# Patient Record
Sex: FEMALE | Race: WHITE | NOT HISPANIC OR LATINO | Employment: OTHER | URBAN - METROPOLITAN AREA
[De-identification: names, ages, dates, MRNs, and addresses within clinical notes are randomized per-mention and may not be internally consistent; named-entity substitution may affect disease eponyms.]

---

## 2017-11-13 ENCOUNTER — HOSPITAL ENCOUNTER (INPATIENT)
Facility: HOSPITAL | Age: 82
LOS: 3 days | Discharge: HOME | DRG: 377 | End: 2017-11-16
Attending: INTERNAL MEDICINE | Admitting: INTERNAL MEDICINE
Payer: MEDICARE

## 2017-11-13 ENCOUNTER — APPOINTMENT (EMERGENCY)
Dept: CT IMAGING | Facility: HOSPITAL | Age: 82
DRG: 377 | End: 2017-11-13
Payer: MEDICARE

## 2017-11-13 DIAGNOSIS — D62 ACUTE BLOOD LOSS ANEMIA: ICD-10-CM

## 2017-11-13 DIAGNOSIS — R58 HYPOVOLEMIA DUE TO HEMORRHAGE: ICD-10-CM

## 2017-11-13 DIAGNOSIS — K92.2 ACUTE LOWER GI BLEEDING: Primary | ICD-10-CM

## 2017-11-13 DIAGNOSIS — D64.9 ANEMIA: ICD-10-CM

## 2017-11-13 DIAGNOSIS — K92.2 GASTROINTESTINAL HEMORRHAGE, UNSPECIFIED GASTROINTESTINAL HEMORRHAGE TYPE: ICD-10-CM

## 2017-11-13 DIAGNOSIS — E86.1 HYPOVOLEMIA DUE TO HEMORRHAGE: ICD-10-CM

## 2017-11-13 PROBLEM — E87.2 LACTIC ACIDOSIS: Status: ACTIVE | Noted: 2017-11-13

## 2017-11-13 PROBLEM — E87.2 LACTIC ACIDOSIS: Status: RESOLVED | Noted: 2017-11-13 | Resolved: 2017-11-13

## 2017-11-13 PROBLEM — E86.0 DEHYDRATION: Status: ACTIVE | Noted: 2017-11-13

## 2017-11-13 PROBLEM — D75.839 THROMBOCYTOSIS: Status: ACTIVE | Noted: 2017-11-13

## 2017-11-13 PROBLEM — E87.20 LACTIC ACIDOSIS: Status: ACTIVE | Noted: 2017-11-13

## 2017-11-13 PROBLEM — N28.9 RENAL LESION: Status: ACTIVE | Noted: 2017-11-13

## 2017-11-13 PROBLEM — E87.20 LACTIC ACIDOSIS: Status: RESOLVED | Noted: 2017-11-13 | Resolved: 2017-11-13

## 2017-11-13 PROBLEM — I45.10 RIGHT BUNDLE BRANCH BLOCK (RBBB) ON ELECTROCARDIOGRAM (ECG): Status: ACTIVE | Noted: 2017-11-13

## 2017-11-13 PROBLEM — K92.1 HEMATOCHEZIA: Status: ACTIVE | Noted: 2017-11-13

## 2017-11-13 PROBLEM — R19.7 DIARRHEA: Status: ACTIVE | Noted: 2017-11-13

## 2017-11-13 PROBLEM — K57.30 DIVERTICULOSIS OF COLON: Status: ACTIVE | Noted: 2017-11-13

## 2017-11-13 PROBLEM — K44.9 LARGE HIATAL HERNIA: Status: ACTIVE | Noted: 2017-11-13

## 2017-11-13 PROBLEM — N17.9 ACUTE KIDNEY INJURY (HCC): Status: ACTIVE | Noted: 2017-11-13

## 2017-11-13 PROBLEM — I95.9 TRANSIENT HYPOTENSION: Status: ACTIVE | Noted: 2017-11-13

## 2017-11-13 LAB
ABO GROUP BLD: NORMAL
ALBUMIN SERPL BCP-MCNC: 2.6 G/DL (ref 3.5–5)
ALP SERPL-CCNC: 78 U/L (ref 46–116)
ALT SERPL W P-5'-P-CCNC: 14 U/L (ref 12–78)
ANION GAP BLD CALC-SCNC: 17 MMOL/L (ref 4–13)
ANION GAP SERPL CALCULATED.3IONS-SCNC: 8 MMOL/L (ref 4–13)
APTT PPP: 27 SECONDS (ref 23–35)
AST SERPL W P-5'-P-CCNC: 16 U/L (ref 5–45)
ATRIAL RATE: 87 BPM
BASOPHILS # BLD AUTO: 0.02 THOUSANDS/ΜL (ref 0–0.1)
BASOPHILS NFR BLD AUTO: 0 % (ref 0–1)
BILIRUB SERPL-MCNC: 0.3 MG/DL (ref 0.2–1)
BLD GP AB SCN SERPL QL: NEGATIVE
BUN BLD-MCNC: 19 MG/DL (ref 5–25)
BUN SERPL-MCNC: 20 MG/DL (ref 5–25)
CA-I BLD-SCNC: 1.12 MMOL/L (ref 1.12–1.32)
CALCIUM SERPL-MCNC: 8.1 MG/DL (ref 8.3–10.1)
CHLORIDE BLD-SCNC: 106 MMOL/L (ref 100–108)
CHLORIDE SERPL-SCNC: 108 MMOL/L (ref 100–108)
CO2 SERPL-SCNC: 26 MMOL/L (ref 21–32)
CREAT BLD-MCNC: 1.2 MG/DL (ref 0.6–1.3)
CREAT SERPL-MCNC: 1.34 MG/DL (ref 0.6–1.3)
EOSINOPHIL # BLD AUTO: 0.03 THOUSAND/ΜL (ref 0–0.61)
EOSINOPHIL NFR BLD AUTO: 0 % (ref 0–6)
ERYTHROCYTE [DISTWIDTH] IN BLOOD BY AUTOMATED COUNT: 15.4 % (ref 11.6–15.1)
GFR SERPL CREATININE-BSD FRML MDRD: 36 ML/MIN/1.73SQ M
GFR SERPL CREATININE-BSD FRML MDRD: 41 ML/MIN/1.73SQ M
GLUCOSE SERPL-MCNC: 126 MG/DL (ref 65–140)
GLUCOSE SERPL-MCNC: 136 MG/DL (ref 65–140)
HCT VFR BLD AUTO: 13.1 % (ref 34.8–46.1)
HCT VFR BLD CALC: <15 % (ref 34.8–46.1)
HGB BLD-MCNC: 3.9 G/DL (ref 11.5–15.4)
HGB BLD-MCNC: 5 G/DL (ref 11.5–15.4)
INR PPP: 1.08 (ref 0.86–1.16)
LACTATE SERPL-SCNC: 1 MMOL/L (ref 0.5–2)
LACTATE SERPL-SCNC: 2.1 MMOL/L (ref 0.5–2)
LIPASE SERPL-CCNC: 185 U/L (ref 73–393)
LYMPHOCYTES # BLD AUTO: 1.11 THOUSANDS/ΜL (ref 0.6–4.47)
LYMPHOCYTES NFR BLD AUTO: 11 % (ref 14–44)
MCH RBC QN AUTO: 25.7 PG (ref 26.8–34.3)
MCHC RBC AUTO-ENTMCNC: 29.8 G/DL (ref 31.4–37.4)
MCV RBC AUTO: 86 FL (ref 82–98)
MONOCYTES # BLD AUTO: 0.67 THOUSAND/ΜL (ref 0.17–1.22)
MONOCYTES NFR BLD AUTO: 7 % (ref 4–12)
NEUTROPHILS # BLD AUTO: 8.1 THOUSANDS/ΜL (ref 1.85–7.62)
NEUTS SEG NFR BLD AUTO: 82 % (ref 43–75)
P AXIS: 96 DEGREES
PCO2 BLD: 22 MMOL/L (ref 21–32)
PLATELET # BLD AUTO: 416 THOUSANDS/UL (ref 149–390)
PMV BLD AUTO: 8.5 FL (ref 8.9–12.7)
POTASSIUM BLD-SCNC: 3.9 MMOL/L (ref 3.5–5.3)
POTASSIUM SERPL-SCNC: 4.1 MMOL/L (ref 3.5–5.3)
PR INTERVAL: 174 MS
PROT SERPL-MCNC: 6.1 G/DL (ref 6.4–8.2)
PROTHROMBIN TIME: 14.3 SECONDS (ref 12.1–14.4)
QRS AXIS: -15 DEGREES
QRSD INTERVAL: 98 MS
QT INTERVAL: 352 MS
QTC INTERVAL: 423 MS
RBC # BLD AUTO: 1.52 MILLION/UL (ref 3.81–5.12)
RH BLD: POSITIVE
SODIUM BLD-SCNC: 141 MMOL/L (ref 136–145)
SODIUM SERPL-SCNC: 142 MMOL/L (ref 136–145)
SPECIMEN EXPIRATION DATE: NORMAL
SPECIMEN SOURCE: ABNORMAL
T WAVE AXIS: 92 DEGREES
TROPONIN I SERPL-MCNC: <0.02 NG/ML
VENTRICULAR RATE: 87 BPM
WBC # BLD AUTO: 9.93 THOUSAND/UL (ref 4.31–10.16)

## 2017-11-13 PROCEDURE — 87040 BLOOD CULTURE FOR BACTERIA: CPT | Performed by: PHYSICIAN ASSISTANT

## 2017-11-13 PROCEDURE — 30233N1 TRANSFUSION OF NONAUTOLOGOUS RED BLOOD CELLS INTO PERIPHERAL VEIN, PERCUTANEOUS APPROACH: ICD-10-PCS | Performed by: FAMILY MEDICINE

## 2017-11-13 PROCEDURE — P9021 RED BLOOD CELLS UNIT: HCPCS

## 2017-11-13 PROCEDURE — 86850 RBC ANTIBODY SCREEN: CPT | Performed by: PHYSICIAN ASSISTANT

## 2017-11-13 PROCEDURE — 86920 COMPATIBILITY TEST SPIN: CPT

## 2017-11-13 PROCEDURE — 83605 ASSAY OF LACTIC ACID: CPT | Performed by: PHYSICIAN ASSISTANT

## 2017-11-13 PROCEDURE — 85610 PROTHROMBIN TIME: CPT | Performed by: PHYSICIAN ASSISTANT

## 2017-11-13 PROCEDURE — 80053 COMPREHEN METABOLIC PANEL: CPT | Performed by: PHYSICIAN ASSISTANT

## 2017-11-13 PROCEDURE — 93005 ELECTROCARDIOGRAM TRACING: CPT

## 2017-11-13 PROCEDURE — 85014 HEMATOCRIT: CPT

## 2017-11-13 PROCEDURE — 99285 EMERGENCY DEPT VISIT HI MDM: CPT

## 2017-11-13 PROCEDURE — 84484 ASSAY OF TROPONIN QUANT: CPT | Performed by: PHYSICIAN ASSISTANT

## 2017-11-13 PROCEDURE — 83690 ASSAY OF LIPASE: CPT | Performed by: PHYSICIAN ASSISTANT

## 2017-11-13 PROCEDURE — 85018 HEMOGLOBIN: CPT | Performed by: PHYSICIAN ASSISTANT

## 2017-11-13 PROCEDURE — 85025 COMPLETE CBC W/AUTO DIFF WBC: CPT | Performed by: PHYSICIAN ASSISTANT

## 2017-11-13 PROCEDURE — 85730 THROMBOPLASTIN TIME PARTIAL: CPT | Performed by: PHYSICIAN ASSISTANT

## 2017-11-13 PROCEDURE — 93005 ELECTROCARDIOGRAM TRACING: CPT | Performed by: PHYSICIAN ASSISTANT

## 2017-11-13 PROCEDURE — 36415 COLL VENOUS BLD VENIPUNCTURE: CPT | Performed by: PHYSICIAN ASSISTANT

## 2017-11-13 PROCEDURE — P9040 RBC LEUKOREDUCED IRRADIATED: HCPCS

## 2017-11-13 PROCEDURE — 80047 BASIC METABLC PNL IONIZED CA: CPT

## 2017-11-13 PROCEDURE — 96361 HYDRATE IV INFUSION ADD-ON: CPT

## 2017-11-13 PROCEDURE — 36430 TRANSFUSION BLD/BLD COMPNT: CPT

## 2017-11-13 PROCEDURE — C9113 INJ PANTOPRAZOLE SODIUM, VIA: HCPCS | Performed by: PHYSICIAN ASSISTANT

## 2017-11-13 PROCEDURE — 74176 CT ABD & PELVIS W/O CONTRAST: CPT

## 2017-11-13 PROCEDURE — 96360 HYDRATION IV INFUSION INIT: CPT

## 2017-11-13 PROCEDURE — 86901 BLOOD TYPING SEROLOGIC RH(D): CPT | Performed by: PHYSICIAN ASSISTANT

## 2017-11-13 PROCEDURE — 86900 BLOOD TYPING SEROLOGIC ABO: CPT | Performed by: PHYSICIAN ASSISTANT

## 2017-11-13 RX ORDER — SODIUM CHLORIDE, SODIUM GLUCONATE, SODIUM ACETATE, POTASSIUM CHLORIDE, MAGNESIUM CHLORIDE, SODIUM PHOSPHATE, DIBASIC, AND POTASSIUM PHOSPHATE .53; .5; .37; .037; .03; .012; .00082 G/100ML; G/100ML; G/100ML; G/100ML; G/100ML; G/100ML; G/100ML
75 INJECTION, SOLUTION INTRAVENOUS CONTINUOUS
Status: DISCONTINUED | OUTPATIENT
Start: 2017-11-13 | End: 2017-11-16 | Stop reason: HOSPADM

## 2017-11-13 RX ADMIN — SODIUM CHLORIDE 8 MG/HR: 900 INJECTION, SOLUTION INTRAVENOUS at 20:28

## 2017-11-13 RX ADMIN — POLYETHYLENE GLYCOL 3350, SODIUM SULFATE ANHYDROUS, SODIUM BICARBONATE, SODIUM CHLORIDE, POTASSIUM CHLORIDE 2000 ML: 236; 22.74; 6.74; 5.86; 2.97 POWDER, FOR SOLUTION ORAL at 20:29

## 2017-11-13 RX ADMIN — SODIUM CHLORIDE 80 MG: 9 INJECTION, SOLUTION INTRAVENOUS at 20:28

## 2017-11-13 RX ADMIN — SODIUM CHLORIDE 1000 ML: 0.9 INJECTION, SOLUTION INTRAVENOUS at 15:58

## 2017-11-13 NOTE — ED PROVIDER NOTES
History  Chief Complaint   Patient presents with    Diarrhea     Per EMS and daughter "Pt was at [facility] BP was 65/37 and ambulance was called, Pt has not been feeling well for the last 8 days  Diarrhea on and off, daughter gave immodium just this morning " Pt also c/o lightheaded and dizziness, and denies SOB and chest pain"     Patient presents to emergency department diarrhea that has off and on for about 8 days  She lives at daughter and family  Daughter has been giving her Imodium as needed  Slows the diarrhea but then the diarrhea returns  She has been feeling dizzy and lightheaded off and on  She is not having any pain in her chest or difficulty breathing  Daughter states that her stool color is different and states that it is eggplant with a bit of red in color ankle  However they deny that it looks bloody  They deny that it is black and target  She states she has been able to keep in liquids and bland foods she has not had any vomiting or nausea  She denies any abdominal pain  Patient is an otherwise healthy 59-year-old woman who does not take anything regularly but daughter states she does take Aleve as needed for her knee pain  Daughter admits she does not have a family doctor she has never had a colonoscopy and has not had any medical care in years  Patient was taken to urgent care and was found have a blood pressure of 60 over 30 when EMS arrived it had started to improve they got IV access and started to give fluids just shy of 200 had gone in upon patient's arrival here  Patient's blood pressure is in the 1 teens over 50s to 60s here  Patient admits that she has been very lightheaded and dizzy off and on over the past week  None       History reviewed  No pertinent past medical history  Past Surgical History:   Procedure Laterality Date    CATARACT EXTRACTION         History reviewed  No pertinent family history    I have reviewed and agree with the history as documented  Social History   Substance Use Topics    Smoking status: Never Smoker    Smokeless tobacco: Never Used    Alcohol use No        Review of Systems   All other systems reviewed and are negative  Physical Exam  ED Triage Vitals   Temperature Pulse Respirations Blood Pressure SpO2   11/13/17 1530 11/13/17 1530 11/13/17 1530 11/13/17 1530 11/13/17 1530   97 6 °F (36 4 °C) 90 20 112/57 99 %      Temp Source Heart Rate Source Patient Position - Orthostatic VS BP Location FiO2 (%)   11/13/17 1530 11/13/17 1530 11/13/17 1530 11/13/17 1530 --   Oral Monitor Lying Right arm       Pain Score       11/13/17 1949       No Pain           Orthostatic Vital Signs  Vitals:    11/13/17 1845 11/13/17 1900 11/13/17 1915 11/13/17 1937   BP: 124/58 122/59 136/61    Pulse: 92 92 94    Patient Position - Orthostatic VS: Lying Lying Lying Lying       Physical Exam   Constitutional: She is oriented to person, place, and time  She appears well-developed and well-nourished  HENT:   Head: Normocephalic  Mouth/Throat: Oropharynx is clear and moist    Eyes: Conjunctivae and EOM are normal    Neck: Neck supple  Cardiovascular: Normal rate, regular rhythm and normal heart sounds  Pulmonary/Chest: Effort normal and breath sounds normal    Abdominal: Soft  Bowel sounds are normal  There is no tenderness  Genitourinary: Rectal exam shows guaiac positive stool  Genitourinary Comments: Gross bright red blood no masses palpated no significant hemorrhoids   Musculoskeletal: She exhibits no edema  Neurological: She is alert and oriented to person, place, and time  Skin: Skin is warm  Psychiatric: She has a normal mood and affect  Her behavior is normal    Nursing note and vitals reviewed        ED Medications  Medications   multi-electrolyte (ISOLYTE-S PH 7 4 equivalent) IV solution (not administered)   pantoprazole (PROTONIX) 80 mg in sodium chloride 0 9 % 100 mL IVPB (not administered)   pantoprazole (PROTONIX) 80 mg in sodium chloride 0 9 % 100 mL infusion (not administered)   polyethylene glycol (GOLYTELY) bowel prep 2,000 mL (not administered)   sodium chloride 0 9 % bolus 1,000 mL (0 mL Intravenous Stopped 11/13/17 1319)       Diagnostic Studies  Results Reviewed     Procedure Component Value Units Date/Time    POCT Chem 8+ [[id number]]  (Abnormal) Collected:  11/13/17 1630    Lab Status:  Final result Updated:  11/13/17 1635     SODIUM, I-STAT 141 mmol/l      Potassium, i-STAT 3 9 mmol/L      Chloride, istat 106 mmol/L      CO2, i-STAT 22 mmol/L      Anion Gap, Istat 17 (H) mmol/L      Calcium, Ionized i-STAT 1 12 mmol/L      BUN, I-STAT 19 mg/dl      Creatinine, i-STAT 1 2 mg/dl      eGFR 41 ml/min/1 73sq m      Glucose, i-STAT 126 mg/dl      Hct, i-STAT <15 (L) %      Hgb, i-STAT -- g/dl      Specimen Type VENOUS    Lactic acid, plasma [[id number]]  (Abnormal) Collected:  11/13/17 1553    Lab Status:  Final result Specimen:  Blood from Arm, Left Updated:  11/13/17 1634     LACTIC ACID 2 1 (HH) mmol/L     Narrative:         Result may be elevated if tourniquet was used during collection  Troponin I [[id number]]  (Normal) Collected:  11/13/17 1553    Lab Status:  Final result Specimen:  Blood from Arm, Left Updated:  11/13/17 1625     Troponin I <0 02 ng/mL     Narrative:         [employer] Chemistry analyzer 99% cutoff is > 0 04 ng/mL in network labs    o cTnI 99% cutoff is useful only when applied to patients in the clinical setting of myocardial ischemia  o cTnI 99% cutoff should be interpreted in the context of clinical history, ECG findings and possibly cardiac imaging to establish correct diagnosis  o cTnI 99% cutoff may be suggestive but clearly not indicative of a coronary event without the clinical setting of myocardial ischemia      Comprehensive metabolic panel [[id number]]  (Abnormal) Collected:  11/13/17 1553    Lab Status:  Final result Specimen:  Blood from Arm, Left Updated:  11/13/17 1623     Sodium 142 mmol/L Potassium 4 1 mmol/L      Chloride 108 mmol/L      CO2 26 mmol/L      Anion Gap 8 mmol/L      BUN 20 mg/dL      Creatinine 1 34 (H) mg/dL      Glucose 136 mg/dL      Calcium 8 1 (L) mg/dL      AST 16 U/L      ALT 14 U/L      Alkaline Phosphatase 78 U/L      Total Protein 6 1 (L) g/dL      Albumin 2 6 (L) g/dL      Total Bilirubin 0 30 mg/dL      eGFR 36 ml/min/1 73sq m     Narrative:         National Kidney Disease Education Program recommendations are as follows:  GFR calculation is accurate only with a steady state creatinine  Chronic Kidney disease less than 60 ml/min/1 73 sq  meters  Kidney failure less than 15 ml/min/1 73 sq  meters  Lipase [[id number]]  (Normal) Collected:  11/13/17 1553    Lab Status:  Final result Specimen:  Blood from Arm, Left Updated:  11/13/17 1623     Lipase 185 u/L     Protime-INR [[id number]]  (Normal) Collected:  11/13/17 1553    Lab Status:  Final result Specimen:  Blood from Arm, Left Updated:  11/13/17 1617     Protime 14 3 seconds      INR 1 08    APTT [[id number]]  (Normal) Collected:  11/13/17 1553    Lab Status:  Final result Specimen:  Blood from Arm, Left Updated:  11/13/17 1617     PTT 27 seconds     Narrative:          Therapeutic Heparin Range = 60-90 seconds    CBC and differential [[id number]]  (Abnormal) Collected:  11/13/17 1553    Lab Status:  Final result Specimen:  Blood from Arm, Left Updated:  11/13/17 1614     WBC 9 93 Thousand/uL      RBC 1 52 (L) Million/uL      Hemoglobin 3 9 (LL) g/dL      Hematocrit 13 1 (L) %      MCV 86 fL      MCH 25 7 (L) pg      MCHC 29 8 (L) g/dL      RDW 15 4 (H) %      MPV 8 5 (L) fL      Platelets 966 (H) Thousands/uL      Neutrophils Relative 82 (H) %      Lymphocytes Relative 11 (L) %      Monocytes Relative 7 %      Eosinophils Relative 0 %      Basophils Relative 0 %      Neutrophils Absolute 8 10 (H) Thousands/µL      Lymphocytes Absolute 1 11 Thousands/µL      Monocytes Absolute 0 67 Thousand/µL      Eosinophils Absolute 0 03 Thousand/µL      Basophils Absolute 0 02 Thousands/µL     Blood culture #1 [[id number]] Collected:  11/13/17 1554    Lab Status: In process Specimen:  Blood from Arm, Left Updated:  11/13/17 1604    Blood culture #2 [[id number]] Collected:  11/13/17 1557    Lab Status: In process Specimen:  Blood from Arm, Right Updated:  11/13/17 1604    POCT urinalysis dipstick [[id number]]     Lab Status:  No result Specimen:  Urine                  CT abdomen pelvis wo contrast   Final Result by Gifty Meza MD (11/13 1706)      Colonic diverticulosis without acute diverticulitis  Cholelithiasis without signs of cholecystitis  Large hiatal hernia containing proximally 50 percent of the stomach  No acute inflammatory changes in the abdomen or pelvis  Dominant 6 cm hyperdense right renal lesion is likely a hemorrhagic cyst   Consider comparison with prior studies if available           ##[email or web address]##imslh         Workstation performed: [id number]                    Procedures  ECG 12 Lead Documentation  Date/Time: 11/13/2017 4:41 PM  Performed by: MANUEL Honeycutt  Authorized by: Yobany EID     Patient location:  ED  Previous ECG:     Previous ECG:  Unavailable  Rate:     ECG rate:  87  Rhythm:     Rhythm: sinus rhythm    Ectopy:     Ectopy: none    QRS:     QRS axis:  Normal  ST segments:     ST segments:  Normal  T waves:     T waves: inverted      Inverted:  AVL    CriticalCare Time  Performed by: MANEUL MATHUR  Authorized by: Yobany EID     Critical care provider statement:     Critical care time (minutes):  40    Critical care was necessary to treat or prevent imminent or life-threatening deterioration of the following conditions:  Shock    Critical care was time spent personally by me on the following activities:  Obtaining history from patient or surrogate, development of treatment plan with patient or surrogate, discussions with consultants, discussions with primary provider, evaluation of patient's response to treatment, examination of patient, ordering and performing treatments and interventions, ordering and review of laboratory studies and re-evaluation of patient's condition  Comments:       90s a few times did spike up into the low 100s but did not get higher  Patient remained alert and oriented we and had multiple conversations with patient and daughter  Phone Contacts  ED Phone Contact    ED Course  ED Course as of Nov 13 2016   Mon Nov 13, 2017   1643 Initially concern for possible GI infectious cause of her is dizziness and diarrhea and so blood cultures and a lactic or initially ordered however it is secondary to her GI bleed  The elevated lactic acid is secondary to her hypovolemia secondary to GI bleed not septic shock as I was originally concerned about  1738 Discussed code status with patient and she became upset and did not answer however the daughter said yes she would be a full code  1738 Patient's vitals remained stable her heart rate remains in the 90s and her blood pressure remains stable  Currently heart rate is 90-94 with all blood pressure of 117/55  Continue to closely monitor patient  Blood is here    0 Spoke with critical care DR Eid - discussed case head and spoke with the critical care JOSÉ Padilla she will come and see the patient here in the emergency department will admit to ICU                                  MDM  Number of Diagnoses or Management Options  Acute lower GI bleeding: new and requires workup  Anemia: new and requires workup  Hypovolemia due to hemorrhage: new and requires workup     Amount and/or Complexity of Data Reviewed  Clinical lab tests: reviewed  Tests in the radiology section of CPT®: reviewed  Discussion of test results with the performing providers: yes  Obtain history from someone other than the patient: yes  Discuss the patient with other providers: yes    Risk of Complications, Morbidity, and/or Mortality  General comments: Spoke with critical care he also requested to let General surgery no and I did speak with surgical PA about case they are aware  No formal Consul is needed at this time  Discussed case in detail with Dr Corrinne Mountain will also saw and evaluated the patient  Patient Progress  Patient progress: improved    CritCare Time    Disposition  Final diagnoses:   Acute lower GI bleeding   Hypovolemia due to hemorrhage   Anemia - 2nd to hemmorage     Time reflects when diagnosis was documented in both MDM as applicable and the Disposition within this note     Time User Action Codes Description Comment    11/13/2017  6:12 PM Lisa Russell Add [K92 2] Acute lower GI bleeding     11/13/2017  6:12 PM Lisa Russell Add [E86 1,  R58] Hypovolemia due to hemorrhage     11/13/2017  6:12 PM Lisa Russell Add [D64 9] Anemia     11/13/2017  6:13 PM Lisa Russell Modify [D64 9] Anemia 2nd to hemmorage    11/13/2017  7:42 PM Emile Coffee Add [D62] Acute blood loss anemia     11/13/2017  7:43 PM Emile Coffee Add [K92 2] Gastrointestinal hemorrhage, unspecified gastrointestinal hemorrhage type     11/13/2017  7:43 PM Emile Coffee Modify [K92 2] Gastrointestinal hemorrhage, unspecified gastrointestinal hemorrhage type       ED Disposition     ED Disposition Condition Comment    Admit  Case was discussed with Dr Ashley Diaz / Sav Kaur and the patient's admission status was agreed to be admisison  to the service of Dr Mark Colby    None       There are no discharge medications for this patient  No discharge procedures on file      ED Provider  Electronically Signed by           Brandon Mccormick PA-C  11/13/17 2016

## 2017-11-14 ENCOUNTER — ANESTHESIA (INPATIENT)
Dept: GASTROENTEROLOGY | Facility: HOSPITAL | Age: 82
DRG: 377 | End: 2017-11-14
Payer: MEDICARE

## 2017-11-14 ENCOUNTER — ANESTHESIA EVENT (INPATIENT)
Dept: GASTROENTEROLOGY | Facility: HOSPITAL | Age: 82
DRG: 377 | End: 2017-11-14
Payer: MEDICARE

## 2017-11-14 ENCOUNTER — GENERIC CONVERSION - ENCOUNTER (OUTPATIENT)
Dept: OTHER | Facility: OTHER | Age: 82
End: 2017-11-14

## 2017-11-14 PROBLEM — I95.9 TRANSIENT HYPOTENSION: Status: RESOLVED | Noted: 2017-11-13 | Resolved: 2017-11-14

## 2017-11-14 PROBLEM — D75.839 THROMBOCYTOSIS: Status: RESOLVED | Noted: 2017-11-13 | Resolved: 2017-11-14

## 2017-11-14 PROBLEM — K92.2 GASTROINTESTINAL HEMORRHAGE: Status: ACTIVE | Noted: 2017-11-13

## 2017-11-14 LAB
ABO GROUP BLD BPU: NORMAL
ALBUMIN SERPL BCP-MCNC: 2.3 G/DL (ref 3.5–5)
ALP SERPL-CCNC: 78 U/L (ref 46–116)
ALT SERPL W P-5'-P-CCNC: 12 U/L (ref 12–78)
ANION GAP SERPL CALCULATED.3IONS-SCNC: 8 MMOL/L (ref 4–13)
AST SERPL W P-5'-P-CCNC: 17 U/L (ref 5–45)
BASOPHILS # BLD AUTO: 0.02 THOUSANDS/ΜL (ref 0–0.1)
BASOPHILS NFR BLD AUTO: 0 % (ref 0–1)
BILIRUB SERPL-MCNC: 0.9 MG/DL (ref 0.2–1)
BPU ID: NORMAL
BUN SERPL-MCNC: 16 MG/DL (ref 5–25)
C DIFF TOX GENS STL QL NAA+PROBE: NORMAL
CA-I BLD-SCNC: 0.94 MMOL/L (ref 1.12–1.32)
CALCIUM SERPL-MCNC: 7.7 MG/DL (ref 8.3–10.1)
CHLORIDE SERPL-SCNC: 109 MMOL/L (ref 100–108)
CO2 SERPL-SCNC: 24 MMOL/L (ref 21–32)
CREAT SERPL-MCNC: 1.08 MG/DL (ref 0.6–1.3)
CROSSMATCH: NORMAL
EOSINOPHIL # BLD AUTO: 0.05 THOUSAND/ΜL (ref 0–0.61)
EOSINOPHIL NFR BLD AUTO: 1 % (ref 0–6)
ERYTHROCYTE [DISTWIDTH] IN BLOOD BY AUTOMATED COUNT: 13.8 % (ref 11.6–15.1)
GFR SERPL CREATININE-BSD FRML MDRD: 46 ML/MIN/1.73SQ M
GLUCOSE SERPL-MCNC: 96 MG/DL (ref 65–140)
HCT VFR BLD AUTO: 22.3 % (ref 34.8–46.1)
HGB BLD-MCNC: 6.6 G/DL (ref 11.5–15.4)
HGB BLD-MCNC: 7.2 G/DL (ref 11.5–15.4)
HGB BLD-MCNC: 8 G/DL (ref 11.5–15.4)
HGB BLD-MCNC: 8.2 G/DL (ref 11.5–15.4)
INR PPP: 1.09 (ref 0.86–1.16)
LYMPHOCYTES # BLD AUTO: 1.24 THOUSANDS/ΜL (ref 0.6–4.47)
LYMPHOCYTES NFR BLD AUTO: 13 % (ref 14–44)
MAGNESIUM SERPL-MCNC: 2 MG/DL (ref 1.6–2.6)
MCH RBC QN AUTO: 27.8 PG (ref 26.8–34.3)
MCHC RBC AUTO-ENTMCNC: 32.3 G/DL (ref 31.4–37.4)
MCV RBC AUTO: 86 FL (ref 82–98)
MONOCYTES # BLD AUTO: 1.1 THOUSAND/ΜL (ref 0.17–1.22)
MONOCYTES NFR BLD AUTO: 12 % (ref 4–12)
NEUTROPHILS # BLD AUTO: 7.13 THOUSANDS/ΜL (ref 1.85–7.62)
NEUTS SEG NFR BLD AUTO: 74 % (ref 43–75)
PHOSPHATE SERPL-MCNC: 2.8 MG/DL (ref 2.3–4.1)
PLATELET # BLD AUTO: 299 THOUSANDS/UL (ref 149–390)
PMV BLD AUTO: 8.5 FL (ref 8.9–12.7)
POTASSIUM SERPL-SCNC: 3.8 MMOL/L (ref 3.5–5.3)
PROT SERPL-MCNC: 5.5 G/DL (ref 6.4–8.2)
PROTHROMBIN TIME: 14.5 SECONDS (ref 12.1–14.4)
RBC # BLD AUTO: 2.59 MILLION/UL (ref 3.81–5.12)
SODIUM SERPL-SCNC: 141 MMOL/L (ref 136–145)
TROPONIN I SERPL-MCNC: <0.02 NG/ML
UNIT DISPENSE STATUS: NORMAL
UNIT PRODUCT CODE: NORMAL
UNIT RH: NORMAL
WBC # BLD AUTO: 9.54 THOUSAND/UL (ref 4.31–10.16)

## 2017-11-14 PROCEDURE — 84100 ASSAY OF PHOSPHORUS: CPT | Performed by: INTERNAL MEDICINE

## 2017-11-14 PROCEDURE — 85025 COMPLETE CBC W/AUTO DIFF WBC: CPT | Performed by: INTERNAL MEDICINE

## 2017-11-14 PROCEDURE — 82330 ASSAY OF CALCIUM: CPT | Performed by: INTERNAL MEDICINE

## 2017-11-14 PROCEDURE — 85610 PROTHROMBIN TIME: CPT | Performed by: INTERNAL MEDICINE

## 2017-11-14 PROCEDURE — 88342 IMHCHEM/IMCYTCHM 1ST ANTB: CPT | Performed by: INTERNAL MEDICINE

## 2017-11-14 PROCEDURE — 88341 IMHCHEM/IMCYTCHM EA ADD ANTB: CPT | Performed by: INTERNAL MEDICINE

## 2017-11-14 PROCEDURE — 88305 TISSUE EXAM BY PATHOLOGIST: CPT | Performed by: INTERNAL MEDICINE

## 2017-11-14 PROCEDURE — 80053 COMPREHEN METABOLIC PANEL: CPT | Performed by: INTERNAL MEDICINE

## 2017-11-14 PROCEDURE — 85018 HEMOGLOBIN: CPT | Performed by: INTERNAL MEDICINE

## 2017-11-14 PROCEDURE — 84484 ASSAY OF TROPONIN QUANT: CPT | Performed by: INTERNAL MEDICINE

## 2017-11-14 PROCEDURE — 0DB68ZX EXCISION OF STOMACH, VIA NATURAL OR ARTIFICIAL OPENING ENDOSCOPIC, DIAGNOSTIC: ICD-10-PCS | Performed by: INTERNAL MEDICINE

## 2017-11-14 PROCEDURE — 87493 C DIFF AMPLIFIED PROBE: CPT | Performed by: PHYSICIAN ASSISTANT

## 2017-11-14 PROCEDURE — C9113 INJ PANTOPRAZOLE SODIUM, VIA: HCPCS | Performed by: PHYSICIAN ASSISTANT

## 2017-11-14 PROCEDURE — 0DJD8ZZ INSPECTION OF LOWER INTESTINAL TRACT, VIA NATURAL OR ARTIFICIAL OPENING ENDOSCOPIC: ICD-10-PCS | Performed by: INTERNAL MEDICINE

## 2017-11-14 PROCEDURE — 83735 ASSAY OF MAGNESIUM: CPT | Performed by: INTERNAL MEDICINE

## 2017-11-14 RX ORDER — CALCIUM GLUCONATE 94 MG/ML
1 INJECTION, SOLUTION INTRAVENOUS ONCE
Status: DISCONTINUED | OUTPATIENT
Start: 2017-11-14 | End: 2017-11-14 | Stop reason: SDUPTHER

## 2017-11-14 RX ORDER — POTASSIUM CHLORIDE 14.9 MG/ML
20 INJECTION INTRAVENOUS ONCE
Status: COMPLETED | OUTPATIENT
Start: 2017-11-14 | End: 2017-11-14

## 2017-11-14 RX ORDER — MAGNESIUM CARB/ALUMINUM HYDROX 105-160MG
148 TABLET,CHEWABLE ORAL ONCE
Status: COMPLETED | OUTPATIENT
Start: 2017-11-14 | End: 2017-11-14

## 2017-11-14 RX ORDER — SODIUM CHLORIDE 9 MG/ML
INJECTION, SOLUTION INTRAVENOUS CONTINUOUS PRN
Status: DISCONTINUED | OUTPATIENT
Start: 2017-11-14 | End: 2017-11-14 | Stop reason: SURG

## 2017-11-14 RX ORDER — PROPOFOL 10 MG/ML
INJECTION, EMULSION INTRAVENOUS AS NEEDED
Status: DISCONTINUED | OUTPATIENT
Start: 2017-11-14 | End: 2017-11-14 | Stop reason: SURG

## 2017-11-14 RX ADMIN — SODIUM CHLORIDE: 0.9 INJECTION, SOLUTION INTRAVENOUS at 16:08

## 2017-11-14 RX ADMIN — PROPOFOL 20 MG: 10 INJECTION, EMULSION INTRAVENOUS at 16:21

## 2017-11-14 RX ADMIN — PROPOFOL 10 MG: 10 INJECTION, EMULSION INTRAVENOUS at 16:43

## 2017-11-14 RX ADMIN — PROPOFOL 10 MG: 10 INJECTION, EMULSION INTRAVENOUS at 16:25

## 2017-11-14 RX ADMIN — PROPOFOL 10 MG: 10 INJECTION, EMULSION INTRAVENOUS at 16:33

## 2017-11-14 RX ADMIN — CALCIUM GLUCONATE 1 G: 94 INJECTION, SOLUTION INTRAVENOUS at 05:49

## 2017-11-14 RX ADMIN — PROPOFOL 10 MG: 10 INJECTION, EMULSION INTRAVENOUS at 16:28

## 2017-11-14 RX ADMIN — SODIUM CHLORIDE 8 MG/HR: 900 INJECTION, SOLUTION INTRAVENOUS at 07:40

## 2017-11-14 RX ADMIN — PROPOFOL 10 MG: 10 INJECTION, EMULSION INTRAVENOUS at 16:38

## 2017-11-14 RX ADMIN — PROPOFOL 20 MG: 10 INJECTION, EMULSION INTRAVENOUS at 16:17

## 2017-11-14 RX ADMIN — PROPOFOL 10 MG: 10 INJECTION, EMULSION INTRAVENOUS at 16:36

## 2017-11-14 RX ADMIN — PROPOFOL 10 MG: 10 INJECTION, EMULSION INTRAVENOUS at 16:31

## 2017-11-14 RX ADMIN — PROPOFOL 20 MG: 10 INJECTION, EMULSION INTRAVENOUS at 16:15

## 2017-11-14 RX ADMIN — PROPOFOL 10 MG: 10 INJECTION, EMULSION INTRAVENOUS at 16:40

## 2017-11-14 RX ADMIN — POTASSIUM CHLORIDE 20 MEQ: 200 INJECTION, SOLUTION INTRAVENOUS at 05:45

## 2017-11-14 RX ADMIN — SODIUM CHLORIDE, SODIUM GLUCONATE, SODIUM ACETATE, POTASSIUM CHLORIDE, MAGNESIUM CHLORIDE, SODIUM PHOSPHATE, DIBASIC, AND POTASSIUM PHOSPHATE 50 ML/HR: .53; .5; .37; .037; .03; .012; .00082 INJECTION, SOLUTION INTRAVENOUS at 01:36

## 2017-11-14 RX ADMIN — MAGNESIUM CITRATE 148 ML: 1.75 LIQUID ORAL at 09:19

## 2017-11-14 RX ADMIN — PROPOFOL 20 MG: 10 INJECTION, EMULSION INTRAVENOUS at 16:19

## 2017-11-14 RX ADMIN — SODIUM CHLORIDE 8 MG/HR: 900 INJECTION, SOLUTION INTRAVENOUS at 18:43

## 2017-11-14 RX ADMIN — SODIUM CHLORIDE, SODIUM GLUCONATE, SODIUM ACETATE, POTASSIUM CHLORIDE, MAGNESIUM CHLORIDE, SODIUM PHOSPHATE, DIBASIC, AND POTASSIUM PHOSPHATE 75 ML/HR: .53; .5; .37; .037; .03; .012; .00082 INJECTION, SOLUTION INTRAVENOUS at 18:28

## 2017-11-14 NOTE — CONSULTS
Consultation - 126 [facility]  Qian Mullen 80 y o  female MRN: [id number]  Unit/Bed#:  Encounter: [id number]        Inpatient consult to gastroenterology  Consult performed by: George Villa ordered by: Michell Crespo        Reason for Consult / Principal Problem: Gastrointestinal bleeding    HPI: Qian Mullen is a 80y o  year old female with reportedly no known medical history (does not have a family doctor, never had a colonoscopy and has not had any medical care in years) who presented to the emergency room yesterday afternoon complaining of intermittent diarrhea for the last 8 days, also sometimes feeling dizzy and lightheaded  Patient's family did not think that her stools had looked bloody but had an eggplant-like coloration  Patient was taken to urgent care and found to have blood pressure of 60/30, was taken here from there via EMS  In the emergency room rectal exam showed guaiac-positive stool with gross bright red blood  Patient takes Aleve as needed for knee pain, amounting to about once per day  Hemoglobin was found to be severely decreased at 3 9, baseline unknown, with MCV apparently normal at 86  BUN within normal limits at 20  She has been transfused packed red blood cells and hemoglobin this morning is 7 2  Patient was given half a bowel prep last night which she tolerated, she denies any abdominal pain, nausea or vomiting  She had some liquidy brown stool this morning  No active rectal bleeding at this time  Patient says that she was experiencing diarrhea only intermittently over the last week, not profusely throughout the day generally  She denies any known history of gastrointestinal issues, she denies any problems with acid reflux, indigestion or difficulty with swallowing  REVIEW OF SYSTEMS:    CONSTITUTIONAL: Denies any fever, chills, or rigors  Good appetite, and no recent weight loss  HEENT: No earache or tinnitus   Denies hearing loss or visual disturbances  CARDIOVASCULAR: No chest pain or palpitations  RESPIRATORY: Denies any cough, hemoptysis, shortness of breath or dyspnea on exertion  GASTROINTESTINAL: As noted in the History of Present Illness  GENITOURINARY: No problems with urination  Denies any hematuria or dysuria  NEUROLOGIC: No dizziness or vertigo, denies headaches  MUSCULOSKELETAL: Denies any muscle or joint pain  SKIN: Denies skin rashes or itching  ENDOCRINE: Denies excessive thirst  Denies intolerance to heat or cold  PSYCHOSOCIAL: Denies depression or anxiety  Denies any recent memory loss  Historical Information   History reviewed  No pertinent past medical history  Past Surgical History:   Procedure Laterality Date    CATARACT EXTRACTION       Social History   History   Alcohol Use No     History   Drug Use No     History   Smoking Status    Never Smoker   Smokeless Tobacco    Never Used     History reviewed  No pertinent family history  Meds/Allergies     No prescriptions prior to admission  Current Facility-Administered Medications   Medication Dose Route Frequency    multi-electrolyte (ISOLYTE-S PH 7 4 equivalent) IV solution  75 mL/hr Intravenous Continuous    pantoprazole (PROTONIX) 80 mg in sodium chloride 0 9 % 100 mL infusion  8 mg/hr Intravenous Continuous       No Known Allergies        Objective     Blood pressure 148/79, pulse 87, temperature 98 °F (36 7 °C), temperature source Oral, resp  rate 18, height 5' 5" (1 651 m), weight 86 9 kg (191 lb 9 3 oz), SpO2 100 %        Intake/Output Summary (Last 24 hours) at 11/14/17 0900  Last data filed at 11/14/17 0601   Gross per 24 hour   Intake          2116 33 ml   Output                0 ml   Net          2116 33 ml         PHYSICAL EXAM     General Appearance:   Alert, cooperative, no distress, appears stated age    HEENT:   Normocephalic, atraumatic, anicteric      Neck:  Supple, symmetrical, trachea midline, no adenopathy;    thyroid: no enlargement/tenderness/nodules; no carotid  bruit or JVD    Lungs:   Clear to auscultation bilaterally; no rales, rhonchi or wheezing; respirations unlabored    Heart[de-identified]   S1 and S2 normal; regular rate and rhythm; no murmur, rub, or gallop     Abdomen:   Soft, non-tender, non-distended; normal bowel sounds; no masses, no organomegaly    Genitalia:   Deferred    Rectal:   Deferred    Extremities:  No cyanosis, clubbing or edema    Pulses:  2+ and symmetric all extremities    Skin:  Skin color, texture, turgor normal, no rashes or lesions    Lymph nodes:  No palpable cervical, axillary or inguinal lymphadenopathy        Lab Results:   Admission on 11/13/2017   Component Date Value    WBC 11/13/2017 9 93     RBC 11/13/2017 1 52*    Hemoglobin 11/13/2017 3 9*    Hematocrit 11/13/2017 13 1*    MCV 11/13/2017 86     MCH 11/13/2017 25 7*    MCHC 11/13/2017 29 8*    RDW 11/13/2017 15 4*    MPV 11/13/2017 8 5*    Platelets 08/95/3737 416*    Neutrophils Relative 11/13/2017 82*    Lymphocytes Relative 11/13/2017 11*    Monocytes Relative 11/13/2017 7     Eosinophils Relative 11/13/2017 0     Basophils Relative 11/13/2017 0     Neutrophils Absolute 11/13/2017 8 10*    Lymphocytes Absolute 11/13/2017 1 11     Monocytes Absolute 11/13/2017 0 67     Eosinophils Absolute 11/13/2017 0 03     Basophils Absolute 11/13/2017 0 02     Sodium 11/13/2017 142     Potassium 11/13/2017 4 1     Chloride 11/13/2017 108     CO2 11/13/2017 26     Anion Gap 11/13/2017 8     BUN 11/13/2017 20     Creatinine 11/13/2017 1 34*    Glucose 11/13/2017 136     Calcium 11/13/2017 8 1*    AST 11/13/2017 16     ALT 11/13/2017 14     Alkaline Phosphatase 11/13/2017 78     Total Protein 11/13/2017 6 1*    Albumin 11/13/2017 2 6*    Total Bilirubin 11/13/2017 0 30     eGFR 11/13/2017 36     Lipase 11/13/2017 185     Protime 11/13/2017 14 3     INR 11/13/2017 1 08     PTT 11/13/2017 27     Troponin I 11/13/2017 <0 02     LACTIC ACID 11/13/2017 2 1*    ABO Grouping 11/13/2017 O     Rh Factor 11/13/2017 Positive     Antibody Screen 11/13/2017 Negative     Specimen Expiration Date 11/13/2017 [id number]     Unit Product Code 11/14/2017 T3969N06     Unit Number 11/14/2017 [id number]     Unit ABO 11/14/2017 O     Unit RH 11/14/2017 POS     Crossmatch 11/14/2017 Compatible     Unit Dispense Status 11/14/2017 Presumed Trans     Unit Product Code 11/14/2017 A7396E21     Unit Number 11/14/2017 [id number]     Unit ABO 11/14/2017 O     Unit RH 11/14/2017 POS     Crossmatch 11/14/2017 Compatible     Unit Dispense Status 11/14/2017 Presumed Trans     Unit Product Code 11/14/2017 L2947O20     Unit Number 11/14/2017 [id number]     Unit ABO 11/14/2017 O     Unit RH 11/14/2017 POS     Crossmatch 11/14/2017 Compatible     Unit Dispense Status 11/14/2017 Presumed Trans     SODIUM, I-STAT 11/13/2017 141     Potassium, i-STAT 11/13/2017 3 9     Chloride, istat 11/13/2017 106     CO2, i-STAT 11/13/2017 22     Anion Gap, Istat 11/13/2017 17*    Calcium, Ionized i-STAT 11/13/2017 1 12     BUN, I-STAT 11/13/2017 19     Creatinine, i-STAT 11/13/2017 1 2     eGFR 11/13/2017 41     Glucose, i-STAT 11/13/2017 126     Hct, i-STAT 11/13/2017 <15*    Hgb, i-STAT 11/13/2017      Specimen Type 11/13/2017 VENOUS     Ventricular Rate 11/13/2017 87     Atrial Rate 11/13/2017 87     IN Interval 11/13/2017 174     QRSD Interval 11/13/2017 98     QT Interval 11/13/2017 352     QTC Interval 11/13/2017 423     P Axis 11/13/2017 96     QRS Axis 11/13/2017 -15     T Wave Axis 11/13/2017 92     LACTIC ACID 11/13/2017 1 0     Hemoglobin 11/13/2017 5 0*    Hemoglobin 11/14/2017 6 6*    Calcium, Ionized 11/14/2017 0 94*    WBC 11/14/2017 9 54     RBC 11/14/2017 2 59*    Hemoglobin 11/14/2017 7 2*    Hematocrit 11/14/2017 22 3*    MCV 11/14/2017 86     MCH 11/14/2017 27 8     MCHC 11/14/2017 32 3     RDW 11/14/2017 13 8     MPV 11/14/2017 8 5*    Platelets 44/35/2775 299     Neutrophils Relative 11/14/2017 74     Lymphocytes Relative 11/14/2017 13*    Monocytes Relative 11/14/2017 12     Eosinophils Relative 11/14/2017 1     Basophils Relative 11/14/2017 0     Neutrophils Absolute 11/14/2017 7 13     Lymphocytes Absolute 11/14/2017 1 24     Monocytes Absolute 11/14/2017 1 10     Eosinophils Absolute 11/14/2017 0 05     Basophils Absolute 11/14/2017 0 02     Sodium 11/14/2017 141     Potassium 11/14/2017 3 8     Chloride 11/14/2017 109*    CO2 11/14/2017 24     Anion Gap 11/14/2017 8     BUN 11/14/2017 16     Creatinine 11/14/2017 1 08     Glucose 11/14/2017 96     Calcium 11/14/2017 7 7*    AST 11/14/2017 17     ALT 11/14/2017 12     Alkaline Phosphatase 11/14/2017 78     Total Protein 11/14/2017 5 5*    Albumin 11/14/2017 2 3*    Total Bilirubin 11/14/2017 0 90     eGFR 11/14/2017 46     Magnesium 11/14/2017 2 0     Phosphorus 11/14/2017 2 8     Protime 11/14/2017 14 5*    INR 11/14/2017 1 09     Troponin I 11/14/2017 <0 02      Results for Marla Brunner (MRN [id number]) as of 11/14/2017 09:00   Ref   Range 11/13/2017 15:34 11/13/2017 15:53 11/13/2017 15:54 11/13/2017 15:57 11/13/2017 16:23 11/13/2017 16:30 11/13/2017 21:04 11/13/2017 23:59 11/14/2017 00:15 11/14/2017 03:19 11/14/2017 05:47 11/14/2017 05:47 11/14/2017 05:47   Glucose, i-STAT Latest Ref Range: 65 - 140 mg/dl      126          SODIUM, I-STAT Latest Ref Range: 136 - 145 mmol/l      141          POTASSIUM,I-STAT Latest Ref Range: 3 5 - 5 3 mmol/L      3 9          CHLORIDE, ISTAT Latest Ref Range: 100 - 108 mmol/L      106          CO2, i-STAT Latest Ref Range: 21 - 32 mmol/L      22          Anion Gap, Istat Latest Ref Range: 4 - 13 mmol/L      17 (H)          CALCIUM, IONIZED ISTAT Latest Ref Range: 1 12 - 1 32 mmol/L      1 12          BUN, I-STAT Latest Ref Range: 5 - 25 mg/dl      19 CREATININE, I-STAT Latest Ref Range: 0 6 - 1 3 mg/dl      1 2          eGFR Latest Units: ml/min/1 73sq m  36    41    46      Hemoglobin, Istat Latest Ref Range: 11 5 - 15 4 g/dl      See Comment          HCT, I-STAT Latest Ref Range: 34 8 - 46 1 %      <15 (L)          Specimen Type Unknown      VENOUS          Sodium Latest Ref Range: 136 - 145 mmol/L  142        141      Potassium Latest Ref Range: 3 5 - 5 3 mmol/L  4 1        3 8      Chloride Latest Ref Range: 100 - 108 mmol/L  108        109 (H)      CO2 Latest Ref Range: 21 - 32 mmol/L  26        24      Anion Gap Latest Ref Range: 4 - 13 mmol/L  8        8      BUN Latest Ref Range: 5 - 25 mg/dL  20        16      Creatinine Latest Ref Range: 0 60 - 1 30 mg/dL  1 34 (H)        1 08      Glucose Latest Ref Range: 65 - 140 mg/dL  136        96      Calcium Latest Ref Range: 8 3 - 10 1 mg/dL  8 1 (L)        7 7 (L)      CALCIUM IONIZED Latest Ref Range: 1 12 - 1 32 mmol/L          0 94 (L)      AST Latest Ref Range: 5 - 45 U/L  16        17      ALT Latest Ref Range: 12 - 78 U/L  14        12      Alkaline Phosphatase Latest Ref Range: 46 - 116 U/L  78        78      Total Protein Latest Ref Range: 6 4 - 8 2 g/dL  6 1 (L)        5 5 (L)      Albumin Latest Ref Range: 3 5 - 5 0 g/dL  2 6 (L)        2 3 (L)      Total Bilirubin Latest Ref Range: 0 20 - 1 00 mg/dL  0 30        0 90      Phosphorus Latest Ref Range: 2 3 - 4 1 mg/dL          2 8      Magnesium Latest Ref Range: 1 6 - 2 6 mg/dL          2 0      Lipase Latest Ref Range: 73 - 393 u/L  185              Troponin I Latest Ref Range: <=0 04 ng/mL  <0 02        <0 02      LACTIC ACID Latest Ref Range: 0 5 - 2 0 mmol/L  2 1 (HH)     1 0         WBC Latest Ref Range: 4 31 - 10 16 Thousand/uL  9 93        9 54      RBC Latest Ref Range: 3 81 - 5 12 Million/uL  1 52 (L)        2 59 (L)      Hemoglobin Latest Ref Range: 11 5 - 15 4 g/dL  3 9 (LL)     5 0 (LL) 6 6 (LL)  7 2 (L)      Hematocrit Latest Ref Range: 34 8 - 46 1 %  13 1 (L)        22 3 (L)      MCV Latest Ref Range: 82 - 98 fL  86        86      MCH Latest Ref Range: 26 8 - 34 3 pg  25 7 (L)        27 8      MCHC Latest Ref Range: 31 4 - 37 4 g/dL  29 8 (L)        32 3      RDW Latest Ref Range: 11 6 - 15 1 %  15 4 (H)        13 8      Platelets Latest Ref Range: 149 - 390 Thousands/uL  416 (H)        299      MPV Latest Ref Range: 8 9 - 12 7 fL  8 5 (L)        8 5 (L)      Neutrophils Relative Latest Ref Range: 43 - 75 %  82 (H)        74      Lymphocytes Relative Latest Ref Range: 14 - 44 %  11 (L)        13 (L)      Monocytes Relative Latest Ref Range: 4 - 12 %  7        12      Eosinophils Relative Latest Ref Range: 0 - 6 %  0        1      Basophils Relative Latest Ref Range: 0 - 1 %  0        0      Neutrophils Absolute Latest Ref Range: 1 85 - 7 62 Thousands/µL  8 10 (H)        7 13      Lymphocytes Absolute Latest Ref Range: 0 60 - 4 47 Thousands/µL  1 11        1 24      Monocytes Absolute Latest Ref Range: 0 17 - 1 22 Thousand/µL  0 67        1 10      Eosinophils Absolute Latest Ref Range: 0 00 - 0 61 Thousand/µL  0 03        0 05      Basophils Absolute Latest Ref Range: 0 00 - 0 10 Thousands/µL  0 02        0 02      Protime Latest Ref Range: 12 1 - 14 4 seconds  14 3        14 5 (H)      INR Latest Ref Range: 0 86 - 1 16   1 08        1 09      PTT Latest Ref Range: 23 - 35 seconds  27              ABO Grouping Unknown     O           Rh Factor Unknown     Positive           Antibody Screen Unknown     Negative           Specimen Expiration Date Unknown     [id number]           CLOSTRIDIUM DIFFICILE TOXIN BY PCR Unknown         Rpt ((NONE))       BLOOD CULTURE Unknown   Rpt ((NONE)) Rpt ((NONE))            ECG 12-LEAD Unknown Rpt               Crossmatch Unknown           Compatible Compatible Compatible   Unit ABO Unknown           O O O       Imaging Studies: I have personally reviewed pertinent reports        CT ABDOMEN AND PELVIS WITHOUT IV CONTRAST  INDICATION:  "Per EMS and daughter "Pt was at [facility] BP was 65/37 and ambulance was called, Pt has not been feeling well for the last 8 days  Diarrhea on and off, daughter gave immodium just this morning " Pt also c/o lightheaded and dizziness,   and denies SOB and chest pain""  COMPARISON: None  TECHNIQUE:  CT examination of the abdomen and pelvis was performed without intravenous contrast   Reformatted images were created in axial, sagittal, and coronal planes  Radiation dose length product (DLP) for this visit:  504 mGy-cm   This examination, like all CT scans performed in the [facility], was performed utilizing techniques to minimize radiation dose exposure, including the use of iterative   reconstruction and automated exposure control  Enteric contrast was administered  FINDINGS:  ABDOMEN  LOWER CHEST:  Large hiatal hernia containing about 50 percent of the stomach  LIVER/BILIARY TREE:  Unremarkable  GALLBLADDER:  There are gallstone(s) within the gallbladder, without pericholecystic inflammatory changes  Dominant gallstone measures 2 0 cm in diameter  SPLEEN:  There is splenomegaly measuring  PANCREAS:  Unremarkable  ADRENAL GLANDS:  Unremarkable  KIDNEYS/URETERS:  No hydronephrosis or perinephric collection  Numerous simple appearing renal cysts  Single high density probably exophytic lesion off the lower pole of the left kidney measuring 6 4 cm in greatest diameter is likely a hemorrhagic   cyst   One of the left renal cyst contains thin wall calcifications  STOMACH AND BOWEL:  Advanced diffuse colonic diverticulosis without acute diverticulitis  No bowel obstruction or bowel pneumatosis  APPENDIX:  No findings to suggest appendicitis  ABDOMINOPELVIC CAVITY:  No ascites or free intraperitoneal air  No lymphadenopathy  VESSELS:  Unremarkable for patient's age  PELVIS  REPRODUCTIVE ORGANS:  Unremarkable for patient's age    URINARY BLADDER: Unremarkable  ABDOMINAL WALL/INGUINAL REGIONS:  Unremarkable  OSSEOUS STRUCTURES:  No acute fracture or destructive osseous lesion  IMPRESSION:  Colonic diverticulosis without acute diverticulitis  Cholelithiasis without signs of cholecystitis  Large hiatal hernia containing proximally 50 percent of the stomach  No acute inflammatory changes in the abdomen or pelvis  Dominant 6 cm hyperdense right renal lesion is likely a hemorrhagic cyst   Consider comparison with prior studies if available        ASSESSMENT/PLAN:     1  Severe symptomatic anemia with guaiac-positive stool, bright red blood on initial rectal exam   Hemoglobin appears appropriately increased after transfusion of packed red blood cells, and blood pressure appears improved with transfusions and IV fluids  She now appears to be having brown colored stools  Question if patient had an acute bleed from an upper source that currently is not hemorrhaging, such as a peptic ulcer (given her NSAID use), should also rule out lower GI sources    - monitor hemoglobin closely, transfuse further if needed  - Protonix IV  - will plan for EGD and colonoscopy today, give 1/2 bottle magnesium citrate stat to complete bowel prep  - I explained EGD and colonoscopy procedures in detail to the patient at this time including risks and benefits, risks including but not limited to infection, perforation and bleeding, patient expressed understanding and agreement  - try to avoid routine NSAID use, I explained this to the patient  - NPO now except for prep      The patient was seen and examined by Dr Keyanna Macias, all rincon medical decisions were made with Dr Keyanna Macias  Thank you for allowing us to participate in the care of this pleasant patient  We will follow up with you closely

## 2017-11-14 NOTE — PLAN OF CARE
Problem: Potential for Falls  Goal: Patient will remain free of falls  INTERVENTIONS:  - Assess patient frequently for physical needs  -  Identify cognitive and physical deficits and behaviors that affect risk of falls    -  [place] fall precautions as indicated by assessment   - Educate patient/family on patient safety including physical limitations  - Instruct patient to call for assistance with activity based on assessment  - Modify environment to reduce risk of injury  - Consider OT/PT consult to assist with strengthening/mobility   Outcome: Progressing      Problem: PAIN - ADULT  Goal: Verbalizes/displays adequate comfort level or baseline comfort level  Interventions:  - Encourage patient to monitor pain and request assistance  - Assess pain using appropriate pain scale  - Administer analgesics based on type and severity of pain and evaluate response  - Implement non-pharmacological measures as appropriate and evaluate response  - Consider cultural and social influences on pain and pain management  - Notify physician/advanced practitioner if interventions unsuccessful or patient reports new pain  Outcome: Progressing      Problem: GASTROINTESTINAL - ADULT  Goal: Maintains or returns to baseline bowel function  INTERVENTIONS:  - Assess bowel function  - Encourage oral fluids to ensure adequate hydration  - Administer IV fluids as ordered to ensure adequate hydration  - Administer ordered medications as needed  - Encourage mobilization and activity  - Nutrition services referral to assist patient with appropriate food choices  Outcome: Progressing    Goal: Maintains adequate nutritional intake  INTERVENTIONS:  - Monitor percentage of each meal consumed  - Identify factors contributing to decreased intake, treat as appropriate  - Assist with meals as needed  - Monitor I&O, WT and lab values  - Obtain nutrition services referral as needed  Outcome: Progressing      Problem: METABOLIC, FLUID AND ELECTROLYTES - ADULT  Goal: Electrolytes maintained within normal limits  INTERVENTIONS:  - Monitor labs and assess patient for signs and symptoms of electrolyte imbalances  - Administer electrolyte replacement as ordered  - Monitor response to electrolyte replacements, including repeat lab results as appropriate  - Instruct patient on fluid and nutrition as appropriate  Outcome: Progressing    Goal: Fluid balance maintained  INTERVENTIONS:  - Monitor labs and assess for signs and symptoms of volume excess or deficit  - Monitor I/O and WT  - Instruct patient on fluid and nutrition as appropriate  Outcome: Progressing      Problem: SKIN/TISSUE INTEGRITY - ADULT  Goal: Skin integrity remains intact  INTERVENTIONS  - Identify patients at risk for skin breakdown  - Assess and monitor skin integrity  - Assess and monitor nutrition and hydration status  - Monitor labs (i e  albumin)  - Assess for incontinence   - Turn and reposition patient  - Assist with mobility/ambulation  - Relieve pressure over bony prominences  - Avoid friction and shearing  - Provide appropriate hygiene as needed including keeping skin clean and dry  - Evaluate need for skin moisturizer/barrier cream  - Collaborate with interdisciplinary team (i e  Nutrition, Rehabilitation, etc )   - Patient/family teaching  Outcome: Progressing

## 2017-11-14 NOTE — ANESTHESIA POSTPROCEDURE EVALUATION
Post-Op Assessment Note      CV Status:  Stable    Mental Status:  Somnolent    Hydration Status:  Stable    PONV Controlled:  None    Airway Patency:  Patent    Post Op Vitals Reviewed: Yes          Staff: CRNA           BP   97/54   Temp  97 5   Pulse  76   Resp   16   SpO2   100

## 2017-11-14 NOTE — PLAN OF CARE
Problem: Potential for Falls  Goal: Patient will remain free of falls  INTERVENTIONS:  - Assess patient frequently for physical needs  -  Identify cognitive and physical deficits and behaviors that affect risk of falls    -  [place] fall precautions as indicated by assessment   - Educate patient/family on patient safety including physical limitations  - Instruct patient to call for assistance with activity based on assessment  - Modify environment to reduce risk of injury  - Consider OT/PT consult to assist with strengthening/mobility   Outcome: Progressing

## 2017-11-14 NOTE — OP NOTE
**** GI/ENDOSCOPY REPORT ****     PATIENT NAME: Nathalia Grade - VISIT ID:  Patient ID: [id number]   YOB: 1929     INTRODUCTION: Esophagogastroduodenoscopy - A 80 female patient presents   for an inpatient Esophagogastroduodenoscopy at [place] [address]  INDICATIONS: Anemia  Bleeding  CONSENT: The benefits, risks, and alternatives to the procedure were   discussed and informed consent was obtained from the patient  PREPARATION:  EKG, pulse, pulse oximetry and blood pressure were monitored   throughout the procedure  MEDICATIONS: Anesthesia-check records     PROCEDURE:  The endoscope was passed without difficulty through the mouth   under direct visualization and advanced to the 2nd portion of the   duodenum  The scope was withdrawn and the mucosa was carefully examined  FINDINGS:   Esophagus: The GE junction was observed 30 cm from the entry   site  There was a 7 cm hiatus hernia visible in the esophagus  Stomach:   Linear ulceration on the gastric fold below hiatal hernia  A biopsy was   taken  The specimen was collected for pathology  Duodenum: 3 irregular   ulcers noted in the bulb without any signs of bleeding  COMPLICATIONS: There were no complications  IMPRESSIONS: The GE junction was visualized  A hiatus hernia found  Linear   ulceration on the gastric fold below hiatal hernia  3 irregular ulcers   noted in the bulb without any signs of bleeding  RECOMMENDATIONS: Follow-up on the results of the biopsy specimens  Continue taking the following medications as prescribed: Protonix  ESTIMATED BLOOD LOSS:     PATHOLOGY SPECIMENS: Random biopsy taken  Specimen collected for pathology       PROCEDURE CODES:     ICD-9 Codes: 459 0 Hemorrhage, unspecified 553 3 Diaphragmatic hernia   without mention of obstruction or gangrene     ICD-10 Codes: D64 9 Anemia, unspecified K92 2 Gastrointestinal hemorrhage,   unspecified K44 Diaphragmatic hernia PERFORMED BY: CHARISMA Nino  on 11/14/2017  Version 1, electronically signed by CHARISMA Hobbs  on 11/14/2017   at 16:33

## 2017-11-14 NOTE — CASE MANAGEMENT
Initial Clinical Review    Admission: Date/Time/Statement: 11/13/17 @ 1815     Orders Placed This Encounter   Procedures    Inpatient Admission (expected length of stay for this patient is greater than two midnights)     Standing Status:   Standing     Number of Occurrences:   1     Order Specific Question:   Admitting Physician     Answer:   Angela Leventhal [[id number]]     Order Specific Question:   Level of Care     Answer:   Critical Care [15]     Order Specific Question:   Estimated length of stay     Answer:   More than 2 Midnights     Order Specific Question:   Certification     Answer:   I certify that inpatient services are medically necessary for this patient for a duration of greater than two midnights  See H&P and MD Progress Notes for additional information about the patient's course of treatment  ED: Date/Time/Mode of Arrival:   ED Arrival Information     Expected Arrival Acuity Means of Arrival Escorted By Service Admission Type    11/13/2017 14:55 11/13/2017 15:24 Urgent Ambulance [facility] Critical Care/ICU Urgent    Arrival Complaint    Diarrhea, dehydration          Chief Complaint:   Chief Complaint   Patient presents with    Diarrhea     Per EMS and daughter "Pt was at [facility] BP was 65/37 and ambulance was called, Pt has not been feeling well for the last 8 days  Diarrhea on and off, daughter gave immodium just this morning " Pt also c/o lightheaded and dizziness, and denies SOB and chest pain"       History of Illness: Patient presents to emergency department diarrhea that has off and on for about 8 days  She lives at daughter and family  Daughter has been giving her Imodium as needed  Slows the diarrhea but then the diarrhea returns  She has been feeling dizzy and lightheaded off and on  She is not having any pain in her chest or difficulty breathing  Daughter states that her stool color is different and states that it is eggplant with a bit of red in color ankle  However they deny that it looks bloody  They deny that it is black and target  She states she has been able to keep in liquids and bland foods she has not had any vomiting or nausea  She denies any abdominal pain  Patient is an otherwise healthy 70-year-old woman who does not take anything regularly but daughter states she does take Aleve as needed for her knee pain  Daughter admits she does not have a family doctor she has never had a colonoscopy and has not had any medical care in years  Patient was taken to urgent care and was found have a blood pressure of 60 over 30 when EMS arrived it had started to improve they got IV access and started to give fluids just shy of 200 had gone in upon patient's arrival here  Patient's blood pressure is in the 1 teens over 50s to 60s here  Patient admits that she has been very lightheaded and dizzy off and on over the past week  ED Vital Signs:   ED Triage Vitals   Temperature Pulse Respirations Blood Pressure SpO2   11/13/17 1530 11/13/17 1530 11/13/17 1530 11/13/17 1530 11/13/17 1530   97 6 °F (36 4 °C) 90 20 112/57 99 %      Temp Source Heart Rate Source Patient Position - Orthostatic VS BP Location FiO2 (%)   11/13/17 1530 11/13/17 1530 11/13/17 1530 11/13/17 1530 --   Oral Monitor Lying Right arm       Pain Score       11/13/17 1949       No Pain        Wt Readings from Last 1 Encounters:   11/13/17 86 9 kg (191 lb 9 3 oz)     Abnormal Labs/Diagnostic Test Results: Lactic Acid 2 1, Creat 1 34, H/H 3 9/13 1, Platelet 435, Neutros PCT 82    CT Abd/Pelvis: Colonic diverticulosis without acute diverticulitis  Cholelithiasis without signs of cholecystitis  Large hiatal hernia containing proximally 50 percent of the stomach  No acute inflammatory changes in the abdomen or pelvis    Dominant 6 cm hyperdense right renal lesion is likely a hemorrhagic cyst    EKG: Normal Sinus Rhythm, Incomplete RBBB    ED Treatment:   Medication Administration from 11/13/2017 1455 to 11/13/2017 1932       Date/Time Order Dose Route Action Action by Comments     11/13/2017 1749 sodium chloride 0 9 % bolus 1,000 mL 0 mL Intravenous Stopped April CHARISMA Jitendra Soto RN      11/13/2017 1558 sodium chloride 0 9 % bolus 1,000 mL 1,000 mL Intravenous New Bag April CHARISMA Jitendra Soto RN         Physical Exam   Constitutional: She is oriented to person, place, and time  She appears well-developed and well-nourished  Cardiovascular: Normal rate, regular rhythm and normal heart sounds  Pulmonary/Chest: Effort normal and breath sounds normal    Abdominal: Soft  Bowel sounds are normal  There is no tenderness  Genitourinary: Rectal exam shows guaiac positive stool  Genitourinary Comments: Gross bright red blood no masses palpated no significant hemorrhoids     Past Medical/Surgical History: Active Ambulatory Problems     Diagnosis Date Noted    No Active Ambulatory Problems     Resolved Ambulatory Problems     Diagnosis Date Noted    No Resolved Ambulatory Problems     No Additional Past Medical History       Admitting Diagnosis: Diarrhea [R19 7]  Dehydration [E86 0]  Hematochezia [K92 1]  Acute lower GI bleeding [K92 2]  Anemia [D64 9]  Hypovolemia due to hemorrhage [E86 1, R58]    Age/Sex: 80 y o  female    Assessment/Plan:     1  Acute Gastrointestinal Bleeding (upper vs lower)  2  Acute blood loss anemia w/ unknown baseline Hgb  3  Acute Kidney Injury, suspect prerenal azotemia 2/2 volume loss from diarrhea + possible ATN from recent NSAID use  Unknown baseline  4  Lactic Acidosis, likely 2/2 GI Bleed  5  Transient hypotension, responsive to IV hydration  Suspect 2/2 dehydration from diarrhea  6  Thrombocytosis  7  Incomplete Right Bundle Branch Block  8  Colonic diverticulosis w/o acute diverticulitis  9  Incidental large hiatal hernia  10   Incidental dominant 6cm hyperdense right renal lesion suspected hemorrhagic cyst        Plan:                  Neuro: [facility]-ICU, neuro checks                   CV: Currently HD stable  Goal MAP >65mmhg                   Pulm: Stable  Maintain goal SpO2 >92%                 GI:  Administer and 80 mg Protonix bolus and initiate a Protonix infusion as it is unclear with patient's symptomatology if this is an acute upper or lower gastrointestinal bleed  Patient reports taking nonsteroidals approximately every other day for arthritic knee pain for the past several weeks however the ED's physical exam findings of red blood on rectal exam suggests a lower GI source  My exam perform several hours later did not yield any bright red blood however did contain melena and maroon stool in the rectal vault  Have spoken to the on-call gastroenterologist Dr Mary Weiner and given the patient is currently hemodynamically stable without a recent bowel movement, will hold off on performing any emergent endoscopic procedures unless patient clinically deteriorates overnight and will prep the patient for an endoscopy and colonoscopy for tomorrow morning  Will continue to resuscitate the patient with blood products and fluids and monitor hemoglobin                   :  Monitor patient's creatinine which is suspected to be elevated due to dehydration and  ATN from recent NSAID use  Gentle hydration therapy with isolyte  I notified the patient of the incidentaloma of the right renal lesion suspected to be hemorrhagic cyst   Recommend outpatient follow-up                 F/E/N:  Continue isolyte and monitor electrolytes  Keep NPO for now given anticipated endoscopic procedures (EGD/colonoscopy) tomorrow morning or sooner if patient decompensates                  ID:  There is no obvious infection  C diff colitis is unlikely given patient has not been around ill contacts, lives with her daughter, and has not been on any recent antibiotics                 Heme:  Monitor hemoglobin q 6 hours  Currently there is no coagulopathy  Ordered 3 U PRBC in ED, so far received 1 unit   Goal Hgb >7 5    Endo:  Stable                  Msk/Skin:  Frequent repositioning  Assess daily for skin breakdown  When hemoglobin is stable, will consult physical therapy for an ambulatory gait assessment given the patient lives with her daughter and has been having difficulty walking due to knee pain                   Disposition:  Admit to step-down level 1 under critical care service  Closely monitor hemoglobin and perform serial abdominal exams  Monitor stool output  Spoke with patient regarding code status with daughter at bedside and have elected to be a level 1 full code at this point        VTE Pharmacologic Prophylaxis: Pharmacologic VTE Prophylaxis contraindicated due to gi bleed  VTE Mechanical Prophylaxis: sequential compression device    Given critical illness, patient length of stay will require greater than two midnights     Admission Orders:  Inpatient/Critical Care  Continuous Cardiac Monitoring  Serial Cardiac Enzymes q6h x 3  Consult GI r/e acute blood loss anemia, gi bleed   Bilateral Sequential Compression Device  Transfusion 3 units PRBCs  H&H q6h  Occult Stool x 3  Daily Weights  NPO, ice chips  Isolyte-S pH 7 4 @ 50  IV Protonix Drip

## 2017-11-14 NOTE — ANESTHESIA PREPROCEDURE EVALUATION
Review of Systems/Medical History  Patient summary reviewed  Chart reviewed  No history of anesthetic complications     Cardiovascular  Negative cardio ROS Exercise tolerance: good,     Pulmonary  Negative pulmonary ROS ,        GI/Hepatic    GI bleeding ,  Hiatal hernia,        Kidney disease ARF,        Endo/Other  Negative endo/other ROS Arthritis     GYN       Hematology  Anemia acute blood loss anemia,     Musculoskeletal  Obesity (BMI 32) ,        Neurology  Negative neurology ROS      Psychology   Negative psychology ROS        Hasn't been to doctor in > 50 years    Lab Results   Component Value Date    WBC 9 54 11/14/2017    HGB 8 0 (L) 11/14/2017     11/14/2017   s/p multiple transfusions - presented with Hgb <4    Lab Results   Component Value Date     11/14/2017    K 3 8 11/14/2017    BUN 16 11/14/2017    CREATININE 1 08 11/14/2017    GLUCOSE 96 11/14/2017     Lab Results   Component Value Date    PTT 27 11/13/2017      Lab Results   Component Value Date    INR 1 09 11/14/2017     Blood type O  Physical Exam    Airway    Mallampati score: II  TM Distance: >3 FB  Neck ROM: full     Dental   Comment: edentulous,     Cardiovascular  Comment: Negative ROS,     Pulmonary      Other Findings      11/13/17 1649 CT abdomen pelvis wo contrast    Impression:     Colonic diverticulosis without acute diverticulitis  Cholelithiasis without signs of cholecystitis  Large hiatal hernia containing proximally 50 percent of the stomach  No acute inflammatory changes in the abdomen or pelvis  Dominant 6 cm hyperdense right renal lesion is likely a hemorrhagic cyst  Consider comparison with prior studies if available  Anesthesia Plan  ASA Score- 2 Emergent      Anesthesia Type- IV sedation with anesthesia with ASA Monitors  Additional Monitors:   Airway Plan:           Induction- intravenous  Informed Consent- Anesthetic plan and risks discussed with patient    I personally reviewed this patient with the CRNA  Discussed and agreed on the Anesthesia Plan with the CRNA  Di Almanza

## 2017-11-14 NOTE — PROGRESS NOTES
Progress Note - Critical Care   Franklin Funes 80 y o  female MRN: [id number]  Unit/Bed#:  Encounter: [id number]    Attending Physician: Micah Bustos MD      ______________________________________________________________________  Assessment and Plan:   1  Acute gastrointestinal bleeding  2  Acute blood loss anemia  3  Acute kidney injury, pre renal plus ATN from recent NSAID use  4  Lactic acidosis, resolved  5  Hypocalcemia  6  Thrombocytosis  7  Incomplete right bundle branch block  8  Colonic diverticulosis without acute diverticulitis  9  Incidental large hiatal hernia  10  Incidental dominant right 6 cm suspected hemorrhagic renal cyst        Neuro: [facility]-ICU, neuro checks    CV: currently HD stable  Goal MAP > 65mmhg  Hold chemical DVT prophylaxis 2/2 GIB  Pulm:  Stable  Monitor  Incentive spirometry to prevent atelectasis    GI: Continue serial abdominal exams and hemoglobin checks  Continue Protonix infusion  Plan for endoscopy and possible colonoscopy today  Follow up GI consult    : Monitor creatinine in urine output  Pt  Notified of incidental renal cyst  Recommend outpatient followup  F/E/N:  Continue maintenance fluids while NPO  Replete potassium/calcium    ID:  No obvious infection  Monitor WBCs and temperature    Heme:  Hemoglobin q 6 hours  Transfuse for a goal hemoglobin above 7 5    Endo:  Stable     Msk/Skin:  Frequent repositioning, PT consult when hemoglobin is stable    Disposition:  Consider downgrading to Med surge telemetry postprocedure  as patient has been hemodynamically stable  Code Status: Level 1 - Full Code    Counseling / Coordination of Care  Total Critical Care time spent 0 minutes excluding procedures, teaching and family updates      ______________________________________________________________________    Chief Complaint: none    24 Hour Events:   · Admitted overnight for GIB/Acute blood loss anemia w/ Hgb 3 9 and transient hypotension which responded to isotonic fluids  GI notified and aware  · Received 3 U PRBC  · Has had 4 brown BMs s/p golytely prep for anticipated colonoscopy/EGD today  · Hgb 3 9-->5 0-->6 6-->7 2       Infusions:   protonix infusion   isolyte 50/hr    Access:  2 peripheral IV (left hand/right AC)    I/O: 1 9/no documented output    ______________________________________________________________________    Physical Exam:   Physical Exam   Constitutional: She is oriented to person, place, and time  She appears well-developed and well-nourished  HENT:   Head: Normocephalic  Eyes: Pupils are equal, round, and reactive to light  Neck: No JVD present  Cardiovascular: Normal rate and regular rhythm  Pulmonary/Chest: Effort normal    Abdominal: Soft  Bowel sounds are normal  She exhibits no distension  There is no tenderness  There is no guarding  Musculoskeletal: Normal range of motion  Neurological: She is alert and oriented to person, place, and time  Skin: Skin is warm and dry  Capillary refill takes less than 2 seconds  Psychiatric: She has a normal mood and affect  Her behavior is normal    Nursing note and vitals reviewed  ______________________________________________________________________  Vitals:    17   BP: 136/61  122/73 136/55   Pulse: 94  90 90   Resp: 20 21 18 18   Temp:  98 1 °F (36 7 °C) 97 9 °F (36 6 °C) 98 1 °F (36 7 °C)   TempSrc:  Oral     SpO2: 98% 100%  100%   Weight:  86 9 kg (191 lb 9 3 oz)     Height:  5' 5" (1 651 m)         Temperature:   Temp (24hrs), Av °F (36 7 °C), Min:97 6 °F (36 4 °C), Max:98 1 °F (36 7 °C)    Current Temperature: 98 1 °F (36 7 °C)  Weights:   IBW: 57 kg    Body mass index is 31 88 kg/m²    Weight (last 2 days)     Date/Time   Weight    17 1937  86 9 (191 58)    17 1530  91 7 (202 16)            Hemodynamic Monitoring:  N/A     Non-Invasive/Invasive Ventilation Settings:  Respiratory    Lab Data (Last 4 hours)    None         O2/Vent Data (Last 4 hours)    None              Intake and Outputs:  I/O       11/12 0701 - 11/13 0700 11/13 0701 - 11/14 0700    IV Piggyback  1000    Total Intake(mL/kg)  1000 (11 5)    Net   +1000                Nutrition:        Diet Orders            Start     Ordered    11/13/17 1942  Diet NPO; Ice chips  Diet effective now     Question Answer Comment   Diet Type NPO    NPO Except: Ice chips    RD to adjust diet per protocol? Yes        11/13/17 1946          Labs:     Results from last 7 days  Lab Units 11/13/17  2104 11/13/17  1553   WBC Thousand/uL  --  9 93   HEMOGLOBIN g/dL 5 0* 3 9*   HEMATOCRIT %  --  13 1*   PLATELETS Thousands/uL  --  416*   NEUTROS PCT %  --  82*   MONOS PCT %  --  7       Results from last 7 days  Lab Units 11/13/17  1630 11/13/17  1553   SODIUM mmol/L  --  142   POTASSIUM mmol/L  --  4 1   CHLORIDE mmol/L  --  108   CO2 mmol/L  --  26   BUN mg/dL  --  20   CREATININE mg/dL  --  1 34*   CALCIUM mg/dL  --  8 1*   TOTAL PROTEIN g/dL  --  6 1*   BILIRUBIN TOTAL mg/dL  --  0 30   ALK PHOS U/L  --  78   ALT U/L  --  14   AST U/L  --  16   GLUCOSE RANDOM mg/dL  --  136   GLUCOSE, ISTAT mg/dl 126  --          No results found for: PHOS   Results from last 7 days  Lab Units 11/13/17  1553   INR  1 08   PTT seconds 27       0  Lab Value Date/Time   TROPONINI <0 02 11/13/2017 1553       Results from last 7 days  Lab Units 11/13/17  1553   LACTIC ACID mmol/L 2 1*     ABG:No results found for: PHART, ESZ4TSH, PO2ART, HGI3JFE, W2WWYRFG, BEART, SOURCE  Imaging: CAP w/o contrast:Colonic diverticulosis without acute diverticulitis  Cholelithiasis without signs of cholecystitis  Large hiatal hernia containing proximally 50 percent of the stomach  No acute inflammatory changes in the abdomen or pelvis  Dominant 6 cm hyperdense right renal lesion is likely a hemorrhagic cyst   Consider comparison with prior studies if available    I have personally reviewed pertinent films in PACS  EKG: NSR  RBBB  Micro:  No results found for: Norlin Eugenia, WOUNDCULT, SPUTUMCULTUR  Allergies: No Known Allergies  Medications:   Scheduled Meds:   Continuous Infusions:  multi-electrolyte 50 mL/hr    pantoprozole (PROTONIX) infusion (Continuous) 8 mg/hr Last Rate: 8 mg/hr (11/13/17 2028)     PRN Meds:     VTE Pharmacologic Prophylaxis: Pharmacologic VTE Prophylaxis contraindicated due to GIB  VTE Mechanical Prophylaxis: sequential compression device  Invasive lines and devices: Invasive Devices     Peripheral Intravenous Line            Peripheral IV 11/13/17 Left Hand less than 1 day    Peripheral IV 11/13/17 Right Antecubital less than 1 day                     Portions of the record may have been created with voice recognition software  Occasional wrong word or "sound a like" substitutions may have occurred due to the inherent limitations of voice recognition software  Read the chart carefully and recognize, using context, where substitutions have occurred      Marchelle Barthel, [place]

## 2017-11-14 NOTE — PROGRESS NOTES
Progress Note - ICU Transfer to Step down/med  surg  - [place] Prudent 80 y o  female MRN: [id number]    Unit/Bed#:  Encounter: [id number]    Code Status: Level 1 - Full Code  POA:    POLST:      Reason for ICU adm:  Acute blood loss anemia/hypotension    Active problems:   Patient Active Problem List   Diagnosis    Acute blood loss anemia    Gastrointestinal bleeding    Acute kidney injury ([address] )    Dehydration    Hematochezia    Diarrhea    Right bundle branch block (RBBB) on electrocardiogram (ECG)    Diverticulosis of colon    Large hiatal hernia    Incidental 6cm right Renal lesion on CT    Gastrointestinal hemorrhage       Consultants:  GI    History of Present Illness/Summary of clinical course:  26-year-old female with no significant past medical history other than arthritic pain who had diarrhea for a week with dark brown/melanotic stools who started with presyncopal symptoms prompting her to go to an urgent care found have a systolic blood pressure in the 60s and hemoglobin of 3 9  EMS was called IV access was placed and patient was given 1 L of normal saline with improvement in blood pressure to the 100s  Protonix bolus and drip were started  rectal exam in the ER was noted to have bright red blood in which GI was called and :  Prep was given  Patient was transfused with 3 units of PRBCs with improvement in the hemoglobin is 7 2  CT of the abdomen and pelvis revealed large hiatal hernia, cholelithiasis, diverticulosis, 6 cm left renal lesion likely hemorrhagic cyst     EGD/colonoscopy were done revealing a hiatal hernia with ulceration at the gastric fold below this and 3 are regular ulcers in the bulb without signs of bleeding  Colonoscopy revealed diverticuli and internal hemorrhoids  Patient was transferred back to the unit has remained hemodynamically stable with repeat hemoglobin of 8 and then 8 4    Will remain on Protonix drip for 48 hours with likely discharge in the next few days     Recent or scheduled procedures:   11/14 colonoscopy with liquid brown stool noted multiple diverticuli and internal hemorrhoids  11/14 EGD-hiatal hernia with linear ulcerations at the gastric fold below the hiatal hernia and 3 are regular ulcers in the bulb without signs of bleeding    Outstanding/pending diagnostics:   Biopsies from colonoscopy pending       Mobilization Plan:  Out of bed    Nutrition Plan:  Full liquid advance as tolerated per GI    Discharge Plan:    Patient should be ready for discharge to home after transition to p o  Protonix, hemoglobin remained stable and tolerating p o  diet   Initial PT/OT/ST Recommendations:  NA   Initial /Plan:  No needs following     Specific Diagnosis Plan:    See progress note for full plan of care    Portions of the record may have been created with voice recognition software  Occasional wrong word or "sound a like" substitutions may have occurred due to the inherent limitations of voice recognition software  Read the chart carefully and recognize, using context, where substitutions have occurred      AZRA Huizar

## 2017-11-14 NOTE — H&P
History and Physical - Critical Care   Moe Paredes 80 y o  female MRN: [id number]  Unit/Bed#:  Encounter: [id number]    Reason for Admission / Chief Complaint: weakness from diarrhea    History of Present Illness:  Moe Paredes is a 80 y o  female who presents to [facility] Emergency Department for evaluation of dark colored diarrhea that has been lasting for approximately 8 days with of associated weakness  She advises that in between episodes of diarrhea, she would have formed brown stool  Admits to taking Aleve every other day for arthritic knee pain for the past 2-3 weeks  This morning when patient was very weak after a diarrhea episode, her daughter prompted her to be evaluated at the urgent center where she reportedly was hypotensive with systolic blood pressures in the 60s to 70s which prompted the center to have the patient be seen in the emergency department where she was found to have a hemoglobin of 3 9  She remained hemodynamically stable in the emergency department however 3 hours into her evaluation, she had a slight drop in her systolic blood pressure into the 90s for which responded to a 1 L saline bolus  The emergency department performed a rectal exam which reportedly found bright red blood  Patient's last bowel movement is reported as being this morning and was dark black per patient, "the color of purple eggplant "     Patient denies any abdominal pain, nausea, vomiting, hematemesis,  fevers, hematochezia, vaginal bleeding, recent illness, recent antibiotic use, use of anticoagulants or antiplatelets  Although weak, she denies having a syncopal event or near syncope  She has not been around anyone with similar symptoms to suggest a gastro intestinal illness or an infective colitis  She denies having a prior colonoscopy or endoscopy and in fact, the last time she was evaluated by physician it was 48 years ago when she gave birth to her daughter       Due to acute blood loss anemia suspected from an acute gastrointestinal bleed, patient will be admitted to the step-down unit under critical care for close observation  Anticipate a greater than 2 midnight stay for evaluation of this gastrointestinal bleeding  History obtained from chart review and the patient  Past Medical History:  History reviewed  No pertinent past medical history  Past Surgical History:  Past Surgical History:   Procedure Laterality Date    CATARACT EXTRACTION         Past Family History:  History reviewed  No pertinent family history  Social History:  History   Smoking Status    Never Smoker   Smokeless Tobacco    Never Used     History   Alcohol Use No     History   Drug Use No     Marital Status:   Exercise History:     Medications:  No current facility-administered medications for this encounter  Home medications:  Prior to Admission medications    Not on File     Allergies:  No Known Allergies    ROS:   Review of Systems   Constitutional: Positive for fatigue  Negative for activity change, chills and diaphoresis  HENT: Negative for hearing loss and sore throat  Eyes: Negative for visual disturbance  Respiratory: Negative for cough, chest tightness and shortness of breath  Cardiovascular: Negative for chest pain and palpitations  Gastrointestinal: Positive for blood in stool and diarrhea  Negative for abdominal distention, abdominal pain, nausea and vomiting  Genitourinary: Negative for difficulty urinating and hematuria  Musculoskeletal: Negative for arthralgias and myalgias  Skin: Negative for pallor and rash  Neurological: Positive for dizziness, weakness and light-headedness  Negative for syncope  Psychiatric/Behavioral: Negative for confusion         Vitals:  Vitals:    11/13/17 1845 11/13/17 1900 11/13/17 1915 11/13/17 1937   BP: 124/58 122/59 136/61    Pulse: 92 92 94    Resp: 20 20 20 21   Temp:    98 1 °F (36 7 °C)   TempSrc:    Oral   SpO2: 98% 98% 98%    Weight:    86 9 kg (191 lb 9 3 oz)   Height:    5' 5" (1 651 m)     Temperature:   Temp (24hrs), Av 9 °F (36 6 °C), Min:97 6 °F (36 4 °C), Max:98 1 °F (36 7 °C)    Current Temperature: 98 1 °F (36 7 °C)    Weights:   IBW: 57 kg  Body mass index is 31 88 kg/m²  Hemodynamic Monitoring:  N/A     Non-Invasive/Invasive Ventilation Settings:  Respiratory    Lab Data (Last 4 hours)    None         O2/Vent Data (Last 4 hours)    None              No results found for: PHART, GAL1WEQ, PO2ART, BUY8ISN, A1TYHSFF, BEART, SOURCE  SpO2: SpO2: 100 %     Physical Exam:  Physical Exam   Constitutional: Vital signs are normal  She appears well-developed and well-nourished  She is cooperative  Non-toxic appearance  She does not have a sickly appearance  HENT:   Head: Normocephalic and atraumatic  Mouth/Throat: Oropharynx is clear and moist  Mucous membranes are pale  She has dentures  Eyes: Conjunctivae are normal  Pupils are equal, round, and reactive to light  Neck: Normal range of motion  No JVD present  Cardiovascular: Normal rate, regular rhythm, normal heart sounds, intact distal pulses and normal pulses  Pulmonary/Chest: Effort normal and breath sounds normal  No respiratory distress  Abdominal: Normal appearance and bowel sounds are normal  She exhibits no distension  There is no tenderness  There is no rigidity, no rebound, no guarding and no CVA tenderness  Genitourinary: Rectal exam shows external hemorrhoid and guaiac positive stool  Rectal exam shows no mass, no tenderness and anal tone normal    Genitourinary Comments: Rectal exam on my assessment positive for melenic and dark maroon stool  Musculoskeletal: Normal range of motion  Neurological: She is alert  No cranial nerve deficit or sensory deficit  GCS eye subscore is 4  GCS verbal subscore is 5  GCS motor subscore is 6  Skin: Skin is warm, dry and intact  No rash noted  No cyanosis  Nails show no clubbing  Psychiatric: She has a normal mood and affect  Her speech is normal and behavior is normal    Nursing note and vitals reviewed  Labs:    Results from last 7 days  Lab Units 11/13/17  1553   WBC Thousand/uL 9 93   HEMOGLOBIN g/dL 3 9*   HEMATOCRIT % 13 1*   PLATELETS Thousands/uL 416*   NEUTROS PCT % 82*   MONOS PCT % 7      Results from last 7 days  Lab Units 11/13/17  1630 11/13/17  1553   SODIUM mmol/L  --  142   POTASSIUM mmol/L  --  4 1   CHLORIDE mmol/L  --  108   CO2 mmol/L  --  26   BUN mg/dL  --  20   CREATININE mg/dL  --  1 34*   CALCIUM mg/dL  --  8 1*   TOTAL PROTEIN g/dL  --  6 1*   BILIRUBIN TOTAL mg/dL  --  0 30   ALK PHOS U/L  --  78   ALT U/L  --  14   AST U/L  --  16   GLUCOSE RANDOM mg/dL  --  136   GLUCOSE, ISTAT mg/dl 126  --                 Results from last 7 days  Lab Units 11/13/17  1553   INR  1 08   PTT seconds 27       Results from last 7 days  Lab Units 11/13/17  1553   LACTIC ACID mmol/L 2 1*       0  Lab Value Date/Time   TROPONINI <0 02 11/13/2017 1553       Imaging: CT Abdomen/Pelvis w/o IV contrast: colonic diverticulosis w/o acute diverticulitis  Cholelithiasis w/o signs of cholecystitis  Large hiatal hernia containing proximally 50% of stomach  No acute inflammatory changes in the abdomen or pelvis  Dominant 6cm hyperdense right renal lesion likely hemorrhagic cysts  I have personally reviewed pertinent films in PACS  EKG: NSR rate 87 w/ incomplete RBBB  QTc 423  No ST changes  This was personally reviewed by myself  Micro:  No results found for: Jan Robertson, WOUNDCULT, [facility]    ______________________________________________________________________    Assessment:  1  Acute Gastrointestinal Bleeding (upper vs lower)  2  Acute blood loss anemia w/ unknown baseline Hgb  3  Acute Kidney Injury, suspect prerenal azotemia 2/2 volume loss from diarrhea + possible ATN from recent NSAID use  Unknown baseline  4  Lactic Acidosis, likely 2/2 GI Bleed  5   Transient hypotension, responsive to IV hydration  Suspect 2/2 dehydration from diarrhea  6  Thrombocytosis  7  Incomplete Right Bundle Branch Block  8  Colonic diverticulosis w/o acute diverticulitis  9  Incidental large hiatal hernia  10  Incidental dominant 6cm hyperdense right renal lesion suspected hemorrhagic cyst      Plan:      Neuro: CAM-ICU, neuro checks  CV: Currently HD stable  Goal MAP >65mmhg  Pulm: Stable  Maintain goal SpO2 >92%     GI:  Administer and 80 mg Protonix bolus and initiate a Protonix infusion as it is unclear with patient's symptomatology if this is an acute upper or lower gastrointestinal bleed  Patient reports taking nonsteroidals approximately every other day for arthritic knee pain for the past several weeks however the ED's physical exam findings of red blood on rectal exam suggests a lower GI source  My exam perform several hours later did not yield any bright red blood however did contain melena and maroon stool in the rectal vault  Have spoken to the on-call gastroenterologist Dr Brittany Urias and given the patient is currently hemodynamically stable without a recent bowel movement, will hold off on performing any emergent endoscopic procedures unless patient clinically deteriorates overnight and will prep the patient for an endoscopy and colonoscopy for tomorrow morning  Will continue to resuscitate the patient with blood products and fluids and monitor hemoglobin  :  Monitor patient's creatinine which is suspected to be elevated due to dehydration and  ATN from recent NSAID use  Gentle hydration therapy with isolyte  I notified the patient of the incidentaloma of the right renal lesion suspected to be hemorrhagic cyst   Recommend outpatient follow-up     F/E/N:  Continue isolyte and monitor electrolytes  Keep NPO for now given anticipated endoscopic procedures (EGD/colonoscopy) tomorrow morning or sooner if patient decompensates      ID:  There is no obvious infection  C diff colitis is unlikely given patient has not been around ill contacts, lives with her daughter, and has not been on any recent antibiotics  Heme:  Monitor hemoglobin q 6 hours  Currently there is no coagulopathy  Ordered 3 U PRBC in ED, so far received 1 unit  Goal Hgb >7 5     Endo:  Stable  Msk/Skin:  Frequent repositioning  Assess daily for skin breakdown  When hemoglobin is stable, will consult physical therapy for an ambulatory gait assessment given the patient lives with her daughter and has been having difficulty walking due to knee pain  Disposition:  Admit to step-down level 1 under critical care service  Closely monitor hemoglobin and perform serial abdominal exams  Monitor stool output  Spoke with patient regarding code status with daughter at bedside and have elected to be a level 1 full code at this point  Counseling / Coordination of Care  Total Critical Care time spent 30 minutes excluding procedures, teaching and family updates  ______________________________________________________________________    VTE Pharmacologic Prophylaxis: Pharmacologic VTE Prophylaxis contraindicated due to gi bleed  VTE Mechanical Prophylaxis: sequential compression device    Invasive lines and devices: Invasive Devices     Peripheral Intravenous Line            Peripheral IV 11/13/17 Left Hand less than 1 day    Peripheral IV 11/13/17 Right Antecubital less than 1 day                Code Status: No Order  POA:    POLST:      Given critical illness, patient length of stay will require greater than two midnights  Portions of the record may have been created with voice recognition software  Occasional wrong word or "sound a like" substitutions may have occurred due to the inherent limitations of voice recognition software  Read the chart carefully and recognize, using context, where substitutions have occurred        Chichi Donald, [place]

## 2017-11-14 NOTE — OP NOTE
**** GI/ENDOSCOPY REPORT ****     PATIENT NAME: Dominga Marcus ------ VISIT ID:  Patient ID:   [id number] YOB: 1929     INTRODUCTION: Colonoscopy - A 80 female patient presents for an inpatient   Colonoscopy at [facility]  PREVIOUS COLONOSCOPY:     INDICATIONS: Anemia  CONSENT:  The benefits, risks, and alternatives to the procedure were   discussed and informed consent was obtained from the patient  PREPARATION: EKG, pulse, pulse oximetry and blood pressure were monitored   throughout the procedure  The patient was identified by myself both   verbally and by visual inspection of ID band  ASA Classification: Class 2   - Patient has mild to moderate systemic disturbance that may or may not be   related to the disorder requiring surgery  Airway Assessment   Classification: Airway class 2 - Visualization of the soft palate, fauces   and uvula  MEDICATIONS: Anesthesia-check records     PROCEDURE:  The endoscope was passed without difficulty through the anus   under direct visualization and advanced to the cecum, confirmed by   appendiceal orifice and ileocecal valve  The scope was withdrawn and the   mucosa was carefully examined  Cecal Intubation Time: 3 minutes(s) Scope   Withdrawal Time: 7 minutes(s)     RECTAL EXAM: Normal rectal exam      FINDINGS:  Lot of thick, liquid, brown stool with some solid stools balls   washed off and suctioned as much as possible  Multiple diverticuli noted   throughout  No other gross lesions  Internal hemorrhoids were found  COMPLICATIONS: There were no complications  IMPRESSIONS: Lot of thick, liquid, brown stool with some solid stools   balls washed off and suctioned as much as possible  Multiple diverticuli   noted throughout  No other gross lesions  Internal hemorrhoids       RECOMMENDATIONS: Monitor H/H      PATHOLOGY SPECIMENS:     PROCEDURE CODES:     ICD-9 Codes:     ICD-10 Codes: D64 9 Anemia, [id number] 9 Unspecified hemorrhoids     PERFORMED BY: CHARISMA Gallagher  on 11/14/2017  Version 1, electronically signed by CHARISMA Olivo  on 11/14/2017   at 16:51

## 2017-11-15 LAB
ALBUMIN SERPL BCP-MCNC: 2.5 G/DL (ref 3.5–5)
ALP SERPL-CCNC: 77 U/L (ref 46–116)
ALT SERPL W P-5'-P-CCNC: 17 U/L (ref 12–78)
ANION GAP SERPL CALCULATED.3IONS-SCNC: 8 MMOL/L (ref 4–13)
AST SERPL W P-5'-P-CCNC: 21 U/L (ref 5–45)
BASOPHILS # BLD AUTO: 0.03 THOUSANDS/ΜL (ref 0–0.1)
BASOPHILS NFR BLD AUTO: 0 % (ref 0–1)
BILIRUB SERPL-MCNC: 0.8 MG/DL (ref 0.2–1)
BUN SERPL-MCNC: 9 MG/DL (ref 5–25)
CA-I BLD-SCNC: 1.06 MMOL/L (ref 1.12–1.32)
CALCIUM SERPL-MCNC: 8.1 MG/DL (ref 8.3–10.1)
CHLORIDE SERPL-SCNC: 109 MMOL/L (ref 100–108)
CO2 SERPL-SCNC: 27 MMOL/L (ref 21–32)
CREAT SERPL-MCNC: 1.15 MG/DL (ref 0.6–1.3)
EOSINOPHIL # BLD AUTO: 0.11 THOUSAND/ΜL (ref 0–0.61)
EOSINOPHIL NFR BLD AUTO: 1 % (ref 0–6)
ERYTHROCYTE [DISTWIDTH] IN BLOOD BY AUTOMATED COUNT: 14.9 % (ref 11.6–15.1)
GFR SERPL CREATININE-BSD FRML MDRD: 43 ML/MIN/1.73SQ M
GLUCOSE SERPL-MCNC: 88 MG/DL (ref 65–140)
HCT VFR BLD AUTO: 22.9 % (ref 34.8–46.1)
HGB BLD-MCNC: 7.4 G/DL (ref 11.5–15.4)
HGB BLD-MCNC: 7.9 G/DL (ref 11.5–15.4)
HGB BLD-MCNC: 7.9 G/DL (ref 11.5–15.4)
LYMPHOCYTES # BLD AUTO: 1.06 THOUSANDS/ΜL (ref 0.6–4.47)
LYMPHOCYTES NFR BLD AUTO: 13 % (ref 14–44)
MAGNESIUM SERPL-MCNC: 2.3 MG/DL (ref 1.6–2.6)
MCH RBC QN AUTO: 27.7 PG (ref 26.8–34.3)
MCHC RBC AUTO-ENTMCNC: 32.3 G/DL (ref 31.4–37.4)
MCV RBC AUTO: 86 FL (ref 82–98)
MONOCYTES # BLD AUTO: 0.87 THOUSAND/ΜL (ref 0.17–1.22)
MONOCYTES NFR BLD AUTO: 11 % (ref 4–12)
NEUTROPHILS # BLD AUTO: 5.95 THOUSANDS/ΜL (ref 1.85–7.62)
NEUTS SEG NFR BLD AUTO: 75 % (ref 43–75)
PHOSPHATE SERPL-MCNC: 2.7 MG/DL (ref 2.3–4.1)
PLATELET # BLD AUTO: 299 THOUSANDS/UL (ref 149–390)
PMV BLD AUTO: 8.2 FL (ref 8.9–12.7)
POTASSIUM SERPL-SCNC: 3.5 MMOL/L (ref 3.5–5.3)
PROT SERPL-MCNC: 5.8 G/DL (ref 6.4–8.2)
RBC # BLD AUTO: 2.67 MILLION/UL (ref 3.81–5.12)
SODIUM SERPL-SCNC: 144 MMOL/L (ref 136–145)
WBC # BLD AUTO: 8.02 THOUSAND/UL (ref 4.31–10.16)

## 2017-11-15 PROCEDURE — 84100 ASSAY OF PHOSPHORUS: CPT | Performed by: PHYSICIAN ASSISTANT

## 2017-11-15 PROCEDURE — 83735 ASSAY OF MAGNESIUM: CPT | Performed by: PHYSICIAN ASSISTANT

## 2017-11-15 PROCEDURE — C9113 INJ PANTOPRAZOLE SODIUM, VIA: HCPCS | Performed by: PHYSICIAN ASSISTANT

## 2017-11-15 PROCEDURE — 85025 COMPLETE CBC W/AUTO DIFF WBC: CPT | Performed by: PHYSICIAN ASSISTANT

## 2017-11-15 PROCEDURE — 82330 ASSAY OF CALCIUM: CPT | Performed by: INTERNAL MEDICINE

## 2017-11-15 PROCEDURE — C9113 INJ PANTOPRAZOLE SODIUM, VIA: HCPCS | Performed by: FAMILY MEDICINE

## 2017-11-15 PROCEDURE — 80053 COMPREHEN METABOLIC PANEL: CPT | Performed by: PHYSICIAN ASSISTANT

## 2017-11-15 PROCEDURE — 85018 HEMOGLOBIN: CPT | Performed by: NURSE PRACTITIONER

## 2017-11-15 RX ORDER — PANTOPRAZOLE SODIUM 40 MG/1
40 INJECTION, POWDER, FOR SOLUTION INTRAVENOUS EVERY 12 HOURS SCHEDULED
Status: DISCONTINUED | OUTPATIENT
Start: 2017-11-15 | End: 2017-11-16 | Stop reason: HOSPADM

## 2017-11-15 RX ADMIN — PANTOPRAZOLE SODIUM 40 MG: 40 INJECTION, POWDER, FOR SOLUTION INTRAVENOUS at 22:06

## 2017-11-15 RX ADMIN — SODIUM CHLORIDE 8 MG/HR: 900 INJECTION, SOLUTION INTRAVENOUS at 05:33

## 2017-11-15 RX ADMIN — SODIUM CHLORIDE, SODIUM GLUCONATE, SODIUM ACETATE, POTASSIUM CHLORIDE, MAGNESIUM CHLORIDE, SODIUM PHOSPHATE, DIBASIC, AND POTASSIUM PHOSPHATE 75 ML/HR: .53; .5; .37; .037; .03; .012; .00082 INJECTION, SOLUTION INTRAVENOUS at 22:10

## 2017-11-15 RX ADMIN — SODIUM CHLORIDE, SODIUM GLUCONATE, SODIUM ACETATE, POTASSIUM CHLORIDE, MAGNESIUM CHLORIDE, SODIUM PHOSPHATE, DIBASIC, AND POTASSIUM PHOSPHATE 75 ML/HR: .53; .5; .37; .037; .03; .012; .00082 INJECTION, SOLUTION INTRAVENOUS at 06:11

## 2017-11-15 NOTE — SUBJECTIVE & OBJECTIVE
VTE Pharmacologic Prophylaxis:   Pharmacologic: Pharmacologic VTE Prophylaxis contraindicated due to GI bleeding  Mechanical: Mechanical VTE prophylaxis in place  Patient Centered Rounds: I have performed bedside rounds with nursing staff today  Discussions with Specialists or Other Care Team Provider:   Education and Discussions with Family / Patient:  Patient  Time Spent for Care: 20 minutes  More than 50% of total time spent on counseling and coordination of care as described above  Current Length of Stay: 2 day(s)  Current Patient Status: Inpatient   Certification Statement: The patient will continue to require additional inpatient hospital stay due to GI bleeding    Discharge Plan:  Depends on clinical course and no more episode of bleeding   Code Status: Level 1 - Full Code    Subjective:   I dont have more rectal bleeding  she denies any chest pain, palpitation, shortness of breath, abdominal pain    Objective:   Vitals:   Temp (24hrs), Av 8 °F (36 6 °C), Min:97 1 °F (36 2 °C), Max:98 6 °F (37 °C)    HR:  [72-88] 72  Resp:  [12-27] 16  BP: ()/(54-84) 105/56  SpO2:  [94 %-100 %] 94 %  Body mass index is 32 28 kg/m²  Input and Output Summary (last 24 hours): Intake/Output Summary (Last 24 hours) at 11/15/17 1038  Last data filed at 11/15/17 0415   Gross per 24 hour   Intake           876 08 ml   Output             1200 ml   Net          -323 92 ml       Physical Exam:     Physical Exam   Constitutional: She is oriented to person, place, and time  No distress  Eyes: Conjunctivae and EOM are normal  Pupils are equal, round, and reactive to light  Neck: Normal range of motion  Neck supple  No JVD present  No tracheal deviation present  No thyromegaly present  Cardiovascular: Normal rate and normal heart sounds  Exam reveals no gallop and no friction rub  No murmur heard  Pulmonary/Chest: Effort normal and breath sounds normal  No respiratory distress  She has no wheezes   She has no rales  She exhibits no tenderness  Abdominal: Soft  Bowel sounds are normal  She exhibits no distension and no mass  There is no tenderness  There is no rebound and no guarding  Musculoskeletal: She exhibits no edema, tenderness or deformity  Lymphadenopathy:     She has no cervical adenopathy  Neurological: She is alert and oriented to person, place, and time  She has normal reflexes  No cranial nerve deficit  Coordination normal    Skin: She is not diaphoretic  There is pallor  Additional Data:   Labs:    Results from last 7 days  Lab Units 11/15/17  0853 11/15/17  0453   WBC Thousand/uL  --  8 02   HEMOGLOBIN g/dL 7 9* 7 4*   HEMATOCRIT %  --  22 9*   PLATELETS Thousands/uL  --  299   NEUTROS PCT %  --  75   LYMPHS PCT %  --  13*   MONOS PCT %  --  11   EOS PCT %  --  1       Results from last 7 days  Lab Units 11/15/17  0453   SODIUM mmol/L 144   POTASSIUM mmol/L 3 5   CHLORIDE mmol/L 109*   CO2 mmol/L 27   BUN mg/dL 9   CREATININE mg/dL 1 15   CALCIUM mg/dL 8 1*   TOTAL PROTEIN g/dL 5 8*   BILIRUBIN TOTAL mg/dL 0 80   ALK PHOS U/L 77   ALT U/L 17   AST U/L 21   GLUCOSE RANDOM mg/dL 88       Results from last 7 days  Lab Units 11/14/17  0319   INR  1 09       * I Have Reviewed All Lab Data Listed Above  * Additional Pertinent Lab Tests Reviewed: All Labs Within Last 24 Hours Reviewed    Imaging:    Imaging Reports Reviewed Today Include:   Cultures:   Blood Culture:   Lab Results   Component Value Date    BLOODCX No Growth at 24 hrs  11/13/2017    BLOODCX No Growth at 24 hrs  11/13/2017     Urine Culture: No results found for: URINECX  Sputum Culture: No components found for: SPUTUMCX  Wound Culture: No results found for: WOUNDCULT    Last 24 Hours Medication List:     pantoprazole 40 mg Intravenous Q12H [address]        Today, Patient Was Seen By: Ambar Aguiar MD    ** Please Note: Dragon 360 Dictation voice to text software may have been used in the creation of this document  **

## 2017-11-15 NOTE — PROGRESS NOTES
Pt up in chair and has no complaints of pain  Denies any bloody stools at this time  Pt has visitors bedside  Call bell within reach  Will continue to monitor

## 2017-11-15 NOTE — PROGRESS NOTES
Progress Note - Kary May 80 y o  female MRN: [id number]    Unit/Bed#: -01 Encounter: [id number]        Subjective:   Patient reports feeling well, denies any abdominal pain or nausea, no subjective fever or chills  She did not have any bowel movement last night or this morning  Tolerating diet  Objective:     Vitals: Blood pressure 105/56, pulse 72, temperature 98 6 °F (37 °C), temperature source Oral, resp  rate 16, height 5' 5" (1 651 m), weight 88 kg (194 lb 0 1 oz), SpO2 94 %  ,Body mass index is 32 28 kg/m²  Intake/Output Summary (Last 24 hours) at 11/15/17 1417  Last data filed at 11/15/17 0415   Gross per 24 hour   Intake              500 ml   Output             1200 ml   Net             -700 ml       Physical Exam:   General appearance: alert, appears stated age and cooperative  Lungs: clear to auscultation bilaterally, no labored breathing/accessory muscle use  Heart: regular rate and rhythm, S1, S2 normal, no murmur, click, rub or gallop  Abdomen: soft, non-tender; bowel sounds normal; no masses,  no organomegaly  Extremities: no edema    Invasive Devices     Peripheral Intravenous Line            Peripheral IV 11/13/17 Left Hand 2 days    Peripheral IV 11/15/17 Left Forearm less than 1 day                Lab, Imaging and other studies: I have personally reviewed pertinent reports      Admission on 11/13/2017   Component Date Value    WBC 11/13/2017 9 93     RBC 11/13/2017 1 52*    Hemoglobin 11/13/2017 3 9*    Hematocrit 11/13/2017 13 1*    MCV 11/13/2017 86     MCH 11/13/2017 25 7*    MCHC 11/13/2017 29 8*    RDW 11/13/2017 15 4*    MPV 11/13/2017 8 5*    Platelets 41/35/5044 416*    Neutrophils Relative 11/13/2017 82*    Lymphocytes Relative 11/13/2017 11*    Monocytes Relative 11/13/2017 7     Eosinophils Relative 11/13/2017 0     Basophils Relative 11/13/2017 0     Neutrophils Absolute 11/13/2017 8 10*    Lymphocytes Absolute 11/13/2017 1 11     Monocytes Absolute 11/13/2017 0 67     Eosinophils Absolute 11/13/2017 0 03     Basophils Absolute 11/13/2017 0 02     Sodium 11/13/2017 142     Potassium 11/13/2017 4 1     Chloride 11/13/2017 108     CO2 11/13/2017 26     Anion Gap 11/13/2017 8     BUN 11/13/2017 20     Creatinine 11/13/2017 1 34*    Glucose 11/13/2017 136     Calcium 11/13/2017 8 1*    AST 11/13/2017 16     ALT 11/13/2017 14     Alkaline Phosphatase 11/13/2017 78     Total Protein 11/13/2017 6 1*    Albumin 11/13/2017 2 6*    Total Bilirubin 11/13/2017 0 30     eGFR 11/13/2017 36     Lipase 11/13/2017 185     Protime 11/13/2017 14 3     INR 11/13/2017 1 08     PTT 11/13/2017 27     Troponin I 11/13/2017 <0 02     LACTIC ACID 11/13/2017 2 1*    Blood Culture 11/14/2017 No Growth at 24 hrs   Blood Culture 11/14/2017 No Growth at 24 hrs       ABO Grouping 11/13/2017 O     Rh Factor 11/13/2017 Positive     Antibody Screen 11/13/2017 Negative     Specimen Expiration Date 11/13/2017 [id number]     Unit Product Code 11/14/2017 F2569V88     Unit Number 11/14/2017 [id number]     Unit ABO 11/14/2017 O     Unit RH 11/14/2017 POS     Crossmatch 11/14/2017 Compatible     Unit Dispense Status 11/14/2017 Presumed Trans     Unit Product Code 11/14/2017 M9399E04     Unit Number 11/14/2017 [id number]     Unit ABO 11/14/2017 O     Unit RH 11/14/2017 POS     Crossmatch 11/14/2017 Compatible     Unit Dispense Status 11/14/2017 Presumed Trans     Unit Product Code 11/14/2017 M8598E20     Unit Number 11/14/2017 [id number]     Unit ABO 11/14/2017 O     Unit RH 11/14/2017 POS     Crossmatch 11/14/2017 Compatible     Unit Dispense Status 11/14/2017 Presumed Trans     SODIUM, I-STAT 11/13/2017 141     Potassium, i-STAT 11/13/2017 3 9     Chloride, istat 11/13/2017 106     CO2, i-STAT 11/13/2017 22     Anion Gap, Istat 11/13/2017 17*    Calcium, Ionized i-STAT 11/13/2017 1 12     BUN, I-STAT 11/13/2017 19     Creatinine, i-STAT 11/13/2017 1 2     eGFR 11/13/2017 41     Glucose, i-STAT 11/13/2017 126     Hct, i-STAT 11/13/2017 <15*    Hgb, i-STAT 11/13/2017      Specimen Type 11/13/2017 VENOUS     Ventricular Rate 11/13/2017 87     Atrial Rate 11/13/2017 87     VA Interval 11/13/2017 174     QRSD Interval 11/13/2017 98     QT Interval 11/13/2017 352     QTC Interval 11/13/2017 423     P Axis 11/13/2017 96     QRS Axis 11/13/2017 -15     T Wave Axis 11/13/2017 92     LACTIC ACID 11/13/2017 1 0     Hemoglobin 11/13/2017 5 0*    Hemoglobin 11/14/2017 6 6*    C difficile toxin by PCR 11/14/2017 NEGATIVE for C difficle toxin by PCR        Calcium, Ionized 11/14/2017 0 94*    WBC 11/14/2017 9 54     RBC 11/14/2017 2 59*    Hemoglobin 11/14/2017 7 2*    Hematocrit 11/14/2017 22 3*    MCV 11/14/2017 86     MCH 11/14/2017 27 8     MCHC 11/14/2017 32 3     RDW 11/14/2017 13 8     MPV 11/14/2017 8 5*    Platelets 95/22/9732 299     Neutrophils Relative 11/14/2017 74     Lymphocytes Relative 11/14/2017 13*    Monocytes Relative 11/14/2017 12     Eosinophils Relative 11/14/2017 1     Basophils Relative 11/14/2017 0     Neutrophils Absolute 11/14/2017 7 13     Lymphocytes Absolute 11/14/2017 1 24     Monocytes Absolute 11/14/2017 1 10     Eosinophils Absolute 11/14/2017 0 05     Basophils Absolute 11/14/2017 0 02     Sodium 11/14/2017 141     Potassium 11/14/2017 3 8     Chloride 11/14/2017 109*    CO2 11/14/2017 24     Anion Gap 11/14/2017 8     BUN 11/14/2017 16     Creatinine 11/14/2017 1 08     Glucose 11/14/2017 96     Calcium 11/14/2017 7 7*    AST 11/14/2017 17     ALT 11/14/2017 12     Alkaline Phosphatase 11/14/2017 78     Total Protein 11/14/2017 5 5*    Albumin 11/14/2017 2 3*    Total Bilirubin 11/14/2017 0 90     eGFR 11/14/2017 46     Magnesium 11/14/2017 2 0     Phosphorus 11/14/2017 2 8     Protime 11/14/2017 14 5*    INR 11/14/2017 1 09     Troponin I 11/14/2017 <0 02     Hemoglobin 11/14/2017 8 0*    Hemoglobin 11/14/2017 8 2*    WBC 11/15/2017 8 02     RBC 11/15/2017 2 67*    Hemoglobin 11/15/2017 7 4*    Hematocrit 11/15/2017 22 9*    MCV 11/15/2017 86     MCH 11/15/2017 27 7     MCHC 11/15/2017 32 3     RDW 11/15/2017 14 9     MPV 11/15/2017 8 2*    Platelets 48/16/8826 299     Neutrophils Relative 11/15/2017 75     Lymphocytes Relative 11/15/2017 13*    Monocytes Relative 11/15/2017 11     Eosinophils Relative 11/15/2017 1     Basophils Relative 11/15/2017 0     Neutrophils Absolute 11/15/2017 5 95     Lymphocytes Absolute 11/15/2017 1 06     Monocytes Absolute 11/15/2017 0 87     Eosinophils Absolute 11/15/2017 0 11     Basophils Absolute 11/15/2017 0 03     Sodium 11/15/2017 144     Potassium 11/15/2017 3 5     Chloride 11/15/2017 109*    CO2 11/15/2017 27     Anion Gap 11/15/2017 8     BUN 11/15/2017 9     Creatinine 11/15/2017 1 15     Glucose 11/15/2017 88     Calcium 11/15/2017 8 1*    AST 11/15/2017 21     ALT 11/15/2017 17     Alkaline Phosphatase 11/15/2017 77     Total Protein 11/15/2017 5 8*    Albumin 11/15/2017 2 5*    Total Bilirubin 11/15/2017 0 80     eGFR 11/15/2017 43     Magnesium 11/15/2017 2 3     Phosphorus 11/15/2017 2 7     Calcium, Ionized 11/15/2017 1 06*    Hemoglobin 11/15/2017 7 9*     Results for Maria T Schuster (MRN [id number]) as of 11/15/2017 14:18   Ref   Range 11/14/2017 05:47 11/14/2017 09:39 11/14/2017 14:21 11/14/2017 16:27 11/15/2017 04:53 11/15/2017 08:53   eGFR Latest Units: ml/min/1 73sq m     43    Sodium Latest Ref Range: 136 - 145 mmol/L     144    Potassium Latest Ref Range: 3 5 - 5 3 mmol/L     3 5    Chloride Latest Ref Range: 100 - 108 mmol/L     109 (H)    CO2 Latest Ref Range: 21 - 32 mmol/L     27    Anion Gap Latest Ref Range: 4 - 13 mmol/L     8    BUN Latest Ref Range: 5 - 25 mg/dL     9    Creatinine Latest Ref Range: 0 60 - 1 30 mg/dL     1 15    Glucose Latest Ref Range: 65 - 140 mg/dL     88    Calcium Latest Ref Range: 8 3 - 10 1 mg/dL     8 1 (L)    CALCIUM IONIZED Latest Ref Range: 1 12 - 1 32 mmol/L     1 06 (L)    AST Latest Ref Range: 5 - 45 U/L     21    ALT Latest Ref Range: 12 - 78 U/L     17    Alkaline Phosphatase Latest Ref Range: 46 - 116 U/L     77    Total Protein Latest Ref Range: 6 4 - 8 2 g/dL     5 8 (L)    Albumin Latest Ref Range: 3 5 - 5 0 g/dL     2 5 (L)    Total Bilirubin Latest Ref Range: 0 20 - 1 00 mg/dL     0 80    Phosphorus Latest Ref Range: 2 3 - 4 1 mg/dL     2 7    Magnesium Latest Ref Range: 1 6 - 2 6 mg/dL     2 3    WBC Latest Ref Range: 4 31 - 10 16 Thousand/uL     8 02    RBC Latest Ref Range: 3 81 - 5 12 Million/uL     2 67 (L)    Hemoglobin Latest Ref Range: 11 5 - 15 4 g/dL  8 0 (L) 8 2 (L)  7 4 (L) 7 9 (L)   Hematocrit Latest Ref Range: 34 8 - 46 1 %     22 9 (L)    MCV Latest Ref Range: 82 - 98 fL     86    MCH Latest Ref Range: 26 8 - 34 3 pg     27 7    MCHC Latest Ref Range: 31 4 - 37 4 g/dL     32 3    RDW Latest Ref Range: 11 6 - 15 1 %     14 9    Platelets Latest Ref Range: 149 - 390 Thousands/uL     299    MPV Latest Ref Range: 8 9 - 12 7 fL     8 2 (L)    Neutrophils Relative Latest Ref Range: 43 - 75 %     75    Lymphocytes Relative Latest Ref Range: 14 - 44 %     13 (L)    Monocytes Relative Latest Ref Range: 4 - 12 %     11    Eosinophils Relative Latest Ref Range: 0 - 6 %     1    Basophils Relative Latest Ref Range: 0 - 1 %     0    Neutrophils Absolute Latest Ref Range: 1 85 - 7 62 Thousands/µL     5 95    Lymphocytes Absolute Latest Ref Range: 0 60 - 4 47 Thousands/µL     1 06    Monocytes Absolute Latest Ref Range: 0 17 - 1 22 Thousand/µL     0 87    Eosinophils Absolute Latest Ref Range: 0 00 - 0 61 Thousand/µL     0 11    Basophils Absolute Latest Ref Range: 0 00 - 0 10 Thousands/µL     0 03    Crossmatch Unknown Compatible        Unit ABO Unknown O        Unit [facility] Unknown POS        Unit Number Unknown [id number] Unit Dispense Status Unknown Presumed Trans        TISSUE EXAM Unknown    Rpt ((NONE))         Assessment/Plan:    1  Severe anemia and likely upper GI bleeding, secondary to multiple duodenal ulcers which were visualized on EGD yesterday  No evidence of active bleeding at this time  Hemoglobin appears stable    Colonoscopy showed only some brown colored stool and no obvious evidence of lower GI bleeding     - Protonix twice daily  - avoid NSAIDs  - nonulcerogenic diet as tolerated  - monitor hemoglobin closely, transfuse if needed

## 2017-11-15 NOTE — PROGRESS NOTES
Progress Note - Paula Fitzgerald 80 y o  female MRN: [id number]    Unit/Bed#: -01 Encounter: [id number]        * Gastrointestinal bleeding   Assessment & Plan    Stable  No more episode of bleeding  No abdominal pain, no rectal bleeding  Likely secondary to Linear ulceration on the gastric fold below hiatal hernia and rregular ulcers  noted in the bulb without any signs of bleeding after endoscopy procedure   -Keep on PPI, start on PPImg BID  -Follow up CBC  -Follow up gastroenterology recomendation            Acute kidney injury [facility])   Assessment & Plan    Improved  Closely follow up  BMP am          Acute blood loss anemia   Assessment & Plan    Stable  adequate hg valued  No more episode of bleeding  No abdominal pain, no rectal bleeding  Likely secondary to Linear ulceration on the gastric fold below hiatal hernia and rregular ulcers  noted in the bulb without any signs of bleeding after endoscopy procedure   -Keep on PPI, start on PPImg BID  -Follow up CBC  -Follow up gastroenterology recomendation         Large hiatal hernia   Assessment & Plan      Stable  Right bundle branch block (RBBB) on electrocardiogram (ECG)   Assessment & Plan    Closely monitor  Dehydration   Assessment & Plan      Improving  Keep on IVF              VTE Pharmacologic Prophylaxis:   Pharmacologic: Pharmacologic VTE Prophylaxis contraindicated due to GI bleeding  Mechanical: Mechanical VTE prophylaxis in place  Patient Centered Rounds: I have performed bedside rounds with nursing staff today  Discussions with Specialists or Other Care Team Provider:   Education and Discussions with Family / Patient:  Patient  Time Spent for Care: 20 minutes  More than 50% of total time spent on counseling and coordination of care as described above      Current Length of Stay: 2 day(s)  Current Patient Status: Inpatient   Certification Statement: The patient will continue to require additional inpatient hospital stay due to GI bleeding    Discharge Plan:  Depends on clinical course and no more episode of bleeding   Code Status: Level 1 - Full Code    Subjective:   I dont have more rectal bleeding  she denies any chest pain, palpitation, shortness of breath, abdominal pain    Objective:   Vitals:   Temp (24hrs), Av 8 °F (36 6 °C), Min:97 1 °F (36 2 °C), Max:98 6 °F (37 °C)    HR:  [72-88] 72  Resp:  [12-27] 16  BP: ()/(54-84) 105/56  SpO2:  [94 %-100 %] 94 %  Body mass index is 32 28 kg/m²  Input and Output Summary (last 24 hours): Intake/Output Summary (Last 24 hours) at 11/15/17 1038  Last data filed at 11/15/17 0415   Gross per 24 hour   Intake           876 08 ml   Output             1200 ml   Net          -323 92 ml       Physical Exam:     Physical Exam   Constitutional: She is oriented to person, place, and time  No distress  Eyes: Conjunctivae and EOM are normal  Pupils are equal, round, and reactive to light  Neck: Normal range of motion  Neck supple  No JVD present  No tracheal deviation present  No thyromegaly present  Cardiovascular: Normal rate and normal heart sounds  Exam reveals no gallop and no friction rub  No murmur heard  Pulmonary/Chest: Effort normal and breath sounds normal  No respiratory distress  She has no wheezes  She has no rales  She exhibits no tenderness  Abdominal: Soft  Bowel sounds are normal  She exhibits no distension and no mass  There is no tenderness  There is no rebound and no guarding  Musculoskeletal: She exhibits no edema, tenderness or deformity  Lymphadenopathy:     She has no cervical adenopathy  Neurological: She is alert and oriented to person, place, and time  She has normal reflexes  No cranial nerve deficit  Coordination normal    Skin: She is not diaphoretic  There is pallor         Additional Data:   Labs:    Results from last 7 days  Lab Units 11/15/17  0853 11/15/17  0453   WBC Thousand/uL  --  8 02   HEMOGLOBIN g/dL 7 9* 7 4* HEMATOCRIT %  --  22 9*   PLATELETS Thousands/uL  --  299   NEUTROS PCT %  --  75   LYMPHS PCT %  --  13*   MONOS PCT %  --  11   EOS PCT %  --  1       Results from last 7 days  Lab Units 11/15/17  0453   SODIUM mmol/L 144   POTASSIUM mmol/L 3 5   CHLORIDE mmol/L 109*   CO2 mmol/L 27   BUN mg/dL 9   CREATININE mg/dL 1 15   CALCIUM mg/dL 8 1*   TOTAL PROTEIN g/dL 5 8*   BILIRUBIN TOTAL mg/dL 0 80   ALK PHOS U/L 77   ALT U/L 17   AST U/L 21   GLUCOSE RANDOM mg/dL 88       Results from last 7 days  Lab Units 11/14/17  0319   INR  1 09       * I Have Reviewed All Lab Data Listed Above  * Additional Pertinent Lab Tests Reviewed: All Labs Within Last 24 Hours Reviewed    Imaging:    Imaging Reports Reviewed Today Include:   Cultures:   Blood Culture:   Lab Results   Component Value Date    BLOODCX No Growth at 24 hrs  11/13/2017    BLOODCX No Growth at 24 hrs  11/13/2017     Urine Culture: No results found for: URINECX  Sputum Culture: No components found for: SPUTUMCX  Wound Culture: No results found for: WOUNDCULT    Last 24 Hours Medication List:     pantoprazole 40 mg Intravenous Q12H [address]        Today, Patient Was Seen By: Boo Wetzel MD    ** Please Note: Dragon 360 Dictation voice to text software may have been used in the creation of this document   **

## 2017-11-15 NOTE — PLAN OF CARE
GASTROINTESTINAL - ADULT     Maintains or returns to baseline bowel function Progressing     Maintains adequate nutritional intake Progressing        METABOLIC, FLUID AND ELECTROLYTES - ADULT     Electrolytes maintained within normal limits Progressing     Fluid balance maintained Progressing        PAIN - ADULT     Verbalizes/displays adequate comfort level or baseline comfort level Progressing        Potential for Falls     Patient will remain free of falls Progressing        SKIN/TISSUE INTEGRITY - ADULT     Skin integrity remains intact Progressing

## 2017-11-16 VITALS
DIASTOLIC BLOOD PRESSURE: 83 MMHG | OXYGEN SATURATION: 94 % | RESPIRATION RATE: 16 BRPM | SYSTOLIC BLOOD PRESSURE: 121 MMHG | HEIGHT: 65 IN | HEART RATE: 81 BPM | TEMPERATURE: 98.2 F | BODY MASS INDEX: 32.62 KG/M2 | WEIGHT: 195.77 LBS

## 2017-11-16 PROBLEM — N17.9 ACUTE KIDNEY INJURY (HCC): Status: RESOLVED | Noted: 2017-11-13 | Resolved: 2017-11-16

## 2017-11-16 PROBLEM — E86.0 DEHYDRATION: Status: RESOLVED | Noted: 2017-11-13 | Resolved: 2017-11-16

## 2017-11-16 PROBLEM — R19.7 DIARRHEA: Status: RESOLVED | Noted: 2017-11-13 | Resolved: 2017-11-16

## 2017-11-16 LAB
HCT VFR BLD AUTO: 24.5 % (ref 34.8–46.1)
HGB BLD-MCNC: 7.7 G/DL (ref 11.5–15.4)

## 2017-11-16 PROCEDURE — 85014 HEMATOCRIT: CPT | Performed by: PHYSICIAN ASSISTANT

## 2017-11-16 PROCEDURE — 85018 HEMOGLOBIN: CPT | Performed by: PHYSICIAN ASSISTANT

## 2017-11-16 PROCEDURE — C9113 INJ PANTOPRAZOLE SODIUM, VIA: HCPCS | Performed by: FAMILY MEDICINE

## 2017-11-16 RX ORDER — PANTOPRAZOLE SODIUM 40 MG/1
40 TABLET, DELAYED RELEASE ORAL 2 TIMES DAILY
Qty: 60 TABLET | Refills: 0 | Status: SHIPPED | OUTPATIENT
Start: 2017-11-16 | End: 2021-02-28

## 2017-11-16 RX ORDER — FERROUS SULFATE TAB EC 324 MG (65 MG FE EQUIVALENT) 324 (65 FE) MG
324 TABLET DELAYED RESPONSE ORAL
Qty: 60 TABLET | Refills: 0 | Status: SHIPPED | OUTPATIENT
Start: 2017-11-16 | End: 2021-02-28

## 2017-11-16 RX ORDER — DOCUSATE SODIUM 100 MG/1
100 CAPSULE, LIQUID FILLED ORAL 2 TIMES DAILY
Status: DISCONTINUED | OUTPATIENT
Start: 2017-11-16 | End: 2017-11-16 | Stop reason: HOSPADM

## 2017-11-16 RX ORDER — DOCUSATE SODIUM 100 MG/1
100 CAPSULE, LIQUID FILLED ORAL 2 TIMES DAILY
Qty: 10 CAPSULE | Refills: 0 | Status: SHIPPED | OUTPATIENT
Start: 2017-11-16 | End: 2021-02-28

## 2017-11-16 RX ADMIN — PANTOPRAZOLE SODIUM 40 MG: 40 INJECTION, POWDER, FOR SOLUTION INTRAVENOUS at 08:24

## 2017-11-16 NOTE — SOCIAL WORK
CM met with Pt at bedside to discuss IMM  CM also discussed insurance with Pt  Pt reports that she does not have Part B and was informed that in order for her to get Part B she would be fined for every year she was eligible but did not have it, CM verified this with Medicare's website and Pt is unable to afford the premium  CM then met with pt and Pt's daughter at bedside  CM discussed clinic in [place] with Pt and Pt's daughter and provided information, CM also discussed getting secondary insurance as it is open enrollment

## 2017-11-16 NOTE — PLAN OF CARE
Problem: DISCHARGE PLANNING - CARE MANAGEMENT  Goal: Discharge to post-acute care or home with appropriate resources  INTERVENTIONS:  - Conduct assessment to determine patient/family and health care team treatment goals, and need for post-acute services based on payer coverage, community resources, and patient preferences, and barriers to discharge  - Address psychosocial, clinical, and financial barriers to discharge as identified in assessment in conjunction with the patient/family and health care team  - Arrange appropriate level of post-acute services according to patients   needs and preference and payer coverage in collaboration with the physician and health care team  - Communicate with and update the patient/family, physician, and health care team regarding progress on the discharge plan  - Arrange appropriate transportation to post-acute venues  Outcome: Completed Date Met: 11/16/17  TUCKER met with Pt at bedside to discuss IMM  TUCKER also discussed insurance with Pt  Pt reports that she does not have Part B and was informed that in order for her to get Part B she would be fined for every year she was eligible but did not have it, CM verified this with Medicare's website and Pt is unable to afford the premium  CM then met with pt and Pt's daughter at bedside  CM discussed clinic in [place] with Pt and Pt's daughter and provided information, CM also discussed getting secondary insurance as it is open enrollment

## 2017-11-16 NOTE — DISCHARGE SUMMARY
Discharge Summary - [facility] Internal Medicine    Patient Information: Franklin Funes 80 y o  female MRN: [id number]  Unit/Bed#: -01 Encounter: [id number]    Discharging Physician / Practitioner: Pedro Ochoa PA-C  PCP: No primary care provider on file  Admission Date: 11/13/2017  Discharge Date: 11/16/17    Reason for Admission: GI bleed    Discharge Diagnoses:     Principal Problem:    Gastrointestinal bleeding  Active Problems:    Acute blood loss anemia    Large hiatal hernia    Incidental 6cm right Renal lesion on CT  Resolved Problems:    Acute kidney injury ([address] )    Lactic acidosis    Transient hypotension    Dehydration    Diarrhea    Thrombocytosis ([facility] [address] )      Consultations During Hospital Stay:  · GI    Procedures Performed:   · Colonic diverticulosis without acute diverticulitis  Cholelithiasis without signs of cholecystitis  Large hiatal hernia containing proximally 50 percent of the stomach  No acute inflammatory changes in the abdomen or pelvis  Dominant 6 cm hyperdense right renal lesion is likely a hemorrhagic cyst   Consider comparison with prior studies if available  ·  Blood transfusion  · EGD= hiatal hernia found  Linear ulceration on the gastric fold with 3 irregular ulcers noted without signs of bleeding  Biopsy taken  · Colonoscopy= lot of thick liquid brown stool with some solid stool balls washed off and suctioned  Multiple diverticuli noted  Internal hemorrhoids  No other obvious lesions  Significant Findings / Test Results:     · See above    Incidental Findings:   ·  6 cm hyperdense renal lesion likely hemorrhagic cyst--defer to PCP as far as whether to follow up    Test Results Pending at Discharge (will require follow up): · Biopsy from EGD ? H  Pylori     Outpatient Tests Requested:  · Cbc/bmp 1 week     Complications:  No family doctor    Hospital Course:     Franklin Funes is a 80 y o  female patient who originally presented to the hospital on 11/13/2017 due to dark colored diarrhea with associated weakness for 8 days  She also had very mild acute kidney injury present on admission which resolved  Patient had been taking Aleve for arthritic knee pain for several weeks  Upon admission to the hospital she was found to be hypotensive with a profoundly low hemoglobin of 3 9  She was initially treated in the intensive care unit for GI bleed and had a CT scan of the abdomen and pelvis which was unrevealing with the exception of a large hiatal hernia  She received blood transfusion with improvement in her hemoglobin  She was seen by Gastroenterology and underwent an EGD and colonoscopy which did revealed gastric ulcers  She was placed on IV Protonix and eventually her diet was advanced and she is transition to oral Protonix at discharge  I have also started her on iron and vitamin-C given that her hemoglobin still remains below 8 at time of discharge although she has been clinically stable with no further dropping in her counts  She should follow up with Gastroenterology as an outpatient  Condition at Discharge: stable     Discharge Day Visit / Exam:     Subjective:  Patient says she is doing very well and wants to go home today  She denies any pain, diarrhea, bleeding, and says she has been eating well but needs help ordering her meal today because she does not have her glasses  She denies any weakness or shortness of breath and has been up ambulating on her own  Vitals: Blood Pressure: 121/83 (11/16/17 0700)  Pulse: 81 (11/16/17 0700)  Temperature: 98 2 °F (36 8 °C) (11/16/17 0700)  Temp Source: Oral (11/16/17 0700)  Respirations: 16 (11/16/17 0700)  Height: 5' 5" (165 1 cm) (11/13/17 1937)  Weight - Scale: 88 8 kg (195 lb 12 3 oz) (11/16/17 0600)  SpO2: 94 % (11/16/17 0700)  Exam:   Physical Exam   Constitutional: She appears well-developed and well-nourished  No distress  HENT:   Head: Normocephalic and atraumatic     Eyes: Conjunctivae are normal  Right eye exhibits no discharge  Left eye exhibits no discharge  No scleral icterus  Neck: Neck supple  Cardiovascular: Normal rate, regular rhythm and normal heart sounds  No murmur heard  Pulmonary/Chest: Effort normal  No respiratory distress  She has no wheezes  She has no rales  Abdominal: Soft  Bowel sounds are normal  She exhibits no distension  There is no tenderness  There is no rebound  Musculoskeletal: She exhibits no edema  Neurological: She is alert  Awake alert and interactive without any focal deficits noted  Skin: Skin is warm and dry  No rash noted  She is not diaphoretic  No erythema  Psychiatric: She has a normal mood and affect  Her behavior is normal  Thought content normal        Discussion with Family:  Called patient's daughter and left a message on her machine  Patient's daughter called me back and I reviewed the plan with her and addressed all of her questions    Discharge instructions/Information to patient and family:   See after visit summary for information provided to patient and family  Provisions for Follow-Up Care:  See after visit summary for information related to follow-up care and any pertinent home health orders  Disposition:     Home    For Discharges to [address] [facility]:   · Not Applicable to this Patient - Not Applicable to this Patient    Planned Readmission: none     Discharge Statement:  I spent 45  minutes discharging the patient  This time was spent on the day of discharge  I had direct contact with the patient on the day of discharge  Greater than 50% of the total time was spent examining patient, answering all patient questions, arranging and discussing plan of care with patient as well as directly providing post-discharge instructions  Additional time then spent on discharge activities    Spoke with nursing and case management    Discharge Medications:  See after visit summary for reconciled discharge medications provided to patient and family        ** Please Note: This note has been constructed using a voice recognition system **

## 2017-11-18 LAB
BACTERIA BLD CULT: NORMAL
BACTERIA BLD CULT: NORMAL

## 2017-11-19 ENCOUNTER — GENERIC CONVERSION - ENCOUNTER (OUTPATIENT)
Dept: OTHER | Facility: OTHER | Age: 82
End: 2017-11-19

## 2017-11-23 ENCOUNTER — GENERIC CONVERSION - ENCOUNTER (OUTPATIENT)
Dept: OTHER | Facility: OTHER | Age: 82
End: 2017-11-23

## 2017-11-27 ENCOUNTER — APPOINTMENT (OUTPATIENT)
Dept: LAB | Facility: CLINIC | Age: 82
End: 2017-11-27
Payer: MEDICARE

## 2017-11-27 ENCOUNTER — ALLSCRIPTS OFFICE VISIT (OUTPATIENT)
Dept: OTHER | Facility: OTHER | Age: 82
End: 2017-11-27

## 2017-11-27 DIAGNOSIS — Z13.220 ENCOUNTER FOR SCREENING FOR LIPOID DISORDERS: ICD-10-CM

## 2017-11-27 DIAGNOSIS — D64.9 ANEMIA: ICD-10-CM

## 2017-11-27 LAB — HGB BLD-MCNC: 9.3 G/DL (ref 11.5–15.4)

## 2017-11-27 PROCEDURE — 85018 HEMOGLOBIN: CPT

## 2017-11-28 NOTE — PROGRESS NOTES
Assessment    1  Arthritis of knee (716 96) (M17 10)   2  Rectal bleeding (569 3) (K62 5)   3  Low hemoglobin (285 9) (D64 9)    Plan  Lipid screening    · (1) LIPID PANEL FASTING W DIRECT LDL REFLEX; Status:Canceled; Low hemoglobin    · (1) CBC/PLT/DIFF; Status:Canceled;    · (1) HEMOGLOBIN, BLOOD; Status:Active; Requested for:27Nov2017;   Need for immunization against influenza    · Flulaval Quadrivalent Intramuscular Suspension; INJECT 0 5  MLIntramuscular; To Be Done: [id number]    Discussion/Summary  Discussion Summary:   Low Hg and Rectal Bleedto stop taking ASA or any NSAID  CBC ordered to recheck Hg and RBCs  Continue taking pantoprazole and ferrous sulfate as directed  Use Tylenol for pain p r n   was in a rush to leave because her daughter had a dental appointment  She will return in 2 weeks for completion of a health history and any other testing that may be needed  Counseling Documentation With Imm: The patient was counseled regarding diagnostic results,-- instructions for management,-- risk factor reductions,-- risks and benefits of treatment options,-- importance of compliance with treatment  Patient Education: Educational resources provided:   Medication SE Review and Pt Understands Tx: Possible side effects of new medications were reviewed with the patient/guardian today  The treatment plan was reviewed with the patient/guardian  The patient/guardian understands and agrees with the treatment plan      Chief Complaint  Chief Complaint Free Text Note Form: Presents to the clinic to establish care      History of Present Illness  HPI: As Skip Atwood seen at the emergency department for dizziness and being pale  Blood test revealed very low hemoglobin of 3 9 g/dL  RBC was decreased  Colonoscopy showed multiple diverticuli, and internal hemorrhoids  EGD showed a 7 cm hiatal hernia, with linea ulceration of the gastric folds  Three irregular ulcers on the Duodenum without any signs of bleeding   We offered to do additional testing including TSH, and BP profile  She declined  She reports she only once the hemoglobin which was recommended from the hospital  She has not gone to the PCP in more than 50 years  No mammogram  Colonoscopy and EGD done at the hospital    Hospital Based Practices Required Assessment:  Pain Assessment  the patient states they do not have pain  (on a scale of 0 to 10, the patient rates the pain at 0 )   Prefered Language is  [place]  Primary Language is  English  Education Completed: disease/condition-- and-- treatment/procedure  Teaching Method: verbal  Person Taught: patient  Evaluation Of Learning: verbalized/demonstrated understanding      Review of Systems  Complete-Female:  Constitutional: No fever, no chills, feels well, no tiredness, no recent weight gain or weight loss  Cardiovascular: No complaints of slow heart rate, no fast heart rate, no chest pain, no palpitations, no leg claudication, no lower extremity edema  Respiratory: No complaints of shortness of breath, no wheezing, no cough, no SOB on exertion, no orthopnea, no PND  Gastrointestinal: No complaints of abdominal pain, no constipation, no nausea or vomiting, no diarrhea, no bloody stools  Other Symptoms: Denies any more rectal bleed since leaving the hospital  Denies constipation  No abdominal or chest pain  No dizziness  No nosebleed  ROS Reviewed:   ROS reviewed  Active Problems  1  Arthritis of knee (716 96) (M17 10)   2  Chronic pain of both knees (682 10,165 47) (M25 561,M25 562,G89 29)   3  Hiatal hernia (553 3) (K44 9)   4  Lipid screening (V77 91) (Z13 220)   5  Low hemoglobin (285 9) (D64 9)   6  Ovarian cyst (620 2) (N83 209)   7  Rectal bleeding (569 3) (K62 5)    Past Medical History  Active Problems And Past Medical History Reviewed: The active problems and past medical history were reviewed and updated today  Surgical History  1   History of Cataract Surgery  Surgical History Reviewed: The surgical history was reviewed and updated today  Family History  Mother    1  Family history of No significant medical problems  Father    2  Family history of diabetes mellitus (V18 0) (Z83 3)  Family History    3  Family history of cancer (V16 9) (Z80 9)   4  Denied: Family history of drug addiction   5  No family history of mental disorder  Family History Reviewed: The family history was reviewed and updated today  Social History     · Former smoker (Y31 47) (G22 419)  Social History Reviewed: The social history was reviewed and updated today  The social history was reviewed and is unchanged  Current Meds   1  Ferrous Gluconate 325 (36 Fe) MG Oral Tablet; Take 1 tablet twice daily; Therapy: [id number] to (Evaluate:26Jan2018) Recorded   2  Pantoprazole Sodium 40 MG Oral Tablet Delayed Release; TAKE 1 TABLET DAILY; Therapy: [id number] to (Evaluate:26May2018) Recorded    Allergies    1  No Known Drug Allergies    Vitals  Signs   Recorded: 27Nov2017 09:34AM   Temperature: 97 5 F  Heart Rate: 72  Systolic: 073  Diastolic: 72  Height: 5 ft 0 5 in  Weight: 193 lb 1 95 oz  BMI Calculated: 37 1  BSA Calculated: 1 85    Physical Exam   Constitutional  General appearance: No acute distress, well appearing and well nourished  well developed,-- normal body odor,-- does not smell of feces,-- does not smell of urine,-- well nourished,-- clothing appropriate-- and-- well groomed  Pulmonary  Respiratory effort: No increased work of breathing or signs of respiratory distress  Auscultation of lungs: Clear to auscultation  Cardiovascular  Auscultation of heart: Normal rate and rhythm, normal S1 and S2, without murmurs  Abdomen  Abdomen: Abnormal  -- Obese, round, nontender to palpation  Liver and spleen: No hepatomegaly or splenomegaly  -- Exam limited by body habitus    Musculoskeletal  Digits and nails: Abnormal    Inspection/palpation of joints, bones, and muscles: Abnormal  -- Palpation of the knees reveal tenderness  Good range of motion for age history of arthritis on both knees  Adequate strength on bilateral arms on lower extremities  Romana nodes present on fingers of both hands  Capillary refill less than 3 seconds  Neurologic  Cranial nerves: Cranial nerves 2-12 intact  -- Grossly intact  Psychiatric  Orientation to person, place, and time: Normal    Mood and affect: Normal          Results/Data  PHQ-2 Adult Depression Screening 27Nov2017 09:38AM User, Ahs     Test Name Result Flag Reference   PHQ-2 Adult Depression Score 0       Over the last two weeks, how often have you been bothered by any of the following problems? Little interest or pleasure in doing things: Not at all - 0 Feeling down, depressed, or hopeless: Not at all - 0   PHQ-2 Adult Depression Screening Negative           Attending Note  Collaborating Physician Note: Collaborating Physician: I supervised the Advanced Practitioner-- and-- I agree with the Advanced Practitioner note        Signatures   Electronically signed by : Lorna Espino; Nov 27 2017  1:35PM EST                       (Author)    Electronically signed by : Joleen Barragan DO; Nov 27 2017  2:14PM EST                       (Review)

## 2018-01-11 NOTE — RESULT NOTES
Discussion/Summary      Gastric biopsies came back as benign and negative for H  pylori  Patient to continue PPI and call for follow-up office visit if symptoms persist or PRN  Left message on number listed  CS    Pt's dtr Armando Sumnerbert called back, discussed  CS             Verified Results  (1) TISSUE EXAM [id number] 04:27PM Carmelo Pandya     Test Name Result Flag Reference   LAB AP CASE REPORT (Report)     Surgical Pathology Report             Case: [id number]                   Authorizing Provider: Enzo Denny MD     Collected:      11/14/2017 1627        Ordering Location:   [facility]    Received:      11/14/2017 [facility]                               Pathologist:      Leslee Toro MD                                Specimen:  Stomach, cold biopsy gastric body   LAB AP FINAL DIAGNOSIS (Report)     A  Stomach, cold biopsy gastric body biopsy:  -Benign gastric- type mucosa with mild chronic inactive gastritis and   single lymphoid follicle formation   -No evidence of Paneth cell metaplasia seen   -No evidence of dysplasia or malignancy   -No H Pylori organisms identified on immunohistochemical stained slide  Control reacted appropriately      Electronically signed by Leslee Toro MD on 11/17/2017 at 12:12 PM   LAB AP NOTE      Pankeratin stain MCK will be ordered and the result will be issued in the   supplemental report   LAB AP SURGICAL ADDITIONAL INFORMATION (Report)     All controls performed with the immunohistochemical stains reported above   reacted appropriately  These tests were developed and their performance   characteristics determined by [facility] or   [facility]  They may not be cleared or approved by the [place]  Food and Drug Administration  The FDA has determined that such clearance   or approval is not necessary  These tests are used for clinical purposes     They should not be regarded as investigational or for research  This   laboratory has been approved by [place], designated as a high-complexity   laboratory and is qualified to perform these tests  LAB AP GROSS DESCRIPTION (Report)     A  The specimen is received in formalin, labeled with the patient's name   and hospital number, and is designated cold biopsy gastric body, are   two irregularly shaped fragments of tan soft tissue measuring 0 5 and 0 2   cm in greatest dimension  Entirely submitted  One cassette  Note: The estimated total formalin fixation time based upon information   provided by the submitting clinician and the standard processing schedule   is less than 72 hours      RRavotti   LAB AP CLINICAL INFORMATION R/o h pylori     R/o h pylori

## 2018-01-13 VITALS
DIASTOLIC BLOOD PRESSURE: 72 MMHG | BODY MASS INDEX: 36.46 KG/M2 | HEART RATE: 72 BPM | SYSTOLIC BLOOD PRESSURE: 108 MMHG | TEMPERATURE: 97.5 F | HEIGHT: 61 IN | WEIGHT: 193.12 LBS

## 2018-01-15 NOTE — RESULT NOTES
Discussion/Summary      Gastric biopsies came back as benign and negative for H  pylori  Patient to continue PPI and call for follow-up office visit if symptoms persist or PRN  DISCUSSED, SEE OTHER TASK  CS         Verified Results  (1) TISSUE EXAM [id number] 04:27PM Torsten Lawrence     Test Name Result Flag Reference   LAB AP CASE REPORT (Report)     Surgical Pathology Report             Case: [id number]                   Authorizing Provider: Jaymie Levine MD     Collected:      11/14/2017 9649        Ordering Location:   [facility]    Received:      11/14/2017 1613 [facility]                               Pathologist:      Sal Spears MD                                Specimen:  Stomach, cold biopsy gastric body   LAB AP FINAL DIAGNOSIS (Report)     A  Stomach, cold biopsy gastric body biopsy:  -Benign gastric- type mucosa with mild chronic inactive gastritis and   single lymphoid follicle formation   -No evidence of Paneth cell metaplasia seen   -No evidence of dysplasia or malignancy   -No H Pylori organisms identified on immunohistochemical stained slide  Control reacted appropriately      Electronically signed by Sal Spears MD on 11/17/2017 at 12:12 PM   LAB AP NOTE      Pankeratin stain MCK will be ordered and the result will be issued in the   supplemental report   LAB AP SURGICAL ADDITIONAL INFORMATION (Report)     All controls performed with the immunohistochemical stains reported above   reacted appropriately  These tests were developed and their performance   characteristics determined by [address] [place] [address] or   [facility]  They may not be cleared or approved by the [place]  Food and Drug Administration  The FDA has determined that such clearance   or approval is not necessary  These tests are used for clinical purposes  They should not be regarded as investigational or for research   This   laboratory has been approved by [facility], designated as a high-complexity   laboratory and is qualified to perform these tests  LAB AP GROSS DESCRIPTION (Report)     A  The specimen is received in formalin, labeled with the patient's name   and hospital number, and is designated cold biopsy gastric body, are   two irregularly shaped fragments of tan soft tissue measuring 0 5 and 0 2   cm in greatest dimension  Entirely submitted  One cassette  Note: The estimated total formalin fixation time based upon information   provided by the submitting clinician and the standard processing schedule   is less than 72 hours  RRavotti   LAB AP CLINICAL INFORMATION R/o h pylori     R/o h pylori   LAB AP ADDENDUM 1 (Report)     The purpose of this supplemental report is to add the result of   immunostains performed on this gastric body Biopsy     -Pankeratin stain MCK is used in evaluation and highlighted surface   epithelium  Stromal cells are negative for staining   Appropriate positive   and negative controls reacted appropriately  Addendum electronically signed by Antonio Dunn MD on 11/20/2017 at 10:05 AM

## 2018-02-14 ENCOUNTER — TELEPHONE (OUTPATIENT)
Dept: INTERNAL MEDICINE CLINIC | Facility: CLINIC | Age: 83
End: 2018-02-14

## 2018-02-14 NOTE — TELEPHONE ENCOUNTER
Can patient take Mucinex Dm? They just want to make sure it doesn't conflict with her other medications

## 2018-07-05 ENCOUNTER — TELEPHONE (OUTPATIENT)
Dept: INTERNAL MEDICINE CLINIC | Facility: CLINIC | Age: 83
End: 2018-07-05

## 2018-07-05 DIAGNOSIS — R10.13 DYSPEPSIA: Primary | ICD-10-CM

## 2018-07-05 RX ORDER — PANTOPRAZOLE SODIUM 40 MG/1
TABLET, DELAYED RELEASE ORAL
Qty: 30 TABLET | Refills: 0 | Status: SHIPPED | OUTPATIENT
Start: 2018-07-05 | End: 2018-08-04 | Stop reason: SDUPTHER

## 2018-07-06 NOTE — TELEPHONE ENCOUNTER
PATIENTS DAUGHTER CALLED IN REGARDING PANTOPRAZOLE REFILL SO I MADE HER AWARE OF THE INFORMATION BELOW, DAUGHTER INFORMED ME THAT PATIENT HAD FRACTURED HER HIP ABOUT 2 MONTHS AGO  FELL AT HER NEPHEWS HOME, WAS [place] SO SHE IS STILL NOT FEELING TOO GREAT , AS SOON AS SHE FEELS BETTER SHE WILL SCHEDULE

## 2018-08-04 DIAGNOSIS — R10.13 DYSPEPSIA: ICD-10-CM

## 2018-08-08 RX ORDER — PANTOPRAZOLE SODIUM 40 MG/1
40 TABLET, DELAYED RELEASE ORAL DAILY
Qty: 90 TABLET | Refills: 1 | Status: SHIPPED | OUTPATIENT
Start: 2018-08-08 | End: 2019-05-28 | Stop reason: SDUPTHER

## 2019-05-28 DIAGNOSIS — R10.13 DYSPEPSIA: ICD-10-CM

## 2019-05-31 DIAGNOSIS — K92.2 GASTROINTESTINAL HEMORRHAGE, UNSPECIFIED GASTROINTESTINAL HEMORRHAGE TYPE: Primary | ICD-10-CM

## 2019-05-31 RX ORDER — PANTOPRAZOLE SODIUM 40 MG/1
40 TABLET, DELAYED RELEASE ORAL DAILY
Qty: 30 TABLET | Refills: 5 | Status: SHIPPED | OUTPATIENT
Start: 2019-05-31 | End: 2021-02-28

## 2019-05-31 RX ORDER — PANTOPRAZOLE SODIUM 40 MG/1
TABLET, DELAYED RELEASE ORAL
Qty: 90 TABLET | Refills: 0 | Status: SHIPPED | OUTPATIENT
Start: 2019-05-31 | End: 2021-02-28

## 2019-08-03 NOTE — ASSESSMENT & PLAN NOTE
Closely monitor 
Improved    Closely follow up  IAM am
Improving     Keep on IVF
Stable 
Stable  No more episode of bleeding   No abdominal pain, no rectal bleeding  Likely secondary to Linear ulceration on the gastric fold below hiatal hernia and rregular ulcers  noted in the bulb without any signs of bleeding after endoscopy procedure   -Keep on PPI, start on PPImg BID  -Follow up CBC  -Follow up gastroenterology recomendation
Stable  adequate hg valued  No more episode of bleeding   No abdominal pain, no rectal bleeding  Likely secondary to Linear ulceration on the gastric fold below hiatal hernia and rregular ulcers  noted in the bulb without any signs of bleeding after endoscopy procedure   -Keep on PPI, start on PPImg BID  -Follow up CBC  -Follow up gastroenterology recomendation
.
no fever/no nausea/no vomiting

## 2021-02-28 ENCOUNTER — APPOINTMENT (EMERGENCY)
Dept: RADIOLOGY | Facility: HOSPITAL | Age: 86
DRG: 177 | End: 2021-02-28
Payer: MEDICARE

## 2021-02-28 ENCOUNTER — HOSPITAL ENCOUNTER (INPATIENT)
Facility: HOSPITAL | Age: 86
LOS: 10 days | Discharge: HOME | DRG: 177 | End: 2021-03-10
Attending: EMERGENCY MEDICINE | Admitting: HOSPITALIST
Payer: MEDICARE

## 2021-02-28 DIAGNOSIS — U07.1 COVID-19 VIRUS INFECTION: ICD-10-CM

## 2021-02-28 DIAGNOSIS — J12.82 PNEUMONIA DUE TO COVID-19 VIRUS: Primary | ICD-10-CM

## 2021-02-28 DIAGNOSIS — R09.02 HYPOXIA: ICD-10-CM

## 2021-02-28 DIAGNOSIS — U07.1 PNEUMONIA DUE TO COVID-19 VIRUS: Primary | ICD-10-CM

## 2021-02-28 DIAGNOSIS — N17.9 ACUTE KIDNEY INJURY (HCC): ICD-10-CM

## 2021-02-28 DIAGNOSIS — K59.00 CONSTIPATION, UNSPECIFIED CONSTIPATION TYPE: ICD-10-CM

## 2021-02-28 PROBLEM — R74.8 ELEVATED LIVER ENZYMES: Status: ACTIVE | Noted: 2021-02-28

## 2021-02-28 PROBLEM — E87.1 HYPONATREMIA: Status: ACTIVE | Noted: 2021-02-28

## 2021-02-28 PROBLEM — R79.89 ELEVATED SERUM CREATININE: Status: ACTIVE | Noted: 2021-02-28

## 2021-02-28 PROBLEM — R79.89 ELEVATED D-DIMER: Status: ACTIVE | Noted: 2021-02-28

## 2021-02-28 LAB
ALBUMIN SERPL BCP-MCNC: 2.8 G/DL (ref 3.5–5)
ALP SERPL-CCNC: 87 U/L (ref 46–116)
ALT SERPL W P-5'-P-CCNC: 31 U/L (ref 12–78)
ANION GAP SERPL CALCULATED.3IONS-SCNC: 9 MMOL/L (ref 4–13)
APTT PPP: 161 SECONDS (ref 23–37)
AST SERPL W P-5'-P-CCNC: 69 U/L (ref 5–45)
ATRIAL RATE: 105 BPM
BASOPHILS # BLD AUTO: 0.01 THOUSANDS/ΜL (ref 0–0.1)
BASOPHILS NFR BLD AUTO: 0 % (ref 0–1)
BILIRUB SERPL-MCNC: 0.69 MG/DL (ref 0.2–1)
BUN SERPL-MCNC: 22 MG/DL (ref 5–25)
CALCIUM ALBUM COR SERPL-MCNC: 9.7 MG/DL (ref 8.3–10.1)
CALCIUM SERPL-MCNC: 8.7 MG/DL (ref 8.3–10.1)
CHLORIDE SERPL-SCNC: 101 MMOL/L (ref 100–108)
CK MB SERPL-MCNC: 0.5 NG/ML (ref 0–5)
CK MB SERPL-MCNC: <1 % (ref 0–2.5)
CK SERPL-CCNC: 160 U/L (ref 26–192)
CO2 SERPL-SCNC: 25 MMOL/L (ref 21–32)
CREAT SERPL-MCNC: 1.66 MG/DL (ref 0.6–1.3)
CRP SERPL QL: 162.1 MG/L
D DIMER PPP FEU-MCNC: 2.51 UG/ML FEU
EOSINOPHIL # BLD AUTO: 0 THOUSAND/ΜL (ref 0–0.61)
EOSINOPHIL NFR BLD AUTO: 0 % (ref 0–6)
ERYTHROCYTE [DISTWIDTH] IN BLOOD BY AUTOMATED COUNT: 15.9 % (ref 11.6–15.1)
ERYTHROCYTE [DISTWIDTH] IN BLOOD BY AUTOMATED COUNT: 15.9 % (ref 11.6–15.1)
FERRITIN SERPL-MCNC: 332 NG/ML (ref 8–388)
FLUAV RNA RESP QL NAA+PROBE: NEGATIVE
FLUBV RNA RESP QL NAA+PROBE: NEGATIVE
GFR SERPL CREATININE-BSD FRML MDRD: 27 ML/MIN/1.73SQ M
GLUCOSE SERPL-MCNC: 117 MG/DL (ref 65–140)
HCT VFR BLD AUTO: 33.4 % (ref 34.8–46.1)
HCT VFR BLD AUTO: 37.1 % (ref 34.8–46.1)
HGB BLD-MCNC: 10.3 G/DL (ref 11.5–15.4)
HGB BLD-MCNC: 11.4 G/DL (ref 11.5–15.4)
IMM GRANULOCYTES # BLD AUTO: 0.03 THOUSAND/UL (ref 0–0.2)
IMM GRANULOCYTES NFR BLD AUTO: 1 % (ref 0–2)
INR PPP: 1.07 (ref 0.84–1.19)
INR PPP: 1.17 (ref 0.84–1.19)
LACTATE SERPL-SCNC: 1.9 MMOL/L (ref 0.5–2)
LYMPHOCYTES # BLD AUTO: 0.61 THOUSANDS/ΜL (ref 0.6–4.47)
LYMPHOCYTES NFR BLD AUTO: 10 % (ref 14–44)
MCH RBC QN AUTO: 25.2 PG (ref 26.8–34.3)
MCH RBC QN AUTO: 25.2 PG (ref 26.8–34.3)
MCHC RBC AUTO-ENTMCNC: 30.7 G/DL (ref 31.4–37.4)
MCHC RBC AUTO-ENTMCNC: 30.8 G/DL (ref 31.4–37.4)
MCV RBC AUTO: 82 FL (ref 82–98)
MCV RBC AUTO: 82 FL (ref 82–98)
MONOCYTES # BLD AUTO: 0.26 THOUSAND/ΜL (ref 0.17–1.22)
MONOCYTES NFR BLD AUTO: 4 % (ref 4–12)
NEUTROPHILS # BLD AUTO: 5.45 THOUSANDS/ΜL (ref 1.85–7.62)
NEUTS SEG NFR BLD AUTO: 85 % (ref 43–75)
NRBC BLD AUTO-RTO: 0 /100 WBCS
NT-PROBNP SERPL-MCNC: 1658 PG/ML
P AXIS: 93 DEGREES
PLATELET # BLD AUTO: 187 THOUSANDS/UL (ref 149–390)
PLATELET # BLD AUTO: 215 THOUSANDS/UL (ref 149–390)
PMV BLD AUTO: 8.9 FL (ref 8.9–12.7)
PMV BLD AUTO: 9.2 FL (ref 8.9–12.7)
POTASSIUM SERPL-SCNC: 3.6 MMOL/L (ref 3.5–5.3)
PR INTERVAL: 182 MS
PROCALCITONIN SERPL-MCNC: 0.38 NG/ML
PROT SERPL-MCNC: 8 G/DL (ref 6.4–8.2)
PROTHROMBIN TIME: 14 SECONDS (ref 11.6–14.5)
PROTHROMBIN TIME: 15 SECONDS (ref 11.6–14.5)
QRS AXIS: -47 DEGREES
QRSD INTERVAL: 98 MS
QT INTERVAL: 312 MS
QTC INTERVAL: 405 MS
RBC # BLD AUTO: 4.08 MILLION/UL (ref 3.81–5.12)
RBC # BLD AUTO: 4.52 MILLION/UL (ref 3.81–5.12)
RSV RNA RESP QL NAA+PROBE: NEGATIVE
SARS-COV-2 RNA RESP QL NAA+PROBE: POSITIVE
SODIUM SERPL-SCNC: 135 MMOL/L (ref 136–145)
T WAVE AXIS: 101 DEGREES
TROPONIN I SERPL-MCNC: 0.04 NG/ML
VENTRICULAR RATE: 101 BPM
WBC # BLD AUTO: 5.17 THOUSAND/UL (ref 4.31–10.16)
WBC # BLD AUTO: 6.36 THOUSAND/UL (ref 4.31–10.16)

## 2021-02-28 PROCEDURE — 83880 ASSAY OF NATRIURETIC PEPTIDE: CPT | Performed by: EMERGENCY MEDICINE

## 2021-02-28 PROCEDURE — 85379 FIBRIN DEGRADATION QUANT: CPT | Performed by: EMERGENCY MEDICINE

## 2021-02-28 PROCEDURE — 84484 ASSAY OF TROPONIN QUANT: CPT | Performed by: EMERGENCY MEDICINE

## 2021-02-28 PROCEDURE — 85025 COMPLETE CBC W/AUTO DIFF WBC: CPT | Performed by: EMERGENCY MEDICINE

## 2021-02-28 PROCEDURE — 80053 COMPREHEN METABOLIC PANEL: CPT | Performed by: EMERGENCY MEDICINE

## 2021-02-28 PROCEDURE — 85610 PROTHROMBIN TIME: CPT | Performed by: INTERNAL MEDICINE

## 2021-02-28 PROCEDURE — 85730 THROMBOPLASTIN TIME PARTIAL: CPT | Performed by: HOSPITALIST

## 2021-02-28 PROCEDURE — 99285 EMERGENCY DEPT VISIT HI MDM: CPT | Performed by: EMERGENCY MEDICINE

## 2021-02-28 PROCEDURE — 85610 PROTHROMBIN TIME: CPT | Performed by: EMERGENCY MEDICINE

## 2021-02-28 PROCEDURE — 82728 ASSAY OF FERRITIN: CPT | Performed by: EMERGENCY MEDICINE

## 2021-02-28 PROCEDURE — 99285 EMERGENCY DEPT VISIT HI MDM: CPT

## 2021-02-28 PROCEDURE — 36415 COLL VENOUS BLD VENIPUNCTURE: CPT | Performed by: EMERGENCY MEDICINE

## 2021-02-28 PROCEDURE — 82553 CREATINE MB FRACTION: CPT | Performed by: EMERGENCY MEDICINE

## 2021-02-28 PROCEDURE — 94664 DEMO&/EVAL PT USE INHALER: CPT

## 2021-02-28 PROCEDURE — 71045 X-RAY EXAM CHEST 1 VIEW: CPT

## 2021-02-28 PROCEDURE — 93010 ELECTROCARDIOGRAM REPORT: CPT | Performed by: INTERNAL MEDICINE

## 2021-02-28 PROCEDURE — 99223 1ST HOSP IP/OBS HIGH 75: CPT | Performed by: HOSPITALIST

## 2021-02-28 PROCEDURE — 85027 COMPLETE CBC AUTOMATED: CPT | Performed by: INTERNAL MEDICINE

## 2021-02-28 PROCEDURE — 86140 C-REACTIVE PROTEIN: CPT | Performed by: EMERGENCY MEDICINE

## 2021-02-28 PROCEDURE — 82550 ASSAY OF CK (CPK): CPT | Performed by: EMERGENCY MEDICINE

## 2021-02-28 PROCEDURE — 83605 ASSAY OF LACTIC ACID: CPT | Performed by: EMERGENCY MEDICINE

## 2021-02-28 PROCEDURE — 93005 ELECTROCARDIOGRAM TRACING: CPT

## 2021-02-28 PROCEDURE — 96360 HYDRATION IV INFUSION INIT: CPT

## 2021-02-28 PROCEDURE — 84145 PROCALCITONIN (PCT): CPT | Performed by: EMERGENCY MEDICINE

## 2021-02-28 PROCEDURE — 0241U HB NFCT DS VIR RESP RNA 4 TRGT: CPT | Performed by: EMERGENCY MEDICINE

## 2021-02-28 PROCEDURE — 87040 BLOOD CULTURE FOR BACTERIA: CPT | Performed by: EMERGENCY MEDICINE

## 2021-02-28 PROCEDURE — XW033E5 INTRODUCTION OF REMDESIVIR ANTI-INFECTIVE INTO PERIPHERAL VEIN, PERCUTANEOUS APPROACH, NEW TECHNOLOGY GROUP 5: ICD-10-PCS | Performed by: EMERGENCY MEDICINE

## 2021-02-28 PROCEDURE — 85730 THROMBOPLASTIN TIME PARTIAL: CPT | Performed by: INTERNAL MEDICINE

## 2021-02-28 RX ORDER — HEPARIN SODIUM 1000 [USP'U]/ML
2600 INJECTION, SOLUTION INTRAVENOUS; SUBCUTANEOUS
Status: DISCONTINUED | OUTPATIENT
Start: 2021-02-28 | End: 2021-03-02

## 2021-02-28 RX ORDER — FAMOTIDINE 20 MG/1
10 TABLET, FILM COATED ORAL DAILY
Status: DISCONTINUED | OUTPATIENT
Start: 2021-03-01 | End: 2021-03-10 | Stop reason: HOSPADM

## 2021-02-28 RX ORDER — HEPARIN SODIUM 10000 [USP'U]/100ML
3-30 INJECTION, SOLUTION INTRAVENOUS
Status: DISCONTINUED | OUTPATIENT
Start: 2021-02-28 | End: 2021-03-02

## 2021-02-28 RX ORDER — GUAIFENESIN 600 MG
1200 TABLET, EXTENDED RELEASE 12 HR ORAL EVERY 12 HOURS SCHEDULED
Status: DISCONTINUED | OUTPATIENT
Start: 2021-02-28 | End: 2021-03-03

## 2021-02-28 RX ORDER — ATORVASTATIN CALCIUM 40 MG/1
40 TABLET, FILM COATED ORAL
Status: DISCONTINUED | OUTPATIENT
Start: 2021-02-28 | End: 2021-03-10 | Stop reason: HOSPADM

## 2021-02-28 RX ORDER — MELATONIN
2000 DAILY
Status: DISCONTINUED | OUTPATIENT
Start: 2021-02-28 | End: 2021-03-10 | Stop reason: HOSPADM

## 2021-02-28 RX ORDER — HEPARIN SODIUM 1000 [USP'U]/ML
5200 INJECTION, SOLUTION INTRAVENOUS; SUBCUTANEOUS ONCE
Status: COMPLETED | OUTPATIENT
Start: 2021-02-28 | End: 2021-02-28

## 2021-02-28 RX ORDER — SODIUM CHLORIDE 9 MG/ML
75 INJECTION, SOLUTION INTRAVENOUS CONTINUOUS
Status: DISCONTINUED | OUTPATIENT
Start: 2021-02-28 | End: 2021-03-01

## 2021-02-28 RX ORDER — MULTIVITAMIN/IRON/FOLIC ACID 18MG-0.4MG
1 TABLET ORAL DAILY
Status: DISCONTINUED | OUTPATIENT
Start: 2021-03-07 | End: 2021-03-10 | Stop reason: HOSPADM

## 2021-02-28 RX ORDER — HEPARIN SODIUM 1000 [USP'U]/ML
5200 INJECTION, SOLUTION INTRAVENOUS; SUBCUTANEOUS
Status: DISCONTINUED | OUTPATIENT
Start: 2021-02-28 | End: 2021-03-02

## 2021-02-28 RX ORDER — DEXAMETHASONE SODIUM PHOSPHATE 4 MG/ML
6 INJECTION, SOLUTION INTRA-ARTICULAR; INTRALESIONAL; INTRAMUSCULAR; INTRAVENOUS; SOFT TISSUE ONCE
Status: COMPLETED | OUTPATIENT
Start: 2021-02-28 | End: 2021-02-28

## 2021-02-28 RX ORDER — DOXYCYCLINE HYCLATE 100 MG/1
100 CAPSULE ORAL EVERY 12 HOURS
Status: DISCONTINUED | OUTPATIENT
Start: 2021-02-28 | End: 2021-03-05

## 2021-02-28 RX ORDER — BENZONATATE 100 MG/1
100 CAPSULE ORAL 3 TIMES DAILY PRN
Status: DISCONTINUED | OUTPATIENT
Start: 2021-02-28 | End: 2021-03-10 | Stop reason: HOSPADM

## 2021-02-28 RX ORDER — ZINC SULFATE 50(220)MG
220 CAPSULE ORAL DAILY
Status: COMPLETED | OUTPATIENT
Start: 2021-02-28 | End: 2021-03-06

## 2021-02-28 RX ORDER — ASCORBIC ACID 500 MG
1000 TABLET ORAL EVERY 12 HOURS SCHEDULED
Status: COMPLETED | OUTPATIENT
Start: 2021-02-28 | End: 2021-03-06

## 2021-02-28 RX ORDER — DEXAMETHASONE SODIUM PHOSPHATE 4 MG/ML
6 INJECTION, SOLUTION INTRA-ARTICULAR; INTRALESIONAL; INTRAMUSCULAR; INTRAVENOUS; SOFT TISSUE EVERY 24 HOURS
Status: DISCONTINUED | OUTPATIENT
Start: 2021-03-01 | End: 2021-03-04

## 2021-02-28 RX ADMIN — Medication 2000 UNITS: at 13:57

## 2021-02-28 RX ADMIN — GUAIFENESIN 1200 MG: 600 TABLET, EXTENDED RELEASE ORAL at 21:49

## 2021-02-28 RX ADMIN — ZINC SULFATE 220 MG (50 MG) CAPSULE 220 MG: CAPSULE at 13:57

## 2021-02-28 RX ADMIN — SODIUM CHLORIDE 500 ML: 0.9 INJECTION, SOLUTION INTRAVENOUS at 10:45

## 2021-02-28 RX ADMIN — SODIUM CHLORIDE 75 ML/HR: 0.9 INJECTION, SOLUTION INTRAVENOUS at 14:20

## 2021-02-28 RX ADMIN — REMDESIVIR 200 MG: 100 INJECTION, POWDER, LYOPHILIZED, FOR SOLUTION INTRAVENOUS at 13:44

## 2021-02-28 RX ADMIN — OXYCODONE HYDROCHLORIDE AND ACETAMINOPHEN 1000 MG: 500 TABLET ORAL at 21:49

## 2021-02-28 RX ADMIN — CEFTRIAXONE 1000 MG: 10 INJECTION, POWDER, FOR SOLUTION INTRAVENOUS at 13:57

## 2021-02-28 RX ADMIN — DOXYCYCLINE 100 MG: 100 CAPSULE ORAL at 21:49

## 2021-02-28 RX ADMIN — ATORVASTATIN CALCIUM 40 MG: 40 TABLET, FILM COATED ORAL at 21:49

## 2021-02-28 RX ADMIN — HEPARIN SODIUM 18 UNITS/KG/HR: 10000 INJECTION, SOLUTION INTRAVENOUS at 14:23

## 2021-02-28 RX ADMIN — DEXAMETHASONE SODIUM PHOSPHATE 6 MG: 4 INJECTION, SOLUTION INTRA-ARTICULAR; INTRALESIONAL; INTRAMUSCULAR; INTRAVENOUS; SOFT TISSUE at 12:27

## 2021-02-28 RX ADMIN — GUAIFENESIN 1200 MG: 600 TABLET, EXTENDED RELEASE ORAL at 13:57

## 2021-02-28 RX ADMIN — HEPARIN SODIUM 5200 UNITS: 1000 INJECTION INTRAVENOUS; SUBCUTANEOUS at 14:19

## 2021-02-28 RX ADMIN — OXYCODONE HYDROCHLORIDE AND ACETAMINOPHEN 1000 MG: 500 TABLET ORAL at 13:57

## 2021-02-28 RX ADMIN — DOXYCYCLINE 100 MG: 100 CAPSULE ORAL at 13:57

## 2021-02-28 RX ADMIN — FAMOTIDINE 20 MG: 10 INJECTION, SOLUTION INTRAVENOUS at 12:26

## 2021-02-28 NOTE — ED PROVIDER NOTES
History  Chief Complaint   Patient presents with    Weakness - Generalized     pt c/o generalized weakness for 3 days  per EMS, pt O2 was in the low 70s on RA      Patient is a 51-year-old female with a history of GERD who presents with generalized weakness and poor appetite  Patient states that she has had generalized weakness and decreased p o  Intake for the past 2-3 days  She has been trying to drink fluids but has had decreased solid food intake  She admits to a mild cough but denies fever, chills shortness of breath, chest pain or other concerns  However EMS states that she appeared to be in mild respiratory distress with hypoxia on their arrival   Daughter called EMS today due to he shortness breath  There are multiple family members in the household who are positive for COVID-19  Patient was placed on nasal cannula oxygen improvement in oxygen saturation  She went mid 70s to 92% on 6 L nasal cannula  History provided by:  Patient and EMS personnel  Fatigue  Severity:  Moderate  Duration:  3 days  Timing:  Constant  Progression:  Worsening  Chronicity:  New  Ineffective treatments:  Rest and drinking fluids  Associated symptoms: cough    Associated symptoms: no abdominal pain, no chest pain, no diarrhea, no dizziness, no dysuria, no fever, no headaches, no nausea, no seizures, no shortness of breath and no vomiting        None       History reviewed  No pertinent past medical history  Past Surgical History:   Procedure Laterality Date    CATARACT EXTRACTION      EGD AND COLONOSCOPY N/A 11/14/2017    Procedure: EGD AND COLONOSCOPY;  Surgeon: Tevin Dyer MD;  Location: [facility]; Service: Gastroenterology       Family History   Problem Relation Age of Onset    Diabetes Father     Cancer Family      I have reviewed and agree with the history as documented      E-Cigarette/Vaping     E-Cigarette/Vaping Substances     Social History     Tobacco Use    Smoking status: Never Smoker    Smokeless tobacco: Never Used    Tobacco comment: former smoker, per [facility];  started smoking at 13 yo, stopped at 28 yo   Substance Use Topics    Alcohol use: No    Drug use: No       Review of Systems   Constitutional: Positive for fatigue  Negative for chills, diaphoresis and fever  HENT: Negative for nosebleeds, sore throat and trouble swallowing  Eyes: Negative for photophobia, pain and visual disturbance  Respiratory: Positive for cough  Negative for chest tightness and shortness of breath  Cardiovascular: Negative for chest pain, palpitations and leg swelling  Gastrointestinal: Negative for abdominal pain, constipation, diarrhea, nausea and vomiting  Endocrine: Negative for polydipsia and polyuria  Genitourinary: Negative for difficulty urinating, dysuria, hematuria, pelvic pain, vaginal bleeding and vaginal discharge  Musculoskeletal: Negative for back pain, neck pain and neck stiffness  Skin: Negative for pallor and rash  Neurological: Negative for dizziness, seizures, light-headedness and headaches  All other systems reviewed and are negative  Physical Exam  Physical Exam  Vitals signs and nursing note reviewed  Constitutional:       General: She is not in acute distress  Appearance: She is well-developed  HENT:      Head: Normocephalic and atraumatic  Eyes:      Pupils: Pupils are equal, round, and reactive to light  Neck:      Musculoskeletal: Normal range of motion and neck supple  Cardiovascular:      Rate and Rhythm: Normal rate and regular rhythm  Pulses: Normal pulses  Heart sounds: Normal heart sounds  Pulmonary:      Effort: Pulmonary effort is normal  Tachypnea present  No respiratory distress  Breath sounds: Normal breath sounds  Comments: Coarse breath sounds in bilateral bases  Abdominal:      General: There is no distension  Palpations: Abdomen is soft  Abdomen is not rigid  Tenderness:  There is no abdominal tenderness  There is no guarding or rebound  Musculoskeletal: Normal range of motion  General: No tenderness  Lymphadenopathy:      Cervical: No cervical adenopathy  Skin:     General: Skin is warm and dry  Capillary Refill: Capillary refill takes less than 2 seconds  Neurological:      Mental Status: She is alert and oriented to person, place, and time  Cranial Nerves: No cranial nerve deficit  Sensory: No sensory deficit           Vital Signs  ED Triage Vitals   Temperature Pulse Respirations Blood Pressure SpO2   02/28/21 1032 02/28/21 1032 02/28/21 1032 02/28/21 1032 02/28/21 1034   99 3 °F (37 4 °C) 99 (!) 32 136/81 94 %      Temp Source Heart Rate Source Patient Position - Orthostatic VS BP Location FiO2 (%)   02/28/21 1032 02/28/21 1032 02/28/21 1032 02/28/21 1032 --   Oral Monitor Sitting Right arm       Pain Score       02/28/21 1032       No Pain           Vitals:    02/28/21 1032 02/28/21 1230   BP: 136/81 120/63   Pulse: 99 97   Patient Position - Orthostatic VS: Sitting          Visual Acuity      ED Medications  Medications   remdesivir (Veklury) 200 mg in sodium chloride 0 9 % 250 mL IVPB (has no administration in time range)     Followed by   remdesivir Elvera Bald) 100 mg in sodium chloride 0 9 % 250 mL IVPB (has no administration in time range)   cholecalciferol (VITAMIN D3) tablet 2,000 Units (has no administration in time range)   ascorbic acid (VITAMIN C) tablet 1,000 mg (has no administration in time range)   zinc sulfate (ZINCATE) capsule 220 mg (has no administration in time range)     Followed by   multivitamin-minerals (CENTRUM ADULTS) tablet 1 tablet (has no administration in time range)   dexamethasone (DECADRON) injection 6 mg (has no administration in time range)   famotidine (PEPCID) tablet 10 mg (has no administration in time range)   ceftriaxone (ROCEPHIN) 1 g/50 mL in dextrose IVPB (has no administration in time range)   doxycycline hyclate (VIBRAMYCIN) capsule 100 mg (has no administration in time range)   atorvastatin (LIPITOR) tablet 40 mg (has no administration in time range)   sodium chloride 0 9 % bolus 500 mL (500 mL Intravenous New Bag 2/28/21 1045)   dexamethasone (DECADRON) injection 6 mg (6 mg Intravenous Given 2/28/21 1227)   famotidine (PEPCID) injection 20 mg (20 mg Intravenous Given 2/28/21 1226)       Diagnostic Studies  Results Reviewed     Procedure Component Value Units Date/Time    C-reactive protein [[id number]]  (Abnormal) Collected: 02/28/21 1037    Lab Status: Final result Specimen: Blood from Arm, Right Updated: 02/28/21 1310      1 mg/L     CKMB [[id number]]  (Normal) Collected: 02/28/21 1037    Lab Status: Final result Specimen: Blood from Arm, Right Updated: 02/28/21 1308     CK-MB Index <1 0 %      CK-MB 0 5 ng/mL     CK (with reflex to MB) [[id number]]  (Normal) Collected: 02/28/21 1037    Lab Status: Final result Specimen: Blood from Arm, Right Updated: 02/28/21 1215     Total  U/L     NT-BNP PRO [[id number]]  (Abnormal) Collected: 02/28/21 1037    Lab Status: Final result Specimen: Blood from Arm, Right Updated: 02/28/21 1207     NT-proBNP 1,658 pg/mL     COVID19, Influenza A/B, RSV PCR, [facility] [[id number]]  (Abnormal) Collected: 02/28/21 1037    Lab Status: Final result Specimen: Nares from Nose Updated: 02/28/21 1135     SARS-CoV-2 Positive     INFLUENZA A PCR Negative     INFLUENZA B PCR Negative     RSV PCR Negative    Narrative: This test has been authorized by FDA under an EUA (Emergency Use Assay) for use by authorized laboratories  Clinical caution and judgement should be used with the interpretation of these results with consideration of the clinical impression and other laboratory testing  Testing reported as "Positive" or "Negative" has been proven to be accurate according to standard laboratory validation requirements    All testing is performed with control materials showing appropriate reactivity at standard intervals  Comprehensive metabolic panel [[id number]]  (Abnormal) Collected: 02/28/21 1037    Lab Status: Final result Specimen: Blood from Arm, Right Updated: 02/28/21 1126     Sodium 135 mmol/L      Potassium 3 6 mmol/L      Chloride 101 mmol/L      CO2 25 mmol/L      ANION GAP 9 mmol/L      BUN 22 mg/dL      Creatinine 1 66 mg/dL      Glucose 117 mg/dL      Calcium 8 7 mg/dL      Corrected Calcium 9 7 mg/dL      AST 69 U/L      ALT 31 U/L      Alkaline Phosphatase 87 U/L      Total Protein 8 0 g/dL      Albumin 2 8 g/dL      Total Bilirubin 0 69 mg/dL      eGFR 27 ml/min/1 73sq m     Narrative:      [place] guidelines for Chronic Kidney Disease (CKD):     Stage 1 with normal or high GFR (GFR > 90 mL/min/1 73 square meters)    Stage 2 Mild CKD (GFR = 60-89 mL/min/1 73 square meters)    Stage 3A Moderate CKD (GFR = 45-59 mL/min/1 73 square meters)    Stage 3B Moderate CKD (GFR = 30-44 mL/min/1 73 square meters)    Stage 4 Severe CKD (GFR = 15-29 mL/min/1 73 square meters)    Stage 5 End Stage CKD (GFR <15 mL/min/1 73 square meters)  Note: GFR calculation is accurate only with a steady state creatinine    Lactic acid, plasma [[id number]]  (Normal) Collected: 02/28/21 1037    Lab Status: Final result Specimen: Blood from Arm, Right Updated: 02/28/21 1107     LACTIC ACID 1 9 mmol/L     Narrative:      Result may be elevated if tourniquet was used during collection      Troponin I [[id number]]  (Normal) Collected: 02/28/21 1037    Lab Status: Final result Specimen: Blood from Arm, Right Updated: 02/28/21 1106     Troponin I 0 04 ng/mL     D-dimer, quantitative [[id number]]  (Abnormal) Collected: 02/28/21 1037    Lab Status: Final result Specimen: Blood from Arm, Right Updated: 02/28/21 1101     D-Dimer, Quant 2 51 ug/ml FEU     Protime-INR [[id number]]  (Normal) Collected: 02/28/21 1037    Lab Status: Final result Specimen: Blood from Arm, Right Updated: 02/28/21 1057     Protime 14 0 seconds      INR 1 07    Blood culture #1 [[id number]] Collected: 02/28/21 1049    Lab Status: In process Specimen: Blood from Arm, Left Updated: 02/28/21 1054    Ferritin [[id number]] Collected: 02/28/21 1037    Lab Status: In process Specimen: Blood from Arm, Right Updated: 02/28/21 1052    CBC and differential [[id number]]  (Abnormal) Collected: 02/28/21 1037    Lab Status: Final result Specimen: Blood from Arm, Right Updated: 02/28/21 1047     WBC 6 36 Thousand/uL      RBC 4 52 Million/uL      Hemoglobin 11 4 g/dL      Hematocrit 37 1 %      MCV 82 fL      MCH 25 2 pg      MCHC 30 7 g/dL      RDW 15 9 %      MPV 8 9 fL      Platelets 162 Thousands/uL      nRBC 0 /100 WBCs      Neutrophils Relative 85 %      Immat GRANS % 1 %      Lymphocytes Relative 10 %      Monocytes Relative 4 %      Eosinophils Relative 0 %      Basophils Relative 0 %      Neutrophils Absolute 5 45 Thousands/µL      Immature Grans Absolute 0 03 Thousand/uL      Lymphocytes Absolute 0 61 Thousands/µL      Monocytes Absolute 0 26 Thousand/µL      Eosinophils Absolute 0 00 Thousand/µL      Basophils Absolute 0 01 Thousands/µL     Blood culture #2 [[id number]] Collected: 02/28/21 1037    Lab Status: In process Specimen: Blood from Arm, Right Updated: 02/28/21 1045    Procalcitonin with AM Reflex [[id number]] Collected: 02/28/21 1037    Lab Status: In process Specimen: Blood from Arm, Right Updated: 02/28/21 1044                 XR chest 1 view portable   ED Interpretation by Yessenia Andre DO (02/28 1104)   Bilateral, peripheral infiltrates  Final Result by Sharyle Plater, MD (02/28 1230)      Patchy peripheral basilar groundglass opacities suspicious for multifocal pneumonia in this patient with known Covid 19 infection                    Workstation performed: [id number]                    Procedures  ECG 12 Lead Documentation Only    Date/Time: 2/28/2021 10:58 AM  Performed by: Yessenia Andre DO  Authorized by: Yessenia Andre DO ECG reviewed by me, the ED Provider: yes    Patient location:  ED  Previous ECG:     Previous ECG:  Compared to current    Similarity:  No change    Comparison to cardiac monitor: Yes    Comments:      Sinus tachycardia rate of 101 beats per minute  Left axis deviation  Poor R-wave progression  Left ventricular hypertrophy with secondary repolarization abnormalities  Similar to previous from 11/13/2017             ED Course  ED Course as of Feb 28 1318   Sun Feb 28, 2021   1128 Acute kidney injury  Creatinine(!): 1 66   1128 Patient 94% on 3 L nasal cannula  MDM  Number of Diagnoses or Management Options  Acute kidney injury [facility]): new and requires workup  COVID-19 virus infection: new and requires workup  Hypoxia: new and requires workup  Diagnosis management comments: Patient presents with hypoxia secondary to COVID-19 infection  She is stable on 3 L nasal cannula  Chest x-ray confirms bilateral, patchy infiltrates  Patient started on mild COVID-19 treatment algorithm  Given Decadron, Pepcid and remdesivir  Will hospitalize for further management         Amount and/or Complexity of Data Reviewed  Clinical lab tests: ordered and reviewed  Tests in the radiology section of CPT®: ordered and reviewed  Tests in the medicine section of CPT®: ordered and reviewed  Review and summarize past medical records: yes  Discuss the patient with other providers: yes  Independent visualization of images, tracings, or specimens: yes    Risk of Complications, Morbidity, and/or Mortality  Presenting problems: high  Diagnostic procedures: moderate  Management options: high    Patient Progress  Patient progress: stable      Disposition  Final diagnoses:   COVID-19 virus infection   Acute kidney injury ([facility] [address] )   Hypoxia     Time reflects when diagnosis was documented in both MDM as applicable and the Disposition within this note     Time User Action Codes Description Comment 2/28/2021 11:46 AM Ang Kent STANTON Add [U07 1] COVID-19 virus infection     2/28/2021 11:46 AM Ang Kent STANTON Add [N17 9] Acute kidney injury ([facility] [address] )     2/28/2021 11:47 AM Julia Gonzalez Add [R09 02] Hypoxia       ED Disposition     ED Disposition Condition Date/Time Comment    Admit Stable Nancy Feb 28, 2021 11:46 AM Case was discussed with PENNY and the patient's admission status was agreed to be Admission Status: inpatient status to the service of Dr Stanley Murphy   Follow-up Information    None         Patient's Medications   Discharge Prescriptions    No medications on file     No discharge procedures on file      PDMP Review     None          ED Provider  Electronically Signed by           Pamela Arce DO  02/28/21 4887

## 2021-02-28 NOTE — ASSESSMENT & PLAN NOTE
· Mild hyponatremia 135, most probably secondary to dehydration given reported decreased p o   Intake  · Start gentle IVF  · Monitor BMP daily

## 2021-02-28 NOTE — ASSESSMENT & PLAN NOTE
· POA:  The time involved 2 51 in the setting of COVID-19 pneumonia, given the patient current presentation sudden onset shortness of breath, acute respiratory failure that required supplemental oxygen need to rule out PE  · Given the patient current [place] I will defer obtaining CTA, will start gentle IV hydration patient creatinine is improved tomorrow will consider CTA then   · Will avoid starting Lovenox given current DKA, will start heparin drip  · Continue trend D-dimer

## 2021-02-28 NOTE — ASSESSMENT & PLAN NOTE
· Isolated elevation of AST 69, in the setting of COVID-19 pneumonia  · Continue trend and monitor CMP daily

## 2021-02-28 NOTE — H&P
H&P- Lev Laurie 12/13/1929, 80 y o  female MRN: [id number]    Unit/Bed#: ED 08 Encounter: [id number]    Primary Care Provider: No primary care provider on file  Date and time admitted to hospital: 2/28/2021 10:27 AM        * Pneumonia due to COVID-19 virus  Assessment & Plan  · POA:  Generalized weakness, poor appetite decreased p o  Intake for the past 2-3 days, mild cough with shortness of breath but no fevers chills or chest pain, the saturating mid 70 requiring supplemental oxygen  · COVID-19 positive  · Chest x-ray showing bilateral patchy opacities suggestive for multifocal pneumonia  · Patient will be admitted under COVID-19 moderate treatment protocol  · Inflammatory markers:  , ferritin pending  · D-dimer elevated 2 51 patient will require full anticoagulation, given current Lars I am presentation will start heparin drip, gentle IV hydration and consider CTA to rule out PE when kidney function is stable  · Cardiac markers:  Troponin 0 04, CK pending  · Procalcitonin currently pending, was started antibiotics with doxycycline and Rocephin to be discontinued if procalcitonin is negative x2   · Given patient elevated inflammatory markers and increased O2 requirement will start steroids med Decadron and remdesivir per protocol  · Was started vitamin-C, vitamin-D, zinc, famotidine  · PT and OT eval and treat, Self-proning if able, Incentive spirometry  · Consult Pulm/CC if worsening oxygen requirements  · Respiratory protocol, incentive spirometer, self prone   · Supportive management with Tessalon Perles, Mucinex, Tylenol  · Continuing supplemental oxygen as tolerated for SpO2 sat >90%  · Continue trend D-dimer, inflammatory markers daily      Elevated serum creatinine  Assessment & Plan  · Creatinine level on presentation 1 6 seconds in the setting of COVID-19 pneumonia and reported significant decreased p o  Intake the past 2-3 days    · There is no recent record for comparison, most recent from 2017 a creatinine level of 1 1 with reduced GFR which might indicate history of CKD   · Will consider starting gentle IV hydration  · Avoid nephrotoxins, NSAIDs, hypotension  · Monitor BMP daily    Elevated d-dimer  Assessment & Plan  · POA:  The time involved 2 51 in the setting of COVID-19 pneumonia, given the patient current presentation sudden onset shortness of breath, acute respiratory failure that required supplemental oxygen need to rule out PE  · Given the patient current Lars I will defer obtaining CTA, will start gentle IV hydration patient creatinine is improved tomorrow will consider CTA then   · Will avoid starting Lovenox given current DKA, will start heparin drip  · Continue trend D-dimer      Hyponatremia  Assessment & Plan  · Mild hyponatremia 135, most probably secondary to dehydration given reported decreased p o  Intake  · Start gentle IVF  · Monitor BMP daily    Elevated liver enzymes  Assessment & Plan  · Isolated elevation of AST 69, in the setting of COVID-19 pneumonia  · Continue trend and monitor CMP daily      VTE Prophylaxis: Heparin Drip  / sequential compression device   Code Status:  Level 1 full code  POLST: POLST form is not discussed and not completed at this time  Anticipated Length of Stay:  Patient will be admitted on an Inpatient basis with an anticipated length of stay of  more than 2 midnights  Justification for Hospital Stay:  COVID-19 pneumonia  Chief Complaint:  Generalized weakness    History of Present Illness:    Charlotte Carias is a 80 y o  female who presents with 3 days history of worsening generalized weakness, decreased appetite with significant decreased p o  And shortness of breath  Patient lives home with her daughter of and her son-in-law which both get tested positive for COVID-19, patient's symptoms per daughter started on the 16 but continued to worsen    EMS called and patient was transferred to the ED, per EMS documentation patient was saturating mid 76s requiring supplemental oxygen  Patient has a past medical history significant for upper GI bleed, previous fall with hip fracture, she does not take any medications at home  On presentation hemodynamically stable, tachypneic, requiring supplemental oxygen  Initially patient requiring up to 6 L were able to wean down to 3 L currently saturating 94% on room air at the time of exam   COVID-19 test was positive, chest x-ray showing multifocal pneumonia, initial blood work significant for elevated creatinine, elevated D-dimer and CRP  Given the patient's sudden change in respiratory status and elevated D-dimer she will need CTA to rule out PE, given elevated creatinine will hold on getting the CTA, will start initial IV if hydration, will start full anticoagulation, patient creatinine improved will consider getting the CT then   -patient will be admitted under moderate COVID-19 treatment protocol  Will consider Infectious Disease and pulmonology consult if patient O2 requirement escalated  Review of Systems:    Review of Systems   Constitutional: Positive for activity change, appetite change and fatigue  Negative for chills, diaphoresis and fever  HENT: Positive for congestion  Respiratory: Positive for cough and shortness of breath  Negative for chest tightness and wheezing  Cardiovascular: Negative for chest pain, palpitations and leg swelling  Gastrointestinal: Negative for abdominal pain, diarrhea, nausea and vomiting  Genitourinary: Negative for dysuria and urgency  Neurological: Positive for weakness  Negative for dizziness, numbness and headaches  Psychiatric/Behavioral: The patient is not nervous/anxious  Past Medical and Surgical History:     History reviewed  No pertinent past medical history      Past Surgical History:   Procedure Laterality Date    CATARACT EXTRACTION      EGD AND COLONOSCOPY N/A 11/14/2017    Procedure: EGD AND COLONOSCOPY;  Surgeon: Shireen Sweeney MD; Location: [facility]; Service: Gastroenterology       Meds/Allergies:    Prior to Admission medications    Medication Sig Start Date End Date Taking? Authorizing Provider   ascorbic acid (VITAMIN C) 250 MG CHEW Chew 1 tablet 2 (two) times a day Take with iron  Patient not taking: Reported on 2/28/2021 11/16/17 2/28/21  Justen Leone MD   docusate sodium (COLACE) 100 mg capsule Take 1 capsule by mouth 2 (two) times a day  Patient not taking: Reported on 2/28/2021 11/16/17 2/28/21  Justen Leone MD   ferrous sulfate 324 (65 Fe) mg Take 1 tablet by mouth 2 (two) times a day before meals  Patient not taking: Reported on 2/28/2021 11/16/17 2/28/21  Justen Leone MD   pantoprazole (PROTONIX) 40 mg tablet Take 1 tablet by mouth 2 (two) times a day  Patient not taking: Reported on 2/28/2021 11/16/17 2/28/21  Justen Leone MD   pantoprazole (PROTONIX) 40 mg tablet TAKE 1 TABLET(40 MG) BY MOUTH DAILY  Patient not taking: Reported on 2/28/2021 5/31/19 2/28/21  Fiordaliza Haque PA-C   pantoprazole (PROTONIX) 40 mg tablet Take 1 tablet (40 mg total) by mouth daily  Patient not taking: Reported on 2/28/2021 5/31/19 2/28/21  Fiordaliza Haque PA-C     I have reviewed home medications with patient personally  Allergies: No Known Allergies    Social History:     Marital Status:     Occupation:  Retired  Patient Pre-hospital Living Situation:  Lives home with family  Patient Pre-hospital Level of Mobility:  Ambulate with a walker  Patient Pre-hospital Diet Restrictions:  Regular no restrictions  Substance Use History:   Social History     Substance and Sexual Activity   Alcohol Use No     Social History     Tobacco Use   Smoking Status Never Smoker   Smokeless Tobacco Never Used   Tobacco Comment    former smoker, per [facility];  started smoking at 13 yo, stopped at 26 yo     Social History     Substance and Sexual Activity   Drug Use No       Family History:    Family History Problem Relation Age of Onset    Diabetes Father     Cancer Family        Physical Exam:     Vitals:   Blood Pressure: 120/63 (02/28/21 1230)  Pulse: 97 (02/28/21 1230)  Temperature: 99 3 °F (37 4 °C) (02/28/21 1032)  Temp Source: Oral (02/28/21 1032)  Respirations: (!) 28 (02/28/21 1230)  Weight - Scale: 66 7 kg (147 lb) (02/28/21 1032)  SpO2: 93 % (02/28/21 1230)    Physical Exam  Constitutional:       General: She is not in acute distress  Appearance: Normal appearance  She is ill-appearing  HENT:      Head: Normocephalic and atraumatic  Mouth/Throat:      Mouth: Mucous membranes are dry  Pharynx: No oropharyngeal exudate or posterior oropharyngeal erythema  Eyes:      Extraocular Movements: Extraocular movements intact  Pupils: Pupils are equal, round, and reactive to light  Cardiovascular:      Rate and Rhythm: Normal rate and regular rhythm  Pulses: Normal pulses  Pulmonary:      Effort: Pulmonary effort is normal       Breath sounds: Decreased breath sounds present  No wheezing  Chest:      Chest wall: No tenderness  Abdominal:      General: Abdomen is flat  Palpations: Abdomen is soft  Tenderness: There is no abdominal tenderness  Musculoskeletal:         General: No swelling  Right lower leg: No edema  Left lower leg: No edema  Neurological:      General: No focal deficit present  Mental Status: She is alert and oriented to person, place, and time  Psychiatric:         Mood and Affect: Mood normal          Behavior: Behavior normal              Additional Data:     Lab Results: I have personally reviewed pertinent reports        Results from last 7 days   Lab Units 02/28/21  1037   WBC Thousand/uL 6 36   HEMOGLOBIN g/dL 11 4*   HEMATOCRIT % 37 1   PLATELETS Thousands/uL 215   NEUTROS PCT % 85*   LYMPHS PCT % 10*   MONOS PCT % 4   EOS PCT % 0     Results from last 7 days   Lab Units 02/28/21  1037   POTASSIUM mmol/L 3 6   CHLORIDE mmol/L 101   CO2 mmol/L 25   BUN mg/dL 22   CREATININE mg/dL 1 66*   CALCIUM mg/dL 8 7   ALK PHOS U/L 87   ALT U/L 31   AST U/L 69*     Results from last 7 days   Lab Units 02/28/21  1037   INR  1 07       Imaging: I have personally reviewed pertinent reports  Xr Chest 1 View Portable    Result Date: 2/28/2021  Narrative: CHEST INDICATION:   Shortness of breath  Patient has confirmed COVID-19  Test date 2/28/2021 COMPARISON:  None EXAM PERFORMED/VIEWS:  XR CHEST PORTABLE FINDINGS: Heart shadow appears unremarkable  Atherosclerotic vascular calcifications are noted  Patchy right greater than left bibasilar peripheral groundglass opacities  No large pleural effusion or pneumothorax  There is a large hiatal hernia  Osseous structures appear within normal limits for patient age  Impression: Patchy peripheral basilar groundglass opacities suspicious for multifocal pneumonia in this patient with known Covid 19 infection  Workstation performed: [id number]       EKG, Pathology, and Other Studies Reviewed on Admission:   · EKG:  Pending    Epic / Care Everywhere Records Reviewed: Yes     ** Please Note: This note has been constructed using a voice recognition system   **

## 2021-02-28 NOTE — ASSESSMENT & PLAN NOTE
· POA:  Generalized weakness, poor appetite decreased p o   Intake for the past 2-3 days, mild cough with shortness of breath but no fevers chills or chest pain, the saturating mid 70 requiring supplemental oxygen  · COVID-19 positive  · Chest x-ray showing bilateral patchy opacities suggestive for multifocal pneumonia  · Patient will be admitted under COVID-19 moderate treatment protocol  · Inflammatory markers:  , ferritin pending  · D-dimer elevated 2 51 patient will require full anticoagulation, given current Lars I am presentation will start heparin drip, gentle IV hydration and consider CTA to rule out PE when kidney function is stable  · Cardiac markers:  Troponin 0 04, CK pending  · Procalcitonin currently pending, was started antibiotics with doxycycline and Rocephin to be discontinued if procalcitonin is negative x2   · Given patient elevated inflammatory markers and increased O2 requirement will start steroids med Decadron and remdesivir per protocol  · Was started vitamin-C, vitamin-D, zinc, famotidine  · PT and OT eval and treat, Self-proning if able, Incentive spirometry  · Consult Pulm/CC if worsening oxygen requirements  · Respiratory protocol, incentive spirometer, self prone   · Supportive management with Tessalon Perles, Mucinex, Tylenol  · Continuing supplemental oxygen as tolerated for SpO2 sat >90%  · Continue trend D-dimer, inflammatory markers daily

## 2021-02-28 NOTE — ASSESSMENT & PLAN NOTE
· Creatinine level on presentation 1 6 seconds in the setting of COVID-19 pneumonia and reported significant decreased p o  Intake the past 2-3 days    · There is no recent record for comparison, most recent from 2017 a creatinine level of 1 1 with reduced GFR which might indicate history of CKD   · Will consider starting gentle IV hydration  · Avoid nephrotoxins, NSAIDs, hypotension  · Monitor BMP daily

## 2021-03-01 ENCOUNTER — APPOINTMENT (INPATIENT)
Dept: CT IMAGING | Facility: HOSPITAL | Age: 86
DRG: 177 | End: 2021-03-01
Payer: MEDICARE

## 2021-03-01 LAB
ALBUMIN SERPL BCP-MCNC: 2.3 G/DL (ref 3.5–5)
ALP SERPL-CCNC: 76 U/L (ref 46–116)
ALT SERPL W P-5'-P-CCNC: 32 U/L (ref 12–78)
ANION GAP SERPL CALCULATED.3IONS-SCNC: 11 MMOL/L (ref 4–13)
APTT PPP: 109 SECONDS (ref 23–37)
APTT PPP: 198 SECONDS (ref 23–37)
APTT PPP: >210 SECONDS (ref 23–37)
AST SERPL W P-5'-P-CCNC: 68 U/L (ref 5–45)
BASOPHILS # BLD AUTO: 0 THOUSANDS/ΜL (ref 0–0.1)
BASOPHILS NFR BLD AUTO: 0 % (ref 0–1)
BILIRUB SERPL-MCNC: 0.51 MG/DL (ref 0.2–1)
BUN SERPL-MCNC: 26 MG/DL (ref 5–25)
CALCIUM ALBUM COR SERPL-MCNC: 9.5 MG/DL (ref 8.3–10.1)
CALCIUM SERPL-MCNC: 8.1 MG/DL (ref 8.3–10.1)
CHLORIDE SERPL-SCNC: 106 MMOL/L (ref 100–108)
CO2 SERPL-SCNC: 23 MMOL/L (ref 21–32)
CREAT SERPL-MCNC: 1.32 MG/DL (ref 0.6–1.3)
CRP SERPL QL: 149.9 MG/L
D DIMER PPP FEU-MCNC: 1.8 UG/ML FEU
EOSINOPHIL # BLD AUTO: 0 THOUSAND/ΜL (ref 0–0.61)
EOSINOPHIL NFR BLD AUTO: 0 % (ref 0–6)
ERYTHROCYTE [DISTWIDTH] IN BLOOD BY AUTOMATED COUNT: 15.9 % (ref 11.6–15.1)
FERRITIN SERPL-MCNC: 369 NG/ML (ref 8–388)
GFR SERPL CREATININE-BSD FRML MDRD: 35 ML/MIN/1.73SQ M
GLUCOSE SERPL-MCNC: 118 MG/DL (ref 65–140)
GLUCOSE SERPL-MCNC: 129 MG/DL (ref 65–140)
HCT VFR BLD AUTO: 34.2 % (ref 34.8–46.1)
HGB BLD-MCNC: 10.6 G/DL (ref 11.5–15.4)
IMM GRANULOCYTES # BLD AUTO: 0.02 THOUSAND/UL (ref 0–0.2)
IMM GRANULOCYTES NFR BLD AUTO: 1 % (ref 0–2)
LYMPHOCYTES # BLD AUTO: 0.42 THOUSANDS/ΜL (ref 0.6–4.47)
LYMPHOCYTES NFR BLD AUTO: 11 % (ref 14–44)
MCH RBC QN AUTO: 25.7 PG (ref 26.8–34.3)
MCHC RBC AUTO-ENTMCNC: 31 G/DL (ref 31.4–37.4)
MCV RBC AUTO: 83 FL (ref 82–98)
MONOCYTES # BLD AUTO: 0.28 THOUSAND/ΜL (ref 0.17–1.22)
MONOCYTES NFR BLD AUTO: 7 % (ref 4–12)
NEUTROPHILS # BLD AUTO: 3.18 THOUSANDS/ΜL (ref 1.85–7.62)
NEUTS SEG NFR BLD AUTO: 81 % (ref 43–75)
NRBC BLD AUTO-RTO: 0 /100 WBCS
PLATELET # BLD AUTO: 199 THOUSANDS/UL (ref 149–390)
PMV BLD AUTO: 9.3 FL (ref 8.9–12.7)
POTASSIUM SERPL-SCNC: 3.6 MMOL/L (ref 3.5–5.3)
PROCALCITONIN SERPL-MCNC: 0.44 NG/ML
PROT SERPL-MCNC: 6.8 G/DL (ref 6.4–8.2)
RBC # BLD AUTO: 4.13 MILLION/UL (ref 3.81–5.12)
SODIUM SERPL-SCNC: 140 MMOL/L (ref 136–145)
WBC # BLD AUTO: 3.9 THOUSAND/UL (ref 4.31–10.16)

## 2021-03-01 PROCEDURE — 85730 THROMBOPLASTIN TIME PARTIAL: CPT | Performed by: HOSPITALIST

## 2021-03-01 PROCEDURE — 85025 COMPLETE CBC W/AUTO DIFF WBC: CPT | Performed by: PSYCHIATRY & NEUROLOGY

## 2021-03-01 PROCEDURE — G1004 CDSM NDSC: HCPCS

## 2021-03-01 PROCEDURE — 80053 COMPREHEN METABOLIC PANEL: CPT | Performed by: PSYCHIATRY & NEUROLOGY

## 2021-03-01 PROCEDURE — 99232 SBSQ HOSP IP/OBS MODERATE 35: CPT | Performed by: HOSPITALIST

## 2021-03-01 PROCEDURE — 82728 ASSAY OF FERRITIN: CPT | Performed by: PSYCHIATRY & NEUROLOGY

## 2021-03-01 PROCEDURE — 86140 C-REACTIVE PROTEIN: CPT | Performed by: PSYCHIATRY & NEUROLOGY

## 2021-03-01 PROCEDURE — 71275 CT ANGIOGRAPHY CHEST: CPT

## 2021-03-01 PROCEDURE — 85730 THROMBOPLASTIN TIME PARTIAL: CPT | Performed by: INTERNAL MEDICINE

## 2021-03-01 PROCEDURE — 84145 PROCALCITONIN (PCT): CPT | Performed by: EMERGENCY MEDICINE

## 2021-03-01 PROCEDURE — 82948 REAGENT STRIP/BLOOD GLUCOSE: CPT

## 2021-03-01 PROCEDURE — 85379 FIBRIN DEGRADATION QUANT: CPT | Performed by: PSYCHIATRY & NEUROLOGY

## 2021-03-01 RX ORDER — SODIUM CHLORIDE, SODIUM GLUCONATE, SODIUM ACETATE, POTASSIUM CHLORIDE, MAGNESIUM CHLORIDE, SODIUM PHOSPHATE, DIBASIC, AND POTASSIUM PHOSPHATE .53; .5; .37; .037; .03; .012; .00082 G/100ML; G/100ML; G/100ML; G/100ML; G/100ML; G/100ML; G/100ML
50 INJECTION, SOLUTION INTRAVENOUS CONTINUOUS
Status: DISCONTINUED | OUTPATIENT
Start: 2021-03-01 | End: 2021-03-02

## 2021-03-01 RX ADMIN — OXYCODONE HYDROCHLORIDE AND ACETAMINOPHEN 1000 MG: 500 TABLET ORAL at 08:14

## 2021-03-01 RX ADMIN — FAMOTIDINE 10 MG: 20 TABLET, FILM COATED ORAL at 12:08

## 2021-03-01 RX ADMIN — REMDESIVIR 100 MG: 100 INJECTION, POWDER, LYOPHILIZED, FOR SOLUTION INTRAVENOUS at 13:12

## 2021-03-01 RX ADMIN — IOHEXOL 85 ML: 350 INJECTION, SOLUTION INTRAVENOUS at 20:08

## 2021-03-01 RX ADMIN — GUAIFENESIN 1200 MG: 600 TABLET, EXTENDED RELEASE ORAL at 08:14

## 2021-03-01 RX ADMIN — GUAIFENESIN 1200 MG: 600 TABLET, EXTENDED RELEASE ORAL at 22:40

## 2021-03-01 RX ADMIN — DEXAMETHASONE SODIUM PHOSPHATE 6 MG: 4 INJECTION, SOLUTION INTRAMUSCULAR; INTRAVENOUS at 12:08

## 2021-03-01 RX ADMIN — DOXYCYCLINE 100 MG: 100 CAPSULE ORAL at 08:14

## 2021-03-01 RX ADMIN — SODIUM CHLORIDE 75 ML/HR: 0.9 INJECTION, SOLUTION INTRAVENOUS at 06:32

## 2021-03-01 RX ADMIN — ZINC SULFATE 220 MG (50 MG) CAPSULE 220 MG: CAPSULE at 08:14

## 2021-03-01 RX ADMIN — OXYCODONE HYDROCHLORIDE AND ACETAMINOPHEN 1000 MG: 500 TABLET ORAL at 22:40

## 2021-03-01 RX ADMIN — DOXYCYCLINE 100 MG: 100 CAPSULE ORAL at 22:40

## 2021-03-01 RX ADMIN — CEFTRIAXONE 1000 MG: 10 INJECTION, POWDER, FOR SOLUTION INTRAVENOUS at 12:12

## 2021-03-01 RX ADMIN — ATORVASTATIN CALCIUM 40 MG: 40 TABLET, FILM COATED ORAL at 22:40

## 2021-03-01 RX ADMIN — SODIUM CHLORIDE, SODIUM GLUCONATE, SODIUM ACETATE, POTASSIUM CHLORIDE, MAGNESIUM CHLORIDE, SODIUM PHOSPHATE, DIBASIC, AND POTASSIUM PHOSPHATE 50 ML/HR: .53; .5; .37; .037; .03; .012; .00082 INJECTION, SOLUTION INTRAVENOUS at 14:00

## 2021-03-01 RX ADMIN — Medication 2000 UNITS: at 08:14

## 2021-03-01 NOTE — ASSESSMENT & PLAN NOTE
Recent Labs     02/28/21  1037 03/01/21  0509   CREATININE 1 66* 1 32*   EGFR 27 35     · Creatinine level on presentation 1 6 seconds in the setting of COVID-19 pneumonia and reported significant decreased p o  Intake the past 2-3 days    · There is no recent record for comparison, most recent from 2017 a creatinine level of 1 1 with reduced GFR which might indicate history of CKD   · Continue gentle hydration  · Avoid nephrotoxins, NSAIDs, hypotension  · Monitor BMP daily

## 2021-03-01 NOTE — PROGRESS NOTES
Progress Note - Enrrique Uribe 12/13/1929, 80 y o  female MRN: [id number]    Unit/Bed#: S -[facility] Encounter: [id number]    Primary Care Provider: No primary care provider on file  Date and time admitted to hospital: 2/28/2021 10:27 AM        * Pneumonia due to COVID-19 virus  Assessment & Plan  Lab Results   Component Value Date    SARSCOV2 Positive (A) 02/28/2021     Recent Labs     02/28/21  1037 03/01/21  0509 03/01/21  0510   FERRITIN 332  --  369    1* 149 9*  --    DDIMER 2 51* 1 80*  --        Recent Labs     02/28/21  1037 03/01/21  0509   PROCALCITONI 0 38* 0 44*       · POA:  Generalized weakness, poor appetite decreased p o   Intake for the past 2-3 days, mild cough with shortness of breath but no fevers chills or chest pain, the saturating mid 70 requiring supplemental oxygen  · Chest x-ray showing bilateral patchy opacities suggestive for multifocal pneumonia  · Patient will be admitted under COVID-19 moderate treatment protocol  · Inflammatory markers:  CRP now 149 9 from 162, ferritin normal  · D-dimer elevated 2 51 but now 1 80  · Cardiac markers:  Troponin 0 04, CK pending  · Procalcitonin currently pending, was started antibiotics with doxycycline and Rocephin to be discontinued if procalcitonin is negative x2   · Given patient elevated inflammatory markers and increased O2 requirement will start steroids med Decadron and remdesivir per protocol  · Was started vitamin-C, vitamin-D, zinc, famotidine  · PT and OT eval and treat, Self-proning if able, Incentive spirometry  · Consult Pulm/CC if worsening oxygen requirements  · Respiratory protocol, incentive spirometer, self prone   · Supportive management with Tessalon Perles, Mucinex, Tylenol  · Continuing supplemental oxygen as tolerated for SpO2 sat >90%  · Continue trend D-dimer, inflammatory markers daily  · Continue heparin drip in the setting of elevated D-dimer with SOB  · Obtain CTA to r/o PE now that Cr has improved; will pre hydrate with isolyle      Hyponatremia  Assessment & Plan  · Mild hyponatremia 135, most probably secondary to dehydration given reported decreased p o  Intake  · Start gentle IVF  · Monitor BMP daily    Elevated liver enzymes  Assessment & Plan  Recent Labs     02/28/21  1037 03/01/21  0509   AST 69* 68*   ALT 31 32   ALKPHOS 87 76   TBILI 0 69 0 51     · Isolated elevation of AST 69 on admission, in the setting of COVID-19 pneumonia  · Continue trend and monitor CMP daily    Elevated d-dimer  Assessment & Plan  Recent Labs     02/28/21  1037 03/01/21  0509 03/01/21  0510   FERRITIN 332  --  369    1* 149 9*  --    DDIMER 2 51* 1 80*  --        · POA:  The time involved 2 51 in the setting of COVID-19 pneumonia, given the patient current presentation sudden onset shortness of breath, acute respiratory failure that required supplemental oxygen need to rule out PE  · Obtain CTA this afternoon  · Will avoid starting Lovenox given current DKA, will start heparin drip  · Continue trend D-dimer      Elevated serum creatinine  Assessment & Plan  Recent Labs     02/28/21  1037 03/01/21  0509   CREATININE 1 66* 1 32*   EGFR 27 35     · Creatinine level on presentation 1 6 seconds in the setting of COVID-19 pneumonia and reported significant decreased p o  Intake the past 2-3 days  · There is no recent record for comparison, most recent from 2017 a creatinine level of 1 1 with reduced GFR which might indicate history of CKD   · Continue gentle hydration  · Avoid nephrotoxins, NSAIDs, hypotension  · Monitor BMP daily        VTE Pharmacologic Prophylaxis:   Pharmacologic: Heparin Drip  Mechanical VTE Prophylaxis in Place: Yes    Discussions with Specialists or Other Care Team Provider:  Nursing    Education and Discussions with Family / Patient: Will discuss with patient's daughter later this afternoon      Current Length of Stay: 1 day(s)    Current Patient Status: Inpatient     Discharge Plan / Estimated Discharge Date:  48-72 hours pending clinical improvement    Code Status: Level 1 - Full Code      Subjective:   Overnight, patient had increased O2 needs  She is currently up to 6 L nasal cannula  Patient states this morning that she does feel better compared to yesterday  She states her cough is improved  She denies any leg swelling or chest pain  Objective:     Vitals:   Temp (24hrs), Av °F (36 7 °C), Min:97 4 °F (36 3 °C), Max:98 5 °F (36 9 °C)    Temp:  [97 4 °F (36 3 °C)-98 5 °F (36 9 °C)] 97 4 °F (36 3 °C)  HR:  [75-97] 77  Resp:  [20-28] 22  BP: (100-133)/(57-67) 111/62  SpO2:  [90 %-95 %] 93 %  Body mass index is 28 24 kg/m²  Input and Output Summary (last 24 hours): Intake/Output Summary (Last 24 hours) at 3/1/2021 1130  Last data filed at 2021 1414  Gross per 24 hour   Intake 750 ml   Output --   Net 750 ml       Physical Exam:     Physical Exam  Constitutional:       General: She is not in acute distress  Appearance: Normal appearance  She is ill-appearing  HENT:      Head: Normocephalic and atraumatic  Mouth/Throat:      Mouth: Mucous membranes are dry  Pharynx: No oropharyngeal exudate or posterior oropharyngeal erythema  Eyes:      Extraocular Movements: Extraocular movements intact  Pupils: Pupils are equal, round, and reactive to light  Cardiovascular:      Rate and Rhythm: Normal rate and regular rhythm  Pulses: Normal pulses  Pulmonary:      Effort: Pulmonary effort is normal       Breath sounds: Decreased breath sounds present  No wheezing  Comments: Right-sided crackles  Chest:      Chest wall: No tenderness  Abdominal:      General: Abdomen is flat  Palpations: Abdomen is soft  Tenderness: There is no abdominal tenderness  Musculoskeletal:         General: No swelling  Right lower leg: No edema  Left lower leg: No edema  Neurological:      General: No focal deficit present        Mental Status: She is alert and oriented to person, place, and time  Psychiatric:         Mood and Affect: Mood normal          Behavior: Behavior normal          Additional Data:     Labs:    Results from last 7 days   Lab Units 03/01/21  0510   WBC Thousand/uL 3 90*   HEMOGLOBIN g/dL 10 6*   HEMATOCRIT % 34 2*   PLATELETS Thousands/uL 199   NEUTROS PCT % 81*   LYMPHS PCT % 11*   MONOS PCT % 7   EOS PCT % 0     Results from last 7 days   Lab Units 03/01/21  0509   POTASSIUM mmol/L 3 6   CHLORIDE mmol/L 106   CO2 mmol/L 23   BUN mg/dL 26*   CREATININE mg/dL 1 32*   CALCIUM mg/dL 8 1*   ALK PHOS U/L 76   ALT U/L 32   AST U/L 68*     Results from last 7 days   Lab Units 02/28/21  1522   INR  1 17       * I Have Reviewed All Lab Data Listed Above  * Additional Pertinent Lab Tests Reviewed: Funmi 66 Admission Reviewed    Imaging:    Imaging Reports Reviewed Today Include:  Chest x-ray  Imaging Personally Reviewed by Myself Includes:  Chest x-ray    Recent Cultures (last 7 days):     Results from last 7 days   Lab Units 02/28/21  1049 02/28/21  1037   BLOOD CULTURE  Received in Microbiology Lab  Culture in Progress  Received in Microbiology Lab  Culture in Progress         Last 24 Hours Medication List:   Current Facility-Administered Medications   Medication Dose Route Frequency Provider Last Rate    ascorbic acid  1,000 mg Oral Q12H [facility] Tamir, DO      atorvastatin  40 mg Oral HS [facility] Tamir, DO      benzonatate  100 mg Oral TID PRN Jamilah Bonus, DO      cefTRIAXone  1,000 mg Intravenous Q24H [facility] Tamir, DO 1,000 mg (02/28/21 1357)    cholecalciferol  2,000 Units Oral Daily [facility] Tamir, DO      dexamethasone  6 mg Intravenous Q24H Jamilah Bonus, DO      doxycycline hyclate  100 mg Oral Q12H [facility] Tamir, DO      famotidine  10 mg Oral Daily [facility]      guaiFENesin  1,200 mg Oral Q12H [facility] Tamir, DO      heparin (porcine)  3-30 Units/kg/hr (Order-Specific) Intravenous Titrated Ghent Rugby, DO 12 Units/kg/hr (03/01/21 1015)    heparin (porcine)  2,600 Units Intravenous Q1H PRN Ghent Rugby, DO      heparin (porcine)  5,200 Units Intravenous Q1H PRN Arabella Jagdeep, DO      multi-electrolyte  50 mL/hr Intravenous Continuous Nikki Rowan MD      zinc sulfate  220 mg Oral Daily Ghent Jagdeep, DO      Followed by   Jozef Garrison ON 3/7/2021] multivitamin-minerals  1 tablet Oral Daily Huber Garnett DO      remdesivir  100 mg Intravenous Q24H Ghent Jagdeep, DO          Today, Patient Was Seen By: Nikki Rowan MD    ** Please Note: This note has been constructed using a voice recognition system   **

## 2021-03-01 NOTE — UTILIZATION REVIEW
Initial Clinical Review    Admission: Date/Time/Statement:   Admission Orders (From admission, onward)     Ordered        02/28/21 1147  Inpatient Admission  Once                   Orders Placed This Encounter   Procedures    Inpatient Admission     Standing Status:   Standing     Number of Occurrences:   1     Order Specific Question:   Level of Care     Answer:   Med Surg [16]     Order Specific Question:   Estimated length of stay     Answer:   More than 2 Midnights     Order Specific Question:   Certification     Answer:   I certify that inpatient services are medically necessary for this patient for a duration of greater than two midnights  See H&P and MD Progress Notes for additional information about the patient's course of treatment  ED Arrival Information     Expected Arrival Acuity Means of Arrival Escorted By Service Admission Type    - 2/28/2021 10:26 [facility]    Arrival Complaint    Weakness        Chief Complaint   Patient presents with    Weakness - Generalized     pt c/o generalized weakness for 3 days  per EMS, pt O2 was in the low 70s on RA      Assessment/Plan: 79 yo female PMHx upper GI bleed, previous fall with hip fracture,on no maintenance medications to ED by EMS admitted as Inpatient due to PNA due to COVID 19 virus, elevated serum creatinine & D dimer, Hyponatremia, elevated Liver enzymes  Patient lives w daughter & son in law both COVID +, began with viral symptoms 11 days earlier w 3 day worsening generalized weakness, poor PO intake, w/ SOB  Upon EMS arrival, room air SATS= 70% w NC applied 6 L  CXR bilateral infiltrates  Continue ongoing MODERATE treatment pathway for covid, IV Heparin drip (rule out PE) , IV Decadron, IV Remdesivir, dual antibx  Vitamin cocktail  Daily labs, monitor pro calcitonin for (2 negative results)  to DC antibx  Self proning  Cont ongoing gentle IVF for HOSSEIN avoid NSAIDs, nephrotoxins  Trend D dimer     3/1/2021 Attending MD Clarke MODERATE treatment pathway for COVID PNA,, with improvement in creatinine, would proceed with CTA with pre and post hydration use IV Isolyte  Patient's oxygen needs are increasing, but pt denies worsening SOB  Had weaned to 3L NC back up to 6 L NC  Remains on heparin drip for empiric anticoagulation    ED Triage Vitals   Temperature Pulse Respirations Blood Pressure SpO2   02/28/21 1032 02/28/21 1032 02/28/21 1032 02/28/21 1032 02/28/21 1034   99 3 °F (37 4 °C) 99 (!) 32 136/81 94 %      Temp Source Heart Rate Source Patient Position - Orthostatic VS BP Location FiO2 (%)   02/28/21 1032 02/28/21 1032 02/28/21 1032 02/28/21 1032 --   Oral Monitor Sitting Right arm       Pain Score       02/28/21 1032       No Pain          Wt Readings from Last 1 Encounters:   02/28/21 66 7 kg (147 lb)     Additional Vital Signs:   Date/Time  Temp  Pulse  Resp  BP  MAP (mmHg)  SpO2  Calculated FIO2 (%) - Nasal Cannula  Nasal Cannula O2 Flow Rate (L/min)  O2 Device  Patient Position - Orthostatic VS   03/01/21 1453  97 4 °F (36 3 °C)Abnormal   74  27Abnormal   134/74  98  93 %  --  --  Nasal cannula  Lying   03/01/21 0900  97 4 °F (36 3 °C)Abnormal   77  22  111/62  79  93 %  44  6 L/min  Nasal cannula  Lying   03/01/21 0814  --  --  --  --  --  92 %  44  6 L/min  Nasal cannula  --   02/28/21 2300  --  --  --  --  --  92 %  40  5 L/min  Nasal cannula  --   02/28/21 2209  98 2 °F (36 8 °C)  77  --  133/67  --  92 %  --  --  Nasal cannula  Lying   02/28/21 2200  --  --  20  --  --  --  --  --  --  --   02/28/21 1630  --  --  --  --  --  --  --  --  Nasal cannula  --   02/28/21 1622  98 5 °F (36 9 °C)  75  --  121/66  --  90 %  --  --  Nasal cannula  Lying   02/28/21 1512  --  88  21  100/57  --  92 %  32  3 L/min  Nasal cannula  Sitting   02/28/21 1430  --  90  26Abnormal   110/59  79  95 %  --  --  --  --   02/28/21 1400  --  88  28Abnormal   121/62  83  93 %  32  3 L/min  Nasal cannula  --   02/28/21 1230  --  97 28Abnormal   120/63  --  93 %  32  3 L/min  Nasal cannula  --   02/28/21 1111  --  --  --  --  --  95 %  32  3 L/min  Nasal cannula  --   02/28/21 1056  --  --  --  --  --  --  --  --  Nasal cannula  --   02/28/21 1034  --  --  --  --  --  94 %  --  --  Nasal cannula  --   02/28/21 1032  99 3 °F (37 4 °C)  99  32Abnormal   136/81  --  --  40  5 L/min  Nasal cannula  Sitting      Weights (last 14 days)    Date/Time  Weight  Weight Method   02/28/21 1032  66 7 kg (147 lb)  Stated     Pertinent Labs/Diagnostic Test Results:   Results from last 7 days   Lab Units 02/28/21  1037   SARS-COV-2  Positive*     Results from last 7 days   Lab Units 03/01/21  0510 02/28/21  1522 02/28/21  1037   WBC Thousand/uL 3 90* 5 17 6 36   HEMOGLOBIN g/dL 10 6* 10 3* 11 4*   HEMATOCRIT % 34 2* 33 4* 37 1   PLATELETS Thousands/uL 199 187 215   NEUTROS ABS Thousands/µL 3 18  --  5 45         Results from last 7 days   Lab Units 03/01/21  0509 02/28/21  1037   SODIUM mmol/L 140 135*   POTASSIUM mmol/L 3 6 3 6   CHLORIDE mmol/L 106 101   CO2 mmol/L 23 25   ANION GAP mmol/L 11 9   BUN mg/dL 26* 22   CREATININE mg/dL 1 32* 1 66*   EGFR ml/min/1 73sq m 35 27   CALCIUM mg/dL 8 1* 8 7     Results from last 7 days   Lab Units 03/01/21  0509 02/28/21  1037   AST U/L 68* 69*   ALT U/L 32 31   ALK PHOS U/L 76 87   TOTAL PROTEIN g/dL 6 8 8 0   ALBUMIN g/dL 2 3* 2 8*   TOTAL BILIRUBIN mg/dL 0 51 0 69     Results from last 7 days   Lab Units 03/01/21  0905   POC GLUCOSE mg/dl 129     Results from last 7 days   Lab Units 03/01/21  0509 02/28/21  1037   GLUCOSE RANDOM mg/dL 118 117             No results found for: BETA-HYDROXYBUTYRATE               Results from last 7 days   Lab Units 02/28/21  1037   CK TOTAL U/L 160   CK MB INDEX % <1 0   CK MB ng/mL 0 5     Results from last 7 days   Lab Units 02/28/21  1037   TROPONIN I ng/mL 0 04     Results from last 7 days   Lab Units 03/01/21  0509 02/28/21  1037   D-DIMER QUANTITATIVE ug/ml FEU 1 80* 2 51* Results from last 7 days   Lab Units 03/01/21  0827 02/28/21  2303 02/28/21  1522 02/28/21  1037   PROTIME seconds  --   --  15 0* 14 0   INR   --   --  1 17 1 07   PTT seconds 198* >210* 161*  --          Results from last 7 days   Lab Units 03/01/21  0509 02/28/21  1037   PROCALCITONIN ng/ml 0 44* 0 38*     Results from last 7 days   Lab Units 02/28/21  1037   LACTIC ACID mmol/L 1 9             Results from last 7 days   Lab Units 02/28/21  1037   NT-PRO BNP pg/mL 1,658*     Results from last 7 days   Lab Units 03/01/21  0510 02/28/21  1037   FERRITIN ng/mL 369 332             Results from last 7 days   Lab Units 03/01/21  0509 02/28/21  1037   CRP mg/L 149 9* 162 1*             Results from last 7 days   Lab Units 02/28/21  1037   INFLUENZA A PCR  Negative   INFLUENZA B PCR  Negative   RSV PCR  Negative     Results from last 7 days   Lab Units 02/28/21  1049 02/28/21  1037   BLOOD CULTURE  Received in Microbiology Lab  Culture in Progress  Received in Microbiology Lab  Culture in Progress  2/28/2021    XR chest 1 view portable   ED Interpretation  (02/28 1104)   Bilateral, peripheral infiltrates  Patchy peripheral basilar groundglass opacities suspicious for multifocal pneumonia in this patient with known Covid 19 infection     CTA chest pe study    (Results Pending)     2/28/2021 ecg Sinus tachycardia  Left anterior fascicular block  T wave abnormality, consider lateral ischemia  Abnormal ECG  When compared with ECG of 13-NOV-2017 15:34,  QRS axis Shifted left  ED Treatment:   Medication Administration from 02/28/2021 1026 to 02/28/2021 1613       Date/Time Order Dose Route Action     02/28/2021 1045 sodium chloride 0 9 % bolus 500 mL 500 mL Intravenous New Bag     02/28/2021 1227 dexamethasone (DECADRON) injection 6 mg 6 mg Intravenous Given     02/28/2021 1226 famotidine (PEPCID) injection 20 mg 20 mg Intravenous Given     02/28/2021 1344 remdesivir (Veklury) 200 mg in sodium chloride 0 9 % 250 mL IVPB 200 mg Intravenous New Bag     02/28/2021 1357 cholecalciferol (VITAMIN D3) tablet 2,000 Units 2,000 Units Oral Given     02/28/2021 1357 ascorbic acid (VITAMIN C) tablet 1,000 mg 1,000 mg Oral Given     02/28/2021 1357 zinc sulfate (ZINCATE) capsule 220 mg 220 mg Oral Given     02/28/2021 1357 ceftriaxone (ROCEPHIN) 1 g/50 mL in dextrose IVPB 1,000 mg Intravenous New Bag     02/28/2021 1357 doxycycline hyclate (VIBRAMYCIN) capsule 100 mg 100 mg Oral Given     02/28/2021 1357 guaiFENesin (MUCINEX) 12 hr tablet 1,200 mg 1,200 mg Oral Given     02/28/2021 1420 sodium chloride 0 9 % infusion 75 mL/hr Intravenous New Bag     02/28/2021 1419 heparin (porcine) injection 5,200 Units 5,200 Units Intravenous Given     02/28/2021 1423 heparin (porcine) 25,000 units in 0 45% NaCl 250 mL infusion (premix) 18 Units/kg/hr Intravenous New Bag        Admitting Diagnosis: Weakness [R53 1]  Hypoxia [R09 02]  Acute kidney injury ([facility] [address] ) [N17 9]  COVID-19 virus infection [U07 1]  Age/Sex: 80 y o  female  Admission Orders:  Contact & airborne isolation  Self proning  NC MIDFLOW  CTA chest PE study   Ambulate patient  Activity as tolerated    Scheduled Medications:  ascorbic acid, 1,000 mg, Oral, Q12H [address]  atorvastatin, 40 mg, Oral, HS  cefTRIAXone, 1,000 mg, Intravenous, Q24H  cholecalciferol, 2,000 Units, Oral, Daily  dexamethasone, 6 mg, Intravenous, Q24H  doxycycline hyclate, 100 mg, Oral, Q12H  famotidine, 10 mg, Oral, Daily  guaiFENesin, 1,200 mg, Oral, Q12H [address]  zinc sulfate, 220 mg, Oral, Daily    Followed by  Ca Lainez ON 3/7/2021] multivitamin-minerals, 1 tablet, Oral, Daily  remdesivir, 100 mg, Intravenous, Q24H    Continuous IV Infusions:  heparin (porcine), 3-30 Units/kg/hr (Order-Specific), Intravenous, Titrated  multi-electrolyte, 50 mL/hr, Intravenous, Continuous    PRN Meds:  benzonatate, 100 mg, Oral, TID PRN  heparin (porcine), 2,600 Units, Intravenous, Q1H PRN  heparin (porcine), 5,200 Units, Intravenous, Q1H PRN  Network Utilization Review Department  ATTENTION: Please call with any questions or concerns to [phone] and carefully listen to the prompts so that you are directed to the right person  All voicemails are confidential   Ocurt Muss all requests for admission clinical reviews, approved or denied determinations and any other requests to dedicated fax number below belonging to the campus where the patient is receiving treatment   List of dedicated fax numbers for the Facilities:  [address] DENIALS (Administrative/Medical Necessity) [phone]   [address] (Maternity/NICU/Pediatrics) [phone]   [address] Fam Luna [address] (  Fab Beck "Jovita" 103) [address] [address] [address] [address] [place] [address] [phone]

## 2021-03-01 NOTE — ASSESSMENT & PLAN NOTE
Recent Labs     02/28/21  1037 03/01/21  0509   AST 69* 68*   ALT 31 32   ALKPHOS 87 76   TBILI 0 69 0 51     · Isolated elevation of AST 69 on admission, in the setting of COVID-19 pneumonia  · Continue trend and monitor CMP daily

## 2021-03-01 NOTE — ASSESSMENT & PLAN NOTE
Lab Results   Component Value Date    SARSCOV2 Positive (A) 02/28/2021     Recent Labs     02/28/21  1037 03/01/21  0509 03/01/21  0510   FERRITIN 332  --  369    1* 149 9*  --    DDIMER 2 51* 1 80*  --        Recent Labs     02/28/21  1037 03/01/21  0509   PROCALCITONI 0 38* 0 44*       · POA:  Generalized weakness, poor appetite decreased p o   Intake for the past 2-3 days, mild cough with shortness of breath but no fevers chills or chest pain, the saturating mid 70 requiring supplemental oxygen  · Chest x-ray showing bilateral patchy opacities suggestive for multifocal pneumonia  · Patient will be admitted under COVID-19 moderate treatment protocol  · Inflammatory markers:  CRP now 149 9 from 162, ferritin normal  · D-dimer elevated 2 51 but now 1 80  · Cardiac markers:  Troponin 0 04, CK pending  · Procalcitonin currently pending, was started antibiotics with doxycycline and Rocephin to be discontinued if procalcitonin is negative x2   · Given patient elevated inflammatory markers and increased O2 requirement will start steroids med Decadron and remdesivir per protocol  · Was started vitamin-C, vitamin-D, zinc, famotidine  · PT and OT eval and treat, Self-proning if able, Incentive spirometry  · Consult Pulm/CC if worsening oxygen requirements  · Respiratory protocol, incentive spirometer, self prone   · Supportive management with Tessalon Perles, Mucinex, Tylenol  · Continuing supplemental oxygen as tolerated for SpO2 sat >90%  · Continue trend D-dimer, inflammatory markers daily  · Continue heparin drip in the setting of elevated D-dimer with SOB  · Obtain CTA to r/o PE now that Cr has improved; will pre hydrate with isolyle

## 2021-03-01 NOTE — ASSESSMENT & PLAN NOTE
Recent Labs     02/28/21  1037 03/01/21  0509 03/01/21  0510   FERRITIN 332  --  369    1* 149 9*  --    DDIMER 2 51* 1 80*  --        · POA:  The time involved 2 51 in the setting of COVID-19 pneumonia, given the patient current presentation sudden onset shortness of breath, acute respiratory failure that required supplemental oxygen need to rule out PE  · Obtain CTA this afternoon  · Will avoid starting Lovenox given current DKA, will start heparin drip  · Continue trend D-dimer

## 2021-03-01 NOTE — PLAN OF CARE
Problem: Potential for Falls  Goal: Patient will remain free of falls  Description: INTERVENTIONS:  - Assess patient frequently for physical needs  -  Identify cognitive and physical deficits and behaviors that affect risk of falls    -  [place] fall precautions as indicated by assessment   - Educate patient/family on patient safety including physical limitations  - Instruct patient to call for assistance with activity based on assessment  - Modify environment to reduce risk of injury  - Consider OT/PT consult to assist with strengthening/mobility  Outcome: Progressing     Problem: Prexisting or High Potential for Compromised Skin Integrity  Goal: Skin integrity is maintained or improved  Description: INTERVENTIONS:  - Identify patients at risk for skin breakdown  - Assess and monitor skin integrity  - Assess and monitor nutrition and hydration status  - Monitor labs   - Assess for incontinence   - Turn and reposition patient  - Assist with mobility/ambulation  - Relieve pressure over bony prominences  - Avoid friction and shearing  - Provide appropriate hygiene as needed including keeping skin clean and dry  - Evaluate need for skin moisturizer/barrier cream  - Collaborate with interdisciplinary team   - Patient/family teaching  - Consider wound care consult   Outcome: Progressing     Problem: RESPIRATORY - ADULT  Goal: Achieves optimal ventilation and oxygenation  Description: INTERVENTIONS:  - Assess for changes in respiratory status  - Assess for changes in mentation and behavior  - Position to facilitate oxygenation and minimize respiratory effort  - Oxygen administered by appropriate delivery if ordered  - Initiate smoking cessation education as indicated  - Encourage broncho-pulmonary hygiene including cough, deep breathe, Incentive Spirometry  - Assess the need for suctioning and aspirate as needed  - Assess and instruct to report SOB or any respiratory difficulty  - Respiratory Therapy support as indicated  Outcome: Progressing     Problem: PAIN - ADULT  Goal: Verbalizes/displays adequate comfort level or baseline comfort level  Description: Interventions:  - Encourage patient to monitor pain and request assistance  - Assess pain using appropriate pain scale  - Administer analgesics based on type and severity of pain and evaluate response  - Implement non-pharmacological measures as appropriate and evaluate response  - Consider cultural and social influences on pain and pain management  - Notify physician/advanced practitioner if interventions unsuccessful or patient reports new pain  Outcome: Progressing     Problem: INFECTION - ADULT  Goal: Absence or prevention of progression during hospitalization  Description: INTERVENTIONS:  - Assess and monitor for signs and symptoms of infection  - Monitor lab/diagnostic results  - Monitor all insertion sites, i e  indwelling lines, tubes, and drains  - Monitor endotracheal if appropriate and nasal secretions for changes in amount and color  - [place] appropriate cooling/warming therapies per order  - Administer medications as ordered  - Instruct and encourage patient and family to use good hand hygiene technique  - Identify and instruct in appropriate isolation precautions for identified infection/condition  Outcome: Progressing  Goal: Absence of fever/infection during neutropenic period  Description: INTERVENTIONS:  - Monitor WBC    Outcome: Progressing     Problem: SAFETY ADULT  Goal: Patient will remain free of falls  Description: INTERVENTIONS:  - Assess patient frequently for physical needs  -  Identify cognitive and physical deficits and behaviors that affect risk of falls    -  [place] fall precautions as indicated by assessment   - Educate patient/family on patient safety including physical limitations  - Instruct patient to call for assistance with activity based on assessment  - Modify environment to reduce risk of injury  - Consider OT/PT consult to assist with strengthening/mobility  Outcome: Progressing  Goal: Maintain or return to baseline ADL function  Description: INTERVENTIONS:  -  Assess patient's ability to carry out ADLs; assess patient's baseline for ADL function and identify physical deficits which impact ability to perform ADLs (bathing, care of mouth/teeth, toileting, grooming, dressing, etc )  - Assess/evaluate cause of self-care deficits   - Assess range of motion  - Assess patient's mobility; develop plan if impaired  - Assess patient's need for assistive devices and provide as appropriate  - Encourage maximum independence but intervene and supervise when necessary  - Involve family in performance of ADLs  - Assess for home care needs following discharge   - Consider OT consult to assist with ADL evaluation and planning for discharge  - Provide patient education as appropriate  Outcome: Progressing  Goal: Maintain or return mobility status to optimal level  Description: INTERVENTIONS:  - Assess patient's baseline mobility status (ambulation, transfers, stairs, etc )    - Identify cognitive and physical deficits and behaviors that affect mobility  - Identify mobility aids required to assist with transfers and/or ambulation (gait belt, sit-to-stand, lift, walker, cane, etc )  - [place] fall precautions as indicated by assessment  - Record patient progress and toleration of activity level on Mobility SBAR; progress patient to next Phase/Stage  - Instruct patient to call for assistance with activity based on assessment  - Consider rehabilitation consult to assist with strengthening/weightbearing, etc   Outcome: Progressing     Problem: DISCHARGE PLANNING  Goal: Discharge to home or other facility with appropriate resources  Description: INTERVENTIONS:  - Identify barriers to discharge w/patient and caregiver  - Arrange for needed discharge resources and transportation as appropriate  - Identify discharge learning needs (meds, wound care, etc )  - Arrange for interpretive services to assist at discharge as needed  - Refer to Case Management Department for coordinating discharge planning if the patient needs post-hospital services based on physician/advanced practitioner order or complex needs related to functional status, cognitive ability, or social support system  Outcome: Progressing     Problem: SKIN/TISSUE INTEGRITY - ADULT  Goal: Skin integrity remains intact  Description: INTERVENTIONS  - Identify patients at risk for skin breakdown  - Assess and monitor skin integrity  - Assess and monitor nutrition and hydration status  - Monitor labs (i e  albumin)  - Assess for incontinence   - Turn and reposition patient  - Assist with mobility/ambulation  - Relieve pressure over bony prominences  - Avoid friction and shearing  - Provide appropriate hygiene as needed including keeping skin clean and dry  - Evaluate need for skin moisturizer/barrier cream  - Collaborate with interdisciplinary team (i e  Nutrition, Rehabilitation, etc )   - Patient/family teaching  Outcome: Progressing

## 2021-03-02 LAB
ALBUMIN SERPL BCP-MCNC: 2.2 G/DL (ref 3.5–5)
ALP SERPL-CCNC: 75 U/L (ref 46–116)
ALT SERPL W P-5'-P-CCNC: 32 U/L (ref 12–78)
ANION GAP SERPL CALCULATED.3IONS-SCNC: 9 MMOL/L (ref 4–13)
APTT PPP: 75 SECONDS (ref 23–37)
APTT PPP: 88 SECONDS (ref 23–37)
AST SERPL W P-5'-P-CCNC: 62 U/L (ref 5–45)
BILIRUB SERPL-MCNC: 0.47 MG/DL (ref 0.2–1)
BUN SERPL-MCNC: 28 MG/DL (ref 5–25)
CALCIUM ALBUM COR SERPL-MCNC: 9.5 MG/DL (ref 8.3–10.1)
CALCIUM SERPL-MCNC: 8.1 MG/DL (ref 8.3–10.1)
CHLORIDE SERPL-SCNC: 106 MMOL/L (ref 100–108)
CO2 SERPL-SCNC: 25 MMOL/L (ref 21–32)
CREAT SERPL-MCNC: 1.24 MG/DL (ref 0.6–1.3)
CRP SERPL QL: 89.5 MG/L
ERYTHROCYTE [DISTWIDTH] IN BLOOD BY AUTOMATED COUNT: 16.4 % (ref 11.6–15.1)
GFR SERPL CREATININE-BSD FRML MDRD: 38 ML/MIN/1.73SQ M
GLUCOSE SERPL-MCNC: 104 MG/DL (ref 65–140)
HCT VFR BLD AUTO: 33.6 % (ref 34.8–46.1)
HGB BLD-MCNC: 10.4 G/DL (ref 11.5–15.4)
MCH RBC QN AUTO: 25.6 PG (ref 26.8–34.3)
MCHC RBC AUTO-ENTMCNC: 31 G/DL (ref 31.4–37.4)
MCV RBC AUTO: 83 FL (ref 82–98)
PLATELET # BLD AUTO: 260 THOUSANDS/UL (ref 149–390)
PMV BLD AUTO: 9.5 FL (ref 8.9–12.7)
POTASSIUM SERPL-SCNC: 3.8 MMOL/L (ref 3.5–5.3)
PROT SERPL-MCNC: 6.7 G/DL (ref 6.4–8.2)
RBC # BLD AUTO: 4.07 MILLION/UL (ref 3.81–5.12)
SODIUM SERPL-SCNC: 140 MMOL/L (ref 136–145)
WBC # BLD AUTO: 6.81 THOUSAND/UL (ref 4.31–10.16)

## 2021-03-02 PROCEDURE — 85027 COMPLETE CBC AUTOMATED: CPT | Performed by: INTERNAL MEDICINE

## 2021-03-02 PROCEDURE — 86140 C-REACTIVE PROTEIN: CPT | Performed by: INTERNAL MEDICINE

## 2021-03-02 PROCEDURE — 80053 COMPREHEN METABOLIC PANEL: CPT | Performed by: INTERNAL MEDICINE

## 2021-03-02 PROCEDURE — 85730 THROMBOPLASTIN TIME PARTIAL: CPT | Performed by: INTERNAL MEDICINE

## 2021-03-02 PROCEDURE — 99232 SBSQ HOSP IP/OBS MODERATE 35: CPT | Performed by: INTERNAL MEDICINE

## 2021-03-02 RX ORDER — ONDANSETRON 2 MG/ML
4 INJECTION INTRAMUSCULAR; INTRAVENOUS EVERY 4 HOURS PRN
Status: DISCONTINUED | OUTPATIENT
Start: 2021-03-02 | End: 2021-03-10 | Stop reason: HOSPADM

## 2021-03-02 RX ORDER — ACETAMINOPHEN 325 MG/1
650 TABLET ORAL EVERY 6 HOURS PRN
Status: DISCONTINUED | OUTPATIENT
Start: 2021-03-02 | End: 2021-03-10 | Stop reason: HOSPADM

## 2021-03-02 RX ADMIN — GUAIFENESIN 1200 MG: 600 TABLET, EXTENDED RELEASE ORAL at 09:58

## 2021-03-02 RX ADMIN — GUAIFENESIN 1200 MG: 600 TABLET, EXTENDED RELEASE ORAL at 21:42

## 2021-03-02 RX ADMIN — DEXAMETHASONE SODIUM PHOSPHATE 6 MG: 4 INJECTION, SOLUTION INTRAMUSCULAR; INTRAVENOUS at 11:49

## 2021-03-02 RX ADMIN — CEFTRIAXONE 1000 MG: 10 INJECTION, POWDER, FOR SOLUTION INTRAVENOUS at 14:00

## 2021-03-02 RX ADMIN — FAMOTIDINE 10 MG: 20 TABLET, FILM COATED ORAL at 11:49

## 2021-03-02 RX ADMIN — SODIUM CHLORIDE, SODIUM GLUCONATE, SODIUM ACETATE, POTASSIUM CHLORIDE, MAGNESIUM CHLORIDE, SODIUM PHOSPHATE, DIBASIC, AND POTASSIUM PHOSPHATE 50 ML/HR: .53; .5; .37; .037; .03; .012; .00082 INJECTION, SOLUTION INTRAVENOUS at 11:49

## 2021-03-02 RX ADMIN — OXYCODONE HYDROCHLORIDE AND ACETAMINOPHEN 1000 MG: 500 TABLET ORAL at 21:43

## 2021-03-02 RX ADMIN — ZINC SULFATE 220 MG (50 MG) CAPSULE 220 MG: CAPSULE at 09:59

## 2021-03-02 RX ADMIN — ATORVASTATIN CALCIUM 40 MG: 40 TABLET, FILM COATED ORAL at 22:03

## 2021-03-02 RX ADMIN — ONDANSETRON 4 MG: 2 INJECTION INTRAMUSCULAR; INTRAVENOUS at 11:49

## 2021-03-02 RX ADMIN — REMDESIVIR 100 MG: 100 INJECTION, POWDER, LYOPHILIZED, FOR SOLUTION INTRAVENOUS at 11:58

## 2021-03-02 RX ADMIN — DOXYCYCLINE 100 MG: 100 CAPSULE ORAL at 22:03

## 2021-03-02 RX ADMIN — Medication 2000 UNITS: at 09:59

## 2021-03-02 RX ADMIN — ENOXAPARIN SODIUM 70 MG: 80 INJECTION SUBCUTANEOUS at 09:57

## 2021-03-02 RX ADMIN — DOXYCYCLINE 100 MG: 100 CAPSULE ORAL at 09:58

## 2021-03-02 RX ADMIN — OXYCODONE HYDROCHLORIDE AND ACETAMINOPHEN 1000 MG: 500 TABLET ORAL at 09:57

## 2021-03-02 NOTE — ASSESSMENT & PLAN NOTE
Recent Labs     02/28/21  1037 03/01/21  0509 03/02/21  0510   SODIUM 135* 140 140     · Mild hyponatremia 135, most probably secondary to dehydration given reported decreased p o   Intake now resolving  · Start gentle IVF  · Monitor BMP daily

## 2021-03-02 NOTE — PLAN OF CARE
Problem: Potential for Falls  Goal: Patient will remain free of falls  Description: INTERVENTIONS:  - Assess patient frequently for physical needs  -  Identify cognitive and physical deficits and behaviors that affect risk of falls    -  [place] fall precautions as indicated by assessment   - Educate patient/family on patient safety including physical limitations  - Instruct patient to call for assistance with activity based on assessment  - Modify environment to reduce risk of injury  - Consider OT/PT consult to assist with strengthening/mobility  Outcome: Progressing     Problem: Prexisting or High Potential for Compromised Skin Integrity  Goal: Skin integrity is maintained or improved  Description: INTERVENTIONS:  - Identify patients at risk for skin breakdown  - Assess and monitor skin integrity  - Assess and monitor nutrition and hydration status  - Monitor labs   - Assess for incontinence   - Turn and reposition patient  - Assist with mobility/ambulation  - Relieve pressure over bony prominences  - Avoid friction and shearing  - Provide appropriate hygiene as needed including keeping skin clean and dry  - Evaluate need for skin moisturizer/barrier cream  - Collaborate with interdisciplinary team   - Patient/family teaching  - Consider wound care consult   Outcome: Progressing     Problem: RESPIRATORY - ADULT  Goal: Achieves optimal ventilation and oxygenation  Description: INTERVENTIONS:  - Assess for changes in respiratory status  - Assess for changes in mentation and behavior  - Position to facilitate oxygenation and minimize respiratory effort  - Oxygen administered by appropriate delivery if ordered  - Initiate smoking cessation education as indicated  - Encourage broncho-pulmonary hygiene including cough, deep breathe, Incentive Spirometry  - Assess the need for suctioning and aspirate as needed  - Assess and instruct to report SOB or any respiratory difficulty  - Respiratory Therapy support as indicated  Outcome: Progressing     Problem: PAIN - ADULT  Goal: Verbalizes/displays adequate comfort level or baseline comfort level  Description: Interventions:  - Encourage patient to monitor pain and request assistance  - Assess pain using appropriate pain scale  - Administer analgesics based on type and severity of pain and evaluate response  - Implement non-pharmacological measures as appropriate and evaluate response  - Consider cultural and social influences on pain and pain management  - Notify physician/advanced practitioner if interventions unsuccessful or patient reports new pain  Outcome: Progressing     Problem: INFECTION - ADULT  Goal: Absence or prevention of progression during hospitalization  Description: INTERVENTIONS:  - Assess and monitor for signs and symptoms of infection  - Monitor lab/diagnostic results  - Monitor all insertion sites, i e  indwelling lines, tubes, and drains  - Monitor endotracheal if appropriate and nasal secretions for changes in amount and color  - [place] appropriate cooling/warming therapies per order  - Administer medications as ordered  - Instruct and encourage patient and family to use good hand hygiene technique  - Identify and instruct in appropriate isolation precautions for identified infection/condition  Outcome: Progressing  Goal: Absence of fever/infection during neutropenic period  Description: INTERVENTIONS:  - Monitor WBC    Outcome: Progressing     Problem: SAFETY ADULT  Goal: Patient will remain free of falls  Description: INTERVENTIONS:  - Assess patient frequently for physical needs  -  Identify cognitive and physical deficits and behaviors that affect risk of falls    -  [place] fall precautions as indicated by assessment   - Educate patient/family on patient safety including physical limitations  - Instruct patient to call for assistance with activity based on assessment  - Modify environment to reduce risk of injury  - Consider OT/PT consult to assist with strengthening/mobility  Outcome: Progressing  Goal: Maintain or return to baseline ADL function  Description: INTERVENTIONS:  -  Assess patient's ability to carry out ADLs; assess patient's baseline for ADL function and identify physical deficits which impact ability to perform ADLs (bathing, care of mouth/teeth, toileting, grooming, dressing, etc )  - Assess/evaluate cause of self-care deficits   - Assess range of motion  - Assess patient's mobility; develop plan if impaired  - Assess patient's need for assistive devices and provide as appropriate  - Encourage maximum independence but intervene and supervise when necessary  - Involve family in performance of ADLs  - Assess for home care needs following discharge   - Consider OT consult to assist with ADL evaluation and planning for discharge  - Provide patient education as appropriate  Outcome: Progressing  Goal: Maintain or return mobility status to optimal level  Description: INTERVENTIONS:  - Assess patient's baseline mobility status (ambulation, transfers, stairs, etc )    - Identify cognitive and physical deficits and behaviors that affect mobility  - Identify mobility aids required to assist with transfers and/or ambulation (gait belt, sit-to-stand, lift, walker, cane, etc )  - [place] fall precautions as indicated by assessment  - Record patient progress and toleration of activity level on Mobility SBAR; progress patient to next Phase/Stage  - Instruct patient to call for assistance with activity based on assessment  - Consider rehabilitation consult to assist with strengthening/weightbearing, etc   Outcome: Progressing     Problem: DISCHARGE PLANNING  Goal: Discharge to home or other facility with appropriate resources  Description: INTERVENTIONS:  - Identify barriers to discharge w/patient and caregiver  - Arrange for needed discharge resources and transportation as appropriate  - Identify discharge learning needs (meds, wound care, etc )  - Arrange for interpretive services to assist at discharge as needed  - Refer to Case Management Department for coordinating discharge planning if the patient needs post-hospital services based on physician/advanced practitioner order or complex needs related to functional status, cognitive ability, or social support system  Outcome: Progressing     Problem: SKIN/TISSUE INTEGRITY - ADULT  Goal: Skin integrity remains intact  Description: INTERVENTIONS  - Identify patients at risk for skin breakdown  - Assess and monitor skin integrity  - Assess and monitor nutrition and hydration status  - Monitor labs (i e  albumin)  - Assess for incontinence   - Turn and reposition patient  - Assist with mobility/ambulation  - Relieve pressure over bony prominences  - Avoid friction and shearing  - Provide appropriate hygiene as needed including keeping skin clean and dry  - Evaluate need for skin moisturizer/barrier cream  - Collaborate with interdisciplinary team (i e  Nutrition, Rehabilitation, etc )   - Patient/family teaching  Outcome: Progressing

## 2021-03-02 NOTE — ASSESSMENT & PLAN NOTE
Recent Labs     02/28/21  1037 03/01/21  0509 03/01/21  0510 03/02/21  0510   FERRITIN 332  --  369  --     1* 149 9*  --  89 5*   DDIMER 2 51* 1 80*  --   --        · POA:  The time involved 2 51 in the setting of COVID-19 pneumonia, given the patient current presentation sudden onset shortness of breath, acute respiratory failure that required supplemental oxygen need to rule out PE  · CTA negative for PE, transitioned to subcutaneous therapeutic Lovenox  · Continue trend D-dimer

## 2021-03-02 NOTE — CASE MANAGEMENT
CM called and LMOVM for patient's daughter Marisela Mcdowell and requested return call so that CM can complete CM assessment with her

## 2021-03-02 NOTE — ASSESSMENT & PLAN NOTE
Lab Results   Component Value Date    SARSCOV2 Positive (A) 02/28/2021     Recent Labs     02/28/21  1037 03/01/21  0509 03/01/21  0510 03/02/21  0510   FERRITIN 332  --  369  --     1* 149 9*  --  89 5*   DDIMER 2 51* 1 80*  --   --        Recent Labs     02/28/21  1037 03/01/21  0509   PROCALCITONI 0 38* 0 44*       · POA:  Generalized weakness, poor appetite decreased p o   Intake for the past 2-3 days, mild cough with shortness of breath but no fevers chills or chest pain, the saturating mid 70 requiring supplemental oxygen  · Chest x-ray showing bilateral patchy opacities suggestive for multifocal pneumonia  · Patient will be admitted under COVID-19 moderate treatment protocol  · Inflammatory markers: ferritin normal  · Cardiac markers:  Troponin 0 04, CK within normal limits  · Procalcitonin elevated, was started antibiotics with doxycycline and Rocephin  · Given patient elevated inflammatory markers and increased O2 requirement will start steroids med Decadron and remdesivir per protocol  · Was started vitamin-C, vitamin-D, zinc, famotidine  · PT and OT eval and treat, Self-proning if able, Incentive spirometry  · Consult Pulm/CC if worsening oxygen requirements  · Respiratory protocol, incentive spirometer, self prone   · Supportive management with Tessalon Perles, Mucinex, Tylenol  · Continuing supplemental oxygen as tolerated for SpO2 sat >90%  · Continue trend D-dimer, inflammatory markers daily  · Transition from heparin drip to therapeutic Lovenox given that CTA is negative for PE

## 2021-03-02 NOTE — CASE MANAGEMENT
LOS: 2 DAYS  PATIENT IS NOT A BUNDLE  PATIENT IS NOT A READMISSION  UNPLANNED RISK OF READMISSION: 15     CM called and spoke to patient's daughter Disha Flores via mobile phone to introduce self, explain role, complete CM assessment, and discuss DC planning  Lives where: [facility]  Lives with: daughter Disha Flores and her son in law  Supports: extended family including grandchildren  Home Type & Entry: 2 story home, patient has first floor set up  No ALEXA   DME: RW, also has grab bars in shower and by the toilet  ADLs: patient's daughter provides supervision for showering, patient is otherwise independent and still makes her own breakfast every morning  VNA history:  no history  STR history: [facility] three years ago after fracturing her leg  Pharmacy Preference & Coverage: [facility] [employer]  Patient does not have Rx coverage as she is not on any maintenance medications  Is able to afford copays with use of discount savings cards  Mental Health/Drug/ETOH history: no history  POA/AD/LW: daughter Disha Flores is POA; no AD but daughter is aware of her wishes  Income/Employment: SSI, pension   Transportation: family  PCP: does not have one  Infolink discussed and information placed on AVS  Daughter may also reach out to a friend for a recommendation  Transport Home: daughter     DCP: Patient's daughter would like for patient to return home upon discharge  She did ask about having therapy consulted during patient's stay to help get her up and moving since she's been in bed for a few days  Patient's daughter sounded receptive to [address] if recommended  She did state that patient is "sharp as a whip" and is mentally very sound  SLIM aware of request for therapy and orders are placed  CM will continue to follow      CM reviewed discharge planning process including the following: identifying caregivers at home, preference for d/c planning needs, availability of treatment team to discuss questions or concerns patient and/or family may have regarding diagnosis, plan of care, old or new medications and discharge planning   CM will continue to follow for care coordination and update assessment as appropriate

## 2021-03-02 NOTE — ASSESSMENT & PLAN NOTE
Recent Labs     02/28/21  1037 03/01/21  0509 03/02/21  0510   CREATININE 1 66* 1 32* 1 24   EGFR 27 35 38     · Elevated at 1 66 at admission  · In the setting of COVID-19 pneumonia and reported significant decreased p o  Intake the past 2-3 days    · There is no recent record for comparison, most recent from 2017 a creatinine level of 1 1 with reduced GFR which might indicate history of CKD   · Continue gentle hydration  · Avoid nephrotoxins, NSAIDs, hypotension  · Monitor BMP daily

## 2021-03-02 NOTE — PROGRESS NOTES
Progress Note - Thyra Arrow 12/13/1929, 80 y o  female MRN: [id number]    Unit/Bed#: S -[facility] Encounter: [id number]    Primary Care Provider: No primary care provider on file  Date and time admitted to hospital: 2/28/2021 10:27 AM        * Pneumonia due to COVID-19 virus  Assessment & Plan  Lab Results   Component Value Date    SARSCOV2 Positive (A) 02/28/2021     Recent Labs     02/28/21  1037 03/01/21  0509 03/01/21  0510 03/02/21  0510   FERRITIN 332  --  369  --     1* 149 9*  --  89 5*   DDIMER 2 51* 1 80*  --   --        Recent Labs     02/28/21  1037 03/01/21  0509   PROCALCITONI 0 38* 0 44*       · POA:  Generalized weakness, poor appetite decreased p o   Intake for the past 2-3 days, mild cough with shortness of breath but no fevers chills or chest pain, the saturating mid 70 requiring supplemental oxygen  · Chest x-ray showing bilateral patchy opacities suggestive for multifocal pneumonia  · Patient will be admitted under COVID-19 moderate treatment protocol  · Inflammatory markers: ferritin normal  · Cardiac markers:  Troponin 0 04, CK within normal limits  · Procalcitonin elevated, was started antibiotics with doxycycline and Rocephin  · Given patient elevated inflammatory markers and increased O2 requirement will start steroids med Decadron and remdesivir per protocol  · Was started vitamin-C, vitamin-D, zinc, famotidine  · PT and OT eval and treat, Self-proning if able, Incentive spirometry  · Consult Pulm/CC if worsening oxygen requirements  · Respiratory protocol, incentive spirometer, self prone   · Supportive management with Tessalon Perles, Mucinex, Tylenol  · Continuing supplemental oxygen as tolerated for SpO2 sat >90%  · Continue trend D-dimer, inflammatory markers daily  · Transition from heparin drip to therapeutic Lovenox given that CTA is negative for PE      Hyponatremia  Assessment & Plan  Recent Labs     02/28/21  1037 03/01/21  0509 03/02/21  0510   SODIUM 135* 140 140 · Mild hyponatremia 135, most probably secondary to dehydration given reported decreased p o  Intake now resolving  · Start gentle IVF  · Monitor BMP daily    Elevated liver enzymes  Assessment & Plan  Recent Labs     02/28/21  1037 03/01/21  0509 03/02/21  0510   AST 69* 68* 62*   ALT 31 32 32   ALKPHOS 87 76 75   TBILI 0 69 0 51 0 47     · Isolated elevation of AST 69 on admission, in the setting of COVID-19 pneumonia  · Continue trend and monitor CMP daily    Elevated d-dimer  Assessment & Plan  Recent Labs     02/28/21  1037 03/01/21  0509 03/01/21  0510 03/02/21  0510   FERRITIN 332  --  369  --     1* 149 9*  --  89 5*   DDIMER 2 51* 1 80*  --   --        · POA:  The time involved 2 51 in the setting of COVID-19 pneumonia, given the patient current presentation sudden onset shortness of breath, acute respiratory failure that required supplemental oxygen need to rule out PE  · CTA negative for PE, transitioned to subcutaneous therapeutic Lovenox  · Continue trend D-dimer      Elevated serum creatinine  Assessment & Plan  Recent Labs     02/28/21  1037 03/01/21  0509 03/02/21  0510   CREATININE 1 66* 1 32* 1 24   EGFR 27 35 38     · Elevated at 1 66 at admission  · In the setting of COVID-19 pneumonia and reported significant decreased p o  Intake the past 2-3 days  · There is no recent record for comparison, most recent from 2017 a creatinine level of 1 1 with reduced GFR which might indicate history of CKD   · Continue gentle hydration  · Avoid nephrotoxins, NSAIDs, hypotension  · Monitor BMP daily        VTE Pharmacologic Prophylaxis:   Pharmacologic: Enoxaparin (Lovenox)  Mechanical VTE Prophylaxis in Place: Yes    Discussions with Specialists or Other Care Team Provider:  Nursing    Education and Discussions with Family / Patient:  Plan of care was discussed with patient at bedside and patient's daughter by phone  All questions were answered      Current Length of Stay: 2 day(s)    Current Patient Status: Inpatient     Discharge Plan / Estimated Discharge Date:  48-72 hours pending clinical improvement    Code Status: Level 1 - Full Code      Subjective:   No acute events overnight  Patient remains on 6 L nasal cannula  She denies any shortness of breath  She denies any cough  She denies any diarrhea  She states she feels fine  Objective:     Vitals:   Temp (24hrs), Av 4 °F (36 3 °C), Min:97 4 °F (36 3 °C), Max:97 5 °F (36 4 °C)    Temp:  [97 4 °F (36 3 °C)-97 5 °F (36 4 °C)] 97 5 °F (36 4 °C)  HR:  [74-83] 83  Resp:  [22-27] 22  BP: (128-141)/(69-75) 141/75  SpO2:  [90 %-93 %] 90 %  Body mass index is 28 24 kg/m²  Input and Output Summary (last 24 hours): Intake/Output Summary (Last 24 hours) at 3/2/2021 1035  Last data filed at 3/2/2021 0900  Gross per 24 hour   Intake 360 ml   Output 75 ml   Net 285 ml       Physical Exam:     Physical Exam  Constitutional:       General: She is not in acute distress  Appearance: Normal appearance  She is ill-appearing  HENT:      Head: Normocephalic and atraumatic  Mouth/Throat:      Mouth: Mucous membranes are dry  Pharynx: No oropharyngeal exudate or posterior oropharyngeal erythema  Eyes:      Extraocular Movements: Extraocular movements intact  Pupils: Pupils are equal, round, and reactive to light  Cardiovascular:      Rate and Rhythm: Normal rate and regular rhythm  Pulses: Normal pulses  Pulmonary:      Effort: Pulmonary effort is normal       Breath sounds: Decreased breath sounds present  No wheezing  Comments: Right-sided crackles  Chest:      Chest wall: No tenderness  Abdominal:      General: Abdomen is flat  Palpations: Abdomen is soft  Tenderness: There is no abdominal tenderness  Musculoskeletal:         General: No swelling  Right lower leg: No edema  Left lower leg: No edema  Neurological:      General: No focal deficit present        Mental Status: She is alert and oriented to person, place, and time  Psychiatric:         Mood and Affect: Mood normal          Behavior: Behavior normal          Additional Data:     Labs:    Results from last 7 days   Lab Units 03/02/21  0509 03/01/21  0510   WBC Thousand/uL 6 81 3 90*   HEMOGLOBIN g/dL 10 4* 10 6*   HEMATOCRIT % 33 6* 34 2*   PLATELETS Thousands/uL 260 199   NEUTROS PCT %  --  81*   LYMPHS PCT %  --  11*   MONOS PCT %  --  7   EOS PCT %  --  0     Results from last 7 days   Lab Units 03/02/21  0510   POTASSIUM mmol/L 3 8   CHLORIDE mmol/L 106   CO2 mmol/L 25   BUN mg/dL 28*   CREATININE mg/dL 1 24   CALCIUM mg/dL 8 1*   ALK PHOS U/L 75   ALT U/L 32   AST U/L 62*     Results from last 7 days   Lab Units 02/28/21  1522   INR  1 17       * I Have Reviewed All Lab Data Listed Above  * Additional Pertinent Lab Tests Reviewed: All Labs Within Last 24 Hours Reviewed    Imaging:    Imaging Reports Reviewed Today Include:  CTA chest PE  Imaging Personally Reviewed by Myself Includes:  None    Recent Cultures (last 7 days):     Results from last 7 days   Lab Units 02/28/21  1049 02/28/21  1037   BLOOD CULTURE  No Growth at 24 hrs  No Growth at 24 hrs         Last 24 Hours Medication List:   Current Facility-Administered Medications   Medication Dose Route Frequency Provider Last Rate    ascorbic acid  1,000 mg Oral Q12H [address] Huber Garnett, DO      atorvastatin  40 mg Oral HS Huber Nicky, DO      benzonatate  100 mg Oral TID PRN Kenisha Kleverse, DO      cefTRIAXone  1,000 mg Intravenous Q24H Huber Garnett, DO 1,000 mg (03/01/21 1212)    cholecalciferol  2,000 Units Oral Daily Huber Garnett, DO      dexamethasone  6 mg Intravenous Q24H Kenisha Kleverse, DO      doxycycline hyclate  100 mg Oral Q12H Huber Garnett, DO      enoxaparin  1 mg/kg Subcutaneous Q24H [address], MD      famotidine  10 mg Oral Daily Kenisha Dowse, DO      guaiFENesin  1,200 mg Oral Q12H [facility], DO      multi-electrolyte 50 mL/hr Intravenous Continuous Leila Blake MD 50 mL/hr (03/01/21 1400)    zinc sulfate  220 mg Oral Daily Huber Garnett DO      Followed by   Ted Meza ON 3/7/2021] multivitamin-minerals  1 tablet Oral Daily Huber Garnett DO      remdesivir  100 mg Intravenous Q24H Maciel La DO          Today, Patient Was Seen By: Leila Blake MD    ** Please Note: This note has been constructed using a voice recognition system   **

## 2021-03-02 NOTE — ASSESSMENT & PLAN NOTE
Recent Labs     02/28/21  1037 03/01/21  0509 03/02/21  0510   AST 69* 68* 62*   ALT 31 32 32   ALKPHOS 87 76 75   TBILI 0 69 0 51 0 47     · Isolated elevation of AST 69 on admission, in the setting of COVID-19 pneumonia  · Continue trend and monitor CMP daily

## 2021-03-03 PROBLEM — R74.8 ELEVATED LIVER ENZYMES: Status: RESOLVED | Noted: 2021-02-28 | Resolved: 2021-03-03

## 2021-03-03 PROBLEM — E87.1 HYPONATREMIA: Status: RESOLVED | Noted: 2021-02-28 | Resolved: 2021-03-03

## 2021-03-03 LAB
ALBUMIN SERPL BCP-MCNC: 2.1 G/DL (ref 3.5–5)
ALP SERPL-CCNC: 70 U/L (ref 46–116)
ALT SERPL W P-5'-P-CCNC: 24 U/L (ref 12–78)
ANION GAP SERPL CALCULATED.3IONS-SCNC: 9 MMOL/L (ref 4–13)
AST SERPL W P-5'-P-CCNC: 44 U/L (ref 5–45)
BILIRUB SERPL-MCNC: 0.41 MG/DL (ref 0.2–1)
BUN SERPL-MCNC: 27 MG/DL (ref 5–25)
CALCIUM ALBUM COR SERPL-MCNC: 9.5 MG/DL (ref 8.3–10.1)
CALCIUM SERPL-MCNC: 8 MG/DL (ref 8.3–10.1)
CHLORIDE SERPL-SCNC: 108 MMOL/L (ref 100–108)
CO2 SERPL-SCNC: 25 MMOL/L (ref 21–32)
CREAT SERPL-MCNC: 1.29 MG/DL (ref 0.6–1.3)
GFR SERPL CREATININE-BSD FRML MDRD: 36 ML/MIN/1.73SQ M
GLUCOSE SERPL-MCNC: 93 MG/DL (ref 65–140)
POTASSIUM SERPL-SCNC: 3.8 MMOL/L (ref 3.5–5.3)
PROCALCITONIN SERPL-MCNC: 0.2 NG/ML
PROT SERPL-MCNC: 6.3 G/DL (ref 6.4–8.2)
SODIUM SERPL-SCNC: 142 MMOL/L (ref 136–145)

## 2021-03-03 PROCEDURE — 97167 OT EVAL HIGH COMPLEX 60 MIN: CPT

## 2021-03-03 PROCEDURE — 80053 COMPREHEN METABOLIC PANEL: CPT | Performed by: INTERNAL MEDICINE

## 2021-03-03 PROCEDURE — 99232 SBSQ HOSP IP/OBS MODERATE 35: CPT | Performed by: INTERNAL MEDICINE

## 2021-03-03 PROCEDURE — 84145 PROCALCITONIN (PCT): CPT | Performed by: INTERNAL MEDICINE

## 2021-03-03 PROCEDURE — 97163 PT EVAL HIGH COMPLEX 45 MIN: CPT

## 2021-03-03 RX ADMIN — ENOXAPARIN SODIUM 70 MG: 80 INJECTION SUBCUTANEOUS at 08:37

## 2021-03-03 RX ADMIN — Medication 2000 UNITS: at 08:32

## 2021-03-03 RX ADMIN — ZINC SULFATE 220 MG (50 MG) CAPSULE 220 MG: CAPSULE at 08:33

## 2021-03-03 RX ADMIN — DOXYCYCLINE 100 MG: 100 CAPSULE ORAL at 11:32

## 2021-03-03 RX ADMIN — DEXAMETHASONE SODIUM PHOSPHATE 6 MG: 4 INJECTION, SOLUTION INTRAMUSCULAR; INTRAVENOUS at 11:32

## 2021-03-03 RX ADMIN — REMDESIVIR 100 MG: 100 INJECTION, POWDER, LYOPHILIZED, FOR SOLUTION INTRAVENOUS at 11:32

## 2021-03-03 RX ADMIN — OXYCODONE HYDROCHLORIDE AND ACETAMINOPHEN 1000 MG: 500 TABLET ORAL at 21:40

## 2021-03-03 RX ADMIN — GUAIFENESIN 1200 MG: 600 TABLET, EXTENDED RELEASE ORAL at 08:37

## 2021-03-03 RX ADMIN — ATORVASTATIN CALCIUM 40 MG: 40 TABLET, FILM COATED ORAL at 21:40

## 2021-03-03 RX ADMIN — DOXYCYCLINE 100 MG: 100 CAPSULE ORAL at 21:40

## 2021-03-03 RX ADMIN — CEFTRIAXONE 1000 MG: 10 INJECTION, POWDER, FOR SOLUTION INTRAVENOUS at 14:40

## 2021-03-03 RX ADMIN — OXYCODONE HYDROCHLORIDE AND ACETAMINOPHEN 1000 MG: 500 TABLET ORAL at 08:33

## 2021-03-03 NOTE — PLAN OF CARE
Problem: OCCUPATIONAL THERAPY ADULT  Goal: Performs self-care activities at highest level of function for planned discharge setting  See evaluation for individualized goals  Description: Treatment Interventions: ADL retraining, Functional transfer training, Endurance training, Patient/family training, Equipment evaluation/education, Continued evaluation, Energy conservation, Activityengagement  Equipment Recommended: Shower/Tub chair with back ($)(use of RW for functional mobility)       See flowsheet documentation for full assessment, interventions and recommendations  Note: Limitation: Decreased ADL status, Decreased endurance, Decreased self-care trans, Decreased high-level ADLs     Assessment: Pt is a 81yo female [facility] on 2/28/2021  Pt presents w/ pneumonia due to COVID-19 and significant PMH impacting her occupational performance including L hip fracture s/p repair (per pt report), GI bleed  Pt diagnosed w/ COVID-19  Pt reports living w/ daughter and son in law in [address] [facility] [place]  Pt reports I w/ ADL using RW for functional mobility w/ out O2  Personal factors impacting performance include advanced age, difficulty completing IADL  Upon eval, pt alert and oriented  Able to participate in conversation  Pt required min A to complete bed mobility supine to sit at EOB  Pt required min A to complete sit <> stand  Pt completed SPT using RW w/ min A  O2 sats dropped to 71-72% on 5L seated at EOB  Required + time, rest break to recover w/ use of non-rebreather  Pt required S to complete UBD and max A to complete toileting  Pt able to feed self seated OOB In chair  Pt presents w/ decreased activity tolerance, decreased endurance, decreased standing balance, decreased standing tolerance, generalized weakness/deconditioning impacting her I w/ dressing, bathing, oral hygiene, functional mobility, functional tranfers, activity engagement, clothing mgmt, pet care, and light meal prep   Pt completing ADL below baseline level of I and would benefit from OT while in acute care to address deficits  Recommend discharge home to PLOF w/ family support / assist and Home OT pending progress in acute care/ activity tolerance   Will continue to follow 3-5X/ week     OT Discharge Recommendation: Home with skilled therapy(pending progress, activity tolerance in acute care)

## 2021-03-03 NOTE — PHYSICAL THERAPY NOTE
PHYSICAL THERAPY EVALUATION NOTE    Patient Name: Lev Sullivan  NPMSJ'N Date: 3/3/2021     AGE:   80 y o  Mrn:   [id number]  ADMIT DX:  Weakness [R53 1]  Hypoxia [R09 02]  Acute kidney injury ([facility] [address] ) [N17 9]  COVID-19 virus infection [U07 1]    History reviewed  No pertinent past medical history  Length Of Stay: 3  PHYSICAL THERAPY EVALUATION :   Patient's identity confirmed via 2 patient identifiers (full name and ) at start of session       21 1202   PT Last Visit   PT Visit Date 21   Note Type   Note type Evaluation   Pain Assessment   Pain Assessment Tool 0-10   Pain Score No Pain   Home Living   Type of [address] [place] [address] One level;Stairs to enter without rails  (1 ALEXA)   Randy Workman   Additional Comments Pt resides in a 1 SH w/ 1 ALEXA w/ her daughter  She uses a RW for mobility PTA   Prior Function   Level of [place] Independent with ADLs and functional mobility   Lives With Daughter; Other (Comment)  (Dtr and ZORAIDA)   Receives Help From Family   ADL Assistance Independent   IADLs Needs assistance   Falls in the last 6 months 0   Vocational Retired   Restrictions/Precautions   [facility] Bearing Precautions Per Order No   Other Precautions Airborne/isolation;Droplet precautions;Contact/isolation; Chair Alarm; Bed Alarm;Multiple lines;O2;Fall Risk  (5L [place] O2, 6L NC O2 post Mesfin smith, IV pole)   General   Additional Pertinent History Pt on 5L NC O2 to start  Pt decreased to low to mid 80s sitting EOB and eventually improving to 88%  After getting OOB to recliner chair, pt in the low 70s, benefits from 10L NRB from [address] staff   Increased to 94% on 6L (increased by RN)    Cognition   Overall Cognitive Status [facility]   Arousal/Participation Alert   Attention Attends with cues to redirect   Orientation Level Oriented X4   Memory Within functional limits   Following Commands Follows one step commands with increased time or repetition   Comments Pt identified by full name and   She is pleasant and agreeable to participate in PT evaluation   RLE Assessment   RLE Assessment WFL   Bed Mobility   Supine to Sit 4  Minimal assistance   Additional items Assist x 1; Increased time required   Sit to Supine 4  Minimal assistance   Additional items Assist x 1; Increased time required   Transfers   Sit to Stand 4  Minimal assistance   Additional items Assist x 1; Increased time required   Stand to Sit 4  Minimal assistance   Additional items Assist x 1; Increased time required   Stand pivot 4  Minimal assistance   Additional items Assist x 1; Increased time required  (w/ RW)   Ambulation/Elevation   Gait pattern Short stride; Excessively slow;Decreased foot clearance   Gait Assistance 4  Minimal assist   Additional items Assist x 1   Assistive Device Rolling walker   Distance 2 forward steps  (further amb deferred due to SpO2 drop)   Balance   Static Sitting Good   Static Standing Poor +   Ambulatory Poor +   Activity Tolerance   Activity Tolerance Patient limited by fatigue; Other (Comment)  (SpO2 dropping)   Medical Staff Made Aware Care coordination w/ OT Nohemy Ramos to [facility] Tony   Nurse Made Aware Spoke to RN Chary Sharpe, notified of SpO2   Assessment   Prognosis Fair   Problem List Decreased strength;Decreased endurance; Impaired balance;Decreased mobility   Assessment Erika Salazar is a 80 y o  Female who [facility] on 2021 from home w/ c/o generalized weakness and diagnosis of COVID and pneumonia due to [address]  Orders for PT eval and treat received, w/ activity orders of activity as tolerated and fall precautions  Pt presents w/ comorbidities of hx of ORIF (per pt report), RBBB  and personal factors including: advanced age, mobilizing w/ assistive device and stair(s) to enter home  At baseline, pt mobilizes independently w/ RW, and reports 0 falls in the last 6 months   Upon evaluation, pt presents w/ the following deficits: weakness, impaired balance, decreased endurance and SpO2 impacting functional mobility  Pt requires min A for bed mobility, min A for transfers  Pt's clinical presentation is unstable/unpredictable due to need for assist w/ all phases of mobility when usually mobilizing independently, declining oxygen saturation w/ low level of activity, need for supplemental oxygen in order to maintain oxygen saturation, need for input for mobility technique/safety and recent drastic decline in mobility compared to baseline  Pt is at an increased risk of falls due to physical deficits  Given the above findings, discharge recommendation is for home w/ family support and home PT pending medical optimization including respiratory status and mobility progress  During this admission, pt would benefit from skilled acute inpatient PT in order to address the abovementioned deficits to maximize function and mobility before DC from acute care  Goals   Patient Goals Go home and have a sausage and pepper sandwich   STG Expiration Date 03/13/21   Short Term Goal #1 Patient will: Increase bilateral LE strength 1/2 grade to facilitate independent mobility, Perform all bed mobility tasks modified independent to decrease fall risk factors, Perform all transfers modified independent to improve independence, Ambulate at least 50 ft  with roller walker w/ supervision w/o LOB, Navigate 1 stairs w/ supervision with unilateral handrail to facilitate return to previous living environment, Increase all balance 1/2 grade to decrease risk for falls, Complete exercise program independently, Tolerate 3 hr OOB to faciliate upright tolerance and Improve Barthel Index score to 65 or greater to facilitate independence   PT Treatment Day 0   Plan   Treatment/Interventions Functional transfer training;LE strengthening/ROM; Elevations; Therapeutic exercise; Endurance training;Patient/family training;Equipment eval/education; Bed mobility;Gait training   PT Frequency Other (Comment)  (4-5x/wk)   Recommendation   PT Discharge Recommendation Home with skilled therapy; Other (Comment)  (HHPT pending medical optimization and progress)   Equipment Recommended   (RW)   AM-PAC Basic Mobility Inpatient   Turning in Bed Without Bedrails 3   Lying on Back to Sitting on Edge of Flat Bed 3   Moving Bed to Chair 3   Standing Up From Chair 3   Walk in Room 3   Climb 3-5 Stairs 3   Basic Mobility Inpatient Raw Score 18   Basic Mobility Standardized Score 41 05   Barthel Index   Feeding 10   Bathing 0   Grooming Score 5   Dressing Score 5   Bladder Score 0   Bowels Score 10   Toilet Use Score 0   Transfers (Bed/Chair) Score 10   Mobility (Level Surface) Score 0   Stairs Score 0   Barthel Index Score 40       Pt would benefit from skilled inpatient PT during this admission in order to facilitate progress towards goals to maximize functional independence    Colby Ferrara, PT, DPT

## 2021-03-03 NOTE — OCCUPATIONAL THERAPY NOTE
Occupational Therapy Evaluation     Patient Name: Zia Ballesteros  VCBZC'J Date: 3/3/2021  Problem List  Principal Problem:    Pneumonia due to COVID-19 virus  Active Problems:    Elevated serum creatinine    Elevated d-dimer    Elevated liver enzymes    Hyponatremia    Past Medical History  History reviewed  No pertinent past medical history  Past Surgical History  Past Surgical History:   Procedure Laterality Date    CATARACT EXTRACTION      EGD AND COLONOSCOPY N/A 11/14/2017    Procedure: EGD AND COLONOSCOPY;  Surgeon: Ranjan Lee MD;  [facility]; Service: Gastroenterology        03/03/21 1201   OT Last Visit   OT Visit Date 03/03/21  (Wednesday)   Note Type   Note type Evaluation   Restrictions/Precautions   Weight Bearing Precautions Per Order No   Other Precautions Contact/isolation; Airborne/isolation;Droplet precautions; Chair Alarm; Bed Alarm;Multiple lines; Fall Risk;O2  (5L O2 via [place] upon arrival and 6L post eval; purewick,Gerard)   Pain Assessment   Pain Assessment Tool Pain Assessment not indicated - pt denies pain   Pain Score No Pain   Home Living   Type of [address] [place] [address] One level; Other (Comment); Bed/bath upstairs; Able to live on main level with bedroom/bathroom  (1 SH w/ 1 ALEXA)   Bathroom Toilet Standard   Bathroom Equipment Grab bars in shower; Shower chair   Bathroom Accessibility Accessible;Accessible via walker   Home Equipment Walker;Grab bars   Additional Comments Pt reports living w/ daughter and son in [address] in [address] [facility]   Prior Function   Level of [place] Independent with ADLs and functional mobility   Lives With Daughter; Other (Comment)  (son in law)   Receives Help From Family  (w/ IADL tasks)   ADL Assistance Independent   IADLs Needs assistance   Falls in the last 6 months 0   Vocational Retired   Comments Pt reports using RW for functional mobility and I w/ ADL PTA w/ out use of O2   Pt able to prepare breakfast and needs assistance w/ IADL tasks   Lifestyle Autonomy Pt reports I w/ ADL PTA using RW for functional mobility PTA w/ out O2   Reciprocal Relationships Pt reports living w/ daughter and son in law in [address] [facility]   Service to Others Pt reports retired    Intrinsic Gratification Pt reports enjoying dog (Ranjan Jackson) and sitting outside in yard   ADL   Where Assessed Edge of bed  (vs OOB in chair post eval w/ needs met, call bell in reach)   Eating Assistance 6  Modified independent   Eating Deficit Setup; Increased time to complete  (seated OOB in chair post eval )   Grooming Assistance Unable to assess   UB Bathing Assistance Unable to assess  (due to decreased activity tolerance)   LB Bathing Assistance Unable to assess  (due to decreased activity tolerance)   UB Dressing Assistance 5  Supervision/Setup   UB Dressing Deficit Setup;Supervision/safety; Increased time to complete   LB Dressing Assistance   (anticipate mod A based on fxal obs skills)   Toileting Assistance  2  Maximal Assistance  (incontinent of urine)   Toileting Deficit Setup; Increased time to complete;Verbal cueing   Additional Comments on 5L O2 upon arrival and 6L post eval (ok to increased O2 per RN)  O2 sats dropped 71-72% seated at EOB  Required seated rest break and ues of non -rebreather to recover to 93%   Bed Mobility   Supine to Sit 4  Minimal assistance   Additional items Assist x 1;HOB elevated; Increased time required;Verbal cues; Bedrails   Sit to Supine Unable to assess   Additional Comments Pt seated OOB in chair post eval w/ needs met, call bell in reach and chair alarm activated   Transfers   Sit to Stand 4  Minimal assistance   Additional items Assist x 1; Increased time required;Verbal cues   Stand to Sit 4  Minimal assistance   Additional items Assist x 1; Increased time required;Verbal cues   Stand pivot 4  Minimal assistance   Additional items Assist x 1; Increased time required  (using RW)   Additional Comments Limited participation in ADL's due to decreased activity tolerance / endurance  SPO2 dropped to 74% on 5L O2   Functional Mobility   Additional Comments Deferred additional mobility due to decreased SPO2  Will continue to assess   Additional items Rolling walker   Balance   Static Sitting Good   Static Standing Poor +   Activity Tolerance   Activity Tolerance Patient limited by fatigue;Treatment limited secondary to medical complications (Comment); Other (Comment)  (decreased O2 sats on 5L seated at [facility])   Medical Staff Made Aware care coordination w/ PT, Jaqueline Banks  Spoke to [facility], Tony   Nurse Made Aware per RNConner appropriate to see pt   RUE Assessment   RUE Assessment WFL   RUE Strength   RUE Overall Strength Within Functional Limits - able to perform ADL tasks with strength   LUE Assessment   LUE Assessment WFL   LUE Strength   LUE Overall Strength Within Functional Limits - able to perform ADL tasks with strength   Hand Function   Gross Motor Coordination Functional   Fine Motor Coordination   (R hand dominane)   Sensation   Light Touch No apparent deficits  (B UE to light touch)   Sharp/Dull Not tested   Cognition   Overall Cognitive Status [facility]   Arousal/Participation Alert; Cooperative   Attention Attends with cues to redirect   Orientation Level Oriented X4   Memory Within functional limits   Following Commands Follows one step commands with increased time or repetition   Comments Identified pt by full name and birthdate  Alert and generally oriented  Able to follow directions and communicate wants / needs  Pleasant, cooperative and motivated to participate   Assessment   Limitation Decreased ADL status; Decreased endurance;Decreased self-care trans;Decreased high-level ADLs   Assessment Pt is a 79yo female admitted [facility] on 2/28/2021  Pt presents w/ pneumonia due to COVID-19 and significant PMH impacting her occupational performance including L hip fracture s/p repair (per pt report), GI bleed  Pt diagnosed w/ COVID-19   Pt reports living w/ daughter and son in law in 1 SH PTA  Pt reports I w/ ADL using RW for functional mobility w/ out O2  Personal factors impacting performance include advanced age, difficulty completing IADL  Upon eval, pt alert and oriented  Able to participate in conversation  Pt required min A to complete bed mobility supine to sit at EOB  Pt required min A to complete sit <> stand  Pt completed SPT using RW w/ min A  O2 sats dropped to 71-72% on 5L seated at EOB  Required + time, rest break to recover w/ use of non-rebreather  Pt required S to complete UBD and max A to complete toileting  Pt able to feed self seated OOB In chair  Pt presents w/ decreased activity tolerance, decreased endurance, decreased standing balance, decreased standing tolerance, generalized weakness/deconditioning impacting her I w/ dressing, bathing, oral hygiene, functional mobility, functional tranfers, activity engagement, clothing mgmt, pet care, and light meal prep  Pt completing ADL below baseline level of I and would benefit from OT while in acute care to address deficits  Recommend discharge home to PLOF w/ family support / assist and Home OT pending progress in acute care/ activity tolerance  Will continue to follow 3-5X/ week   Goals   Patient Goals Pt stated that she would like to return home   Plan   Treatment Interventions ADL retraining;Functional transfer training; Endurance training;Patient/family training;Equipment evaluation/education;Continued evaluation; Energy conservation; Activityengagement   Goal Expiration Date 03/13/21   OT Frequency 3-5x/wk   Recommendation   OT Discharge Recommendation Home with skilled therapy  (pending progress, activity tolerance in acute care)   Equipment Recommended Shower/Tub chair with back ($)  (use of RW for functional mobility)   AM-PAC Daily Activity Inpatient   Lower Body Dressing 2   Bathing 2   Toileting 2   Upper Body Dressing 4   Grooming 4   Eating 4   Daily Activity Raw Score 18   Daily Activity Standardized Score (Calc for Raw Score >=11) 38 66   AM-PAC Applied Cognition Inpatient   Following a Speech/Presentation 3   Understanding Ordinary Conversation 4   Taking Medications 4   Remembering Where Things Are Placed or Put Away 4   Remembering List of 4-5 Errands 3   Taking Care of Complicated Tasks 3   Applied Cognition Raw Score 21   Applied Cognition Standardized Score 44 3   Barthel Index   Feeding 10   Bathing 0   Grooming Score 5   Dressing Score 5   Bladder Score 0   Bowels Score 10   Toilet Use Score 0   Transfers (Bed/Chair) Score 10   Mobility (Level Surface) Score 0   Stairs Score 0   Barthel Index Score 40      The patient's raw score on the AM-PAC Daily Activity inpatient short form is 18, standardized score is 38 66, less than 39 4  Patients at this level are likely to benefit from DC to post-acute rehabilitation services  Please refer to the recommendation of the Occupational Therapist for safe DC planning      Pt goals to be met by 3/13/2021:  -Pt will complete bed mobility supine <> sit w/ mod I to max I w/ ADL Performance to return home to PLOF    -Pt will complete UBD w/ mod I after set- up to max I w/ ADL Performance to return home    -Pt will demonstrate increased activity tolerance to complete LBD w/ min A x1 using LHAE as needed to max I    -Pt will demonstrate increased activity and sitting tolerance to participate in ADL tasks for at least 10 minutes w/ out rest break or sign / symptoms of fatigue     -Pt will demonstrate increased functional standing tolerance for at least 10 minutes using AD as needed w/ at least fair balance to max I w/ functional mobility and pet care    -Pt will demonstrate good attention and understanding of EC tech to max I w/ ADL performance    -Pt will demonstrate good attention and participation in continued evaluation to assess functional mobility using AD, DME as needed to assist in safe discharge planning    Kassi Forde, OTR/L

## 2021-03-03 NOTE — PLAN OF CARE
Problem: Potential for Falls  Goal: Patient will remain free of falls  Description: INTERVENTIONS:  - Assess patient frequently for physical needs  -  Identify cognitive and physical deficits and behaviors that affect risk of falls    -  [place] fall precautions as indicated by assessment   - Educate patient/family on patient safety including physical limitations  - Instruct patient to call for assistance with activity based on assessment  - Modify environment to reduce risk of injury  - Consider OT/PT consult to assist with strengthening/mobility  Outcome: Progressing     Problem: Prexisting or High Potential for Compromised Skin Integrity  Goal: Skin integrity is maintained or improved  Description: INTERVENTIONS:  - Identify patients at risk for skin breakdown  - Assess and monitor skin integrity  - Assess and monitor nutrition and hydration status  - Monitor labs   - Assess for incontinence   - Turn and reposition patient  - Assist with mobility/ambulation  - Relieve pressure over bony prominences  - Avoid friction and shearing  - Provide appropriate hygiene as needed including keeping skin clean and dry  - Evaluate need for skin moisturizer/barrier cream  - Collaborate with interdisciplinary team   - Patient/family teaching  - Consider wound care consult   Outcome: Progressing     Problem: RESPIRATORY - ADULT  Goal: Achieves optimal ventilation and oxygenation  Description: INTERVENTIONS:  - Assess for changes in respiratory status  - Assess for changes in mentation and behavior  - Position to facilitate oxygenation and minimize respiratory effort  - Oxygen administered by appropriate delivery if ordered  - Initiate smoking cessation education as indicated  - Encourage broncho-pulmonary hygiene including cough, deep breathe, Incentive Spirometry  - Assess the need for suctioning and aspirate as needed  - Assess and instruct to report SOB or any respiratory difficulty  - Respiratory Therapy support as indicated  Outcome: Progressing     Problem: PAIN - ADULT  Goal: Verbalizes/displays adequate comfort level or baseline comfort level  Description: Interventions:  - Encourage patient to monitor pain and request assistance  - Assess pain using appropriate pain scale  - Administer analgesics based on type and severity of pain and evaluate response  - Implement non-pharmacological measures as appropriate and evaluate response  - Consider cultural and social influences on pain and pain management  - Notify physician/advanced practitioner if interventions unsuccessful or patient reports new pain  Outcome: Progressing     Problem: INFECTION - ADULT  Goal: Absence or prevention of progression during hospitalization  Description: INTERVENTIONS:  - Assess and monitor for signs and symptoms of infection  - Monitor lab/diagnostic results  - Monitor all insertion sites, i e  indwelling lines, tubes, and drains  - Monitor endotracheal if appropriate and nasal secretions for changes in amount and color  - [place] appropriate cooling/warming therapies per order  - Administer medications as ordered  - Instruct and encourage patient and family to use good hand hygiene technique  - Identify and instruct in appropriate isolation precautions for identified infection/condition  Outcome: Progressing  Goal: Absence of fever/infection during neutropenic period  Description: INTERVENTIONS:  - Monitor WBC    Outcome: Progressing     Problem: SAFETY ADULT  Goal: Patient will remain free of falls  Description: INTERVENTIONS:  - Assess patient frequently for physical needs  -  Identify cognitive and physical deficits and behaviors that affect risk of falls    -  [place] fall precautions as indicated by assessment   - Educate patient/family on patient safety including physical limitations  - Instruct patient to call for assistance with activity based on assessment  - Modify environment to reduce risk of injury  - Consider OT/PT consult to assist with strengthening/mobility  Outcome: Progressing  Goal: Maintain or return to baseline ADL function  Description: INTERVENTIONS:  -  Assess patient's ability to carry out ADLs; assess patient's baseline for ADL function and identify physical deficits which impact ability to perform ADLs (bathing, care of mouth/teeth, toileting, grooming, dressing, etc )  - Assess/evaluate cause of self-care deficits   - Assess range of motion  - Assess patient's mobility; develop plan if impaired  - Assess patient's need for assistive devices and provide as appropriate  - Encourage maximum independence but intervene and supervise when necessary  - Involve family in performance of ADLs  - Assess for home care needs following discharge   - Consider OT consult to assist with ADL evaluation and planning for discharge  - Provide patient education as appropriate  Outcome: Progressing  Goal: Maintain or return mobility status to optimal level  Description: INTERVENTIONS:  - Assess patient's baseline mobility status (ambulation, transfers, stairs, etc )    - Identify cognitive and physical deficits and behaviors that affect mobility  - Identify mobility aids required to assist with transfers and/or ambulation (gait belt, sit-to-stand, lift, walker, cane, etc )  - [place] fall precautions as indicated by assessment  - Record patient progress and toleration of activity level on Mobility SBAR; progress patient to next Phase/Stage  - Instruct patient to call for assistance with activity based on assessment  - Consider rehabilitation consult to assist with strengthening/weightbearing, etc   Outcome: Progressing     Problem: DISCHARGE PLANNING  Goal: Discharge to home or other facility with appropriate resources  Description: INTERVENTIONS:  - Identify barriers to discharge w/patient and caregiver  - Arrange for needed discharge resources and transportation as appropriate  - Identify discharge learning needs (meds, wound care, etc )  - Arrange for interpretive services to assist at discharge as needed  - Refer to Case Management Department for coordinating discharge planning if the patient needs post-hospital services based on physician/advanced practitioner order or complex needs related to functional status, cognitive ability, or social support system  Outcome: Progressing     Problem: SKIN/TISSUE INTEGRITY - ADULT  Goal: Skin integrity remains intact  Description: INTERVENTIONS  - Identify patients at risk for skin breakdown  - Assess and monitor skin integrity  - Assess and monitor nutrition and hydration status  - Monitor labs (i e  albumin)  - Assess for incontinence   - Turn and reposition patient  - Assist with mobility/ambulation  - Relieve pressure over bony prominences  - Avoid friction and shearing  - Provide appropriate hygiene as needed including keeping skin clean and dry  - Evaluate need for skin moisturizer/barrier cream  - Collaborate with interdisciplinary team (i e  Nutrition, Rehabilitation, etc )   - Patient/family teaching  Outcome: Progressing

## 2021-03-03 NOTE — ASSESSMENT & PLAN NOTE
Lab Results   Component Value Date    SARSCOV2 Positive (A) 02/28/2021     Recent Labs     03/01/21  0509 03/01/21  0510 03/02/21  0510   FERRITIN  --  369  --     9*  --  89 5*   DDIMER 1 80*  --   --        Recent Labs     03/01/21  0509 03/03/21  0639   PROCALCITONI 0 44* 0 20       · POA:  Generalized weakness, poor appetite decreased p o   Intake for the past 2-3 days, mild cough with shortness of breath but no fevers chills or chest pain, the saturating mid 70 requiring supplemental oxygen  · Chest x-ray showing bilateral patchy opacities suggestive for multifocal pneumonia  · Patient will be admitted under COVID-19 moderate treatment protocol  · Inflammatory markers: ferritin normal  · Cardiac markers:  Troponin 0 04, CK within normal limits  · Procalcitonin elevated, was started antibiotics with doxycycline and Rocephin  · Given patient elevated inflammatory markers and increased O2 requirement will start steroids med Decadron and remdesivir per protocol  · Was started vitamin-C, vitamin-D, zinc, famotidine  · PT and OT eval and treat, Self-proning if able, Incentive spirometry  · Consult Pulm/CC if worsening oxygen requirements  · Respiratory protocol, incentive spirometer, self prone   · Supportive management with Tessalon Perles, Mucinex, Tylenol  · Continuing supplemental oxygen as tolerated for SpO2 sat >90%  · Continue trend D-dimer, inflammatory markers every 1-3 days  · Continue therapeutic Lovenox  · Consider convalescent plasma if patient's clinical picture remains unchanged

## 2021-03-03 NOTE — PROGRESS NOTES
Progress Note - Naida Stout 12/13/1929, 80 y o  female MRN: [id number]    Unit/Bed#: S [facility] Encounter: [id number]    Primary Care Provider: No primary care provider on file  Date and time admitted to hospital: 2/28/2021 10:27 AM        * Pneumonia due to COVID-19 virus  Assessment & Plan  Lab Results   Component Value Date    SARSCOV2 Positive (A) 02/28/2021     Recent Labs     03/01/21  0509 03/01/21  0510 03/02/21  0510   FERRITIN  --  369  --     9*  --  89 5*   DDIMER 1 80*  --   --        Recent Labs     03/01/21  0509 03/03/21  0639   PROCALCITONI 0 44* 0 20       · POA:  Generalized weakness, poor appetite decreased p o   Intake for the past 2-3 days, mild cough with shortness of breath but no fevers chills or chest pain, the saturating mid 70 requiring supplemental oxygen  · Chest x-ray showing bilateral patchy opacities suggestive for multifocal pneumonia  · Patient will be admitted under COVID-19 moderate treatment protocol  · Inflammatory markers: ferritin normal  · Cardiac markers:  Troponin 0 04, CK within normal limits  · Procalcitonin elevated, was started antibiotics with doxycycline and Rocephin  · Given patient elevated inflammatory markers and increased O2 requirement will start steroids med Decadron and remdesivir per protocol  · Was started vitamin-C, vitamin-D, zinc, famotidine  · PT and OT eval and treat, Self-proning if able, Incentive spirometry  · Consult Pulm/CC if worsening oxygen requirements  · Respiratory protocol, incentive spirometer, self prone   · Supportive management with Tessalon Perles, Mucinex, Tylenol  · Continuing supplemental oxygen as tolerated for SpO2 sat >90%  · Continue trend D-dimer, inflammatory markers every 1-3 days  · Continue therapeutic Lovenox  · Consider convalescent plasma if patient's clinical picture remains unchanged       Elevated d-dimer  Assessment & Plan  Recent Labs     03/01/21  0509 03/01/21  0510 03/02/21  0510   FERRITIN  --  369 --     9*  --  89 5*   DDIMER 1 80*  --   --        · POA:  The time involved 2 51 in the setting of COVID-19 pneumonia, given the patient current presentation sudden onset shortness of breath, acute respiratory failure that required supplemental oxygen need to rule out PE  · CTA negative for PE, transitioned to subcutaneous therapeutic Lovenox  · Continue trend D-dimer      Elevated serum creatinine  Assessment & Plan  Recent Labs     03/01/21  0509 03/02/21  0510 03/03/21  0639   CREATININE 1 32* 1 24 1 29   EGFR 35 38 36     · Elevated at 1 66 at admission  · In the setting of COVID-19 pneumonia and reported significant decreased p o  Intake the past 2-3 days  · There is no recent record for comparison, most recent from 2017 a creatinine level of 1 1 with reduced GFR which might indicate history of CKD   · Off IVF  · Avoid nephrotoxins, NSAIDs, hypotension  · Monitor CMP daily    Hyponatremia-resolved as of 3/3/2021  Assessment & Plan  Recent Labs     03/01/21  0509 03/02/21  0510 03/03/21  0639   SODIUM 140 140 142     · Mild hyponatremia 135, most probably secondary to dehydration given reported decreased p o  Intake now resolved  · Off IVF  · Monitor BMP daily    Elevated liver enzymes-resolved as of 3/3/2021  Assessment & Plan  Recent Labs     03/01/21  0509 03/02/21  0510 03/03/21  0639   AST 68* 62* 44   ALT 32 32 24   ALKPHOS 76 75 70   TBILI 0 51 0 47 0 41     · Isolated elevation of AST 69 on admission, in the setting of COVID-19 pneumonia, now resolved  · Continue trend and monitor CMP daily        VTE Pharmacologic Prophylaxis:   Pharmacologic: Enoxaparin (Lovenox)  Mechanical VTE Prophylaxis in Place: Yes    Discussions with Specialists or Other Care Team Provider:  Nursing    Education and Discussions with Family / Patient:   Will call patient's daughter later this afternoon    Current Length of Stay: 3 day(s)    Current Patient Status: Inpatient     Discharge Plan / Estimated Discharge Date: 48-72 hours pending clinical improvement    Code Status: Level 1 - Full Code      Subjective:   No acute events overnight  Per nursing, able to wean down to 5 L nasal cannula  Patient states her breathing is okay  She notes an intermittent cough  She denies any chest pain    Objective:     Vitals:   Temp (24hrs), Av °F (36 7 °C), Min:97 7 °F (36 5 °C), Max:98 3 °F (36 8 °C)    Temp:  [97 7 °F (36 5 °C)-98 3 °F (36 8 °C)] 98 °F (36 7 °C)  HR:  [] 114  Resp:  [18-22] 22  BP: (115-150)/(70-80) 115/72  SpO2:  [90 %] 90 %  Body mass index is 28 24 kg/m²  Input and Output Summary (last 24 hours): Intake/Output Summary (Last 24 hours) at 3/3/2021 1253  Last data filed at 3/3/2021 0900  Gross per 24 hour   Intake 120 ml   Output 1500 ml   Net -1380 ml       Physical Exam:     Physical Exam  Constitutional:       General: She is not in acute distress  Appearance: Normal appearance  She is ill-appearing  HENT:      Head: Normocephalic and atraumatic  Mouth/Throat:      Mouth: Mucous membranes are dry  Pharynx: No oropharyngeal exudate or posterior oropharyngeal erythema  Eyes:      Extraocular Movements: Extraocular movements intact  Pupils: Pupils are equal, round, and reactive to light  Cardiovascular:      Rate and Rhythm: Normal rate and regular rhythm  Pulses: Normal pulses  Pulmonary:      Effort: Pulmonary effort is normal       Breath sounds: Decreased breath sounds present  No wheezing  Comments: Right-sided crackles  Chest:      Chest wall: No tenderness  Abdominal:      General: Abdomen is flat  Palpations: Abdomen is soft  Tenderness: There is no abdominal tenderness  Musculoskeletal:         General: No swelling  Right lower leg: No edema  Left lower leg: No edema  Neurological:      General: No focal deficit present  Mental Status: She is alert and oriented to person, place, and time     Psychiatric:         Mood and Affect: Mood normal          Behavior: Behavior normal          Additional Data:     Labs:    Results from last 7 days   Lab Units 03/02/21  0509 03/01/21  0510   WBC Thousand/uL 6 81 3 90*   HEMOGLOBIN g/dL 10 4* 10 6*   HEMATOCRIT % 33 6* 34 2*   PLATELETS Thousands/uL 260 199   NEUTROS PCT %  --  81*   LYMPHS PCT %  --  11*   MONOS PCT %  --  7   EOS PCT %  --  0     Results from last 7 days   Lab Units 03/03/21  0639   POTASSIUM mmol/L 3 8   CHLORIDE mmol/L 108   CO2 mmol/L 25   BUN mg/dL 27*   CREATININE mg/dL 1 29   CALCIUM mg/dL 8 0*   ALK PHOS U/L 70   ALT U/L 24   AST U/L 44     Results from last 7 days   Lab Units 02/28/21  1522   INR  1 17       * I Have Reviewed All Lab Data Listed Above  * Additional Pertinent Lab Tests Reviewed: All Labs Within Last 24 Hours Reviewed    Imaging:    Imaging Reports Reviewed Today Include: None  Imaging Personally Reviewed by Myself Includes:  None    Recent Cultures (last 7 days):     Results from last 7 days   Lab Units 02/28/21  1049 02/28/21  1037   BLOOD CULTURE  No Growth at 48 hrs  No Growth at 48 hrs         Last 24 Hours Medication List:   Current Facility-Administered Medications   Medication Dose Route Frequency Provider Last Rate    acetaminophen  650 mg Oral Q6H PRN Chucky Layman, DO      ascorbic acid  1,000 mg Oral Q12H [address] Huber Nicky, DO      atorvastatin  40 mg Oral HS Huber Garnett, DO      benzonatate  100 mg Oral TID PRN Cullenert Buffy, DO      cefTRIAXone  1,000 mg Intravenous Q24H Huber Garnett, DO 1,000 mg (03/02/21 1400)    cholecalciferol  2,000 Units Oral Daily Huber Garnett, DO      dexamethasone  6 mg Intravenous Q24H [place] Buffy, DO      doxycycline hyclate  100 mg Oral Q12H Huber Garnett, DO      enoxaparin  1 mg/kg Subcutaneous Q24H Olvin Brooks MD      famotidine  10 mg Oral Daily Lambert Buffy, DO      guaiFENesin  1,200 mg Oral Q12H [address] Lambert Buffy, DO      zinc sulfate  220 mg Oral Daily Huber DO Tamir      Followed by   Chyna Boyd ON 3/7/2021] multivitamin-minerals  1 tablet Oral Daily Rosina Puentes DO      ondansetron  4 mg Intravenous Q4H PRN Benjamín Maher MD      remdesivir  100 mg Intravenous Q24H Rosina Puentes DO          Today, Patient Was Seen By: Benjamín Maher MD    ** Please Note: This note has been constructed using a voice recognition system   **

## 2021-03-03 NOTE — ASSESSMENT & PLAN NOTE
Recent Labs     03/01/21  0509 03/02/21  0510 03/03/21  0639   SODIUM 140 140 142     · Mild hyponatremia 135, most probably secondary to dehydration given reported decreased p o   Intake now resolved  · Off IVF  · Monitor BMP daily

## 2021-03-03 NOTE — ASSESSMENT & PLAN NOTE
Recent Labs     03/01/21  0509 03/01/21  0510 03/02/21  0510   FERRITIN  --  369  --     9*  --  89 5*   DDIMER 1 80*  --   --        · POA:  The time involved 2 51 in the setting of COVID-19 pneumonia, given the patient current presentation sudden onset shortness of breath, acute respiratory failure that required supplemental oxygen need to rule out PE  · CTA negative for PE, transitioned to subcutaneous therapeutic Lovenox  · Continue trend D-dimer

## 2021-03-03 NOTE — PLAN OF CARE
Problem: PHYSICAL THERAPY ADULT  Goal: Performs mobility at highest level of function for planned discharge setting  See evaluation for individualized goals  Description: Treatment/Interventions: Functional transfer training, LE strengthening/ROM, Elevations, Therapeutic exercise, Endurance training, Patient/family training, Equipment eval/education, Bed mobility, Gait training  Equipment Recommended: (RW)       See flowsheet documentation for full assessment, interventions and recommendations  Note: Prognosis: Fair  Problem List: Decreased strength, Decreased endurance, Impaired balance, Decreased mobility  Assessment: Chary Heath is a 80 y o  Female who [facility] on 2/28/2021 from home w/ c/o generalized weakness and diagnosis of COVID and pneumonia due to [address]  Orders for PT eval and treat received, w/ activity orders of activity as tolerated and fall precautions  Pt presents w/ comorbidities of hx of ORIF (per pt report), RBBB  and personal factors including: advanced age, mobilizing w/ assistive device and stair(s) to enter home  At baseline, pt mobilizes independently w/ RW, and reports 0 falls in the last 6 months  Upon evaluation, pt presents w/ the following deficits: weakness, impaired balance, decreased endurance and SpO2 impacting functional mobility  Pt requires min A for bed mobility, min A for transfers  Pt's clinical presentation is unstable/unpredictable due to need for assist w/ all phases of mobility when usually mobilizing independently, declining oxygen saturation w/ low level of activity, need for supplemental oxygen in order to maintain oxygen saturation, need for input for mobility technique/safety and recent drastic decline in mobility compared to baseline  Pt is at an increased risk of falls due to physical deficits   Given the above findings, discharge recommendation is for home w/ family support and home PT pending medical optimization including respiratory status and mobility progress  During this admission, pt would benefit from skilled acute inpatient PT in order to address the abovementioned deficits to maximize function and mobility before DC from acute care  PT Discharge Recommendation: Home with skilled therapy, Other (Comment)(HHPT pending medical optimization and progress)          See flowsheet documentation for full assessment

## 2021-03-03 NOTE — ASSESSMENT & PLAN NOTE
Recent Labs     03/01/21  0509 03/02/21  0510 03/03/21  0639   AST 68* 62* 44   ALT 32 32 24   ALKPHOS 76 75 70   TBILI 0 51 0 47 0 41     · Isolated elevation of AST 69 on admission, in the setting of COVID-19 pneumonia, now resolved  · Continue trend and monitor CMP daily

## 2021-03-03 NOTE — ASSESSMENT & PLAN NOTE
Recent Labs     03/01/21  0509 03/02/21  0510 03/03/21  0639   CREATININE 1 32* 1 24 1 29   EGFR 35 38 36     · Elevated at 1 66 at admission  · In the setting of COVID-19 pneumonia and reported significant decreased p o  Intake the past 2-3 days    · There is no recent record for comparison, most recent from 2017 a creatinine level of 1 1 with reduced GFR which might indicate history of CKD   · Off IVF  · Avoid nephrotoxins, NSAIDs, hypotension  · Monitor CMP daily

## 2021-03-04 ENCOUNTER — APPOINTMENT (INPATIENT)
Dept: RADIOLOGY | Facility: HOSPITAL | Age: 86
DRG: 177 | End: 2021-03-04
Payer: MEDICARE

## 2021-03-04 PROBLEM — D64.9 ANEMIA: Status: ACTIVE | Noted: 2021-03-04

## 2021-03-04 LAB
ANION GAP SERPL CALCULATED.3IONS-SCNC: 7 MMOL/L (ref 4–13)
BUN SERPL-MCNC: 28 MG/DL (ref 5–25)
CALCIUM SERPL-MCNC: 8.2 MG/DL (ref 8.3–10.1)
CHLORIDE SERPL-SCNC: 107 MMOL/L (ref 100–108)
CO2 SERPL-SCNC: 27 MMOL/L (ref 21–32)
CREAT SERPL-MCNC: 1.26 MG/DL (ref 0.6–1.3)
CRP SERPL QL: 63.1 MG/L
D DIMER PPP FEU-MCNC: 1.74 UG/ML FEU
GFR SERPL CREATININE-BSD FRML MDRD: 37 ML/MIN/1.73SQ M
GLUCOSE SERPL-MCNC: 106 MG/DL (ref 65–140)
POTASSIUM SERPL-SCNC: 3.9 MMOL/L (ref 3.5–5.3)
PROCALCITONIN SERPL-MCNC: 0.13 NG/ML
SODIUM SERPL-SCNC: 141 MMOL/L (ref 136–145)

## 2021-03-04 PROCEDURE — 86140 C-REACTIVE PROTEIN: CPT | Performed by: INTERNAL MEDICINE

## 2021-03-04 PROCEDURE — 99232 SBSQ HOSP IP/OBS MODERATE 35: CPT | Performed by: INTERNAL MEDICINE

## 2021-03-04 PROCEDURE — 84145 PROCALCITONIN (PCT): CPT | Performed by: INTERNAL MEDICINE

## 2021-03-04 PROCEDURE — 85379 FIBRIN DEGRADATION QUANT: CPT | Performed by: INTERNAL MEDICINE

## 2021-03-04 PROCEDURE — 71045 X-RAY EXAM CHEST 1 VIEW: CPT

## 2021-03-04 PROCEDURE — 80048 BASIC METABOLIC PNL TOTAL CA: CPT | Performed by: INTERNAL MEDICINE

## 2021-03-04 RX ORDER — DEXAMETHASONE SODIUM PHOSPHATE 4 MG/ML
6 INJECTION, SOLUTION INTRA-ARTICULAR; INTRALESIONAL; INTRAMUSCULAR; INTRAVENOUS; SOFT TISSUE EVERY 12 HOURS SCHEDULED
Status: DISCONTINUED | OUTPATIENT
Start: 2021-03-04 | End: 2021-03-10 | Stop reason: HOSPADM

## 2021-03-04 RX ADMIN — ZINC SULFATE 220 MG (50 MG) CAPSULE 220 MG: CAPSULE at 08:11

## 2021-03-04 RX ADMIN — Medication 2000 UNITS: at 08:11

## 2021-03-04 RX ADMIN — OXYCODONE HYDROCHLORIDE AND ACETAMINOPHEN 1000 MG: 500 TABLET ORAL at 08:11

## 2021-03-04 RX ADMIN — ATORVASTATIN CALCIUM 40 MG: 40 TABLET, FILM COATED ORAL at 22:30

## 2021-03-04 RX ADMIN — REMDESIVIR 100 MG: 100 INJECTION, POWDER, LYOPHILIZED, FOR SOLUTION INTRAVENOUS at 12:12

## 2021-03-04 RX ADMIN — CEFTRIAXONE 1000 MG: 10 INJECTION, POWDER, FOR SOLUTION INTRAVENOUS at 13:29

## 2021-03-04 RX ADMIN — OXYCODONE HYDROCHLORIDE AND ACETAMINOPHEN 1000 MG: 500 TABLET ORAL at 22:29

## 2021-03-04 RX ADMIN — DOXYCYCLINE 100 MG: 100 CAPSULE ORAL at 08:11

## 2021-03-04 RX ADMIN — DEXAMETHASONE SODIUM PHOSPHATE 6 MG: 4 INJECTION, SOLUTION INTRAMUSCULAR; INTRAVENOUS at 12:12

## 2021-03-04 RX ADMIN — ENOXAPARIN SODIUM 70 MG: 80 INJECTION SUBCUTANEOUS at 08:11

## 2021-03-04 RX ADMIN — DOXYCYCLINE 100 MG: 100 CAPSULE ORAL at 22:29

## 2021-03-04 RX ADMIN — DEXAMETHASONE SODIUM PHOSPHATE 6 MG: 4 INJECTION INTRA-ARTICULAR; INTRALESIONAL; INTRAMUSCULAR; INTRAVENOUS; SOFT TISSUE at 22:30

## 2021-03-04 RX ADMIN — FAMOTIDINE 10 MG: 20 TABLET, FILM COATED ORAL at 12:12

## 2021-03-04 NOTE — PLAN OF CARE
Problem: Potential for Falls  Goal: Patient will remain free of falls  Description: INTERVENTIONS:  - Assess patient frequently for physical needs  -  Identify cognitive and physical deficits and behaviors that affect risk of falls    -  [place] fall precautions as indicated by assessment   - Educate patient/family on patient safety including physical limitations  - Instruct patient to call for assistance with activity based on assessment  - Modify environment to reduce risk of injury  - Consider OT/PT consult to assist with strengthening/mobility  Outcome: Progressing     Problem: Prexisting or High Potential for Compromised Skin Integrity  Goal: Skin integrity is maintained or improved  Description: INTERVENTIONS:  - Identify patients at risk for skin breakdown  - Assess and monitor skin integrity  - Assess and monitor nutrition and hydration status  - Monitor labs   - Assess for incontinence   - Turn and reposition patient  - Assist with mobility/ambulation  - Relieve pressure over bony prominences  - Avoid friction and shearing  - Provide appropriate hygiene as needed including keeping skin clean and dry  - Evaluate need for skin moisturizer/barrier cream  - Collaborate with interdisciplinary team   - Patient/family teaching  - Consider wound care consult   Outcome: Progressing     Problem: RESPIRATORY - ADULT  Goal: Achieves optimal ventilation and oxygenation  Description: INTERVENTIONS:  - Assess for changes in respiratory status  - Assess for changes in mentation and behavior  - Position to facilitate oxygenation and minimize respiratory effort  - Oxygen administered by appropriate delivery if ordered  - Initiate smoking cessation education as indicated  - Encourage broncho-pulmonary hygiene including cough, deep breathe, Incentive Spirometry  - Assess the need for suctioning and aspirate as needed  - Assess and instruct to report SOB or any respiratory difficulty  - Respiratory Therapy support as indicated  Outcome: Progressing     Problem: PAIN - ADULT  Goal: Verbalizes/displays adequate comfort level or baseline comfort level  Description: Interventions:  - Encourage patient to monitor pain and request assistance  - Assess pain using appropriate pain scale  - Administer analgesics based on type and severity of pain and evaluate response  - Implement non-pharmacological measures as appropriate and evaluate response  - Consider cultural and social influences on pain and pain management  - Notify physician/advanced practitioner if interventions unsuccessful or patient reports new pain  Outcome: Progressing     Problem: INFECTION - ADULT  Goal: Absence or prevention of progression during hospitalization  Description: INTERVENTIONS:  - Assess and monitor for signs and symptoms of infection  - Monitor lab/diagnostic results  - Monitor all insertion sites, i e  indwelling lines, tubes, and drains  - Monitor endotracheal if appropriate and nasal secretions for changes in amount and color  - [place] appropriate cooling/warming therapies per order  - Administer medications as ordered  - Instruct and encourage patient and family to use good hand hygiene technique  - Identify and instruct in appropriate isolation precautions for identified infection/condition  Outcome: Progressing  Goal: Absence of fever/infection during neutropenic period  Description: INTERVENTIONS:  - Monitor WBC    Outcome: Progressing     Problem: SAFETY ADULT  Goal: Patient will remain free of falls  Description: INTERVENTIONS:  - Assess patient frequently for physical needs  -  Identify cognitive and physical deficits and behaviors that affect risk of falls    -  [place] fall precautions as indicated by assessment   - Educate patient/family on patient safety including physical limitations  - Instruct patient to call for assistance with activity based on assessment  - Modify environment to reduce risk of injury  - Consider OT/PT consult to assist with strengthening/mobility  Outcome: Progressing  Goal: Maintain or return to baseline ADL function  Description: INTERVENTIONS:  -  Assess patient's ability to carry out ADLs; assess patient's baseline for ADL function and identify physical deficits which impact ability to perform ADLs (bathing, care of mouth/teeth, toileting, grooming, dressing, etc )  - Assess/evaluate cause of self-care deficits   - Assess range of motion  - Assess patient's mobility; develop plan if impaired  - Assess patient's need for assistive devices and provide as appropriate  - Encourage maximum independence but intervene and supervise when necessary  - Involve family in performance of ADLs  - Assess for home care needs following discharge   - Consider OT consult to assist with ADL evaluation and planning for discharge  - Provide patient education as appropriate  Outcome: Progressing  Goal: Maintain or return mobility status to optimal level  Description: INTERVENTIONS:  - Assess patient's baseline mobility status (ambulation, transfers, stairs, etc )    - Identify cognitive and physical deficits and behaviors that affect mobility  - Identify mobility aids required to assist with transfers and/or ambulation (gait belt, sit-to-stand, lift, walker, cane, etc )  - [place] fall precautions as indicated by assessment  - Record patient progress and toleration of activity level on Mobility SBAR; progress patient to next Phase/Stage  - Instruct patient to call for assistance with activity based on assessment  - Consider rehabilitation consult to assist with strengthening/weightbearing, etc   Outcome: Progressing     Problem: DISCHARGE PLANNING  Goal: Discharge to home or other facility with appropriate resources  Description: INTERVENTIONS:  - Identify barriers to discharge w/patient and caregiver  - Arrange for needed discharge resources and transportation as appropriate  - Identify discharge learning needs (meds, wound care, etc )  - Arrange for interpretive services to assist at discharge as needed  - Refer to Case Management Department for coordinating discharge planning if the patient needs post-hospital services based on physician/advanced practitioner order or complex needs related to functional status, cognitive ability, or social support system  Outcome: Progressing     Problem: SKIN/TISSUE INTEGRITY - ADULT  Goal: Skin integrity remains intact  Description: INTERVENTIONS  - Identify patients at risk for skin breakdown  - Assess and monitor skin integrity  - Assess and monitor nutrition and hydration status  - Monitor labs (i e  albumin)  - Assess for incontinence   - Turn and reposition patient  - Assist with mobility/ambulation  - Relieve pressure over bony prominences  - Avoid friction and shearing  - Provide appropriate hygiene as needed including keeping skin clean and dry  - Evaluate need for skin moisturizer/barrier cream  - Collaborate with interdisciplinary team (i e  Nutrition, Rehabilitation, etc )   - Patient/family teaching  Outcome: Progressing

## 2021-03-04 NOTE — ASSESSMENT & PLAN NOTE
Lab Results   Component Value Date    SARSCOV2 Positive (A) 02/28/2021     Recent Labs     03/02/21  0510 03/04/21  0443   CRP 89 5* 63 1*   DDIMER  --  1 74*       Recent Labs     03/03/21  0639   PROCALCITONI 0 20       · POA:  Generalized weakness, poor appetite decreased p o   Intake for the past 2-3 days, mild cough with shortness of breath but no fevers chills or chest pain, the saturating mid 70 requiring supplemental oxygen  · Chest x-ray showing bilateral patchy opacities suggestive for multifocal pneumonia  · Patient will be admitted under COVID-19 moderate treatment protocol  · Inflammatory markers: ferritin normal  · Cardiac markers:  Troponin 0 04, CK within normal limits  · Repeat procalcitonin negative, was started antibiotics with doxycycline and Rocephin, can likely be discontinued if second procalcitonin negative  · Given patient elevated inflammatory markers and increased O2 requirement, continue steroids med Decadron and remdesivir per protocol; consider increasing dosage of Decadron  · Was started vitamin-C, vitamin-D, zinc, famotidine  · PT and OT eval and treat, Self-proning if able, Incentive spirometry  · Consult Pulm/CC if worsening oxygen requirements  · Respiratory protocol, incentive spirometer, self prone   · Supportive management with Tessalon Perles, Mucinex, Tylenol  · Continuing supplemental oxygen as tolerated for SpO2 sat >89%  · Continue trend D-dimer, inflammatory markers every 1-3 days  · Continue therapeutic Lovenox  · Consider convalescent plasma if patient's clinical picture remains unchanged

## 2021-03-04 NOTE — ASSESSMENT & PLAN NOTE
Recent Labs     03/02/21  0510 03/03/21  0639 03/04/21  0443   CREATININE 1 24 1 29 1 26   EGFR 38 36 37     · Elevated at 1 66 at admission  · In the setting of COVID-19 pneumonia and reported significant decreased p o  Intake the past 2-3 days    · There is no recent record for comparison, most recent from 2017 a creatinine level of 1 1 with reduced GFR which might indicate history of CKD; this may be patient's baseline   · Off IVF  · Avoid nephrotoxins, NSAIDs, hypotension  · Monitor CMP daily

## 2021-03-04 NOTE — OCCUPATIONAL THERAPY NOTE
OccupationalTherapy Progress Note     Patient Name: Jolene Shearer  [id number] Date: 3/4/2021  Problem List  Principal Problem:    Pneumonia due to COVID-19 virus  Active Problems:    Elevated serum creatinine    Elevated d-dimer    Anemia          03/04/21 1505   OT Last Visit   OT Visit Date 03/04/21  (Thursday)   Note Type   Note Type Treatment   Cancel Reasons Other  (pt declined due to fatigue)   Assessment   Assessment Chart review completed  Attempted to see pt for OT tx session  Pt declined participation in sessiondue to fatigue   Will continue to follow as appropriate and schedule allows   [facility], OTR/L

## 2021-03-04 NOTE — ASSESSMENT & PLAN NOTE
Recent Labs     03/02/21  0509   HGB 10 4*     In the setting of COVID-19 infection  Patient also appears to be chronically anemic  Normocytic anemia  No sign of active bleed      Plan:  · Continue to monitor  · Patient may benefit from iron supplementation

## 2021-03-04 NOTE — PROGRESS NOTES
Progress Note - Leslie Marcus 12/13/1929, 80 y o  female MRN: [id number]    Unit/Bed#: S -01 Encounter: [id number]    Primary Care Provider: No primary care provider on file  Date and time admitted to hospital: 2/28/2021 10:27 AM        * Pneumonia due to COVID-19 virus  Assessment & Plan  Lab Results   Component Value Date    SARSCOV2 Positive (A) 02/28/2021     Recent Labs     03/02/21  0510 03/04/21  0443   CRP 89 5* 63 1*   DDIMER  --  1 74*       Recent Labs     03/03/21  0639   PROCALCITONI 0 20       · POA:  Generalized weakness, poor appetite decreased p o   Intake for the past 2-3 days, mild cough with shortness of breath but no fevers chills or chest pain, the saturating mid 70 requiring supplemental oxygen  · Chest x-ray showing bilateral patchy opacities suggestive for multifocal pneumonia  · Patient will be admitted under COVID-19 moderate treatment protocol  · Inflammatory markers: ferritin normal  · Cardiac markers:  Troponin 0 04, CK within normal limits  · Repeat procalcitonin negative, was started antibiotics with doxycycline and Rocephin, can likely be discontinued if second procalcitonin negative  · Given patient elevated inflammatory markers and increased O2 requirement, continue steroids med Decadron and remdesivir per protocol; consider increasing dosage of Decadron  · Was started vitamin-C, vitamin-D, zinc, famotidine  · PT and OT eval and treat, Self-proning if able, Incentive spirometry  · Consult Pulm/CC if worsening oxygen requirements  · Respiratory protocol, incentive spirometer, self prone   · Supportive management with Tessalon Perles, Mucinex, Tylenol  · Continuing supplemental oxygen as tolerated for SpO2 sat >89%  · Continue trend D-dimer, inflammatory markers every 1-3 days  · Continue therapeutic Lovenox  · Consider convalescent plasma if patient's clinical picture remains unchanged       Anemia  Assessment & Plan  Recent Labs     03/02/21  0509   HGB 10 4*     In the setting of COVID-19 infection  Patient also appears to be chronically anemic  Normocytic anemia  No sign of active bleed  Plan:  · Continue to monitor  · Patient may benefit from iron supplementation    Elevated d-dimer  Assessment & Plan  Recent Labs     03/02/21  0510 03/04/21  0443   CRP 89 5* 63 1*   DDIMER  --  1 74*       · POA:  The time involved 2 51 in the setting of COVID-19 pneumonia, given the patient current presentation sudden onset shortness of breath, acute respiratory failure that required supplemental oxygen need to rule out PE  · CTA negative for PE, transitioned to subcutaneous therapeutic Lovenox  · Continue trend D-dimer      Elevated serum creatinine  Assessment & Plan  Recent Labs     03/02/21  0510 03/03/21  0639 03/04/21  0443   CREATININE 1 24 1 29 1 26   EGFR 38 36 37     · Elevated at 1 66 at admission  · In the setting of COVID-19 pneumonia and reported significant decreased p o  Intake the past 2-3 days  · There is no recent record for comparison, most recent from 2017 a creatinine level of 1 1 with reduced GFR which might indicate history of CKD; this may be patient's baseline   · Off IVF  · Avoid nephrotoxins, NSAIDs, hypotension  · Monitor CMP daily    Hyponatremia-resolved as of 3/3/2021  Assessment & Plan  Recent Labs     03/02/21  0510 03/03/21  0639 03/04/21  0443   SODIUM 140 142 141     · Mild hyponatremia 135, most probably secondary to dehydration given reported decreased p o   Intake now resolved  · Off IVF  · Monitor BMP daily    Elevated liver enzymes-resolved as of 3/3/2021  Assessment & Plan  Recent Labs     03/02/21  0510 03/03/21  0639   AST 62* 44   ALT 32 24   ALKPHOS 75 70   TBILI 0 47 0 41     · Isolated elevation of AST 69 on admission, in the setting of COVID-19 pneumonia, now resolved  · Continue trend and monitor CMP daily        VTE Pharmacologic Prophylaxis:   Pharmacologic: Enoxaparin (Lovenox)  Mechanical VTE Prophylaxis in Place: Yes    Discussions with Specialists or Other Care Team Provider:  Nursing    Education and Discussions with Family / Patient: Will discuss with patient's daughter later this afternoon  Current Length of Stay: 4 day(s)    Current Patient Status: Inpatient     Discharge Plan / Estimated Discharge Date:  Greater than 72 hours    Code Status: Level 1 - Full Code      Subjective:   Patient desaturated significantly when working with physical therapy  She was on 5 L nasal cannula and desaturated to 70% with transfers  Patient is now in the mid 90s on 6 L  She denies any significant shortness of breath when desaturating  She denies any cough  Objective:     Vitals:   Temp (24hrs), Av 7 °F (36 5 °C), Min:97 3 °F (36 3 °C), Max:98 4 °F (36 9 °C)    Temp:  [97 3 °F (36 3 °C)-98 4 °F (36 9 °C)] 97 4 °F (36 3 °C)  HR:  [100-112] 112  Resp:  [19-26] 26  BP: (106-130)/(72-79) 119/73  SpO2:  [81 %-92 %] 90 %  Body mass index is 28 24 kg/m²  Input and Output Summary (last 24 hours): Intake/Output Summary (Last 24 hours) at 3/4/2021 1105  Last data filed at 3/4/2021 0515  Gross per 24 hour   Intake 480 ml   Output 500 ml   Net -20 ml       Physical Exam:     Physical Exam  Constitutional:       General: She is not in acute distress  Appearance: Normal appearance  She is ill-appearing  HENT:      Head: Normocephalic and atraumatic  Mouth/Throat:      Mouth: Mucous membranes are dry  Pharynx: No oropharyngeal exudate or posterior oropharyngeal erythema  Eyes:      Extraocular Movements: Extraocular movements intact  Pupils: Pupils are equal, round, and reactive to light  Cardiovascular:      Rate and Rhythm: Normal rate and regular rhythm  Pulses: Normal pulses  Pulmonary:      Effort: Pulmonary effort is normal       Breath sounds: Decreased breath sounds present  No wheezing  Comments: Right-sided crackles  Chest:      Chest wall: No tenderness  Abdominal:      General: Abdomen is flat  Palpations: Abdomen is soft  Tenderness: There is no abdominal tenderness  Musculoskeletal:         General: No swelling  Right lower leg: No edema  Left lower leg: No edema  Neurological:      General: No focal deficit present  Mental Status: She is alert and oriented to person, place, and time  Psychiatric:         Mood and Affect: Mood normal          Behavior: Behavior normal          Additional Data:     Labs:    Results from last 7 days   Lab Units 03/02/21  0509 03/01/21  0510   WBC Thousand/uL 6 81 3 90*   HEMOGLOBIN g/dL 10 4* 10 6*   HEMATOCRIT % 33 6* 34 2*   PLATELETS Thousands/uL 260 199   NEUTROS PCT %  --  81*   LYMPHS PCT %  --  11*   MONOS PCT %  --  7   EOS PCT %  --  0     Results from last 7 days   Lab Units 03/04/21  0443 03/03/21  0639   POTASSIUM mmol/L 3 9 3 8   CHLORIDE mmol/L 107 108   CO2 mmol/L 27 25   BUN mg/dL 28* 27*   CREATININE mg/dL 1 26 1 29   CALCIUM mg/dL 8 2* 8 0*   ALK PHOS U/L  --  70   ALT U/L  --  24   AST U/L  --  44     Results from last 7 days   Lab Units 02/28/21  1522   INR  1 17       * I Have Reviewed All Lab Data Listed Above  * Additional Pertinent Lab Tests Reviewed: All Labs Within Last 24 Hours Reviewed    Imaging:    Imaging Reports Reviewed Today Include: None  Imaging Personally Reviewed by Myself Includes:  None    Recent Cultures (last 7 days):     Results from last 7 days   Lab Units 02/28/21  1049 02/28/21  1037   BLOOD CULTURE  No Growth at 72 hrs  No Growth at 72 hrs         Last 24 Hours Medication List:   Current Facility-Administered Medications   Medication Dose Route Frequency Provider Last Rate    acetaminophen  650 mg Oral Q6H PRN Katherine Finney, DO      ascorbic acid  1,000 mg Oral Q12H [address] Huber Atmir, DO      atorvastatin  40 mg Oral HS Huber Tamir, DO      benzonatate  100 mg Oral TID PRN Caldwell Libman,       cefTRIAXone  1,000 mg Intravenous Q24H Huber Tamir, DO 1,000 mg (03/03/21 1440)    cholecalciferol  2,000 Units Oral Daily Maeola Blotter, DO      dexamethasone  6 mg Intravenous Q24H Maeola Blotter, DO      doxycycline hyclate  100 mg Oral Q12H Maeola Blotter, DO      enoxaparin  1 mg/kg Subcutaneous Q24H Carmela Johnson MD      famotidine  10 mg Oral Daily Maeola Blotter, DO      zinc sulfate  220 mg Oral Daily Maeola Blotter, DO      Followed by   Red Gaxiola ON 3/7/2021] multivitamin-minerals  1 tablet Oral Daily Maeola Blotter, DO      ondansetron  4 mg Intravenous Q4H PRN Melia Boo MD      remdesivir  100 mg Intravenous Q24H Maeola Blotter, DO          Today, Patient Was Seen By: Melia Boo MD    ** Please Note: This note has been constructed using a voice recognition system   **

## 2021-03-04 NOTE — ASSESSMENT & PLAN NOTE
Recent Labs     03/02/21  0510 03/03/21  0639   AST 62* 44   ALT 32 24   ALKPHOS 75 70   TBILI 0 47 0 41     · Isolated elevation of AST 69 on admission, in the setting of COVID-19 pneumonia, now resolved  · Continue trend and monitor CMP daily

## 2021-03-04 NOTE — ASSESSMENT & PLAN NOTE
Recent Labs     03/02/21  0510 03/04/21  0443   CRP 89 5* 63 1*   DDIMER  --  1 74*       · POA:  The time involved 2 51 in the setting of COVID-19 pneumonia, given the patient current presentation sudden onset shortness of breath, acute respiratory failure that required supplemental oxygen need to rule out PE  · CTA negative for PE, transitioned to subcutaneous therapeutic Lovenox  · Continue trend D-dimer

## 2021-03-04 NOTE — ASSESSMENT & PLAN NOTE
Recent Labs     03/02/21  0510 03/03/21  0639 03/04/21  0443   SODIUM 140 142 141     · Mild hyponatremia 135, most probably secondary to dehydration given reported decreased p o   Intake now resolved  · Off IVF  · Monitor BMP daily

## 2021-03-05 LAB
BACTERIA BLD CULT: NORMAL
BACTERIA BLD CULT: NORMAL

## 2021-03-05 PROCEDURE — 97530 THERAPEUTIC ACTIVITIES: CPT

## 2021-03-05 PROCEDURE — 99232 SBSQ HOSP IP/OBS MODERATE 35: CPT | Performed by: INTERNAL MEDICINE

## 2021-03-05 PROCEDURE — 97116 GAIT TRAINING THERAPY: CPT

## 2021-03-05 PROCEDURE — 97110 THERAPEUTIC EXERCISES: CPT

## 2021-03-05 RX ORDER — FUROSEMIDE 10 MG/ML
40 INJECTION INTRAMUSCULAR; INTRAVENOUS ONCE
Status: COMPLETED | OUTPATIENT
Start: 2021-03-05 | End: 2021-03-05

## 2021-03-05 RX ORDER — POLYETHYLENE GLYCOL 3350 17 G/17G
17 POWDER, FOR SOLUTION ORAL DAILY PRN
Status: DISCONTINUED | OUTPATIENT
Start: 2021-03-05 | End: 2021-03-07

## 2021-03-05 RX ORDER — DOCUSATE SODIUM 100 MG/1
100 CAPSULE, LIQUID FILLED ORAL 2 TIMES DAILY
Status: DISCONTINUED | OUTPATIENT
Start: 2021-03-05 | End: 2021-03-10 | Stop reason: HOSPADM

## 2021-03-05 RX ADMIN — OXYCODONE HYDROCHLORIDE AND ACETAMINOPHEN 1000 MG: 500 TABLET ORAL at 08:53

## 2021-03-05 RX ADMIN — DEXAMETHASONE SODIUM PHOSPHATE 6 MG: 4 INJECTION INTRA-ARTICULAR; INTRALESIONAL; INTRAMUSCULAR; INTRAVENOUS; SOFT TISSUE at 08:54

## 2021-03-05 RX ADMIN — ATORVASTATIN CALCIUM 40 MG: 40 TABLET, FILM COATED ORAL at 21:14

## 2021-03-05 RX ADMIN — DEXAMETHASONE SODIUM PHOSPHATE 6 MG: 4 INJECTION INTRA-ARTICULAR; INTRALESIONAL; INTRAMUSCULAR; INTRAVENOUS; SOFT TISSUE at 21:14

## 2021-03-05 RX ADMIN — FUROSEMIDE 40 MG: 10 INJECTION, SOLUTION INTRAVENOUS at 12:43

## 2021-03-05 RX ADMIN — ZINC SULFATE 220 MG (50 MG) CAPSULE 220 MG: CAPSULE at 08:53

## 2021-03-05 RX ADMIN — Medication 2000 UNITS: at 08:53

## 2021-03-05 RX ADMIN — ENOXAPARIN SODIUM 70 MG: 80 INJECTION SUBCUTANEOUS at 08:54

## 2021-03-05 RX ADMIN — OXYCODONE HYDROCHLORIDE AND ACETAMINOPHEN 1000 MG: 500 TABLET ORAL at 21:14

## 2021-03-05 RX ADMIN — DOCUSATE SODIUM 100 MG: 100 CAPSULE, LIQUID FILLED ORAL at 12:43

## 2021-03-05 NOTE — ASSESSMENT & PLAN NOTE
Recent Labs     03/03/21  0639   AST 44   ALT 24   ALKPHOS 70   TBILI 0 41     · Isolated elevation of AST 69 on admission, in the setting of COVID-19 pneumonia, now resolved  · Continue trend and monitor CMP daily

## 2021-03-05 NOTE — ASSESSMENT & PLAN NOTE
Lab Results   Component Value Date    SARSCOV2 Positive (A) 02/28/2021     Recent Labs     03/04/21  0443   CRP 63 1*   DDIMER 1 74*       Recent Labs     03/03/21  0639 03/04/21  1015   PROCALCITONI 0 20 0 13       · POA:  Generalized weakness, poor appetite decreased p o   Intake for the past 2-3 days, mild cough with shortness of breath but no fevers chills or chest pain, the saturating mid 70 requiring supplemental oxygen  · Chest x-ray showing bilateral patchy opacities suggestive for multifocal pneumonia  · Patient will be admitted under COVID-19 moderate treatment protocol  · Inflammatory markers: ferritin normal  · Cardiac markers:  Troponin 0 04, CK within normal limits  · Will discontinue antibiotics as procalcitonin negative x2 (completed 5 day course of doxycycline and ceftriaxone)  · Remdesivir completed on 03/04/2021 for a 5 day course  · Continue Decadron 6 mg bid (day 6 for steroid)  · Continue vitamin-C, vitamin-D, zinc, famotidine  · PT and OT eval and treat, Self-proning if able, Incentive spirometry  · Consult Pulm/CC if worsening oxygen requirements  · Respiratory protocol, incentive spirometer, self prone   · Supportive management with Tessalon Perles, Mucinex, Tylenol  · Continuing supplemental oxygen as tolerated for SpO2 sat >89%  · Continue trend D-dimer, inflammatory markers every 1-3 days  · Continue therapeutic Lovenox  · Consider convalescent plasma if patient's clinical picture remains unchanged

## 2021-03-05 NOTE — ASSESSMENT & PLAN NOTE
Recent Labs     03/04/21  0443   CRP 63 1*   DDIMER 1 74*       · POA: 2 51 in the setting of COVID-19 pneumonia  · CTA negative for PE   · On subcutaneous therapeutic Lovenox

## 2021-03-05 NOTE — PROGRESS NOTES
Progress Note - Leslie Marcus 12/13/1929, 80 y o  female MRN: [id number]    Unit/Bed#: S -01 Encounter: [id number]    Primary Care Provider: No primary care provider on file  Date and time admitted to hospital: 2/28/2021 10:27 AM        * Pneumonia due to COVID-19 virus  Assessment & Plan  Lab Results   Component Value Date    SARSCOV2 Positive (A) 02/28/2021     Recent Labs     03/04/21  0443   CRP 63 1*   DDIMER 1 74*       Recent Labs     03/03/21  0639 03/04/21  1015   PROCALCITONI 0 20 0 13       · POA:  Generalized weakness, poor appetite decreased p o  Intake for the past 2-3 days, mild cough with shortness of breath but no fevers chills or chest pain, the saturating mid 70 requiring supplemental oxygen  · Chest x-ray showing bilateral patchy opacities suggestive for multifocal pneumonia  · Patient will be admitted under COVID-19 moderate treatment protocol  · Inflammatory markers: ferritin normal  · Cardiac markers:  Troponin 0 04, CK within normal limits  · Will discontinue antibiotics as procalcitonin negative x2 (completed 5 day course of doxycycline and ceftriaxone)  · Remdesivir completed on 03/04/2021 for a 5 day course  · Continue Decadron 6 mg bid (day 6 for steroid)  · Continue vitamin-C, vitamin-D, zinc, famotidine  · PT and OT eval and treat, Self-proning if able, Incentive spirometry  · Consult Pulm/CC if worsening oxygen requirements  · Respiratory protocol, incentive spirometer, self prone   · Supportive management with Tessalon Perles, Mucinex, Tylenol  · Continuing supplemental oxygen as tolerated for SpO2 sat >89%  · Continue trend D-dimer, inflammatory markers every 1-3 days  · Continue therapeutic Lovenox  · Consider convalescent plasma if patient's clinical picture remains unchanged       Anemia  Assessment & Plan  No results for input(s): HGB in the last 72 hours  In the setting of COVID-19 infection  Patient also appears to be chronically anemic  Normocytic anemia   No sign of active bleed  Plan:  · Continue to monitor intermittently   · Patient may benefit from iron supplementation    Elevated d-dimer  Assessment & Plan  Recent Labs     03/04/21  0443   CRP 63 1*   DDIMER 1 74*       · POA:  The time involved 2 51 in the setting of COVID-19 pneumonia, given the patient current presentation sudden onset shortness of breath, acute respiratory failure that required supplemental oxygen need to rule out PE  · CTA negative for PE, transitioned to subcutaneous therapeutic Lovenox  · Continue trend D-dimer      Elevated serum creatinine  Assessment & Plan  Recent Labs     03/03/21  0639 03/04/21  0443   CREATININE 1 29 1 26   EGFR 36 37     · Elevated at 1 66 at admission  · In the setting of COVID-19 pneumonia and reported significant decreased p o  Intake the past 2-3 days  · There is no recent record for comparison, most recent from 2017 a creatinine level of 1 1 with reduced GFR which might indicate history of CKD; this may be patient's baseline   · Off IVF  · Avoid nephrotoxins, NSAIDs, hypotension  · Monitor CMP daily    Hyponatremia-resolved as of 3/3/2021  Assessment & Plan  Recent Labs     03/03/21  0639 03/04/21  0443   SODIUM 142 141     · Mild hyponatremia 135, most probably secondary to dehydration given reported decreased p o  Intake now resolved  · Off IVF  · Monitor BMP daily    Elevated liver enzymes-resolved as of 3/3/2021  Assessment & Plan  Recent Labs     03/03/21  0639   AST 44   ALT 24   ALKPHOS 70   TBILI 0 41     · Isolated elevation of AST 69 on admission, in the setting of COVID-19 pneumonia, now resolved  · Continue trend and monitor CMP daily        VTE Pharmacologic Prophylaxis:   Pharmacologic: Enoxaparin (Lovenox)  Mechanical VTE Prophylaxis in Place: No    Discussions with Specialists or Other Care Team Provider: Nursing    Education and Discussions with Family / Patient:  Will discuss with patient's daughter later this afternoon    Current Length of Stay: 5 day(s)    Current Patient Status: Inpatient     Discharge Plan / Estimated Discharge Date: > 72 hours    Code Status: Level 1 - Full Code      Subjective:   No acute events overnight  This morning, patient was seen and examined at the chair watching TV  She denies any shortness of breath or cough  She denies any fevers or chills  Objective:     Vitals:   Temp (24hrs), Av 6 °F (36 4 °C), Min:97 5 °F (36 4 °C), Max:97 7 °F (36 5 °C)    Temp:  [97 5 °F (36 4 °C)-97 7 °F (36 5 °C)] 97 7 °F (36 5 °C)  HR:  [] 63  Resp:  [18-20] 20  BP: (134-136)/(73-74) 134/73  SpO2:  [81 %-96 %] 91 %  Body mass index is 28 24 kg/m²  Input and Output Summary (last 24 hours): Intake/Output Summary (Last 24 hours) at 3/5/2021 8935  Last data filed at 3/5/2021 0600  Gross per 24 hour   Intake 120 ml   Output 600 ml   Net -480 ml       Physical Exam:     Physical Exam  Constitutional:       General: She is not in acute distress  Appearance: Normal appearance  She is ill-appearing  HENT:      Head: Normocephalic and atraumatic  Mouth/Throat:      Mouth: Mucous membranes are dry  Pharynx: No oropharyngeal exudate or posterior oropharyngeal erythema  Eyes:      Extraocular Movements: Extraocular movements intact  Pupils: Pupils are equal, round, and reactive to light  Cardiovascular:      Rate and Rhythm: Normal rate and regular rhythm  Pulses: Normal pulses  Pulmonary:      Effort: Pulmonary effort is normal       Breath sounds: Decreased breath sounds present  No wheezing  Comments: Right-sided crackles  Chest:      Chest wall: No tenderness  Abdominal:      General: Abdomen is flat  Palpations: Abdomen is soft  Tenderness: There is no abdominal tenderness  Musculoskeletal:         General: No swelling  Right lower leg: No edema  Left lower leg: No edema  Neurological:      General: No focal deficit present        Mental Status: She is alert and oriented to person, place, and time  Psychiatric:         Mood and Affect: Mood normal          Behavior: Behavior normal          Additional Data:     Labs:    Results from last 7 days   Lab Units 03/02/21  0509 03/01/21  0510   WBC Thousand/uL 6 81 3 90*   HEMOGLOBIN g/dL 10 4* 10 6*   HEMATOCRIT % 33 6* 34 2*   PLATELETS Thousands/uL 260 199   NEUTROS PCT %  --  81*   LYMPHS PCT %  --  11*   MONOS PCT %  --  7   EOS PCT %  --  0     Results from last 7 days   Lab Units 03/04/21  0443 03/03/21  0639   POTASSIUM mmol/L 3 9 3 8   CHLORIDE mmol/L 107 108   CO2 mmol/L 27 25   BUN mg/dL 28* 27*   CREATININE mg/dL 1 26 1 29   CALCIUM mg/dL 8 2* 8 0*   ALK PHOS U/L  --  70   ALT U/L  --  24   AST U/L  --  44     Results from last 7 days   Lab Units 02/28/21  1522   INR  1 17       * I Have Reviewed All Lab Data Listed Above  * Additional Pertinent Lab Tests Reviewed: No New Labs Available For Today    Imaging:    Imaging Reports Reviewed Today Include:  Chest x-ray  Imaging Personally Reviewed by Myself Includes:  Chest x-ray    Recent Cultures (last 7 days):     Results from last 7 days   Lab Units 02/28/21  1049 02/28/21  1037   BLOOD CULTURE  No Growth After 4 Days  No Growth After 4 Days         Last 24 Hours Medication List:   Current Facility-Administered Medications   Medication Dose Route Frequency Provider Last Rate    acetaminophen  650 mg Oral Q6H PRN Nicole Overcast, DO      ascorbic acid  1,000 mg Oral Q12H [address] Huber Garnett DO      atorvastatin  40 mg Oral HS Huber Garnett DO      benzonatate  100 mg Oral TID PRN [facility] Catherine, DO      cholecalciferol  2,000 Units Oral Daily [facility] Catherine, DO      dexamethasone  6 mg Intravenous Q12H Carmela Johnson MD      enoxaparin  1 mg/kg Subcutaneous Q24H [address] Brady Mujica MD      famotidine  10 mg Oral Daily [facility] Catherine, DO      zinc sulfate  220 mg Oral Daily Huber Garnett DO      Followed by   Cary Patel ON 3/7/2021] multivitamin-minerals  1 tablet Oral Daily Shira Bhatti DO      ondansetron  4 mg Intravenous Q4H PRN Ginny Bang MD          Today, Patient Was Seen By: Ginny Bang MD    ** Please Note: This note has been constructed using a voice recognition system   **

## 2021-03-05 NOTE — PHYSICAL THERAPY NOTE
PHYSICAL THERAPY NOTE      Patient Name: Ankur FERNANDEZJ Date: 3/5/2021        03/05/21 0825   PT Last Visit   PT Visit Date 21   Note Type   Note Type Treatment   Pain Assessment   Pain Assessment Tool Pain Assessment not indicated - pt denies pain   Pain Score No Pain   Restrictions/Precautions   Weight Bearing Precautions Per Order No   Other Precautions Airborne/isolation;Droplet precautions;Contact/isolation; Chair Alarm; Bed Alarm;Multiple lines;O2;Fall Risk  (4L O2 via NC, purewick, Masimo)   General   Family/Caregiver Present No   Subjective   Subjective Patient supine in bed and is agreeable to participate in therapy session  Patient identifers obtained from name &   Bed Mobility   Supine to Sit 4  Minimal assistance   Additional items Assist x 1;HOB elevated; Bedrails; Increased time required;Verbal cues;LE management   Sit to Supine Unable to assess   Additional Comments Patient seated OOB in recliner    Transfers   Sit to Stand 4  Minimal assistance   Additional items Assist x 1; Armrests; Increased time required;Verbal cues   Stand to Sit 4  Minimal assistance   Additional items Assist x 1; Armrests; Increased time required;Verbal cues   Stand pivot 4  Minimal assistance   Additional items Assist x 1; Armrests; Increased time required;Verbal cues  (EOB to recliner)   Ambulation/Elevation   Gait pattern Short stride; Excessively slow;Decreased foot clearance   Gait Assistance 4  Minimal assist   Additional items Assist x 1;Verbal cues   Assistive Device Rolling walker   Distance 3 steps fwd   Balance   Static Sitting Good   Dynamic Sitting Fair +   Static Standing Poor +   Ambulatory Poor +   Endurance Deficit   Endurance Deficit Yes   Endurance Deficit Description limited activity tolerance, decreasing SpO2   Activity Tolerance   Activity Tolerance Patient limited by fatigue;Treatment limited secondary to medical complications (Comment)  (decreasing SpO2)   Nurse Made Aware Spoke to Carin Dong RN    Exercises   Knee AROM Long Arc Quad Sitting;10 reps;AROM; Bilateral   Ankle Pumps Sitting;15 reps;AROM; Bilateral   Marching Sitting;10 reps;AROM; Bilateral   Assessment   Prognosis Fair   Problem List Decreased strength;Decreased endurance; Impaired balance;Decreased mobility   Assessment Patient agreeable to participate in therapy session  Patient remains consistent with min ax1 for supine to sit and sit<>stand transfers  Requires verbal instruction for body positioning and hand placement  Pt able to ambulate few feet forward with roller walker and min ax1 for steadying, increased distance limited secondary to decreasing SpO2 to 84% on 4L O2 via NC  Once seated in chair with verbal instruction for breathing technique, SpO2 increased to 91%  Pt with no SOB or complaints of SOB throughout mobility  Pt participated in seated B LE exercise program with 100% input for form and pace  Continue to focus on OOB mobility with progression of transfers and ambulation as appropriate to maximize functional mobility  Goals   Patient Goals to go home   STG Expiration Date 03/13/21   PT Treatment Day 1   Plan   Treatment/Interventions Functional transfer training;LE strengthening/ROM; Elevations; Therapeutic exercise; Endurance training;Patient/family training;Equipment eval/education; Bed mobility;Gait training;Spoke to nursing   PT Frequency Other (Comment)  (4-5x/week)   Recommendation   PT Discharge Recommendation Home with skilled therapy; Other (Comment)  (HHPT pending medical optimization and progress)   Equipment Recommended Walker  (roller walker)   AM-PAC Basic Mobility Inpatient   Turning in Bed Without Bedrails 3   Lying on Back to Sitting on Edge of Flat Bed 3   Moving Bed to Chair 3   Standing Up From Chair 3   Walk in Room 3   Climb 3-5 Stairs 3   Basic Mobility Inpatient Raw Score 18   Basic Mobility Standardized Score 41 05 Guille Diaz, PTA

## 2021-03-05 NOTE — PLAN OF CARE
Problem: PHYSICAL THERAPY ADULT  Goal: Performs mobility at highest level of function for planned discharge setting  See evaluation for individualized goals  Description: Treatment/Interventions: Functional transfer training, LE strengthening/ROM, Elevations, Therapeutic exercise, Endurance training, Patient/family training, Equipment eval/education, Bed mobility, Gait training  Equipment Recommended: (RW)       See flowsheet documentation for full assessment, interventions and recommendations  Outcome: Progressing  Note: Prognosis: Fair  Problem List: Decreased strength, Decreased endurance, Impaired balance, Decreased mobility  Assessment: Patient agreeable to participate in therapy session  Patient remains consistent with min ax1 for supine to sit and sit<>stand transfers  Requires verbal instruction for body positioning and hand placement  Pt able to ambulate few feet forward with roller walker and min ax1 for steadying, increased distance limited secondary to decreasing SpO2 to 84% on 4L O2 via NC  Once seated in chair with verbal instruction for breathing technique, SpO2 increased to 91%  Pt with no SOB or complaints of SOB throughout mobility  Pt participated in seated B LE exercise program with 100% input for form and pace  Continue to focus on OOB mobility with progression of transfers and ambulation as appropriate to maximize functional mobility  PT Discharge Recommendation: Home with skilled therapy, Other (Comment)(HHPT pending medical optimization and progress)          See flowsheet documentation for full assessment

## 2021-03-05 NOTE — PLAN OF CARE
Problem: Potential for Falls  Goal: Patient will remain free of falls  Description: INTERVENTIONS:  - Assess patient frequently for physical needs  -  Identify cognitive and physical deficits and behaviors that affect risk of falls    -  [place] fall precautions as indicated by assessment   - Educate patient/family on patient safety including physical limitations  - Instruct patient to call for assistance with activity based on assessment  - Modify environment to reduce risk of injury  - Consider OT/PT consult to assist with strengthening/mobility  3/5/2021 1041 by Desmond Blank RN  Outcome: Progressing  3/5/2021 1041 by Desmond Blank RN  Outcome: Progressing     Problem: Prexisting or High Potential for Compromised Skin Integrity  Goal: Skin integrity is maintained or improved  Description: INTERVENTIONS:  - Identify patients at risk for skin breakdown  - Assess and monitor skin integrity  - Assess and monitor nutrition and hydration status  - Monitor labs   - Assess for incontinence   - Turn and reposition patient  - Assist with mobility/ambulation  - Relieve pressure over bony prominences  - Avoid friction and shearing  - Provide appropriate hygiene as needed including keeping skin clean and dry  - Evaluate need for skin moisturizer/barrier cream  - Collaborate with interdisciplinary team   - Patient/family teaching  - Consider wound care consult   3/5/2021 1041 by Desmond Blank RN  Outcome: Progressing  3/5/2021 1041 by Desmond Blank RN  Outcome: Progressing     Problem: RESPIRATORY - ADULT  Goal: Achieves optimal ventilation and oxygenation  Description: INTERVENTIONS:  - Assess for changes in respiratory status  - Assess for changes in mentation and behavior  - Position to facilitate oxygenation and minimize respiratory effort  - Oxygen administered by appropriate delivery if ordered  - Initiate smoking cessation education as indicated  - Encourage broncho-pulmonary hygiene including cough, deep breathe, Incentive Spirometry  - Assess the need for suctioning and aspirate as needed  - Assess and instruct to report SOB or any respiratory difficulty  - Respiratory Therapy support as indicated  3/5/2021 1041 by Walter Katz RN  Outcome: Progressing  3/5/2021 1041 by Walter Katz RN  Outcome: Progressing     Problem: SKIN/TISSUE INTEGRITY - ADULT  Goal: Skin integrity remains intact  Description: INTERVENTIONS  - Identify patients at risk for skin breakdown  - Assess and monitor skin integrity  - Assess and monitor nutrition and hydration status  - Monitor labs (i e  albumin)  - Assess for incontinence   - Turn and reposition patient  - Assist with mobility/ambulation  - Relieve pressure over bony prominences  - Avoid friction and shearing  - Provide appropriate hygiene as needed including keeping skin clean and dry  - Evaluate need for skin moisturizer/barrier cream  - Collaborate with interdisciplinary team (i e  Nutrition, Rehabilitation, etc )   - Patient/family teaching  3/5/2021 1041 by Walter Katz RN  Outcome: Progressing  3/5/2021 1041 by Walter Katz RN  Outcome: Progressing     Problem: PAIN - ADULT  Goal: Verbalizes/displays adequate comfort level or baseline comfort level  Description: Interventions:  - Encourage patient to monitor pain and request assistance  - Assess pain using appropriate pain scale  - Administer analgesics based on type and severity of pain and evaluate response  - Implement non-pharmacological measures as appropriate and evaluate response  - Consider cultural and social influences on pain and pain management  - Notify physician/advanced practitioner if interventions unsuccessful or patient reports new pain  3/5/2021 1041 by Walter Katz RN  Outcome: Progressing  3/5/2021 1041 by Waletr Katz RN  Outcome: Progressing     Problem: INFECTION - ADULT  Goal: Absence or prevention of progression during hospitalization  Description: INTERVENTIONS:  - Assess and monitor for signs and symptoms of infection  - Monitor lab/diagnostic results  - Monitor all insertion sites, i e  indwelling lines, tubes, and drains  - Monitor endotracheal if appropriate and nasal secretions for changes in amount and color  - [place] appropriate cooling/warming therapies per order  - Administer medications as ordered  - Instruct and encourage patient and family to use good hand hygiene technique  - Identify and instruct in appropriate isolation precautions for identified infection/condition  3/5/2021 1041 by Daniela Cornell RN  Outcome: Progressing  3/5/2021 1041 by Daniela Cornell RN  Outcome: Progressing  Goal: Absence of fever/infection during neutropenic period  Description: INTERVENTIONS:  - Monitor WBC    3/5/2021 1041 by Daniela Cornell RN  Outcome: Progressing  3/5/2021 1041 by Daniela Cornell RN  Outcome: Progressing     Problem: SAFETY ADULT  Goal: Patient will remain free of falls  Description: INTERVENTIONS:  - Assess patient frequently for physical needs  -  Identify cognitive and physical deficits and behaviors that affect risk of falls    -  [place] fall precautions as indicated by assessment   - Educate patient/family on patient safety including physical limitations  - Instruct patient to call for assistance with activity based on assessment  - Modify environment to reduce risk of injury  - Consider OT/PT consult to assist with strengthening/mobility  3/5/2021 1041 by Daniela Cornell RN  Outcome: Progressing  3/5/2021 1041 by Daniela Cornell RN  Outcome: Progressing  Goal: Maintain or return to baseline ADL function  Description: INTERVENTIONS:  -  Assess patient's ability to carry out ADLs; assess patient's baseline for ADL function and identify physical deficits which impact ability to perform ADLs (bathing, care of mouth/teeth, toileting, grooming, dressing, etc )  - Assess/evaluate cause of self-care deficits   - Assess range of motion  - Assess patient's mobility; develop plan if impaired  - Assess patient's need for assistive devices and provide as appropriate  - Encourage maximum independence but intervene and supervise when necessary  - Involve family in performance of ADLs  - Assess for home care needs following discharge   - Consider OT consult to assist with ADL evaluation and planning for discharge  - Provide patient education as appropriate  3/5/2021 1041 by Gavino Huerta RN  Outcome: Progressing  3/5/2021 1041 by Gavino Huerta RN  Outcome: Progressing  Goal: Maintain or return mobility status to optimal level  Description: INTERVENTIONS:  - Assess patient's baseline mobility status (ambulation, transfers, stairs, etc )    - Identify cognitive and physical deficits and behaviors that affect mobility  - Identify mobility aids required to assist with transfers and/or ambulation (gait belt, sit-to-stand, lift, walker, cane, etc )  - [place] fall precautions as indicated by assessment  - Record patient progress and toleration of activity level on Mobility SBAR; progress patient to next Phase/Stage  - Instruct patient to call for assistance with activity based on assessment  - Consider rehabilitation consult to assist with strengthening/weightbearing, etc   3/5/2021 1041 by Gavino Huerta RN  Outcome: Progressing  3/5/2021 1041 by Gavino Huerta RN  Outcome: Progressing     Problem: DISCHARGE PLANNING  Goal: Discharge to home or other facility with appropriate resources  Description: INTERVENTIONS:  - Identify barriers to discharge w/patient and caregiver  - Arrange for needed discharge resources and transportation as appropriate  - Identify discharge learning needs (meds, wound care, etc )  - Arrange for interpretive services to assist at discharge as needed  - Refer to Case Management Department for coordinating discharge planning if the patient needs post-hospital services based on physician/advanced practitioner order or complex needs related to functional status, cognitive ability, or social support system  3/5/2021 1041 by Faraz Lang RN  Outcome: Progressing  3/5/2021 1041 by Faraz Lang RN  Outcome: Progressing

## 2021-03-05 NOTE — ASSESSMENT & PLAN NOTE
Recent Labs     03/04/21  0443   CRP 63 1*   DDIMER 1 74*       · POA:  The time involved 2 51 in the setting of COVID-19 pneumonia, given the patient current presentation sudden onset shortness of breath, acute respiratory failure that required supplemental oxygen need to rule out PE  · CTA negative for PE, transitioned to subcutaneous therapeutic Lovenox  · Continue trend D-dimer

## 2021-03-05 NOTE — ASSESSMENT & PLAN NOTE
Recent Labs     03/03/21  0639 03/04/21  0443   SODIUM 142 141     · Mild hyponatremia 135, most probably secondary to dehydration given reported decreased p o   Intake now resolved  · Off IVF  · Monitor BMP daily

## 2021-03-05 NOTE — ASSESSMENT & PLAN NOTE
Recent Labs     03/03/21  0639 03/04/21  0443   CREATININE 1 29 1 26   EGFR 36 37     · Elevated at 1 66 at admission  · In the setting of COVID-19 pneumonia and reported significant decreased p o  Intake the past 2-3 days    · There is no recent record for comparison, most recent from 2017 a creatinine level of 1 1 with reduced GFR which might indicate history of CKD; this may be patient's baseline   · Off IVF  · Avoid nephrotoxins, NSAIDs, hypotension  · Monitor CMP daily

## 2021-03-05 NOTE — ASSESSMENT & PLAN NOTE
No results for input(s): HGB in the last 72 hours  In the setting of COVID-19 infection  Patient also appears to be chronically anemic  Plan:  · Normocytic anemia  · No sign of active bleed    · Continue to monitor intermittently

## 2021-03-05 NOTE — ASSESSMENT & PLAN NOTE
Lab Results   Component Value Date    SARSCOV2 Positive (A) 02/28/2021     Recent Labs     03/04/21  0443   CRP 63 1*   DDIMER 1 74*       Recent Labs     03/03/21  0639 03/04/21  1015   PROCALCITONI 0 20 0 13       · POA: Generalized weakness, poor appetite decreased p o  intake for the past 2-3 days, mild cough with shortness of breath but no fevers, chills or chest pain, requiring supplemental oxygen  · Chest x-ray showing bilateral patchy opacities suggestive for multifocal pneumonia  · Patient will be admitted under COVID-19 moderate treatment protocol  · Ferritin normal, CRP: 42, D dimer: 2 11   · Cardiac markers:  Troponin 0 04, CK within normal limits  · Completed 5 day course of doxycycline and ceftriaxone  · Remdesivir completed on 03/04/2021 for a 5 day course  · Decadron 6 mg bid, day 7  · Continue vitamin-C, vitamin-D, zinc, famotidine  · Self-proning if able, Incentive spirometry  · Respiratory protocol, incentive spirometer, self prone   · Supportive management with Tessalon Perles, Mucinex, Tylenol  · Continuing supplemental oxygen as tolerated for SpO2 sat >89%  · Continue trend D-dimer, inflammatory markers every 1-3 days  · Continue therapeutic Lovenox  · Patient declined convalescent plasma  · Received IV Lasix 40 mg once yesterday, ordered another 40 mg dose today  · [place] down O2 as tolerated

## 2021-03-05 NOTE — ASSESSMENT & PLAN NOTE
Recent Labs     03/03/21  0639 03/04/21  0443   CREATININE 1 29 1 26   EGFR 36 37     · Elevated at 1 66 at admission  · In the setting of COVID-19 pneumonia and reported significant decreased p o  Intake the past 2-3 days    · There is no recent record for comparison, most recent from 2017 a creatinine level of 1 1 with reduced GFR which might indicate history of CKD; this may be patient's baseline   · Creatinine improving, 1 17 this AM  · Avoid nephrotoxins, NSAIDs, hypotension  · Monitor BMP in AM

## 2021-03-05 NOTE — ASSESSMENT & PLAN NOTE
No results for input(s): HGB in the last 72 hours  In the setting of COVID-19 infection  Patient also appears to be chronically anemic  Normocytic anemia  No sign of active bleed      Plan:  · Continue to monitor intermittently   · Patient may benefit from iron supplementation

## 2021-03-05 NOTE — PLAN OF CARE
Problem: Potential for Falls  Goal: Patient will remain free of falls  Description: INTERVENTIONS:  - Assess patient frequently for physical needs  -  Identify cognitive and physical deficits and behaviors that affect risk of falls    -  [place] fall precautions as indicated by assessment   - Educate patient/family on patient safety including physical limitations  - Instruct patient to call for assistance with activity based on assessment  - Modify environment to reduce risk of injury  - Consider OT/PT consult to assist with strengthening/mobility  Outcome: Progressing     Problem: Prexisting or High Potential for Compromised Skin Integrity  Goal: Skin integrity is maintained or improved  Description: INTERVENTIONS:  - Identify patients at risk for skin breakdown  - Assess and monitor skin integrity  - Assess and monitor nutrition and hydration status  - Monitor labs   - Assess for incontinence   - Turn and reposition patient  - Assist with mobility/ambulation  - Relieve pressure over bony prominences  - Avoid friction and shearing  - Provide appropriate hygiene as needed including keeping skin clean and dry  - Evaluate need for skin moisturizer/barrier cream  - Collaborate with interdisciplinary team   - Patient/family teaching  - Consider wound care consult   Outcome: Progressing     Problem: RESPIRATORY - ADULT  Goal: Achieves optimal ventilation and oxygenation  Description: INTERVENTIONS:  - Assess for changes in respiratory status  - Assess for changes in mentation and behavior  - Position to facilitate oxygenation and minimize respiratory effort  - Oxygen administered by appropriate delivery if ordered  - Initiate smoking cessation education as indicated  - Encourage broncho-pulmonary hygiene including cough, deep breathe, Incentive Spirometry  - Assess the need for suctioning and aspirate as needed  - Assess and instruct to report SOB or any respiratory difficulty  - Respiratory Therapy support as indicated  Outcome: Progressing     Problem: PAIN - ADULT  Goal: Verbalizes/displays adequate comfort level or baseline comfort level  Description: Interventions:  - Encourage patient to monitor pain and request assistance  - Assess pain using appropriate pain scale  - Administer analgesics based on type and severity of pain and evaluate response  - Implement non-pharmacological measures as appropriate and evaluate response  - Consider cultural and social influences on pain and pain management  - Notify physician/advanced practitioner if interventions unsuccessful or patient reports new pain  Outcome: Progressing     Problem: INFECTION - ADULT  Goal: Absence or prevention of progression during hospitalization  Description: INTERVENTIONS:  - Assess and monitor for signs and symptoms of infection  - Monitor lab/diagnostic results  - Monitor all insertion sites, i e  indwelling lines, tubes, and drains  - Monitor endotracheal if appropriate and nasal secretions for changes in amount and color  - [place] appropriate cooling/warming therapies per order  - Administer medications as ordered  - Instruct and encourage patient and family to use good hand hygiene technique  - Identify and instruct in appropriate isolation precautions for identified infection/condition  Outcome: Progressing  Goal: Absence of fever/infection during neutropenic period  Description: INTERVENTIONS:  - Monitor WBC    Outcome: Progressing     Problem: SAFETY ADULT  Goal: Patient will remain free of falls  Description: INTERVENTIONS:  - Assess patient frequently for physical needs  -  Identify cognitive and physical deficits and behaviors that affect risk of falls    -  [place] fall precautions as indicated by assessment   - Educate patient/family on patient safety including physical limitations  - Instruct patient to call for assistance with activity based on assessment  - Modify environment to reduce risk of injury  - Consider OT/PT consult to assist with strengthening/mobility  Outcome: Progressing  Goal: Maintain or return to baseline ADL function  Description: INTERVENTIONS:  -  Assess patient's ability to carry out ADLs; assess patient's baseline for ADL function and identify physical deficits which impact ability to perform ADLs (bathing, care of mouth/teeth, toileting, grooming, dressing, etc )  - Assess/evaluate cause of self-care deficits   - Assess range of motion  - Assess patient's mobility; develop plan if impaired  - Assess patient's need for assistive devices and provide as appropriate  - Encourage maximum independence but intervene and supervise when necessary  - Involve family in performance of ADLs  - Assess for home care needs following discharge   - Consider OT consult to assist with ADL evaluation and planning for discharge  - Provide patient education as appropriate  Outcome: Progressing  Goal: Maintain or return mobility status to optimal level  Description: INTERVENTIONS:  - Assess patient's baseline mobility status (ambulation, transfers, stairs, etc )    - Identify cognitive and physical deficits and behaviors that affect mobility  - Identify mobility aids required to assist with transfers and/or ambulation (gait belt, sit-to-stand, lift, walker, cane, etc )  - [place] fall precautions as indicated by assessment  - Record patient progress and toleration of activity level on Mobility SBAR; progress patient to next Phase/Stage  - Instruct patient to call for assistance with activity based on assessment  - Consider rehabilitation consult to assist with strengthening/weightbearing, etc   Outcome: Progressing     Problem: DISCHARGE PLANNING  Goal: Discharge to home or other facility with appropriate resources  Description: INTERVENTIONS:  - Identify barriers to discharge w/patient and caregiver  - Arrange for needed discharge resources and transportation as appropriate  - Identify discharge learning needs (meds, wound care, etc )  - Arrange for interpretive services to assist at discharge as needed  - Refer to Case Management Department for coordinating discharge planning if the patient needs post-hospital services based on physician/advanced practitioner order or complex needs related to functional status, cognitive ability, or social support system  Outcome: Progressing     Problem: SKIN/TISSUE INTEGRITY - ADULT  Goal: Skin integrity remains intact  Description: INTERVENTIONS  - Identify patients at risk for skin breakdown  - Assess and monitor skin integrity  - Assess and monitor nutrition and hydration status  - Monitor labs (i e  albumin)  - Assess for incontinence   - Turn and reposition patient  - Assist with mobility/ambulation  - Relieve pressure over bony prominences  - Avoid friction and shearing  - Provide appropriate hygiene as needed including keeping skin clean and dry  - Evaluate need for skin moisturizer/barrier cream  - Collaborate with interdisciplinary team (i e  Nutrition, Rehabilitation, etc )   - Patient/family teaching  Outcome: Progressing

## 2021-03-05 NOTE — DISCHARGE SUMMARY
Discharge- Denney Leyden 12/13/1929, 80 y o  female MRN: [id number]    Unit/Bed#: S -01 Encounter: [id number]    Primary Care Provider: No primary care provider on file  Date and time admitted to hospital: 2/28/2021 10:27 AM        Constipation  Assessment & Plan  Multiple days with no bowel movement  Patient was started on b i d  Colace and p r n  MiraLax  Will advise patient to continue Miralax on D/C    Anemia  Assessment & Plan  Recent Labs     03/09/21  0612   HGB 12 2     In the setting of COVID-19 infection  Patient also appears to be chronically anemic, however most recent result normal  F/U with PCP    Elevated d-dimer  Assessment & Plan  Recent Labs     03/09/21 0612   DDIMER 2 32*       · POA: 2 51 in the setting of COVID-19 pneumonia  · CTA negative for PE   · On subcutaneous therapeutic Lovenox, will switch to eliquis on d/c      Elevated serum creatinine  Assessment & Plan  Recent Labs     03/08/21  0456 03/09/21  0612   CREATININE 1 22 1 24   EGFR 39 38     · Elevated at 1 66 at admission  · In the setting of COVID-19 pneumonia and reported significant decreased p o  Intake the past 2-3 days  · There is no recent record for comparison, most recent from 2017 a creatinine level of 1 1 with reduced GFR which might indicate history of CKD; this may be patient's baseline   · Creatinine improving, with in normal range now  · Avoid nephrotoxins, NSAIDs, hypotension    * Pneumonia due to COVID-19 virus  Assessment & Plan  Lab Results   Component Value Date    SARSCOV2 Positive (A) 02/28/2021     Recent Labs     03/09/21 0612   DDIMER 2 32*       No results for input(s): PROCALCITONI in the last 72 hours      · POA: Generalized weakness, poor appetite decreased p o  intake for the 2-3 days, mild cough with shortness of breath but no fevers, chills or chest pain, requiring supplemental oxygen  · Chest x-ray showing bilateral patchy opacities suggestive for multifocal pneumonia  · Patient will be admitted under COVID-19 moderate treatment protocol  · Ferritin normal, CRP: 42, D dimer: 2 11   · Cardiac markers:  Troponin 0 04, CK within normal limits  · Completed 5 day course of doxycycline and ceftriaxone  · Remdesivir completed on 03/04/2021 for a 5 day course  · Decadron 6 mg bid, completed  · Self-proning if able, Incentive spirometry  · Respiratory protocol, incentive spirometer, self prone   · Supportive management with Tessalon Perles, Mucinex, Tylenol  · Continuing supplemental oxygen as tolerated for SpO2 sat >89%  · Continue trend D-dimer, inflammatory markers every 1-3 days  · Will d/c Lovenox on d/c and switch pt to eliquis per protocol    · Patient declined convalescent plasma  · Received IV Lasix 40 mg twice 03/05/2021 and again 03/06/2021 and 20mg 3/10/2021  · Will need O2 on dc, pt will need to be evaluated by PCP in 1-2 weeks for continued O2 need      Discharging Resident Physician: Layne Cox DO  Attending: Brittany Chu MD  PCP: No primary care provider on file  Admission Date: 2/28/2021  Discharge Date: 03/10/21    Disposition:     Home    Reason for Admission:  COVID pneumonia    Consultations During Hospital Stay:  · N/A    Procedures Performed:     · N/A    Significant Findings / Test Results:     · COVID positive on 02/28/2021  · Blood cultures negative x2  · Elevated creatinine of 1 66  · Elevated D-dimer of 2 51  Xr Chest Portable    Result Date: 3/5/2021  Impression: Slight worsening of patchy multifocal airspace opacities in keeping with viral pneumonia due to COVID-19 infection  Workstation performed: [id number]     Xr Chest 1 View Portable  Result Date: 2/28/2021  Impression: Patchy peripheral basilar groundglass opacities suspicious for multifocal pneumonia in this patient with known Covid 19 infection   Workstation performed: [id number]     Cta Chest Pe Study  Result Date: 3/1/2021  · Impression: Peripheral groundglass infiltrates throughout both lungs in keeping with Covid pneumonia in this Covid 19 positive patient  No pulmonary arterial embolism  Partially imaged right renal lesion measuring at least 7 1 cm in diameter #2/223 had the appearance of a hemorrhagic cyst on the prior noncontrast study but enhances on this study  Follow-up with post contrast CT abdomen pelvis  Large hiatal hernia containing most of the stomach  The study was marked in [facility] for immediate notification  Workstation performed: [id number]     Incidental Findings:   Partially imaged right renal lesion measuring at least 7 1 cm in diameter #2/223 had the appearance of a hemorrhagic cyst on the prior noncontrast study but enhances on this study  Follow-up with post contrast CT abdomen pelvis  Large hiatal hernia containing most of the stomach  Test Results Pending at Discharge (will require follow up): · None     Outpatient Tests Requested:  · BMP in 1 week    Complications:  None    Hospital Course:     Chary Heath is a 80 y o  female patient who originally presented to the hospital on 2/28/2021 due to a 3 days history of worsening generalized weakness, decreased appetite with significant decreased p o  intake and shortness of breath  COVID-19 test was positive, chest x-ray showing multifocal pneumonia, initial blood work significant for elevated creatinine, elevated D-dimer and CRP  Patient was admitted under moderate COVID-19 treatment protocol  Pt received treatment per protocol, including remdesivir, steroids, vitamins, and given elevated D-dimer was started on therapeutic Lovenox which was subsequently switched to Eliquis on discharge  The patient required home oxygen on discharge and will be instructed to follow-up with her primary care physician in 1-2 weeks for re-evaluation of her O2 needs  Condition at Discharge: good     Discharge Day Visit / Exam:     Subjective:  Patient was seen and examined  IA discussed the patient's plan of care on discharge with her    I assessed her oxygen status by ambulating the patient without her oxygen and her O2 saturations dropped to 85% and she reported mild shortness of breath therefore she will need O2 on discharge and will reassess the need when she follows up with her PCP in 1 week  The patient denies any palpitations, chest pain, nausea, vomiting, diarrhea, constipation  Vitals: Blood Pressure: 124/62 (03/10/21 0753)  Pulse: 104 (03/10/21 0753)  Temperature: 98 1 °F (36 7 °C) (03/10/21 0753)  Temp Source: Oral (03/10/21 0753)  Respirations: 18 (03/10/21 0753)  Weight - Scale: 66 7 kg (147 lb) (02/28/21 1032)  SpO2: 94 % (03/10/21 0753)  Exam:   Physical Exam  Vitals signs and nursing note reviewed  Constitutional:       General: She is not in acute distress  Appearance: She is well-developed  She is not ill-appearing, toxic-appearing or diaphoretic  HENT:      Head: Normocephalic and atraumatic  Nose: Nose normal    Eyes:      General: No scleral icterus  Right eye: No discharge  Left eye: No discharge  Conjunctiva/sclera: Conjunctivae normal    Neck:      Musculoskeletal: Normal range of motion  Cardiovascular:      Rate and Rhythm: Normal rate and regular rhythm  Heart sounds: Normal heart sounds  No murmur  No friction rub  No gallop  Pulmonary:      Effort: Pulmonary effort is normal  No respiratory distress  Breath sounds: No stridor  Rales present  No wheezing or rhonchi (RLL)  Abdominal:      General: Abdomen is flat  Bowel sounds are normal       Palpations: Abdomen is soft  Musculoskeletal: Normal range of motion  Skin:     General: Skin is warm and dry  Findings: No erythema  Neurological:      General: No focal deficit present  Mental Status: She is alert and oriented to person, place, and time  Psychiatric:         Mood and Affect: Mood normal          Behavior: Behavior normal          Thought Content:  Thought content normal          Judgment: Judgment normal  Discussion with Family:  Discharge plan previously discussed with daughter    Discharge instructions/Information to patient and family:   See after visit summary for information provided to patient and family  Provisions for Follow-Up Care:  See after visit summary for information related to follow-up care and any pertinent home health orders  Planned Readmission: None     Discharge Medications:  See after visit summary for reconciled discharge medications provided to patient and family        ** Please Note: This note has been constructed using a voice recognition system **

## 2021-03-06 LAB
ALBUMIN SERPL BCP-MCNC: 2.2 G/DL (ref 3.5–5)
ALP SERPL-CCNC: 84 U/L (ref 46–116)
ALT SERPL W P-5'-P-CCNC: 25 U/L (ref 12–78)
ANION GAP SERPL CALCULATED.3IONS-SCNC: 6 MMOL/L (ref 4–13)
AST SERPL W P-5'-P-CCNC: 32 U/L (ref 5–45)
BILIRUB SERPL-MCNC: 0.58 MG/DL (ref 0.2–1)
BUN SERPL-MCNC: 33 MG/DL (ref 5–25)
CALCIUM ALBUM COR SERPL-MCNC: 10 MG/DL (ref 8.3–10.1)
CALCIUM SERPL-MCNC: 8.6 MG/DL (ref 8.3–10.1)
CHLORIDE SERPL-SCNC: 107 MMOL/L (ref 100–108)
CO2 SERPL-SCNC: 30 MMOL/L (ref 21–32)
CREAT SERPL-MCNC: 1.17 MG/DL (ref 0.6–1.3)
CRP SERPL QL: 42 MG/L
D DIMER PPP FEU-MCNC: 2.11 UG/ML FEU
GFR SERPL CREATININE-BSD FRML MDRD: 41 ML/MIN/1.73SQ M
GLUCOSE SERPL-MCNC: 129 MG/DL (ref 65–140)
POTASSIUM SERPL-SCNC: 4.5 MMOL/L (ref 3.5–5.3)
PROT SERPL-MCNC: 6.6 G/DL (ref 6.4–8.2)
SODIUM SERPL-SCNC: 143 MMOL/L (ref 136–145)

## 2021-03-06 PROCEDURE — 85379 FIBRIN DEGRADATION QUANT: CPT | Performed by: INTERNAL MEDICINE

## 2021-03-06 PROCEDURE — 86140 C-REACTIVE PROTEIN: CPT | Performed by: INTERNAL MEDICINE

## 2021-03-06 PROCEDURE — 80053 COMPREHEN METABOLIC PANEL: CPT | Performed by: INTERNAL MEDICINE

## 2021-03-06 PROCEDURE — 99232 SBSQ HOSP IP/OBS MODERATE 35: CPT | Performed by: INTERNAL MEDICINE

## 2021-03-06 RX ORDER — FUROSEMIDE 10 MG/ML
40 INJECTION INTRAMUSCULAR; INTRAVENOUS ONCE
Status: COMPLETED | OUTPATIENT
Start: 2021-03-06 | End: 2021-03-06

## 2021-03-06 RX ADMIN — ATORVASTATIN CALCIUM 40 MG: 40 TABLET, FILM COATED ORAL at 21:11

## 2021-03-06 RX ADMIN — OXYCODONE HYDROCHLORIDE AND ACETAMINOPHEN 1000 MG: 500 TABLET ORAL at 10:18

## 2021-03-06 RX ADMIN — FUROSEMIDE 40 MG: 10 INJECTION, SOLUTION INTRAVENOUS at 10:18

## 2021-03-06 RX ADMIN — Medication 2000 UNITS: at 10:17

## 2021-03-06 RX ADMIN — ENOXAPARIN SODIUM 70 MG: 80 INJECTION SUBCUTANEOUS at 10:17

## 2021-03-06 RX ADMIN — ZINC SULFATE 220 MG (50 MG) CAPSULE 220 MG: CAPSULE at 10:18

## 2021-03-06 RX ADMIN — DOCUSATE SODIUM 100 MG: 100 CAPSULE, LIQUID FILLED ORAL at 10:18

## 2021-03-06 RX ADMIN — DOCUSATE SODIUM 100 MG: 100 CAPSULE, LIQUID FILLED ORAL at 16:12

## 2021-03-06 RX ADMIN — DEXAMETHASONE SODIUM PHOSPHATE 6 MG: 4 INJECTION INTRA-ARTICULAR; INTRALESIONAL; INTRAMUSCULAR; INTRAVENOUS; SOFT TISSUE at 21:11

## 2021-03-06 RX ADMIN — DEXAMETHASONE SODIUM PHOSPHATE 6 MG: 4 INJECTION INTRA-ARTICULAR; INTRALESIONAL; INTRAMUSCULAR; INTRAVENOUS; SOFT TISSUE at 10:18

## 2021-03-06 RX ADMIN — OXYCODONE HYDROCHLORIDE AND ACETAMINOPHEN 1000 MG: 500 TABLET ORAL at 21:11

## 2021-03-06 RX ADMIN — FAMOTIDINE 10 MG: 20 TABLET, FILM COATED ORAL at 12:51

## 2021-03-06 NOTE — PLAN OF CARE
Problem: Potential for Falls  Goal: Patient will remain free of falls  Description: INTERVENTIONS:  - Assess patient frequently for physical needs  -  Identify cognitive and physical deficits and behaviors that affect risk of falls    -  [place] fall precautions as indicated by assessment   - Educate patient/family on patient safety including physical limitations  - Instruct patient to call for assistance with activity based on assessment  - Modify environment to reduce risk of injury  - Consider OT/PT consult to assist with strengthening/mobility  Outcome: Progressing     Problem: Prexisting or High Potential for Compromised Skin Integrity  Goal: Skin integrity is maintained or improved  Description: INTERVENTIONS:  - Identify patients at risk for skin breakdown  - Assess and monitor skin integrity  - Assess and monitor nutrition and hydration status  - Monitor labs   - Assess for incontinence   - Turn and reposition patient  - Assist with mobility/ambulation  - Relieve pressure over bony prominences  - Avoid friction and shearing  - Provide appropriate hygiene as needed including keeping skin clean and dry  - Evaluate need for skin moisturizer/barrier cream  - Collaborate with interdisciplinary team   - Patient/family teaching  - Consider wound care consult   Outcome: Progressing     Problem: RESPIRATORY - ADULT  Goal: Achieves optimal ventilation and oxygenation  Description: INTERVENTIONS:  - Assess for changes in respiratory status  - Assess for changes in mentation and behavior  - Position to facilitate oxygenation and minimize respiratory effort  - Oxygen administered by appropriate delivery if ordered  - Initiate smoking cessation education as indicated  - Encourage broncho-pulmonary hygiene including cough, deep breathe, Incentive Spirometry  - Assess the need for suctioning and aspirate as needed  - Assess and instruct to report SOB or any respiratory difficulty  - Respiratory Therapy support as indicated  Outcome: Progressing     Problem: SKIN/TISSUE INTEGRITY - ADULT  Goal: Skin integrity remains intact  Description: INTERVENTIONS  - Identify patients at risk for skin breakdown  - Assess and monitor skin integrity  - Assess and monitor nutrition and hydration status  - Monitor labs (i e  albumin)  - Assess for incontinence   - Turn and reposition patient  - Assist with mobility/ambulation  - Relieve pressure over bony prominences  - Avoid friction and shearing  - Provide appropriate hygiene as needed including keeping skin clean and dry  - Evaluate need for skin moisturizer/barrier cream  - Collaborate with interdisciplinary team (i e  Nutrition, Rehabilitation, etc )   - Patient/family teaching  Outcome: Progressing     Problem: PAIN - ADULT  Goal: Verbalizes/displays adequate comfort level or baseline comfort level  Description: Interventions:  - Encourage patient to monitor pain and request assistance  - Assess pain using appropriate pain scale  - Administer analgesics based on type and severity of pain and evaluate response  - Implement non-pharmacological measures as appropriate and evaluate response  - Consider cultural and social influences on pain and pain management  - Notify physician/advanced practitioner if interventions unsuccessful or patient reports new pain  Outcome: Progressing     Problem: INFECTION - ADULT  Goal: Absence or prevention of progression during hospitalization  Description: INTERVENTIONS:  - Assess and monitor for signs and symptoms of infection  - Monitor lab/diagnostic results  - Monitor all insertion sites, i e  indwelling lines, tubes, and drains  - Monitor endotracheal if appropriate and nasal secretions for changes in amount and color  - [place] appropriate cooling/warming therapies per order  - Administer medications as ordered  - Instruct and encourage patient and family to use good hand hygiene technique  - Identify and instruct in appropriate isolation precautions for identified infection/condition  Outcome: Progressing  Goal: Absence of fever/infection during neutropenic period  Description: INTERVENTIONS:  - Monitor WBC    Outcome: Progressing     Problem: SAFETY ADULT  Goal: Patient will remain free of falls  Description: INTERVENTIONS:  - Assess patient frequently for physical needs  -  Identify cognitive and physical deficits and behaviors that affect risk of falls    -  [place] fall precautions as indicated by assessment   - Educate patient/family on patient safety including physical limitations  - Instruct patient to call for assistance with activity based on assessment  - Modify environment to reduce risk of injury  - Consider OT/PT consult to assist with strengthening/mobility  Outcome: Progressing  Goal: Maintain or return to baseline ADL function  Description: INTERVENTIONS:  -  Assess patient's ability to carry out ADLs; assess patient's baseline for ADL function and identify physical deficits which impact ability to perform ADLs (bathing, care of mouth/teeth, toileting, grooming, dressing, etc )  - Assess/evaluate cause of self-care deficits   - Assess range of motion  - Assess patient's mobility; develop plan if impaired  - Assess patient's need for assistive devices and provide as appropriate  - Encourage maximum independence but intervene and supervise when necessary  - Involve family in performance of ADLs  - Assess for home care needs following discharge   - Consider OT consult to assist with ADL evaluation and planning for discharge  - Provide patient education as appropriate  Outcome: Progressing  Goal: Maintain or return mobility status to optimal level  Description: INTERVENTIONS:  - Assess patient's baseline mobility status (ambulation, transfers, stairs, etc )    - Identify cognitive and physical deficits and behaviors that affect mobility  - Identify mobility aids required to assist with transfers and/or ambulation (gait belt, sit-to-stand, lift, walker, cane, etc )  - [place] fall precautions as indicated by assessment  - Record patient progress and toleration of activity level on Mobility SBAR; progress patient to next Phase/Stage  - Instruct patient to call for assistance with activity based on assessment  - Consider rehabilitation consult to assist with strengthening/weightbearing, etc   Outcome: Progressing     Problem: DISCHARGE PLANNING  Goal: Discharge to home or other facility with appropriate resources  Description: INTERVENTIONS:  - Identify barriers to discharge w/patient and caregiver  - Arrange for needed discharge resources and transportation as appropriate  - Identify discharge learning needs (meds, wound care, etc )  - Arrange for interpretive services to assist at discharge as needed  - Refer to Case Management Department for coordinating discharge planning if the patient needs post-hospital services based on physician/advanced practitioner order or complex needs related to functional status, cognitive ability, or social support system  Outcome: Progressing

## 2021-03-06 NOTE — PROGRESS NOTES
Progress Note - Noa Coronado 12/13/1929, 80 y o  female MRN: [id number]    Unit/Bed#: S -[place] Encounter: [id number]    Primary Care Provider: No primary care provider on file  Date and time admitted to hospital: 2/28/2021 10:27 AM        * Pneumonia due to COVID-19 virus  Assessment & Plan  Lab Results   Component Value Date    SARSCOV2 Positive (A) 02/28/2021     Recent Labs     03/04/21  0443   CRP 63 1*   DDIMER 1 74*       Recent Labs     03/03/21  0639 03/04/21  1015   PROCALCITONI 0 20 0 13       · POA: Generalized weakness, poor appetite decreased p o  intake for the past 2-3 days, mild cough with shortness of breath but no fevers, chills or chest pain, requiring supplemental oxygen  · Chest x-ray showing bilateral patchy opacities suggestive for multifocal pneumonia  · Patient will be admitted under COVID-19 moderate treatment protocol  · Ferritin normal, CRP: 42, D dimer: 2 11   · Cardiac markers:  Troponin 0 04, CK within normal limits  · Completed 5 day course of doxycycline and ceftriaxone  · Remdesivir completed on 03/04/2021 for a 5 day course  · Decadron 6 mg bid, day 7  · Continue vitamin-C, vitamin-D, zinc, famotidine  · Self-proning if able, Incentive spirometry  · Respiratory protocol, incentive spirometer, self prone   · Supportive management with Tessalon Perles, Mucinex, Tylenol  · Continuing supplemental oxygen as tolerated for SpO2 sat >89%  · Continue trend D-dimer, inflammatory markers every 1-3 days  · Continue therapeutic Lovenox  · Patient declined convalescent plasma  · Received IV Lasix 40 mg once yesterday, ordered another 40 mg dose today  · [place] down O2 as tolerated      Anemia  Assessment & Plan  No results for input(s): HGB in the last 72 hours  In the setting of COVID-19 infection  Patient also appears to be chronically anemic  Plan:  · Normocytic anemia  · No sign of active bleed    · Continue to monitor intermittently     Elevated d-dimer  Assessment & Plan  Recent Labs     03/04/21  0443   CRP 63 1*   DDIMER 1 74*       · POA: 2 51 in the setting of COVID-19 pneumonia  · CTA negative for PE   · On subcutaneous therapeutic Lovenox      Elevated serum creatinine  Assessment & Plan  Recent Labs     03/03/21  0639 03/04/21  0443   CREATININE 1 29 1 26   EGFR 36 37     · Elevated at 1 66 at admission  · In the setting of COVID-19 pneumonia and reported significant decreased p o  Intake the past 2-3 days  · There is no recent record for comparison, most recent from 2017 a creatinine level of 1 1 with reduced GFR which might indicate history of CKD; this may be patient's baseline   · Creatinine improving, 1 17 this AM  · Avoid nephrotoxins, NSAIDs, hypotension  · Monitor BMP in AM    Hyponatremia-resolved as of 3/3/2021  Assessment & Plan  Recent Labs     03/03/21  0639 03/04/21  0443   SODIUM 142 141     · Mild hyponatremia 135, most probably secondary to dehydration given reported decreased p o  Intake now resolved  · Off IVF  · Monitor BMP daily    Elevated liver enzymes-resolved as of 3/3/2021  Assessment & Plan  Recent Labs     03/03/21  0639   AST 44   ALT 24   ALKPHOS 70   TBILI 0 41     · Isolated elevation of AST 69 on admission, in the setting of COVID-19 pneumonia, now resolved  · Continue trend and monitor CMP daily      VTE Pharmacologic Prophylaxis:   Pharmacologic: Enoxaparin (Lovenox)  Mechanical VTE Prophylaxis in Place: Yes    Discussions with Specialists or Other Care Team Provider:  Discussed with  and nursing    Education and Discussions with Family / Patient:  Called and discussed with patient's daughter    Current Length of Stay: 6 day(s)    Current Patient Status: Inpatient     Discharge Plan / Estimated Discharge Date: TBD    Code Status: Level 1 - Full Code      Subjective:   Patient seen this morning in no acute distress  She denies SOB or chest pain  She was on 4 L nasal cannula however decreased to 3 L      Objective: Vitals:   Temp (24hrs), Av 6 °F (36 4 °C), Min:97 6 °F (36 4 °C), Max:97 6 °F (36 4 °C)    Temp:  [97 6 °F (36 4 °C)] 97 6 °F (36 4 °C)  HR:  [] 100  Resp:  [] 100  BP: (108-110)/(64-70) 110/70  SpO2:  [87 %-91 %] 87 %  Body mass index is 28 24 kg/m²  Input and Output Summary (last 24 hours): Intake/Output Summary (Last 24 hours) at 3/6/2021 1605  Last data filed at 3/6/2021 1500  Gross per 24 hour   Intake --   Output 2500 ml   Net -2500 ml       Physical Exam:     Physical Exam  Vitals signs reviewed  Constitutional:       General: She is not in acute distress  Appearance: She is not ill-appearing  HENT:      Head: Normocephalic and atraumatic  Mouth/Throat:      Mouth: Mucous membranes are moist       Pharynx: Oropharynx is clear  Eyes:      Extraocular Movements: Extraocular movements intact  Conjunctiva/sclera: Conjunctivae normal       Pupils: Pupils are equal, round, and reactive to light  Neck:      Musculoskeletal: Normal range of motion and neck supple  Cardiovascular:      Rate and Rhythm: Normal rate and regular rhythm  Pulses: Normal pulses  Heart sounds: Normal heart sounds  Pulmonary:      Effort: Pulmonary effort is normal  No respiratory distress  Comments: Decreased breath sounds bilaterally  Abdominal:      General: Bowel sounds are normal  There is no distension  Palpations: Abdomen is soft  Tenderness: There is no abdominal tenderness  Musculoskeletal: Normal range of motion  General: No swelling or tenderness  Skin:     General: Skin is warm and dry  Neurological:      General: No focal deficit present  Mental Status: She is alert and oriented to person, place, and time  Psychiatric:         Mood and Affect: Mood normal          Behavior: Behavior normal          Thought Content:  Thought content normal          Judgment: Judgment normal          Additional Data:     Labs:    Results from last 7 days   Lab Units 03/02/21  0509 03/01/21  0510   WBC Thousand/uL 6 81 3 90*   HEMOGLOBIN g/dL 10 4* 10 6*   HEMATOCRIT % 33 6* 34 2*   PLATELETS Thousands/uL 260 199   NEUTROS PCT %  --  81*   LYMPHS PCT %  --  11*   MONOS PCT %  --  7   EOS PCT %  --  0     Results from last 7 days   Lab Units 03/06/21  0629   POTASSIUM mmol/L 4 5   CHLORIDE mmol/L 107   CO2 mmol/L 30   BUN mg/dL 33*   CREATININE mg/dL 1 17   CALCIUM mg/dL 8 6   ALK PHOS U/L 84   ALT U/L 25   AST U/L 32     Results from last 7 days   Lab Units 02/28/21  1522   INR  1 17       * I Have Reviewed All Lab Data Listed Above  * Additional Pertinent Lab Tests Reviewed: [address] Admission Reviewed    Imaging:    Imaging Reports Reviewed Today Include:  None  Imaging Personally Reviewed by Myself Includes:  None    Recent Cultures (last 7 days):     Results from last 7 days   Lab Units 02/28/21  1049 02/28/21  1037   BLOOD CULTURE  No Growth After 5 Days  No Growth After 5 Days         Last 24 Hours Medication List:   Current Facility-Administered Medications   Medication Dose Route Frequency Provider Last Rate    acetaminophen  650 mg Oral Q6H PRN Katherine Herndonro, DO      ascorbic acid  1,000 mg Oral Q12H [address] Huber Garnett, DO      atorvastatin  40 mg Oral HS Huber Tamir, DO      benzonatate  100 mg Oral TID PRN Caldwell Libman, DO      cholecalciferol  2,000 Units Oral Daily Mcleod Libman, DO      dexamethasone  6 mg Intravenous Q12H [address] Dallas Hughes MD      docusate sodium  100 mg Oral BID Katherine Finney, DO      enoxaparin  1 mg/kg Subcutaneous Q24H Tatiana Lopez MD      famotidine  10 mg Oral Daily Caldwell Libman, DO      [START ON 3/7/2021] multivitamin-minerals  1 tablet Oral Daily Mcleodwell Libman, DO      ondansetron  4 mg Intravenous Q4H PRN Dallas Hughes MD      polyethylene glycol  17 g Oral Daily PRN Katherine Finney DO          Today, Patient Was Seen By: Marlene Madera MD    ** Please Note: This note has been constructed using a voice recognition system   **

## 2021-03-07 PROBLEM — K59.00 CONSTIPATION: Status: ACTIVE | Noted: 2021-03-07

## 2021-03-07 LAB
ANION GAP SERPL CALCULATED.3IONS-SCNC: 6 MMOL/L (ref 4–13)
BUN SERPL-MCNC: 39 MG/DL (ref 5–25)
CALCIUM SERPL-MCNC: 8.8 MG/DL (ref 8.3–10.1)
CHLORIDE SERPL-SCNC: 104 MMOL/L (ref 100–108)
CO2 SERPL-SCNC: 30 MMOL/L (ref 21–32)
CREAT SERPL-MCNC: 1.15 MG/DL (ref 0.6–1.3)
GFR SERPL CREATININE-BSD FRML MDRD: 42 ML/MIN/1.73SQ M
GLUCOSE SERPL-MCNC: 113 MG/DL (ref 65–140)
GLUCOSE SERPL-MCNC: 146 MG/DL (ref 65–140)
POTASSIUM SERPL-SCNC: 4 MMOL/L (ref 3.5–5.3)
SODIUM SERPL-SCNC: 140 MMOL/L (ref 136–145)

## 2021-03-07 PROCEDURE — 99232 SBSQ HOSP IP/OBS MODERATE 35: CPT | Performed by: INTERNAL MEDICINE

## 2021-03-07 PROCEDURE — 80048 BASIC METABOLIC PNL TOTAL CA: CPT | Performed by: INTERNAL MEDICINE

## 2021-03-07 PROCEDURE — 82948 REAGENT STRIP/BLOOD GLUCOSE: CPT

## 2021-03-07 RX ORDER — FUROSEMIDE 10 MG/ML
20 INJECTION INTRAMUSCULAR; INTRAVENOUS ONCE
Status: COMPLETED | OUTPATIENT
Start: 2021-03-07 | End: 2021-03-07

## 2021-03-07 RX ORDER — POLYETHYLENE GLYCOL 3350 17 G/17G
17 POWDER, FOR SOLUTION ORAL DAILY
Status: DISCONTINUED | OUTPATIENT
Start: 2021-03-07 | End: 2021-03-10 | Stop reason: HOSPADM

## 2021-03-07 RX ADMIN — ATORVASTATIN CALCIUM 40 MG: 40 TABLET, FILM COATED ORAL at 21:56

## 2021-03-07 RX ADMIN — Medication 2000 UNITS: at 08:02

## 2021-03-07 RX ADMIN — POLYETHYLENE GLYCOL 3350 17 G: 17 POWDER, FOR SOLUTION ORAL at 11:48

## 2021-03-07 RX ADMIN — DOCUSATE SODIUM 100 MG: 100 CAPSULE, LIQUID FILLED ORAL at 17:13

## 2021-03-07 RX ADMIN — Medication 1 TABLET: at 08:02

## 2021-03-07 RX ADMIN — DOCUSATE SODIUM 100 MG: 100 CAPSULE, LIQUID FILLED ORAL at 08:02

## 2021-03-07 RX ADMIN — FAMOTIDINE 10 MG: 20 TABLET, FILM COATED ORAL at 11:48

## 2021-03-07 RX ADMIN — FUROSEMIDE 20 MG: 10 INJECTION, SOLUTION INTRAVENOUS at 17:13

## 2021-03-07 RX ADMIN — ENOXAPARIN SODIUM 70 MG: 80 INJECTION SUBCUTANEOUS at 08:01

## 2021-03-07 RX ADMIN — DEXAMETHASONE SODIUM PHOSPHATE 6 MG: 4 INJECTION INTRA-ARTICULAR; INTRALESIONAL; INTRAMUSCULAR; INTRAVENOUS; SOFT TISSUE at 21:56

## 2021-03-07 RX ADMIN — DEXAMETHASONE SODIUM PHOSPHATE 6 MG: 4 INJECTION INTRA-ARTICULAR; INTRALESIONAL; INTRAMUSCULAR; INTRAVENOUS; SOFT TISSUE at 08:02

## 2021-03-07 NOTE — PLAN OF CARE
Problem: Potential for Falls  Goal: Patient will remain free of falls  Description: INTERVENTIONS:  - Assess patient frequently for physical needs  -  Identify cognitive and physical deficits and behaviors that affect risk of falls    -  [place] fall precautions as indicated by assessment   - Educate patient/family on patient safety including physical limitations  - Instruct patient to call for assistance with activity based on assessment  - Modify environment to reduce risk of injury  - Consider OT/PT consult to assist with strengthening/mobility  Outcome: Progressing     Problem: Prexisting or High Potential for Compromised Skin Integrity  Goal: Skin integrity is maintained or improved  Description: INTERVENTIONS:  - Identify patients at risk for skin breakdown  - Assess and monitor skin integrity  - Assess and monitor nutrition and hydration status  - Monitor labs   - Assess for incontinence   - Turn and reposition patient  - Assist with mobility/ambulation  - Relieve pressure over bony prominences  - Avoid friction and shearing  - Provide appropriate hygiene as needed including keeping skin clean and dry  - Evaluate need for skin moisturizer/barrier cream  - Collaborate with interdisciplinary team   - Patient/family teaching  - Consider wound care consult   Outcome: Progressing     Problem: RESPIRATORY - ADULT  Goal: Achieves optimal ventilation and oxygenation  Description: INTERVENTIONS:  - Assess for changes in respiratory status  - Assess for changes in mentation and behavior  - Position to facilitate oxygenation and minimize respiratory effort  - Oxygen administered by appropriate delivery if ordered  - Initiate smoking cessation education as indicated  - Encourage broncho-pulmonary hygiene including cough, deep breathe, Incentive Spirometry  - Assess the need for suctioning and aspirate as needed  - Assess and instruct to report SOB or any respiratory difficulty  - Respiratory Therapy support as indicated  Outcome: Progressing     Problem: SKIN/TISSUE INTEGRITY - ADULT  Goal: Skin integrity remains intact  Description: INTERVENTIONS  - Identify patients at risk for skin breakdown  - Assess and monitor skin integrity  - Assess and monitor nutrition and hydration status  - Monitor labs (i e  albumin)  - Assess for incontinence   - Turn and reposition patient  - Assist with mobility/ambulation  - Relieve pressure over bony prominences  - Avoid friction and shearing  - Provide appropriate hygiene as needed including keeping skin clean and dry  - Evaluate need for skin moisturizer/barrier cream  - Collaborate with interdisciplinary team (i e  Nutrition, Rehabilitation, etc )   - Patient/family teaching  Outcome: Progressing     Problem: PAIN - ADULT  Goal: Verbalizes/displays adequate comfort level or baseline comfort level  Description: Interventions:  - Encourage patient to monitor pain and request assistance  - Assess pain using appropriate pain scale  - Administer analgesics based on type and severity of pain and evaluate response  - Implement non-pharmacological measures as appropriate and evaluate response  - Consider cultural and social influences on pain and pain management  - Notify physician/advanced practitioner if interventions unsuccessful or patient reports new pain  Outcome: Progressing     Problem: SAFETY ADULT  Goal: Patient will remain free of falls  Description: INTERVENTIONS:  - Assess patient frequently for physical needs  -  Identify cognitive and physical deficits and behaviors that affect risk of falls    -  [place] fall precautions as indicated by assessment   - Educate patient/family on patient safety including physical limitations  - Instruct patient to call for assistance with activity based on assessment  - Modify environment to reduce risk of injury  - Consider OT/PT consult to assist with strengthening/mobility  Outcome: Progressing  Goal: Maintain or return to baseline ADL function  Description: INTERVENTIONS:  -  Assess patient's ability to carry out ADLs; assess patient's baseline for ADL function and identify physical deficits which impact ability to perform ADLs (bathing, care of mouth/teeth, toileting, grooming, dressing, etc )  - Assess/evaluate cause of self-care deficits   - Assess range of motion  - Assess patient's mobility; develop plan if impaired  - Assess patient's need for assistive devices and provide as appropriate  - Encourage maximum independence but intervene and supervise when necessary  - Involve family in performance of ADLs  - Assess for home care needs following discharge   - Consider OT consult to assist with ADL evaluation and planning for discharge  - Provide patient education as appropriate  Outcome: Progressing  Goal: Maintain or return mobility status to optimal level  Description: INTERVENTIONS:  - Assess patient's baseline mobility status (ambulation, transfers, stairs, etc )    - Identify cognitive and physical deficits and behaviors that affect mobility  - Identify mobility aids required to assist with transfers and/or ambulation (gait belt, sit-to-stand, lift, walker, cane, etc )  - [place] fall precautions as indicated by assessment  - Record patient progress and toleration of activity level on Mobility SBAR; progress patient to next Phase/Stage  - Instruct patient to call for assistance with activity based on assessment  - Consider rehabilitation consult to assist with strengthening/weightbearing, etc   Outcome: Progressing     Problem: DISCHARGE PLANNING  Goal: Discharge to home or other facility with appropriate resources  Description: INTERVENTIONS:  - Identify barriers to discharge w/patient and caregiver  - Arrange for needed discharge resources and transportation as appropriate  - Identify discharge learning needs (meds, wound care, etc )  - Arrange for interpretive services to assist at discharge as needed  - Refer to Case Management Department for coordinating discharge planning if the patient needs post-hospital services based on physician/advanced practitioner order or complex needs related to functional status, cognitive ability, or social support system  Outcome: Progressing

## 2021-03-07 NOTE — ASSESSMENT & PLAN NOTE
Multiple days with no bowel movement  Patient was started on b i d  Colace and p r n  MiraLax      Plan:  · Will make MiraLax standing  · Chart bowel movements

## 2021-03-07 NOTE — ASSESSMENT & PLAN NOTE
Recent Labs     03/06/21  0629   AST 32   ALT 25   ALKPHOS 84   TBILI 0 58     · Isolated elevation of AST 69 on admission, in the setting of COVID-19 pneumonia, now resolved  · Continue trend and monitor CMP daily

## 2021-03-07 NOTE — ASSESSMENT & PLAN NOTE
Recent Labs     03/06/21  0629 03/07/21  0437   CREATININE 1 17 1 15   EGFR 41 42     · Elevated at 1 66 at admission  · In the setting of COVID-19 pneumonia and reported significant decreased p o  Intake the past 2-3 days    · There is no recent record for comparison, most recent from 2017 a creatinine level of 1 1 with reduced GFR which might indicate history of CKD; this may be patient's baseline   · Creatinine improving  · Avoid nephrotoxins, NSAIDs, hypotension  · Monitor BMP in AM

## 2021-03-07 NOTE — ASSESSMENT & PLAN NOTE
Recent Labs     03/06/21  0629 03/07/21  0437   SODIUM 143 140     · Mild hyponatremia 135, most probably secondary to dehydration given reported decreased p o   Intake now resolved  · Off IVF  · Monitor BMP daily

## 2021-03-07 NOTE — PROGRESS NOTES
Progress Note - Jessica Leiva 12/13/1929, 80 y o  female MRN: [id number]    Unit/Bed#: S -01 Encounter: [id number]    Primary Care Provider: No primary care provider on file  Date and time admitted to hospital: 2/28/2021 10:27 AM        * Pneumonia due to COVID-19 virus  Assessment & Plan  Lab Results   Component Value Date    SARSCOV2 Positive (A) 02/28/2021     Recent Labs     03/06/21  0629   CRP 42 0*   DDIMER 2 11*       No results for input(s): PROCALCITONI in the last 72 hours  · POA: Generalized weakness, poor appetite decreased p o  intake for the past 2-3 days, mild cough with shortness of breath but no fevers, chills or chest pain, requiring supplemental oxygen  · Chest x-ray showing bilateral patchy opacities suggestive for multifocal pneumonia  · Patient will be admitted under COVID-19 moderate treatment protocol  · Ferritin normal, CRP: 42, D dimer: 2 11   · Cardiac markers:  Troponin 0 04, CK within normal limits  · Completed 5 day course of doxycycline and ceftriaxone  · Remdesivir completed on 03/04/2021 for a 5 day course  · Decadron 6 mg bid, day 8  · Continue vitamin-C, vitamin-D, zinc, famotidine  · Self-proning if able, Incentive spirometry  · Respiratory protocol, incentive spirometer, self prone   · Supportive management with Tessalon Perles, Mucinex, Tylenol  · Continuing supplemental oxygen as tolerated for SpO2 sat >89%  · Continue trend D-dimer, inflammatory markers every 1-3 days  · Continue therapeutic Lovenox  · Patient declined convalescent plasma  · Received IV Lasix 40 mg once 03/05/2021, ordered another 40 mg dose 03/06/2021; will hold off on any further diuresis  · Weaned down O2 as tolerated      Constipation  Assessment & Plan  Multiple days with no bowel movement  Patient was started on b i d  Colace and p r n  MiraLax      Plan:  · Will make MiraLax standing  · Chart bowel movements    Anemia  Assessment & Plan  No results for input(s): HGB in the last 72 hours   In the setting of COVID-19 infection  Patient also appears to be chronically anemic  Plan:  · Normocytic anemia  · No sign of active bleed  · Continue to monitor intermittently     Elevated d-dimer  Assessment & Plan  Recent Labs     03/06/21  0629   CRP 42 0*   DDIMER 2 11*       · POA: 2 51 in the setting of COVID-19 pneumonia  · CTA negative for PE   · On subcutaneous therapeutic Lovenox      Elevated serum creatinine  Assessment & Plan  Recent Labs     03/06/21  0629 03/07/21  0437   CREATININE 1 17 1 15   EGFR 41 42     · Elevated at 1 66 at admission  · In the setting of COVID-19 pneumonia and reported significant decreased p o  Intake the past 2-3 days  · There is no recent record for comparison, most recent from 2017 a creatinine level of 1 1 with reduced GFR which might indicate history of CKD; this may be patient's baseline   · Creatinine improving  · Avoid nephrotoxins, NSAIDs, hypotension  · Monitor BMP in AM    Hyponatremia-resolved as of 3/3/2021  Assessment & Plan  Recent Labs     03/06/21  0629 03/07/21  0437   SODIUM 143 140     · Mild hyponatremia 135, most probably secondary to dehydration given reported decreased p o  Intake now resolved  · Off IVF  · Monitor BMP daily    Elevated liver enzymes-resolved as of 3/3/2021  Assessment & Plan  Recent Labs     03/06/21  0629   AST 32   ALT 25   ALKPHOS 84   TBILI 0 58     · Isolated elevation of AST 69 on admission, in the setting of COVID-19 pneumonia, now resolved  · Continue trend and monitor CMP daily        VTE Pharmacologic Prophylaxis:   Pharmacologic: Enoxaparin (Lovenox)  Mechanical VTE Prophylaxis in Place: No    Discussions with Specialists or Other Care Team Provider:  Nursing    Education and Discussions with Family / Patient:   Will discussed with patient's daughter later this afternoon    Current Length of Stay: 7 day(s)    Current Patient Status: Inpatient     Discharge Plan / Estimated Discharge Date: 24-48 hours pending respiratory progress    Code Status: Level 1 - Full Code      Subjective:   No acute events overnight  Patient does not saturating at around 90% on 1 5 L  Patient denies any shortness of breath or cough at this time  Per nursing, patient has not had a bowel movement for multiple days  Patient denies any abdominal pain, nausea, or vomiting  Objective:     Vitals:   Temp (24hrs), Av 7 °F (36 5 °C), Min:97 6 °F (36 4 °C), Max:97 8 °F (36 6 °C)    Temp:  [97 6 °F (36 4 °C)-97 8 °F (36 6 °C)] 97 7 °F (36 5 °C)  HR:  [] 110  Resp:  [15-18] 15  BP: (110-140)/(70-87) 140/87  SpO2:  [87 %-91 %] 91 %  Body mass index is 28 24 kg/m²  Input and Output Summary (last 24 hours): Intake/Output Summary (Last 24 hours) at 3/7/2021 1104  Last data filed at 3/7/2021 0927  Gross per 24 hour   Intake 180 ml   Output 1850 ml   Net -1670 ml       Physical Exam:     Physical Exam  Vitals signs reviewed  Constitutional:       General: She is not in acute distress  Appearance: She is not ill-appearing  HENT:      Head: Normocephalic and atraumatic  Mouth/Throat:      Mouth: Mucous membranes are moist       Pharynx: Oropharynx is clear  Eyes:      Extraocular Movements: Extraocular movements intact  Conjunctiva/sclera: Conjunctivae normal       Pupils: Pupils are equal, round, and reactive to light  Neck:      Musculoskeletal: Normal range of motion and neck supple  Cardiovascular:      Rate and Rhythm: Normal rate and regular rhythm  Pulses: Normal pulses  Heart sounds: Normal heart sounds  Pulmonary:      Effort: Pulmonary effort is normal  No respiratory distress  Comments: Decreased breath sounds bilaterally  Abdominal:      General: Bowel sounds are normal  There is no distension  Palpations: Abdomen is soft  Tenderness: There is no abdominal tenderness  Musculoskeletal: Normal range of motion  General: No swelling or tenderness     Skin: General: Skin is warm and dry  Neurological:      General: No focal deficit present  Mental Status: She is alert and oriented to person, place, and time  Psychiatric:         Mood and Affect: Mood normal          Behavior: Behavior normal          Thought Content: Thought content normal          Judgment: Judgment normal          Additional Data:     Labs:    Results from last 7 days   Lab Units 03/02/21  0509 03/01/21  0510   WBC Thousand/uL 6 81 3 90*   HEMOGLOBIN g/dL 10 4* 10 6*   HEMATOCRIT % 33 6* 34 2*   PLATELETS Thousands/uL 260 199   NEUTROS PCT %  --  81*   LYMPHS PCT %  --  11*   MONOS PCT %  --  7   EOS PCT %  --  0     Results from last 7 days   Lab Units 03/07/21  0437 03/06/21  0629   POTASSIUM mmol/L 4 0 4 5   CHLORIDE mmol/L 104 107   CO2 mmol/L 30 30   BUN mg/dL 39* 33*   CREATININE mg/dL 1 15 1 17   CALCIUM mg/dL 8 8 8 6   ALK PHOS U/L  --  84   ALT U/L  --  25   AST U/L  --  32     Results from last 7 days   Lab Units 02/28/21  1522   INR  1 17       * I Have Reviewed All Lab Data Listed Above  * Additional Pertinent Lab Tests Reviewed:  All Labs Within Last 24 Hours Reviewed    Imaging:    Imaging Reports Reviewed Today Include:  None  Imaging Personally Reviewed by Myself Includes:  None    Recent Cultures (last 7 days):           Last 24 Hours Medication List:   Current Facility-Administered Medications   Medication Dose Route Frequency Provider Last Rate    acetaminophen  650 mg Oral Q6H PRN Leveda Hernan, DO      atorvastatin  40 mg Oral HS Huber Garnett, DO      benzonatate  100 mg Oral TID PRN [place] Go, DO      cholecalciferol  2,000 Units Oral Daily Huber Garnett DO      dexamethasone  6 mg Intravenous Q12H Carmela 36, MD      docusate sodium  100 mg Oral BID Salo Becerra, DO      enoxaparin  1 mg/kg Subcutaneous Q24H [address] Jeovannyy MD Yg      famotidine  10 mg Oral Daily [place] Go, DO      multivitamin-minerals  1 tablet Oral Daily Huber DO Tamir      ondansetron  4 mg Intravenous Q4H PRN Markos Miller MD      polyethylene glycol  17 g Oral Daily Markos Miller MD          Today, Patient Was Seen By: Markos Miller MD    ** Please Note: This note has been constructed using a voice recognition system   **

## 2021-03-07 NOTE — ASSESSMENT & PLAN NOTE
Recent Labs     03/06/21  0629   CRP 42 0*   DDIMER 2 11*       · POA: 2 51 in the setting of COVID-19 pneumonia  · CTA negative for PE   · On subcutaneous therapeutic Lovenox

## 2021-03-07 NOTE — ASSESSMENT & PLAN NOTE
Lab Results   Component Value Date    SARSCOV2 Positive (A) 02/28/2021     Recent Labs     03/06/21  0629   CRP 42 0*   DDIMER 2 11*       No results for input(s): PROCALCITONI in the last 72 hours      · POA: Generalized weakness, poor appetite decreased p o  intake for the past 2-3 days, mild cough with shortness of breath but no fevers, chills or chest pain, requiring supplemental oxygen  · Chest x-ray showing bilateral patchy opacities suggestive for multifocal pneumonia  · Patient will be admitted under COVID-19 moderate treatment protocol  · Ferritin normal, CRP: 42, D dimer: 2 11   · Cardiac markers:  Troponin 0 04, CK within normal limits  · Completed 5 day course of doxycycline and ceftriaxone  · Remdesivir completed on 03/04/2021 for a 5 day course  · Decadron 6 mg bid, day 8  · Continue vitamin-C, vitamin-D, zinc, famotidine  · Self-proning if able, Incentive spirometry  · Respiratory protocol, incentive spirometer, self prone   · Supportive management with Tessalon Perles, Mucinex, Tylenol  · Continuing supplemental oxygen as tolerated for SpO2 sat >89%  · Continue trend D-dimer, inflammatory markers every 1-3 days  · Continue therapeutic Lovenox  · Patient declined convalescent plasma  · Received IV Lasix 40 mg once 03/05/2021, ordered another 40 mg dose 03/06/2021; will hold off on any further diuresis  · Weaned down O2 as tolerated

## 2021-03-08 ENCOUNTER — HOSPITAL ENCOUNTER (INPATIENT)
Dept: VASCULAR ULTRASOUND | Facility: HOSPITAL | Age: 86
Discharge: HOME | DRG: 177 | End: 2021-03-08
Payer: MEDICARE

## 2021-03-08 LAB
ANION GAP SERPL CALCULATED.3IONS-SCNC: 5 MMOL/L (ref 4–13)
BUN SERPL-MCNC: 42 MG/DL (ref 5–25)
CALCIUM SERPL-MCNC: 9 MG/DL (ref 8.3–10.1)
CHLORIDE SERPL-SCNC: 102 MMOL/L (ref 100–108)
CO2 SERPL-SCNC: 32 MMOL/L (ref 21–32)
CREAT SERPL-MCNC: 1.22 MG/DL (ref 0.6–1.3)
GFR SERPL CREATININE-BSD FRML MDRD: 39 ML/MIN/1.73SQ M
GLUCOSE SERPL-MCNC: 138 MG/DL (ref 65–140)
POTASSIUM SERPL-SCNC: 4.8 MMOL/L (ref 3.5–5.3)
SODIUM SERPL-SCNC: 139 MMOL/L (ref 136–145)

## 2021-03-08 PROCEDURE — 99232 SBSQ HOSP IP/OBS MODERATE 35: CPT | Performed by: INTERNAL MEDICINE

## 2021-03-08 PROCEDURE — 93970 EXTREMITY STUDY: CPT | Performed by: SURGERY

## 2021-03-08 PROCEDURE — 80048 BASIC METABOLIC PNL TOTAL CA: CPT | Performed by: INTERNAL MEDICINE

## 2021-03-08 PROCEDURE — 94761 N-INVAS EAR/PLS OXIMETRY MLT: CPT

## 2021-03-08 PROCEDURE — 93970 EXTREMITY STUDY: CPT

## 2021-03-08 RX ADMIN — DEXAMETHASONE SODIUM PHOSPHATE 6 MG: 4 INJECTION INTRA-ARTICULAR; INTRALESIONAL; INTRAMUSCULAR; INTRAVENOUS; SOFT TISSUE at 07:53

## 2021-03-08 RX ADMIN — Medication 1 TABLET: at 07:52

## 2021-03-08 RX ADMIN — FAMOTIDINE 10 MG: 20 TABLET, FILM COATED ORAL at 11:30

## 2021-03-08 RX ADMIN — ATORVASTATIN CALCIUM 40 MG: 40 TABLET, FILM COATED ORAL at 21:55

## 2021-03-08 RX ADMIN — Medication 2000 UNITS: at 07:53

## 2021-03-08 RX ADMIN — DEXAMETHASONE SODIUM PHOSPHATE 6 MG: 4 INJECTION INTRA-ARTICULAR; INTRALESIONAL; INTRAMUSCULAR; INTRAVENOUS; SOFT TISSUE at 21:55

## 2021-03-08 RX ADMIN — POLYETHYLENE GLYCOL 3350 17 G: 17 POWDER, FOR SOLUTION ORAL at 07:52

## 2021-03-08 RX ADMIN — DOCUSATE SODIUM 100 MG: 100 CAPSULE, LIQUID FILLED ORAL at 18:30

## 2021-03-08 RX ADMIN — DOCUSATE SODIUM 100 MG: 100 CAPSULE, LIQUID FILLED ORAL at 07:52

## 2021-03-08 RX ADMIN — ENOXAPARIN SODIUM 70 MG: 80 INJECTION SUBCUTANEOUS at 07:52

## 2021-03-08 NOTE — DISCHARGE INSTRUCTIONS
Discharge Anticoagulation Plan:     While hospitalized, patient had D-Dimer > 2 5 ug/mL without confirmed VTE/PE or without high suspicion for VTE     BLE Duplex was obtained and was negative for DVT  CTA Chest was obtained and was negative for PE  Patient discharged on Eliquis 2 5 mg BID for 30 days  Patient will need follow up with primary care provider (or specialist) within 72 hours of hospital discharge, and frequently thereafter to monitor for signs of worsening respiratory function, VTE and/or bleeding  [address]    Your healthcare provider and/or public health staff have evaluated you and have determined that you do not need to remain in the hospital at this time  At this time you can be isolated at home where you will be monitored by staff from your local or state health department  You should carefully follow the prevention and isolation steps below until a healthcare provider or local or state health department says that you can return to your normal activities  Stay home except to get medical care    People who are mildly ill with COVID-19 are able to isolate at home during their illness  You should restrict activities outside your home, except for getting medical care  Do not go to work, school, or public areas  Avoid using public transportation, ride-sharing, or taxis  Separate yourself from other people and animals in your home    People: As much as possible, you should stay in a specific room and away from other people in your home  Also, you should use a separate bathroom, if available  Animals: You should restrict contact with pets and other animals while you are sick with COVID-19, just like you would around other people  Although there have not been reports of pets or other animals becoming sick with COVID-19, it is still recommended that people sick with COVID-19 limit contact with animals until more information is known about the virus   When possible, have another member of your household care for your animals while you are sick  If you are sick with COVID-19, avoid contact with your pet, including petting, snuggling, being kissed or licked, and sharing food  If you must care for your pet or be around animals while you are sick, wash your hands before and after you interact with pets and wear a facemask  See COVID-19 and Animals for more information  Call ahead before visiting your doctor    If you have a medical appointment, call the healthcare provider and tell them that you have or may have COVID-19  This will help the healthcare providers office take steps to keep other people from getting infected or exposed  Wear a facemask    You should wear a facemask when you are around other people (e g , sharing a room or vehicle) or pets and before you enter a healthcare providers office  If you are not able to wear a facemask (for example, because it causes trouble breathing), then people who live with you should not stay in the same room with you, or they should wear a facemask if they enter your room  Cover your coughs and sneezes    Cover your mouth and nose with a tissue when you cough or sneeze  Throw used tissues in a lined trash can  Immediately wash your hands with soap and water for at least 20 seconds or, if soap and water are not available, clean your hands with an alcohol-based hand  that contains at least 60% alcohol  Clean your hands often    Wash your hands often with soap and water for at least 20 seconds, especially after blowing your nose, coughing, or sneezing; going to the bathroom; and before eating or preparing food  If soap and water are not readily available, use an alcohol-based hand  with at least 60% alcohol, covering all surfaces of your hands and rubbing them together until they feel dry  Soap and water are the best option if hands are visibly dirty   Avoid touching your eyes, nose, and mouth with unwashed hands  Avoid sharing personal household items    You should not share dishes, drinking glasses, cups, eating utensils, towels, or bedding with other people or pets in your home  After using these items, they should be washed thoroughly with soap and water  Clean all high-touch surfaces everyday    High touch surfaces include counters, tabletops, doorknobs, bathroom fixtures, toilets, phones, keyboards, tablets, and bedside tables  Also, clean any surfaces that may have blood, stool, or body fluids on them  Use a household cleaning spray or wipe, according to the label instructions  Labels contain instructions for safe and effective use of the cleaning product including precautions you should take when applying the product, such as wearing gloves and making sure you have good ventilation during use of the product  Monitor your symptoms    Seek prompt medical attention if your illness is worsening (e g , difficulty breathing)  Before seeking care, call your healthcare provider and tell them that you have, or are being evaluated for, COVID-19  Put on a facemask before you enter the facility  These steps will help the healthcare providers office to keep other people in the office or waiting room from getting infected or exposed  Ask your healthcare provider to call the local or [facility] health department  Persons who are placed under active monitoring or facilitated self-monitoring should follow instructions provided by their local health department or occupational health professionals, as appropriate  If you have a medical emergency and need to call 911, notify the dispatch personnel that you have, or are being evaluated for COVID-19  If possible, put on a facemask before emergency medical services arrive      Discontinuing home isolation    Patients with confirmed COVID-19 should remain under home isolation precautions until the following conditions are met:   - They have had no fever for at least 24 hours (that is one full day of no fever without the use medicine that reduces fevers)  AND  - other symptoms have improved (for example, when their cough or shortness of breath have improved)  AND  - If had mild or moderate illness, at least 10 days have passed since their symptoms first appeared or if severe illness (needed oxygen) or immunosuppressed, at least 20 days have passed since symptoms first appeared  Patients with confirmed COVID-19 should also notify close contacts (including their workplace) and ask that they self-quarantine  Currently, close contact is defined as being within 6 feet for 15 minutes or more from the period 24 hours starting 48 hours before symptom onset to the time at which the patient went into isolation  Close contacts of patients diagnosed with COVID-19 should be instructed by the patient to self-quarantine for 14 days from the last time of their last contact with the patient       Source: [employer]

## 2021-03-08 NOTE — CASE MANAGEMENT
CM informed by SLIM that patient is medically stable for discharge home today  RT completed Home O2 eval with patient and she qualifies for 2L Home O2  CM contacted patient's daughter Hollie Cota to review discharge plan  She reports she's surprised by and not comfortable with discharge today, as she was told yesterday patient would be held an additional day to try and get her off of [facility] also is requesting a repeat CXR  This was deferred to SLIM and CM reported she would ask for PENNY to provide medical update  Hollie Cota reports she's in [facility] visiting her mother in law and will not be home until very late tonight  Jimybert Cipriano reports she doesn't have a pulse ox at home but can pick one up for patient on day of discharge if she still needs Home [facility] aware that PT/OT are recommending discharge with [facility] at this time  Sandraalvarezbert RojasCipriano reports she'd like to think about this recommendation and asked CM to check in with her day of discharge to obtain her preference  CM relayed details of conversation to SLIM  As family will not be home until late tonight and there is potential to try and get her off of acute O2, [facility] and will discharge home on 3/9  Hollie Cota will provide transportation home  CM to follow up with Hollie Cota tomorrow re: [facility] preference

## 2021-03-08 NOTE — RESPIRATORY THERAPY NOTE
Home Oxygen Qualifying Test       Patient name: Darien Bender        : 1929   Date of Test:  2021  Diagnosis: Pneumonia due to Covid 19     Home Oxygen Test:    **Medicare Guidelines require item(s) 1-5 on all ambulatory patients or 1 and 2 on non-ambulatory patients  1   Baseline SPO2 on Room Air at rest: 89-90%  2   SPO2 during exercise on Room Air: %  During exercise monitor SpO2  If SPO2 increases >=89% with ambulation do not add supplemental             oxygen  If <= 88% on room air add O2 via NC and titrate patient  Patient must be ambulated with O2 and titrated to > 88% with exertion  3   SPO2 on Oxygen at rest: 90-91% on 2 lpm     4   SPO2 during exercise on Oxygen: 88-90% a liter flow of 2 lpm     5   Exercise performed:          Walking for about 20 feet  [x]  Supplemental Home Oxygen is indicated  []  Client does not qualify for home oxygen  Respiratory Additional Notes: Pt initially did not have desaturation at rest  Did have desaturation below 88% during ambulation  Applied 2L NC  Pt was only able to ambulate about 20 ft  No further desaturation     Laith Bang, RT

## 2021-03-08 NOTE — ASSESSMENT & PLAN NOTE
Recent Labs     03/06/21  0629 03/07/21  0437 03/08/21  0456   CREATININE 1 17 1 15 1 22   EGFR 41 42 39     · Elevated at 1 66 at admission  · In the setting of COVID-19 pneumonia and reported significant decreased p o  Intake the past 2-3 days    · There is no recent record for comparison, most recent from 2017 a creatinine level of 1 1 with reduced GFR which might indicate history of CKD; this may be patient's baseline   · Creatinine improving  · Avoid nephrotoxins, NSAIDs, hypotension

## 2021-03-08 NOTE — INCIDENTAL FINDINGS
The following findings require follow up:  Radiographic finding   Finding: CTA chest pe study: Peripheral groundglass infiltrates throughout both lungs in keeping with Covid pneumonia in this Covid 19 positive patient , No pulmonary arterial embolism , Partially imaged right renal lesion measuring at least 7 1 cm in diameter #2/223 had the appearance of a hemorrhagic cyst on the prior noncontrast study but enhances on this study  Follow-up with post contrast CT abdomen pelvis , Large hiatal hernia containing most of the stomach , The study was marked in EPIC for immediate notification  , Workstation performed: [id number]   Follow up required: CT A/P   Follow up should be done within 1-2 week(s)    Please notify the following clinician to assist with the follow up: PCP

## 2021-03-08 NOTE — ASSESSMENT & PLAN NOTE
Recent Labs     03/06/21  0629   CRP 42 0*   DDIMER 2 11*       · POA: 2 51 in the setting of COVID-19 pneumonia  · CTA negative for PE   · On subcutaneous therapeutic Lovenox, will switch to eliquis on d/c

## 2021-03-08 NOTE — ASSESSMENT & PLAN NOTE
No results for input(s): HGB in the last 72 hours  In the setting of COVID-19 infection  Patient also appears to be chronically anemic    F/U with PCP

## 2021-03-08 NOTE — ASSESSMENT & PLAN NOTE
Lab Results   Component Value Date    SARSCOV2 Positive (A) 02/28/2021     Recent Labs     03/06/21  0629   CRP 42 0*   DDIMER 2 11*       No results for input(s): PROCALCITONI in the last 72 hours      · POA: Generalized weakness, poor appetite decreased p o  intake for the 2-3 days, mild cough with shortness of breath but no fevers, chills or chest pain, requiring supplemental oxygen  · Chest x-ray showing bilateral patchy opacities suggestive for multifocal pneumonia  · Patient will be admitted under COVID-19 moderate treatment protocol  · Ferritin normal, CRP: 42, D dimer: 2 11   · Cardiac markers:  Troponin 0 04, CK within normal limits  · Completed 5 day course of doxycycline and ceftriaxone  · Remdesivir completed on 03/04/2021 for a 5 day course  · Decadron 6 mg bid, day 9 of 10 days, will not need to continue on DC  · Self-proning if able, Incentive spirometry  · Respiratory protocol, incentive spirometer, self prone   · Supportive management with Tessalon Perles, Mucinex, Tylenol  · Continuing supplemental oxygen as tolerated for SpO2 sat >89%  · Continue trend D-dimer, inflammatory markers every 1-3 days  · Will d/c Lovenox on d/c and switch pt to eliquis per protocol    · Patient declined convalescent plasma  · Received IV Lasix 40 mg twice 03/05/2021 and again 03/06/2021; will hold off on any further diuresis  · Will need O2 on dc, pt will need to be evaluated by PCP in 1-2 weeks for continued O2 need

## 2021-03-08 NOTE — DISCHARGE INSTR - AVS FIRST PAGE
Dear Tomy Mazariegos,     It was our pleasure to care for you here at [facility]  It is our hope that we were always able to exceed the expected standards for your care during your stay  You were hospitalized due to COVID-19  You were cared for on the 3rd floor by [facility] under the service of Erik Sanders DO with the [facility] who covers for your primary care physician (PCP), No primary care provider on file  , while you were hospitalized  If you have any questions or concerns related to this hospitalization, you may contact us at [phone]  For follow up as well as any medication refills, we recommend that you follow up with your primary care physician  A registered nurse will reach out to you by phone within a few days after your discharge to answer any additional questions that you may have after going home  However, at this time we provide for you here, the most important instructions / recommendations at discharge:     · Notable Medication Adjustments -   · Eliquis 2 5mg Twice a day for 30 days  · Continue Atorvastatin for 2 weeks following discharge or until you are directed to stop by your PCP  · Testing Required after Discharge -   · None  · Important follow up information -   · Please follow-up with primary care physician in 1-2 weeks to determine your oxygen requirements  · Other Instructions -   · Please follow up with your PCP regarding your CT imaging   · Please review this entire after visit summary as additional general instructions including medication list, appointments, activity, diet, any pertinent wound care, and other additional recommendations from your care team that may be provided for you        Sincerely,     [facility]

## 2021-03-08 NOTE — ASSESSMENT & PLAN NOTE
Recent Labs     03/06/21  0629 03/07/21  0437 03/08/21  0456   CREATININE 1 17 1 15 1 22   EGFR 41 42 39     · Elevated at 1 66 at admission  · In the setting of COVID-19 pneumonia and reported significant decreased p o  Intake the past 2-3 days    · There is no recent record for comparison, most recent from 2017 a creatinine level of 1 1 with reduced GFR which might indicate history of CKD; this may be patient's baseline   · Creatinine improving  · Avoid nephrotoxins, NSAIDs, hypotension AICD (automatic cardioverter/defibrillator) present    Atrial fibrillation    Breast CA    CHF (congestive heart failure)    CKD (chronic kidney disease)    HTN (hypertension)

## 2021-03-08 NOTE — PLAN OF CARE
Problem: Potential for Falls  Goal: Patient will remain free of falls  Description: INTERVENTIONS:  - Assess patient frequently for physical needs  -  Identify cognitive and physical deficits and behaviors that affect risk of falls    -  [place] fall precautions as indicated by assessment   - Educate patient/family on patient safety including physical limitations  - Instruct patient to call for assistance with activity based on assessment  - Modify environment to reduce risk of injury  - Consider OT/PT consult to assist with strengthening/mobility  Outcome: Progressing     Problem: Prexisting or High Potential for Compromised Skin Integrity  Goal: Skin integrity is maintained or improved  Description: INTERVENTIONS:  - Identify patients at risk for skin breakdown  - Assess and monitor skin integrity  - Assess and monitor nutrition and hydration status  - Monitor labs   - Assess for incontinence   - Turn and reposition patient  - Assist with mobility/ambulation  - Relieve pressure over bony prominences  - Avoid friction and shearing  - Provide appropriate hygiene as needed including keeping skin clean and dry  - Evaluate need for skin moisturizer/barrier cream  - Collaborate with interdisciplinary team   - Patient/family teaching  - Consider wound care consult   Outcome: Progressing     Problem: RESPIRATORY - ADULT  Goal: Achieves optimal ventilation and oxygenation  Description: INTERVENTIONS:  - Assess for changes in respiratory status  - Assess for changes in mentation and behavior  - Position to facilitate oxygenation and minimize respiratory effort  - Oxygen administered by appropriate delivery if ordered  - Initiate smoking cessation education as indicated  - Encourage broncho-pulmonary hygiene including cough, deep breathe, Incentive Spirometry  - Assess the need for suctioning and aspirate as needed  - Assess and instruct to report SOB or any respiratory difficulty  - Respiratory Therapy support as indicated  Outcome: Progressing     Problem: SKIN/TISSUE INTEGRITY - ADULT  Goal: Skin integrity remains intact  Description: INTERVENTIONS  - Identify patients at risk for skin breakdown  - Assess and monitor skin integrity  - Assess and monitor nutrition and hydration status  - Monitor labs (i e  albumin)  - Assess for incontinence   - Turn and reposition patient  - Assist with mobility/ambulation  - Relieve pressure over bony prominences  - Avoid friction and shearing  - Provide appropriate hygiene as needed including keeping skin clean and dry  - Evaluate need for skin moisturizer/barrier cream  - Collaborate with interdisciplinary team (i e  Nutrition, Rehabilitation, etc )   - Patient/family teaching  Outcome: Progressing     Problem: PAIN - ADULT  Goal: Verbalizes/displays adequate comfort level or baseline comfort level  Description: Interventions:  - Encourage patient to monitor pain and request assistance  - Assess pain using appropriate pain scale  - Administer analgesics based on type and severity of pain and evaluate response  - Implement non-pharmacological measures as appropriate and evaluate response  - Consider cultural and social influences on pain and pain management  - Notify physician/advanced practitioner if interventions unsuccessful or patient reports new pain  Outcome: Progressing     Problem: INFECTION - ADULT  Goal: Absence or prevention of progression during hospitalization  Description: INTERVENTIONS:  - Assess and monitor for signs and symptoms of infection  - Monitor lab/diagnostic results  - Monitor all insertion sites, i e  indwelling lines, tubes, and drains  - Monitor endotracheal if appropriate and nasal secretions for changes in amount and color  - [place] appropriate cooling/warming therapies per order  - Administer medications as ordered  - Instruct and encourage patient and family to use good hand hygiene technique  - Identify and instruct in appropriate isolation precautions for identified infection/condition  Outcome: Progressing  Goal: Absence of fever/infection during neutropenic period  Description: INTERVENTIONS:  - Monitor WBC    Outcome: Progressing     Problem: SAFETY ADULT  Goal: Patient will remain free of falls  Description: INTERVENTIONS:  - Assess patient frequently for physical needs  -  Identify cognitive and physical deficits and behaviors that affect risk of falls    -  [place] fall precautions as indicated by assessment   - Educate patient/family on patient safety including physical limitations  - Instruct patient to call for assistance with activity based on assessment  - Modify environment to reduce risk of injury  - Consider OT/PT consult to assist with strengthening/mobility  Outcome: Progressing  Goal: Maintain or return to baseline ADL function  Description: INTERVENTIONS:  -  Assess patient's ability to carry out ADLs; assess patient's baseline for ADL function and identify physical deficits which impact ability to perform ADLs (bathing, care of mouth/teeth, toileting, grooming, dressing, etc )  - Assess/evaluate cause of self-care deficits   - Assess range of motion  - Assess patient's mobility; develop plan if impaired  - Assess patient's need for assistive devices and provide as appropriate  - Encourage maximum independence but intervene and supervise when necessary  - Involve family in performance of ADLs  - Assess for home care needs following discharge   - Consider OT consult to assist with ADL evaluation and planning for discharge  - Provide patient education as appropriate  Outcome: Progressing  Goal: Maintain or return mobility status to optimal level  Description: INTERVENTIONS:  - Assess patient's baseline mobility status (ambulation, transfers, stairs, etc )    - Identify cognitive and physical deficits and behaviors that affect mobility  - Identify mobility aids required to assist with transfers and/or ambulation (gait belt, sit-to-stand, lift, walker, cane, etc )  - [place] fall precautions as indicated by assessment  - Record patient progress and toleration of activity level on Mobility SBAR; progress patient to next Phase/Stage  - Instruct patient to call for assistance with activity based on assessment  - Consider rehabilitation consult to assist with strengthening/weightbearing, etc   Outcome: Progressing     Problem: DISCHARGE PLANNING  Goal: Discharge to home or other facility with appropriate resources  Description: INTERVENTIONS:  - Identify barriers to discharge w/patient and caregiver  - Arrange for needed discharge resources and transportation as appropriate  - Identify discharge learning needs (meds, wound care, etc )  - Arrange for interpretive services to assist at discharge as needed  - Refer to Case Management Department for coordinating discharge planning if the patient needs post-hospital services based on physician/advanced practitioner order or complex needs related to functional status, cognitive ability, or social support system  Outcome: Progressing

## 2021-03-08 NOTE — PLAN OF CARE
Problem: Potential for Falls  Goal: Patient will remain free of falls  Description: INTERVENTIONS:  - Assess patient frequently for physical needs  -  Identify cognitive and physical deficits and behaviors that affect risk of falls    -  [place] fall precautions as indicated by assessment   - Educate patient/family on patient safety including physical limitations  - Instruct patient to call for assistance with activity based on assessment  - Modify environment to reduce risk of injury  - Consider OT/PT consult to assist with strengthening/mobility  Outcome: Progressing     Problem: Prexisting or High Potential for Compromised Skin Integrity  Goal: Skin integrity is maintained or improved  Description: INTERVENTIONS:  - Identify patients at risk for skin breakdown  - Assess and monitor skin integrity  - Assess and monitor nutrition and hydration status  - Monitor labs   - Assess for incontinence   - Turn and reposition patient  - Assist with mobility/ambulation  - Relieve pressure over bony prominences  - Avoid friction and shearing  - Provide appropriate hygiene as needed including keeping skin clean and dry  - Evaluate need for skin moisturizer/barrier cream  - Collaborate with interdisciplinary team   - Patient/family teaching  - Consider wound care consult   Outcome: Progressing     Problem: RESPIRATORY - ADULT  Goal: Achieves optimal ventilation and oxygenation  Description: INTERVENTIONS:  - Assess for changes in respiratory status  - Assess for changes in mentation and behavior  - Position to facilitate oxygenation and minimize respiratory effort  - Oxygen administered by appropriate delivery if ordered  - Initiate smoking cessation education as indicated  - Encourage broncho-pulmonary hygiene including cough, deep breathe, Incentive Spirometry  - Assess the need for suctioning and aspirate as needed  - Assess and instruct to report SOB or any respiratory difficulty  - Respiratory Therapy support as indicated  Outcome: Progressing     Problem: PAIN - ADULT  Goal: Verbalizes/displays adequate comfort level or baseline comfort level  Description: Interventions:  - Encourage patient to monitor pain and request assistance  - Assess pain using appropriate pain scale  - Administer analgesics based on type and severity of pain and evaluate response  - Implement non-pharmacological measures as appropriate and evaluate response  - Consider cultural and social influences on pain and pain management  - Notify physician/advanced practitioner if interventions unsuccessful or patient reports new pain  Outcome: Progressing     Problem: INFECTION - ADULT  Goal: Absence or prevention of progression during hospitalization  Description: INTERVENTIONS:  - Assess and monitor for signs and symptoms of infection  - Monitor lab/diagnostic results  - Monitor all insertion sites, i e  indwelling lines, tubes, and drains  - Monitor endotracheal if appropriate and nasal secretions for changes in amount and color  - [place] appropriate cooling/warming therapies per order  - Administer medications as ordered  - Instruct and encourage patient and family to use good hand hygiene technique  - Identify and instruct in appropriate isolation precautions for identified infection/condition  Outcome: Progressing  Goal: Absence of fever/infection during neutropenic period  Description: INTERVENTIONS:  - Monitor WBC    Outcome: Progressing     Problem: SAFETY ADULT  Goal: Patient will remain free of falls  Description: INTERVENTIONS:  - Assess patient frequently for physical needs  -  Identify cognitive and physical deficits and behaviors that affect risk of falls    -  [place] fall precautions as indicated by assessment   - Educate patient/family on patient safety including physical limitations  - Instruct patient to call for assistance with activity based on assessment  - Modify environment to reduce risk of injury  - Consider OT/PT consult to assist with strengthening/mobility  Outcome: Progressing  Goal: Maintain or return to baseline ADL function  Description: INTERVENTIONS:  -  Assess patient's ability to carry out ADLs; assess patient's baseline for ADL function and identify physical deficits which impact ability to perform ADLs (bathing, care of mouth/teeth, toileting, grooming, dressing, etc )  - Assess/evaluate cause of self-care deficits   - Assess range of motion  - Assess patient's mobility; develop plan if impaired  - Assess patient's need for assistive devices and provide as appropriate  - Encourage maximum independence but intervene and supervise when necessary  - Involve family in performance of ADLs  - Assess for home care needs following discharge   - Consider OT consult to assist with ADL evaluation and planning for discharge  - Provide patient education as appropriate  Outcome: Progressing  Goal: Maintain or return mobility status to optimal level  Description: INTERVENTIONS:  - Assess patient's baseline mobility status (ambulation, transfers, stairs, etc )    - Identify cognitive and physical deficits and behaviors that affect mobility  - Identify mobility aids required to assist with transfers and/or ambulation (gait belt, sit-to-stand, lift, walker, cane, etc )  - [place] fall precautions as indicated by assessment  - Record patient progress and toleration of activity level on Mobility SBAR; progress patient to next Phase/Stage  - Instruct patient to call for assistance with activity based on assessment  - Consider rehabilitation consult to assist with strengthening/weightbearing, etc   Outcome: Progressing     Problem: DISCHARGE PLANNING  Goal: Discharge to home or other facility with appropriate resources  Description: INTERVENTIONS:  - Identify barriers to discharge w/patient and caregiver  - Arrange for needed discharge resources and transportation as appropriate  - Identify discharge learning needs (meds, wound care, etc )  - Arrange for interpretive services to assist at discharge as needed  - Refer to Case Management Department for coordinating discharge planning if the patient needs post-hospital services based on physician/advanced practitioner order or complex needs related to functional status, cognitive ability, or social support system  Outcome: Progressing     Problem: SKIN/TISSUE INTEGRITY - ADULT  Goal: Skin integrity remains intact  Description: INTERVENTIONS  - Identify patients at risk for skin breakdown  - Assess and monitor skin integrity  - Assess and monitor nutrition and hydration status  - Monitor labs (i e  albumin)  - Assess for incontinence   - Turn and reposition patient  - Assist with mobility/ambulation  - Relieve pressure over bony prominences  - Avoid friction and shearing  - Provide appropriate hygiene as needed including keeping skin clean and dry  - Evaluate need for skin moisturizer/barrier cream  - Collaborate with interdisciplinary team (i e  Nutrition, Rehabilitation, etc )   - Patient/family teaching  Outcome: Progressing

## 2021-03-08 NOTE — PROGRESS NOTES
Progress Note - Sera Ohara 12/13/1929, 80 y o  female MRN: [id number]    Unit/Bed#: S [facility] Encounter: [id number]    Primary Care Provider: No primary care provider on file  Date and time admitted to hospital: 2/28/2021 10:27 AM        Constipation  Assessment & Plan  Multiple days with no bowel movement  Patient was started on b i d  Colace and p r n  MiraLax  Will advise patient to continue Miralax on D/C    Anemia  Assessment & Plan  No results for input(s): HGB in the last 72 hours  In the setting of COVID-19 infection  Patient also appears to be chronically anemic  F/U with PCP    Elevated d-dimer  Assessment & Plan  Recent Labs     03/06/21  0629   CRP 42 0*   DDIMER 2 11*       · POA: 2 51 in the setting of COVID-19 pneumonia  · CTA negative for PE   · On subcutaneous therapeutic Lovenox, will switch to eliquis on d/c      Elevated serum creatinine  Assessment & Plan  Recent Labs     03/06/21  0629 03/07/21  0437 03/08/21  0456   CREATININE 1 17 1 15 1 22   EGFR 41 42 39     · Elevated at 1 66 at admission  · In the setting of COVID-19 pneumonia and reported significant decreased p o  Intake the past 2-3 days  · There is no recent record for comparison, most recent from 2017 a creatinine level of 1 1 with reduced GFR which might indicate history of CKD; this may be patient's baseline   · Creatinine improving  · Avoid nephrotoxins, NSAIDs, hypotension    * Pneumonia due to COVID-19 virus  Assessment & Plan  Lab Results   Component Value Date    SARSCOV2 Positive (A) 02/28/2021     Recent Labs     03/06/21  0629   CRP 42 0*   DDIMER 2 11*       No results for input(s): PROCALCITONI in the last 72 hours      · POA: Generalized weakness, poor appetite decreased p o  intake for the 2-3 days, mild cough with shortness of breath but no fevers, chills or chest pain, requiring supplemental oxygen  · Chest x-ray showing bilateral patchy opacities suggestive for multifocal pneumonia  · Patient will be admitted under COVID-19 moderate treatment protocol  · Ferritin normal, CRP: 42, D dimer: 2 11   · Cardiac markers:  Troponin 0 04, CK within normal limits  · Completed 5 day course of doxycycline and ceftriaxone  · Remdesivir completed on 2021 for a 5 day course  · Decadron 6 mg bid, day 9 of 10 days, will not need to continue on DC  · Self-proning if able, Incentive spirometry  · Respiratory protocol, incentive spirometer, self prone   · Supportive management with Tessalon Perles, Mucinex, Tylenol  · Continuing supplemental oxygen as tolerated for SpO2 sat >89%  · Continue trend D-dimer, inflammatory markers every 1-3 days  · Will d/c Lovenox on d/c and switch pt to eliquis per protocol    · Patient declined convalescent plasma  · Received IV Lasix 40 mg twice 2021 and again 2021; will hold off on any further diuresis  · Will need O2 on dc, pt will need to be evaluated by PCP in 1-2 weeks for continued O2 need          VTE Pharmacologic Prophylaxis:   Pharmacologic: Enoxaparin (Lovenox)  Mechanical VTE Prophylaxis in Place: Yes    Discussions with Specialists or Other Care Team Provider: N/A    Education and Discussions with Family / Patient: Discussed plan of care with pts family    Current Length of Stay: 8 day(s)    Current Patient Status: Inpatient     Discharge Plan / Estimated Discharge Date:     Code Status: Level 1 - Full Code      Subjective:   Patient was seen and examined  Patient was using incentive spirometer and sitting in her chair  The patient had no acute complaints  The patient reported that she had a bowel movement earlier this morning  The patient denies any palpitations, chest pain, nausea, vomiting, diarrhea, constipation  The patient does report mild shortness of breath but that the oxygen helps       Objective:     Vitals:   Temp (24hrs), Av °F (36 7 °C), Min:97 6 °F (36 4 °C), Max:98 6 °F (37 °C)    Temp:  [97 6 °F (36 4 °C)-98 6 °F (37 °C)] 97 8 °F (36 6 °C)  HR:  [88-97] 88  Resp:  [16-20] 20  BP: (102-124)/(59-75) 123/75  SpO2:  [83 %-95 %] 95 %  Body mass index is 28 24 kg/m²  Input and Output Summary (last 24 hours): Intake/Output Summary (Last 24 hours) at 3/8/2021 1635  Last data filed at 3/8/2021 4870  Gross per 24 hour   Intake 360 ml   Output 905 ml   Net -545 ml       Physical Exam:     Physical Exam  Vitals signs and nursing note reviewed  Constitutional:       General: She is not in acute distress  Appearance: She is well-developed  She is not diaphoretic  HENT:      Head: Normocephalic and atraumatic  Nose: Nose normal    Eyes:      Conjunctiva/sclera: Conjunctivae normal       Pupils: Pupils are equal, round, and reactive to light  Neck:      Musculoskeletal: Normal range of motion  Cardiovascular:      Rate and Rhythm: Normal rate and regular rhythm  Heart sounds: No murmur  No friction rub  No gallop  Pulmonary:      Effort: Pulmonary effort is normal  No respiratory distress  Breath sounds: Normal breath sounds  Abdominal:      General: Abdomen is flat  Musculoskeletal: Normal range of motion  Skin:     General: Skin is warm and dry  Neurological:      Mental Status: She is alert and oriented to person, place, and time  Psychiatric:         Behavior: Behavior normal          Thought Content: Thought content normal          Judgment: Judgment normal          Additional Data:     Labs: I have personally reviewed pertinent reports    Results from last 7 days   Lab Units 03/02/21  0509   WBC Thousand/uL 6 81   HEMOGLOBIN g/dL 10 4*   HEMATOCRIT % 33 6*   PLATELETS Thousands/uL 260      Results from last 7 days   Lab Units 03/08/21  0456 03/07/21  0437 03/06/21  0629  03/03/21  0639 03/02/21  0510   POTASSIUM mmol/L 4 8 4 0 4 5   < > 3 8 3 8   CHLORIDE mmol/L 102 104 107   < > 108 106   CO2 mmol/L 32 30 30   < > 25 25   BUN mg/dL 42* 39* 33*   < > 27* 28*   CREATININE mg/dL 1 22 1 15 1 17   < > 1 29 1 24 CALCIUM mg/dL 9 0 8 8 8 6   < > 8 0* 8 1*   ALK PHOS U/L  --   --  84  --  70 75   ALT U/L  --   --  25  --  24 32   AST U/L  --   --  32  --  44 62*    < > = values in this interval not displayed  Results from last 7 days   Lab Units 03/02/21  0441 03/01/21  2322   PTT seconds 75* 88*               * Additional Pertinent Lab Tests Reviewed: Funmi [facility] Admission Reviewed    Radiology Results: I have personally reviewed pertinent reports  Xr Chest Portable    Result Date: 3/5/2021  Impression: Slight worsening of patchy multifocal airspace opacities in keeping with viral pneumonia due to COVID-19 infection  Workstation performed: [id number]     Xr Chest 1 View Portable    Result Date: 2/28/2021  Impression: Patchy peripheral basilar groundglass opacities suspicious for multifocal pneumonia in this patient with known Covid 19 infection  Workstation performed: [id number]     Cta Chest Pe Study    Result Date: 3/1/2021  Impression: Peripheral groundglass infiltrates throughout both lungs in keeping with Covid pneumonia in this Covid 19 positive patient  No pulmonary arterial embolism  Partially imaged right renal lesion measuring at least 7 1 cm in diameter #2/223 had the appearance of a hemorrhagic cyst on the prior noncontrast study but enhances on this study  Follow-up with post contrast CT abdomen pelvis  Large hiatal hernia containing most of the stomach  The study was marked in [facility] for immediate notification   Workstation performed: [id number]       Recent Cultures (last 7 days):           Last 24 Hours Medication List:   Current Facility-Administered Medications   Medication Dose Route Frequency Provider Last Rate    acetaminophen  650 mg Oral Q6H PRN Roger Apley, DO      atorvastatin  40 mg Oral HS Huber Garnett DO      benzonatate  100 mg Oral TID PRN Angelica Brink DO      cholecalciferol  2,000 Units Oral Daily Huber Garnett DO      dexamethasone  6 mg Intravenous Q12H Gabby Sterling MD      docusate sodium  100 mg Oral BID Justino Krishna, DO      enoxaparin  1 mg/kg Subcutaneous Q24H Gabby Sterling MD      famotidine  10 mg Oral Daily Juanasinhg Navarro, DO      multivitamin-minerals  1 tablet Oral Daily Juanasingh Navarro, DO      ondansetron  4 mg Intravenous Q4H PRN Aydin Starr MD      polyethylene glycol  17 g Oral Daily Aydin Starr MD          Today, Patient Was Seen By: Cameron Avelar DO    Portions of the record may have been created with voice recognition software  Occasional wrong word or "sound a like" substitutions may have occurred due to the inherent limitations of voice recognition software  Read the chart carefully and recognize, using context, where substitutions have occurred

## 2021-03-08 NOTE — ASSESSMENT & PLAN NOTE
Multiple days with no bowel movement  Patient was started on b i d  Colace and p r n  MiraLax    Will advise patient to continue Miralax on D/C

## 2021-03-09 LAB
ALBUMIN SERPL BCP-MCNC: 2.2 G/DL (ref 3.5–5)
ALP SERPL-CCNC: 80 U/L (ref 46–116)
ALT SERPL W P-5'-P-CCNC: 17 U/L (ref 12–78)
ANION GAP SERPL CALCULATED.3IONS-SCNC: 6 MMOL/L (ref 4–13)
AST SERPL W P-5'-P-CCNC: 23 U/L (ref 5–45)
BILIRUB SERPL-MCNC: 0.62 MG/DL (ref 0.2–1)
BUN SERPL-MCNC: 40 MG/DL (ref 5–25)
CALCIUM ALBUM COR SERPL-MCNC: 10.1 MG/DL (ref 8.3–10.1)
CALCIUM SERPL-MCNC: 8.7 MG/DL (ref 8.3–10.1)
CHLORIDE SERPL-SCNC: 103 MMOL/L (ref 100–108)
CO2 SERPL-SCNC: 32 MMOL/L (ref 21–32)
CREAT SERPL-MCNC: 1.24 MG/DL (ref 0.6–1.3)
D DIMER PPP FEU-MCNC: 2.32 UG/ML FEU
ERYTHROCYTE [DISTWIDTH] IN BLOOD BY AUTOMATED COUNT: 17.1 % (ref 11.6–15.1)
GFR SERPL CREATININE-BSD FRML MDRD: 38 ML/MIN/1.73SQ M
GLUCOSE SERPL-MCNC: 112 MG/DL (ref 65–140)
HCT VFR BLD AUTO: 39.6 % (ref 34.8–46.1)
HGB BLD-MCNC: 12.2 G/DL (ref 11.5–15.4)
MCH RBC QN AUTO: 25.5 PG (ref 26.8–34.3)
MCHC RBC AUTO-ENTMCNC: 30.8 G/DL (ref 31.4–37.4)
MCV RBC AUTO: 83 FL (ref 82–98)
PLATELET # BLD AUTO: 278 THOUSANDS/UL (ref 149–390)
PMV BLD AUTO: 9.3 FL (ref 8.9–12.7)
POTASSIUM SERPL-SCNC: 4.6 MMOL/L (ref 3.5–5.3)
PROT SERPL-MCNC: 6.5 G/DL (ref 6.4–8.2)
RBC # BLD AUTO: 4.78 MILLION/UL (ref 3.81–5.12)
SODIUM SERPL-SCNC: 141 MMOL/L (ref 136–145)
WBC # BLD AUTO: 9.11 THOUSAND/UL (ref 4.31–10.16)

## 2021-03-09 PROCEDURE — 85027 COMPLETE CBC AUTOMATED: CPT | Performed by: PSYCHIATRY & NEUROLOGY

## 2021-03-09 PROCEDURE — 85379 FIBRIN DEGRADATION QUANT: CPT | Performed by: PSYCHIATRY & NEUROLOGY

## 2021-03-09 PROCEDURE — 80053 COMPREHEN METABOLIC PANEL: CPT | Performed by: PSYCHIATRY & NEUROLOGY

## 2021-03-09 PROCEDURE — 99232 SBSQ HOSP IP/OBS MODERATE 35: CPT | Performed by: INTERNAL MEDICINE

## 2021-03-09 RX ADMIN — ENOXAPARIN SODIUM 70 MG: 80 INJECTION SUBCUTANEOUS at 08:16

## 2021-03-09 RX ADMIN — Medication 1 TABLET: at 08:15

## 2021-03-09 RX ADMIN — DEXAMETHASONE SODIUM PHOSPHATE 6 MG: 4 INJECTION INTRA-ARTICULAR; INTRALESIONAL; INTRAMUSCULAR; INTRAVENOUS; SOFT TISSUE at 08:16

## 2021-03-09 RX ADMIN — ATORVASTATIN CALCIUM 40 MG: 40 TABLET, FILM COATED ORAL at 21:21

## 2021-03-09 RX ADMIN — FAMOTIDINE 10 MG: 20 TABLET, FILM COATED ORAL at 12:59

## 2021-03-09 RX ADMIN — DEXAMETHASONE SODIUM PHOSPHATE 6 MG: 4 INJECTION INTRA-ARTICULAR; INTRALESIONAL; INTRAMUSCULAR; INTRAVENOUS; SOFT TISSUE at 21:22

## 2021-03-09 RX ADMIN — Medication 2000 UNITS: at 08:15

## 2021-03-09 RX ADMIN — DOCUSATE SODIUM 100 MG: 100 CAPSULE, LIQUID FILLED ORAL at 18:54

## 2021-03-09 NOTE — ASSESSMENT & PLAN NOTE
Recent Labs     03/09/21  0612   HGB 12 2     In the setting of COVID-19 infection      Patient also appears to be chronically anemic, however most recent result normal  F/U with PCP

## 2021-03-09 NOTE — ASSESSMENT & PLAN NOTE
Lab Results   Component Value Date    SARSCOV2 Positive (A) 02/28/2021     Recent Labs     03/09/21  0612   DDIMER 2 32*       No results for input(s): PROCALCITONI in the last 72 hours      · POA: Generalized weakness, poor appetite decreased p o  intake for the 2-3 days, mild cough with shortness of breath but no fevers, chills or chest pain, requiring supplemental oxygen  · Chest x-ray showing bilateral patchy opacities suggestive for multifocal pneumonia  · Patient will be admitted under COVID-19 moderate treatment protocol  · Ferritin normal, CRP: 42, D dimer: 2 11   · Cardiac markers:  Troponin 0 04, CK within normal limits  · Completed 5 day course of doxycycline and ceftriaxone  · Remdesivir completed on 03/04/2021 for a 5 day course  · Decadron 6 mg bid, day 9 of 10 days, will not need to continue on DC  · Self-proning if able, Incentive spirometry  · Respiratory protocol, incentive spirometer, self prone   · Supportive management with Tessalon Perles, Mucinex, Tylenol  · Continuing supplemental oxygen as tolerated for SpO2 sat >89%  · Continue trend D-dimer, inflammatory markers every 1-3 days  · Will d/c Lovenox on d/c and switch pt to eliquis per protocol    · Patient declined convalescent plasma  · Received IV Lasix 40 mg twice 03/05/2021 and again 03/06/2021; will hold off on any further diuresis  · Will need O2 on dc, pt will need to be evaluated by PCP in 1-2 weeks for continued O2 need

## 2021-03-09 NOTE — CASE MANAGEMENT
CM contacted patient's daughter Kavin Crespo to review discharge plan  As per conversation with Selam Guzmangwen [facility] at 10 am on 3/10/21 to transport patient home  Kavin Molinary confirmed same and reports she purchased a pulse ox for patient at home today  Pulse ox instructions placed on AVS  Kavin Crespo aware patient qualifies for Home O2 and would like to obtain same through [employer]  Kavin Crespo informed CM that patient has Medicare Part A only and is [place] pending secondary  CM unsure if [employer] will require a credit card on file for the Oxygen and can retro bill; CM sent referral and asked for guidance and will follow up with Kavin Crespo in the morning  Kavin Crespo appreciative of same  IMM reviewed and copy placed on chart for provision upon discharge  Kavin Crespo declining [facility] services at this time  She reports patient has follow up with her PCP in one week and she will work with him to obtain [facility] services at that time if she feels patient needs it  CM to follow up with Kavin Crespo re: Home O2 set up tomorrow

## 2021-03-09 NOTE — PLAN OF CARE
Problem: Potential for Falls  Goal: Patient will remain free of falls  Description: INTERVENTIONS:  - Assess patient frequently for physical needs  -  Identify cognitive and physical deficits and behaviors that affect risk of falls    -  [place] fall precautions as indicated by assessment   - Educate patient/family on patient safety including physical limitations  - Instruct patient to call for assistance with activity based on assessment  - Modify environment to reduce risk of injury  - Consider OT/PT consult to assist with strengthening/mobility  Outcome: Progressing     Problem: Prexisting or High Potential for Compromised Skin Integrity  Goal: Skin integrity is maintained or improved  Description: INTERVENTIONS:  - Identify patients at risk for skin breakdown  - Assess and monitor skin integrity  - Assess and monitor nutrition and hydration status  - Monitor labs   - Assess for incontinence   - Turn and reposition patient  - Assist with mobility/ambulation  - Relieve pressure over bony prominences  - Avoid friction and shearing  - Provide appropriate hygiene as needed including keeping skin clean and dry  - Evaluate need for skin moisturizer/barrier cream  - Collaborate with interdisciplinary team   - Patient/family teaching  - Consider wound care consult   Outcome: Progressing     Problem: RESPIRATORY - ADULT  Goal: Achieves optimal ventilation and oxygenation  Description: INTERVENTIONS:  - Assess for changes in respiratory status  - Assess for changes in mentation and behavior  - Position to facilitate oxygenation and minimize respiratory effort  - Oxygen administered by appropriate delivery if ordered  - Initiate smoking cessation education as indicated  - Encourage broncho-pulmonary hygiene including cough, deep breathe, Incentive Spirometry  - Assess the need for suctioning and aspirate as needed  - Assess and instruct to report SOB or any respiratory difficulty  - Respiratory Therapy support as indicated  Outcome: Progressing     Problem: PAIN - ADULT  Goal: Verbalizes/displays adequate comfort level or baseline comfort level  Description: Interventions:  - Encourage patient to monitor pain and request assistance  - Assess pain using appropriate pain scale  - Administer analgesics based on type and severity of pain and evaluate response  - Implement non-pharmacological measures as appropriate and evaluate response  - Consider cultural and social influences on pain and pain management  - Notify physician/advanced practitioner if interventions unsuccessful or patient reports new pain  Outcome: Progressing     Problem: INFECTION - ADULT  Goal: Absence or prevention of progression during hospitalization  Description: INTERVENTIONS:  - Assess and monitor for signs and symptoms of infection  - Monitor lab/diagnostic results  - Monitor all insertion sites, i e  indwelling lines, tubes, and drains  - Monitor endotracheal if appropriate and nasal secretions for changes in amount and color  - [place] appropriate cooling/warming therapies per order  - Administer medications as ordered  - Instruct and encourage patient and family to use good hand hygiene technique  - Identify and instruct in appropriate isolation precautions for identified infection/condition  Outcome: Progressing  Goal: Absence of fever/infection during neutropenic period  Description: INTERVENTIONS:  - Monitor WBC    Outcome: Progressing     Problem: SAFETY ADULT  Goal: Patient will remain free of falls  Description: INTERVENTIONS:  - Assess patient frequently for physical needs  -  Identify cognitive and physical deficits and behaviors that affect risk of falls    -  [place] fall precautions as indicated by assessment   - Educate patient/family on patient safety including physical limitations  - Instruct patient to call for assistance with activity based on assessment  - Modify environment to reduce risk of injury  - Consider OT/PT consult to assist with strengthening/mobility  Outcome: Progressing  Goal: Maintain or return to baseline ADL function  Description: INTERVENTIONS:  -  Assess patient's ability to carry out ADLs; assess patient's baseline for ADL function and identify physical deficits which impact ability to perform ADLs (bathing, care of mouth/teeth, toileting, grooming, dressing, etc )  - Assess/evaluate cause of self-care deficits   - Assess range of motion  - Assess patient's mobility; develop plan if impaired  - Assess patient's need for assistive devices and provide as appropriate  - Encourage maximum independence but intervene and supervise when necessary  - Involve family in performance of ADLs  - Assess for home care needs following discharge   - Consider OT consult to assist with ADL evaluation and planning for discharge  - Provide patient education as appropriate  Outcome: Progressing  Goal: Maintain or return mobility status to optimal level  Description: INTERVENTIONS:  - Assess patient's baseline mobility status (ambulation, transfers, stairs, etc )    - Identify cognitive and physical deficits and behaviors that affect mobility  - Identify mobility aids required to assist with transfers and/or ambulation (gait belt, sit-to-stand, lift, walker, cane, etc )  - [place] fall precautions as indicated by assessment  - Record patient progress and toleration of activity level on Mobility SBAR; progress patient to next Phase/Stage  - Instruct patient to call for assistance with activity based on assessment  - Consider rehabilitation consult to assist with strengthening/weightbearing, etc   Outcome: Progressing     Problem: DISCHARGE PLANNING  Goal: Discharge to home or other facility with appropriate resources  Description: INTERVENTIONS:  - Identify barriers to discharge w/patient and caregiver  - Arrange for needed discharge resources and transportation as appropriate  - Identify discharge learning needs (meds, wound care, etc )  - Arrange for interpretive services to assist at discharge as needed  - Refer to Case Management Department for coordinating discharge planning if the patient needs post-hospital services based on physician/advanced practitioner order or complex needs related to functional status, cognitive ability, or social support system  Outcome: Progressing     Problem: SKIN/TISSUE INTEGRITY - ADULT  Goal: Skin integrity remains intact  Description: INTERVENTIONS  - Identify patients at risk for skin breakdown  - Assess and monitor skin integrity  - Assess and monitor nutrition and hydration status  - Monitor labs (i e  albumin)  - Assess for incontinence   - Turn and reposition patient  - Assist with mobility/ambulation  - Relieve pressure over bony prominences  - Avoid friction and shearing  - Provide appropriate hygiene as needed including keeping skin clean and dry  - Evaluate need for skin moisturizer/barrier cream  - Collaborate with interdisciplinary team (i e  Nutrition, Rehabilitation, etc )   - Patient/family teaching  Outcome: Progressing

## 2021-03-09 NOTE — ASSESSMENT & PLAN NOTE
Recent Labs     03/09/21  0612   DDIMER 2 32*       · POA: 2 51 in the setting of COVID-19 pneumonia  · CTA negative for PE   · On subcutaneous therapeutic Lovenox, will switch to eliquis on d/c

## 2021-03-09 NOTE — PROGRESS NOTES
[facility]  Progress Note Pat Rubi 12/13/1929, 80 y o  female MRN: [id number]  Unit/Bed#: S -[facility] Encounter: [id number]  Primary Care Provider: No primary care provider on file  Date and time admitted to hospital: 2/28/2021 10:27 AM    Constipation  Assessment & Plan  Multiple days with no bowel movement  Patient was started on b i d  Colace and p r n  MiraLax  Will advise patient to continue Miralax on D/C    Anemia  Assessment & Plan  Recent Labs     03/09/21  0612   HGB 12 2     In the setting of COVID-19 infection  Patient also appears to be chronically anemic, however most recent result normal  F/U with PCP    Elevated d-dimer  Assessment & Plan  Recent Labs     03/09/21 0612   DDIMER 2 32*       · POA: 2 51 in the setting of COVID-19 pneumonia  · CTA negative for PE   · On subcutaneous therapeutic Lovenox, will switch to eliquis on d/c      Elevated serum creatinine  Assessment & Plan  Recent Labs     03/07/21  0437 03/08/21  0456 03/09/21  0612   CREATININE 1 15 1 22 1 24   EGFR 42 39 38     · Elevated at 1 66 at admission  · In the setting of COVID-19 pneumonia and reported significant decreased p o  Intake the past 2-3 days  · There is no recent record for comparison, most recent from 2017 a creatinine level of 1 1 with reduced GFR which might indicate history of CKD; this may be patient's baseline   · Creatinine improving, with in normal range now  · Avoid nephrotoxins, NSAIDs, hypotension    * Pneumonia due to COVID-19 virus  Assessment & Plan  Lab Results   Component Value Date    SARSCOV2 Positive (A) 02/28/2021     Recent Labs     03/09/21 0612   DDIMER 2 32*       No results for input(s): PROCALCITONI in the last 72 hours      · POA: Generalized weakness, poor appetite decreased p o  intake for the 2-3 days, mild cough with shortness of breath but no fevers, chills or chest pain, requiring supplemental oxygen  · Chest x-ray showing bilateral patchy opacities suggestive for multifocal pneumonia  · Patient will be admitted under COVID-19 moderate treatment protocol  · Ferritin normal, CRP: 42, D dimer: 2 11   · Cardiac markers:  Troponin 0 04, CK within normal limits  · Completed 5 day course of doxycycline and ceftriaxone  · Remdesivir completed on 2021 for a 5 day course  · Decadron 6 mg bid, day 9 of 10 days, will not need to continue on DC  · Self-proning if able, Incentive spirometry  · Respiratory protocol, incentive spirometer, self prone   · Supportive management with Tessalon Perles, Mucinex, Tylenol  · Continuing supplemental oxygen as tolerated for SpO2 sat >89%  · Continue trend D-dimer, inflammatory markers every 1-3 days  · Will d/c Lovenox on d/c and switch pt to eliquis per protocol    · Patient declined convalescent plasma  · Received IV Lasix 40 mg twice 2021 and again 2021; will hold off on any further diuresis  · Will need O2 on dc, pt will need to be evaluated by PCP in 1-2 weeks for continued O2 need          VTE Pharmacologic Prophylaxis:   Pharmacologic: Enoxaparin (Lovenox)  Mechanical VTE Prophylaxis in Place: Yes    Discussions with Specialists or Other Care Team Provider: n/a    Education and Discussions with Family / Patient: Disscussed plan of care with patient and family    Current Length of Stay: 9 day(s)    Current Patient Status: Inpatient     Discharge Plan / Estimated Discharge Date:      Code Status: Level 1 - Full Code      Subjective:   Patient seen and examined  No acute events overnight  The patient and her family have request that she be d/c tomorrow  Planning for dc at 10am 3/10  The patient reports that her breathing is improving and she is not as SOB today  A 10 point review of systems was completed, unless noted otherwise all other systems are negative      Objective:     Vitals:   Temp (24hrs), Av 6 °F (36 4 °C), Min:97 6 °F (36 4 °C), Max:97 6 °F (36 4 °C)    Temp:  [97 6 °F (36 4 °C)] 97 6 °F (36 4 °C)  HR:  [100] 100  Resp:  [16-18] 16  BP: (112-122)/(68-80) 122/80  SpO2:  [94 %] 94 %  Body mass index is 28 24 kg/m²  Input and Output Summary (last 24 hours): Intake/Output Summary (Last 24 hours) at 3/9/2021 1500  Last data filed at 3/9/2021 1300  Gross per 24 hour   Intake 720 ml   Output 900 ml   Net -180 ml       Physical Exam: Pt examined through window due to COVID-19 infection    Physical Exam  Vitals signs and nursing note reviewed  Constitutional:       General: She is not in acute distress  Appearance: She is not ill-appearing, toxic-appearing or diaphoretic  HENT:      Head: Normocephalic and atraumatic  Nose: Nose normal    Cardiovascular:      Rate and Rhythm: Normal rate  Pulmonary:      Effort: Pulmonary effort is normal    Abdominal:      General: Abdomen is flat  Musculoskeletal: Normal range of motion  Skin:     Findings: No erythema  Neurological:      General: No focal deficit present  Mental Status: She is alert and oriented to person, place, and time  Psychiatric:         Mood and Affect: Mood normal          Behavior: Behavior normal      Additional Data:     Labs: I have personally reviewed pertinent reports  Results from last 7 days   Lab Units 03/09/21  0612   WBC Thousand/uL 9 11   HEMOGLOBIN g/dL 12 2   HEMATOCRIT % 39 6   PLATELETS Thousands/uL 278      Results from last 7 days   Lab Units 03/09/21  0612 03/08/21  0456 03/07/21  0437 03/06/21  0629  03/03/21  0639   POTASSIUM mmol/L 4 6 4 8 4 0 4 5   < > 3 8   CHLORIDE mmol/L 103 102 104 107   < > 108   CO2 mmol/L 32 32 30 30   < > 25   BUN mg/dL 40* 42* 39* 33*   < > 27*   CREATININE mg/dL 1 24 1 22 1 15 1 17   < > 1 29   CALCIUM mg/dL 8 7 9 0 8 8 8 6   < > 8 0*   ALK PHOS U/L 80  --   --  84  --  70   ALT U/L 17  --   --  25  --  24   AST U/L 23  --   --  32  --  44    < > = values in this interval not displayed                              * Additional Pertinent Lab Tests Reviewed: Funmi [address] Admission Reviewed    Radiology Results: I have personally reviewed pertinent reports  No results found  Recent Cultures (last 7 days):           Last 24 Hours Medication List:   Current Facility-Administered Medications   Medication Dose Route Frequency Provider Last Rate    acetaminophen  650 mg Oral Q6H PRN Fausto Areola, DO      atorvastatin  40 mg Oral HS Huber Tamir, DO      benzonatate  100 mg Oral TID PRN Maeola Blotter, DO      cholecalciferol  2,000 Units Oral Daily Huber Garnett, DO      dexamethasone  6 mg Intravenous Q12H Metsa 36, MD      docusate sodium  100 mg Oral BID Fausto Areola, DO      enoxaparin  1 mg/kg Subcutaneous Q24H Metsa 36, MD      famotidine  10 mg Oral Daily Maeola Blotter, DO      multivitamin-minerals  1 tablet Oral Daily Maeola Blotter, DO      ondansetron  4 mg Intravenous Q4H PRN Melia Boo MD      polyethylene glycol  17 g Oral Daily Melia Boo MD          Today, Patient Was Seen By: Alanna Oviedo DO    Portions of the record may have been created with voice recognition software  Occasional wrong word or "sound a like" substitutions may have occurred due to the inherent limitations of voice recognition software  Read the chart carefully and recognize, using context, where substitutions have occurred

## 2021-03-09 NOTE — ASSESSMENT & PLAN NOTE
Recent Labs     03/07/21  0437 03/08/21  0456 03/09/21  0612   CREATININE 1 15 1 22 1 24   EGFR 42 39 38     · Elevated at 1 66 at admission  · In the setting of COVID-19 pneumonia and reported significant decreased p o  Intake the past 2-3 days    · There is no recent record for comparison, most recent from 2017 a creatinine level of 1 1 with reduced GFR which might indicate history of CKD; this may be patient's baseline   · Creatinine improving, with in normal range now  · Avoid nephrotoxins, NSAIDs, hypotension

## 2021-03-10 ENCOUNTER — TELEPHONE (OUTPATIENT)
Dept: INTERNAL MEDICINE CLINIC | Facility: CLINIC | Age: 86
End: 2021-03-10

## 2021-03-10 VITALS
OXYGEN SATURATION: 94 % | HEART RATE: 104 BPM | SYSTOLIC BLOOD PRESSURE: 124 MMHG | TEMPERATURE: 98.1 F | BODY MASS INDEX: 28.24 KG/M2 | RESPIRATION RATE: 18 BRPM | DIASTOLIC BLOOD PRESSURE: 62 MMHG | WEIGHT: 147 LBS

## 2021-03-10 PROCEDURE — 99239 HOSP IP/OBS DSCHRG MGMT >30: CPT | Performed by: INTERNAL MEDICINE

## 2021-03-10 RX ORDER — POLYETHYLENE GLYCOL 3350 17 G/17G
17 POWDER, FOR SOLUTION ORAL DAILY
Refills: 0
Start: 2021-03-10 | End: 2021-03-25 | Stop reason: HOSPADM

## 2021-03-10 RX ORDER — BENZONATATE 100 MG/1
100 CAPSULE ORAL 3 TIMES DAILY PRN
Qty: 20 CAPSULE | Refills: 0 | Status: SHIPPED | OUTPATIENT
Start: 2021-03-10 | End: 2021-03-25 | Stop reason: HOSPADM

## 2021-03-10 RX ORDER — FUROSEMIDE 10 MG/ML
20 INJECTION INTRAMUSCULAR; INTRAVENOUS ONCE
Status: COMPLETED | OUTPATIENT
Start: 2021-03-10 | End: 2021-03-10

## 2021-03-10 RX ORDER — ATORVASTATIN CALCIUM 40 MG/1
40 TABLET, FILM COATED ORAL
Qty: 14 TABLET | Refills: 0 | Status: SHIPPED | OUTPATIENT
Start: 2021-03-10 | End: 2021-04-12

## 2021-03-10 RX ADMIN — ENOXAPARIN SODIUM 70 MG: 80 INJECTION SUBCUTANEOUS at 08:20

## 2021-03-10 RX ADMIN — DEXAMETHASONE SODIUM PHOSPHATE 6 MG: 4 INJECTION INTRA-ARTICULAR; INTRALESIONAL; INTRAMUSCULAR; INTRAVENOUS; SOFT TISSUE at 08:19

## 2021-03-10 RX ADMIN — Medication 1 TABLET: at 08:20

## 2021-03-10 RX ADMIN — Medication 2000 UNITS: at 08:20

## 2021-03-10 RX ADMIN — FUROSEMIDE 20 MG: 10 INJECTION, SOLUTION INTRAVENOUS at 09:28

## 2021-03-10 NOTE — PLAN OF CARE
Problem: Potential for Falls  Goal: Patient will remain free of falls  Description: INTERVENTIONS:  - Assess patient frequently for physical needs  -  Identify cognitive and physical deficits and behaviors that affect risk of falls    -  [place] fall precautions as indicated by assessment   - Educate patient/family on patient safety including physical limitations  - Instruct patient to call for assistance with activity based on assessment  - Modify environment to reduce risk of injury  - Consider OT/PT consult to assist with strengthening/mobility  Outcome: Progressing     Problem: Prexisting or High Potential for Compromised Skin Integrity  Goal: Skin integrity is maintained or improved  Description: INTERVENTIONS:  - Identify patients at risk for skin breakdown  - Assess and monitor skin integrity  - Assess and monitor nutrition and hydration status  - Monitor labs   - Assess for incontinence   - Turn and reposition patient  - Assist with mobility/ambulation  - Relieve pressure over bony prominences  - Avoid friction and shearing  - Provide appropriate hygiene as needed including keeping skin clean and dry  - Evaluate need for skin moisturizer/barrier cream  - Collaborate with interdisciplinary team   - Patient/family teaching  - Consider wound care consult   Outcome: Progressing     Problem: RESPIRATORY - ADULT  Goal: Achieves optimal ventilation and oxygenation  Description: INTERVENTIONS:  - Assess for changes in respiratory status  - Assess for changes in mentation and behavior  - Position to facilitate oxygenation and minimize respiratory effort  - Oxygen administered by appropriate delivery if ordered  - Initiate smoking cessation education as indicated  - Encourage broncho-pulmonary hygiene including cough, deep breathe, Incentive Spirometry  - Assess the need for suctioning and aspirate as needed  - Assess and instruct to report SOB or any respiratory difficulty  - Respiratory Therapy support as indicated  Outcome: Progressing     Problem: SKIN/TISSUE INTEGRITY - ADULT  Goal: Skin integrity remains intact  Description: INTERVENTIONS  - Identify patients at risk for skin breakdown  - Assess and monitor skin integrity  - Assess and monitor nutrition and hydration status  - Monitor labs (i e  albumin)  - Assess for incontinence   - Turn and reposition patient  - Assist with mobility/ambulation  - Relieve pressure over bony prominences  - Avoid friction and shearing  - Provide appropriate hygiene as needed including keeping skin clean and dry  - Evaluate need for skin moisturizer/barrier cream  - Collaborate with interdisciplinary team (i e  Nutrition, Rehabilitation, etc )   - Patient/family teaching  Outcome: Progressing     Problem: PAIN - ADULT  Goal: Verbalizes/displays adequate comfort level or baseline comfort level  Description: Interventions:  - Encourage patient to monitor pain and request assistance  - Assess pain using appropriate pain scale  - Administer analgesics based on type and severity of pain and evaluate response  - Implement non-pharmacological measures as appropriate and evaluate response  - Consider cultural and social influences on pain and pain management  - Notify physician/advanced practitioner if interventions unsuccessful or patient reports new pain  Outcome: Progressing     Problem: INFECTION - ADULT  Goal: Absence or prevention of progression during hospitalization  Description: INTERVENTIONS:  - Assess and monitor for signs and symptoms of infection  - Monitor lab/diagnostic results  - Monitor all insertion sites, i e  indwelling lines, tubes, and drains  - Monitor endotracheal if appropriate and nasal secretions for changes in amount and color  - [place] appropriate cooling/warming therapies per order  - Administer medications as ordered  - Instruct and encourage patient and family to use good hand hygiene technique  - Identify and instruct in appropriate isolation precautions for identified infection/condition  Outcome: Progressing  Goal: Absence of fever/infection during neutropenic period  Description: INTERVENTIONS:  - Monitor WBC    Outcome: Progressing     Problem: SAFETY ADULT  Goal: Patient will remain free of falls  Description: INTERVENTIONS:  - Assess patient frequently for physical needs  -  Identify cognitive and physical deficits and behaviors that affect risk of falls    -  [place] fall precautions as indicated by assessment   - Educate patient/family on patient safety including physical limitations  - Instruct patient to call for assistance with activity based on assessment  - Modify environment to reduce risk of injury  - Consider OT/PT consult to assist with strengthening/mobility  Outcome: Progressing  Goal: Maintain or return to baseline ADL function  Description: INTERVENTIONS:  -  Assess patient's ability to carry out ADLs; assess patient's baseline for ADL function and identify physical deficits which impact ability to perform ADLs (bathing, care of mouth/teeth, toileting, grooming, dressing, etc )  - Assess/evaluate cause of self-care deficits   - Assess range of motion  - Assess patient's mobility; develop plan if impaired  - Assess patient's need for assistive devices and provide as appropriate  - Encourage maximum independence but intervene and supervise when necessary  - Involve family in performance of ADLs  - Assess for home care needs following discharge   - Consider OT consult to assist with ADL evaluation and planning for discharge  - Provide patient education as appropriate  Outcome: Progressing  Goal: Maintain or return mobility status to optimal level  Description: INTERVENTIONS:  - Assess patient's baseline mobility status (ambulation, transfers, stairs, etc )    - Identify cognitive and physical deficits and behaviors that affect mobility  - Identify mobility aids required to assist with transfers and/or ambulation (gait belt, sit-to-stand, lift, walker, cane, etc )  - [place] fall precautions as indicated by assessment  - Record patient progress and toleration of activity level on Mobility SBAR; progress patient to next Phase/Stage  - Instruct patient to call for assistance with activity based on assessment  - Consider rehabilitation consult to assist with strengthening/weightbearing, etc   Outcome: Progressing     Problem: DISCHARGE PLANNING  Goal: Discharge to home or other facility with appropriate resources  Description: INTERVENTIONS:  - Identify barriers to discharge w/patient and caregiver  - Arrange for needed discharge resources and transportation as appropriate  - Identify discharge learning needs (meds, wound care, etc )  - Arrange for interpretive services to assist at discharge as needed  - Refer to Case Management Department for coordinating discharge planning if the patient needs post-hospital services based on physician/advanced practitioner order or complex needs related to functional status, cognitive ability, or social support system  Outcome: Progressing

## 2021-03-10 NOTE — ASSESSMENT & PLAN NOTE
Recent Labs     03/08/21  0456 03/09/21  0612   CREATININE 1 22 1 24   EGFR 39 38     · Elevated at 1 66 at admission  · In the setting of COVID-19 pneumonia and reported significant decreased p o  Intake the past 2-3 days    · There is no recent record for comparison, most recent from 2017 a creatinine level of 1 1 with reduced GFR which might indicate history of CKD; this may be patient's baseline   · Creatinine improving, with in normal range now  · Avoid nephrotoxins, NSAIDs, hypotension

## 2021-03-10 NOTE — TELEPHONE ENCOUNTER
Set up a virtual appointment for Zia Ballesteros, the mother of Dina Taylor  Called to see what Calixto Ortega would prefer to be contacted by either through Teams or a normal telephone call  L/m to call back

## 2021-03-10 NOTE — PLAN OF CARE
Problem: Potential for Falls  Goal: Patient will remain free of falls  Description: INTERVENTIONS:  - Assess patient frequently for physical needs  -  Identify cognitive and physical deficits and behaviors that affect risk of falls    -  [place] fall precautions as indicated by assessment   - Educate patient/family on patient safety including physical limitations  - Instruct patient to call for assistance with activity based on assessment  - Modify environment to reduce risk of injury  - Consider OT/PT consult to assist with strengthening/mobility  Outcome: Progressing     Problem: Prexisting or High Potential for Compromised Skin Integrity  Goal: Skin integrity is maintained or improved  Description: INTERVENTIONS:  - Identify patients at risk for skin breakdown  - Assess and monitor skin integrity  - Assess and monitor nutrition and hydration status  - Monitor labs   - Assess for incontinence   - Turn and reposition patient  - Assist with mobility/ambulation  - Relieve pressure over bony prominences  - Avoid friction and shearing  - Provide appropriate hygiene as needed including keeping skin clean and dry  - Evaluate need for skin moisturizer/barrier cream  - Collaborate with interdisciplinary team   - Patient/family teaching  - Consider wound care consult   Outcome: Progressing     Problem: RESPIRATORY - ADULT  Goal: Achieves optimal ventilation and oxygenation  Description: INTERVENTIONS:  - Assess for changes in respiratory status  - Assess for changes in mentation and behavior  - Position to facilitate oxygenation and minimize respiratory effort  - Oxygen administered by appropriate delivery if ordered  - Initiate smoking cessation education as indicated  - Encourage broncho-pulmonary hygiene including cough, deep breathe, Incentive Spirometry  - Assess the need for suctioning and aspirate as needed  - Assess and instruct to report SOB or any respiratory difficulty  - Respiratory Therapy support as indicated  Outcome: Progressing     Problem: PAIN - ADULT  Goal: Verbalizes/displays adequate comfort level or baseline comfort level  Description: Interventions:  - Encourage patient to monitor pain and request assistance  - Assess pain using appropriate pain scale  - Administer analgesics based on type and severity of pain and evaluate response  - Implement non-pharmacological measures as appropriate and evaluate response  - Consider cultural and social influences on pain and pain management  - Notify physician/advanced practitioner if interventions unsuccessful or patient reports new pain  Outcome: Progressing     Problem: INFECTION - ADULT  Goal: Absence or prevention of progression during hospitalization  Description: INTERVENTIONS:  - Assess and monitor for signs and symptoms of infection  - Monitor lab/diagnostic results  - Monitor all insertion sites, i e  indwelling lines, tubes, and drains  - Monitor endotracheal if appropriate and nasal secretions for changes in amount and color  - [place] appropriate cooling/warming therapies per order  - Administer medications as ordered  - Instruct and encourage patient and family to use good hand hygiene technique  - Identify and instruct in appropriate isolation precautions for identified infection/condition  Outcome: Progressing  Goal: Absence of fever/infection during neutropenic period  Description: INTERVENTIONS:  - Monitor WBC    Outcome: Progressing     Problem: SAFETY ADULT  Goal: Patient will remain free of falls  Description: INTERVENTIONS:  - Assess patient frequently for physical needs  -  Identify cognitive and physical deficits and behaviors that affect risk of falls    -  [place] fall precautions as indicated by assessment   - Educate patient/family on patient safety including physical limitations  - Instruct patient to call for assistance with activity based on assessment  - Modify environment to reduce risk of injury  - Consider OT/PT consult to assist with strengthening/mobility  Outcome: Progressing  Goal: Maintain or return to baseline ADL function  Description: INTERVENTIONS:  -  Assess patient's ability to carry out ADLs; assess patient's baseline for ADL function and identify physical deficits which impact ability to perform ADLs (bathing, care of mouth/teeth, toileting, grooming, dressing, etc )  - Assess/evaluate cause of self-care deficits   - Assess range of motion  - Assess patient's mobility; develop plan if impaired  - Assess patient's need for assistive devices and provide as appropriate  - Encourage maximum independence but intervene and supervise when necessary  - Involve family in performance of ADLs  - Assess for home care needs following discharge   - Consider OT consult to assist with ADL evaluation and planning for discharge  - Provide patient education as appropriate  Outcome: Progressing  Goal: Maintain or return mobility status to optimal level  Description: INTERVENTIONS:  - Assess patient's baseline mobility status (ambulation, transfers, stairs, etc )    - Identify cognitive and physical deficits and behaviors that affect mobility  - Identify mobility aids required to assist with transfers and/or ambulation (gait belt, sit-to-stand, lift, walker, cane, etc )  - [place] fall precautions as indicated by assessment  - Record patient progress and toleration of activity level on Mobility SBAR; progress patient to next Phase/Stage  - Instruct patient to call for assistance with activity based on assessment  - Consider rehabilitation consult to assist with strengthening/weightbearing, etc   Outcome: Progressing     Problem: DISCHARGE PLANNING  Goal: Discharge to home or other facility with appropriate resources  Description: INTERVENTIONS:  - Identify barriers to discharge w/patient and caregiver  - Arrange for needed discharge resources and transportation as appropriate  - Identify discharge learning needs (meds, wound care, etc )  - Arrange for interpretive services to assist at discharge as needed  - Refer to Case Management Department for coordinating discharge planning if the patient needs post-hospital services based on physician/advanced practitioner order or complex needs related to functional status, cognitive ability, or social support system  Outcome: Progressing     Problem: SKIN/TISSUE INTEGRITY - ADULT  Goal: Skin integrity remains intact  Description: INTERVENTIONS  - Identify patients at risk for skin breakdown  - Assess and monitor skin integrity  - Assess and monitor nutrition and hydration status  - Monitor labs (i e  albumin)  - Assess for incontinence   - Turn and reposition patient  - Assist with mobility/ambulation  - Relieve pressure over bony prominences  - Avoid friction and shearing  - Provide appropriate hygiene as needed including keeping skin clean and dry  - Evaluate need for skin moisturizer/barrier cream  - Collaborate with interdisciplinary team (i e  Nutrition, Rehabilitation, etc )   - Patient/family teaching  Outcome: Progressing

## 2021-03-10 NOTE — ASSESSMENT & PLAN NOTE
Lab Results   Component Value Date    SARSCOV2 Positive (A) 02/28/2021     Recent Labs     03/09/21  0612   DDIMER 2 32*       No results for input(s): PROCALCITONI in the last 72 hours      · POA: Generalized weakness, poor appetite decreased p o  intake for the 2-3 days, mild cough with shortness of breath but no fevers, chills or chest pain, requiring supplemental oxygen  · Chest x-ray showing bilateral patchy opacities suggestive for multifocal pneumonia  · Patient will be admitted under COVID-19 moderate treatment protocol  · Ferritin normal, CRP: 42, D dimer: 2 11   · Cardiac markers:  Troponin 0 04, CK within normal limits  · Completed 5 day course of doxycycline and ceftriaxone  · Remdesivir completed on 03/04/2021 for a 5 day course  · Decadron 6 mg bid, completed  · Self-proning if able, Incentive spirometry  · Respiratory protocol, incentive spirometer, self prone   · Supportive management with Tessalon Perles, Mucinex, Tylenol  · Continuing supplemental oxygen as tolerated for SpO2 sat >89%  · Continue trend D-dimer, inflammatory markers every 1-3 days  · Will d/c Lovenox on d/c and switch pt to eliquis per protocol    · Patient declined convalescent plasma  · Received IV Lasix 40 mg twice 03/05/2021 and again 03/06/2021 and 20mg 3/10/2021  · Will need O2 on dc, pt will need to be evaluated by PCP in 1-2 weeks for continued O2 need

## 2021-03-10 NOTE — CASE MANAGEMENT
CM confirmed with SLIM that patient is medically stable for discharge home today  CM contacted Neto Shea at [facility] to ask how patient can obtain O2 as her secondary MA is pending  Neto Shea stated that patient would be private pay $150/month for concentrator, 1 time fee of $25 for the regulator and $15 per tank  First month OOP cost is $190 and then it will be $150/month  CM called patient's daughter Deon Hodge to review  She is able to afford OOP cost and will be calling Debbie at [phone] to provide credit card information  Deon Hodge asked for help setting up follow up appointment with a PCP for patient  CM offered the [facility] location and Deon Hodge reports this is close to her home  As per [facility] with [facility], patient is scheduled for virtual appointment with Dr Anastasia Christensen on 3/15 at 3:30 pm  Appointment information placed on AVS  CM received call back from Deon Hodge stating she tried to contact [facility] to provide credit card information and they don't have patient's clinical info on file  CM contacted [employer] liaison Neto Shea to tell him about the referral last night and asked that he coordinate delivery/payment with patient's daughter  Neto Shea stated he would do so and would follow up with CM once completed  SLIM aware that O2 set up and PCP appointment are being taken care of

## 2021-03-22 ENCOUNTER — APPOINTMENT (EMERGENCY)
Dept: CT IMAGING | Facility: HOSPITAL | Age: 86
DRG: 308 | End: 2021-03-22
Payer: MEDICARE

## 2021-03-22 ENCOUNTER — OFFICE VISIT (OUTPATIENT)
Dept: INTERNAL MEDICINE CLINIC | Facility: CLINIC | Age: 86
End: 2021-03-22

## 2021-03-22 ENCOUNTER — APPOINTMENT (EMERGENCY)
Dept: RADIOLOGY | Facility: HOSPITAL | Age: 86
DRG: 308 | End: 2021-03-22
Payer: MEDICARE

## 2021-03-22 ENCOUNTER — HOSPITAL ENCOUNTER (INPATIENT)
Facility: HOSPITAL | Age: 86
LOS: 3 days | Discharge: HOME | DRG: 308 | End: 2021-03-25
Attending: EMERGENCY MEDICINE | Admitting: INTERNAL MEDICINE
Payer: MEDICARE

## 2021-03-22 VITALS — HEART RATE: 163 BPM | TEMPERATURE: 98.5 F | BODY MASS INDEX: 28.24 KG/M2 | WEIGHT: 147 LBS | OXYGEN SATURATION: 95 %

## 2021-03-22 DIAGNOSIS — I48.92 ATRIAL FLUTTER (HCC): Primary | ICD-10-CM

## 2021-03-22 DIAGNOSIS — J96.11 CHRONIC RESPIRATORY FAILURE WITH HYPOXIA (HCC): ICD-10-CM

## 2021-03-22 DIAGNOSIS — R00.0 RAPID HEART RATE: ICD-10-CM

## 2021-03-22 DIAGNOSIS — J12.82 PNEUMONIA DUE TO COVID-19 VIRUS: ICD-10-CM

## 2021-03-22 DIAGNOSIS — I48.4 ATYPICAL ATRIAL FLUTTER (HCC): ICD-10-CM

## 2021-03-22 DIAGNOSIS — R00.0 TACHYCARDIA: ICD-10-CM

## 2021-03-22 DIAGNOSIS — U07.1 PNEUMONIA DUE TO COVID-19 VIRUS: ICD-10-CM

## 2021-03-22 DIAGNOSIS — Z00.00 ENCOUNTER FOR MEDICAL EXAMINATION TO ESTABLISH CARE: Primary | ICD-10-CM

## 2021-03-22 DIAGNOSIS — K59.00 CONSTIPATION, UNSPECIFIED CONSTIPATION TYPE: ICD-10-CM

## 2021-03-22 DIAGNOSIS — N28.9 RENAL LESION: ICD-10-CM

## 2021-03-22 PROBLEM — N18.30 CKD (CHRONIC KIDNEY DISEASE) STAGE 3, GFR 30-59 ML/MIN (HCC): Status: ACTIVE | Noted: 2021-03-22

## 2021-03-22 PROBLEM — R68.89 INCREASED OXYGEN DEMAND: Status: ACTIVE | Noted: 2021-03-22

## 2021-03-22 LAB
ALBUMIN SERPL BCP-MCNC: 2.3 G/DL (ref 3.5–5)
ALP SERPL-CCNC: 82 U/L (ref 46–116)
ALT SERPL W P-5'-P-CCNC: 17 U/L (ref 12–78)
ANION GAP SERPL CALCULATED.3IONS-SCNC: 9 MMOL/L (ref 4–13)
AST SERPL W P-5'-P-CCNC: 24 U/L (ref 5–45)
BASOPHILS # BLD AUTO: 0.04 THOUSANDS/ΜL (ref 0–0.1)
BASOPHILS NFR BLD AUTO: 1 % (ref 0–1)
BILIRUB SERPL-MCNC: 0.53 MG/DL (ref 0.2–1)
BUN SERPL-MCNC: 18 MG/DL (ref 5–25)
CALCIUM ALBUM COR SERPL-MCNC: 10.6 MG/DL (ref 8.3–10.1)
CALCIUM SERPL-MCNC: 9.2 MG/DL (ref 8.3–10.1)
CHLORIDE SERPL-SCNC: 108 MMOL/L (ref 100–108)
CO2 SERPL-SCNC: 28 MMOL/L (ref 21–32)
CREAT SERPL-MCNC: 1.43 MG/DL (ref 0.6–1.3)
D DIMER PPP FEU-MCNC: 5.12 UG/ML FEU
EOSINOPHIL # BLD AUTO: 0.14 THOUSAND/ΜL (ref 0–0.61)
EOSINOPHIL NFR BLD AUTO: 3 % (ref 0–6)
ERYTHROCYTE [DISTWIDTH] IN BLOOD BY AUTOMATED COUNT: 19 % (ref 11.6–15.1)
GFR SERPL CREATININE-BSD FRML MDRD: 32 ML/MIN/1.73SQ M
GLUCOSE SERPL-MCNC: 100 MG/DL (ref 65–140)
HCT VFR BLD AUTO: 38.5 % (ref 34.8–46.1)
HGB BLD-MCNC: 11.6 G/DL (ref 11.5–15.4)
IMM GRANULOCYTES # BLD AUTO: 0.02 THOUSAND/UL (ref 0–0.2)
IMM GRANULOCYTES NFR BLD AUTO: 0 % (ref 0–2)
LYMPHOCYTES # BLD AUTO: 0.74 THOUSANDS/ΜL (ref 0.6–4.47)
LYMPHOCYTES NFR BLD AUTO: 13 % (ref 14–44)
MCH RBC QN AUTO: 26.6 PG (ref 26.8–34.3)
MCHC RBC AUTO-ENTMCNC: 30.1 G/DL (ref 31.4–37.4)
MCV RBC AUTO: 88 FL (ref 82–98)
MONOCYTES # BLD AUTO: 0.4 THOUSAND/ΜL (ref 0.17–1.22)
MONOCYTES NFR BLD AUTO: 7 % (ref 4–12)
NEUTROPHILS # BLD AUTO: 4.3 THOUSANDS/ΜL (ref 1.85–7.62)
NEUTS SEG NFR BLD AUTO: 76 % (ref 43–75)
NRBC BLD AUTO-RTO: 0 /100 WBCS
PLATELET # BLD AUTO: 213 THOUSANDS/UL (ref 149–390)
PMV BLD AUTO: 9.3 FL (ref 8.9–12.7)
POTASSIUM SERPL-SCNC: 4.2 MMOL/L (ref 3.5–5.3)
PROT SERPL-MCNC: 7.4 G/DL (ref 6.4–8.2)
RBC # BLD AUTO: 4.36 MILLION/UL (ref 3.81–5.12)
SODIUM SERPL-SCNC: 145 MMOL/L (ref 136–145)
TROPONIN I SERPL-MCNC: 0.04 NG/ML
TSH SERPL DL<=0.05 MIU/L-ACNC: 1.92 UIU/ML (ref 0.36–3.74)
WBC # BLD AUTO: 5.64 THOUSAND/UL (ref 4.31–10.16)

## 2021-03-22 PROCEDURE — 84484 ASSAY OF TROPONIN QUANT: CPT | Performed by: EMERGENCY MEDICINE

## 2021-03-22 PROCEDURE — 85025 COMPLETE CBC W/AUTO DIFF WBC: CPT | Performed by: EMERGENCY MEDICINE

## 2021-03-22 PROCEDURE — 93000 ELECTROCARDIOGRAM COMPLETE: CPT | Performed by: INTERNAL MEDICINE

## 2021-03-22 PROCEDURE — 96366 THER/PROPH/DIAG IV INF ADDON: CPT

## 2021-03-22 PROCEDURE — 71045 X-RAY EXAM CHEST 1 VIEW: CPT

## 2021-03-22 PROCEDURE — 99285 EMERGENCY DEPT VISIT HI MDM: CPT

## 2021-03-22 PROCEDURE — 99243 OFF/OP CNSLTJ NEW/EST LOW 30: CPT | Performed by: INTERNAL MEDICINE

## 2021-03-22 PROCEDURE — 96361 HYDRATE IV INFUSION ADD-ON: CPT

## 2021-03-22 PROCEDURE — 83735 ASSAY OF MAGNESIUM: CPT | Performed by: NURSE PRACTITIONER

## 2021-03-22 PROCEDURE — 93005 ELECTROCARDIOGRAM TRACING: CPT

## 2021-03-22 PROCEDURE — 85379 FIBRIN DEGRADATION QUANT: CPT | Performed by: EMERGENCY MEDICINE

## 2021-03-22 PROCEDURE — G1004 CDSM NDSC: HCPCS

## 2021-03-22 PROCEDURE — 99291 CRITICAL CARE FIRST HOUR: CPT | Performed by: EMERGENCY MEDICINE

## 2021-03-22 PROCEDURE — 71275 CT ANGIOGRAPHY CHEST: CPT

## 2021-03-22 PROCEDURE — 96365 THER/PROPH/DIAG IV INF INIT: CPT

## 2021-03-22 PROCEDURE — 36415 COLL VENOUS BLD VENIPUNCTURE: CPT | Performed by: EMERGENCY MEDICINE

## 2021-03-22 PROCEDURE — 84443 ASSAY THYROID STIM HORMONE: CPT | Performed by: FAMILY MEDICINE

## 2021-03-22 PROCEDURE — 96375 TX/PRO/DX INJ NEW DRUG ADDON: CPT

## 2021-03-22 PROCEDURE — 80053 COMPREHEN METABOLIC PANEL: CPT | Performed by: EMERGENCY MEDICINE

## 2021-03-22 RX ORDER — ADENOSINE 3 MG/ML
6 INJECTION INTRAVENOUS ONCE
Status: COMPLETED | OUTPATIENT
Start: 2021-03-22 | End: 2021-03-22

## 2021-03-22 RX ORDER — DILTIAZEM HYDROCHLORIDE 5 MG/ML
20 INJECTION INTRAVENOUS ONCE
Status: COMPLETED | OUTPATIENT
Start: 2021-03-22 | End: 2021-03-22

## 2021-03-22 RX ORDER — SODIUM CHLORIDE 9 MG/ML
50 INJECTION, SOLUTION INTRAVENOUS CONTINUOUS
Status: DISCONTINUED | OUTPATIENT
Start: 2021-03-22 | End: 2021-03-23

## 2021-03-22 RX ORDER — ATORVASTATIN CALCIUM 40 MG/1
40 TABLET, FILM COATED ORAL
Status: DISCONTINUED | OUTPATIENT
Start: 2021-03-22 | End: 2021-03-25 | Stop reason: HOSPADM

## 2021-03-22 RX ADMIN — ATORVASTATIN CALCIUM 40 MG: 40 TABLET, FILM COATED ORAL at 22:13

## 2021-03-22 RX ADMIN — APIXABAN 2.5 MG: 2.5 TABLET, FILM COATED ORAL at 22:13

## 2021-03-22 RX ADMIN — DILTIAZEM HYDROCHLORIDE 5 MG/HR: 5 INJECTION INTRAVENOUS at 16:33

## 2021-03-22 RX ADMIN — IOHEXOL 85 ML: 350 INJECTION, SOLUTION INTRAVENOUS at 19:34

## 2021-03-22 RX ADMIN — DILTIAZEM HYDROCHLORIDE 20 MG: 5 INJECTION INTRAVENOUS at 16:33

## 2021-03-22 RX ADMIN — SODIUM CHLORIDE 1000 ML: 0.9 INJECTION, SOLUTION INTRAVENOUS at 14:59

## 2021-03-22 RX ADMIN — DILTIAZEM HYDROCHLORIDE 2.5 MG/HR: 5 INJECTION INTRAVENOUS at 22:54

## 2021-03-22 RX ADMIN — ADENOSINE 6 MG: 3 INJECTION, SOLUTION INTRAVENOUS at 16:00

## 2021-03-22 NOTE — PROGRESS NOTES
Assessment/Plan:    Pneumonia due to COVID-19 virus  Tested positive on 2/28/2021 and was hospitalized for it 2/28/2021 - 3/10/2021  She completed course of antibiotics, remdesivir and decadron in the hospital  She was discharged with eliquis due to elevated d dimer percovid protocol  Home O2 eval done prior to discharge and patient and recommended 2 L O2 NC at rest and ambulation  She completed recommended isolation period  Plan:  · Continue eliquis   · Continue to titrate and wean down O2 to maintain Spo2 > 90%        CKD (chronic kidney disease) stage 3, GFR 30-59 ml/min  Lab Results   Component Value Date    EGFR 38 03/09/2021    EGFR 39 03/08/2021    EGFR 42 03/07/2021    CREATININE 1 24 03/09/2021    CREATININE 1 22 03/08/2021    CREATININE 1 15 03/07/2021   Monitor renal function     Constipation  Improved  Patient is having regular bowel movements    Incidental 6cm right Renal lesion on CT  Will order CT abd/pelvis w contrast once patient does her lab work to make sure cr is stable     Sinus Tachycardia  Patient noted to have HR of 160s  She does some anxiety component however given her recent COVID 19 pneumonia and non compliance with eliquis, we need to rule out PE  · Patient is asymptomatic  I took her off of O2 supplements for 30 min and Spo2 was 88-90% at rest      · ECG done showing sinus tachy with HR of 162-163  · Will transfer to [facility] for ED evaluation of PE       Diagnoses and all orders for this visit:    Encounter for medical examination to establish care    Pneumonia due to COVID-19 virus  -     Transfer to other facility    Incidental 6cm right Renal lesion on CT    Constipation, unspecified constipation type    Tachycardia  -     POCT ECG  -     Transfer to other facility          Subjective:      Patient ID: Shanon Henderson is a 80 y o  female      This is a 80year old pleasant lady with past medical history significant for CHD stage 3 who is here to Establish care with our office today  She was recently hospitalized for COVID-19 pneumonia from 02/28/2021 to 03/10/2020 as she was tested positive for COVID-19 2/28  She completed course of antibiotics, him to severe and Decadron and was medically stable for discharge  On the day of discharge she was prescribed with Eliquis as her D-dimer was elevated per protocol  Home O2 eval was done and she qualified for 2 L nasal cannula oxygen at rest and on ambulation which she was discharged on  Patient was seen in the clinic today for follow-up and to establish care  She was noted to have tachycardia in the 160s  After discussion with patient, she stated that she has not been taking her Eliquis as prescribed  She was tried off of oxygen supplements for 30 minutes and her SpO2 remained between 88-90% on room air at rest   ECG was done showing sinus tachycardia  Due to her recent history of COVID-19 and now complains with Eliquis, there is concern for BP therefore patient was transferred to [facility] emergency department for further evaluation to rule out PE  Plan discussed with patient and her daughter and they were agreeable  The following portions of the patient's history were reviewed and updated as appropriate: allergies, current medications, past family history, past medical history, past social history, past surgical history and problem list     Review of Systems   Constitutional: Negative for activity change, appetite change, chills, diaphoresis and fever  HENT: Negative for congestion, sore throat and trouble swallowing  Respiratory: Negative for cough and shortness of breath  Cardiovascular: Negative for chest pain and palpitations  Gastrointestinal: Negative for abdominal pain, constipation, diarrhea, nausea and vomiting  Genitourinary: Negative for difficulty urinating and dysuria  Neurological: Negative for dizziness, light-headedness and headaches  Psychiatric/Behavioral: Negative for confusion   The patient is not nervous/anxious  All other systems reviewed and are negative  Objective:      Pulse (!) 163   Temp 98 5 °F (36 9 °C)   Wt 66 7 kg (147 lb)   LMP  (LMP Unknown)   SpO2 95%   BMI 28 24 kg/m²          Physical Exam  Vitals signs reviewed  Constitutional:       General: She is not in acute distress  Appearance: She is not ill-appearing or diaphoretic  HENT:      Head: Normocephalic and atraumatic  Mouth/Throat:      Mouth: Mucous membranes are moist       Pharynx: Oropharynx is clear  Eyes:      General: No scleral icterus  Extraocular Movements: Extraocular movements intact  Conjunctiva/sclera: Conjunctivae normal    Neck:      Musculoskeletal: Normal range of motion and neck supple  Cardiovascular:      Rate and Rhythm: Regular rhythm  Tachycardia present  Heart sounds: Normal heart sounds  Pulmonary:      Effort: Pulmonary effort is normal  No respiratory distress  Breath sounds: Normal breath sounds  No wheezing or rales  Abdominal:      General: Bowel sounds are normal       Palpations: Abdomen is soft  Tenderness: There is no abdominal tenderness  Musculoskeletal:      Right lower leg: Edema present  Left lower leg: Edema present  Skin:     General: Skin is warm and dry  Capillary Refill: Capillary refill takes less than 2 seconds  Neurological:      General: No focal deficit present  Mental Status: She is alert and oriented to person, place, and time     Psychiatric:         Mood and Affect: Mood normal          Behavior: Behavior normal

## 2021-03-22 NOTE — ASSESSMENT & PLAN NOTE
Tested positive on 2/28/2021 and was hospitalized for it 2/28/2021 - 3/10/2021  She completed course of antibiotics, remdesivir and decadron in the hospital  She was discharged with eliquis due to elevated d dimer percovid protocol  Home O2 eval done prior to discharge and patient and recommended 2 L O2 NC at rest and ambulation  She completed recommended isolation period      Plan:  · Continue eliquis   · Continue to titrate and wean down O2 to maintain Spo2 > 90%

## 2021-03-22 NOTE — ASSESSMENT & PLAN NOTE
Patient noted to have HR of 160s  She does some anxiety component however given her recent COVID 19 pneumonia and non compliance with eliquis, we need to rule out PE  · Patient is asymptomatic  I took her off of O2 supplements for 30 min and Spo2 was 88-90% at rest      · ECG done showing sinus tachy with HR of 162-163    · Will transfer to [facility] for ED evaluation of PE

## 2021-03-22 NOTE — ED PROVIDER NOTES
History  Chief Complaint   Patient presents with    Rapid Heart Rate     Patient has no complaints, sent from [facility] for rapid heart rate  80 y o  female presents with chief complaint of a rapid heart rate  Onset was today  No history of similar symptoms in the past   The patient has a limited past medical history  She is currently prescribed eliquis following a discharge secondary to a diagnosis of covid pneumonia however she is not taking it  Today her oxygen requirement went up (was on 2 5 liters following discharge)  Currently satting 91% at this level  History provided by:  Patient   used: No    Rapid Heart Rate  Palpitations quality:  Fast  Onset quality:  Sudden  Duration:  1 day  Timing:  Constant  Progression:  Unchanged  Chronicity:  New  Relieved by:  Nothing  Worsened by:  Nothing  Ineffective treatments:  None tried  Associated symptoms: shortness of breath (on exertion)    Associated symptoms: no chest pain, no diaphoresis, no dizziness, no nausea, no syncope and no vomiting    Risk factors: no hx of PE        Prior to Admission Medications   Prescriptions Last Dose Informant Patient Reported? Taking?    apixaban (ELIQUIS) 2 5 mg Not Taking at Unknown time Child No No   Sig: Take 1 tablet (2 5 mg total) by mouth 2 (two) times a day   Patient not taking: Reported on 3/22/2021   atorvastatin (LIPITOR) 40 mg tablet Not Taking at Unknown time Child No No   Sig: Take 1 tablet (40 mg total) by mouth daily at bedtime   Patient not taking: Reported on 3/22/2021   benzonatate (TESSALON PERLES) 100 mg capsule Not Taking at Unknown time Child No No   Sig: Take 1 capsule (100 mg total) by mouth 3 (three) times a day as needed for cough   Patient not taking: Reported on 3/22/2021   polyethylene glycol (MIRALAX) 17 g packet Not Taking at Unknown time Child No No   Sig: Take 17 g by mouth daily   Patient not taking: Reported on 3/22/2021      Facility-Administered Medications: None       History reviewed  No pertinent past medical history  Past Surgical History:   Procedure Laterality Date    CATARACT EXTRACTION      EGD AND COLONOSCOPY N/A 11/14/2017    Procedure: EGD AND COLONOSCOPY;  Surgeon: Citlaly Correa MD;  [facility]; Service: Gastroenterology    HIP FRACTURE SURGERY         Family History   Problem Relation Age of Onset    Diabetes Father     Cancer Family      I have reviewed and agree with the history as documented  E-Cigarette/Vaping     E-Cigarette/Vaping Substances     Social History     Tobacco Use    Smoking status: Never Smoker    Smokeless tobacco: Never Used    Tobacco comment: former smoker, per [employer];  started smoking at 13 yo, stopped at 28 yo   Substance Use Topics    Alcohol use: No    Drug use: No       Review of Systems   Constitutional: Positive for fatigue  Negative for chills, diaphoresis and fever  Respiratory: Positive for shortness of breath (on exertion)  Cardiovascular: Positive for palpitations  Negative for chest pain and syncope  Gastrointestinal: Negative for diarrhea, nausea and vomiting  Genitourinary: Negative for dysuria and frequency  Skin: Negative for rash  Neurological: Negative for dizziness  All other systems reviewed and are negative  Physical Exam  Physical Exam  Vitals signs and nursing note reviewed  Constitutional:       General: She is in acute distress  Appearance: She is well-developed  HENT:      Head: Normocephalic and atraumatic  Eyes:      Pupils: Pupils are equal, round, and reactive to light  Neck:      Musculoskeletal: Normal range of motion  Vascular: No JVD  Cardiovascular:      Rate and Rhythm: Regular rhythm  Tachycardia present  Heart sounds: Normal heart sounds  No murmur  No friction rub  No gallop  Pulmonary:      Effort: Pulmonary effort is normal  No respiratory distress  Breath sounds: Normal breath sounds  No wheezing or rales  Chest:      Chest wall: No tenderness  Musculoskeletal: Normal range of motion  General: No tenderness  Right lower leg: Edema (non pitting) present  Left lower leg: Edema (non pitting) present  Skin:     General: Skin is warm and dry  Neurological:      General: No focal deficit present  Mental Status: She is alert and oriented to person, place, and time  Psychiatric:         Behavior: Behavior normal          Thought Content:  Thought content normal          Judgment: Judgment normal          Vital Signs  ED Triage Vitals   Temperature Pulse Respirations Blood Pressure SpO2   03/22/21 1444 03/22/21 1444 03/22/21 1444 03/22/21 1444 03/22/21 1444   98 5 °F (36 9 °C) (!) 162 16 157/68 96 %      Temp Source Heart Rate Source Patient Position - Orthostatic VS BP Location FiO2 (%)   03/22/21 1444 03/22/21 1444 03/22/21 1444 03/22/21 1444 --   Oral Monitor Lying Left arm       Pain Score       03/22/21 1845       No Pain           Vitals:    03/22/21 1900 03/22/21 1915 03/22/21 1942 03/22/21 1949   BP: 106/82 113/90 125/77 116/86   Pulse: (!) 146 (!) 142 (!) 136 (!) 121   Patient Position - Orthostatic VS: Lying Lying Sitting Sitting         Visual Acuity  Visual Acuity      Most Recent Value   L Pupil Size (mm)  3   R Pupil Size (mm)  3   L Pupil Shape  Round   R Pupil Shape  Round          ED Medications  Medications   sodium chloride 0 9 % bolus 1,000 mL (0 mL Intravenous Stopped 3/22/21 1604)   adenosine (ADENOCARD) injection 6 mg (6 mg Intravenous Given 3/22/21 1600)   diltiazem (CARDIZEM) 125 mg in sodium chloride 0 9 % 125 mL infusion (15 mg/hr Intravenous Rate/Dose Change 3/22/21 1944)   diltiazem (CARDIZEM) injection 20 mg (20 mg Intravenous Given 3/22/21 1633)   iohexol (OMNIPAQUE) 350 MG/ML injection (MULTI-DOSE) 85 mL (85 mL Intravenous Given 3/22/21 1934)       Diagnostic Studies  Results Reviewed     Procedure Component Value Units Date/Time    D-dimer, quantitative [[id number]]  (Abnormal) Collected: 03/22/21 1507    Lab Status: Final result Specimen: Blood from Arm, Right Updated: 03/22/21 1543     D-Dimer, Quant 5 12 ug/ml FEU     Comprehensive metabolic panel [[id number]]  (Abnormal) Collected: 03/22/21 1458    Lab Status: Final result Specimen: Blood from Arm, Right Updated: 03/22/21 1541     Sodium 145 mmol/L      Potassium 4 2 mmol/L      Chloride 108 mmol/L      CO2 28 mmol/L      ANION GAP 9 mmol/L      BUN 18 mg/dL      Creatinine 1 43 mg/dL      Glucose 100 mg/dL      Calcium 9 2 mg/dL      Corrected Calcium 10 6 mg/dL      AST 24 U/L      ALT 17 U/L      Alkaline Phosphatase 82 U/L      Total Protein 7 4 g/dL      Albumin 2 3 g/dL      Total Bilirubin 0 53 mg/dL      eGFR 32 ml/min/1 73sq m     Narrative:      National Kidney Disease Foundation guidelines for Chronic Kidney Disease (CKD):     Stage 1 with normal or high GFR (GFR > 90 mL/min/1 73 square meters)    Stage 2 Mild CKD (GFR = 60-89 mL/min/1 73 square meters)    Stage 3A Moderate CKD (GFR = 45-59 mL/min/1 73 square meters)    Stage 3B Moderate CKD (GFR = 30-44 mL/min/1 73 square meters)    Stage 4 Severe CKD (GFR = 15-29 mL/min/1 73 square meters)    Stage 5 End Stage CKD (GFR <15 mL/min/1 73 square meters)  Note: GFR calculation is accurate only with a steady state creatinine    Troponin I [[id number]]  (Normal) Collected: 03/22/21 1458    Lab Status: Final result Specimen: Blood from Arm, Right Updated: 03/22/21 1541     Troponin I 0 04 ng/mL     CBC and differential [[id number]]  (Abnormal) Collected: 03/22/21 1458    Lab Status: Final result Specimen: Blood from Arm, Right Updated: 03/22/21 1527     WBC 5 64 Thousand/uL      RBC 4 36 Million/uL      Hemoglobin 11 6 g/dL      Hematocrit 38 5 %      MCV 88 fL      MCH 26 6 pg      MCHC 30 1 g/dL      RDW 19 0 %      MPV 9 3 fL      Platelets 582 Thousands/uL      nRBC 0 /100 WBCs      Neutrophils Relative 76 %      Immat GRANS % 0 %      Lymphocytes Relative 13 %      Monocytes Relative 7 %      Eosinophils Relative 3 %      Basophils Relative 1 %      Neutrophils Absolute 4 30 Thousands/µL      Immature Grans Absolute 0 02 Thousand/uL      Lymphocytes Absolute 0 74 Thousands/µL      Monocytes Absolute 0 40 Thousand/µL      Eosinophils Absolute 0 14 Thousand/µL      Basophils Absolute 0 04 Thousands/µL                  CTA ED chest PE study   Final Result by Romain Avendano MD (03/22 2010)      1  No evidence of pulmonary embolus  2   Diffuse abnormality throughout both lungs with combination of groundglass attenuation, consolidation and linear opacities  This can be part of the expected temporal change of Covid pneumonia during progression in recovery  However, superimposed    bacterial pneumonia cannot be excluded  3   Small right pleural effusion  4   1 5 x 1 8 cm soft tissue structure in the right breast, with slight increase in size since 2017, possibly an indolent breast cancer  Reference:Yordy Y, et al  Temporal changes of CT findings in 90 patients with COVID-19 pneumonia: a longitudinal study  Radiology 2020; [phone]  Workstation performed: [id number]         XR chest 1 view portable   Final Result by Burgess Moncho MD (03/22 1816)      Worsening bilateral pneumonia  A VQ scan will likely be non-diagnostic                  Workstation performed: [id number]                    Procedures  ECG 12 Lead Documentation Only    Date/Time: 3/22/2021 2:45 PM  Performed by: Sav Jorgensen MD  Authorized by: Sav Jorgensen MD     Indications / Diagnosis:  Palpitations, rapid heart rate  ECG reviewed by me, the ED Provider: yes    Patient location:  ED  Previous ECG:     Previous ECG:  Compared to current    Comparison ECG info:  2/28/21    Similarity:  Changes noted  Interpretation:     Interpretation: abnormal    Rate:     ECG rate:  162    ECG rate assessment: tachycardic    Rhythm:     Rhythm comment:  I suspect this is atrial flutter with a 2:1 block but the apparent rhythm is sinus tachycardia  Ectopy:     Ectopy: none    QRS:     QRS axis:  Left    QRS intervals:  Normal  Conduction:     Conduction: normal    ST segments:     ST segments:  Normal  T waves:     T waves: normal    CriticalCare Time  Performed by: Sav Jorgensen MD  Authorized by: Sav Jorgensen MD     Critical care provider statement:     Critical care time (minutes):  45    Critical care time was exclusive of:  Separately billable procedures and treating other patients and teaching time    Critical care was necessary to treat or prevent imminent or life-threatening deterioration of the following conditions:  Cardiac failure    Critical care was time spent personally by me on the following activities:  Blood draw for specimens, obtaining history from patient or surrogate, development of treatment plan with patient or surrogate, evaluation of patient's response to treatment, examination of patient, interpretation of cardiac output measurements, ordering and performing treatments and interventions, ordering and review of laboratory studies, ordering and review of radiographic studies, re-evaluation of patient's condition and review of old charts             ED Course  ED Course as of Mar 22 2036   Mon Mar 22, 2021   1600 We gave the patient 6 mg of adenosine through her right ac with fast push  There was a brief break in her heart rhythm revealing an underlying atrial flutter  1630 At this time I am going to administer diltiazem with a goal of rate control, once rate control is obtained I will order a CTA-PE study                           PERC Rule for PE      Most Recent Value   PERC Rule for PE   Age >=50  1 Filed at: 03/22/2021 1627   HR >=100  1 Filed at: 03/22/2021 1627   O2 Sat on room air < 95%  1 Filed at: 03/22/2021 1627   History of PE or DVT  0 Filed at: 03/22/2021 1627   Recent trauma or surgery  0 Filed at: 03/22/2021 1627   Hemoptysis  0 Filed at: 03/22/2021 1627   Exogenous estrogen  0 Filed at: 03/22/2021 1627   Unilateral leg swelling  0 Filed at: 03/22/2021 1627   PERC Rule for PE Results  3 Filed at: 03/22/2021 1627              SBIRT 20yo+      Most Recent Value   SBIRT (25 yo +)   In order to provide better care to our patients, we are screening all of our patients for alcohol and drug use  Would it be okay to ask you these screening questions? Unable to answer at this time Filed at: 03/22/2021 1900          Wells' Criteria for PE      Most Recent Value   Wells' Criteria for PE   Clinical signs and symptoms of DVT  0 Filed at: 03/22/2021 1627   PE is primary diagnosis or equally likely  3 Filed at: 03/22/2021 1627   HR >100  1 5 Filed at: 03/22/2021 1627   Immobilization at least 3 days or Surgery in the previous 4 weeks  0 Filed at: 03/22/2021 1627   Previous, objectively diagnosed PE or DVT  0 Filed at: 03/22/2021 1627   Hemoptysis  0 Filed at: 03/22/2021 1627   Malignancy with treatment within 6 months or palliative  0 Filed at: 03/22/2021 1627   Wells' Criteria Total  4 5 Filed at: 03/22/2021 1627                MDM  Number of Diagnoses or Management Options  Atrial flutter ([address] ): new and requires workup  Rapid heart rate: new and requires workup  Diagnosis management comments: Background: 80 y o  female presents with rapid heart rate    Differential DX includes but is not limited to: sinus tachycardia (possibly due to PE), atrial flutter with a 2:1 block    Plan: cardiac workup, ddimer, likely will give adenosine to see underlying rhythm              Amount and/or Complexity of Data Reviewed  Clinical lab tests: ordered and reviewed  Tests in the radiology section of CPT®: ordered and reviewed    Risk of Complications, Morbidity, and/or Mortality  Presenting problems: high  Diagnostic procedures: high  Management options: high    Patient Progress  Patient progress: stable      Disposition  Final diagnoses:   Atrial flutter ([address] )   Rapid heart rate     Time reflects when diagnosis was documented in both MDM as applicable and the Disposition within this note     Time User Action Codes Description Comment    3/22/2021  8:32 PM Casper Singh Add [I48 92] Atrial flutter ([address] )     3/22/2021  8:32 PM Casper Singh Add [R00 0] Rapid heart rate       ED Disposition     ED Disposition Condition Date/Time Comment    Admit Stable Mon Mar 22, 2021  8:32 PM Case was discussed with Maryann Officer and the patient's admission status was agreed to be Admission Status: inpatient status to the service of Dr Sagar Mcdonnell   Follow-up Information    None         Patient's Medications   Discharge Prescriptions    No medications on file     No discharge procedures on file      PDMP Review     None          ED Provider  Electronically Signed by           Desean Vidal MD  03/22/21 2036

## 2021-03-22 NOTE — ASSESSMENT & PLAN NOTE
Lab Results   Component Value Date    EGFR 38 03/09/2021    EGFR 39 03/08/2021    EGFR 42 03/07/2021    CREATININE 1 24 03/09/2021    CREATININE 1 22 03/08/2021    CREATININE 1 15 03/07/2021   Monitor renal function

## 2021-03-23 ENCOUNTER — APPOINTMENT (INPATIENT)
Dept: NON INVASIVE DIAGNOSTICS | Facility: HOSPITAL | Age: 86
DRG: 308 | End: 2021-03-23
Payer: MEDICARE

## 2021-03-23 PROBLEM — J96.11 CHRONIC RESPIRATORY FAILURE WITH HYPOXIA (HCC): Status: ACTIVE | Noted: 2021-03-22

## 2021-03-23 PROBLEM — J96.21 ACUTE ON CHRONIC RESPIRATORY FAILURE WITH HYPOXIA (HCC): Status: ACTIVE | Noted: 2021-03-22

## 2021-03-23 LAB
ANION GAP SERPL CALCULATED.3IONS-SCNC: 7 MMOL/L (ref 4–13)
BUN SERPL-MCNC: 12 MG/DL (ref 5–25)
CALCIUM SERPL-MCNC: 8.7 MG/DL (ref 8.3–10.1)
CHLORIDE SERPL-SCNC: 108 MMOL/L (ref 100–108)
CO2 SERPL-SCNC: 26 MMOL/L (ref 21–32)
CREAT SERPL-MCNC: 1.03 MG/DL (ref 0.6–1.3)
GFR SERPL CREATININE-BSD FRML MDRD: 48 ML/MIN/1.73SQ M
GLUCOSE SERPL-MCNC: 95 MG/DL (ref 65–140)
MAGNESIUM SERPL-MCNC: 2.1 MG/DL (ref 1.6–2.6)
POTASSIUM SERPL-SCNC: 4.3 MMOL/L (ref 3.5–5.3)
SODIUM SERPL-SCNC: 141 MMOL/L (ref 136–145)

## 2021-03-23 PROCEDURE — 80048 BASIC METABOLIC PNL TOTAL CA: CPT | Performed by: INTERNAL MEDICINE

## 2021-03-23 PROCEDURE — 93306 TTE W/DOPPLER COMPLETE: CPT | Performed by: INTERNAL MEDICINE

## 2021-03-23 PROCEDURE — 99232 SBSQ HOSP IP/OBS MODERATE 35: CPT | Performed by: INTERNAL MEDICINE

## 2021-03-23 PROCEDURE — 93306 TTE W/DOPPLER COMPLETE: CPT

## 2021-03-23 PROCEDURE — 99254 IP/OBS CNSLTJ NEW/EST MOD 60: CPT | Performed by: INTERNAL MEDICINE

## 2021-03-23 RX ORDER — METOPROLOL TARTRATE 5 MG/5ML
5 INJECTION INTRAVENOUS EVERY 6 HOURS PRN
Status: DISCONTINUED | OUTPATIENT
Start: 2021-03-23 | End: 2021-03-24

## 2021-03-23 RX ORDER — FUROSEMIDE 10 MG/ML
20 INJECTION INTRAMUSCULAR; INTRAVENOUS ONCE
Status: COMPLETED | OUTPATIENT
Start: 2021-03-23 | End: 2021-03-23

## 2021-03-23 RX ORDER — METOPROLOL TARTRATE 5 MG/5ML
5 INJECTION INTRAVENOUS ONCE
Status: COMPLETED | OUTPATIENT
Start: 2021-03-23 | End: 2021-03-23

## 2021-03-23 RX ORDER — DILTIAZEM HYDROCHLORIDE 60 MG/1
60 TABLET, FILM COATED ORAL EVERY 6 HOURS SCHEDULED
Status: DISCONTINUED | OUTPATIENT
Start: 2021-03-23 | End: 2021-03-24

## 2021-03-23 RX ADMIN — DILTIAZEM HYDROCHLORIDE 5 MG/HR: 5 INJECTION INTRAVENOUS at 00:06

## 2021-03-23 RX ADMIN — FUROSEMIDE 20 MG: 10 INJECTION, SOLUTION INTRAVENOUS at 16:51

## 2021-03-23 RX ADMIN — METOROPROLOL TARTRATE 5 MG: 5 INJECTION, SOLUTION INTRAVENOUS at 16:47

## 2021-03-23 RX ADMIN — APIXABAN 2.5 MG: 2.5 TABLET, FILM COATED ORAL at 08:37

## 2021-03-23 RX ADMIN — ATORVASTATIN CALCIUM 40 MG: 40 TABLET, FILM COATED ORAL at 23:07

## 2021-03-23 RX ADMIN — APIXABAN 2.5 MG: 2.5 TABLET, FILM COATED ORAL at 12:41

## 2021-03-23 RX ADMIN — SODIUM CHLORIDE 50 ML/HR: 0.9 INJECTION, SOLUTION INTRAVENOUS at 00:06

## 2021-03-23 RX ADMIN — APIXABAN 5 MG: 5 TABLET, FILM COATED ORAL at 17:10

## 2021-03-23 RX ADMIN — METOPROLOL TARTRATE 25 MG: 25 TABLET, FILM COATED ORAL at 17:12

## 2021-03-23 RX ADMIN — DILTIAZEM HYDROCHLORIDE 60 MG: 60 TABLET, FILM COATED ORAL at 23:07

## 2021-03-23 RX ADMIN — METOPROLOL TARTRATE 25 MG: 25 TABLET, FILM COATED ORAL at 10:20

## 2021-03-23 RX ADMIN — DILTIAZEM HYDROCHLORIDE 60 MG: 60 TABLET, FILM COATED ORAL at 17:10

## 2021-03-23 NOTE — PLAN OF CARE
Problem: Prexisting or High Potential for Compromised Skin Integrity  Goal: Skin integrity is maintained or improved  Description: INTERVENTIONS:  - Identify patients at risk for skin breakdown  - Assess and monitor skin integrity  - Assess and monitor nutrition and hydration status  - Monitor labs   - Assess for incontinence   - Turn and reposition patient  - Assist with mobility/ambulation  - Relieve pressure over bony prominences  - Avoid friction and shearing  - Provide appropriate hygiene as needed including keeping skin clean and dry  - Evaluate need for skin moisturizer/barrier cream  - Collaborate with interdisciplinary team   - Patient/family teaching  - Consider wound care consult   Outcome: Progressing     Problem: Potential for Falls  Goal: Patient will remain free of falls  Description: INTERVENTIONS:  - Assess patient frequently for physical needs  -  Identify cognitive and physical deficits and behaviors that affect risk of falls    -  [place] fall precautions as indicated by assessment   - Educate patient/family on patient safety including physical limitations  - Instruct patient to call for assistance with activity based on assessment  - Modify environment to reduce risk of injury  - Consider OT/PT consult to assist with strengthening/mobility  Outcome: Progressing     Problem: CARDIOVASCULAR - ADULT  Goal: Maintains optimal cardiac output and hemodynamic stability  Description: INTERVENTIONS:  - Monitor I/O, vital signs and rhythm  - Monitor for S/S and trends of decreased cardiac output  - Administer and titrate ordered vasoactive medications to optimize hemodynamic stability  - Assess quality of pulses, skin color and temperature  - Assess for signs of decreased coronary artery perfusion  - Instruct patient to report change in severity of symptoms  Outcome: Progressing  Goal: Absence of cardiac dysrhythmias or at baseline rhythm  Description: INTERVENTIONS:  - Continuous cardiac monitoring, vital signs, obtain 12 lead EKG if ordered  - Administer antiarrhythmic and heart rate control medications as ordered  - Monitor electrolytes and administer replacement therapy as ordered  Outcome: Progressing

## 2021-03-23 NOTE — CONSULTS
Cardiology   Shannen Wilde 80 y o  female MRN: [id number]  Unit/Bed#: S -01 Encounter: [id number]      Reason for Consult / Principal Problem:  Atrial flutter with RVR    Physician Requesting Consult:  Oly Baker MD    Cardiologist: None    Assessment  1  New onset atrial flutter with RVR  -No history of  -Asymptomatic  -12 lead ECG 3/22; atrial flutter with 2-1 conduction, rate 160 to bpm  -24 hour telemetry review; atrial flutter with variable rates, currently in the 110s  -Currently on IV Cardizem GTT at 7 5 mg/hour  -On Eliquis 2 5 mg b i d  (CHADS2 Vasc score = 3)  -TSH level 1 92    2  Hx of COVID-19 PNA infection (hospitalization 2/28 through 3/10/2021)  -Discharged on 2 5 L NC -- currently on 4 L NC -- sat 95%  -CTA chest PE study -- no evidence of PE, diffuse abnormality throughout both lungs with combination of ground-glass attenuation, consolidation and linear opacities, small right pleural effusion    3  Dyslipidemia  -No recent lipid profile for review  -On atorvastatin 40 mg daily    4  CKD stage 3  -Baseline creatinine around 1 2-1 3  -Creatinine 1 43 on admission    Plan  -Obtain TTE  -Adjust Eliquis to 5 mg b i d  (would be therapeutic for her weight, and creatinine <1 5)  -Rate control strategy recommended; caution anti arrhythmic use given unclear timing/onset atrial flutter not on anticoagulation; --- for now will start oral metoprolol tartrate 25 mg t i d and wean down IV Cardizem for HR goal <100  -Monitor renal function electrolytes closely -- obtain a m  BMP/Mag level  -Monitor on telemetry    HPI: Shannen Wilde 80y o  year old female with a medical history of dyslipidemia, CKD stage 3 recent COVID-19 PNA infection (2/2021)  No known history of CAD, HF, or any known cardiac arrhythmias  She did not previously follow with a cardiologist     Patient was recently hospitalized at the [address] [place] [address] 2/28 through 3/01/21 with COVID-19 PNA    Documentation does not reflect the presence of any abnormal heart rhythms identified during this hospital stay  She had persistent supplemental O2 requirements, and did require 2 5 L of O2 on discharge  Her D-dimer level was elevated, and thus met criteria for it initiation of systemic anticoagulation on discharged with Eliquis 2 5 mg b i d  However her report patient did not fill her prescription of Eliquis on discharge  She presented to the [address] [place] [address] on 3/22/21 after having been evaluated by her PCP yesterday for routine hospital follow-up  She was found to have elevated heart rates in the upper 160s, and O2 sat's of around 90% on 2 L NC  She was referred to the ED for further evaluation  ECG obtained in the ED demonstrated atrial flutter with 2-1 conduction, rate 160 to bpm   In the ED she received 20 mg IV Cardizem bolus, and was initiated on a IV Cardizem infusion  She was initiated on Eliquis 2 5 mg b i d  For systemic anticoagulation  BMP/CBC relatively unremarkable with the exception of creatinine level slightly elevated above baseline  She did receive a IV fluid bolus for hydration in this regard  Cardiology was consulted for further treatment recommendations/management    Family History:   Family History   Problem Relation Age of Onset    Diabetes Father     Cancer Family      Historical Information   History reviewed  No pertinent past medical history  Past Surgical History:   Procedure Laterality Date    CATARACT EXTRACTION      EGD AND COLONOSCOPY N/A 11/14/2017    Procedure: EGD AND COLONOSCOPY;  Surgeon: Janet Cuellar MD;  Location[facility];   Service: Gastroenterology    HIP FRACTURE SURGERY       Social History   Social History     Substance and Sexual Activity   Alcohol Use No     Social History     Substance and Sexual Activity   Drug Use No     Social History     Tobacco Use   Smoking Status Never Smoker   Smokeless Tobacco Never Used   Tobacco Comment    former smoker, per [facility]; started smoking at 11 yo, stopped at 26 yo     Family History:   Family History   Problem Relation Age of Onset    Diabetes Father     Cancer Family        Review of Systems:  Review of Systems   Constitutional: Negative for chills, fatigue and fever  HENT: Negative for congestion  Eyes: Negative for visual disturbance  Respiratory: Negative for cough, chest tightness and shortness of breath  Cardiovascular: Negative for chest pain, palpitations and leg swelling  Gastrointestinal: Negative for abdominal pain  Neurological: Negative for dizziness, light-headedness and headaches  All other systems reviewed and are negative  Scheduled Meds:  Current Facility-Administered Medications   Medication Dose Route Frequency Provider Last Rate    apixaban  2 5 mg Oral BID Chan Aragon MD      atorvastatin  40 mg Oral HS Chan Aragon MD      diltiazem  1-15 mg/hr Intravenous Continuous Chan Aragon MD 7 5 mg/hr (03/23/21 0645)    sodium chloride  50 mL/hr Intravenous Continuous Chan Aragon MD 50 mL/hr (03/23/21 0006)     Continuous Infusions:diltiazem, 1-15 mg/hr, Last Rate: 7 5 mg/hr (03/23/21 0645)  sodium chloride, 50 mL/hr, Last Rate: 50 mL/hr (03/23/21 0006)      PRN Meds:   all current active meds have been reviewed and current meds:   Current Facility-Administered Medications   Medication Dose Route Frequency    apixaban (ELIQUIS) tablet 2 5 mg  2 5 mg Oral BID    atorvastatin (LIPITOR) tablet 40 mg  40 mg Oral HS    diltiazem (CARDIZEM) 125 mg in sodium chloride 0 9 % 125 mL infusion  1-15 mg/hr Intravenous Continuous    sodium chloride 0 9 % infusion  50 mL/hr Intravenous Continuous       Allergies   Allergen Reactions    Penicillins Rash       Objective   Vitals: Blood pressure 126/76, pulse (!) 114, temperature 97 5 °F (36 4 °C), temperature source Oral, resp  rate 19, height 5' 1" (1 549 m), weight 66 7 kg (147 lb), SpO2 95 %  , Body mass index is 27 78 kg/m²  , Orthostatic Blood Pressures      Most Recent Value   Blood Pressure  126/76 filed at 03/23/2021 3285   Patient Position - Orthostatic VS  Lying filed at 03/23/2021 0533            Intake/Output Summary (Last 24 hours) at 3/23/2021 0930  Last data filed at 3/23/2021 6843  Gross per 24 hour   Intake 1000 ml   Output 225 ml   Net 775 ml       Invasive Devices     Peripheral Intravenous Line            Peripheral IV 03/22/21 Left Antecubital less than 1 day    Peripheral IV 03/22/21 Right Antecubital less than 1 day                Physical Exam:  Physical Exam  Vitals signs and nursing note reviewed  Constitutional:       General: She is not in acute distress  Appearance: Normal appearance  She is not ill-appearing  HENT:      Head: Normocephalic and atraumatic  Mouth/Throat:      Mouth: Mucous membranes are moist    Eyes:      General: No scleral icterus  Neck:      Musculoskeletal: Normal range of motion and neck supple  Cardiovascular:      Rate and Rhythm: Tachycardia present  Rhythm irregular  Pulses: Normal pulses  Heart sounds: Normal heart sounds  No murmur  Pulmonary:      Effort: Pulmonary effort is normal       Breath sounds: Rales present  No wheezing  Abdominal:      General: Bowel sounds are normal       Palpations: Abdomen is soft  Tenderness: There is no abdominal tenderness  Musculoskeletal:      Right lower leg: No edema  Left lower leg: No edema  Skin:     General: Skin is warm and dry  Capillary Refill: Capillary refill takes less than 2 seconds  Neurological:      General: No focal deficit present  Mental Status: She is alert and oriented to person, place, and time     Psychiatric:         Mood and Affect: Mood normal          Lab Results:   Recent Results (from the past 24 hour(s))   CBC and differential    Collection Time: 03/22/21  2:58 PM   Result Value Ref Range    WBC 5 64 4 31 - 10 16 Thousand/uL    RBC 4 36 3 81 - 5 12 Million/uL Hemoglobin 11 6 11 5 - 15 4 g/dL    Hematocrit 38 5 34 8 - 46 1 %    MCV 88 82 - 98 fL    MCH 26 6 (L) 26 8 - 34 3 pg    MCHC 30 1 (L) 31 4 - 37 4 g/dL    RDW 19 0 (H) 11 6 - 15 1 %    MPV 9 3 8 9 - 12 7 fL    Platelets 476 164 - 667 Thousands/uL    nRBC 0 /100 WBCs    Neutrophils Relative 76 (H) 43 - 75 %    Immat GRANS % 0 0 - 2 %    Lymphocytes Relative 13 (L) 14 - 44 %    Monocytes Relative 7 4 - 12 %    Eosinophils Relative 3 0 - 6 %    Basophils Relative 1 0 - 1 %    Neutrophils Absolute 4 30 1 85 - 7 62 Thousands/µL    Immature Grans Absolute 0 02 0 00 - 0 20 Thousand/uL    Lymphocytes Absolute 0 74 0 60 - 4 47 Thousands/µL    Monocytes Absolute 0 40 0 17 - 1 22 Thousand/µL    Eosinophils Absolute 0 14 0 00 - 0 61 Thousand/µL    Basophils Absolute 0 04 0 00 - 0 10 Thousands/µL   Comprehensive metabolic panel    Collection Time: 03/22/21  2:58 PM   Result Value Ref Range    Sodium 145 136 - 145 mmol/L    Potassium 4 2 3 5 - 5 3 mmol/L    Chloride 108 100 - 108 mmol/L    CO2 28 21 - 32 mmol/L    ANION GAP 9 4 - 13 mmol/L    BUN 18 5 - 25 mg/dL    Creatinine 1 43 (H) 0 60 - 1 30 mg/dL    Glucose 100 65 - 140 mg/dL    Calcium 9 2 8 3 - 10 1 mg/dL    Corrected Calcium 10 6 (H) 8 3 - 10 1 mg/dL    AST 24 5 - 45 U/L    ALT 17 12 - 78 U/L    Alkaline Phosphatase 82 46 - 116 U/L    Total Protein 7 4 6 4 - 8 2 g/dL    Albumin 2 3 (L) 3 5 - 5 0 g/dL    Total Bilirubin 0 53 0 20 - 1 00 mg/dL    eGFR 32 ml/min/1 73sq m   Troponin I    Collection Time: 03/22/21  2:58 PM   Result Value Ref Range    Troponin I 0 04 <=0 04 ng/mL   TSH, 3rd generation with Free T4 reflex    Collection Time: 03/22/21  2:58 PM   Result Value Ref Range    TSH 3RD GENERATON 1 918 0 358 - 3 740 uIU/mL   D-dimer, quantitative    Collection Time: 03/22/21  3:07 PM   Result Value Ref Range    D-Dimer, Quant 5 12 (H) <0 50 ug/ml FEU     Imaging: I have personally reviewed pertinent reports     and I have personally reviewed pertinent films in PACS  Code Status: Level 1 full code  Epic/ Allscripts/Care Everywhere records reviewed:  Yes    * Please Note: Fluency DirectDictation voice to text software may have been used in the creation of this document   **

## 2021-03-23 NOTE — PROGRESS NOTES
[address]   Progress Note Santa Rawls 12/13/1929, 80 y o  female MRN: [id number]  Unit/Bed#: S -01 Encounter: [id number]  Primary Care Provider: Yuliet Conn MD   Date and time admitted to hospital: 3/22/2021  2:47 PM    * Atrial flutter [facility])  Assessment & Plan  Patient presented with new onset Atrial Flutter with heart rate of 162  Adenosine given to patient  Diltiazem drip started  Trops negative  Patients Heart Rate now 98  Patient is in no acute distress  Of note, patient was prescribed eliquis after being discharged with COVID, however she did not  the medication  · Cardiology consulted, appreciate recs  · Start oral metoprolol tartrate 25 mg TID and wean down IV Cardizem  · Monitor on telemetry   · Increase Eliquis to 5 mg BID  · Cardiac diet  · Pending echo    Chronic respiratory failure with hypoxia [facility])  Assessment & Plan  Patient recently discharged from hospital with 2 5L NC o2 after COVID-19 infection  Has been compliant with oxygen supplementation, but has felt short of breath  Saturating 91% on arrival to ED  Currently on 4L satting 97%  - Continue o2 supplementation @4L    CKD (chronic kidney disease) stage 3, GFR 30-59 ml/min  Assessment & Plan  Lab Results   Component Value Date    EGFR 48 03/23/2021    EGFR 32 03/22/2021    EGFR 38 03/09/2021    CREATININE 1 03 03/23/2021    CREATININE 1 43 (H) 03/22/2021    CREATININE 1 24 03/09/2021     · GFR 32 upon admission  Stable  · Elevated creatinine on admission, now improving  · Avoid nephrotoxins  · Monitor renal function    Elevated d-dimer  Assessment & Plan  Patient had elevated in d-dimer during hospital admission earlier this month  D-Dimer in ED 3/22/2021 noted to be 5  12  PE scan negative  Recent Labs     03/22/21  1507   DDIMER 5 12*     - Increased dose of  eliquis 5 mg BID  - Monitor d-dimer trend    Elevated serum creatinine  Assessment & Plan  Creatinine noted to be 1 43 in ED  Baseline around 1 2  Patient has no urinary complaints  Recent Labs     21  1458 21  1057   CREATININE 1 43* 1 03   EGFR 32 48       Plan:  · Improving without IV fluids  · Continue to monitor creatinine on a m  Labs  · Avoid Nephrotoxins      VTE Pharmacologic Prophylaxis:   Pharmacologic: Apixaban (Eliquis)  Mechanical VTE Prophylaxis in Place: No    Discussions with Specialists or Other Care Team Provider: Cardiology    Education and Discussions with Family / Patient: Patient, will update daughter    Current Length of Stay: 1 day(s)    Current Patient Status: Inpatient     Discharge Plan / Estimated Discharge Date: pending clinical course     Code Status: Level 1 - Full Code      Subjective:   Patient is alert and in no acute distress  She denies any chest pain, palpitations or shortness of breath  She is currently on 4 L of oxygen, usually wears around 2 5 L at home after her last hospital discharge  Objective:     Vitals:   Temp (24hrs), Av 8 °F (36 6 °C), Min:97 5 °F (36 4 °C), Max:98 5 °F (36 9 °C)    Temp:  [97 5 °F (36 4 °C)-98 5 °F (36 9 °C)] 97 5 °F (36 4 °C)  HR:  [] 114  Resp:  [16-20] 19  BP: (106-157)/(61-90) 121/79  SpO2:  [90 %-97 %] 95 %  Body mass index is 27 78 kg/m²  Input and Output Summary (last 24 hours): Intake/Output Summary (Last 24 hours) at 3/23/2021 1317  Last data filed at 3/23/2021 0918  Gross per 24 hour   Intake 1350 ml   Output 625 ml   Net 725 ml       Physical Exam:     Physical Exam  Constitutional:       Appearance: Normal appearance  Cardiovascular:      Rate and Rhythm: Rhythm irregularly irregular  Pulmonary:      Effort: Pulmonary effort is normal       Breath sounds: Examination of the right-lower field reveals rales  Examination of the left-lower field reveals rales  Rales present  Abdominal:      General: Bowel sounds are normal       Palpations: Abdomen is soft  Tenderness: There is no abdominal tenderness  Musculoskeletal:      Right lower leg: Edema present  Left lower leg: Edema present  Skin:     General: Skin is warm and dry  Neurological:      General: No focal deficit present  Mental Status: She is alert and oriented to person, place, and time  Psychiatric:         Mood and Affect: Mood normal          Behavior: Behavior normal            Additional Data:     Labs:    Results from last 7 days   Lab Units 03/22/21  1458   WBC Thousand/uL 5 64   HEMOGLOBIN g/dL 11 6   HEMATOCRIT % 38 5   PLATELETS Thousands/uL 213   NEUTROS PCT % 76*   LYMPHS PCT % 13*   MONOS PCT % 7   EOS PCT % 3     Results from last 7 days   Lab Units 03/23/21  1057 03/22/21  1458   POTASSIUM mmol/L 4 3 4 2   CHLORIDE mmol/L 108 108   CO2 mmol/L 26 28   BUN mg/dL 12 18   CREATININE mg/dL 1 03 1 43*   CALCIUM mg/dL 8 7 9 2   ALK PHOS U/L  --  82   ALT U/L  --  17   AST U/L  --  24           * I Have Reviewed All Lab Data Listed Above  * Additional Pertinent Lab Tests Reviewed: All Labs Within Last 24 Hours Reviewed    Imaging:    Imaging Reports Reviewed Today Include:  Chest x-ray, CTA  Imaging Personally Reviewed by Myself Includes:  None    Recent Cultures (last 7 days):           Last 24 Hours Medication List:   Current Facility-Administered Medications   Medication Dose Route Frequency Provider Last Rate    apixaban  5 mg Oral BID Miguel Boas, CRNP      atorvastatin  40 mg Oral HS Richie Frausto MD      diltiazem  1-15 mg/hr Intravenous Continuous Richie Frausto MD 2 5 mg/hr (03/23/21 1144)    metoprolol tartrate  25 mg Oral TID Miguel Boas, CRNP          Today, Patient Was Seen By: Yanni Khan DO    ** Please Note: This note has been constructed using a voice recognition system   **

## 2021-03-23 NOTE — ASSESSMENT & PLAN NOTE
Lab Results   Component Value Date    EGFR 32 03/22/2021    EGFR 38 03/09/2021    EGFR 39 03/08/2021    CREATININE 1 43 (H) 03/22/2021    CREATININE 1 24 03/09/2021    CREATININE 1 22 03/08/2021     · GFR 32 upon admission   Stable  · Elevated creatinine on admission, now improving  · Avoid nephrotoxins  · Monitor renal function

## 2021-03-23 NOTE — ASSESSMENT & PLAN NOTE
Creatinine noted to be 1 43 in ED  Baseline around 1 2  Patient has no urinary complaints  Recent Labs     03/22/21  1458 03/23/21  1057   CREATININE 1 43* 1 03   EGFR 32 48       Plan:  · Improving without IV fluids  · Continue to monitor creatinine on a m   Labs  · Avoid Nephrotoxins

## 2021-03-23 NOTE — ASSESSMENT & PLAN NOTE
Patient recently discharged from hospital with 2 5L NC o2 after COVID-19 infection  Has been compliant with oxygen supplementation, but has felt short of breath  Saturating 91% on arrival to ED   Currently on 4L satting 97%    - Continue o2 supplementation @4L

## 2021-03-23 NOTE — ASSESSMENT & PLAN NOTE
Patient had elevated in d-dimer during hospital admission earlier this month  D-Dimer in ED 3/22/2021 noted to be 5  12  PE scan negative    Recent Labs     03/22/21  1507   DDIMER 5 12*     - Increased dose of  eliquis 5 mg BID  - Monitor d-dimer trend

## 2021-03-23 NOTE — ASSESSMENT & PLAN NOTE
Creatinine noted to be 1 43 in ED  Baseline around 1 2  Patient has no urinary complaints       - Continue to monitor creatinine  - Avoid Nephrotoxins

## 2021-03-23 NOTE — ASSESSMENT & PLAN NOTE
Patient had elevated in d-dimer during hospital admission earlier this month  D-Dimer in ED 3/22/2021 noted to be 5  12  PE scan negative      - Order eliquis 2 5mg BID  - Monitor d-dimer daily

## 2021-03-23 NOTE — ASSESSMENT & PLAN NOTE
Patient recently discharged from hospital with 2 5L NC o2 after COVID-19 infection  Has been compliant with oxygen supplementation, but has felt short of breath  Saturating 91% on arrival to ED  Currently on 4L satting 97%  - Patient removed oxygen overnight, was found resting comfortably but hypoxic  Unclear why oxygen was removed    - Continue o2 supplementation @6L, nursing weaning

## 2021-03-23 NOTE — ED NOTES
Left message with daughter per patients request, updating her on her admission status and condition     Gustabo Esparza, CAITLIN  03/22/21 4473

## 2021-03-23 NOTE — ASSESSMENT & PLAN NOTE
Lab Results   Component Value Date    EGFR 48 03/23/2021    EGFR 32 03/22/2021    EGFR 38 03/09/2021    CREATININE 1 03 03/23/2021    CREATININE 1 43 (H) 03/22/2021    CREATININE 1 24 03/09/2021     · GFR 32 upon admission   Stable  · Elevated creatinine on admission, now improving  · Avoid nephrotoxins  · Monitor renal function

## 2021-03-23 NOTE — ASSESSMENT & PLAN NOTE
Patient presented with new onset Atrial Flutter with heart rate of 162  Adenosine given to patient  Diltiazem drip started  Trops negative  Patients Heart Rate now 98  Patient is in no acute distress  Of note, patient was prescribed eliquis after being discharged with COVID, however she did not  the medication      · Cardiology consulted, appreciate recs  · Start oral metoprolol tartrate 25 mg TID and wean down IV Cardizem  · Monitor on telemetry   · Increase Eliquis to 5 mg BID  · Cardiac diet

## 2021-03-23 NOTE — H&P
[facility]  H&P- Lev Laurie 12/13/1929, 80 y o  female MRN: [id number]  Unit/Bed#: ED 16 Encounter: [id number]  Primary Care Provider: Luis F Robertson MD   Date and time admitted to hospital: 3/22/2021  2:47 PM    * Atrial flutter [facility])  Assessment & Plan  Patient presented with new onset Atrial Flutter with heart rate of 162  Adenosine given to patient  Diltiazem drip started  Trops negative  Patients Heart Rate now 98  Patient is in no acute distress  Of note, patient was prescribed eliquis after being discharged with COVID, however she did not  the medication     - Continue diltiazem drip  - Cardiology consulted, appreciate recommendations  - Order eliquis 2 5mg BID  - Monitor Vital Signs  - Cardiac Diet    Increased oxygen demand  Assessment & Plan  Patient recently discharged from hospital with 2 5L NC o2 after COVID-19 infection  Has been compliant with oxygen supplementation, but has felt short of breath  Saturating 91% on arrival to ED  Currently on 4L satting 97%    - Continue o2 supplementation @4L    CKD (chronic kidney disease) stage 3, GFR 30-59 ml/min  Assessment & Plan  Lab Results   Component Value Date    EGFR 32 03/22/2021    EGFR 38 03/09/2021    EGFR 39 03/08/2021    CREATININE 1 43 (H) 03/22/2021    CREATININE 1 24 03/09/2021    CREATININE 1 22 03/08/2021     GFR 32 upon admission  Stable    - Avoid nephrotoxins  - Monitor renal function    Elevated d-dimer  Assessment & Plan  Patient had elevated in d-dimer during hospital admission earlier this month  D-Dimer in ED 3/22/2021 noted to be 5  12  PE scan negative  - Order eliquis 2 5mg BID  - Monitor d-dimer daily    Elevated serum creatinine  Assessment & Plan  Creatinine noted to be 1 43 in ED  Baseline around 1 2  Patient has no urinary complaints       - Continue to monitor creatinine  - Avoid Nephrotoxins    VTE Prophylaxis: Apixaban (Eliquis)  / sequential compression device   Code Status: Full Code  POLST: POLST form is not discussed and not completed at this time  Anticipated Length of Stay:  Patient will be admitted on an Inpatient basis with an anticipated length of stay of  > 2 midnights  Justification for Hospital Stay: New Onset A  Flutter    Chief Complaint:   Rapid Heart Rate    History of Present Illness:    Jaclyn Fink is a 80 y o  female who presents after being seen in her primary care Physicians office  She was found to have an elevated HR and sent to the ED for workup  Patient was recently discharged from the hospital for COVID-19, discharged with lipitor and eliquis, however she did not fill the prescriptions  She was also discharged on 2 5L O2 NC which she has been compliant with  She had increasing O2 demand today    Review of Systems:    Review of Systems   Constitutional: Negative for chills, fatigue and fever  HENT: Negative for congestion, rhinorrhea, sneezing and sore throat  Respiratory: Positive for shortness of breath  Negative for cough, chest tightness and wheezing  Cardiovascular: Positive for palpitations  Negative for chest pain  Gastrointestinal: Negative for abdominal distention, abdominal pain, constipation, diarrhea, nausea and vomiting  Musculoskeletal: Negative for arthralgias, back pain, joint swelling and myalgias  Skin: Negative for rash  Neurological: Negative for dizziness, weakness, numbness and headaches  Hematological: Negative for adenopathy  Psychiatric/Behavioral: Negative for confusion  All other systems reviewed and are negative  Past Medical and Surgical History:     History reviewed  No pertinent past medical history  Past Surgical History:   Procedure Laterality Date    CATARACT EXTRACTION      EGD AND COLONOSCOPY N/A 11/14/2017    Procedure: EGD AND COLONOSCOPY;  Surgeon: Shireen Sweeney MD;  [facility];   Service: Gastroenterology    HIP FRACTURE SURGERY         Meds/Allergies:    Prior to Admission medications    Medication Sig Start Date End Date Taking? Authorizing Provider   apixaban (ELIQUIS) 2 5 mg Take 1 tablet (2 5 mg total) by mouth 2 (two) times a day  Patient not taking: Reported on 3/22/2021 3/10/21   [employer] DO   atorvastatin (LIPITOR) 40 mg tablet Take 1 tablet (40 mg total) by mouth daily at bedtime  Patient not taking: Reported on 3/22/2021 3/10/21   [employer]   benzonatate (TESSALON PERLES) 100 mg capsule Take 1 capsule (100 mg total) by mouth 3 (three) times a day as needed for cough  Patient not taking: Reported on 3/22/2021 3/10/21   [employer]   polyethylene glycol (MIRALAX) 17 g packet Take 17 g by mouth daily  Patient not taking: Reported on 3/22/2021 3/10/21   [employer] DO     I have reviewed home medications with patient personally  Allergies: Allergies   Allergen Reactions    Penicillins Rash       Social History:     Marital Status:    Substance Use History:   Social History     Substance and Sexual Activity   Alcohol Use No     Social History     Tobacco Use   Smoking Status Never Smoker   Smokeless Tobacco Never Used   Tobacco Comment    former smoker, per [employer];  started smoking at 13 yo, stopped at 26 yo     Social History     Substance and Sexual Activity   Drug Use No       Family History:    non-contributory    Physical Exam:     Vitals:   Blood Pressure: 128/72 (03/22/21 2100)  Pulse: 98 (03/22/21 2100)  Temperature: 98 5 °F (36 9 °C) (03/22/21 1444)  Temp Source: Oral (03/22/21 1444)  Respirations: 20 (03/22/21 2100)  Height: 5' 1" (154 9 cm) (03/22/21 1845)  Weight - Scale: 66 7 kg (147 lb) (03/22/21 1444)  SpO2: 97 % (03/22/21 2100)    Physical Exam  Vitals signs and nursing note reviewed  Constitutional:       General: She is not in acute distress  Appearance: She is not ill-appearing or toxic-appearing  HENT:      Head: Normocephalic and atraumatic  Nose: Nose normal  No congestion or rhinorrhea     Eyes: General: No scleral icterus  Right eye: No discharge  Left eye: No discharge  Conjunctiva/sclera: Conjunctivae normal    Cardiovascular:      Rate and Rhythm: Tachycardia present  Heart sounds: No murmur  Pulmonary:      Effort: No respiratory distress  Breath sounds: No wheezing  Abdominal:      General: Bowel sounds are normal  There is no distension  Tenderness: There is no abdominal tenderness  Musculoskeletal:      Right lower leg: Edema present  Left lower leg: Edema present  Skin:     Findings: No erythema or rash  Neurological:      General: No focal deficit present  Mental Status: She is alert  Psychiatric:         Mood and Affect: Mood normal          Behavior: Behavior normal            Additional Data:     Lab Results: I have personally reviewed pertinent reports  Results from last 7 days   Lab Units 03/22/21  1458   WBC Thousand/uL 5 64   HEMOGLOBIN g/dL 11 6   HEMATOCRIT % 38 5   PLATELETS Thousands/uL 213   NEUTROS PCT % 76*   LYMPHS PCT % 13*   MONOS PCT % 7   EOS PCT % 3     Results from last 7 days   Lab Units 03/22/21  1458   POTASSIUM mmol/L 4 2   CHLORIDE mmol/L 108   CO2 mmol/L 28   BUN mg/dL 18   CREATININE mg/dL 1 43*   CALCIUM mg/dL 9 2   ALK PHOS U/L 82   ALT U/L 17   AST U/L 24           Imaging: I have personally reviewed pertinent reports  Xr Chest 1 View Portable    Result Date: 3/22/2021  Narrative: CHEST INDICATION:   possible PE (evaluate likelihood of VQ scan being diagnostic)  Patient has confirmed COVID-19  Positive on 2/28/2021  COMPARISON:  Chest radiograph from 3/4/2021 and 2/28/2021  Chest CT from 3/1/2021  EXAM PERFORMED/VIEWS:  XR CHEST PORTABLE  FINDINGS: Mild cardiomegaly  Large hiatal hernia  Worsening bilateral pneumonia  No effusion or pneumothorax  Osseous structures normal for age  Calcified body adjacent to the left glenohumeral joint  Impression: Worsening bilateral pneumonia    A VQ scan will likely be non-diagnostic  Workstation performed: [id number]     Xr Chest Portable    Result Date: 3/5/2021  Narrative: CHEST INDICATION:   hypoxia; Covid +  Patient has confirmed COVID-19  COMPARISON:  February 28, 2021 EXAM PERFORMED/VIEWS:  XR CHEST PORTABLE FINDINGS: Cardiomediastinal silhouette appears unremarkable  Patchy groundglass airspace opacities throughout the visualized lung zones are slightly progressed from February 28, 2021  No large pleural effusion  No pneumothorax  Large hiatal hernia  Osseous structures appear within normal limits for patient age  Impression: Slight worsening of patchy multifocal airspace opacities in keeping with viral pneumonia due to COVID-19 infection  Workstation performed: [id number]     Xr Chest 1 View Portable    Result Date: 2/28/2021  Narrative: CHEST INDICATION:   Shortness of breath  Patient has confirmed COVID-19  Test date 2/28/2021 COMPARISON:  None EXAM PERFORMED/VIEWS:  XR CHEST PORTABLE FINDINGS: Heart shadow appears unremarkable  Atherosclerotic vascular calcifications are noted  Patchy right greater than left bibasilar peripheral groundglass opacities  No large pleural effusion or pneumothorax  There is a large hiatal hernia  Osseous structures appear within normal limits for patient age  Impression: Patchy peripheral basilar groundglass opacities suspicious for multifocal pneumonia in this patient with known Covid 19 infection  Workstation performed: [id number]     Cta Chest Pe Study    Result Date: 3/1/2021  Narrative: CTA - CHEST WITH IV CONTRAST - PULMONARY ANGIOGRAM INDICATION:   PE suspected, intermediate prob, positive D-dimer Elevated D-dimer and SOB concern for PE   "pt c/o generalized weakness for 3 days  per EMS, pt O2 was in the low 70s on RA "  Patient has confirmed COVID-19   COMPARISON: AP chest 2/28/2021 TECHNIQUE: CTA examination of the chest was performed using angiographic technique according to a protocol specifically tailored to evaluate for pulmonary embolism  Axial, sagittal, and coronal 2D reformatted images were created from the source data and  submitted for interpretation  In addition, coronal 3D MIP postprocessing was performed on the acquisition scanner  Radiation dose length product (DLP) for this visit:  440 mGy-cm   This examination, like all CT scans performed in the [facility], was performed utilizing techniques to minimize radiation dose exposure, including the use of iterative reconstruction and automated exposure control  IV Contrast:  85 mL of iohexol (OMNIPAQUE)  FINDINGS: PULMONARY ARTERIAL TREE:  No pulmonary embolus is seen  LUNGS:  Peripheral groundglass infiltrates throughout both lungs in keeping with Covid pneumonia in this Covid 19 positive patient  No endotracheal or endobronchial lesion  PLEURA:  Unremarkable  HEART/GREAT VESSELS:  Extensive coronary artery calcification is noted  Heart is otherwise unremarkable  No thoracic aortic aneurysm  MEDIASTINUM AND YOVANI:  Large hiatal hernia containing most of the stomach  CHEST WALL AND LOWER NECK:   Unremarkable  VISUALIZED STRUCTURES IN THE UPPER ABDOMEN:  Partially imaged right renal lesion measuring at least 7 1 cm in diameter #2/223 had the appearance of a hemorrhagic cyst on the prior noncontrast study but enhances on this study  Follow-up with post contrast  CT abdomen pelvis  OSSEOUS STRUCTURES:  No acute fracture or destructive osseous lesion  Impression: Peripheral groundglass infiltrates throughout both lungs in keeping with Covid pneumonia in this Covid 19 positive patient  No pulmonary arterial embolism  Partially imaged right renal lesion measuring at least 7 1 cm in diameter #2/223 had the appearance of a hemorrhagic cyst on the prior noncontrast study but enhances on this study  Follow-up with post contrast CT abdomen pelvis  Large hiatal hernia containing most of the stomach   The study was marked in [facility] for immediate notification  Workstation performed: [id number]     Cta Ed Chest Pe Study    Result Date: 3/22/2021  Narrative: CTA - CHEST WITH IV CONTRAST - PULMONARY ANGIOGRAM INDICATION:   PE suspected, intermediate prob, positive D-dimer markedly elevated ddimer, hypoxia, recent covid  80-year-old female  Confirmed diagnosis of COVID-19 earlier this month, requiring hospitalization  Since discharged  COMPARISON: CTA of the chest from March 1, 2021  TECHNIQUE: CTA examination of the chest was performed using angiographic technique according to a protocol specifically tailored to evaluate for pulmonary embolism  Axial, sagittal, and coronal 2D reformatted images were created from the source data and  submitted for interpretation  In addition, coronal 3D MIP postprocessing was performed on the acquisition scanner  Radiation dose length product (DLP) for this visit:  331 mGy-cm   This examination, like all CT scans performed in the [facility], was performed utilizing techniques to minimize radiation dose exposure, including the use of iterative reconstruction and automated exposure control  IV Contrast:  85 mL of iohexol (OMNIPAQUE)  FINDINGS: PULMONARY ARTERIAL TREE:  No pulmonary embolus is seen  LUNGS:  Mixed pattern of pulmonary opacities throughout both lungs, primarily peripheral in distribution with groundglass opacities, areas of consolidation and bandlike opacities, consistent with atelectasis  This represents a change in the predominantly groundglass pattern of lung attenuation on the prior CTA from 3/1/2021  PLEURA:  Small right pleural effusion  HEART/GREAT VESSELS:  Unremarkable for patient's age  MEDIASTINUM AND YOVANI: No lymphadenopathy or mass  Large hiatal hernia  Esophagus unremarkable    Trachea and main stem bronchi normal  CHEST WALL AND LOWER NECK:   1 5 x 1 8 cm somewhat spiculated soft tissue structure in the right breast, slightly larger now than on a CT of the abdomen and pelvis from 11/13/2017  VISUALIZED STRUCTURES IN THE UPPER ABDOMEN:  Unremarkable  OSSEOUS STRUCTURES:  No acute fracture or destructive osseous lesion  Impression: 1  No evidence of pulmonary embolus  2   Diffuse abnormality throughout both lungs with combination of groundglass attenuation, consolidation and linear opacities  This can be part of the expected temporal change of Covid pneumonia during progression in recovery  However, superimposed bacterial pneumonia cannot be excluded  3   Small right pleural effusion  4   1 5 x 1 8 cm soft tissue structure in the right breast, with slight increase in size since 2017, possibly an indolent breast cancer  Reference:Yordy Y, et al  Temporal changes of CT findings in 90 patients with COVID-19 pneumonia: a longitudinal study  Radiology 2020; [phone]  Workstation performed: [id number]     Vas Lower Limb Venous Duplex Study, Complete Bilateral    Result Date: 3/8/2021  Narrative:  [facility] REPORT CLINICAL: Indications: Routine venous exam to determine length of anticoagulation treatment following discharge of positive Covid-19 patient with an Elevated D-Dimer  Operative History: Patient denies any cardiovascular procedures Risk Factors The patient has no history of DVT  CONCLUSION: Impression: RIGHT LOWER LIMB: Limited No gross evidence of acute deep vein thrombosis in the CFV, FV, and Popliteal Vein  LEFT LOWER LIMB: Limited No gross evidence of acute deep vein thrombosis in the CFV, FV, and Popliteal Vein  Limited study performed  Technical findings were faxed to chart  SIGNATURE: Electronically Signed by: Omero Serrano MD on 2021-03-08 09:10:51 PM      EKG, Pathology, and Other Studies Reviewed on Admission:   · EKG: Atrial Flutter    Epic / Care Everywhere Records Reviewed: Yes     ** Please Note: This note has been constructed using a voice recognition system   **

## 2021-03-23 NOTE — CASE MANAGEMENT
PT IS A 30 DAYS READMISSION  PT IS NOT IDENTIFIED AS A BUNDLE  READMISSION RISK SCORE OF 12    Pt was hospitalized 2/28 for pneumonia due to COVID d/c with home o2 and Eliquis which the Pt reported she was taking because she didn't feel like it  Pt was admitted for Atrial flutter  CM met with Pt with an introduction and explanation of role  Pt reported she continues to reside with daughter Kd Blount, son-n-law and grandchildren in a two story home with a first floor set up and no steps to enter the home  Pt reported the use of a roller walker and has grab bars in the bathroom  Pt reported being independent with the ADLs, no hx of [place] [facility] [id number] services, was at [facility] in the past but no hx of mental health or drug/alcohol placements  Pt reported Kd Blount to be her POA, uses [facility] on [address] [facility] and has [employer] as a PCP  Pt reported her family provided the needed transport and will transport upon d/c     CM reviewed d/c planning process including the following: identifying help at home, patient preference for d/c planning needs, Discharge Lounge, [facility] Meds to Bed program, availability of treatment team to discuss questions or concerns patient and/or family may have regarding understanding medications and recognizing signs and symptoms once discharged  CM also encouraged patient to follow up with all recommended appointments after discharge  Patient advised of importance for patient and family to participate in managing patients medical well being

## 2021-03-23 NOTE — ASSESSMENT & PLAN NOTE
Lab Results   Component Value Date    EGFR 32 03/22/2021    EGFR 38 03/09/2021    EGFR 39 03/08/2021    CREATININE 1 43 (H) 03/22/2021    CREATININE 1 24 03/09/2021    CREATININE 1 22 03/08/2021     GFR 32 upon admission   Stable    - Avoid nephrotoxins  - Monitor renal function

## 2021-03-23 NOTE — ASSESSMENT & PLAN NOTE
Patient presented with new onset Atrial Flutter with heart rate of 162  Adenosine given to patient  Diltiazem drip started  Trops negative  Patients Heart Rate now 98  Patient is in no acute distress  Of note, patient was prescribed eliquis after being discharged with COVID, however she did not  the medication  · Cardiology consulted, appreciate recs  · Yesterday, patient transitioned to PO metoprolol and Cardizem  · Overnight, patient noted to be bradycardic 40-50s on telemetry  · Cardiology to adjust dosages today  · Monitor on telemetry   · Continue Eliquis to 5 mg BID, discussed price vs  Warfarin price with family  Family prefers Eliquis at this time  Sent to pharmacy for exact price check  Explained patient would get first 30 days free  · Cardiac diet  · Echo:  Ejection fraction was estimated to be 55 %  There were no regional wall motion abnormalities  Wall thickness was mildly increased  There was mild concentric hypertrophy

## 2021-03-23 NOTE — UTILIZATION REVIEW
Initial Clinical Review    Admission: Date/Time/Statement:   Admission Orders (From admission, onward)     Ordered        03/22/21 2033  Inpatient Admission  Once                   Orders Placed This Encounter   Procedures    Inpatient Admission     Standing Status:   Standing     Number of Occurrences:   1     Order Specific Question:   Level of Care     Answer:   Med Surg [16]     Order Specific Question:   Estimated length of stay     Answer:   More than 2 Midnights     Order Specific Question:   Certification     Answer:   I certify that inpatient services are medically necessary for this patient for a duration of greater than two midnights  See H&P and MD Progress Notes for additional information about the patient's course of treatment  ED Arrival Information     Expected Arrival Acuity Means of Arrival Escorted By Service Admission Type    3/22/2021  3/22/2021 14:27 Emergent Wheelchair Family Member Hospitalist Emergency    Arrival Complaint    R/o PE, Recent COVID-19        Chief Complaint   Patient presents with    Rapid Heart Rate     Patient has no complaints, sent from [address] [place] [address] Dr rice for rapid heart rate  Assessment/Plan:   Mrs Caitlin Zepeda is a 81 yo female who presents to the ED from PCP Office with c/o elevated heart rate with palpitations and increasing oxygen needs with SOB  Pulse ox was 91% on 2 5L  PMH: recent d/c from hospital with Covid on Lipitor and Eliquis but did not have prescriptions filled  She was d/c home on 2 5L oxygen and indicates compliance  In the ED her pulse was 162  She was treated with IV Adenosine and Diltiazem drip was started  Initial troponins were negative  He heart rate decreased to 98  She is admitted to INPATIENT status with Atrial Flutter - Diltiazem drip, Cardio consult, Eliquis 2 5 mg BID, cardiac diet  Increased oxygen demand - currently on 4L NC oxygen with 97% sat  CKD/elevated creat -1 43 - avoid nephrotoxins    Elevated D dimer - 5 12 - PE scan negative  3/23 Cardio Consult - New onset A flutter with02:1 conduction and RVR 160s - asymptomatic - Cardizem drip  Tele review shows A flutter with variable rates  Continue Eliquis  H/O Covid 19 Pneumonia - oxygen needed at 4L on admission  PE study is no evidence of PE, diffuse abnormality throughout both lungs with ground glass attentuation, consolidation, linear opacities, small R pleural effusion  Will get CLEMENTINA, Adjust Eliquis to 50 mg, start Metoprolol TID and wean down Cardizem drip for goal HR < 100  Follow lytes  Continue tele          ED Triage Vitals   Temperature Pulse Respirations Blood Pressure SpO2   03/22/21 1444 03/22/21 1444 03/22/21 1444 03/22/21 1444 03/22/21 1444   98 5 °F (36 9 °C) (!) 162 16 157/68 96 %      Temp Source Heart Rate Source Patient Position - Orthostatic VS BP Location FiO2 (%)   03/22/21 1444 03/22/21 1444 03/22/21 1444 03/22/21 1444 --   Oral Monitor Lying Left arm       Pain Score       03/22/21 1845       No Pain          Wt Readings from Last 1 Encounters:   03/22/21 66 7 kg (147 lb)     Additional Vital Signs:   03/23/21 1144  --  --  --  121/79  --  --  --  --  --  --   03/23/21 1019  --  --  --  119/80  --  --  --  --  --  --   03/23/21 0918  --  114Abnormal   --  --  --  --  --  --  --  --   03/23/21 0800  --  --  --  --  --  --  36  4 L/min  Nasal cannula  --   03/23/21 0632  97 5 °F (36 4 °C)  148Abnormal   19  126/76  --  95 %  --  --  --  Lying   03/22/21 2241  97 5 °F (36 4 °C)  52Abnormal   18  127/77  95  94 %  36  4 L/min  Nasal cannula  Lying   03/22/21 2216  --  114Abnormal   20  122/82  --  91 %  36  4 L/min  Nasal cannula  Lying   03/22/21 2130  --  122Abnormal   20  117/61  84  94 %  36  4 L/min  Nasal cannula  Lying   03/22/21 2100  --  98  20  128/72  95  97 %  36  4 L/min  Nasal cannula  Lying   03/22/21 2030  --  78  20  125/78  97  95 %  36  4 L/min  Nasal cannula  Sitting   03/22/21 1949  --  121Abnormal   18  116/86  --  94 %  32  3 L/min Nasal cannula  Sitting   03/22/21 1942  --  136Abnormal   18  125/77  --  92 %  --  --  None (Room air)  Sitting   03/22/21 1915  --  142Abnormal   18  113/90  98  90 %  32  3 L/min  Nasal cannula  Lying   03/22/21 1900  --  146Abnormal   18  106/82  91  91 %  32  3 L/min  Nasal cannula  Lying   03/22/21 1845  --  140Abnormal   18  120/73  92  91 %  32  3 L/min  Nasal cannula  Lying   03/22/21 1815  --  104  18  123/70  93  91 %  32  3 L/min  Nasal cannula  Lying   03/22/21 1800  --  104  18  123/77  94  91 %  32  3 L/min  Nasal cannula  Lying   03/22/21 1745  --  86  16  119/69  87  93 %  32  3 L/min  Nasal cannula  Lying   03/22/21 1730  --  100  16  120/68  88  92 %  32  3 L/min  Nasal cannula  Lying   03/22/21 1715  --  90  16  115/61  81  91 %  32  3 L/min  Nasal cannula  Lying   03/22/21 1700  --  84  16  114/61  83  90 %  32  3 L/min  Nasal cannula  Lying   03/22/21 1630  --  154Abnormal   16  117/75  91  93 %  32  3 L/min  Nasal cannula  Lying   03/22/21 1615  --  146Abnormal   16  120/80  98  93 %  32  3 L/min  Nasal cannula  Lying   03/22/21 1600  --  154Abnormal   16  127/88  104  91 %  32  3 L/min  Nasal cannula  Lying     Pertinent Labs/Diagnostic Test Results:     3/22 CXR - Worsening bilateral pneumonia  A VQ scan will likely be non-diagnostic     3/22 CTA ED chest PE study - 1  No evidence of pulmonary embolus  2   Diffuse abnormality throughout both lungs with combination of groundglass attenuation, consolidation and linear opacities  This can be part of the expected temporal change of Covid pneumonia during progression in recovery  However, superimposed bacterial pneumonia cannot be excluded  3   Small right pleural effusion  4   1 5 x 1 8 cm soft tissue structure in the right breast, with slight increase in size since 2017, possibly an indolent breast cancer  3/22 ECG - atrial flutter with 2:1 block L axis deviation       Results from last 7 days   Lab Units 03/22/21  1458   WBC Thousand/uL 5 64   HEMOGLOBIN g/dL 11 6   HEMATOCRIT % 38 5   PLATELETS Thousands/uL 213   NEUTROS ABS Thousands/µL 4 30         Results from last 7 days   Lab Units 03/22/21  1458   SODIUM mmol/L 145   POTASSIUM mmol/L 4 2   CHLORIDE mmol/L 108   CO2 mmol/L 28   ANION GAP mmol/L 9   BUN mg/dL 18   CREATININE mg/dL 1 43*   EGFR ml/min/1 73sq m 32   CALCIUM mg/dL 9 2   MAGNESIUM mg/dL 2 1     Results from last 7 days   Lab Units 03/22/21  1458   AST U/L 24   ALT U/L 17   ALK PHOS U/L 82   TOTAL PROTEIN g/dL 7 4   ALBUMIN g/dL 2 3*   TOTAL BILIRUBIN mg/dL 0 53     Results from last 7 days   Lab Units 03/22/21  1458   GLUCOSE RANDOM mg/dL 100     Results from last 7 days   Lab Units 03/22/21  1458   TROPONIN I ng/mL 0 04     Results from last 7 days   Lab Units 03/22/21  1507   D-DIMER QUANTITATIVE ug/ml FEU 5 12*     Results from last 7 days   Lab Units 03/22/21  1458   TSH 3RD GENERATON uIU/mL 1 918     ED Treatment:   Medication Administration from 03/22/2021 1353 to 03/22/2021 2248    Date/Time Order Dose Route Action   03/22/2021 1459 sodium chloride 0 9 % bolus 1,000 mL 1,000 mL Intravenous New Bag   03/22/2021 1600 adenosine (ADENOCARD) injection 6 mg 6 mg Intravenous Given   03/22/2021 1944 diltiazem (CARDIZEM) 125 mg in sodium chloride 0 9 % 125 mL infusion 15 mg/hr Intravenous Rate/Dose Change   03/22/2021 1928 diltiazem (CARDIZEM) 125 mg in sodium chloride 0 9 % 125 mL infusion 12 5 mg/hr Intravenous Rate/Dose Change   03/22/2021 1900 diltiazem (CARDIZEM) 125 mg in sodium chloride 0 9 % 125 mL infusion 10 mg/hr Intravenous Rate/Dose Change   03/22/2021 1842 diltiazem (CARDIZEM) 125 mg in sodium chloride 0 9 % 125 mL infusion 7 5 mg/hr Intravenous Rate/Dose Change   03/22/2021 1633 diltiazem (CARDIZEM) 125 mg in sodium chloride 0 9 % 125 mL infusion 5 mg/hr Intravenous New Bag   03/22/2021 1633 diltiazem (CARDIZEM) injection 20 mg 20 mg Intravenous Given   03/22/2021 1934 iohexol (OMNIPAQUE) 350 MG/ML injection (MULTI-DOSE) 85 mL 85 mL Intravenous Given   03/22/2021 2213 atorvastatin (LIPITOR) tablet 40 mg 40 mg Oral Given   03/22/2021 2213 apixaban (ELIQUIS) tablet 2 5 mg 2 5 mg Oral Given        Present on Admission:   Atrial flutter (HCC)   Elevated d-dimer   Elevated serum creatinine   CKD (chronic kidney disease) stage 3, GFR 30-59 ml/min   Acute on chronic respiratory failure with hypoxia (HCC)    Admitting Diagnosis: Atrial flutter (HCC) [I48 92]  Rapid heart rate [R00 0]     Age/Sex: 80 y o  female     Admission Orders:  Scheduled Medications:  apixaban, 2 5 mg, Oral, Once  apixaban, 5 mg, Oral, BID  atorvastatin, 40 mg, Oral, HS  metoprolol tartrate, 25 mg, Oral, TID      Continuous IV Infusions:  diltiazem, 1-15 mg/hr, Intravenous, Continuous      PRN Meds:       Tele   SCDs  Continue Cardizem drip   Oxygen at 4L   Echo   IP CONSULT TO CARDIOLOGY    Network Utilization Review Department  ATTENTION: Please call with any questions or concerns to [phone] and carefully listen to the prompts so that you are directed to the right person  All voicemails are confidential   Marco Barger all requests for admission clinical reviews, approved or denied determinations and any other requests to dedicated fax number below belonging to the campus where the patient is receiving treatment   List of dedicated fax numbers for the Facilities:  [address] DENIALS (Administrative/Medical Necessity) [phone]   [address] [place] [address] (Maternity/NICU/Pediatrics) 261 [facility],[address] Fam Luna 7577 (  Fab Beck "Jovita" 103) [address] [address]  [address] [facility]81 [phone]   [place] [address] [phone]

## 2021-03-23 NOTE — ASSESSMENT & PLAN NOTE
Patient presented with new onset Atrial Flutter with heart rate of 162  Adenosine given to patient  Diltiazem drip started  Trops negative  Patients Heart Rate now 98  Patient is in no acute distress   Of note, patient was prescribed eliquis after being discharged with COVID, however she did not  the medication     - Continue diltiazem drip  - Cardiology consulted, appreciate recommendations  - Order eliquis 2 5mg BID  - Monitor Vital Signs  - Cardiac Diet

## 2021-03-24 LAB
ANION GAP SERPL CALCULATED.3IONS-SCNC: 7 MMOL/L (ref 4–13)
ATRIAL RATE: 162 BPM
BUN SERPL-MCNC: 13 MG/DL (ref 5–25)
CALCIUM SERPL-MCNC: 8.4 MG/DL (ref 8.3–10.1)
CHLORIDE SERPL-SCNC: 111 MMOL/L (ref 100–108)
CO2 SERPL-SCNC: 27 MMOL/L (ref 21–32)
CREAT SERPL-MCNC: 1.23 MG/DL (ref 0.6–1.3)
GFR SERPL CREATININE-BSD FRML MDRD: 38 ML/MIN/1.73SQ M
GLUCOSE SERPL-MCNC: 88 MG/DL (ref 65–140)
MAGNESIUM SERPL-MCNC: 1.9 MG/DL (ref 1.6–2.6)
P AXIS: 108 DEGREES
POTASSIUM SERPL-SCNC: 3.8 MMOL/L (ref 3.5–5.3)
PR INTERVAL: 80 MS
QRS AXIS: -30 DEGREES
QRSD INTERVAL: 88 MS
QT INTERVAL: 256 MS
QTC INTERVAL: 420 MS
SODIUM SERPL-SCNC: 145 MMOL/L (ref 136–145)
T WAVE AXIS: 145 DEGREES
VENTRICULAR RATE: 162 BPM

## 2021-03-24 PROCEDURE — 80048 BASIC METABOLIC PNL TOTAL CA: CPT | Performed by: NURSE PRACTITIONER

## 2021-03-24 PROCEDURE — 99232 SBSQ HOSP IP/OBS MODERATE 35: CPT | Performed by: INTERNAL MEDICINE

## 2021-03-24 PROCEDURE — 94761 N-INVAS EAR/PLS OXIMETRY MLT: CPT

## 2021-03-24 PROCEDURE — 93010 ELECTROCARDIOGRAM REPORT: CPT | Performed by: INTERNAL MEDICINE

## 2021-03-24 PROCEDURE — 83735 ASSAY OF MAGNESIUM: CPT | Performed by: INTERNAL MEDICINE

## 2021-03-24 RX ORDER — POTASSIUM CHLORIDE 20 MEQ/1
20 TABLET, EXTENDED RELEASE ORAL ONCE
Status: COMPLETED | OUTPATIENT
Start: 2021-03-24 | End: 2021-03-24

## 2021-03-24 RX ORDER — DILTIAZEM HYDROCHLORIDE 120 MG/1
120 CAPSULE, COATED, EXTENDED RELEASE ORAL DAILY
Status: DISCONTINUED | OUTPATIENT
Start: 2021-03-24 | End: 2021-03-25

## 2021-03-24 RX ORDER — FUROSEMIDE 10 MG/ML
20 INJECTION INTRAMUSCULAR; INTRAVENOUS ONCE
Status: COMPLETED | OUTPATIENT
Start: 2021-03-24 | End: 2021-03-24

## 2021-03-24 RX ORDER — METOPROLOL SUCCINATE 50 MG/1
50 TABLET, EXTENDED RELEASE ORAL DAILY
Status: DISCONTINUED | OUTPATIENT
Start: 2021-03-24 | End: 2021-03-25 | Stop reason: HOSPADM

## 2021-03-24 RX ADMIN — APIXABAN 5 MG: 5 TABLET, FILM COATED ORAL at 17:03

## 2021-03-24 RX ADMIN — APIXABAN 5 MG: 5 TABLET, FILM COATED ORAL at 08:14

## 2021-03-24 RX ADMIN — FUROSEMIDE 20 MG: 10 INJECTION, SOLUTION INTRAVENOUS at 11:31

## 2021-03-24 RX ADMIN — METOPROLOL SUCCINATE 50 MG: 50 TABLET, EXTENDED RELEASE ORAL at 13:17

## 2021-03-24 RX ADMIN — METOPROLOL TARTRATE 25 MG: 25 TABLET, FILM COATED ORAL at 05:18

## 2021-03-24 RX ADMIN — DILTIAZEM HYDROCHLORIDE 120 MG: 120 CAPSULE, COATED, EXTENDED RELEASE ORAL at 13:17

## 2021-03-24 RX ADMIN — DILTIAZEM HYDROCHLORIDE 60 MG: 60 TABLET, FILM COATED ORAL at 05:18

## 2021-03-24 RX ADMIN — POTASSIUM CHLORIDE 20 MEQ: 1500 TABLET, EXTENDED RELEASE ORAL at 11:31

## 2021-03-24 RX ADMIN — ATORVASTATIN CALCIUM 40 MG: 40 TABLET, FILM COATED ORAL at 21:03

## 2021-03-24 NOTE — PLAN OF CARE
Problem: Prexisting or High Potential for Compromised Skin Integrity  Goal: Skin integrity is maintained or improved  Description: INTERVENTIONS:  - Identify patients at risk for skin breakdown  - Assess and monitor skin integrity  - Assess and monitor nutrition and hydration status  - Monitor labs   - Assess for incontinence   - Turn and reposition patient  - Assist with mobility/ambulation  - Relieve pressure over bony prominences  - Avoid friction and shearing  - Provide appropriate hygiene as needed including keeping skin clean and dry  - Evaluate need for skin moisturizer/barrier cream  - Collaborate with interdisciplinary team   - Patient/family teaching  - Consider wound care consult   Outcome: Progressing     Problem: Potential for Falls  Goal: Patient will remain free of falls  Description: INTERVENTIONS:  - Assess patient frequently for physical needs  -  Identify cognitive and physical deficits and behaviors that affect risk of falls    -  [place] fall precautions as indicated by assessment   - Educate patient/family on patient safety including physical limitations  - Instruct patient to call for assistance with activity based on assessment  - Modify environment to reduce risk of injury  - Consider OT/PT consult to assist with strengthening/mobility  Outcome: Progressing     Problem: CARDIOVASCULAR - ADULT  Goal: Maintains optimal cardiac output and hemodynamic stability  Description: INTERVENTIONS:  - Monitor I/O, vital signs and rhythm  - Monitor for S/S and trends of decreased cardiac output  - Administer and titrate ordered vasoactive medications to optimize hemodynamic stability  - Assess quality of pulses, skin color and temperature  - Assess for signs of decreased coronary artery perfusion  - Instruct patient to report change in severity of symptoms  Outcome: Progressing  Goal: Absence of cardiac dysrhythmias or at baseline rhythm  Description: INTERVENTIONS:  - Continuous cardiac monitoring, vital signs, obtain 12 lead EKG if ordered  - Administer antiarrhythmic and heart rate control medications as ordered  - Monitor electrolytes and administer replacement therapy as ordered  Outcome: Progressing

## 2021-03-24 NOTE — PROGRESS NOTES
[facility]  Progress Note Tonny Mata 12/13/1929, 80 y o  female MRN: [id number]  Unit/Bed#: S -01 Encounter: [id number]  Primary Care Provider: Meghan Cramer MD   Date and time admitted to hospital: 3/22/2021  2:47 PM    * Atrial flutter [facility])  Assessment & Plan  Patient presented with new onset Atrial Flutter with heart rate of 162  Adenosine given to patient  Diltiazem drip started  Trops negative  Patients Heart Rate now 98  Patient is in no acute distress  Of note, patient was prescribed eliquis after being discharged with COVID, however she did not  the medication  · Cardiology consulted, appreciate recs  · Yesterday, patient transitioned to PO metoprolol and Cardizem  · Overnight, patient noted to be bradycardic 40-50s on telemetry  · Cardiology to adjust dosages today  · Monitor on telemetry   · Continue Eliquis to 5 mg BID, discussed price vs  Warfarin price with family  Family prefers Eliquis at this time  Sent to pharmacy for exact price check  Explained patient would get first 30 days free  · Cardiac diet  · Echo:  Ejection fraction was estimated to be 55 %  There were no regional wall motion abnormalities  Wall thickness was mildly increased  There was mild concentric hypertrophy    Chronic respiratory failure with hypoxia [facility])  Assessment & Plan  Patient recently discharged from hospital with 2 5L NC o2 after COVID-19 infection  Has been compliant with oxygen supplementation, but has felt short of breath  Saturating 91% on arrival to ED  Currently on 4L satting 97%  - Continue o2 supplementation @4L    CKD (chronic kidney disease) stage 3, GFR 30-59 ml/min  Assessment & Plan  Lab Results   Component Value Date    EGFR 48 03/23/2021    EGFR 32 03/22/2021    EGFR 38 03/09/2021    CREATININE 1 03 03/23/2021    CREATININE 1 43 (H) 03/22/2021    CREATININE 1 24 03/09/2021     · GFR 32 upon admission   Stable  · Elevated creatinine on admission, now improving  · Avoid nephrotoxins  · Monitor renal function    Elevated d-dimer  Assessment & Plan  Patient had elevated in d-dimer during hospital admission earlier this month  D-Dimer in ED 3/22/2021 noted to be 5  12  PE scan negative  Recent Labs     21  1507   DDIMER 5 12*     - Increased dose of  eliquis 5 mg BID  - Monitor d-dimer trend    Elevated serum creatinine  Assessment & Plan  Creatinine noted to be 1 43 in ED  Baseline around 1 2  Patient has no urinary complaints  Recent Labs     21  1458 21  1057   CREATININE 1 43* 1 03   EGFR 32 48       Plan:  · Improving without IV fluids  · Continue to monitor creatinine on a m  Labs  · Avoid Nephrotoxins      VTE Pharmacologic Prophylaxis:   Pharmacologic: Apixaban (Eliquis)  Mechanical VTE Prophylaxis in Place: Yes    Discussions with Specialists or Other Care Team Provider: Cardiology    Education and Discussions with Family / Patient: Discussed with daughter    Current Length of Stay: 2 day(s)    Current Patient Status: Inpatient     Discharge Plan / Estimated Discharge Date: 24-48 hours    Code Status: Level 1 - Full Code      Subjective:   Patient seen and examined at bedside  No subjective complaints  Denies SOB or chest pain  Objective:     Vitals:   Temp (24hrs), Av °F (36 7 °C), Min:98 °F (36 7 °C), Max:98 1 °F (36 7 °C)    Temp:  [98 °F (36 7 °C)-98 1 °F (36 7 °C)] 98 1 °F (36 7 °C)  HR:  [] 75  Resp:  [18-19] 19  BP: (100-124)/(60-78) 101/62  SpO2:  [91 %-93 %] 93 %  Body mass index is 27 78 kg/m²  Input and Output Summary (last 24 hours): Intake/Output Summary (Last 24 hours) at 3/24/2021 1228  Last data filed at 3/24/2021 0700  Gross per 24 hour   Intake 880 ml   Output 1750 ml   Net -870 ml       Physical Exam:     Physical Exam  Constitutional:       Appearance: Normal appearance  Cardiovascular:      Rate and Rhythm: Rhythm irregular     Pulmonary:      Effort: Pulmonary effort is normal       Breath sounds: Rales present  Abdominal:      General: Bowel sounds are normal       Palpations: Abdomen is soft  Tenderness: There is no abdominal tenderness  Musculoskeletal:      Right lower leg: No edema  Left lower leg: No edema  Skin:     General: Skin is warm and dry  Neurological:      General: No focal deficit present  Mental Status: She is alert and oriented to person, place, and time  Psychiatric:         Mood and Affect: Mood normal          Behavior: Behavior normal            Additional Data:     Labs:    Results from last 7 days   Lab Units 03/22/21  1458   WBC Thousand/uL 5 64   HEMOGLOBIN g/dL 11 6   HEMATOCRIT % 38 5   PLATELETS Thousands/uL 213   NEUTROS PCT % 76*   LYMPHS PCT % 13*   MONOS PCT % 7   EOS PCT % 3     Results from last 7 days   Lab Units 03/24/21  0504  03/22/21  1458   POTASSIUM mmol/L 3 8   < > 4 2   CHLORIDE mmol/L 111*   < > 108   CO2 mmol/L 27   < > 28   BUN mg/dL 13   < > 18   CREATININE mg/dL 1 23   < > 1 43*   CALCIUM mg/dL 8 4   < > 9 2   ALK PHOS U/L  --   --  82   ALT U/L  --   --  17   AST U/L  --   --  24    < > = values in this interval not displayed  * I Have Reviewed All Lab Data Listed Above  * Additional Pertinent Lab Tests Reviewed: All Labs Within Last 24 Hours Reviewed    Imaging:    Imaging Reports Reviewed Today Include: N/A  Imaging Personally Reviewed by Myself Includes:  N/A    Recent Cultures (last 7 days):           Last 24 Hours Medication List:   Current Facility-Administered Medications   Medication Dose Route Frequency Provider Last Rate    apixaban  5 mg Oral BID Beth Taj, CRNP      atorvastatin  40 mg Oral HS Jared Khan MD      diltiazem  120 mg Oral Daily Beth Taj, CRNP      metoprolol succinate  50 mg Oral Daily Beth Taj, CRNP          Today, Patient Was Seen By: Eulalia Schilder, DO    ** Please Note: This note has been constructed using a voice recognition system   **

## 2021-03-24 NOTE — CASE MANAGEMENT
CM informed by [facility] that patient is tentative for discharge in 24 hrs pending Cardiology clearance, as they are adjusting some of her medications  Patient will need to be started on Eliquis at discharge as well  She was prescribed Eliquis on last discharge as per COVID protocol but did not start taking it at home and will now need it for new onset atrial flutter with RVR  Patient does not have Rx coverage and [facility] already discussed pricing Eliquis vs Coumadin with daughter, with quotes obtained from [facility]  Daughter Mariely Bryant reports that they are willing to pay the price of Eliquis for convenience  [facility] sent Rx to [facility] to obtain a final cost from them  CM spoke with Isabel at [facility]  She reports the cost for Eliquis is $542 49  [facility], since patient did not  the Eliquis Rx filled on 3/10, she is able to use the free 30 day coupon for her first fill  CM called and spoke with patient's daughter Mariely Bryant to discuss pricing  She reports she is able to afford the Eliquis copay and knows that she can use the free 30 day coupon for the first fill  Coupon placed on sticker chart for provision upon discharge  Mariely Bryant reports they already have a [facility] card that they use as well  CM asked if Mariely Bryant has heard anything from [facility] since discharge  She stated that she and her  sent in all requested documentation and are awaiting further update from them  Mariely Bryant confirms they still have the O2 at home for patient from [employer] and she's been using 2 5L at home and starting to walk around a little bit more  Mariely Bryant will  medications and provide transportation at discharge  [facility] aware that family is able to afford the Eliquis Rx

## 2021-03-24 NOTE — PROGRESS NOTES
General Cardiology   Progress Note -  [facility]   Charlotte Carias 80 y o  female MRN: [id number]    Unit/Bed#: S -01 Encounter: [id number]    Assessment  1  New onset atrial flutter with RVR  -No history of  -Asymptomatic  -12 lead ECG 3/22; atrial flutter with 2-1 conduction, rate 160 to bpm  -24 hour telemetry review; atrial flutter with variable rates, is currently in the 70's, freddy during the early a m  hours with rates in the 40s - 50s  -Weaned off IV Cardizem infusion  -Currently on metoprolol tartrate 25 mg q 6 hours, oral Cardizem 60 mg q 6 hours  -On Eliquis 5 mg b i d  (CHADS2 Vasc score = 3)  -TSH level 1 92     2  Mild volume overload -- in the setting of iatrogenic volume resuscitation (received 1L fluid bolus on admission + standing IVF's) + diastolic dysfunction   -On PE, evidence mild volume overload; + slight JVD above the, and bibasilar fine crackles  -TTE 3/23/2021; LVEF 55%, no regional wall motion abnormalities, wall thickness mildly increased, mild concentric hypertrophy, LA mildly dilated, moderate MR, trace AI, and moderate TR  -Received x1 dose of IV Lasix 20 mg yesterday; 24 hour I&O balance; -920 ml; creatinine level slightly increased to 1 23 today    3  Acute on chronic hypoxic respiratory failure -- may be multifactorial in the setting #2 and # 4  4  Hx of COVID-19 PNA infection (hospitalization 2/28 through 3/10/2021)  -Discharged on 2 5 L NC -- currently on 4-5 L NC -- sat 92%  -CTA chest PE study -- no evidence of PE, diffuse abnormality throughout both lungs with combination of ground-glass attenuation, consolidation and linear opacities, small right pleural effusion     5  Dyslipidemia  -No recent lipid profile for review  -On atorvastatin 40 mg daily     6   CKD stage 3  -Baseline creatinine around 1 2-1 3  -Creatinine 1 43 on admission, currently 1 23 today     Plan  -(Received a total of 75 mg of metoprolol and 180 mg of Cardizem past 24 hrs), this am had received 25 mg of metoprolol and 60 mg of Cardizem, will start Toprol 50 mg daily this afternoon and Cardizem  mg this afternoon -- can up titrate rate control medications further as BP tolerates  -Give another 20 mg of IV lasix today + kdur 20 meq x 1  -Continue Eliquis 5 mg b i d  -2 g NA +diet 2 L FR  -Monitor renal function electrolytes closely -- replete to maintain K + at 4 0, and Mag at 2 0  -Monitor on telemetry     Subjective  Review of Systems   Constitution: Negative for chills, fever and malaise/fatigue  Eyes: Negative for visual disturbance  Cardiovascular: Negative for chest pain, dyspnea on exertion, irregular heartbeat, leg swelling, near-syncope, orthopnea and palpitations  Respiratory: Negative for cough and shortness of breath  All other systems reviewed and are negative  Objective:   Physical Exam  Vitals signs and nursing note reviewed  Constitutional:       Appearance: Normal appearance  She is obese  HENT:      Head: Normocephalic and atraumatic  Mouth/Throat:      Mouth: Mucous membranes are moist    Eyes:      General: No scleral icterus  Neck:      Comments: Slight JVD above the clavicle  Cardiovascular:      Rate and Rhythm: Normal rate  Rhythm irregular  Pulses: Normal pulses  Heart sounds: Normal heart sounds  No murmur  Pulmonary:      Effort: Pulmonary effort is normal       Breath sounds: Rales present  No wheezing  Abdominal:      General: Bowel sounds are normal       Palpations: Abdomen is soft  Tenderness: There is no abdominal tenderness  Musculoskeletal:      Right lower leg: No edema  Left lower leg: No edema  Skin:     General: Skin is warm and dry  Capillary Refill: Capillary refill takes less than 2 seconds  Neurological:      General: No focal deficit present  Mental Status: She is alert and oriented to person, place, and time     Psychiatric:         Mood and Affect: Mood normal          Vitals: Blood pressure 100/60, pulse 75, temperature 98 1 °F (36 7 °C), temperature source Oral, resp  rate 19, height 5' 1" (1 549 m), weight 66 7 kg (147 lb), SpO2 92 %  ,     Body mass index is 27 78 kg/m²  ,   Systolic (80HUY), ZMX:350 , Min:100 , YKC:065     Diastolic (00TXE), BQF:52, Min:60, Max:79      Intake/Output Summary (Last 24 hours) at 3/24/2021 1021  Last data filed at 3/24/2021 0700  Gross per 24 hour   Intake 880 ml   Output 1750 ml   Net -870 ml     Weight (last 2 days)     Date/Time   Weight    03/22/21 1444   66 7 (147)              LABORATORY RESULTS  Results from last 7 days   Lab Units 03/22/21  1458   TROPONIN I ng/mL 0 04     CBC with diff:   Results from last 7 days   Lab Units 03/22/21  1458   WBC Thousand/uL 5 64   HEMOGLOBIN g/dL 11 6   HEMATOCRIT % 38 5   MCV fL 88   PLATELETS Thousands/uL 213   MCH pg 26 6*   MCHC g/dL 30 1*   RDW % 19 0*   MPV fL 9 3   NRBC AUTO /100 WBCs 0       CMP:  Results from last 7 days   Lab Units 03/24/21  0504 03/23/21  1057 03/22/21  1458   POTASSIUM mmol/L 3 8 4 3 4 2   CHLORIDE mmol/L 111* 108 108   CO2 mmol/L 27 26 28   BUN mg/dL 13 12 18   CREATININE mg/dL 1 23 1 03 1 43*   CALCIUM mg/dL 8 4 8 7 9 2   AST U/L  --   --  24   ALT U/L  --   --  17   ALK PHOS U/L  --   --  82   EGFR ml/min/1 73sq m 38 48 32       BMP:  Results from last 7 days   Lab Units 03/24/21  0504 03/23/21  1057 03/22/21  1458   POTASSIUM mmol/L 3 8 4 3 4 2   CHLORIDE mmol/L 111* 108 108   CO2 mmol/L 27 26 28   BUN mg/dL 13 12 18   CREATININE mg/dL 1 23 1 03 1 43*   CALCIUM mg/dL 8 4 8 7 9 2       Lab Results   Component Value Date    NTBNP 1,658 (H) 02/28/2021        Results from last 7 days   Lab Units 03/24/21  0504 03/22/21  1458   MAGNESIUM mg/dL 1 9 2 1             Results from last 7 days   Lab Units 03/22/21  1458   TSH 3RD GENERATON uIU/mL 1 918             Lipid Profile:   No results found for: CHOL  No results found for: HDL  No results found for: LDLCALC  No results found for: TRIG    Cardiac testing:   Results for orders placed during the hospital encounter of 21   Echo complete with contrast if indicated    Narrative [address], [address] [facility]  [phone]    Transthoracic Echocardiogram  2D, M-mode, Doppler, and Color Doppler    Study date:  23-Mar-2021    Patient: King Marito  MR number: [id number]  Account number: [de-identified]  : 13-Dec-1929  Age: 80 years  Gender: Female  Status: Inpatient  Location: Bedside  Height: 61 in  Weight: 146 7 lb  BP: 126/ 76 mmHg    Indications: Atrial flutter    Diagnoses: I48 1 - Atrial flutter    Sonographer:  DEBI Paredes  Primary Physician:  Jason Bender MD  Referring Physician:  Marlene Fulton MD  Group:  [facility]  Interpreting Physician:  Long Hicks MD    SUMMARY    LEFT VENTRICLE:  Systolic function was normal  Ejection fraction was estimated to be 55 %  There were no regional wall motion abnormalities  Wall thickness was mildly increased  There was mild concentric hypertrophy  LEFT ATRIUM:  The atrium was mildly dilated  MITRAL VALVE:  There was moderate regurgitation  AORTIC VALVE:  There was trace regurgitation  TRICUSPID VALVE:  There was moderate regurgitation  The findings suggest mild pulmonary hypertension  IVC, HEPATIC VEINS:  The inferior vena cava was mildly dilated  HISTORY: PRIOR HISTORY: COVID-19, CKD III, Atrial flutter, Tachycardia, RBBB    PROCEDURE: The procedure was performed at the bedside  This was a routine study  The transthoracic approach was used  The study included complete 2D imaging, M-mode, complete spectral Doppler, and color Doppler  The heart rate was 78 bpm,  at the start of the study  Images were obtained from the parasternal, apical, subcostal, and suprasternal notch acoustic windows  Image quality was adequate  LEFT VENTRICLE: Size was normal  Systolic function was normal  Ejection fraction was estimated to be 55 %   There were no regional wall motion abnormalities  Wall thickness was mildly increased  There was mild concentric hypertrophy  DOPPLER: Transmitral flow pattern: atrial flutter  RIGHT VENTRICLE: The size was normal  Systolic function was normal  Wall thickness was normal     LEFT ATRIUM: The atrium was mildly dilated  RIGHT ATRIUM: The atrium was mildly dilated  MITRAL VALVE: Valve structure was normal  There was normal leaflet separation  DOPPLER: The transmitral velocity was within the normal range  There was no evidence for stenosis  There was moderate regurgitation  AORTIC VALVE: The valve was trileaflet  Leaflets exhibited mildly increased thickness, mild calcification, and normal cuspal separation  DOPPLER: Transaortic velocity was within the normal range  There was no evidence for stenosis  There  was trace regurgitation  TRICUSPID VALVE: The valve structure was normal  There was normal leaflet separation  DOPPLER: The transtricuspid velocity was within the normal range  There was no evidence for stenosis  There was moderate regurgitation  Estimated peak PA  pressure was 45 mmHg  The findings suggest mild pulmonary hypertension  PULMONIC VALVE: Leaflets exhibited normal thickness, no calcification, and normal cuspal separation  DOPPLER: The transpulmonic velocity was within the normal range  There was trace regurgitation  PERICARDIUM: There was no pericardial effusion  The pericardium was normal in appearance  AORTA: The root exhibited upper limit of normal size  The ascending aorta was upper normal normal in size, measuring 3 7 cm  SYSTEMIC VEINS: IVC: The inferior vena cava was mildly dilated      SYSTEM MEASUREMENT TABLES    2D  %FS: 33 12 %  Ao Diam: 3 19 cm  EDV(Teich): 91 29 ml  EF(Teich): 61 87 %  ESV(Teich): 34 81 ml  HR_2Ch_Q: 78 74 BPM  HR_4Ch_Q: 78 27 BPM  IVSd: 1 27 cm  LA Area: 25 11 cm2  LA Diam: 4 16 cm  LVCO_2Ch_Q: 3 23 L/min  LVCO_4Ch_Q: 2 89 L/min  LVCO_BiP_Q: 3 06 L/min  LVEF_2Ch_Q: 56 37 %  LVEF_4Ch_Q: 54 91 %  LVEF_BiP_Q: 54 44 %  LVIDd: 4 48 cm  LVIDs: 2 99 cm  LVLd_2Ch_Q: 6 49 cm  LVLd_4Ch_Q: 6 28 cm  LVLs_2Ch_Q: 6 32 cm  LVLs_4Ch_Q: 5 49 cm  LVPWd: 1 19 cm  LVSV_2Ch_Q: 41 ml  LVSV_4Ch_Q: 36 98 ml  LVSV_BiP_Q: 38 57 ml  LVVED_2Ch_Q: 72 72 ml  LVVED_4Ch_Q: 67 35 ml  LVVED_BiP_Q: 70 85 ml  LVVES_2Ch_Q: 31 73 ml  LVVES_4Ch_Q: 30 37 ml  LVVES_BiP_Q: 32 28 ml  RA Area: 19 56 cm2  RVIDd: 3 4 cm  SV(Teich): 56 48 ml    CW  TR MaxP 6 mmHg  TR Vmax: 3 3 m/s    MM  TAPSE: 1 7 cm    [facility]    Prepared and electronically signed by    Lisa Somers MD  Signed 23-Mar-2021 16:05:02       No results found for this or any previous visit  No results found for this or any previous visit  No procedure found  No results found for this or any previous visit  Meds/Allergies   all current active meds have been reviewed and current meds:   Current Facility-Administered Medications   Medication Dose Route Frequency    apixaban (ELIQUIS) tablet 5 mg  5 mg Oral BID    atorvastatin (LIPITOR) tablet 40 mg  40 mg Oral HS    diltiazem (CARDIZEM) 125 mg in sodium chloride 0 9 % 125 mL infusion  1-15 mg/hr Intravenous Continuous    diltiazem (CARDIZEM) tablet 60 mg  60 mg Oral Q6H [facility]    metoprolol (LOPRESSOR) injection 5 mg  5 mg Intravenous Q6H PRN    metoprolol tartrate (LOPRESSOR) tablet 25 mg  25 mg Oral Q6H [facility]     diltiazem, 1-15 mg/hr, Last Rate: Stopped (21 1310)        EKG personally reviewed by AZRA Rogers    Assessment:  Principal Problem:    Atrial flutter ([address] )  Active Problems:    Elevated serum creatinine    Elevated d-dimer    CKD (chronic kidney disease) stage 3, GFR 30-59 ml/min    Chronic respiratory failure with hypoxia (HCC)    Counseling / Coordination of Care  Total floor / unit time spent today 20 minutes    Greater than 50% of total time was spent with the patient and / or family counseling and / or coordination of care  ** Please Note: Dragon 360 Dictation voice to text software may have been used in the creation of this document   **

## 2021-03-25 VITALS
HEIGHT: 61 IN | HEART RATE: 78 BPM | RESPIRATION RATE: 19 BRPM | SYSTOLIC BLOOD PRESSURE: 111 MMHG | WEIGHT: 149.47 LBS | TEMPERATURE: 97.6 F | OXYGEN SATURATION: 91 % | DIASTOLIC BLOOD PRESSURE: 64 MMHG | BODY MASS INDEX: 28.22 KG/M2

## 2021-03-25 LAB
ANION GAP SERPL CALCULATED.3IONS-SCNC: 7 MMOL/L (ref 4–13)
BUN SERPL-MCNC: 17 MG/DL (ref 5–25)
CALCIUM SERPL-MCNC: 8.4 MG/DL (ref 8.3–10.1)
CHLORIDE SERPL-SCNC: 108 MMOL/L (ref 100–108)
CO2 SERPL-SCNC: 28 MMOL/L (ref 21–32)
CREAT SERPL-MCNC: 1.23 MG/DL (ref 0.6–1.3)
GFR SERPL CREATININE-BSD FRML MDRD: 38 ML/MIN/1.73SQ M
GLUCOSE SERPL-MCNC: 88 MG/DL (ref 65–140)
POTASSIUM SERPL-SCNC: 4.4 MMOL/L (ref 3.5–5.3)
SODIUM SERPL-SCNC: 143 MMOL/L (ref 136–145)

## 2021-03-25 PROCEDURE — 99239 HOSP IP/OBS DSCHRG MGMT >30: CPT | Performed by: INTERNAL MEDICINE

## 2021-03-25 PROCEDURE — 80048 BASIC METABOLIC PNL TOTAL CA: CPT | Performed by: INTERNAL MEDICINE

## 2021-03-25 PROCEDURE — 99232 SBSQ HOSP IP/OBS MODERATE 35: CPT | Performed by: INTERNAL MEDICINE

## 2021-03-25 RX ORDER — DILTIAZEM HYDROCHLORIDE 120 MG/1
120 CAPSULE, COATED, EXTENDED RELEASE ORAL DAILY
Qty: 30 CAPSULE | Refills: 0 | Status: SHIPPED | OUTPATIENT
Start: 2021-03-26 | End: 2021-03-25 | Stop reason: HOSPADM

## 2021-03-25 RX ORDER — METOPROLOL SUCCINATE 50 MG/1
50 TABLET, EXTENDED RELEASE ORAL DAILY
Qty: 30 TABLET | Refills: 0 | Status: SHIPPED | OUTPATIENT
Start: 2021-03-26 | End: 2021-04-01 | Stop reason: SDUPTHER

## 2021-03-25 RX ORDER — DILTIAZEM HYDROCHLORIDE 180 MG/1
180 CAPSULE, COATED, EXTENDED RELEASE ORAL DAILY
Qty: 30 CAPSULE | Refills: 0 | Status: SHIPPED | OUTPATIENT
Start: 2021-03-26 | End: 2021-04-21 | Stop reason: SDUPTHER

## 2021-03-25 RX ORDER — FUROSEMIDE 20 MG/1
20 TABLET ORAL DAILY PRN
Qty: 15 TABLET | Refills: 0 | Status: SHIPPED | OUTPATIENT
Start: 2021-03-25 | End: 2021-04-01 | Stop reason: SDUPTHER

## 2021-03-25 RX ORDER — DILTIAZEM HYDROCHLORIDE 180 MG/1
180 CAPSULE, COATED, EXTENDED RELEASE ORAL DAILY
Status: DISCONTINUED | OUTPATIENT
Start: 2021-03-26 | End: 2021-03-25 | Stop reason: HOSPADM

## 2021-03-25 RX ADMIN — APIXABAN 5 MG: 5 TABLET, FILM COATED ORAL at 08:30

## 2021-03-25 RX ADMIN — DILTIAZEM HYDROCHLORIDE 120 MG: 120 CAPSULE, COATED, EXTENDED RELEASE ORAL at 08:30

## 2021-03-25 RX ADMIN — METOPROLOL SUCCINATE 50 MG: 50 TABLET, EXTENDED RELEASE ORAL at 08:30

## 2021-03-25 NOTE — DISCHARGE INSTRUCTIONS
Atrial Flutter   WHAT YOU NEED TO KNOW:   Atrial flutter is an irregular heartbeat  It reduces your heart's ability to pump blood, which means you do not get enough oxygen  An irregular heartbeat could lead to a life-threatening blood clot or stroke  DISCHARGE INSTRUCTIONS:   Call 911 for any of the following:   · You have any of the following signs of a stroke:      ? Numbness or drooping on one side of your face     ? Weakness in an arm or leg    ? Confusion or difficulty speaking    ? Dizziness, a severe headache, or vision loss    · You have any of the following signs of a heart attack:      ? Squeezing, pressure, or pain in your chest    ? You may  also have any of the following:     § Discomfort or pain in your back, neck, jaw, stomach, or arm    § Shortness of breath    § Nausea or vomiting    § Lightheadedness or a sudden cold sweat    Return to the emergency department if:  You have any of the following signs of a blood clot:  · You feel lightheaded, are short of breath, and have chest pain  · You cough up blood  · You have swelling, redness, pain, or warmth in your arm or leg  Contact your cardiologist or healthcare provider if:   · Your heart rate is higher or lower than your cardiologist says it should be  · You are bruising and bleeding more easily  · You have questions or concerns about your condition or care  Medicines: You may need any of the following:  · Heart medicines  help control your heart rate and rhythm  · Blood thinners  help prevent blood clots  Clots can cause strokes, heart attacks, and death  The following are general safety guidelines to follow while you are taking a blood thinner:    ? Watch for bleeding and bruising while you take blood thinners  Watch for bleeding from your gums or nose  Watch for blood in your urine and bowel movements  Use a soft washcloth on your skin, and a soft toothbrush to brush your teeth   This can keep your skin and gums from bleeding  If you shave, use an electric shaver  Do not play contact sports  ? Tell your dentist and other healthcare providers that you take a blood thinner  Wear a bracelet or necklace that says you take this medicine  ? Do not start or stop any other medicines unless your healthcare provider tells you to  Many medicines cannot be used with blood thinners  ? Take your blood thinner exactly as prescribed by your healthcare provider  Do not skip does or take less than prescribed  Tell your provider right away if you forget to take your blood thinner, or if you take too much  ? Warfarin  is a blood thinner that you may need to take  The following are things you should be aware of if you take warfarin:     § Foods and medicines can affect the amount of warfarin in your blood  Do not make major changes to your diet while you take warfarin  Warfarin works best when you eat about the same amount of vitamin K every day  Vitamin K is found in green leafy vegetables and certain other foods  Ask for more information about what to eat when you are taking warfarin  § You will need to see your healthcare provider for follow-up visits when you are on warfarin  You will need regular blood tests  These tests are used to decide how much medicine you need  · Take your medicine as directed  Contact your healthcare provider if you think your medicine is not helping or if you have side effects  Tell him of her if you are allergic to any medicine  Keep a list of the medicines, vitamins, and herbs you take  Include the amounts, and when and why you take them  Bring the list or the pill bottles to follow-up visits  Carry your medicine list with you in case of an emergency  Follow up with your cardiologist as directed: You may need ongoing tests or treatment  Write down your questions so you remember to ask them during your visits  Manage atrial flutter:   · Know your target heart rate    Learn how to take your pulse and monitor your heart rate  · Control your blood pressure  Take your blood pressure medicine as directed  · Do not smoke  Nicotine and other chemicals in cigarettes and cigars can cause heart and lung damage  Ask your healthcare provider for information if you currently smoke and need help to quit  E-cigarettes or smokeless tobacco still contain nicotine  Talk to your healthcare provider before you use these products  · Limit alcohol  Women should limit alcohol to 1 drink a day  Men should limit alcohol to 2 drinks a day  A drink of alcohol is 12 ounces of beer, 5 ounces of wine, or 1½ ounces of liquor  · Eat heart-healthy foods  Heart-healthy foods include fruits, vegetables, whole-grain breads, low-fat dairy products, beans, lean meats, and fish  Replace butter and margarine with heart-healthy oils such as olive oil and canola oil  · Maintain a healthy weight  Ask your healthcare provider how much you should weigh  Ask him to help you create a weight loss plan if you are overweight  © Copyright [address] Information is for End User's use only and may not be sold, redistributed or otherwise used for commercial purposes  All illustrations and images included in CareNotes® are the copyrighted property of [employer] D [employer] M [employer]  or [address] [place] [address]  The above information is an  only  It is not intended as medical advice for individual conditions or treatments  Talk to your doctor, nurse or pharmacist before following any medical regimen to see if it is safe and effective for you

## 2021-03-25 NOTE — DISCHARGE INSTR - AVS FIRST PAGE
Dear Radha Beck,     It was our pleasure to care for you here at [facility]  It is our hope that we were always able to exceed the expected standards for your care during your stay  You were hospitalized due to atrial flutter  You were cared for on the 3rd floor by Veena Cohen DO under the service of Loli Flores MD with the [facility] who covers for your primary care physician (PCP), Bell Gudino MD, while you were hospitalized  If you have any questions or concerns related to this hospitalization, you may contact us at [phone]  For follow up as well as any medication refills, we recommend that you follow up with your primary care physician  A registered nurse will reach out to you by phone within a few days after your discharge to answer any additional questions that you may have after going home  However, at this time we provide for you here, the most important instructions / recommendations at discharge:     · Notable Medication Adjustments -   · Please start taking metoprolol daily  · Please start taking diltiazem daily  · You may use furosemide 20 mg daily as needed for shortness of breath or weight gain greater than 2 lbs in 1 day  · Testing Required after Discharge -   · None  · Important follow up information -   · Please follow up with your family doctor in 1 week  · Please follow up with your cardiologist at your scheduled appointment  · Other Instructions -   · None  · Please review this entire after visit summary as additional general instructions including medication list, appointments, activity, diet, any pertinent wound care, and other additional recommendations from your care team that may be provided for you        Sincerely,     Veena Cohen DO

## 2021-03-25 NOTE — PROGRESS NOTES
General Cardiology   Progress Note -  [facility]   Erika Salazar 80 y o  female MRN: [id number]    Unit/Bed#: S -01 Encounter: [id number]    Assessment  1  New onset atrial flutter with RVR  -No history of  -Asymptomatic  -12 lead ECG 3/22; atrial flutter with 2-1 conduction, rate 160 to bpm  -24 hour telemetry review; atrial flutter, rate controlled  -Currently on metoprolol succinate 50 mg daily, and Cardizem  mg daily  -On Eliquis 5 mg b i d  (CHADS2 Vasc score = 3)  -TSH level 1 92     2  Mild volume overload -- in the setting of iatrogenic volume resuscitation (received 1L fluid bolus on admission + standing IVF's) + diastolic dysfunction   -On PE, volume status is improved; still has some bibasilar fine crackles -- which may be more in the setting of atelectasis  -TTE 3/23/2021; LVEF 55%, no regional wall motion abnormalities, wall thickness mildly increased, mild concentric hypertrophy, LA mildly dilated, moderate MR, trace AI, and moderate TR  -Received x1 dose of IV Lasix 20 mg (3/24 and 3/25); 24 hour I&O balance; -150 ml; creatinine stable at 1 23     3  Acute on chronic hypoxic respiratory failure -- 2/2 to # 4  4  Hx of COVID-19 PNA infection (hospitalization 2/28 through 3/10/2021)  -Discharged on 2 5 L NC sat 90-91% on admission (on 2 5 L NC) -- had increased supplemental O2 requirements on admission -- currently on 4-5 L NC maintaining sats in the low 90's -- please see respiratory therapy note from 3/24 for more information on resting/exericse O2 sats with and without supplemental O2   -CTA chest PE study -- no evidence of PE, diffuse abnormality throughout both lungs with combination of ground-glass attenuation, consolidation and linear opacities, small right pleural effusion     5  Dyslipidemia  -No recent lipid profile for review  -On atorvastatin 40 mg daily     6   CKD stage 3  -Baseline creatinine around 1 2-1 3  -Creatinine 1 43 on admission, currently 1 23 today     Plan  -Continue Toprol XL 50 mg daily, and Cardizem  mg daily  -Continue Eliquis 5 mg b i d  -Continue atorvastatin 40 mg daily  -2 g NA +diet 2 L FR    Subjective  Review of Systems   Constitution: Negative for chills, fever and malaise/fatigue  Cardiovascular: Negative for chest pain, dyspnea on exertion, irregular heartbeat, leg swelling, orthopnea and palpitations  Respiratory: Negative for cough and shortness of breath  Gastrointestinal: Negative for bloating and abdominal pain  Neurological: Negative for dizziness, headaches and weakness  All other systems reviewed and are negative  Objective:   Physical Exam  Vitals signs and nursing note reviewed  Constitutional:       General: She is not in acute distress  Appearance: Normal appearance  She is not ill-appearing  HENT:      Head: Normocephalic and atraumatic  Mouth/Throat:      Mouth: Mucous membranes are moist    Eyes:      General: No scleral icterus  Neck:      Musculoskeletal: Normal range of motion and neck supple  Cardiovascular:      Rate and Rhythm: Normal rate  Rhythm irregular  Pulses: Normal pulses  Heart sounds: Normal heart sounds  Pulmonary:      Effort: Pulmonary effort is normal       Breath sounds: Rales present  No wheezing  Abdominal:      General: Bowel sounds are normal       Palpations: Abdomen is soft  Tenderness: There is no abdominal tenderness  Musculoskeletal:      Right lower leg: No edema  Left lower leg: No edema  Skin:     General: Skin is warm and dry  Capillary Refill: Capillary refill takes less than 2 seconds  Neurological:      General: No focal deficit present  Mental Status: She is alert and oriented to person, place, and time  Psychiatric:         Mood and Affect: Mood normal          Vitals: Blood pressure 113/65, pulse 75, temperature 97 9 °F (36 6 °C), temperature source Oral, resp  rate 20, height 5' 1" (1 549 m), weight 66 7 kg (147 lb), SpO2 91 %  , Body mass index is 27 78 kg/m²  ,   Systolic (61PDE), LMY:860 , Min:101 , IPD:571     Diastolic (74XFF), PYZ:65, Min:62, Max:74      Intake/Output Summary (Last 24 hours) at 3/25/2021 0903  Last data filed at 3/24/2021 1900  Gross per 24 hour   Intake 600 ml   Output 750 ml   Net -150 ml     Weight (last 2 days)     None          LABORATORY RESULTS  Results from last 7 days   Lab Units 03/22/21  1458   TROPONIN I ng/mL 0 04     CBC with diff:   Results from last 7 days   Lab Units 03/22/21  1458   WBC Thousand/uL 5 64   HEMOGLOBIN g/dL 11 6   HEMATOCRIT % 38 5   MCV fL 88   PLATELETS Thousands/uL 213   MCH pg 26 6*   MCHC g/dL 30 1*   RDW % 19 0*   MPV fL 9 3   NRBC AUTO /100 WBCs 0       CMP:  Results from last 7 days   Lab Units 03/25/21  0525 03/24/21  0504 03/23/21  1057 03/22/21  1458   POTASSIUM mmol/L 4 4 3 8 4 3 4 2   CHLORIDE mmol/L 108 111* 108 108   CO2 mmol/L 28 27 26 28   BUN mg/dL 17 13 12 18   CREATININE mg/dL 1 23 1 23 1 03 1 43*   CALCIUM mg/dL 8 4 8 4 8 7 9 2   AST U/L  --   --   --  24   ALT U/L  --   --   --  17   ALK PHOS U/L  --   --   --  82   EGFR ml/min/1 73sq m 38 38 48 32       BMP:  Results from last 7 days   Lab Units 03/25/21  0525 03/24/21  0504 03/23/21  1057 03/22/21  1458   POTASSIUM mmol/L 4 4 3 8 4 3 4 2   CHLORIDE mmol/L 108 111* 108 108   CO2 mmol/L 28 27 26 28   BUN mg/dL 17 13 12 18   CREATININE mg/dL 1 23 1 23 1 03 1 43*   CALCIUM mg/dL 8 4 8 4 8 7 9 2       Lab Results   Component Value Date    NTBNP 1,658 (H) 02/28/2021        Results from last 7 days   Lab Units 03/24/21  0504 03/22/21  1458   MAGNESIUM mg/dL 1 9 2 1             Results from last 7 days   Lab Units 03/22/21  1458   TSH 3RD GENERATON uIU/mL 1 918             Lipid Profile:   No results found for: CHOL  No results found for: HDL  No results found for: LDLCALC  No results found for: TRIG    Cardiac testing:   Results for orders placed during the hospital encounter of 03/22/21   Echo complete with contrast if indicated    Narrative [facility] [address] [facility]  [phone]    Transthoracic Echocardiogram  2D, M-mode, Doppler, and Color Doppler    Study date:  23-Mar-2021    Patient: Celestina Wilson  MR number: [id number]  Account number: [de-identified]  : 13-Dec-1929  Age: 80 years  Gender: Female  Status: Inpatient  Location: Bedside  Height: 61 in  Weight: 146 7 lb  BP: 126/ 76 mmHg    Indications: Atrial flutter    Diagnoses: I48 1 - Atrial flutter    Sonographer:  DEBI Stone  Primary Physician:  Junaid Coyle MD  Referring Physician:  Cori Quintanilla MD  Group:  [facility]  Interpreting Physician:  Audrey Geller MD    SUMMARY    LEFT VENTRICLE:  Systolic function was normal  Ejection fraction was estimated to be 55 %  There were no regional wall motion abnormalities  Wall thickness was mildly increased  There was mild concentric hypertrophy  LEFT ATRIUM:  The atrium was mildly dilated  MITRAL VALVE:  There was moderate regurgitation  AORTIC VALVE:  There was trace regurgitation  TRICUSPID VALVE:  There was moderate regurgitation  The findings suggest mild pulmonary hypertension  IVC, HEPATIC VEINS:  The inferior vena cava was mildly dilated  HISTORY: PRIOR HISTORY: COVID-19, CKD III, Atrial flutter, Tachycardia, RBBB    PROCEDURE: The procedure was performed at the bedside  This was a routine study  The transthoracic approach was used  The study included complete 2D imaging, M-mode, complete spectral Doppler, and color Doppler  The heart rate was 78 bpm,  at the start of the study  Images were obtained from the parasternal, apical, subcostal, and suprasternal notch acoustic windows  Image quality was adequate  LEFT VENTRICLE: Size was normal  Systolic function was normal  Ejection fraction was estimated to be 55 %  There were no regional wall motion abnormalities  Wall thickness was mildly increased   There was mild concentric hypertrophy  DOPPLER: Transmitral flow pattern: atrial flutter  RIGHT VENTRICLE: The size was normal  Systolic function was normal  Wall thickness was normal     LEFT ATRIUM: The atrium was mildly dilated  RIGHT ATRIUM: The atrium was mildly dilated  MITRAL VALVE: Valve structure was normal  There was normal leaflet separation  DOPPLER: The transmitral velocity was within the normal range  There was no evidence for stenosis  There was moderate regurgitation  AORTIC VALVE: The valve was trileaflet  Leaflets exhibited mildly increased thickness, mild calcification, and normal cuspal separation  DOPPLER: Transaortic velocity was within the normal range  There was no evidence for stenosis  There  was trace regurgitation  TRICUSPID VALVE: The valve structure was normal  There was normal leaflet separation  DOPPLER: The transtricuspid velocity was within the normal range  There was no evidence for stenosis  There was moderate regurgitation  Estimated peak PA  pressure was 45 mmHg  The findings suggest mild pulmonary hypertension  PULMONIC VALVE: Leaflets exhibited normal thickness, no calcification, and normal cuspal separation  DOPPLER: The transpulmonic velocity was within the normal range  There was trace regurgitation  PERICARDIUM: There was no pericardial effusion  The pericardium was normal in appearance  AORTA: The root exhibited upper limit of normal size  The ascending aorta was upper normal normal in size, measuring 3 7 cm  SYSTEMIC VEINS: IVC: The inferior vena cava was mildly dilated      SYSTEM MEASUREMENT TABLES    2D  %FS: 33 12 %  Ao Diam: 3 19 cm  EDV(Teich): 91 29 ml  EF(Teich): 61 87 %  ESV(Teich): 34 81 ml  HR_2Ch_Q: 78 74 BPM  HR_4Ch_Q: 78 27 BPM  IVSd: 1 27 cm  LA Area: 25 11 cm2  LA Diam: 4 16 cm  LVCO_2Ch_Q: 3 23 L/min  LVCO_4Ch_Q: 2 89 L/min  LVCO_BiP_Q: 3 06 L/min  LVEF_2Ch_Q: 56 37 %  LVEF_4Ch_Q: 54 91 %  LVEF_BiP_Q: 54 44 %  LVIDd: 4 48 cm  LVIDs: 2 99 cm  LVLd_2Ch_Q: 6 49 cm  LVLd_4Ch_Q: 6 28 cm  LVLs_2Ch_Q: 6 32 cm  LVLs_4Ch_Q: 5 49 cm  LVPWd: 1 19 cm  LVSV_2Ch_Q: 41 ml  LVSV_4Ch_Q: 36 98 ml  LVSV_BiP_Q: 38 57 ml  LVVED_2Ch_Q: 72 72 ml  LVVED_4Ch_Q: 67 35 ml  LVVED_BiP_Q: 70 85 ml  LVVES_2Ch_Q: 31 73 ml  LVVES_4Ch_Q: 30 37 ml  LVVES_BiP_Q: 32 28 ml  RA Area: 19 56 cm2  RVIDd: 3 4 cm  SV(Teich): 56 48 ml    CW  TR MaxP 6 mmHg  TR Vmax: 3 3 m/s    MM  TAPSE: 1 7 cm    [facility] Accredited Echocardiography Laboratory    Prepared and electronically signed by    Edmundo Lombardi MD  Signed 23-Mar-2021 16:05:02       No results found for this or any previous visit  No results found for this or any previous visit  No procedure found  No results found for this or any previous visit  Meds/Allergies   all current active meds have been reviewed and current meds:   Current Facility-Administered Medications   Medication Dose Route Frequency    apixaban (ELIQUIS) tablet 5 mg  5 mg Oral BID    atorvastatin (LIPITOR) tablet 40 mg  40 mg Oral HS    diltiazem (CARDIZEM CD) 24 hr capsule 120 mg  120 mg Oral Daily    metoprolol succinate (TOPROL-XL) 24 hr tablet 50 mg  50 mg Oral Daily          Telemetry Review: No significant arrhythmias seen on telemetry review    EKG personally reviewed by AZRA Lawrence  Assessment:  Principal Problem:    Atrial flutter (HCC)  Active Problems:    Elevated serum creatinine    Elevated d-dimer    CKD (chronic kidney disease) stage 3, GFR 30-59 ml/min    Chronic respiratory failure with hypoxia (HCC)    Counseling / Coordination of Care  Total floor / unit time spent today 20 minutes  Greater than 50% of total time was spent with the patient and / or family counseling and / or coordination of care  ** Please Note: Dragon 360 Dictation voice to text software may have been used in the creation of this document   **

## 2021-03-25 NOTE — CASE MANAGEMENT
CM informed by SLIM that patient will be discharged home today  RT eval completed yesterday and patient's O2 requirements have increased  SLIM completed Home O2 w/ portability order and this documentation was faxed to [facility] so that he can update their records  CM called and LMOVM for patient's daughter Disha Flores providing update that info has been sent to [facility]  TUCKER also reviewed IMM via phone and asked for return call if she had any questions/concerns  CM Dept will continue to follow

## 2021-03-25 NOTE — PLAN OF CARE
Problem: Prexisting or High Potential for Compromised Skin Integrity  Goal: Skin integrity is maintained or improved  Description: INTERVENTIONS:  - Identify patients at risk for skin breakdown  - Assess and monitor skin integrity  - Assess and monitor nutrition and hydration status  - Monitor labs   - Assess for incontinence   - Turn and reposition patient  - Assist with mobility/ambulation  - Relieve pressure over bony prominences  - Avoid friction and shearing  - Provide appropriate hygiene as needed including keeping skin clean and dry  - Evaluate need for skin moisturizer/barrier cream  - Collaborate with interdisciplinary team   - Patient/family teaching  - Consider wound care consult   Outcome: Adequate for Discharge     Problem: Potential for Falls  Goal: Patient will remain free of falls  Description: INTERVENTIONS:  - Assess patient frequently for physical needs  -  Identify cognitive and physical deficits and behaviors that affect risk of falls    -  [place] fall precautions as indicated by assessment   - Educate patient/family on patient safety including physical limitations  - Instruct patient to call for assistance with activity based on assessment  - Modify environment to reduce risk of injury  - Consider OT/PT consult to assist with strengthening/mobility  Outcome: Adequate for Discharge     Problem: CARDIOVASCULAR - ADULT  Goal: Maintains optimal cardiac output and hemodynamic stability  Description: INTERVENTIONS:  - Monitor I/O, vital signs and rhythm  - Monitor for S/S and trends of decreased cardiac output  - Administer and titrate ordered vasoactive medications to optimize hemodynamic stability  - Assess quality of pulses, skin color and temperature  - Assess for signs of decreased coronary artery perfusion  - Instruct patient to report change in severity of symptoms  Outcome: Adequate for Discharge  Goal: Absence of cardiac dysrhythmias or at baseline rhythm  Description: INTERVENTIONS:  - Continuous cardiac monitoring, vital signs, obtain 12 lead EKG if ordered  - Administer antiarrhythmic and heart rate control medications as ordered  - Monitor electrolytes and administer replacement therapy as ordered  Outcome: Adequate for Discharge

## 2021-03-27 NOTE — ASSESSMENT & PLAN NOTE
Patient recently discharged from hospital with 2 5L NC o2 after COVID-19 infection       [place] O2 Evaluation:  pt qualifies for home 02 and requires 4lpm for exertion and 2 lpm at rest    Continue with PRN Lasix for weight gain > 2 lb in 1 day  Continue with low sodium, fluid restricted diet

## 2021-03-27 NOTE — ASSESSMENT & PLAN NOTE
Patient recently discharged from hospital with 2 5L NC o2 after COVID-19 infection       [place] O2 Evaluation:  pt qualifies for home 02 and requires 4lpm for exertion and 2 lpm at rest    Continue with PRN Lasix for weight gain > 2 lb in 1 day  Continue with low sodium, fluid restricted diet

## 2021-03-27 NOTE — DISCHARGE SUMMARY
[facility]  Discharge- Odean Seek 12/13/1929, 80 y o  female MRN: [id number]  Unit/Bed#: S -01 Encounter: [id number]  Primary Care Provider: Haris Brandt MD   Date and time admitted to hospital: 3/22/2021  2:47 PM    * Atrial flutter [facility])  Assessment & Plan  Patient presented with new onset Atrial Flutter with heart rate of 162  Adenosine given to patient  Diltiazem drip started  Trops negative  Patients Heart Rate now 98  Patient is in no acute distress  Of note, patient was prescribed eliquis after being discharged with COVID, however she did not  the medication  · Cardiology consulted, appreciate recs  · Patient transitioned to PO metoprolol and Cardizem  · Overnight, patient noted to be bradycardic 40-50s on telemetry  · Cardiology adjusted dosages today  · Echo:  Ejection fraction was estimated to be 55 %  There were no regional wall motion abnormalities  Wall thickness was mildly increased  There was mild concentric hypertrophy  Plan:  · Continue Eliquis to 5 mg BID, discussed price vs  Warfarin price with family  Family prefers Eliquis at this time  Sent to pharmacy for exact price check  Explained patient would get first 30 days free  · Cardiac diet  · Continue Toprol 50 mg daily   · Continue Cardizem  mg daily  · Follow up with Cardiology on 3/31/2021      Acute on chronic respiratory failure with hypoxia [facility])  Assessment & Plan  Patient recently discharged from hospital with 2 5L NC o2 after COVID-19 infection       [place] O2 Evaluation:  pt qualifies for home 02 and requires 4lpm for exertion and 2 lpm at rest    Continue with PRN Lasix for weight gain > 2 lb in 1 day  Continue with low sodium, fluid restricted diet    CKD (chronic kidney disease) stage 3, GFR 30-59 ml/min  Assessment & Plan  Lab Results   Component Value Date    EGFR 38 03/25/2021    EGFR 38 03/24/2021    EGFR 48 03/23/2021    CREATININE 1 23 03/25/2021    CREATININE 1 23 03/24/2021    CREATININE 1 03 03/23/2021     · GFR 32 upon admission  Stable  · Elevated creatinine on admission, now improved with IV fluids      Elevated d-dimer  Assessment & Plan  Patient had elevated in d-dimer during hospital admission earlier this month  D-Dimer in ED 3/22/2021 noted to be 5  12  PE scan negative  No results for input(s): FERRITIN, CRP, DDIMER in the last 72 hours  - Increased dose of  eliquis 5 mg BID      Elevated serum creatinine  Assessment & Plan  Creatinine noted to be 1 43 in ED  Baseline around 1 2  Patient has no urinary complaints  Recent Labs     03/24/21  0504 03/25/21  0525   CREATININE 1 23 1 23   EGFR 38 38       Plan:  · Improving without IV fluids      Discharging Resident Physician: Cat Cortez DO  Attending: No att  providers found  PCP: Weston Méndez MD  Admission Date: 3/22/2021  Discharge Date: 03/25/21    Disposition:     Home    Reason for Admission: Atrial flutter    Consultations During Hospital Stay:  · Cardiology    Procedures Performed:     · None    Significant Findings / Test Results:     · Please see hospital course    Incidental Findings:   · 1 5 x 1 8 cm soft tissue structure in the right breast, with slight increase in size since 2017, possibly an indolent breast cancer  Test Results Pending at Discharge (will require follow up): · None     Outpatient Tests Requested:  · None    Complications:  None    Hospital Course:     Treytamera Romero is a 80 y o  female patient who originally presented to the hospital on 3/22/2021 due to atrial flutter  Patient was seen at her PCP office, was noted to have elevated HR, and was advised to come to the hospital for further evaluation  In the ED she was found to have atrial flutter with rapid rate in the 160s  Adenosine was initially given followed by a Diltiazem drip  Troponin x 3 were negative  D dimer elevated but PE study negative  Echo on 3/23/2021 showed EF 55%   She was transitioned to oral metoprolol and cardizem and monitored on telemetry  She was given IV Lasix for mild volume overload  She was started on Eliquis  Of note, patient had previously been started on Eliquis during her last admission for COVID but never picked up the prescription  Therefore, Eliquis was price checked and information relayed to the family  They were agreeable to cost  Prior to admission, patient had been on 2 5 L of home O2  However oxygen requirements increased to 4-5 L this admission per home O2 evaluation  Cardiology has cleared patient for discharge on a regimen of Toprol 50 mg daily and Cardizem  mg daily  She was given instructions to take Lasix 20 mg PRN for SOB or weight gain > 2 lb a day  She will continue on Eliquis 5 mg and follow up with them at the office on 3/31/2021  Condition at Discharge: stable     Discharge Day Visit / Exam:     Subjective:  Patient resting comfortably  Denies chest pain or discomfort  Denies current SOB  Vitals: Blood Pressure: 111/64 (03/25/21 1517)  Pulse: 78 (03/25/21 1517)  Temperature: 97 6 °F (36 4 °C) (03/25/21 1517)  Temp Source: Oral (03/25/21 1517)  Respirations: 19 (03/25/21 1517)  Height: 5' 1" (154 9 cm) (03/22/21 1845)  Weight - Scale: 67 8 kg (149 lb 7 6 oz) (03/25/21 1029)  SpO2: 91 % (03/25/21 1517)  Exam:   Physical Exam  Constitutional:       Appearance: Normal appearance  Cardiovascular:      Rate and Rhythm: Rhythm irregular  Pulmonary:      Effort: Pulmonary effort is normal       Breath sounds: Rales present  Abdominal:      General: Bowel sounds are normal       Palpations: Abdomen is soft  Tenderness: There is no abdominal tenderness  Musculoskeletal:      Right lower leg: No edema  Left lower leg: No edema  Skin:     General: Skin is warm and dry  Neurological:      General: No focal deficit present  Mental Status: She is alert     Psychiatric:         Mood and Affect: Mood normal          Behavior: Behavior normal  Discussion with Family: Daughter    Discharge instructions/Information to patient and family:   See after visit summary for information provided to patient and family  Provisions for Follow-Up Care:  See after visit summary for information related to follow-up care and any pertinent home health orders  Planned Readmission: None     Discharge Medications:  See after visit summary for reconciled discharge medications provided to patient and family        ** Please Note: This note has been constructed using a voice recognition system **

## 2021-03-27 NOTE — ASSESSMENT & PLAN NOTE
Patient presented with new onset Atrial Flutter with heart rate of 162  Adenosine given to patient  Diltiazem drip started  Trops negative  Patients Heart Rate now 98  Patient is in no acute distress  Of note, patient was prescribed eliquis after being discharged with COVID, however she did not  the medication  · Cardiology consulted, appreciate recs  · Patient transitioned to PO metoprolol and Cardizem  · Overnight, patient noted to be bradycardic 40-50s on telemetry  · Cardiology adjusted dosages today  · Echo:  Ejection fraction was estimated to be 55 %  There were no regional wall motion abnormalities  Wall thickness was mildly increased  There was mild concentric hypertrophy  Plan:  · Continue Eliquis to 5 mg BID, discussed price vs  Warfarin price with family  Family prefers Eliquis at this time  Sent to pharmacy for exact price check  Explained patient would get first 30 days free     · Cardiac diet  · Continue Toprol 50 mg daily   · Continue Cardizem  mg daily  · Follow up with Cardiology on 3/31/2021

## 2021-03-27 NOTE — ASSESSMENT & PLAN NOTE
Lab Results   Component Value Date    EGFR 38 03/25/2021    EGFR 38 03/24/2021    EGFR 48 03/23/2021    CREATININE 1 23 03/25/2021    CREATININE 1 23 03/24/2021    CREATININE 1 03 03/23/2021     · GFR 32 upon admission   Stable  · Elevated creatinine on admission, now improved with IV fluids

## 2021-03-27 NOTE — ASSESSMENT & PLAN NOTE
Creatinine noted to be 1 43 in ED  Baseline around 1 2  Patient has no urinary complaints     Recent Labs     03/24/21  0504 03/25/21  0525   CREATININE 1 23 1 23   EGFR 38 38       Plan:  · Improving without IV fluids

## 2021-03-27 NOTE — ASSESSMENT & PLAN NOTE
Patient had elevated in d-dimer during hospital admission earlier this month  D-Dimer in ED 3/22/2021 noted to be 5  12  PE scan negative  No results for input(s): FERRITIN, CRP, DDIMER in the last 72 hours    - Increased dose of  eliquis 5 mg BID

## 2021-03-27 NOTE — INCIDENTAL FINDINGS
The following findings require follow up:  Radiographic finding    Findin 5 x 1 8 cm soft tissue structure in the right breast, with slight increase in size since 2017, possibly an indolent breast cancer      Follow up required: Follow up with your primary care physician    Follow up should be done in 1-2 weeks      Please notify the following clinician to assist with the follow up:   Dr Sheba Alarcon    Staff message sent to Dr Sheba Alarcon

## 2021-03-30 ENCOUNTER — TELEPHONE (OUTPATIENT)
Dept: INTERNAL MEDICINE CLINIC | Facility: CLINIC | Age: 86
End: 2021-03-30

## 2021-03-30 NOTE — TELEPHONE ENCOUNTER
Contacted Maxi Cooper to inform her of the saline spray 2-3 times a day or as needed, or vaseline to the nostrils twice a day  Bia Martin was thankful for the call back and information, ending the call soon after

## 2021-03-30 NOTE — TELEPHONE ENCOUNTER
Sonido Vargas has called the office asking about the provider Keisha Menezes has seen on 03/22/2021  Informed Homero Urena the provider was Dr Aleena Urena had asked for Dr Blake Farfan, responded Dr Blake Farfan was not in the office today  Homero Urena had then asked if Dr Blake Farfan was to see them for their appointment on 04/05/2021  Informed Homero Shi was the provider for her appointment  Homero Urena had another question which will be addressed in a note routed to the providers

## 2021-03-30 NOTE — TELEPHONE ENCOUNTER
Called Ramya Kent to inform her of the suggestion by Dr Radha Brown to use either saline spray 2-3 times a day, or as needed, or vaseline to the nostrils twice a day  Kati Le was thankful for the information and ended the call

## 2021-03-30 NOTE — TELEPHONE ENCOUNTER
She can use saline spray 2-3 times a day or as needed, that can be bought over the counter, she can also apply vaseline to the nostrils twice a day

## 2021-03-30 NOTE — TELEPHONE ENCOUNTER
Reyes Rice had called in regards to [employer]  After being discharged from [address] has stated [employer] has been on oxygen therapy  Evangelina Stone had called to asked what nasal spray can be used to moisturize  Denicola's dry nose  Evangelina Stone states Karry Frankel has been experiencing a dry nose that results in nose bleeds and scabs  Evangelina Stone wants to know what medication can be used for her dry nose while on oxygen therapy

## 2021-03-31 ENCOUNTER — TELEPHONE (OUTPATIENT)
Dept: OTHER | Facility: HOSPITAL | Age: 86
End: 2021-03-31

## 2021-03-31 ENCOUNTER — TELEPHONE (OUTPATIENT)
Dept: INTERNAL MEDICINE CLINIC | Facility: CLINIC | Age: 86
End: 2021-03-31

## 2021-03-31 NOTE — TELEPHONE ENCOUNTER
Called patient and spoke to her daughter regarding the incidental findings of breast nodule this recent admission as well as the renal nodule noted during her last admission  She understands and will follow up with patient's PCP  She also expressed concern that patient developed shortness of breath today with hypoxia in the 70s today  They gave patient a dose of her p r n  Lasix and her saturations returned up to the 90s and patient felt back to baseline  They do note that her pulse rate is in the low to mid 100s  They have been in contact with patient's PCP  I encouraged that they could schedule an appointment for tomorrow as patient's heart rate could be evaluated and perhaps adjustments to her medications could be made  I also recommended that given patient's history of CHF, patient would benefit from weighing herself daily and a weight gain of more than 2 lb in 1 day is a good indicator to perhaps give 1 dose of p r n  Lasix  Patient will also follow-up with her cardiologist on Monday

## 2021-03-31 NOTE — TELEPHONE ENCOUNTER
Sarah Hugo has called the office in regards to [facility]  Brooke Arthur would like to speak with a provider regarding Robbin Najjar  Brooke Arthur stated she wants to speak with a doctor directly, not have her message routed to a provider, as Brooke Arthur states the process being "too confusing " Nevertheless informed Brooke Arthur she could leave me the message to be routed to the providers, and a call back can be given to answer her question  Brooke Arthur was adamant about speak to a provider rather than speak to me

## 2021-03-31 NOTE — TELEPHONE ENCOUNTER
I called patient daughter with her concerns about her mother  She states that patient is having minor nasal bleeding I have explained to her that since patient is on oxygen 24/7 and now on anticoagulation for a flutter that is very common to happen  She can use normal saline to hydrate the nasal mucosa, and as a long as there is no profuse bleeding that will likely improve  She was also concerned about her mother having drop on oxygen to low 70s, and also looking short of breath this am, she immediately gave her patient Lasix and increased oxygen, which made her oxygen level improve to above 90  I have explained to her that due to nature of her heart condition she can have fluctuation on her heart rate and also fluid retention  She was instructed to give patient extra 20 mg of Lasix as needed if patient has dropped on oxygen and has shortness of breath  She was also offered a earlier appointment with us so we can address those concerns and adjust her medications as appropriate  She states that her mother is asymptomatic now, her oxygen and heart rate have improved  She has an charleen with us on Monday, but she we will be available to see her sooner  She will let us know tomorrow how her mother feels and decide on that  All   Questions were answer with the best of my ability

## 2021-04-01 ENCOUNTER — OFFICE VISIT (OUTPATIENT)
Dept: CARDIOLOGY CLINIC | Facility: CLINIC | Age: 86
End: 2021-04-01

## 2021-04-01 VITALS
OXYGEN SATURATION: 94 % | WEIGHT: 176.8 LBS | HEART RATE: 103 BPM | BODY MASS INDEX: 33.38 KG/M2 | HEIGHT: 61 IN | SYSTOLIC BLOOD PRESSURE: 108 MMHG | DIASTOLIC BLOOD PRESSURE: 60 MMHG

## 2021-04-01 DIAGNOSIS — I50.31 ACUTE DIASTOLIC HEART FAILURE (HCC): ICD-10-CM

## 2021-04-01 DIAGNOSIS — J96.11 CHRONIC RESPIRATORY FAILURE WITH HYPOXIA (HCC): ICD-10-CM

## 2021-04-01 DIAGNOSIS — I48.92 ATRIAL FLUTTER (HCC): ICD-10-CM

## 2021-04-01 DIAGNOSIS — I48.4 ATYPICAL ATRIAL FLUTTER (HCC): Primary | ICD-10-CM

## 2021-04-01 DIAGNOSIS — Z09 HOSPITAL DISCHARGE FOLLOW-UP: ICD-10-CM

## 2021-04-01 DIAGNOSIS — I45.10 RIGHT BUNDLE BRANCH BLOCK (RBBB) ON ELECTROCARDIOGRAM (ECG): ICD-10-CM

## 2021-04-01 DIAGNOSIS — I10 ESSENTIAL HYPERTENSION: ICD-10-CM

## 2021-04-01 PROCEDURE — 93000 ELECTROCARDIOGRAM COMPLETE: CPT | Performed by: INTERNAL MEDICINE

## 2021-04-01 PROCEDURE — 99215 OFFICE O/P EST HI 40 MIN: CPT | Performed by: INTERNAL MEDICINE

## 2021-04-01 RX ORDER — METOPROLOL SUCCINATE 50 MG/1
TABLET, EXTENDED RELEASE ORAL
Qty: 30 TABLET | Refills: 0
Start: 2021-04-01 | End: 2021-04-01 | Stop reason: ALTCHOICE

## 2021-04-01 RX ORDER — METOPROLOL SUCCINATE 25 MG/1
25 TABLET, EXTENDED RELEASE ORAL EVERY 12 HOURS
Start: 2021-04-01 | End: 2021-04-20 | Stop reason: SDUPTHER

## 2021-04-01 RX ORDER — FUROSEMIDE 20 MG/1
20 TABLET ORAL 3 TIMES WEEKLY
Qty: 15 TABLET | Refills: 1 | Status: SHIPPED | OUTPATIENT
Start: 2021-04-02 | End: 2021-05-19

## 2021-04-01 NOTE — PROGRESS NOTES
Hospital Follow Up   Office Visit Note  Jeferson Villalobos   80 y o    female   MRN: [id number]  [address] [place] [address] [place]  [id number] [phone]    PCP: Raymond Cook MD  Cardiologist: will be Dr Sophia Heath              Summary of recommendations  - decrease Eliquis to 2 5 mg b i d  She has had significant epistaxis and wanted to quit completely  She is over [de-identified]years old, creatinine is 1 2-1 4   -  I provided them [employer] forms and manufacture assistance paperwork to try to get the Eliquis covered  It is too costly out of pocket and she does not have Medicare Part B  They declined Coumadin The daughter would like to stick with Eliquis however  She was provided samples today  -- Lasix 20 mg 3 times a week  She has only been taking it p r n , hardly at all  --BMP  2 weeks  -- she wants to get off oxygen  I suggested she can remove it periodically, they have been O2 pulse ox at home  It needs to be greater than 90%  I asked him to record her O2 sats on a slip paper for the next visit with her PCP  -- change Toprol to 25 mg b i d  Feliciano Merle they are monitoring her heart rate at home  I suspect she is going in and out of atrial flutter with RVR     -- follow-up Dr Sophia Heath new patient 1 month            Assessment/plan  atrial flutter, new onset, presented to the hospital with RVR  Asymptomatic  Rate controlled on Toprol 50 mg daily, Cardizem CD 1 20 mg daily  Chads 2 Vasc score 3  Anticoagulation with Eliquis 5 mg b i d  EKG today   Atypical Atrial flutter with variable response heart rate 103  Will decrease Eliquis to 2 5 b i d    acute diastolic heart failure secondary to rapid a flutter I asked her to take Lasix 20 mg at least 3 times a week  Will get a BMP in 2 weeks    Change Toprol dosing to 25 mg b i d  for better response around the clock  Hx of COVID-19 PNA infection (hospitalization 2/28 through 3/10/2021), on oxygen  Hypertension, essential   /60  Hyperlipidemia,  Prescribed atorvastatin but not taking it  CKD stage 3  Baseline creatinine around 1 2-1 3  Cardiac testing   TTE  3/23/2021; LVEF 55%, no regional wall motion abnormalities, wall thickness mildly increased, mild concentric hypertrophy, LA mildly dilated, moderate MR, trace AI, and moderate TR            DEVAN Sam is a 79 yo female with dyslipidemia, CKD 3 recent and COVID-19 PNA infection (2/2021)  She was discharged on home oxygen 2 L nasal cannula after admission for COVID-19  Her D-dimer was elevated and thusly met criteria for initiating anticoagulation with Eliquis 2 5 mg b i d  However, the patient reports she did not fill the prescription  She has no history of heart failure arrhythmia or CAD  She does not follow with cardiologist    Interval history  Adm 3/22/21 with atrial flutter, new onset, with RVR,  referred by her PCP when seen for routine hospital follow-up     An echocardiogram demonstrated preserved LV function, no wall motion abnormalities  The left atrium was mildly dilated with moderate mitral regurgitation and moderate tricuspid regurgitation  She was followed by Cardiology  She was rate controlled with metoprolol succinate and cardizem CD  She was anticoagulated with Eliquis 5 mg b i d  given a Chads Vasc score of 3  She was treated for volume overload in the setting of diastolic dysfunction and rapid atrial flutter  She was discharged on Lasix 20 mg daily  Her creatinine was stable at 1 2    4/1/21   hospital follow-up  She is accompanied by her daughter  This is an early visit    She has several issues 1)she wants to be off oxygen 2) she has epistaxis frequently 3) the Eliquis is too expensive 4) she does not like Lasix   her EKG today shows atrial flutter with variable response 103 beats per minute   Blood pressure 108/60  Wt Readings from Last 3 Encounters:   04/01/21 80 2 kg (176 lb 12 8 oz)   03/25/21 67 8 kg (149 lb 7 6 oz) 03/22/21 66 7 kg (147 lb)    on exam she is volume overloaded  I suggested that she wants to be off oxygen she has take the Lasix more frequently  We will split the metoprolol up to b i d  dosing for better 24 hour coverage   I gave her forms to try to get patient assistance to get the Eliquis covered   She has borderline criteria for reduced dose of  Eliquis  Her creatinine runs from 1 2-1 4 she has greater than [de-identified]years old  Her weight is 80 kg based upon patient issue with frequent epistaxis, reduce Eliquis to 2 5 mg q 12 hours: discussed risk/benefit  follow-up 1 month new cardiologist appt        Assessment  Diagnoses and all orders for this visit:    Atypical atrial flutter (HCC)  -     POCT ECG  -     apixaban (Eliquis) 2 5 mg; Take 1 tablet (2 5 mg total) by mouth 2 (two) times a day  -     metoprolol succinate (TOPROL-XL) 25 mg 24 hr tablet; Take 1 tablet (25 mg total) by mouth every 12 (twelve) hours    Hospital discharge follow-up    Right bundle branch block (RBBB) on electrocardiogram (ECG)    Essential hypertension  -     Basic metabolic panel; Future    Atrial flutter (HCC)  -     Discontinue: metoprolol succinate (TOPROL-XL) 50 mg 24 hr tablet; One half tab (25mg)  q 12h  -     furosemide (LASIX) 20 mg tablet; Take 1 tablet (20 mg total) by mouth 3 (three) times a week  -     Basic metabolic panel; Future    Chronic respiratory failure with hypoxia (HCC)  -     furosemide (LASIX) 20 mg tablet; Take 1 tablet (20 mg total) by mouth 3 (three) times a week    Acute diastolic heart failure (HCC)          Past Medical History:   Diagnosis Date    Acute kidney injury ([facility] [address] )     Atrial flutter (HCC)     Rapid heart rate        Review of Systems   Constitution: Negative for chills  HENT: Positive for nosebleeds  Cardiovascular: Positive for dyspnea on exertion and leg swelling   Negative for chest pain, claudication, cyanosis, irregular heartbeat, near-syncope, orthopnea, palpitations, paroxysmal nocturnal dyspnea and syncope  Respiratory: Positive for shortness of breath  Negative for cough  Gastrointestinal: Negative for heartburn and nausea  Neurological: Negative for dizziness, focal weakness, headaches, light-headedness and weakness  All other systems reviewed and are negative  Allergies   Allergen Reactions    Penicillins Rash       Current Outpatient Medications:     diltiazem (CARDIZEM CD) 180 mg 24 hr capsule, Take 1 capsule (180 mg total) by mouth daily, Disp: 30 capsule, Rfl: 0    [START ON 4/2/2021] furosemide (LASIX) 20 mg tablet, Take 1 tablet (20 mg total) by mouth 3 (three) times a week, Disp: 15 tablet, Rfl: 1    apixaban (Eliquis) 2 5 mg, Take 1 tablet (2 5 mg total) by mouth 2 (two) times a day, Disp: , Rfl:     atorvastatin (LIPITOR) 40 mg tablet, Take 1 tablet (40 mg total) by mouth daily at bedtime (Patient not taking: Reported on 4/1/2021), Disp: 14 tablet, Rfl: 0    metoprolol succinate (TOPROL-XL) 25 mg 24 hr tablet, Take 1 tablet (25 mg total) by mouth every 12 (twelve) hours, Disp: , Rfl:         Social History     Socioeconomic History    Marital status:       Spouse name: Not on file    Number of children: 3    Years of education: Not on file    Highest education level: Not on file   Occupational History    Not on file   Social Needs    Financial resource strain: Not on file    Food insecurity     Worry: Not on file     Inability: Not on file    Transportation needs     Medical: Not on file     Non-medical: Not on file   Tobacco Use    Smoking status: Never Smoker    Smokeless tobacco: Never Used    Tobacco comment: former smoker, per [employer];  started smoking at 13 yo, stopped at 26 yo   Substance and Sexual Activity    Alcohol use: No    Drug use: No    Sexual activity: Not on file   Lifestyle    Physical activity     Days per week: Not on file     Minutes per session: Not on file    Stress: Not on file   Relationships    Social connections     Talks on phone: Not on file     Gets together: Not on file     Attends Hoah[employer]ism service: Not on file     Active member of club or organization: Not on file     Attends meetings of clubs or organizations: Not on file     Relationship status: Not on file    Intimate partner violence     Fear of current or ex partner: Not on file     Emotionally abused: Not on file     Physically abused: Not on file     Forced sexual activity: Not on file   Other Topics Concern    Not on file   Social History Narrative    Inadequate exercise       Family History   Problem Relation Age of Onset    Diabetes Father     Cancer Family        Physical Exam   Constitutional: She is oriented to person, place, and time  No distress  HENT:   Head: Normocephalic and atraumatic  Eyes: Conjunctivae and EOM are normal    Neck: Normal range of motion  Neck supple  Cardiovascular: Normal heart sounds and intact distal pulses  An irregularly irregular rhythm present  Tachycardia present  Pulmonary/Chest: Effort normal and breath sounds normal    Abdominal: Soft  Bowel sounds are normal    Musculoskeletal: Normal range of motion  Neurological: She is alert and oriented to person, place, and time  Skin: Skin is warm and dry  She is not diaphoretic  Psychiatric: She has a normal mood and affect  Nursing note and vitals reviewed  Vitals: Blood pressure 108/60, pulse 103, height 5' 1" (1 549 m), weight 80 2 kg (176 lb 12 8 oz), SpO2 94 %     Wt Readings from Last 3 Encounters:   04/01/21 80 2 kg (176 lb 12 8 oz)   03/25/21 67 8 kg (149 lb 7 6 oz)   03/22/21 66 7 kg (147 lb)         Labs & Results:  Lab Results   Component Value Date    WBC 5 64 03/22/2021    HGB 11 6 03/22/2021    HCT 38 5 03/22/2021    MCV 88 03/22/2021     03/22/2021     No results found for: BNP  No components found for: CHEM  Total CK   Date Value Ref Range Status   02/28/2021 160 26 - 192 U/L Final     Troponin I   Date Value Ref Range Status   2021 0 04 <=0 04 ng/mL Final     Comment:     [employer] Chemistry analyzer 99% cutoff is > 0 04 ng/mL in network labs     o cTnI 99% cutoff is useful only when applied to patients in the clinical setting of myocardial ischemia   o cTnI 99% cutoff should be interpreted in the context of clinical history, ECG findings and possibly cardiac imaging to establish correct diagnosis  o cTnI 99% cutoff may be suggestive but clearly not indicative of a coronary event without the clinical setting of myocardial ischemia      2021 0 04 <=0 04 ng/mL Final     Comment:     [employer] Chemistry analyzer 99% cutoff is > 0 04 ng/mL in network labs     o cTnI 99% cutoff is useful only when applied to patients in the clinical setting of myocardial ischemia   o cTnI 99% cutoff should be interpreted in the context of clinical history, ECG findings and possibly cardiac imaging to establish correct diagnosis  o cTnI 99% cutoff may be suggestive but clearly not indicative of a coronary event without the clinical setting of myocardial ischemia       2017 <0 02 <=0 04 ng/mL Final     CK-MB Index   Date Value Ref Range Status   2021 <1 0 0 0 - 2 5 % Final     Results for orders placed during the hospital encounter of 21   Echo complete with contrast if indicated    Narrative [facility] [address], [address] [facility]  [phone]    Transthoracic Echocardiogram  2D, M-mode, Doppler, and Color Doppler    Study date:  23-Mar-2021    Patient: Shanthi Soto  MR number: [id number]  Account number: [de-identified]  : 13-Dec-1929  Age: 80 years  Gender: Female  Status: Inpatient  Location: Bedside  Height: 61 in  Weight: 146 7 lb  BP: 126/ 76 mmHg    Indications: Atrial flutter    Diagnoses: I48 1 - Atrial flutter    Sonographer:  DEBI Hale  Primary Physician:  Hans Narvaez MD  Referring Physician:  Delma Edward MD  Group:  [facility]  Interpreting Physician:  Kai Tolentino MD    SUMMARY    LEFT VENTRICLE:  Systolic function was normal  Ejection fraction was estimated to be 55 %  There were no regional wall motion abnormalities  Wall thickness was mildly increased  There was mild concentric hypertrophy  LEFT ATRIUM:  The atrium was mildly dilated  MITRAL VALVE:  There was moderate regurgitation  AORTIC VALVE:  There was trace regurgitation  TRICUSPID VALVE:  There was moderate regurgitation  The findings suggest mild pulmonary hypertension  IVC, HEPATIC VEINS:  The inferior vena cava was mildly dilated  HISTORY: PRIOR HISTORY: COVID-19, CKD III, Atrial flutter, Tachycardia, RBBB    PROCEDURE: The procedure was performed at the bedside  This was a routine study  The transthoracic approach was used  The study included complete 2D imaging, M-mode, complete spectral Doppler, and color Doppler  The heart rate was 78 bpm,  at the start of the study  Images were obtained from the parasternal, apical, subcostal, and suprasternal notch acoustic windows  Image quality was adequate  LEFT VENTRICLE: Size was normal  Systolic function was normal  Ejection fraction was estimated to be 55 %  There were no regional wall motion abnormalities  Wall thickness was mildly increased  There was mild concentric hypertrophy  DOPPLER: Transmitral flow pattern: atrial flutter  RIGHT VENTRICLE: The size was normal  Systolic function was normal  Wall thickness was normal     LEFT ATRIUM: The atrium was mildly dilated  RIGHT ATRIUM: The atrium was mildly dilated  MITRAL VALVE: Valve structure was normal  There was normal leaflet separation  DOPPLER: The transmitral velocity was within the normal range  There was no evidence for stenosis  There was moderate regurgitation  AORTIC VALVE: The valve was trileaflet  Leaflets exhibited mildly increased thickness, mild calcification, and normal cuspal separation   DOPPLER: Transaortic velocity was within the normal range  There was no evidence for stenosis  There  was trace regurgitation  TRICUSPID VALVE: The valve structure was normal  There was normal leaflet separation  DOPPLER: The transtricuspid velocity was within the normal range  There was no evidence for stenosis  There was moderate regurgitation  Estimated peak PA  pressure was 45 mmHg  The findings suggest mild pulmonary hypertension  PULMONIC VALVE: Leaflets exhibited normal thickness, no calcification, and normal cuspal separation  DOPPLER: The transpulmonic velocity was within the normal range  There was trace regurgitation  PERICARDIUM: There was no pericardial effusion  The pericardium was normal in appearance  AORTA: The root exhibited upper limit of normal size  The ascending aorta was upper normal normal in size, measuring 3 7 cm  SYSTEMIC VEINS: IVC: The inferior vena cava was mildly dilated  SYSTEM MEASUREMENT TABLES    2D  %FS: 33 12 %  Ao Diam: 3 19 cm  EDV(Teich): 91 29 ml  EF(Teich): 61 87 %  ESV(Teich): 34 81 ml  HR_2Ch_Q: 78 74 BPM  HR_4Ch_Q: 78 27 BPM  IVSd: 1 27 cm  LA Area: 25 11 cm2  LA Diam: 4 16 cm  LVCO_2Ch_Q: 3 23 L/min  LVCO_4Ch_Q: 2 89 L/min  LVCO_BiP_Q: 3 06 L/min  LVEF_2Ch_Q: 56 37 %  LVEF_4Ch_Q: 54 91 %  LVEF_BiP_Q: 54 44 %  LVIDd: 4 48 cm  LVIDs: 2 99 cm  LVLd_2Ch_Q: 6 49 cm  LVLd_4Ch_Q: 6 28 cm  LVLs_2Ch_Q: 6 32 cm  LVLs_4Ch_Q: 5 49 cm  LVPWd: 1 19 cm  LVSV_2Ch_Q: 41 ml  LVSV_4Ch_Q: 36 98 ml  LVSV_BiP_Q: 38 57 ml  LVVED_2Ch_Q: 72 72 ml  LVVED_4Ch_Q: 67 35 ml  LVVED_BiP_Q: 70 85 ml  LVVES_2Ch_Q: 31 73 ml  LVVES_4Ch_Q: 30 37 ml  LVVES_BiP_Q: 32 28 ml  RA Area: 19 56 cm2  RVIDd: 3 4 cm  SV(Teich): 56 48 ml    CW  TR MaxP 6 mmHg  TR Vmax: 3 3 m/s    MM  TAPSE: 1 7 cm    [employer] Accredited Echocardiography [facility]    Prepared and electronically signed by    Heriberto Lenz MD  Signed 23-Mar-2021 16:05:02       No results found for this or any previous visit      This note was completed in part utilizing m-modal fluency direct voice recognition software  Grammatical errors, random word insertion, spelling mistakes, and incomplete sentences may be an occasional consequence of the system secondary to software limitations, ambient noise and hardware issues  At the time of dictation, efforts were made to edit, clarify and /or correct errors  Please read the chart carefully and recognize, using context, where substitutions have occurred    If you have any questions or concerns about the context, text or information contained within the body of this dictation, please contact myself, the provider, for further clarification

## 2021-04-01 NOTE — LETTER
April 1, 2021     Anabella Elam MD  [address] [facility]    Patient: Juan Luis Hoskins   YOB: 1929   Date of Visit: 4/1/2021       Dear Dr Sagrario Moncada: Thank you for referring Juan Luis Hoskins to me for evaluation  Below are my notes for this consultation  If you have questions, please do not hesitate to call me  I look forward to following your patient along with you  Sincerely,        AZRA Pereira        CC: Donnamarie Rubinstein, MD Alyse Pouch, [address]  4/1/2021  5:28 PM  Sign when [facility] Follow Up   Office Visit Note  Juan Luis Hoskins   80 y o    female   MRN: [id number]  [address]  [address]  [facility] [address] [facility] [address] [phone]    PCP: Anabella Elam MD  Cardiologist: will be Dr Jenifer Burnett              Summary of recommendations  - decrease Eliquis to 2 5 mg b i d  She has had significant epistaxis and wanted to quit completely  She is over [de-identified]years old, creatinine is 1 2-1 4   -  I provided them [employer] forms and manufacture assistance paperwork to try to get the Eliquis covered  It is too costly out of pocket and she does not have Medicare Part B  They declined Coumadin The daughter would like to stick with Eliquis however  She was provided samples today  -- Lasix 20 mg 3 times a week  She has only been taking it p r n , hardly at all  --BMP  2 weeks  -- she wants to get off oxygen  I suggested she can remove it periodically, they have been O2 pulse ox at home  It needs to be greater than 90%  I asked him to record her O2 sats on a slip paper for the next visit with her PCP  -- change Toprol to 25 mg b i d  Ina Burow they are monitoring her heart rate at home  I suspect she is going in and out of atrial flutter with RVR     -- follow-up Dr Jenifer Burnett new patient 1 month            Assessment/plan  atrial flutter, new onset, presented to the hospital with RVR  Asymptomatic     Rate controlled on Toprol 50 mg daily, Cardizem CD 1 20 mg daily  Chads 2 Vasc score 3  Anticoagulation with Eliquis 5 mg b i d  EKG today   Atypical Atrial flutter with variable response heart rate 103  Will decrease Eliquis to 2 5 b i d    acute diastolic heart failure secondary to rapid a flutter I asked her to take Lasix 20 mg at least 3 times a week  Will get a BMP in 2 weeks  Change Toprol dosing to 25 mg b i d  for better response around the clock  Hx of COVID-19 PNA infection (hospitalization 2/28 through 3/10/2021), on oxygen  Hypertension, essential   /60  Hyperlipidemia,  Prescribed atorvastatin but not taking it  CKD stage 3  Baseline creatinine around 1 2-1 3  Cardiac testing   TTE  3/23/2021; LVEF 55%, no regional wall motion abnormalities, wall thickness mildly increased, mild concentric hypertrophy, LA mildly dilated, moderate MR, trace AI, and moderate TR            HPI  Keenan Cartwright is a 79 yo female with dyslipidemia, CKD 3 recent and COVID-19 PNA infection (2/2021)  She was discharged on home oxygen 2 L nasal cannula after admission for COVID-19  Her D-dimer was elevated and thusly met criteria for initiating anticoagulation with Eliquis 2 5 mg b i d  However, the patient reports she did not fill the prescription  She has no history of heart failure arrhythmia or CAD  She does not follow with cardiologist    Interval history  Adm 3/22/21 with atrial flutter, new onset, with RVR,  referred by her PCP when seen for routine hospital follow-up     An echocardiogram demonstrated preserved LV function, no wall motion abnormalities  The left atrium was mildly dilated with moderate mitral regurgitation and moderate tricuspid regurgitation  She was followed by Cardiology  She was rate controlled with metoprolol succinate and cardizem CD  She was anticoagulated with Eliquis 5 mg b i d  given a Chads Vasc score of 3    She was treated for volume overload in the setting of diastolic dysfunction and rapid atrial flutter  She was discharged on Lasix 20 mg daily  Her creatinine was stable at 1 2    4/1/21   hospital follow-up  She is accompanied by her daughter  This is an early visit  She has several issues 1)she wants to be off oxygen 2) she has epistaxis frequently 3) the Eliquis is too expensive 4) she does not like Lasix   her EKG today shows atrial flutter with variable response 103 beats per minute   Blood pressure 108/60  Wt Readings from Last 3 Encounters:   04/01/21 80 2 kg (176 lb 12 8 oz)   03/25/21 67 8 kg (149 lb 7 6 oz)   03/22/21 66 7 kg (147 lb)    on exam she is volume overloaded  I suggested that she wants to be off oxygen she has take the Lasix more frequently  We will split the metoprolol up to b i d  dosing for better 24 hour coverage   I gave her forms to try to get patient assistance to get the Eliquis covered   She has borderline criteria for reduced dose of  Eliquis  Her creatinine runs from 1 2-1 4 she has greater than [de-identified]years old  Her weight is 80 kg based upon patient issue with frequent epistaxis, reduce Eliquis to 2 5 mg q 12 hours: discussed risk/benefit  follow-up 1 month new cardiologist appt        Assessment  Diagnoses and all orders for this visit:    Atypical atrial flutter (HCC)  -     POCT ECG  -     apixaban (Eliquis) 2 5 mg; Take 1 tablet (2 5 mg total) by mouth 2 (two) times a day  -     metoprolol succinate (TOPROL-XL) 25 mg 24 hr tablet; Take 1 tablet (25 mg total) by mouth every 12 (twelve) hours    Hospital discharge follow-up    Right bundle branch block (RBBB) on electrocardiogram (ECG)    Essential hypertension  -     Basic metabolic panel; Future    Atrial flutter (HCC)  -     Discontinue: metoprolol succinate (TOPROL-XL) 50 mg 24 hr tablet; One half tab (25mg)  q 12h  -     furosemide (LASIX) 20 mg tablet; Take 1 tablet (20 mg total) by mouth 3 (three) times a week  -     Basic metabolic panel;  Future    Chronic respiratory failure with hypoxia ([place] [address] )  -     furosemide (LASIX) 20 mg tablet; Take 1 tablet (20 mg total) by mouth 3 (three) times a week    Acute diastolic heart failure (HCC)          Past Medical History:   Diagnosis Date    Acute kidney injury ([place] [address] )     Atrial flutter (HCC)     Rapid heart rate        Review of Systems   Constitution: Negative for chills  HENT: Positive for nosebleeds  Cardiovascular: Positive for dyspnea on exertion and leg swelling  Negative for chest pain, claudication, cyanosis, irregular heartbeat, near-syncope, orthopnea, palpitations, paroxysmal nocturnal dyspnea and syncope  Respiratory: Positive for shortness of breath  Negative for cough  Gastrointestinal: Negative for heartburn and nausea  Neurological: Negative for dizziness, focal weakness, headaches, light-headedness and weakness  All other systems reviewed and are negative  Allergies   Allergen Reactions    Penicillins Rash       Current Outpatient Medications:     diltiazem (CARDIZEM CD) 180 mg 24 hr capsule, Take 1 capsule (180 mg total) by mouth daily, Disp: 30 capsule, Rfl: 0    [START ON 4/2/2021] furosemide (LASIX) 20 mg tablet, Take 1 tablet (20 mg total) by mouth 3 (three) times a week, Disp: 15 tablet, Rfl: 1    apixaban (Eliquis) 2 5 mg, Take 1 tablet (2 5 mg total) by mouth 2 (two) times a day, Disp: , Rfl:     atorvastatin (LIPITOR) 40 mg tablet, Take 1 tablet (40 mg total) by mouth daily at bedtime (Patient not taking: Reported on 4/1/2021), Disp: 14 tablet, Rfl: 0    metoprolol succinate (TOPROL-XL) 25 mg 24 hr tablet, Take 1 tablet (25 mg total) by mouth every 12 (twelve) hours, Disp: , Rfl:         Social History     Socioeconomic History    Marital status:       Spouse name: Not on file    Number of children: 3    Years of education: Not on file    Highest education level: Not on file   Occupational History    Not on file   Social Needs    Financial resource strain: Not on file    Food insecurity Worry: Not on file     Inability: Not on file    Transportation needs     Medical: Not on file     Non-medical: Not on file   Tobacco Use    Smoking status: Never Smoker    Smokeless tobacco: Never Used    Tobacco comment: former smoker, per [employer];  started smoking at 13 yo, stopped at 28 yo   Substance and Sexual Activity    Alcohol use: No    Drug use: No    Sexual activity: Not on file   Lifestyle    Physical activity     Days per week: Not on file     Minutes per session: Not on file    Stress: Not on file   Relationships    Social connections     Talks on phone: Not on file     Gets together: Not on file     Attends [employer] service: Not on file     Active member of club or organization: Not on file     Attends meetings of clubs or organizations: Not on file     Relationship status: Not on file    Intimate partner violence     Fear of current or ex partner: Not on file     Emotionally abused: Not on file     Physically abused: Not on file     Forced sexual activity: Not on file   Other Topics Concern    Not on file   Social History Narrative    Inadequate exercise       Family History   Problem Relation Age of Onset    Diabetes Father     Cancer Family        Physical Exam   Constitutional: She is oriented to person, place, and time  No distress  HENT:   Head: Normocephalic and atraumatic  Eyes: Conjunctivae and EOM are normal    Neck: Normal range of motion  Neck supple  Cardiovascular: Normal heart sounds and intact distal pulses  An irregularly irregular rhythm present  Tachycardia present  Pulmonary/Chest: Effort normal and breath sounds normal    Abdominal: Soft  Bowel sounds are normal    Musculoskeletal: Normal range of motion  Neurological: She is alert and oriented to person, place, and time  Skin: Skin is warm and dry  She is not diaphoretic  Psychiatric: She has a normal mood and affect  Nursing note and vitals reviewed        Vitals: Blood pressure 108/60, pulse 103, height 5' 1" (1 549 m), weight 80 2 kg (176 lb 12 8 oz), SpO2 94 %  Wt Readings from Last 3 Encounters:   04/01/21 80 2 kg (176 lb 12 8 oz)   03/25/21 67 8 kg (149 lb 7 6 oz)   03/22/21 66 7 kg (147 lb)         Labs & Results:  Lab Results   Component Value Date    WBC 5 64 03/22/2021    HGB 11 6 03/22/2021    HCT 38 5 03/22/2021    MCV 88 03/22/2021     03/22/2021     No results found for: BNP  No components found for: CHEM  Total CK   Date Value Ref Range Status   02/28/2021 160 26 - 192 U/L Final     Troponin I   Date Value Ref Range Status   03/22/2021 0 04 <=0 04 ng/mL Final     Comment:     S[employer] Chemistry analyzer 99% cutoff is > 0 04 ng/mL in network labs     o cTnI 99% cutoff is useful only when applied to patients in the clinical setting of myocardial ischemia   o cTnI 99% cutoff should be interpreted in the context of clinical history, ECG findings and possibly cardiac imaging to establish correct diagnosis  o cTnI 99% cutoff may be suggestive but clearly not indicative of a coronary event without the clinical setting of myocardial ischemia      02/28/2021 0 04 <=0 04 ng/mL Final     Comment:     Siemens Chemistry analyzer 99% cutoff is > 0 04 ng/mL in network labs     o cTnI 99% cutoff is useful only when applied to patients in the clinical setting of myocardial ischemia   o cTnI 99% cutoff should be interpreted in the context of clinical history, ECG findings and possibly cardiac imaging to establish correct diagnosis  o cTnI 99% cutoff may be suggestive but clearly not indicative of a coronary event without the clinical setting of myocardial ischemia       11/14/2017 <0 02 <=0 04 ng/mL Final     CK-MB Index   Date Value Ref Range Status   02/28/2021 <1 0 0 0 - 2 5 % Final     Results for orders placed during the hospital encounter of 03/22/21   Echo complete with contrast if indicated    Narrative Monae [address] [place] [id number]  [phone]    Transthoracic Echocardiogram  2D, M-mode, Doppler, and Color Doppler    Study date:  23-Mar-2021    Patient: José Lenz  MR number: [id number]  Account number: [de-identified]  : 13-Dec-1929  Age: 80 years  Gender: Female  Status: Inpatient  Location: Bedside  Height: 61 in  Weight: 146 7 lb  BP: 126/ 76 mmHg    Indications: Atrial flutter    Diagnoses: I48 1 - Atrial flutter    Sonographer:  DEBI Sharif  Primary Physician:  Vanesa Randle MD  Referring Physician:  Freddy Andres MD  Group:  [facility]  Interpreting Physician:  Kiah Cruz MD    SUMMARY    LEFT VENTRICLE:  Systolic function was normal  Ejection fraction was estimated to be 55 %  There were no regional wall motion abnormalities  Wall thickness was mildly increased  There was mild concentric hypertrophy  LEFT ATRIUM:  The atrium was mildly dilated  MITRAL VALVE:  There was moderate regurgitation  AORTIC VALVE:  There was trace regurgitation  TRICUSPID VALVE:  There was moderate regurgitation  The findings suggest mild pulmonary hypertension  IVC, HEPATIC VEINS:  The inferior vena cava was mildly dilated  HISTORY: PRIOR HISTORY: COVID-19, CKD III, Atrial flutter, Tachycardia, RBBB    PROCEDURE: The procedure was performed at the bedside  This was a routine study  The transthoracic approach was used  The study included complete 2D imaging, M-mode, complete spectral Doppler, and color Doppler  The heart rate was 78 bpm,  at the start of the study  Images were obtained from the parasternal, apical, subcostal, and suprasternal notch acoustic windows  Image quality was adequate  LEFT VENTRICLE: Size was normal  Systolic function was normal  Ejection fraction was estimated to be 55 %  There were no regional wall motion abnormalities  Wall thickness was mildly increased  There was mild concentric hypertrophy    DOPPLER: Transmitral flow pattern: atrial flutter  RIGHT VENTRICLE: The size was normal  Systolic function was normal  Wall thickness was normal     LEFT ATRIUM: The atrium was mildly dilated  RIGHT ATRIUM: The atrium was mildly dilated  MITRAL VALVE: Valve structure was normal  There was normal leaflet separation  DOPPLER: The transmitral velocity was within the normal range  There was no evidence for stenosis  There was moderate regurgitation  AORTIC VALVE: The valve was trileaflet  Leaflets exhibited mildly increased thickness, mild calcification, and normal cuspal separation  DOPPLER: Transaortic velocity was within the normal range  There was no evidence for stenosis  There  was trace regurgitation  TRICUSPID VALVE: The valve structure was normal  There was normal leaflet separation  DOPPLER: The transtricuspid velocity was within the normal range  There was no evidence for stenosis  There was moderate regurgitation  Estimated peak PA  pressure was 45 mmHg  The findings suggest mild pulmonary hypertension  PULMONIC VALVE: Leaflets exhibited normal thickness, no calcification, and normal cuspal separation  DOPPLER: The transpulmonic velocity was within the normal range  There was trace regurgitation  PERICARDIUM: There was no pericardial effusion  The pericardium was normal in appearance  AORTA: The root exhibited upper limit of normal size  The ascending aorta was upper normal normal in size, measuring 3 7 cm  SYSTEMIC VEINS: IVC: The inferior vena cava was mildly dilated      SYSTEM MEASUREMENT TABLES    2D  %FS: 33 12 %  Ao Diam: 3 19 cm  EDV(Teich): 91 29 ml  EF(Teich): 61 87 %  ESV(Teich): 34 81 ml  HR_2Ch_Q: 78 74 BPM  HR_4Ch_Q: 78 27 BPM  IVSd: 1 27 cm  LA Area: 25 11 cm2  LA Diam: 4 16 cm  LVCO_2Ch_Q: 3 23 L/min  LVCO_4Ch_Q: 2 89 L/min  LVCO_BiP_Q: 3 06 L/min  LVEF_2Ch_Q: 56 37 %  LVEF_4Ch_Q: 54 91 %  LVEF_BiP_Q: 54 44 %  LVIDd: 4 48 cm  LVIDs: 2 99 cm  LVLd_2Ch_Q: 6 49 cm  LVLd_4Ch_Q: 6 28 cm  LVLs_2Ch_Q: 6 32 cm  LVLs_4Ch_Q: 5 49 cm  LVPWd: 1 19 cm  LVSV_2Ch_Q: 41 ml  LVSV_4Ch_Q: 36 98 ml  LVSV_BiP_Q: 38 57 ml  LVVED_2Ch_Q: 72 72 ml  LVVED_4Ch_Q: 67 35 ml  LVVED_BiP_Q: 70 85 ml  LVVES_2Ch_Q: 31 73 ml  LVVES_4Ch_Q: 30 37 ml  LVVES_BiP_Q: 32 28 ml  RA Area: 19 56 cm2  RVIDd: 3 4 cm  SV(Teich): 56 48 ml    CW  TR MaxP 6 mmHg  TR Vmax: 3 3 m/s    MM  TAPSE: 1 7 cm    [facility]    Prepared and electronically signed by    Jada De La Torre MD  Signed 23-Mar-2021 16:05:02       No results found for this or any previous visit  This note was completed in part utilizing [employer] direct voice recognition software  Grammatical errors, random word insertion, spelling mistakes, and incomplete sentences may be an occasional consequence of the system secondary to software limitations, ambient noise and hardware issues  At the time of dictation, efforts were made to edit, clarify and /or correct errors  Please read the chart carefully and recognize, using context, where substitutions have occurred    If you have any questions or concerns about the context, text or information contained within the body of this dictation, please contact myself, the provider, for further clarification

## 2021-04-06 ENCOUNTER — TRANSITIONAL CARE MANAGEMENT (OUTPATIENT)
Dept: INTERNAL MEDICINE CLINIC | Facility: CLINIC | Age: 86
End: 2021-04-06

## 2021-04-12 ENCOUNTER — OFFICE VISIT (OUTPATIENT)
Dept: INTERNAL MEDICINE CLINIC | Facility: CLINIC | Age: 86
End: 2021-04-12

## 2021-04-12 VITALS
BODY MASS INDEX: 33.12 KG/M2 | DIASTOLIC BLOOD PRESSURE: 82 MMHG | SYSTOLIC BLOOD PRESSURE: 118 MMHG | HEIGHT: 61 IN | OXYGEN SATURATION: 94 % | TEMPERATURE: 97.2 F | WEIGHT: 175.4 LBS | HEART RATE: 75 BPM

## 2021-04-12 DIAGNOSIS — N28.9 RENAL LESION: ICD-10-CM

## 2021-04-12 DIAGNOSIS — J12.82 PNEUMONIA DUE TO COVID-19 VIRUS: ICD-10-CM

## 2021-04-12 DIAGNOSIS — U07.1 PNEUMONIA DUE TO COVID-19 VIRUS: ICD-10-CM

## 2021-04-12 DIAGNOSIS — I48.4 ATYPICAL ATRIAL FLUTTER (HCC): Primary | ICD-10-CM

## 2021-04-12 DIAGNOSIS — N63.10 BREAST MASS, RIGHT: ICD-10-CM

## 2021-04-12 PROBLEM — Z00.00 ENCOUNTER FOR MEDICAL EXAMINATION TO ESTABLISH CARE: Status: RESOLVED | Noted: 2021-03-22 | Resolved: 2021-04-12

## 2021-04-12 PROCEDURE — 99214 OFFICE O/P EST MOD 30 MIN: CPT | Performed by: INTERNAL MEDICINE

## 2021-04-12 NOTE — PATIENT INSTRUCTIONS
Please decrease oxygen from 4L/min to 3 L/min for 2-3 days, as long as oxygen level is above 90% lower to 2L/min    Follow up in 2-3 weeks with Chest x-ray

## 2021-04-12 NOTE — PROGRESS NOTES
Assessment/Plan:    Breast mass, right  I have recommend a diagnostic mammogram and patient likely need a biopsy she would like to think about it, will also  performed breast examination next visit  Incidental 6cm right Renal lesion on CT   Right kidney cyst also seen prior in 2017, slightly grow from 6-7 cm  Patient states that she does not wish to workup that, she was told in 2017 the lesion was ? benign and did not needed further workup  Atrial flutter (HCC)    Rate controlled with metoprolol 12 5 mg twice a day, Cardizem 120 mg anticoagulation with Eliquis 2 5 mg twice a day  Better controlled after adjustment metoprolol  Continue current management  Follow up with Cardiology  Pneumonia due to COVID-19 virus  To follow up resolution check chest x-ray PA and lateral prior to next visit  Patient does not which to remains on oxygen  She is currently on 4L/min  Taper off oxygen as tolerated as long as oxygen saturation remains above 90%  Will decrease from 4 liters/minute to 3 liters/minutes for 2-3 days, then down to 2 L/min  Will reassess in 2-3 weeks  Diagnoses and all orders for this visit:    Atypical atrial flutter (HCC)    Incidental 6cm right Renal lesion on CT    Breast mass, right    Pneumonia due to COVID-19 virus  -     XR chest pa & lateral; Future          Subjective:      Patient ID: Sarai Woodward is a 80 y o  female  Who presents in the office for a follow-up visit  Patient was diagnosed with Covert 19 pneumonia in February 28, she was discharged home few days after, with improvement of her symptoms, but was oxygen dependent  While she was in the office to establish of care on March 22nd, patient was noted to have  Elevated heart rate and was sent to the ER for evaluation, she was found to have acute onset atrial flutter  She was started on Eliquis, metoprolol, Cardizem and  Lasix    Once she was discharged home she had persistent elevated heart rates, she was seen by cardiologist about 1 week ago, and her dose of metoprolol was adjusted, lasix was   Changed to 3 times a week, with improvement of her symptoms and elevated heart rate  Patient also had epistaxis after initiation of anticoagulation, which improved with use of normal saline to irrigate her nostrilss and also taking regular breaks from oxygen use while she is at rest    Epistaxis also improved after addressing a dose of Eliquis 5 mg twice a day to 2 5 mg a day by Cardiology  Patient denies any more episodes of racing of her heart, no shortness of breath, chest pain, palpitations  Patient daughter is present in the present and brings a log of her oxygen and heart rate  The heart rate has been consistently below 100s, and her oxygen is persistent above 90%, most of the time is around 95%  Noted CT scan of the chest showing a right indolent mass, that was also present in 2017, but not reported images were reviewed by me and show a spiculated mass  On the right lower quadrant of the right breast   The patient knew about her right-sided kidney cyst, this was also noted on same CT scan in 2017,  At that time daughter was told that this cyst was benign in did not need further workup  The following portions of the patient's history were reviewed and updated as appropriate: allergies, current medications, past family history, past medical history, past social history, past surgical history and problem list     Review of Systems   Constitutional: Negative for appetite change and fatigue  HENT: Positive for nosebleeds (improved)  Eyes: Negative for visual disturbance  Respiratory: Negative for cough, chest tightness, shortness of breath and wheezing  Cardiovascular: Negative for chest pain, palpitations and leg swelling  Gastrointestinal: Negative for abdominal pain, blood in stool, constipation, nausea and vomiting  Genitourinary: Negative for difficulty urinating and frequency     Musculoskeletal: Negative for arthralgias and joint swelling  Skin: Negative for rash  Neurological: Negative for dizziness, weakness and headaches  Psychiatric/Behavioral: Negative for confusion  The patient is not nervous/anxious  Objective:      /82 (BP Location: Left arm, Patient Position: Sitting, Cuff Size: Adult)   Pulse 75   Temp (!) 97 2 °F (36 2 °C) (Tympanic)   Ht 5' 1" (1 549 m)   Wt 79 6 kg (175 lb 6 4 oz)   LMP  (LMP Unknown)   SpO2 94%   BMI 33 14 kg/m²          Physical Exam  Vitals signs and nursing note reviewed  Constitutional:       General: She is not in acute distress  Appearance: She is well-developed  She is obese  HENT:      Head: Normocephalic and atraumatic  Eyes:      Conjunctiva/sclera: Conjunctivae normal       Pupils: Pupils are equal, round, and reactive to light  Neck:      Musculoskeletal: Normal range of motion and neck supple  Cardiovascular:      Rate and Rhythm: Normal rate  Rhythm irregular  Heart sounds: Normal heart sounds  Pulmonary:      Effort: Pulmonary effort is normal  No respiratory distress  Breath sounds: Rales (bilateral lower lobes) present  No rhonchi  Abdominal:      General: Bowel sounds are normal  There is no distension  Palpations: Abdomen is soft  Tenderness: There is no abdominal tenderness  Musculoskeletal: Normal range of motion  Right lower leg: Edema present  Left lower leg: Edema present  Skin:     General: Skin is warm and dry  Capillary Refill: Capillary refill takes less than 2 seconds  Coloration: Skin is not pale  Neurological:      Mental Status: She is alert and oriented to person, place, and time  Sensory: No sensory deficit  Motor: No abnormal muscle tone  Psychiatric:         Thought Content:  Thought content normal          Judgment: Judgment normal

## 2021-04-12 NOTE — ASSESSMENT & PLAN NOTE
Rate controlled with metoprolol 12 5 mg twice a day, Cardizem 120 mg anticoagulation with Eliquis 2 5 mg twice a day  Better controlled after adjustment metoprolol  Continue current management  Follow up with Cardiology

## 2021-04-12 NOTE — ASSESSMENT & PLAN NOTE
To follow up resolution check chest x-ray PA and lateral prior to next visit  Patient does not which to remains on oxygen  She is currently on 4L/min  Taper off oxygen as tolerated as long as oxygen saturation remains above 90%  Will decrease from 4 liters/minute to 3 liters/minutes for 2-3 days, then down to 2 L/min  Will reassess in 2-3 weeks

## 2021-04-12 NOTE — ASSESSMENT & PLAN NOTE
I have recommend a diagnostic mammogram and patient likely need a biopsy she would like to think about it, will also  performed breast examination next visit

## 2021-04-12 NOTE — ASSESSMENT & PLAN NOTE
Right kidney cyst also seen prior in 2017, slightly grow from 6-7 cm  Patient states that she does not wish to workup that, she was told in 2017 the lesion was ? benign and did not needed further workup

## 2021-04-18 DIAGNOSIS — K92.2 GASTROINTESTINAL HEMORRHAGE, UNSPECIFIED GASTROINTESTINAL HEMORRHAGE TYPE: ICD-10-CM

## 2021-04-19 RX ORDER — PANTOPRAZOLE SODIUM 40 MG/1
TABLET, DELAYED RELEASE ORAL
Qty: 30 TABLET | Refills: 5 | Status: SHIPPED | OUTPATIENT
Start: 2021-04-19 | End: 2021-04-21 | Stop reason: CLARIF

## 2021-04-19 NOTE — TELEPHONE ENCOUNTER
Hi Dr Munir Gilmore, received auto refill on this patient whom I have never seen before  Not sure why pharm sent this to me   You are listed as her PCP

## 2021-04-20 ENCOUNTER — TELEPHONE (OUTPATIENT)
Dept: INTERNAL MEDICINE CLINIC | Facility: CLINIC | Age: 86
End: 2021-04-20

## 2021-04-20 DIAGNOSIS — I48.4 ATYPICAL ATRIAL FLUTTER (HCC): ICD-10-CM

## 2021-04-20 RX ORDER — METOPROLOL SUCCINATE 25 MG/1
25 TABLET, EXTENDED RELEASE ORAL EVERY 12 HOURS
Qty: 180 TABLET | Refills: 2 | Status: SHIPPED | OUTPATIENT
Start: 2021-04-20 | End: 2021-07-19 | Stop reason: SDUPTHER

## 2021-04-20 NOTE — TELEPHONE ENCOUNTER
Slick Paredes has called the office in regards to [facility]  Paula Lay has called requesting a medication refill of metoprolol succinate (TOPROL-XL) 25 mg 24 hr tablet

## 2021-04-20 NOTE — TELEPHONE ENCOUNTER
L/m to Chris Ramirez to inform her a prior authorization is not needed, the pantoprazole medication is ready for  and the metoprolo succinate just needed a refill authorization, in which a provider prescribes the medication, to be ready for

## 2021-04-20 NOTE — TELEPHONE ENCOUNTER
Called [facility] to clarify if a prior authorization is needed for the metoprolol succinate and pantoprazole medications  Keith Benson, the [employer] I spoke to, stated the pantoprozole was refilled yesterday, 04/19/2021 and was ready for   As for the metoprolo succinate needed a refill authorization before being dispensed to the patient

## 2021-04-21 DIAGNOSIS — I48.92 ATRIAL FLUTTER (HCC): ICD-10-CM

## 2021-04-21 RX ORDER — DILTIAZEM HYDROCHLORIDE 180 MG/1
180 CAPSULE, COATED, EXTENDED RELEASE ORAL DAILY
Qty: 90 CAPSULE | Refills: 1 | Status: SHIPPED | OUTPATIENT
Start: 2021-04-21 | End: 2021-07-19 | Stop reason: SDUPTHER

## 2021-04-29 ENCOUNTER — HOSPITAL ENCOUNTER (OUTPATIENT)
Dept: RADIOLOGY | Facility: HOSPITAL | Age: 86
Discharge: HOME | End: 2021-04-29
Attending: INTERNAL MEDICINE

## 2021-04-29 DIAGNOSIS — U07.1 PNEUMONIA DUE TO COVID-19 VIRUS: ICD-10-CM

## 2021-04-29 DIAGNOSIS — J12.82 PNEUMONIA DUE TO COVID-19 VIRUS: ICD-10-CM

## 2021-04-29 PROCEDURE — 71046 X-RAY EXAM CHEST 2 VIEWS: CPT

## 2021-05-03 ENCOUNTER — OFFICE VISIT (OUTPATIENT)
Dept: INTERNAL MEDICINE CLINIC | Facility: CLINIC | Age: 86
End: 2021-05-03

## 2021-05-03 VITALS
WEIGHT: 174.6 LBS | BODY MASS INDEX: 29.09 KG/M2 | TEMPERATURE: 97.1 F | DIASTOLIC BLOOD PRESSURE: 78 MMHG | HEART RATE: 100 BPM | OXYGEN SATURATION: 95 % | HEIGHT: 65 IN | SYSTOLIC BLOOD PRESSURE: 124 MMHG

## 2021-05-03 DIAGNOSIS — I48.4 ATYPICAL ATRIAL FLUTTER (HCC): ICD-10-CM

## 2021-05-03 DIAGNOSIS — Z79.899 ENCOUNTER FOR MONITORING DIURETIC THERAPY: Primary | ICD-10-CM

## 2021-05-03 DIAGNOSIS — J96.01 ACUTE RESPIRATORY FAILURE WITH HYPOXIA (HCC): ICD-10-CM

## 2021-05-03 DIAGNOSIS — Z51.81 ENCOUNTER FOR MONITORING DIURETIC THERAPY: Primary | ICD-10-CM

## 2021-05-03 DIAGNOSIS — N63.10 BREAST MASS, RIGHT: ICD-10-CM

## 2021-05-03 PROCEDURE — 99214 OFFICE O/P EST MOD 30 MIN: CPT | Performed by: INTERNAL MEDICINE

## 2021-05-03 NOTE — ASSESSMENT & PLAN NOTE
· Right lower quadrant mass of the right breast, noted on CT scan of the chest described as indolent mass that was also present in 2017  · Currently there is no mass sensation, nipple retraction or discharge, coloration changes, patient denied any concerns    · There is no FH of breast cancer   · Last mamogram was years ago, currently no records on file  · Patient was advise to proceed with mammogram however patient continues to decline  Plan  Patient instructed to notify to the office if she experiences any changes, Will continue to monitor in next appointment

## 2021-05-03 NOTE — PROGRESS NOTES
Assessment/Plan:    Breast mass, right  · Right lower quadrant mass of the right breast, noted on CT scan of the chest described as indolent mass that was also present in 2017  · Currently there is no mass sensation, nipple retraction or discharge, coloration changes, patient denied any concerns  · There is no FH of breast cancer   · Last mamogram was years ago, currently no records on file  · Patient was advise to proceed with mammogram however patient continues to decline  Plan  Patient instructed to notify to the office if she experiences any changes, Will continue to monitor in next appointment      Acute respiratory failure with hypoxia ([facility] [address] )  · Patient with a past medical history of covid 19 pneumonia, hospitalized from 2/28/21 to 3/10/21, CXR at the time showed Patchy peripheral basilar groundglass opacities, repeat CXR shows improvement in findings    · Echo: EF 55% no regional wall motion abnormalities, wall thickness increased, mild concentric hypertrophy  · Since last hospitalization patient has required O2 NC to keep spo2> 90%, patient was discharge 4L NC, last office visit patient requested to be off  Since the NC is uncomfortable for her and she feels her throat gets dry, in order to wean off, O2 was decreased to 3L, recommendation of breaks of 15 min three times a day and monitor spo2 without oxygen, Daughter mentioned that during this periods , patient doesn't exhibit any distress, dyspnea, chest pain, lightheadedness and spo2 are >90%  · During my encounter, patient was instructed to remove her NC, she did not exhibit acute respiratory distress, no conversation dyspnea was noted however oxygen saturations fall at 84-87%, she was placed back on O2 at 1L , immediate improvement of spo2 up to 94% was noted  Plan  · Continue O2 NC 1L w/ 15 min breaks three times a day with spo2 monitoring, keep records in order to reasses in the next office visit    · Patient was previously prescribed lasix 20 mg TID, however she has only been taking it once a day, she was instructed to resume the initial prescribed dose as this can help to improve her clinical case , patient verbalized understanding, considering this, BMP has been ordered  · Next office visit may 20 w/ lab results  Atrial flutter (HCC)  · History of A flutter, follows with cardiology outpatient, current treatment plan modified : eliquis 2 5 mg QD, toprolol 25 mg bid for which she states compliance  · Currently rate controlled , (-) palpitations, chest pain, sob, lightheadedness, fatigue   · Next cardiology eval may 19  Plan  Continue home meds  Will follow cardiology recommendations      Diagnoses and all orders for this visit:    Encounter for monitoring diuretic therapy  -     Basic metabolic panel; Future    Breast mass, right    Acute respiratory failure with hypoxia (HCC)    Atypical atrial flutter (HCC)        Subjective:      Patient ID: Sarai Woodward is a 80 y o  female  Patient is a 79 y/o female with a past medical history of  Atrial flutter, Acute respiratory failure secondary to Covid 19 pneumonia, right breast mass comes to the office for follow up appointment  At the moment  Of my encounter , patient did not endorses any acute complains, she mentioned to feel tired of having to use home oxygen  And she would like it to be discontinued, patient was placed on home oxygen after hospitalization for Covid 19 pneumonia were she was noted to exhibit acute respiratory failure with hypoxia, she was initially discharge with o2 4L NC and instructed to monitor spo2 at home with the goal of keeping saturations > 90%, In order to wean her off, patient was instructed to decrease  O2 3L NC and have 15 min off breaks w/ subsequent monitoring of spo2 during this periods, patient daughter mentioned they have been doing it at home and so far patients spo2 usually ran above 90's, during this periods she has been noted asymptomatic        The following portions of the patient's history were reviewed and updated as appropriate: allergies, current medications, past family history, past medical history, past social history, past surgical history and problem list     Review of Systems   Constitutional: Positive for activity change  HENT:        Dry throat   All other systems reviewed and are negative  Objective:      /78 (BP Location: Left arm, Patient Position: Sitting, Cuff Size: Standard)   Pulse 100   Temp (!) 97 1 °F (36 2 °C) (Tympanic)   Ht 5' 5" (1 651 m)   Wt 79 2 kg (174 lb 9 6 oz)   LMP  (LMP Unknown)   SpO2 95%   BMI 29 05 kg/m²        Physical Exam  Vitals signs and nursing note reviewed  Constitutional:       General: She is not in acute distress  Appearance: She is obese  She is not ill-appearing, toxic-appearing or diaphoretic  HENT:      Head: Normocephalic and atraumatic  Right Ear: Tympanic membrane normal       Left Ear: Tympanic membrane normal       Nose: Nose normal  No congestion or rhinorrhea  Mouth/Throat:      Mouth: Mucous membranes are moist       Pharynx: Oropharynx is clear  No oropharyngeal exudate or posterior oropharyngeal erythema  Eyes:      Extraocular Movements: Extraocular movements intact  Conjunctiva/sclera: Conjunctivae normal       Pupils: Pupils are equal, round, and reactive to light  Neck:      Musculoskeletal: Normal range of motion and neck supple  No neck rigidity  Vascular: No carotid bruit  Cardiovascular:      Rate and Rhythm: Normal rate  Rhythm irregular  Heart sounds: No murmur  No gallop  Pulmonary:      Effort: Pulmonary effort is normal  No respiratory distress  Breath sounds: Rales (bilateral bases) present  No wheezing  Chest:      Chest wall: No tenderness  Abdominal:      General: Bowel sounds are normal  There is no distension  Palpations: Abdomen is soft  Tenderness: There is no abdominal tenderness     Musculoskeletal: Normal range of motion  General: No swelling or tenderness  Skin:     General: Skin is dry  Capillary Refill: Capillary refill takes 2 to 3 seconds  Comments: flaky   Neurological:      Mental Status: She is alert and oriented to person, place, and time  Psychiatric:         Mood and Affect: Mood normal          Behavior: Behavior normal          Thought Content:  Thought content normal          Judgment: Judgment normal

## 2021-05-03 NOTE — ASSESSMENT & PLAN NOTE
· History of A flutter, follows with cardiology outpatient, current treatment plan modified : eliquis 2 5 mg QD, toprolol 25 mg bid for which she states compliance  · Currently rate controlled , (-) palpitations, chest pain, sob, lightheadedness, fatigue   · Next cardiology eval may 19  Plan  Continue home meds  Will follow cardiology recommendations

## 2021-05-03 NOTE — ASSESSMENT & PLAN NOTE
· Patient with a past medical history of covid 19 pneumonia, hospitalized from 2/28/21 to 3/10/21, CXR at the time showed Patchy peripheral basilar groundglass opacities, repeat CXR shows improvement in findings    · Echo: EF 55% no regional wall motion abnormalities, wall thickness increased, mild concentric hypertrophy  · Since last hospitalization patient has been hypoxic requiring O2 NC to keep spo2> 90%, patient was discharge 4L NC, last office visit , patient requested to be off  Since the NC is uncomfortable for her and she feels her throat gets dry, in order to wean off, O2 was decreased to 3L, spo2 eval during 15 min breaks according to patients daughter showed spo2 >90% and patient does not exhibit any distress, dyspnea, chest pain, lightheadedness  · During my encounter, patient was instructed to remove her NC, she did not exhibit acute respiratory distress, no conversation dyspnea was noted however oxygen saturations fall at 84-87%, she was placed back on O2 at 1L , immediate improvement of spo2 up to 94% was noted  Plan  · Continue O2 NC 1L w/ 15 min breaks three times a day with spo2 monitoring, keep records in order to reasses in the next office visit  · Patient was previously prescribed lasix 20 mg TID, however she has only been taking it once a day, she was instructed to resume the initial prescribed dose as this can help to improve her clinical case , patient verbalized understanding, considering this, BMP has been ordered  · Next office visit may 20 w/ lab results

## 2021-05-19 ENCOUNTER — APPOINTMENT (OUTPATIENT)
Dept: LAB | Facility: CLINIC | Age: 86
End: 2021-05-19

## 2021-05-19 ENCOUNTER — OFFICE VISIT (OUTPATIENT)
Dept: CARDIOLOGY CLINIC | Facility: CLINIC | Age: 86
End: 2021-05-19

## 2021-05-19 VITALS
OXYGEN SATURATION: 94 % | HEART RATE: 99 BPM | BODY MASS INDEX: 30.99 KG/M2 | WEIGHT: 186 LBS | SYSTOLIC BLOOD PRESSURE: 122 MMHG | DIASTOLIC BLOOD PRESSURE: 78 MMHG | HEIGHT: 65 IN

## 2021-05-19 DIAGNOSIS — I48.4 ATYPICAL ATRIAL FLUTTER (HCC): Primary | ICD-10-CM

## 2021-05-19 DIAGNOSIS — Z79.899 ENCOUNTER FOR MONITORING DIURETIC THERAPY: ICD-10-CM

## 2021-05-19 DIAGNOSIS — I48.92 ATRIAL FLUTTER (HCC): ICD-10-CM

## 2021-05-19 DIAGNOSIS — U07.1 PNEUMONIA DUE TO COVID-19 VIRUS: ICD-10-CM

## 2021-05-19 DIAGNOSIS — J96.11 CHRONIC RESPIRATORY FAILURE WITH HYPOXIA (HCC): ICD-10-CM

## 2021-05-19 DIAGNOSIS — Z51.81 ENCOUNTER FOR MONITORING DIURETIC THERAPY: ICD-10-CM

## 2021-05-19 DIAGNOSIS — N18.32 STAGE 3B CHRONIC KIDNEY DISEASE (HCC): ICD-10-CM

## 2021-05-19 DIAGNOSIS — J12.82 PNEUMONIA DUE TO COVID-19 VIRUS: ICD-10-CM

## 2021-05-19 PROBLEM — I50.32 CHRONIC HEART FAILURE WITH PRESERVED EJECTION FRACTION (HCC): Status: ACTIVE | Noted: 2021-05-19

## 2021-05-19 LAB
ANION GAP SERPL CALCULATED.3IONS-SCNC: 8 MMOL/L (ref 4–13)
BUN SERPL-MCNC: 20 MG/DL (ref 5–25)
CALCIUM SERPL-MCNC: 8.9 MG/DL (ref 8.3–10.1)
CHLORIDE SERPL-SCNC: 104 MMOL/L (ref 100–108)
CO2 SERPL-SCNC: 28 MMOL/L (ref 21–32)
CREAT SERPL-MCNC: 1.12 MG/DL (ref 0.6–1.3)
GFR SERPL CREATININE-BSD FRML MDRD: 43 ML/MIN/1.73SQ M
GLUCOSE SERPL-MCNC: 93 MG/DL (ref 65–140)
POTASSIUM SERPL-SCNC: 4.6 MMOL/L (ref 3.5–5.3)
SODIUM SERPL-SCNC: 140 MMOL/L (ref 136–145)

## 2021-05-19 PROCEDURE — 36415 COLL VENOUS BLD VENIPUNCTURE: CPT

## 2021-05-19 PROCEDURE — 99214 OFFICE O/P EST MOD 30 MIN: CPT | Performed by: INTERNAL MEDICINE

## 2021-05-19 PROCEDURE — 93000 ELECTROCARDIOGRAM COMPLETE: CPT | Performed by: INTERNAL MEDICINE

## 2021-05-19 PROCEDURE — 80048 BASIC METABOLIC PNL TOTAL CA: CPT

## 2021-05-19 RX ORDER — FUROSEMIDE 20 MG/1
20 TABLET ORAL DAILY
Qty: 30 TABLET | Refills: 6 | Status: SHIPPED | OUTPATIENT
Start: 2021-05-19 | End: 2022-05-02

## 2021-05-19 NOTE — ASSESSMENT & PLAN NOTE
Slow clinical improvement  Continues to have some level of dyspnea  Continue supplemental oxygen  Close follow-up with Dr Jammie Metz

## 2021-05-19 NOTE — ASSESSMENT & PLAN NOTE
Wt Readings from Last 3 Encounters:   05/19/21 84 4 kg (186 lb)   05/03/21 79 2 kg (174 lb 9 6 oz)   04/12/21 79 6 kg (175 lb 6 4 oz)     Gained 10lbs in 2 weeks  Dietary indiscretion with lots of salt intake  Severe LE edema noted on exam  Increase lasix to 20mg daily for a week and then decrease to every other day  Heart failure is decompensated  Salt restriction and daily monitoring of body weight    Regular walking is beneficial   Report any worsening chest pain, shortness of breath, leg swelling

## 2021-05-19 NOTE — ASSESSMENT & PLAN NOTE
Lab Results   Component Value Date    EGFR 38 03/25/2021    EGFR 38 03/24/2021    EGFR 48 03/23/2021    CREATININE 1 23 03/25/2021    CREATININE 1 23 03/24/2021    CREATININE 1 03 03/23/2021     Continue Eliquis at the current dose  Avoid nephrotoxic drugs

## 2021-05-19 NOTE — ASSESSMENT & PLAN NOTE
Currently managed by rate control and anticoagulation  Continue Eliquis for prevention of stroke  Continue diltiazem and metoprolol for control of ventricular rate  Recent echo personally reviewed  Ejection fraction is preserved with moderate mitral regurgitation and tricuspid regurgitation noted

## 2021-05-19 NOTE — PATIENT INSTRUCTIONS
You have ained 10lbs in 2 weeks  This is related to lots of salt intake  Increase lasix to 20mg daily for a week and then decrease to every other day  Salt restriction and daily monitoring of body weight Heart Failure   WHAT YOU NEED TO KNOW:   Heart failure is a condition that does not allow your heart to fill or pump properly  Not enough oxygen in your blood gets to your organs and tissues  Fluid may not move through your body properly  Fluid builds up and causes swelling and trouble breathing  This is known as congestive heart failure  Heart failure may start in the left or right ventricle  Heart failure is often caused by damage or injury to your heart  The damage may be caused by other heart problems, diabetes, or high blood pressure  The damage may have also been caused by an infection  Heart failure is a long-term condition that tends to get worse over time  It is important to manage your health to improve your quality of life  DISCHARGE INSTRUCTIONS:   Call your local emergency number (911 in the [address] [place] [address],[address]) if:   · You have any of the following signs of a heart attack:      ? Squeezing, pressure, or pain in your chest    ? You may  also have any of the following:     § Discomfort or pain in your back, neck, jaw, stomach, or arm    § Shortness of breath    § Nausea or vomiting    § Lightheadedness or a sudden cold sweat      Call your doctor if:   · Your heartbeat is fast, slow, or uneven all the time  · You have symptoms of worsening heart failure:      ? Shortness of breath at rest, at night, or that is getting worse in any way    ? Weight gain of 3 or more pounds (1 4 kg) in a day, or more than your healthcare provider says is okay    ? More swelling in your legs or ankles    ? Abdominal pain or swelling    ? More coughing    ? Loss of appetite    ? Feeling tired all the time    · You feel hopeless or depressed, or you have lost interest in things you used to enjoy      · You often feel worried or afraid  · You have questions or concerns about your condition or care  Medicines:   · Medicines  may be needed to help regulate your heart rhythm  You may also need medicine to lower your blood pressure, and to decrease extra fluids  · Take your medicine as directed  Contact your healthcare provider if you think your medicine is not helping or if you have side effects  Tell him of her if you are allergic to any medicine  Keep a list of the medicines, vitamins, and herbs you take  Include the amounts, and when and why you take them  Bring the list or the pill bottles to follow-up visits  Carry your medicine list with you in case of an emergency  Go to cardiac rehab if directed:  Cardiac rehab is a program run by specialists who will help you safely strengthen your heart  The program includes exercise, relaxation, stress management, and heart-healthy nutrition  Manage swelling from extra fluid:   · Elevate (raise) your legs above the level of your heart  This will help with fluid that builds up in your legs or ankles  Elevate your legs as often as possible during the day  Prop your legs on pillows or blankets to keep them elevated comfortably  Try not to stand for long periods of time during the day  Move around to keep your blood circulating  · Limit sodium (salt)  Ask how much sodium you can have each day  Your healthcare provider may give you a limit, such as 2,300 milligrams (mg) a day  Your provider or a dietitian can teach you how to read food labels for the number of mg in a food  He or she can also help you find ways to have less salt  For example, if you add salt to food as you cook, do not add more at the table  · Drink liquids as directed  You may need to limit the amount of liquid you drink within 24 hours  Your healthcare provider will tell you how much liquid to have and which liquids are best for you  He or she may tell you to limit liquid to 1 5 to 2 liters in a day   He or she will also tell you how often to drink liquid throughout the day  · Weigh yourself every morning  Use the same scale, in the same spot  Do this after you use the bathroom, but before you eat or drink  Wear the same type of clothing each time  Write down your weight and call your healthcare provider if you have a sudden weight gain  Swelling and weight gain are signs of fluid buildup  Manage heart failure: Your quality of life may improve with treatment and the following:  · Do not smoke  Nicotine and other chemicals in cigarettes and cigars can cause lung and heart damage  Ask your healthcare provider for information if you currently smoke and need help to quit  E-cigarettes or smokeless tobacco still contain nicotine  Talk to your healthcare provider before you use these products  · Do not drink alcohol or use illegal drugs  Alcohol and drugs can increase your risk for high blood pressure, diabetes, and coronary artery disease  · Eat heart-healthy foods  Heart-healthy foods include fruits, vegetables, lean meat (such as beef, chicken, or pork), and low-fat dairy products  Fatty fish such as salmon and tuna are also heart healthy  Other heart-healthy foods include walnuts, whole-grain breads, beans, and cooked beans  Replace butter and margarine with heart-healthy oils such as olive oil or canola oil  Your provider or a dietitian can help you create heart-healthy meal plans  · Manage any chronic health conditions you have  These include high blood pressure, diabetes, obesity, high cholesterol, metabolic syndrome, and COPD  You will have fewer symptoms if you manage these health conditions  Follow your healthcare provider's recommendations and follow up with him or her regularly  · Maintain a healthy weight  Being overweight can increase your risk for high blood pressure, diabetes, and coronary artery disease  These conditions can make your symptoms worse   Ask your healthcare provider how much you should weigh  Ask him or her to help you create a weight loss plan if you are overweight  · Stay active  Activity can help keep your symptoms from getting worse  Walking is a type of physical activity that helps maintain your strength and improve your mood  Physical activity also helps you manage your weight  Work with your healthcare provider to create an exercise plan that is right for you  · Get vaccines as directed  The flu and pneumonia can be severe for a person who has heart failure  Vaccines protect you from these infections  Get a flu shot every year as soon as it is recommended, usually in September or October  You may also need the pneumonia vaccine  Your healthcare provider can tell you if you need other vaccines, and when to get them  Follow up with your doctor within 7 days or as directed: You may need to return for other tests  You may need home health care  A healthcare provider will monitor your vital signs, weight, and make sure your medicines are working  Write down your questions so you remember to ask them during your visits  Join a support group:  Heart failure can be difficult to manage  It may be helpful to talk with others who have heart failure  You may learn how to better manage your condition or get emotional support  For more information:  · [address]  [place] , [place]   Phone: 7- 402 - [phone]  Web Address: https://www strong [employer]/  [facility] 2021 Information is for End User's use only and may not be sold, redistributed or otherwise used for commercial purposes  All illustrations and images included in CareNotes® are the copyrighted property of A D A M , Inc  or [address] [place] [address]  The above information is an  only  It is not intended as medical advice for individual conditions or treatments   Talk to your doctor, nurse or pharmacist before following any medical regimen to see if it is safe and effective for you  Report any worsening chest pain, shortness of breath, leg swelling

## 2021-05-19 NOTE — PROGRESS NOTES
[facility] [place]  [address]  [address]  [facility] [address]  Phone#  [phone]  Fax#  [phone]  [facility]'s Cardiology Office Consultation             NAME: Quinn Ledesma  AGE: 80 y o  SEX: female   : 1929   MRN: [id number]    DATE: 2021  TIME: 3:33 PM    Assessment and plan:    1  Atypical atrial flutter ([address] )  Assessment & Plan:    Currently managed by rate control and anticoagulation  Continue Eliquis for prevention of stroke  Continue diltiazem and metoprolol for control of ventricular rate  Recent echo personally reviewed  Ejection fraction is preserved with moderate mitral regurgitation and tricuspid regurgitation noted  Orders:  -     POCT ECG    2  Stage 3b chronic kidney disease [facility])  Assessment & Plan:  Lab Results   Component Value Date    EGFR 38 2021    EGFR 38 2021    EGFR 48 2021    CREATININE 1 23 2021    CREATININE 1 23 2021    CREATININE 1 03 2021     Continue Eliquis at the current dose  Avoid nephrotoxic drugs  3  Pneumonia due to COVID-19 virus  Assessment & Plan:    Slow clinical improvement  Continues to have some level of dyspnea  4  Atrial flutter (HCC)  -     furosemide (LASIX) 20 mg tablet; Take 1 tablet (20 mg total) by mouth daily    5  Chronic respiratory failure with hypoxia (HCC)  -     furosemide (LASIX) 20 mg tablet; Take 1 tablet (20 mg total) by mouth daily         Discussion:  I am concerned about her retention of fluid and significant weight gain which is secondary to dietary indiscretion and some degree of diastolic heart failure  She will take Lasix 20 mg a day for the next 1 week  Labs have been done today results are pending  She can then cut down to 20 mg every other day if her swelling is better  She has been advised very strongly on avoidance of any salt in her diet as far as possible  Her daughter states she will make sure that she complies      Chief Complaint Patient presents with   [address] [facility] Patient Visit     establishing cardiac care previously saw [facility] Ankle Swelling     both        HPI:  Drake Peguero is a 80y o -year-old female who presents to the cardiology clinic for evaluation of newly diagnosed atrial flutter  She has a history of recent pneumonia with resulting respiratory failure secondary to COVID-19  infection  Admitted in the hospital on 3/22/21 with atrial flutter, new onset, with RVR  An echocardiogram demonstrated preserved LV function, no wall motion abnormalities  The left atrium was mildly dilated with moderate mitral regurgitation and moderate tricuspid regurgitation  She is rate controlled with metoprolol succinate and cardizem CD  Tolerating anticoagulation therapy  No bleeding reported  Current symptoms: No significant angina or limiting dyspnea  Patient is doing well from a cardiac standpoint  Compliant to medications with no side effects reported  She reports she has gained about 10 lb with lasted days  She attributes this to excessive eating  Her daughter however reports that she consumes a lot of salt in her diet  She does not want to give up added salt in the diet  She has significant lower extremity edema  Her orthopnea and PND are better but she continues to be on home oxygen due to borderline oxygen saturations  Denies any cough or fever  She is making slow recovery from COVID-19 pneumonia  Her blood pressure is well controlled  Her last creatinine is 1 23  Repeat labs done today, results are pending      BP Readings from Last 4 Encounters:  05/19/21 : 122/78  05/03/21 : 124/78  04/12/21 : 118/82  04/01/21 : 108/60     Wt Readings from Last 3 Encounters:  05/19/21 : 84 4 kg (186 lb)  05/03/21 : 79 2 kg (174 lb 9 6 oz)  04/12/21 : 79 6 kg (175 lb 6 4 oz)      No results found for: [facility]  Lab Results       Component                Value               Date                       CREATININE               1 23 03/25/2021                 No results found for: BNP         Cardiology Problem list:  Atrial flutter: 3/21: onset, presented to the hospital with RVR  Asymptomatic  Rate controlled on Toprol 50 mg daily, Cardizem CD 1 20 mg daily  Chads 2 Vasc score 3  Anticoagulation with Eliquis 2 5 b i d  Diastolic heart failure secondary to rapid a flutter  Hx of COVID-19 PNA infection (hospitalization 2/28 through 3/10/2021), on oxygen  Hypertension, essential     Hyperlipidemia,  Prescribed atorvastatin but not taking it  CKD stage 3  Baseline creatinine around 1 2-1 3  TTE  3/23/2021; LVEF 55%, no regional wall motion abnormalities, wall thickness mildly increased, mild concentric hypertrophy, LA mildly dilated, moderate MR, trace AI, and moderate TR       ALLERGIES:  Allergies   Allergen Reactions    Penicillins Rash         Current Outpatient Medications:     apixaban (Eliquis) 2 5 mg, Take 1 tablet (2 5 mg total) by mouth 2 (two) times a day, Disp: , Rfl:     diltiazem (CARDIZEM CD) 180 mg 24 hr capsule, Take 1 capsule (180 mg total) by mouth daily, Disp: 90 capsule, Rfl: 1    furosemide (LASIX) 20 mg tablet, Take 1 tablet (20 mg total) by mouth daily, Disp: 30 tablet, Rfl: 6    metoprolol succinate (TOPROL-XL) 25 mg 24 hr tablet, Take 1 tablet (25 mg total) by mouth every 12 (twelve) hours, Disp: 180 tablet, Rfl: 2      Review of Systems   Constitutional: Positive for fatigue and unexpected weight change  HENT: Negative for trouble swallowing  Respiratory: Positive for shortness of breath  Negative for cough and wheezing  Cardiovascular: Positive for chest pain and leg swelling  Negative for palpitations  Gastrointestinal: Negative for abdominal distention and blood in stool  Genitourinary: Negative for hematuria  Musculoskeletal: Positive for arthralgias  Neurological: Negative for dizziness and syncope  Hematological: Does not bruise/bleed easily     Psychiatric/Behavioral: The patient is not nervous/anxious  Past Medical History:   Diagnosis Date    Acute [address] [address] )     Atrial flutter ([facility] [address] )     Rapid heart rate        Past Surgical History:   Procedure Laterality Date    CATARACT EXTRACTION      EGD AND COLONOSCOPY N/A 11/14/2017    Procedure: EGD AND COLONOSCOPY;  Surgeon: Jaymie Levine MD;  [facility]; Service: Gastroenterology    HIP FRACTURE SURGERY         Family History   Problem Relation Age of Onset    Diabetes Father     Cancer Family        Social History     Socioeconomic History    Marital status:       Spouse name: Not on file    Number of children: 3    Years of education: Not on file    Highest education level: Not on file   Occupational History    Not on file   Social Needs    Financial resource strain: Not on file    Food insecurity     Worry: Not on file     Inability: Not on file    Transportation needs     Medical: Not on file     Non-medical: Not on file   Tobacco Use    Smoking status: Never Smoker    Smokeless tobacco: Never Used    Tobacco comment: former smoker, per [employer];  started smoking at 13 yo, stopped at 26 yo   Substance and Sexual Activity    Alcohol use: No    Drug use: No    Sexual activity: Not on file   Lifestyle    Physical activity     Days per week: Not on file     Minutes per session: Not on file    Stress: Not on file   Relationships    Social connections     Talks on phone: Not on file     Gets together: Not on file     Attends [employer] service: Not on file     Active member of club or organization: Not on file     Attends meetings of clubs or organizations: Not on file     Relationship status: Not on file    Intimate partner violence     Fear of current or ex partner: Not on file     Emotionally abused: Not on file     Physically abused: Not on file     Forced sexual activity: Not on file   Other Topics Concern    Not on file   Social History Narrative    Inadequate exercise Objective:     Vitals:    21 1508   BP: 122/78   Pulse: 99   SpO2: 94%     Wt Readings from Last 3 Encounters:   21 84 4 kg (186 lb)   21 79 2 kg (174 lb 9 6 oz)   21 79 6 kg (175 lb 6 4 oz)     Pulse Readings from Last 3 Encounters:   21 99   21 100   21 75     BP Readings from Last 3 Encounters:   21 122/78   21 124/78   21 118/82         Physical Exam  Constitutional:       General: She is not in acute distress  HENT:      Head: Normocephalic  Nose:      Comments: Nasal oxygen     Mouth/Throat:      Mouth: Mucous membranes are moist    Eyes:      Conjunctiva/sclera: Conjunctivae normal    Neck:      Vascular: No carotid bruit  Cardiovascular:      Rate and Rhythm: Normal rate  Rhythm regularly irregular  Heart sounds: S1 normal and S2 normal  Murmur present  Systolic murmur present with a grade of 2/6  Pulmonary:      Breath sounds: Normal breath sounds  No wheezing or rales  Abdominal:      General: Bowel sounds are normal  There is no distension  Musculoskeletal:      Right lower le+ Edema present  Left lower le+ Edema present  Skin:     General: Skin is warm  Findings: No lesion  Neurological:      General: No focal deficit present  Mental Status: She is alert     Psychiatric:         Mood and Affect: Mood normal          Pertinent Laboratory/Diagnostic Studies:    Laboratory studies reviewed personally by Celestia Bosworth, MD    BMP:   Lab Results   Component Value Date    SODIUM 143 2021    K 4 4 2021     2021    CO2 28 2021    BUN 17 2021    CREATININE 1 23 2021    GLUC 88 2021    CALCIUM 8 4 2021     CBC:  Lab Results   Component Value Date    WBC 5 64 2021    RBC 4 36 2021    HGB 11 6 2021    HCT 38 5 2021    MCV 88 2021    MCH 26 6 (L) 2021    RDW 19 0 (H) 2021     2021     Coags:    Lipid Profile:   No results found for: CHOL  No results found for: HDL  No results found for: LDLCALC  No results found for: TRIG   Lab Results   Component Value Date    [id number] 1 918 2021     Lab Results   Component Value Date    ALT 17 2021    AST 24 2021         Imaging Studies:   Xr Chest Pa & Lateral    Result Date: 2021  Narrative: CHEST INDICATION:   U07 1: COVID-19 J12 82: Pneumonia due to coronavirus disease   COMPARISON:  Chest x-ray dated 2021  EXAM PERFORMED/VIEWS:  XR CHEST PA & LATERAL FINDINGS: Heart shadow is enlarged but unchanged from prior exam  Patchy bilateral interstitial and groundglass airspace opacities have minimal to mildly improved when compared to the prior exam   There is no pneumothorax  Osseous structures appear within normal limits for patient age  Impression: Multifocal patchy bilateral interstitial and groundglass airspace opacities most compatible with the patient's history of covid-19 infection, minimally to mildly improved when compared to a chest x-ray dated 2021  Continued follow-up is recommended  Workstation performed: [id number]       Cardiac testing:   EKG reviewed personally:   Atrial flutter, probable prior anterior infarction, nonspecific T-wave abnormalities      Results for orders placed during the hospital encounter of 21   Echo complete with contrast if indicated    Narrative [place] [address], [address] [place] [address]  [phone]    Transthoracic Echocardiogram  2D, M-mode, Doppler, and Color Doppler    Study date:  23-Mar-2021    Patient: Renny Nagel  MR number: [id number]  Account number: [de-identified]  : 13-Dec-1929  Age: 80 years  Gender: Female  Status: Inpatient  Location: Bedside  Height: 61 in  Weight: 146 7 lb  BP: 126/ 76 mmHg    Indications: Atrial flutter    Diagnoses: I48 1 - Atrial flutter    Sonographer:  DEBI Prater  Primary Physician:  Lilia Paul MD  Referring Physician:  Guille Wright MD  Group:  [facility]  Interpreting Physician:  Kai Tolentino MD    SUMMARY    LEFT VENTRICLE:  Systolic function was normal  Ejection fraction was estimated to be 55 %  There were no regional wall motion abnormalities  Wall thickness was mildly increased  There was mild concentric hypertrophy  LEFT ATRIUM:  The atrium was mildly dilated  MITRAL VALVE:  There was moderate regurgitation  AORTIC VALVE:  There was trace regurgitation  TRICUSPID VALVE:  There was moderate regurgitation  The findings suggest mild pulmonary hypertension  IVC, HEPATIC VEINS:  The inferior vena cava was mildly dilated  HISTORY: PRIOR HISTORY: COVID-19, CKD III, Atrial flutter, Tachycardia, RBBB    PROCEDURE: The procedure was performed at the bedside  This was a routine study  The transthoracic approach was used  The study included complete 2D imaging, M-mode, complete spectral Doppler, and color Doppler  The heart rate was 78 bpm,  at the start of the study  Images were obtained from the parasternal, apical, subcostal, and suprasternal notch acoustic windows  Image quality was adequate  LEFT VENTRICLE: Size was normal  Systolic function was normal  Ejection fraction was estimated to be 55 %  There were no regional wall motion abnormalities  Wall thickness was mildly increased  There was mild concentric hypertrophy  DOPPLER: Transmitral flow pattern: atrial flutter  RIGHT VENTRICLE: The size was normal  Systolic function was normal  Wall thickness was normal     LEFT ATRIUM: The atrium was mildly dilated  RIGHT ATRIUM: The atrium was mildly dilated  MITRAL VALVE: Valve structure was normal  There was normal leaflet separation  DOPPLER: The transmitral velocity was within the normal range  There was no evidence for stenosis  There was moderate regurgitation  AORTIC VALVE: The valve was trileaflet   Leaflets exhibited mildly increased thickness, mild calcification, and normal cuspal separation  DOPPLER: Transaortic velocity was within the normal range  There was no evidence for stenosis  There  was trace regurgitation  TRICUSPID VALVE: The valve structure was normal  There was normal leaflet separation  DOPPLER: The transtricuspid velocity was within the normal range  There was no evidence for stenosis  There was moderate regurgitation  Estimated peak PA  pressure was 45 mmHg  The findings suggest mild pulmonary hypertension  PULMONIC VALVE: Leaflets exhibited normal thickness, no calcification, and normal cuspal separation  DOPPLER: The transpulmonic velocity was within the normal range  There was trace regurgitation  PERICARDIUM: There was no pericardial effusion  The pericardium was normal in appearance  AORTA: The root exhibited upper limit of normal size  The ascending aorta was upper normal normal in size, measuring 3 7 cm  SYSTEMIC VEINS: IVC: The inferior vena cava was mildly dilated      SYSTEM MEASUREMENT TABLES    2D  %FS: 33 12 %  Ao Diam: 3 19 cm  EDV(Teich): 91 29 ml  EF(Teich): 61 87 %  ESV(Teich): 34 81 ml  HR_2Ch_Q: 78 74 BPM  HR_4Ch_Q: 78 27 BPM  IVSd: 1 27 cm  LA Area: 25 11 cm2  LA Diam: 4 16 cm  LVCO_2Ch_Q: 3 23 L/min  LVCO_4Ch_Q: 2 89 L/min  LVCO_BiP_Q: 3 06 L/min  LVEF_2Ch_Q: 56 37 %  LVEF_4Ch_Q: 54 91 %  LVEF_BiP_Q: 54 44 %  LVIDd: 4 48 cm  LVIDs: 2 99 cm  LVLd_2Ch_Q: 6 49 cm  LVLd_4Ch_Q: 6 28 cm  LVLs_2Ch_Q: 6 32 cm  LVLs_4Ch_Q: 5 49 cm  LVPWd: 1 19 cm  LVSV_2Ch_Q: 41 ml  LVSV_4Ch_Q: 36 98 ml  LVSV_BiP_Q: 38 57 ml  LVVED_2Ch_Q: 72 72 ml  LVVED_4Ch_Q: 67 35 ml  LVVED_BiP_Q: 70 85 ml  LVVES_2Ch_Q: 31 73 ml  LVVES_4Ch_Q: 30 37 ml  LVVES_BiP_Q: 32 28 ml  RA Area: 19 56 cm2  RVIDd: 3 4 cm  SV(Teich): 56 48 ml    CW  TR MaxP 6 mmHg  TR Vmax: 3 3 m/s    MM  TAPSE: 1 7 cm    [facility] Accredited Echocardiography Laboratory    Prepared and electronically signed by    Kiah Cruz MD  Signed 23-Mar-2021 16:05:02       No results found for this or any previous visit  No procedure found  No results found for this or any previous visit  Orders Placed This Encounter   Procedures    POCT ECG           Janel Starr MD, [facility]    Portions of the record may have been created with voice recognition software  Occasional wrong word or "sound a like" substitutions may have occurred due to the inherent limitations of voice recognition software  Read the chart carefully and recognize, using context, where substitutions have occurred  If you have any questions or concerns about the context, text or information contained within the body of this dictation, please contact myself, the provider, for further clarification

## 2021-05-20 ENCOUNTER — OFFICE VISIT (OUTPATIENT)
Dept: INTERNAL MEDICINE CLINIC | Facility: CLINIC | Age: 86
End: 2021-05-20

## 2021-05-20 VITALS
OXYGEN SATURATION: 100 % | DIASTOLIC BLOOD PRESSURE: 74 MMHG | TEMPERATURE: 97.8 F | SYSTOLIC BLOOD PRESSURE: 116 MMHG | HEART RATE: 98 BPM

## 2021-05-20 DIAGNOSIS — N63.10 BREAST MASS, RIGHT: ICD-10-CM

## 2021-05-20 DIAGNOSIS — N18.32 STAGE 3B CHRONIC KIDNEY DISEASE (HCC): ICD-10-CM

## 2021-05-20 DIAGNOSIS — U07.1 PNEUMONIA DUE TO COVID-19 VIRUS: ICD-10-CM

## 2021-05-20 DIAGNOSIS — J96.21 ACUTE ON CHRONIC RESPIRATORY FAILURE WITH HYPOXIA (HCC): Primary | ICD-10-CM

## 2021-05-20 DIAGNOSIS — J12.82 PNEUMONIA DUE TO COVID-19 VIRUS: ICD-10-CM

## 2021-05-20 DIAGNOSIS — I50.32 CHRONIC HEART FAILURE WITH PRESERVED EJECTION FRACTION (HCC): ICD-10-CM

## 2021-05-20 DIAGNOSIS — I48.4 ATYPICAL ATRIAL FLUTTER (HCC): ICD-10-CM

## 2021-05-20 PROCEDURE — 99213 OFFICE O/P EST LOW 20 MIN: CPT | Performed by: INTERNAL MEDICINE

## 2021-05-20 NOTE — ASSESSMENT & PLAN NOTE
- rates controlled, no chest pain/palpitations noted  - continue with AC: Eliquis 2 5mg BID  - continue with RC: Toprol XL 25mg BID and Cardizem 180mg daily  - follow up with Cardiology as OP

## 2021-05-20 NOTE — ASSESSMENT & PLAN NOTE
Lab Results   Component Value Date    EGFR 43 05/19/2021    EGFR 38 03/25/2021    EGFR 38 03/24/2021    CREATININE 1 12 05/19/2021    CREATININE 1 23 03/25/2021    CREATININE 1 23 03/24/2021     - electrolytes and kidney function stable  - avoid nephrotoxins, NSAIDs

## 2021-05-20 NOTE — ASSESSMENT & PLAN NOTE
- appears to be improving in terms of breathing, at home sats reported to be stable at >90s without O2 supplementation   Has been off O2 at home at rest and even ambulation  - 6 minute walk test done, able to tolerate with sats >90s  - no need for supplemental O2, however instructed to closely monitory for any signs of respiratory distress  - instructed to call the clinic if with any issues  - follow up in 2 months or sooner if the need arises

## 2021-05-20 NOTE — PROGRESS NOTES
Follow Up - INTERNAL MEDICINE HEALTH  [facility] - [facility] INTERNAL MEDICINE [place]      Assessment/Plan:    Acute respiratory failure with hypoxia (HCC)  - per reports at home does not usually use O2 at rest or even during ambulation   Sats maintained at around % on RA  - in the clinic, sats % on RA, 6 minute walk test done and was able to tolerate without O2 supplementation with sats around 90-96%  - no need for O2 supplementation, instructed to monitor closely at home and may use as needed ludy when ambulating or when experiencing symptoms  - pulmonary hygiene, ambulate as tolerated, continue with Lasix every day for 1 week per Cardiology recs and go back to usual dose as this may facilitate in improvement of breathing as well    Atypical atrial flutter ([address] )  - rates controlled, no chest pain/palpitations noted  - continue with AC: Eliquis 2 5mg BID  - continue with RC: Toprol XL 25mg BID and Cardizem 180mg daily  - follow up with Cardiology as OP    Breast mass, right  - will pursue further evaluation with mammography once more recovered and stable from breathing/fluid overload issues  - will revisit and rediscuss during next follow up      Chronic heart failure with preserved ejection fraction ([address] )  Wt Readings from Last 3 Encounters:   05/19/21 84 4 kg (186 lb)   05/03/21 79 2 kg (174 lb 9 6 oz)   04/12/21 79 6 kg (175 lb 6 4 oz)     - follows with Cardiology, was seen 5/19 and was instructed to take Lasix 20mg daily for 1 week and may resume every other day dosing once swelling has improved  - recommended adhere to dietary instructions with low salt intake  - weight self at home      CKD (chronic kidney disease) stage 3, GFR 30-59 ml/min  Lab Results   Component Value Date    EGFR 43 05/19/2021    EGFR 38 03/25/2021    EGFR 38 03/24/2021    CREATININE 1 12 05/19/2021    CREATININE 1 23 03/25/2021    CREATININE 1 23 03/24/2021     - electrolytes and kidney function stable  - avoid nephrotoxins, NSAIDs    Pneumonia due to COVID-19 virus  - appears to be improving in terms of breathing, at home sats reported to be stable at >90s without O2 supplementation  Has been off O2 at home at rest and even ambulation  - 6 minute walk test done, able to tolerate with sats >90s  - no need for supplemental O2, however instructed to closely monitory for any signs of respiratory distress  - instructed to call the clinic if with any issues  - follow up in 2 months or sooner if the need arises      Follow up in in the clinic in 2 months or earlier if needed  Can call the office if with any changes  Diagnoses and all orders for this visit:    Acute on chronic respiratory failure with hypoxia (HCC)    Atypical atrial flutter (HCC)    Chronic heart failure with preserved ejection fraction (HCC)    Stage 3b chronic kidney disease ([facility] [address] )    Pneumonia due to COVID-19 virus    Breast mass, right        Subjective:      Patient ID: Lester Anthony is a 80 y o  female  Roni Sourav is here for a follow up visit  Patient was seen prior due to COVID, acute hypoxic respiratory failure on O2, PAF and CHF  Patient was requiring O2 supplementation at 2-3LPM and was tolerating 1L with good sats  Patient also was seen and evaluated by Cardiology in the interim and was recommended to take Lasix everyday for 1 week and go back to her usual dose of every other day once swelling has improved  Patient and daughter reports that overall she feels good and better, she has been off O2 at home at rest and even ambulation  She is able to walk from her bed to her bathroom without any oxygen, able to do some light chores like cooking and preparing breakfast without any acute issues  Patient denies chest pain, palpitations, abdominal pain  No urinary/GI issues  No blood in stools  Appetite is okay  Just complaining of some arthritic pain which is chronic and stable and without any acute worsening        The following portions of the patient's history were reviewed and updated as appropriate: allergies, current medications, past family history, past medical history, past social history, past surgical history and problem list     Review of Systems   Constitutional: Negative for appetite change, fatigue and fever  HENT: Negative for tinnitus and trouble swallowing  Eyes: Negative for pain and visual disturbance  Respiratory: Negative for shortness of breath  Cardiovascular: Positive for leg swelling  Negative for chest pain and palpitations  Gastrointestinal: Negative for abdominal pain, nausea and vomiting  Genitourinary: Negative for dysuria and hematuria  Musculoskeletal: Positive for arthralgias  Skin: Negative for color change and pallor  Neurological: Negative for dizziness and light-headedness  Hematological: Negative for adenopathy  Psychiatric/Behavioral: Negative for confusion and hallucinations  All other systems reviewed and are negative  Objective:      /74 (BP Location: Left arm, Patient Position: Sitting, Cuff Size: Large)   Pulse 98   Temp 97 8 °F (36 6 °C)   LMP  (LMP Unknown)   SpO2 100% Comment: with 1L of oxygen         Physical Exam  Vitals signs and nursing note reviewed  Constitutional:       General: She is not in acute distress  Appearance: She is not toxic-appearing or diaphoretic  Comments: Pleasant, elderly female   HENT:      Head: Normocephalic and atraumatic  Nose: Nose normal       Mouth/Throat:      Mouth: Mucous membranes are moist       Pharynx: Oropharynx is clear  Eyes:      Conjunctiva/sclera: Conjunctivae normal       Pupils: Pupils are equal, round, and reactive to light  Neck:      Musculoskeletal: Normal range of motion  Cardiovascular:      Rate and Rhythm: Normal rate and regular rhythm  Pulses: Normal pulses  Pulmonary:      Effort: Pulmonary effort is normal  No respiratory distress  Breath sounds: Normal breath sounds  No wheezing  Comments: sats stable on RA, >90% at rest and during 6 minute walk test  Abdominal:      General: Bowel sounds are normal       Palpations: Abdomen is soft  Tenderness: There is no abdominal tenderness  There is no guarding  Musculoskeletal:         General: Swelling present  No tenderness  Comments: ROM slightly limited due to pain   Lymphadenopathy:      Cervical: No cervical adenopathy  Skin:     General: Skin is warm and dry  Neurological:      General: No focal deficit present  Mental Status: She is alert and oriented to person, place, and time  Mental status is at baseline  Psychiatric:         Mood and Affect: Mood normal          Thought Content:  Thought content normal

## 2021-05-20 NOTE — ASSESSMENT & PLAN NOTE
- per reports at home does not usually use O2 at rest or even during ambulation   Sats maintained at around % on RA  - in the clinic, sats % on RA, 6 minute walk test done and was able to tolerate without O2 supplementation with sats around 90-96%  - no need for O2 supplementation, instructed to monitor closely at home and may use as needed ludy when ambulating or when experiencing symptoms  - pulmonary hygiene, ambulate as tolerated, continue with Lasix every day for 1 week per Cardiology recs and go back to usual dose as this may facilitate in improvement of breathing as well

## 2021-05-20 NOTE — ASSESSMENT & PLAN NOTE
Wt Readings from Last 3 Encounters:   05/19/21 84 4 kg (186 lb)   05/03/21 79 2 kg (174 lb 9 6 oz)   04/12/21 79 6 kg (175 lb 6 4 oz)     - follows with Cardiology, was seen 5/19 and was instructed to take Lasix 20mg daily for 1 week and may resume every other day dosing once swelling has improved  - recommended adhere to dietary instructions with low salt intake  - weight self at home

## 2021-05-20 NOTE — PROGRESS NOTES
Results of 6 minute walk test    O2 saturation at rest on room air: 94-96%    O2 saturation during 6 minute walk test on room air: 90-93%

## 2021-05-20 NOTE — ASSESSMENT & PLAN NOTE
- will pursue further evaluation with mammography once more recovered and stable from breathing/fluid overload issues  - will revisit and rediscuss during next follow up

## 2021-06-02 ENCOUNTER — TELEPHONE (OUTPATIENT)
Dept: INTERNAL MEDICINE CLINIC | Facility: CLINIC | Age: 86
End: 2021-06-02

## 2021-06-02 NOTE — TELEPHONE ENCOUNTER
[facility] daughter called and said that [facility] needs a script to release the oxygen or they wont come to pick it up

## 2021-06-02 NOTE — TELEPHONE ENCOUNTER
Please check with [facility] if my last office note suffice, I have discontinued the oxygen at that time  Thanks!

## 2021-07-01 ENCOUNTER — HOSPITAL ENCOUNTER (EMERGENCY)
Facility: HOSPITAL | Age: 86
Discharge: HOME | End: 2021-07-01
Attending: EMERGENCY MEDICINE

## 2021-07-01 VITALS
HEIGHT: 65 IN | WEIGHT: 186 LBS | TEMPERATURE: 97.7 F | DIASTOLIC BLOOD PRESSURE: 86 MMHG | SYSTOLIC BLOOD PRESSURE: 140 MMHG | BODY MASS INDEX: 30.99 KG/M2 | OXYGEN SATURATION: 95 % | HEART RATE: 92 BPM | RESPIRATION RATE: 18 BRPM

## 2021-07-01 DIAGNOSIS — R04.0 RIGHT-SIDED EPISTAXIS: Primary | ICD-10-CM

## 2021-07-01 PROCEDURE — 99282 EMERGENCY DEPT VISIT SF MDM: CPT | Performed by: EMERGENCY MEDICINE

## 2021-07-01 PROCEDURE — 99283 EMERGENCY DEPT VISIT LOW MDM: CPT

## 2021-07-01 PROCEDURE — 30905 CONTROL OF NOSEBLEED: CPT | Performed by: EMERGENCY MEDICINE

## 2021-07-01 PROCEDURE — 30901 CONTROL OF NOSEBLEED: CPT | Performed by: EMERGENCY MEDICINE

## 2021-07-01 RX ORDER — TRANEXAMIC ACID 100 MG/ML
1000 INJECTION, SOLUTION INTRAVENOUS ONCE
Status: COMPLETED | OUTPATIENT
Start: 2021-07-01 | End: 2021-07-01

## 2021-07-01 RX ORDER — OXYMETAZOLINE HYDROCHLORIDE 0.05 G/100ML
2 SPRAY NASAL ONCE
Status: COMPLETED | OUTPATIENT
Start: 2021-07-01 | End: 2021-07-01

## 2021-07-01 RX ADMIN — OXYMETAZOLINE HYDROCHLORIDE 2 SPRAY: 0.05 SPRAY NASAL at 19:59

## 2021-07-01 RX ADMIN — TRANEXAMIC ACID 1000 MG: 1 INJECTION, SOLUTION INTRAVENOUS at 20:59

## 2021-07-01 NOTE — ED PROVIDER NOTES
History  Chief Complaint   Patient presents with   Marcie Wilson     pt stated she had a nose bleed that started at 1600 then stopped, Then left nostril started to to bleed and hasnt stopped, pt on Eliqus due to [place] in march      Franklin Funes is a 80y o  year old female presenting to the [facility] ED for a nose bleed  Symptoms started three hours prior to arrival  Patient reported to have scab in right nostril and began bleeding this morning after she was scratching the area  Bleeding has been persistent from right nostril and intermittently from left nostril as well  Patient held manual pressure and presents to ED  Patient has sensation of blood in the back of her throat  Patient on eliquis due to prior COVID19 infection  No other facial trauma  Denies lightheadedness, weakness, fatigue  Patient denies fevers/chills, chest pain, dyspnea, cough, abdominal pain  History provided by:  Patient   used: No    Nose Bleed  Associated symptoms: no cough and no fever        Prior to Admission Medications   Prescriptions Last Dose Informant Patient Reported? Taking? apixaban (Eliquis) 2 5 mg  Child No No   Sig: Take 1 tablet (2 5 mg total) by mouth 2 (two) times a day   diltiazem (CARDIZEM CD) 180 mg 24 hr capsule  Child No No   Sig: Take 1 capsule (180 mg total) by mouth daily   furosemide (LASIX) 20 mg tablet  Child No No   Sig: Take 1 tablet (20 mg total) by mouth daily   metoprolol succinate (TOPROL-XL) 25 mg 24 hr tablet  Child No No   Sig: Take 1 tablet (25 mg total) by mouth every 12 (twelve) hours      Facility-Administered Medications: None       Past Medical History:   Diagnosis Date    [facility] injury ([facility] [address] )     Atrial flutter ([facility] [address] )     Rapid heart rate        Past Surgical History:   Procedure Laterality Date    CATARACT EXTRACTION      EGD AND COLONOSCOPY N/A 11/14/2017    Procedure: EGD AND COLONOSCOPY;  Surgeon: Soha Correa MD;  [facility]: [facility];   Service: Gastroenterology    HIP FRACTURE SURGERY         Family History   Problem Relation Age of Onset    Diabetes Father     Cancer Family      I have reviewed and agree with the history as documented  E-Cigarette/Vaping    E-Cigarette Use Never User      E-Cigarette/Vaping Substances    Nicotine No     THC No     CBD No     Flavoring No     Other No     Unknown No      Social History     Tobacco Use    Smoking status: Never Smoker    Smokeless tobacco: Never Used    Tobacco comment: former smoker, per [facility];  started smoking at 13 yo, stopped at 28 yo   Vaping Use    Vaping Use: Never used   Substance Use Topics    Alcohol use: No    Drug use: No        Review of Systems   Constitutional: Negative for chills and fever  HENT: Positive for nosebleeds  Negative for facial swelling  Eyes: Negative for visual disturbance  Respiratory: Negative for cough and shortness of breath  Cardiovascular: Negative for chest pain  Gastrointestinal: Negative for abdominal pain, diarrhea, nausea and vomiting  Endocrine: Negative for polyuria  Genitourinary: Negative for difficulty urinating, dysuria and hematuria  Musculoskeletal: Negative for arthralgias  Skin: Negative for rash  Neurological: Negative for syncope and light-headedness  Psychiatric/Behavioral: Negative for behavioral problems and confusion  All other systems reviewed and are negative  Physical Exam  ED Triage Vitals [07/01/21 1918]   Temperature Pulse Respirations Blood Pressure SpO2   97 7 °F (36 5 °C) 100 18 140/86 96 %      Temp Source Heart Rate Source Patient Position - Orthostatic VS BP Location FiO2 (%)   Oral Monitor Lying Right arm --      Pain Score       --             Orthostatic Vital Signs  Vitals:    07/01/21 1918 07/01/21 2245   BP: 140/86    Pulse: 100 92   Patient Position - Orthostatic VS: Lying        Physical Exam  Vitals and nursing note reviewed     Constitutional:       General: She is not in acute distress  Appearance: Normal appearance  She is well-developed  She is not ill-appearing, toxic-appearing or diaphoretic  HENT:      Head: Normocephalic and atraumatic  Nose:      Right Nostril: Epistaxis (blood though no active site of bleeding identified ) present  No septal hematoma  Left Nostril: No epistaxis or septal hematoma  Mouth/Throat:      Comments: Clotted blood visualized in posterior oropharynx  Eyes:      General:         Right eye: No discharge  Left eye: No discharge  Cardiovascular:      Rate and Rhythm: Normal rate and regular rhythm  Pulmonary:      Effort: Pulmonary effort is normal  No accessory muscle usage or respiratory distress  Breath sounds: Normal breath sounds  No stridor  No decreased breath sounds, wheezing, rhonchi or rales  Abdominal:      Palpations: Abdomen is soft  Tenderness: There is no abdominal tenderness  There is no guarding or rebound  Musculoskeletal:      Cervical back: Normal range of motion and neck supple  Right lower leg: No tenderness  No edema  Left lower leg: No tenderness  No edema  Skin:     Capillary Refill: Capillary refill takes less than 2 seconds  Findings: No rash  Neurological:      Mental Status: She is alert and oriented to person, place, and time  Psychiatric:         Mood and Affect: Mood normal          Behavior: Behavior normal          ED Medications  Medications   oxymetazoline (AFRIN) 0 05 % nasal spray 2 spray (2 sprays Each Nare Given 7/1/21 1959)   tranexamic acid 100mg/mL (for epistaxis) 1,000 mg (1,000 mg Nasal Given 7/1/21 2059)       Diagnostic Studies  Results Reviewed     None                 No orders to display         Procedures  Epistaxis management    Date/Time: 7/1/2021 10:10 PM  Performed by: Danuta Davis DO  Authorized by: Danuta Davis DO   Universal Protocol:  Consent: Verbal consent obtained    Risks and benefits: risks, benefits and alternatives were discussed  Patient understanding: patient states understanding of the procedure being performed  Required items: required blood products, implants, devices, and special equipment available  Patient identity confirmed: verbally with patient      Patient location:  ED  Procedure details:     Treatment site:  R anterior and R posterior    Hemostasis method:  Merocel sponge    Approach:  External    Treatment complexity:  Limited    Treatment episode: initial    Post-procedure details:     Assessment:  Bleeding stopped    Patient tolerance of procedure: Tolerated well, no immediate complications          ED Course               Identification of Seniors at Risk      Most Recent Value   (ISAR) Identification of Seniors at Risk   Before the illness or injury that brought you to the Emergency, did you need someone to help you on a regular basis? 0 Filed at: 07/01/2021 1920   In the last 24 hours, have you needed more help than usual?  0 Filed at: 07/01/2021 1920   Have you been hospitalized for one or more nights during the past 6 months? 0 Filed at: 07/01/2021 1920   In general, do you see well? 1 Filed at: 07/01/2021 1920   In general, do you have serious problems with your memory? 0 Filed at: 07/01/2021 1920   Do you take more than three different medications every day? 1 Filed at: 07/01/2021 1920   ISAR Score  2 Filed at: 07/01/2021 1920                              MDM  Number of Diagnoses or Management Options  Right-sided epistaxis  Diagnosis management comments:   80 y o  female presenting for epistaxis  VSS  No active source of bleeding visualized in right nostril  Bleeding refractory to application of afrin and atomized TXA  Merocel packing placed as documented above  I have discussed with the patient our plan to discharge them from the ED and the patient is in agreement with this plan  The patient was provided a written after visit summary with strict RTED precautions       Discharge Plan: Packing to remain in place  Instructed to hold eliquis tonight  Will forgo antibiotics at this time  Prompt ENT f/u  Shannen James Followup: I have discussed with the patient plan to follow up with ENT  Contact information provided in AVS        Amount and/or Complexity of Data Reviewed  Review and summarize past medical records: yes    Patient Progress  Patient progress: stable      Disposition  Final diagnoses:   Right-sided epistaxis     Time reflects when diagnosis was documented in both MDM as applicable and the Disposition within this note     Time User Action Codes Description Comment    7/1/2021 11:05 PM Jacques Onofre Add [R04 0] Right-sided epistaxis       ED Disposition     ED Disposition Condition Date/Time Comment    Discharge Stable Thu Jul 1, 2021 11:05 PM Clearence [place] discharge to home/self care  Follow-up Information     Follow up With Specialties Details Why Contact Info Additional [id number] [facility] Otolaryngology Schedule an appointment as soon as possible for a visit in 1 day  [address] [place] [address]  [address], [address] [place] [address], [place] [address]          Discharge Medication List as of 7/1/2021 11:06 PM      CONTINUE these medications which have NOT CHANGED    Details   apixaban (Eliquis) 2 5 mg Take 1 tablet (2 5 mg total) by mouth 2 (two) times a day, Starting Thu 4/1/2021, No Print      diltiazem (CARDIZEM CD) 180 mg 24 hr capsule Take 1 capsule (180 mg total) by mouth daily, Starting Wed 4/21/2021, Until Mon 10/18/2021, Normal      furosemide (LASIX) 20 mg tablet Take 1 tablet (20 mg total) by mouth daily, Starting Wed 5/19/2021, Normal      metoprolol succinate (TOPROL-XL) 25 mg 24 hr tablet Take 1 tablet (25 mg total) by mouth every 12 (twelve) hours, Starting Tue 4/20/2021, Normal           No discharge procedures on file      PDMP Review     None           ED Provider  Attending physically available and evaluated Clearence [place]  I managed the patient along with the ED Attending      Electronically Signed by         Trisha Pollard DO  07/03/21 7109

## 2021-07-02 NOTE — DISCHARGE INSTRUCTIONS
You have been seen for a nose bleed  Please hold your eliquis tonight  Return to the emergency department if you develop worsening bleeding, fevers, weakness or any other symptoms of concern  Please follow up with ENT by calling the number provided

## 2021-07-02 NOTE — ED NOTES
This RN bedside, pt had removed nasal clamp   No bleeding noted from either nostril     Kalie Perez, CAITLIN  07/01/21 4252

## 2021-07-02 NOTE — ED ATTENDING ATTESTATION
7/1/2021  IKaren DO, saw and evaluated the patient  I have discussed the patient with the resident/non-physician practitioner and agree with the resident's/non-physician practitioner's findings, Plan of Care, and MDM as documented in the resident's/non-physician practitioner's note, except where noted  All available labs and Radiology studies were reviewed  I was present for key portions of any procedure(s) performed by the resident/non-physician practitioner and I was immediately available to provide assistance  At this point I agree with the current assessment done in the Emergency Department  I have conducted an independent evaluation of this patient a history and physical is as follows:    28-year-old female presents with nosebleed that started at 4:00 p m  And then stop  Began to bleed again  Blood noted coming out of left nostril  Patient is on Eliquis  Patient thinks she picked a scab that started with bleeding  No shortness of breath or other complaints  On exam-no acute distress, heart regular, no respiratory distress, has blood in right naris    Plan-pressure, Afrin, TXA, reassess     ED Course         Critical Care Time  Procedures

## 2021-07-19 DIAGNOSIS — I48.4 ATYPICAL ATRIAL FLUTTER (HCC): ICD-10-CM

## 2021-07-19 DIAGNOSIS — I48.92 ATRIAL FLUTTER (HCC): ICD-10-CM

## 2021-07-19 RX ORDER — DILTIAZEM HYDROCHLORIDE 180 MG/1
180 CAPSULE, COATED, EXTENDED RELEASE ORAL DAILY
Qty: 90 CAPSULE | Refills: 1 | Status: SHIPPED | OUTPATIENT
Start: 2021-07-19 | End: 2022-01-17

## 2021-07-19 RX ORDER — METOPROLOL SUCCINATE 25 MG/1
25 TABLET, EXTENDED RELEASE ORAL EVERY 12 HOURS
Qty: 180 TABLET | Refills: 2 | Status: SHIPPED | OUTPATIENT
Start: 2021-07-19 | End: 2022-08-09 | Stop reason: SDUPTHER

## 2021-08-09 ENCOUNTER — TELEPHONE (OUTPATIENT)
Dept: CARDIOLOGY CLINIC | Facility: CLINIC | Age: 86
End: 2021-08-09

## 2021-08-09 NOTE — TELEPHONE ENCOUNTER
Patient's daughter called and asked if she can decrease the dose or discontinue one of her medications for symptom of fatigue  She is often drowsy during the day  Her heart rate ranges from 60's to 80's  They do not check her bp at home  She denies any current cardiac symptoms except for edema in legs that is at baseline   Please advise thanks

## 2021-08-12 NOTE — RESPIRATORY THERAPY NOTE
Home Oxygen Qualifying Test       Patient name: Sera Ohara        : 1929   Date of Test:  2021  Diagnosis:      Home Oxygen Test:    **Medicare Guidelines require item(s) 1-5 on all ambulatory patients or 1 and 2 on non-ambulatory patients  1   Baseline SPO2 on Room Air at rest 88 %  2   SPO2 during exercise on Room Air 80 %  During exercise monitor SpO2  If SPO2 increases >=89% with ambulation do not add supplemental             oxygen  If <= 88% on room air add O2 via NC and titrate patient  Patient must be ambulated with O2 and titrated to > 88% with exertion  3   SPO2 on Oxygen at rest 93 % 4 lpm     4   SPO2 during exercise on Oxygen  90% a liter flow of 4 lpm     5   Exercise performed:          Pt evaluated for home 02  Pt has home 02 for 2 weeks and using at 2lpm  Pt now requires 4lpm for exertion          [x]  Supplemental Home Oxygen is indicated  []  Client does not qualify for home oxygen        Respiratory Additional Notes- pt qualifies for home 02 and requires 4lpm for exertion and 2 lpm at rest  Kg Knight Go for blood tests as directed. Your doctor will do lab tests at regular visits to monitor the effects of this medicine. Please follow up with your doctor and keep your health care provider appointments.

## 2021-11-29 ENCOUNTER — TELEPHONE (OUTPATIENT)
Dept: CARDIOLOGY CLINIC | Facility: CLINIC | Age: 86
End: 2021-11-29

## 2021-11-29 DIAGNOSIS — I50.32 CHRONIC HEART FAILURE WITH PRESERVED EJECTION FRACTION (HCC): Primary | ICD-10-CM

## 2021-11-29 RX ORDER — POTASSIUM CHLORIDE 750 MG/1
10 CAPSULE, EXTENDED RELEASE ORAL DAILY
Qty: 90 CAPSULE | Refills: 3 | Status: SHIPPED | OUTPATIENT
Start: 2021-11-29

## 2022-01-28 DIAGNOSIS — I48.92 ATRIAL FLUTTER (HCC): ICD-10-CM

## 2022-01-28 RX ORDER — DILTIAZEM HYDROCHLORIDE 180 MG/1
180 CAPSULE, COATED, EXTENDED RELEASE ORAL DAILY
Qty: 90 CAPSULE | Refills: 3 | Status: SHIPPED | OUTPATIENT
Start: 2022-01-28

## 2022-01-28 NOTE — TELEPHONE ENCOUNTER
Patients daughter called and forgot to request a 90 day refill on diltiazem (CARDIZEM CD) 180 mg 24 hr capsule

## 2022-05-01 DIAGNOSIS — I48.92 ATRIAL FLUTTER (HCC): ICD-10-CM

## 2022-05-01 DIAGNOSIS — J96.11 CHRONIC RESPIRATORY FAILURE WITH HYPOXIA (HCC): ICD-10-CM

## 2022-05-02 ENCOUNTER — TELEPHONE (OUTPATIENT)
Dept: CARDIOLOGY CLINIC | Facility: CLINIC | Age: 87
End: 2022-05-02

## 2022-05-02 RX ORDER — FUROSEMIDE 20 MG/1
TABLET ORAL
Qty: 30 TABLET | Refills: 6 | Status: SHIPPED | OUTPATIENT
Start: 2022-05-02

## 2022-05-02 NOTE — TELEPHONE ENCOUNTER
Called daughter left message that refills are denied per protocol because there is not Encounter as of 5/6/2021  Encouraged to schedule Fu appt with Dr Gwen Crane will route to Dr Gwen Crane if he will do a courtesy refill until an appt is made

## 2022-05-02 NOTE — TELEPHONE ENCOUNTER
Pt's daughter called in stating that the [facility], needs to know the refill status for the diltiazem (CARDIZEM) 180 mg 24 hr capsule  Please advise pt when you have spoke with the pharmacy

## 2022-05-02 NOTE — TELEPHONE ENCOUNTER
Caryn Cooney has requested a refill of diltiazem (CARDIZEM CD) 180 mg 24 hr capsule  Medication was prescribed by Dr Estrella Rose  Will route to ordering provider

## 2022-05-03 RX ORDER — DILTIAZEM HYDROCHLORIDE 180 MG/1
CAPSULE, COATED, EXTENDED RELEASE ORAL
Qty: 90 CAPSULE | Refills: 3 | OUTPATIENT
Start: 2022-05-03

## 2022-08-08 DIAGNOSIS — I48.4 ATYPICAL ATRIAL FLUTTER (HCC): ICD-10-CM

## 2022-08-09 RX ORDER — METOPROLOL SUCCINATE 25 MG/1
25 TABLET, EXTENDED RELEASE ORAL EVERY 12 HOURS
Qty: 180 TABLET | Refills: 3 | Status: SHIPPED | OUTPATIENT
Start: 2022-08-09

## 2022-08-09 RX ORDER — METOPROLOL SUCCINATE 25 MG/1
TABLET, EXTENDED RELEASE ORAL
Qty: 180 TABLET | Refills: 2 | OUTPATIENT
Start: 2022-08-09

## 2022-08-09 NOTE — TELEPHONE ENCOUNTER
Eli Luke has requested a refill of metoprolol succinate (TOPROL-XL) 25 mg 24 hr tablet  Pt needs an appointment with the [facility] office   Will refuse refill request

## 2022-10-12 PROBLEM — J12.82 PNEUMONIA DUE TO COVID-19 VIRUS: Status: RESOLVED | Noted: 2021-02-28 | Resolved: 2022-10-12

## 2022-10-12 PROBLEM — U07.1 PNEUMONIA DUE TO COVID-19 VIRUS: Status: RESOLVED | Noted: 2021-02-28 | Resolved: 2022-10-12

## 2023-05-17 NOTE — PLAN OF CARE
Problem: Prexisting or High Potential for Compromised Skin Integrity  Goal: Skin integrity is maintained or improved  Description: INTERVENTIONS:  - Identify patients at risk for skin breakdown  - Assess and monitor skin integrity  - Assess and monitor nutrition and hydration status  - Monitor labs   - Assess for incontinence   - Turn and reposition patient  - Assist with mobility/ambulation  - Relieve pressure over bony prominences  - Avoid friction and shearing  - Provide appropriate hygiene as needed including keeping skin clean and dry  - Evaluate need for skin moisturizer/barrier cream  - Collaborate with interdisciplinary team   - Patient/family teaching  - Consider wound care consult   Outcome: Progressing     Problem: Potential for Falls  Goal: Patient will remain free of falls  Description: INTERVENTIONS:  - Assess patient frequently for physical needs  -  Identify cognitive and physical deficits and behaviors that affect risk of falls    -  Felda fall precautions as indicated by assessment   - Educate patient/family on patient safety including physical limitations  - Instruct patient to call for assistance with activity based on assessment  - Modify environment to reduce risk of injury  - Consider OT/PT consult to assist with strengthening/mobility  Outcome: Progressing     Problem: CARDIOVASCULAR - ADULT  Goal: Maintains optimal cardiac output and hemodynamic stability  Description: INTERVENTIONS:  - Monitor I/O, vital signs and rhythm  - Monitor for S/S and trends of decreased cardiac output  - Administer and titrate ordered vasoactive medications to optimize hemodynamic stability  - Assess quality of pulses, skin color and temperature  - Assess for signs of decreased coronary artery perfusion  - Instruct patient to report change in severity of symptoms  Outcome: Progressing  Goal: Absence of cardiac dysrhythmias or at baseline rhythm  Description: INTERVENTIONS:  - Continuous cardiac monitoring, vital signs, obtain 12 lead EKG if ordered  - Administer antiarrhythmic and heart rate control medications as ordered  - Monitor electrolytes and administer replacement therapy as ordered  Outcome: Progressing Speaking Coherently

## 2023-08-02 DIAGNOSIS — I48.92 ATRIAL FLUTTER (HCC): ICD-10-CM

## 2023-08-02 RX ORDER — DILTIAZEM HYDROCHLORIDE 180 MG/1
CAPSULE, COATED, EXTENDED RELEASE ORAL
Qty: 90 CAPSULE | Refills: 0 | Status: SHIPPED | OUTPATIENT
Start: 2023-08-02

## 2023-10-20 DIAGNOSIS — I48.92 ATRIAL FLUTTER (HCC): ICD-10-CM

## 2023-10-23 RX ORDER — DILTIAZEM HYDROCHLORIDE 180 MG/1
CAPSULE, COATED, EXTENDED RELEASE ORAL
Qty: 90 CAPSULE | Refills: 0 | Status: SHIPPED | OUTPATIENT
Start: 2023-10-23

## 2023-11-17 DIAGNOSIS — I48.92 ATRIAL FLUTTER (HCC): ICD-10-CM

## 2023-11-17 DIAGNOSIS — J96.11 CHRONIC RESPIRATORY FAILURE WITH HYPOXIA (HCC): ICD-10-CM

## 2023-11-17 RX ORDER — FUROSEMIDE 20 MG/1
TABLET ORAL
Qty: 30 TABLET | Refills: 6 | Status: SHIPPED | OUTPATIENT
Start: 2023-11-17

## 2024-01-01 ENCOUNTER — APPOINTMENT (INPATIENT)
Dept: RADIOLOGY | Facility: HOSPITAL | Age: 89
DRG: 853 | End: 2024-01-01
Payer: MEDICARE

## 2024-01-01 ENCOUNTER — APPOINTMENT (INPATIENT)
Dept: NON INVASIVE DIAGNOSTICS | Facility: HOSPITAL | Age: 89
DRG: 853 | End: 2024-01-01
Payer: MEDICARE

## 2024-01-01 ENCOUNTER — APPOINTMENT (EMERGENCY)
Dept: CT IMAGING | Facility: HOSPITAL | Age: 89
DRG: 853 | End: 2024-01-01
Payer: MEDICARE

## 2024-01-01 ENCOUNTER — APPOINTMENT (INPATIENT)
Dept: CT IMAGING | Facility: HOSPITAL | Age: 89
DRG: 853 | End: 2024-01-01
Payer: MEDICARE

## 2024-01-01 ENCOUNTER — APPOINTMENT (EMERGENCY)
Dept: RADIOLOGY | Facility: HOSPITAL | Age: 89
DRG: 853 | End: 2024-01-01
Payer: MEDICARE

## 2024-01-01 ENCOUNTER — HOSPITAL ENCOUNTER (INPATIENT)
Facility: HOSPITAL | Age: 89
LOS: 8 days | DRG: 853 | End: 2024-09-20
Attending: STUDENT IN AN ORGANIZED HEALTH CARE EDUCATION/TRAINING PROGRAM | Admitting: STUDENT IN AN ORGANIZED HEALTH CARE EDUCATION/TRAINING PROGRAM
Payer: MEDICARE

## 2024-01-01 ENCOUNTER — ANESTHESIA EVENT (INPATIENT)
Dept: PERIOP | Facility: HOSPITAL | Age: 89
DRG: 853 | End: 2024-01-01
Payer: MEDICARE

## 2024-01-01 ENCOUNTER — ANESTHESIA (INPATIENT)
Dept: PERIOP | Facility: HOSPITAL | Age: 89
DRG: 853 | End: 2024-01-01
Payer: MEDICARE

## 2024-01-01 ENCOUNTER — TELEPHONE (OUTPATIENT)
Dept: DERMATOLOGY | Facility: CLINIC | Age: 89
End: 2024-01-01

## 2024-01-01 ENCOUNTER — APPOINTMENT (INPATIENT)
Dept: VASCULAR ULTRASOUND | Facility: HOSPITAL | Age: 89
DRG: 853 | End: 2024-01-01
Payer: MEDICARE

## 2024-01-01 VITALS
SYSTOLIC BLOOD PRESSURE: 105 MMHG | RESPIRATION RATE: 24 BRPM | WEIGHT: 161.16 LBS | OXYGEN SATURATION: 94 % | HEART RATE: 96 BPM | BODY MASS INDEX: 27.51 KG/M2 | TEMPERATURE: 97.9 F | HEIGHT: 64 IN | DIASTOLIC BLOOD PRESSURE: 59 MMHG

## 2024-01-01 DIAGNOSIS — I26.99 PULMONARY EMBOLISM, UNSPECIFIED CHRONICITY, UNSPECIFIED PULMONARY EMBOLISM TYPE, UNSPECIFIED WHETHER ACUTE COR PULMONALE PRESENT (HCC): ICD-10-CM

## 2024-01-01 DIAGNOSIS — I26.94 MULTIPLE SUBSEGMENTAL PULMONARY EMBOLI WITHOUT ACUTE COR PULMONALE (HCC): ICD-10-CM

## 2024-01-01 DIAGNOSIS — S80.11XA HEMATOMA OF RIGHT LOWER LEG: ICD-10-CM

## 2024-01-01 DIAGNOSIS — L12.0 BULLOUS PEMPHIGOID: ICD-10-CM

## 2024-01-01 DIAGNOSIS — I50.9 ACUTE ON CHRONIC CONGESTIVE HEART FAILURE, UNSPECIFIED HEART FAILURE TYPE (HCC): Primary | ICD-10-CM

## 2024-01-01 DIAGNOSIS — S81.801A LEG WOUND, RIGHT: ICD-10-CM

## 2024-01-01 DIAGNOSIS — N17.9 AKI (ACUTE KIDNEY INJURY) (HCC): ICD-10-CM

## 2024-01-01 DIAGNOSIS — S80.10XA HEMATOMA OF LEG: ICD-10-CM

## 2024-01-01 DIAGNOSIS — Z22.322 MRSA (METHICILLIN RESISTANT STAPH AUREUS) CULTURE POSITIVE: ICD-10-CM

## 2024-01-01 DIAGNOSIS — N39.0 UTI (URINARY TRACT INFECTION): ICD-10-CM

## 2024-01-01 DIAGNOSIS — J90 PLEURAL EFFUSION: ICD-10-CM

## 2024-01-01 LAB
2HR DELTA HS TROPONIN: 0 NG/L
4HR DELTA HS TROPONIN: -2 NG/L
AAASAAGGREGATION: 83.6 % (ref 89–100)
AAASAINHIBITION: 16.4 % (ref 0–11)
AAMA (MAX AMPLITUDE AA): 53.7 MM (ref 51–71)
ABO GROUP BLD BPU: NORMAL
ABO GROUP BLD: NORMAL
ACTFMA(MAX AMPLITUDE ACTF): 17.9 MM (ref 2–19)
ADPADPAGGREGATION: 79.4 % (ref 83–100)
ADPADPINHIBITION: 20.6 % (ref 0–17)
ADPMA(MAX AMPLITUDE ADP): 51.9 MM (ref 45–69)
ALBUMIN SERPL BCG-MCNC: 2.3 G/DL (ref 3.5–5)
ALBUMIN SERPL BCG-MCNC: 2.4 G/DL (ref 3.5–5)
ALBUMIN SERPL BCG-MCNC: 2.9 G/DL (ref 3.5–5)
ALP SERPL-CCNC: 192 U/L (ref 34–104)
ALP SERPL-CCNC: 59 U/L (ref 34–104)
ALP SERPL-CCNC: 72 U/L (ref 34–104)
ALT SERPL W P-5'-P-CCNC: 4 U/L (ref 7–52)
ALT SERPL W P-5'-P-CCNC: 5 U/L (ref 7–52)
ALT SERPL W P-5'-P-CCNC: 9 U/L (ref 7–52)
AMMONIA PLAS-SCNC: 27 UMOL/L (ref 18–72)
ANION GAP SERPL CALCULATED.3IONS-SCNC: -1 MMOL/L (ref 4–13)
ANION GAP SERPL CALCULATED.3IONS-SCNC: 0 MMOL/L (ref 4–13)
ANION GAP SERPL CALCULATED.3IONS-SCNC: 1 MMOL/L (ref 4–13)
ANION GAP SERPL CALCULATED.3IONS-SCNC: 2 MMOL/L (ref 4–13)
ANION GAP SERPL CALCULATED.3IONS-SCNC: 3 MMOL/L (ref 4–13)
ANION GAP SERPL CALCULATED.3IONS-SCNC: 6 MMOL/L (ref 4–13)
ANION GAP SERPL CALCULATED.3IONS-SCNC: 7 MMOL/L (ref 4–13)
ANION GAP SERPL CALCULATED.3IONS-SCNC: 7 MMOL/L (ref 4–13)
ANISOCYTOSIS BLD QL SMEAR: PRESENT
AORTIC ROOT: 4.1 CM
APICAL FOUR CHAMBER EJECTION FRACTION: 53 %
APICAL FOUR CHAMBER EJECTION FRACTION: 62 %
APPEARANCE FLD: CLEAR
APTT PPP: 119 SECONDS (ref 23–34)
APTT PPP: 36 SECONDS (ref 23–34)
APTT PPP: 41 SECONDS (ref 23–34)
APTT PPP: 42 SECONDS (ref 23–34)
APTT PPP: 44 SECONDS (ref 23–34)
APTT PPP: 58 SECONDS (ref 23–34)
APTT PPP: 59 SECONDS (ref 23–34)
APTT PPP: 71 SECONDS (ref 23–34)
APTT PPP: 84 SECONDS (ref 23–34)
APTT PPP: 88 SECONDS (ref 23–34)
APTT PPP: 91 SECONDS (ref 23–34)
APTT PPP: >210 SECONDS (ref 23–34)
APTT PPP: >210 SECONDS (ref 23–34)
ARTERIAL PATENCY WRIST A: NO
ARTERIAL PATENCY WRIST A: YES
ASCENDING AORTA: 3.6 CM
AST SERPL W P-5'-P-CCNC: 10 U/L (ref 13–39)
AST SERPL W P-5'-P-CCNC: 11 U/L (ref 13–39)
AST SERPL W P-5'-P-CCNC: 16 U/L (ref 13–39)
ATRIAL RATE: 100 BPM
ATRIAL RATE: 104 BPM
ATRIAL RATE: 111 BPM
ATRIAL RATE: 36 BPM
ATRIAL RATE: 37 BPM
BACTERIA BLD CULT: NORMAL
BACTERIA BLD CULT: NORMAL
BACTERIA SPEC BFLD CULT: NO GROWTH
BACTERIA UR CULT: ABNORMAL
BACTERIA UR CULT: ABNORMAL
BACTERIA UR QL AUTO: ABNORMAL /HPF
BACTERIA WND AEROBE CULT: ABNORMAL
BASE EX.OXY STD BLDV CALC-SCNC: 42.5 % (ref 60–80)
BASE EX.OXY STD BLDV CALC-SCNC: 60.3 % (ref 60–80)
BASE EX.OXY STD BLDV CALC-SCNC: 60.9 % (ref 60–80)
BASE EX.OXY STD BLDV CALC-SCNC: 82 % (ref 60–80)
BASE EX.OXY STD BLDV CALC-SCNC: 82 % (ref 60–80)
BASE EX.OXY STD BLDV CALC-SCNC: 91.1 % (ref 60–80)
BASE EXCESS BLDA CALC-SCNC: 0.3 MMOL/L
BASE EXCESS BLDA CALC-SCNC: 1 MMOL/L (ref -2–3)
BASE EXCESS BLDA CALC-SCNC: 1.4 MMOL/L
BASE EXCESS BLDA CALC-SCNC: 11 MMOL/L (ref -2–3)
BASE EXCESS BLDA CALC-SCNC: 11.1 MMOL/L
BASE EXCESS BLDA CALC-SCNC: 11.2 MMOL/L
BASE EXCESS BLDA CALC-SCNC: 14.5 MMOL/L
BASE EXCESS BLDA CALC-SCNC: 14.8 MMOL/L
BASE EXCESS BLDA CALC-SCNC: 2.6 MMOL/L
BASE EXCESS BLDA CALC-SCNC: 4.4 MMOL/L
BASE EXCESS BLDA CALC-SCNC: 4.6 MMOL/L
BASE EXCESS BLDA CALC-SCNC: 5.2 MMOL/L
BASE EXCESS BLDA CALC-SCNC: 5.6 MMOL/L
BASE EXCESS BLDA CALC-SCNC: 5.9 MMOL/L
BASE EXCESS BLDA CALC-SCNC: 6.8 MMOL/L
BASE EXCESS BLDA CALC-SCNC: 6.8 MMOL/L
BASE EXCESS BLDA CALC-SCNC: 7.5 MMOL/L
BASE EXCESS BLDA CALC-SCNC: 7.9 MMOL/L
BASE EXCESS BLDA CALC-SCNC: 8.1 MMOL/L
BASE EXCESS BLDA CALC-SCNC: 9.1 MMOL/L
BASE EXCESS BLDA CALC-SCNC: 9.6 MMOL/L
BASE EXCESS BLDV CALC-SCNC: 11 MMOL/L
BASE EXCESS BLDV CALC-SCNC: 11.8 MMOL/L
BASE EXCESS BLDV CALC-SCNC: 12.9 MMOL/L
BASE EXCESS BLDV CALC-SCNC: 13.6 MMOL/L
BASE EXCESS BLDV CALC-SCNC: 15.1 MMOL/L
BASE EXCESS BLDV CALC-SCNC: 2.6 MMOL/L
BASOPHILS # BLD AUTO: 0.01 THOUSANDS/ΜL (ref 0–0.1)
BASOPHILS # BLD AUTO: 0.02 THOUSANDS/ΜL (ref 0–0.1)
BASOPHILS # BLD AUTO: 0.02 THOUSANDS/ΜL (ref 0–0.1)
BASOPHILS # BLD AUTO: 0.03 THOUSANDS/ΜL (ref 0–0.1)
BASOPHILS # BLD AUTO: 0.03 THOUSANDS/ΜL (ref 0–0.1)
BASOPHILS # BLD MANUAL: 0 THOUSAND/UL (ref 0–0.1)
BASOPHILS NFR BLD AUTO: 0 % (ref 0–1)
BASOPHILS NFR MAR MANUAL: 0 % (ref 0–1)
BILIRUB DIRECT SERPL-MCNC: 0.29 MG/DL (ref 0–0.2)
BILIRUB SERPL-MCNC: 0.57 MG/DL (ref 0.2–1)
BILIRUB SERPL-MCNC: 0.72 MG/DL (ref 0.2–1)
BILIRUB SERPL-MCNC: 0.93 MG/DL (ref 0.2–1)
BILIRUB UR QL STRIP: NEGATIVE
BLD GP AB SCN SERPL QL: NEGATIVE
BLD SMEAR INTERP: NORMAL
BNP SERPL-MCNC: 676 PG/ML (ref 0–100)
BPU ID: NORMAL
BSA FOR ECHO PROCEDURE: 1.67 M2
BSA FOR ECHO PROCEDURE: 1.78 M2
BUN SERPL-MCNC: 18 MG/DL (ref 5–25)
BUN SERPL-MCNC: 19 MG/DL (ref 5–25)
BUN SERPL-MCNC: 20 MG/DL (ref 5–25)
BUN SERPL-MCNC: 23 MG/DL (ref 5–25)
BUN SERPL-MCNC: 23 MG/DL (ref 5–25)
BUN SERPL-MCNC: 24 MG/DL (ref 5–25)
BUN SERPL-MCNC: 25 MG/DL (ref 5–25)
BUN SERPL-MCNC: 32 MG/DL (ref 5–25)
BUN SERPL-MCNC: 36 MG/DL (ref 5–25)
BUN SERPL-MCNC: 36 MG/DL (ref 5–25)
BUN SERPL-MCNC: 37 MG/DL (ref 5–25)
BUN SERPL-MCNC: 38 MG/DL (ref 5–25)
BUN SERPL-MCNC: 38 MG/DL (ref 5–25)
CA-I BLD-SCNC: 1.05 MMOL/L (ref 1.12–1.32)
CA-I BLD-SCNC: 1.08 MMOL/L (ref 1.12–1.32)
CA-I BLD-SCNC: 1.14 MMOL/L (ref 1.12–1.32)
CA-I BLD-SCNC: 1.18 MMOL/L (ref 1.12–1.32)
CA-I BLD-SCNC: 1.21 MMOL/L (ref 1.12–1.32)
CA-I BLD-SCNC: 1.21 MMOL/L (ref 1.12–1.32)
CA-I BLD-SCNC: 1.25 MMOL/L (ref 1.12–1.32)
CA-I BLD-SCNC: 1.33 MMOL/L (ref 1.12–1.32)
CA-I BLD-SCNC: 1.48 MMOL/L (ref 1.12–1.32)
CALCIUM ALBUM COR SERPL-MCNC: 8.9 MG/DL (ref 8.3–10.1)
CALCIUM ALBUM COR SERPL-MCNC: 9.1 MG/DL (ref 8.3–10.1)
CALCIUM SERPL-MCNC: 7.6 MG/DL (ref 8.4–10.2)
CALCIUM SERPL-MCNC: 7.6 MG/DL (ref 8.4–10.2)
CALCIUM SERPL-MCNC: 7.7 MG/DL (ref 8.4–10.2)
CALCIUM SERPL-MCNC: 7.9 MG/DL (ref 8.4–10.2)
CALCIUM SERPL-MCNC: 8 MG/DL (ref 8.4–10.2)
CALCIUM SERPL-MCNC: 8.1 MG/DL (ref 8.4–10.2)
CALCIUM SERPL-MCNC: 8.1 MG/DL (ref 8.4–10.2)
CALCIUM SERPL-MCNC: 8.2 MG/DL (ref 8.4–10.2)
CALCIUM SERPL-MCNC: 8.4 MG/DL (ref 8.4–10.2)
CALCIUM SERPL-MCNC: 8.5 MG/DL (ref 8.4–10.2)
CALCIUM SERPL-MCNC: 8.6 MG/DL (ref 8.4–10.2)
CARDIAC TROPONIN I PNL SERPL HS: 14 NG/L
CARDIAC TROPONIN I PNL SERPL HS: 16 NG/L
CARDIAC TROPONIN I PNL SERPL HS: 16 NG/L
CFFMA (FUNCTIONAL FIBRINOGEN MAX AMPLITUDE): 19.3 MM (ref 15–32)
CHLORIDE SERPL-SCNC: 102 MMOL/L (ref 96–108)
CHLORIDE SERPL-SCNC: 103 MMOL/L (ref 96–108)
CHLORIDE SERPL-SCNC: 103 MMOL/L (ref 96–108)
CHLORIDE SERPL-SCNC: 104 MMOL/L (ref 96–108)
CHLORIDE SERPL-SCNC: 105 MMOL/L (ref 96–108)
CHLORIDE SERPL-SCNC: 105 MMOL/L (ref 96–108)
CHLORIDE SERPL-SCNC: 106 MMOL/L (ref 96–108)
CHLORIDE SERPL-SCNC: 107 MMOL/L (ref 96–108)
CHLORIDE SERPL-SCNC: 109 MMOL/L (ref 96–108)
CKLY30: 0 % (ref 0–2.6)
CKR(REACTION TIME): 10.9 MIN (ref 4.6–9.1)
CLARITY UR: ABNORMAL
CO2 SERPL-SCNC: 28 MMOL/L (ref 21–32)
CO2 SERPL-SCNC: 28 MMOL/L (ref 21–32)
CO2 SERPL-SCNC: 34 MMOL/L (ref 21–32)
CO2 SERPL-SCNC: 36 MMOL/L (ref 21–32)
CO2 SERPL-SCNC: 38 MMOL/L (ref 21–32)
CO2 SERPL-SCNC: 38 MMOL/L (ref 21–32)
CO2 SERPL-SCNC: 39 MMOL/L (ref 21–32)
CO2 SERPL-SCNC: 39 MMOL/L (ref 21–32)
CO2 SERPL-SCNC: 41 MMOL/L (ref 21–32)
CO2 SERPL-SCNC: 42 MMOL/L (ref 21–32)
CO2 SERPL-SCNC: 43 MMOL/L (ref 21–32)
CO2 SERPL-SCNC: 44 MMOL/L (ref 21–32)
CO2 SERPL-SCNC: 45 MMOL/L (ref 21–32)
COLOR FLD: NORMAL
COLOR UR: YELLOW
CORTIS SERPL-MCNC: 21 UG/DL
CREAT SERPL-MCNC: 0.82 MG/DL (ref 0.6–1.3)
CREAT SERPL-MCNC: 0.9 MG/DL (ref 0.6–1.3)
CREAT SERPL-MCNC: 0.9 MG/DL (ref 0.6–1.3)
CREAT SERPL-MCNC: 0.93 MG/DL (ref 0.6–1.3)
CREAT SERPL-MCNC: 0.93 MG/DL (ref 0.6–1.3)
CREAT SERPL-MCNC: 1 MG/DL (ref 0.6–1.3)
CREAT SERPL-MCNC: 1.06 MG/DL (ref 0.6–1.3)
CREAT SERPL-MCNC: 1.34 MG/DL (ref 0.6–1.3)
CREAT SERPL-MCNC: 1.42 MG/DL (ref 0.6–1.3)
CREAT SERPL-MCNC: 1.43 MG/DL (ref 0.6–1.3)
CREAT SERPL-MCNC: 1.53 MG/DL (ref 0.6–1.3)
CREAT SERPL-MCNC: 1.55 MG/DL (ref 0.6–1.3)
CREAT SERPL-MCNC: 1.58 MG/DL (ref 0.6–1.3)
CROSSMATCH: NORMAL
CRTMA(RAPIDTEG MAX AMPLITUDE): 55.4 MM (ref 52–70)
D DIMER PPP FEU-MCNC: 2.86 UG/ML FEU
DEPRECATED AT III PPP: 56 % OF NORMAL (ref 92–136)
DIGOXIN SERPL-MCNC: 3.1 NG/ML (ref 0.8–2)
E WAVE DECELERATION TIME: 119 MS
E WAVE DECELERATION TIME: 192 MS
E/A RATIO: 1.76
E/A RATIO: 77
EOSINOPHIL # BLD AUTO: 0 THOUSAND/ΜL (ref 0–0.61)
EOSINOPHIL # BLD AUTO: 0.02 THOUSAND/ΜL (ref 0–0.61)
EOSINOPHIL # BLD AUTO: 0.04 THOUSAND/ΜL (ref 0–0.61)
EOSINOPHIL # BLD AUTO: 0.04 THOUSAND/ΜL (ref 0–0.61)
EOSINOPHIL # BLD AUTO: 0.09 THOUSAND/ΜL (ref 0–0.61)
EOSINOPHIL # BLD AUTO: 0.1 THOUSAND/ΜL (ref 0–0.61)
EOSINOPHIL # BLD AUTO: 0.17 THOUSAND/ΜL (ref 0–0.61)
EOSINOPHIL # BLD AUTO: 0.21 THOUSAND/ΜL (ref 0–0.61)
EOSINOPHIL # BLD MANUAL: 0 THOUSAND/UL (ref 0–0.4)
EOSINOPHIL # BLD MANUAL: 0 THOUSAND/UL (ref 0–0.4)
EOSINOPHIL # BLD MANUAL: 0.12 THOUSAND/UL (ref 0–0.4)
EOSINOPHIL NFR BLD AUTO: 0 % (ref 0–6)
EOSINOPHIL NFR BLD AUTO: 1 % (ref 0–6)
EOSINOPHIL NFR BLD AUTO: 1 % (ref 0–6)
EOSINOPHIL NFR BLD AUTO: 2 % (ref 0–6)
EOSINOPHIL NFR BLD AUTO: 2 % (ref 0–6)
EOSINOPHIL NFR BLD MANUAL: 0 % (ref 0–6)
EOSINOPHIL NFR BLD MANUAL: 0 % (ref 0–6)
EOSINOPHIL NFR BLD MANUAL: 1 % (ref 0–6)
ERYTHROCYTE [DISTWIDTH] IN BLOOD BY AUTOMATED COUNT: 16.7 % (ref 11.6–15.1)
ERYTHROCYTE [DISTWIDTH] IN BLOOD BY AUTOMATED COUNT: 16.8 % (ref 11.6–15.1)
ERYTHROCYTE [DISTWIDTH] IN BLOOD BY AUTOMATED COUNT: 18.5 % (ref 11.6–15.1)
ERYTHROCYTE [DISTWIDTH] IN BLOOD BY AUTOMATED COUNT: 20 % (ref 11.6–15.1)
ERYTHROCYTE [DISTWIDTH] IN BLOOD BY AUTOMATED COUNT: 21.4 % (ref 11.6–15.1)
ERYTHROCYTE [DISTWIDTH] IN BLOOD BY AUTOMATED COUNT: 21.7 % (ref 11.6–15.1)
ERYTHROCYTE [DISTWIDTH] IN BLOOD BY AUTOMATED COUNT: 21.7 % (ref 11.6–15.1)
ERYTHROCYTE [DISTWIDTH] IN BLOOD BY AUTOMATED COUNT: 21.8 % (ref 11.6–15.1)
ERYTHROCYTE [DISTWIDTH] IN BLOOD BY AUTOMATED COUNT: 21.9 % (ref 11.6–15.1)
ERYTHROCYTE [DISTWIDTH] IN BLOOD BY AUTOMATED COUNT: 21.9 % (ref 11.6–15.1)
ERYTHROCYTE [DISTWIDTH] IN BLOOD BY AUTOMATED COUNT: 22 % (ref 11.6–15.1)
ERYTHROCYTE [DISTWIDTH] IN BLOOD BY AUTOMATED COUNT: 22.3 % (ref 11.6–15.1)
ERYTHROCYTE [DISTWIDTH] IN BLOOD BY AUTOMATED COUNT: 22.3 % (ref 11.6–15.1)
FDP BLD QL AGGL: <10
FIBRINOGEN PPP-MCNC: 297 MG/DL (ref 206–523)
FIO2 GAS DIL.REBREATH: 40 L
FLUAV AG UPPER RESP QL IA.RAPID: NEGATIVE
FLUBV AG UPPER RESP QL IA.RAPID: NEGATIVE
FRACTIONAL SHORTENING: 37 (ref 28–44)
GFR SERPL CREATININE-BSD FRML MDRD: 27 ML/MIN/1.73SQ M
GFR SERPL CREATININE-BSD FRML MDRD: 28 ML/MIN/1.73SQ M
GFR SERPL CREATININE-BSD FRML MDRD: 28 ML/MIN/1.73SQ M
GFR SERPL CREATININE-BSD FRML MDRD: 31 ML/MIN/1.73SQ M
GFR SERPL CREATININE-BSD FRML MDRD: 31 ML/MIN/1.73SQ M
GFR SERPL CREATININE-BSD FRML MDRD: 33 ML/MIN/1.73SQ M
GFR SERPL CREATININE-BSD FRML MDRD: 45 ML/MIN/1.73SQ M
GFR SERPL CREATININE-BSD FRML MDRD: 48 ML/MIN/1.73SQ M
GFR SERPL CREATININE-BSD FRML MDRD: 52 ML/MIN/1.73SQ M
GFR SERPL CREATININE-BSD FRML MDRD: 52 ML/MIN/1.73SQ M
GFR SERPL CREATININE-BSD FRML MDRD: 54 ML/MIN/1.73SQ M
GFR SERPL CREATININE-BSD FRML MDRD: 54 ML/MIN/1.73SQ M
GFR SERPL CREATININE-BSD FRML MDRD: 61 ML/MIN/1.73SQ M
GLUCOSE FLD-MCNC: 99 MG/DL
GLUCOSE SERPL-MCNC: 100 MG/DL (ref 65–140)
GLUCOSE SERPL-MCNC: 103 MG/DL (ref 65–140)
GLUCOSE SERPL-MCNC: 104 MG/DL (ref 65–140)
GLUCOSE SERPL-MCNC: 110 MG/DL (ref 65–140)
GLUCOSE SERPL-MCNC: 111 MG/DL (ref 65–140)
GLUCOSE SERPL-MCNC: 111 MG/DL (ref 65–140)
GLUCOSE SERPL-MCNC: 115 MG/DL (ref 65–140)
GLUCOSE SERPL-MCNC: 115 MG/DL (ref 65–140)
GLUCOSE SERPL-MCNC: 118 MG/DL (ref 65–140)
GLUCOSE SERPL-MCNC: 121 MG/DL (ref 65–140)
GLUCOSE SERPL-MCNC: 122 MG/DL (ref 65–140)
GLUCOSE SERPL-MCNC: 125 MG/DL (ref 65–140)
GLUCOSE SERPL-MCNC: 125 MG/DL (ref 65–140)
GLUCOSE SERPL-MCNC: 128 MG/DL (ref 65–140)
GLUCOSE SERPL-MCNC: 130 MG/DL (ref 65–140)
GLUCOSE SERPL-MCNC: 132 MG/DL (ref 65–140)
GLUCOSE SERPL-MCNC: 136 MG/DL (ref 65–140)
GLUCOSE SERPL-MCNC: 136 MG/DL (ref 65–140)
GLUCOSE SERPL-MCNC: 141 MG/DL (ref 65–140)
GLUCOSE SERPL-MCNC: 143 MG/DL (ref 65–140)
GLUCOSE SERPL-MCNC: 146 MG/DL (ref 65–140)
GLUCOSE SERPL-MCNC: 150 MG/DL (ref 65–140)
GLUCOSE SERPL-MCNC: 157 MG/DL (ref 65–140)
GLUCOSE SERPL-MCNC: 165 MG/DL (ref 65–140)
GLUCOSE SERPL-MCNC: 170 MG/DL (ref 65–140)
GLUCOSE SERPL-MCNC: 179 MG/DL (ref 65–140)
GLUCOSE SERPL-MCNC: 61 MG/DL (ref 65–140)
GLUCOSE SERPL-MCNC: 63 MG/DL (ref 65–140)
GLUCOSE SERPL-MCNC: 64 MG/DL (ref 65–140)
GLUCOSE SERPL-MCNC: 67 MG/DL (ref 65–140)
GLUCOSE SERPL-MCNC: 67 MG/DL (ref 65–140)
GLUCOSE SERPL-MCNC: 70 MG/DL (ref 65–140)
GLUCOSE SERPL-MCNC: 70 MG/DL (ref 65–140)
GLUCOSE SERPL-MCNC: 73 MG/DL (ref 65–140)
GLUCOSE SERPL-MCNC: 75 MG/DL (ref 65–140)
GLUCOSE SERPL-MCNC: 77 MG/DL (ref 65–140)
GLUCOSE SERPL-MCNC: 77 MG/DL (ref 65–140)
GLUCOSE SERPL-MCNC: 81 MG/DL (ref 65–140)
GLUCOSE SERPL-MCNC: 81 MG/DL (ref 65–140)
GLUCOSE SERPL-MCNC: 82 MG/DL (ref 65–140)
GLUCOSE SERPL-MCNC: 82 MG/DL (ref 65–140)
GLUCOSE SERPL-MCNC: 86 MG/DL (ref 65–140)
GLUCOSE SERPL-MCNC: 87 MG/DL (ref 65–140)
GLUCOSE SERPL-MCNC: 89 MG/DL (ref 65–140)
GLUCOSE SERPL-MCNC: 90 MG/DL (ref 65–140)
GLUCOSE SERPL-MCNC: 91 MG/DL (ref 65–140)
GLUCOSE SERPL-MCNC: 91 MG/DL (ref 65–140)
GLUCOSE SERPL-MCNC: 96 MG/DL (ref 65–140)
GLUCOSE SERPL-MCNC: 97 MG/DL (ref 65–140)
GLUCOSE SERPL-MCNC: 99 MG/DL (ref 65–140)
GLUCOSE UR STRIP-MCNC: NEGATIVE MG/DL
GRAM STN SPEC: ABNORMAL
GRAM STN SPEC: NORMAL
HCO3 BLDA-SCNC: 27.6 MMOL/L (ref 22–28)
HCO3 BLDA-SCNC: 28.8 MMOL/L (ref 22–28)
HCO3 BLDA-SCNC: 29.3 MMOL/L (ref 22–28)
HCO3 BLDA-SCNC: 30.8 MMOL/L (ref 22–28)
HCO3 BLDA-SCNC: 32.1 MMOL/L (ref 22–28)
HCO3 BLDA-SCNC: 32.8 MMOL/L (ref 22–28)
HCO3 BLDA-SCNC: 35.1 MMOL/L (ref 22–28)
HCO3 BLDA-SCNC: 35.4 MMOL/L (ref 22–28)
HCO3 BLDA-SCNC: 36.2 MMOL/L (ref 22–28)
HCO3 BLDA-SCNC: 36.3 MMOL/L (ref 22–28)
HCO3 BLDA-SCNC: 36.6 MMOL/L (ref 22–28)
HCO3 BLDA-SCNC: 36.8 MMOL/L (ref 22–28)
HCO3 BLDA-SCNC: 37.2 MMOL/L (ref 22–28)
HCO3 BLDA-SCNC: 37.5 MMOL/L (ref 22–28)
HCO3 BLDA-SCNC: 37.6 MMOL/L (ref 22–28)
HCO3 BLDA-SCNC: 38.3 MMOL/L (ref 22–28)
HCO3 BLDA-SCNC: 38.9 MMOL/L (ref 22–28)
HCO3 BLDA-SCNC: 41.2 MMOL/L (ref 22–28)
HCO3 BLDA-SCNC: 42.2 MMOL/L (ref 22–28)
HCO3 BLDV-SCNC: 30.4 MMOL/L (ref 24–30)
HCO3 BLDV-SCNC: 39.5 MMOL/L (ref 24–30)
HCO3 BLDV-SCNC: 41.3 MMOL/L (ref 24–30)
HCO3 BLDV-SCNC: 41.4 MMOL/L (ref 24–30)
HCO3 BLDV-SCNC: 42.5 MMOL/L (ref 24–30)
HCO3 BLDV-SCNC: 43.3 MMOL/L (ref 24–30)
HCT VFR BLD AUTO: 22.8 % (ref 34.8–46.1)
HCT VFR BLD AUTO: 24.1 % (ref 34.8–46.1)
HCT VFR BLD AUTO: 25.3 % (ref 34.8–46.1)
HCT VFR BLD AUTO: 25.9 % (ref 34.8–46.1)
HCT VFR BLD AUTO: 26.7 % (ref 34.8–46.1)
HCT VFR BLD AUTO: 26.7 % (ref 34.8–46.1)
HCT VFR BLD AUTO: 27.1 % (ref 34.8–46.1)
HCT VFR BLD AUTO: 27.3 % (ref 34.8–46.1)
HCT VFR BLD AUTO: 27.5 % (ref 34.8–46.1)
HCT VFR BLD AUTO: 27.6 % (ref 34.8–46.1)
HCT VFR BLD AUTO: 27.7 % (ref 34.8–46.1)
HCT VFR BLD AUTO: 29.8 % (ref 34.8–46.1)
HCT VFR BLD AUTO: 30.4 % (ref 34.8–46.1)
HCT VFR BLD AUTO: 30.5 % (ref 34.8–46.1)
HCT VFR BLD CALC: 19 % (ref 34.8–46.1)
HCT VFR BLD CALC: 21 % (ref 34.8–46.1)
HCT VFR BLD CALC: 23 % (ref 34.8–46.1)
HCT VFR BLD CALC: 24 % (ref 34.8–46.1)
HCT VFR BLD CALC: 25 % (ref 34.8–46.1)
HCT VFR BLD CALC: 28 % (ref 34.8–46.1)
HCT VFR BLD CALC: 28 % (ref 34.8–46.1)
HGB BLD-MCNC: 5.9 G/DL (ref 11.5–15.4)
HGB BLD-MCNC: 6.7 G/DL (ref 11.5–15.4)
HGB BLD-MCNC: 7 G/DL (ref 11.5–15.4)
HGB BLD-MCNC: 7 G/DL (ref 11.5–15.4)
HGB BLD-MCNC: 7.1 G/DL (ref 11.5–15.4)
HGB BLD-MCNC: 7.5 G/DL (ref 11.5–15.4)
HGB BLD-MCNC: 7.7 G/DL (ref 11.5–15.4)
HGB BLD-MCNC: 7.8 G/DL (ref 11.5–15.4)
HGB BLD-MCNC: 7.9 G/DL (ref 11.5–15.4)
HGB BLD-MCNC: 8 G/DL (ref 11.5–15.4)
HGB BLD-MCNC: 8 G/DL (ref 11.5–15.4)
HGB BLD-MCNC: 8.2 G/DL (ref 11.5–15.4)
HGB BLD-MCNC: 8.2 G/DL (ref 11.5–15.4)
HGB BLD-MCNC: 8.3 G/DL (ref 11.5–15.4)
HGB BLD-MCNC: 8.5 G/DL (ref 11.5–15.4)
HGB BLD-MCNC: 8.6 G/DL (ref 11.5–15.4)
HGB BLD-MCNC: 9.1 G/DL (ref 11.5–15.4)
HGB BLDA-MCNC: 6.5 G/DL (ref 11.5–15.4)
HGB BLDA-MCNC: 7.1 G/DL (ref 11.5–15.4)
HGB BLDA-MCNC: 7.8 G/DL (ref 11.5–15.4)
HGB BLDA-MCNC: 8.2 G/DL (ref 11.5–15.4)
HGB BLDA-MCNC: 8.5 G/DL (ref 11.5–15.4)
HGB BLDA-MCNC: 9.5 G/DL (ref 11.5–15.4)
HGB BLDA-MCNC: 9.5 G/DL (ref 11.5–15.4)
HGB UR QL STRIP.AUTO: ABNORMAL
HISTIOCYTES NFR FLD: 24 %
HKHMA(MAX AMPLITUDE KAOLIN): 60.7 MM (ref 53–68)
HYPERCHROMIA BLD QL SMEAR: PRESENT
IMM GRANULOCYTES # BLD AUTO: 0.05 THOUSAND/UL (ref 0–0.2)
IMM GRANULOCYTES # BLD AUTO: 0.07 THOUSAND/UL (ref 0–0.2)
IMM GRANULOCYTES # BLD AUTO: 0.07 THOUSAND/UL (ref 0–0.2)
IMM GRANULOCYTES # BLD AUTO: 0.09 THOUSAND/UL (ref 0–0.2)
IMM GRANULOCYTES # BLD AUTO: 0.09 THOUSAND/UL (ref 0–0.2)
IMM GRANULOCYTES # BLD AUTO: 0.1 THOUSAND/UL (ref 0–0.2)
IMM GRANULOCYTES # BLD AUTO: 0.13 THOUSAND/UL (ref 0–0.2)
IMM GRANULOCYTES # BLD AUTO: 0.14 THOUSAND/UL (ref 0–0.2)
IMM GRANULOCYTES NFR BLD AUTO: 1 % (ref 0–2)
INR PPP: 1.45 (ref 0.85–1.19)
INR PPP: 1.61 (ref 0.85–1.19)
INR PPP: 2.07 (ref 0.85–1.19)
INR PPP: 2.14 (ref 0.85–1.19)
INTERVENTRICULAR SEPTUM IN DIASTOLE (PARASTERNAL SHORT AXIS VIEW): 1.5 CM
INTERVENTRICULAR SEPTUM: 1.5 CM (ref 0.6–1.1)
IPAP: 16
IPAP: ABNORMAL
KETONES UR STRIP-MCNC: NEGATIVE MG/DL
LAAS-AP2: 33.6 CM2
LAAS-AP4: 29.7 CM2
LAAS-AP4: 31.7 CM2
LACTATE SERPL-SCNC: 0.7 MMOL/L (ref 0.5–2)
LACTATE SERPL-SCNC: 1 MMOL/L (ref 0.5–2)
LACTATE SERPL-SCNC: 1.2 MMOL/L (ref 0.5–2)
LACTATE SERPL-SCNC: 1.4 MMOL/L (ref 0.5–2)
LACTATE SERPL-SCNC: 1.5 MMOL/L (ref 0.5–2)
LACTATE SERPL-SCNC: 1.6 MMOL/L (ref 0.5–2)
LDH FLD L TO P-CCNC: 97 U/L
LDH SERPL-CCNC: 318 U/L (ref 140–271)
LEFT ATRIUM AREA SYSTOLE SINGLE PLANE A4C: 28.8 CM2
LEFT ATRIUM SIZE: 5.3 CM
LEFT ATRIUM VOLUME (MOD BIPLANE): 111 ML
LEFT ATRIUM VOLUME INDEX (MOD BIPLANE): 66.5 ML/M2
LEFT INTERNAL DIMENSION IN SYSTOLE: 2.2 CM (ref 2.1–4)
LEFT VENTRICULAR INTERNAL DIMENSION IN DIASTOLE: 3.5 CM (ref 3.5–6)
LEFT VENTRICULAR POSTERIOR WALL IN END DIASTOLE: 1 CM
LEFT VENTRICULAR STROKE VOLUME: 35 ML
LEUKOCYTE ESTERASE UR QL STRIP: ABNORMAL
LVSV (TEICH): 35 ML
LYMPHOCYTES # BLD AUTO: 0.11 THOUSANDS/ΜL (ref 0.6–4.47)
LYMPHOCYTES # BLD AUTO: 0.13 THOUSAND/UL (ref 0.6–4.47)
LYMPHOCYTES # BLD AUTO: 0.21 THOUSANDS/ΜL (ref 0.6–4.47)
LYMPHOCYTES # BLD AUTO: 0.37 THOUSANDS/ΜL (ref 0.6–4.47)
LYMPHOCYTES # BLD AUTO: 0.38 THOUSANDS/ΜL (ref 0.6–4.47)
LYMPHOCYTES # BLD AUTO: 0.43 THOUSANDS/ΜL (ref 0.6–4.47)
LYMPHOCYTES # BLD AUTO: 0.45 THOUSANDS/ΜL (ref 0.6–4.47)
LYMPHOCYTES # BLD AUTO: 0.47 THOUSANDS/ΜL (ref 0.6–4.47)
LYMPHOCYTES # BLD AUTO: 0.5 THOUSAND/UL (ref 0.6–4.47)
LYMPHOCYTES # BLD AUTO: 0.55 THOUSANDS/ΜL (ref 0.6–4.47)
LYMPHOCYTES # BLD AUTO: 1 % (ref 14–44)
LYMPHOCYTES # BLD AUTO: 1.06 THOUSAND/UL (ref 0.6–4.47)
LYMPHOCYTES # BLD AUTO: 4 % (ref 14–44)
LYMPHOCYTES # BLD AUTO: 7 % (ref 14–44)
LYMPHOCYTES NFR BLD AUTO: 1 % (ref 14–44)
LYMPHOCYTES NFR BLD AUTO: 2 % (ref 14–44)
LYMPHOCYTES NFR BLD AUTO: 3 % (ref 14–44)
LYMPHOCYTES NFR BLD AUTO: 3 % (ref 14–44)
LYMPHOCYTES NFR BLD AUTO: 31 %
LYMPHOCYTES NFR BLD AUTO: 4 % (ref 14–44)
LYMPHOCYTES NFR BLD AUTO: 4 % (ref 14–44)
LYMPHOCYTES NFR BLD AUTO: 5 % (ref 14–44)
LYMPHOCYTES NFR BLD AUTO: 5 % (ref 14–44)
MAGNESIUM SERPL-MCNC: 1.8 MG/DL (ref 1.9–2.7)
MAGNESIUM SERPL-MCNC: 1.9 MG/DL (ref 1.9–2.7)
MAGNESIUM SERPL-MCNC: 2 MG/DL (ref 1.9–2.7)
MAGNESIUM SERPL-MCNC: 2 MG/DL (ref 1.9–2.7)
MAGNESIUM SERPL-MCNC: 2.1 MG/DL (ref 1.9–2.7)
MAGNESIUM SERPL-MCNC: 2.2 MG/DL (ref 1.9–2.7)
MAGNESIUM SERPL-MCNC: 2.3 MG/DL (ref 1.9–2.7)
MCH RBC QN AUTO: 19.3 PG (ref 26.8–34.3)
MCH RBC QN AUTO: 19.5 PG (ref 26.8–34.3)
MCH RBC QN AUTO: 19.5 PG (ref 26.8–34.3)
MCH RBC QN AUTO: 19.6 PG (ref 26.8–34.3)
MCH RBC QN AUTO: 19.7 PG (ref 26.8–34.3)
MCH RBC QN AUTO: 20.1 PG (ref 26.8–34.3)
MCH RBC QN AUTO: 20.7 PG (ref 26.8–34.3)
MCH RBC QN AUTO: 21.1 PG (ref 26.8–34.3)
MCH RBC QN AUTO: 23.1 PG (ref 26.8–34.3)
MCH RBC QN AUTO: 23.5 PG (ref 26.8–34.3)
MCH RBC QN AUTO: 26.2 PG (ref 26.8–34.3)
MCH RBC QN AUTO: 26.2 PG (ref 26.8–34.3)
MCH RBC QN AUTO: 26.6 PG (ref 26.8–34.3)
MCHC RBC AUTO-ENTMCNC: 25.4 G/DL (ref 31.4–37.4)
MCHC RBC AUTO-ENTMCNC: 25.5 G/DL (ref 31.4–37.4)
MCHC RBC AUTO-ENTMCNC: 26.2 G/DL (ref 31.4–37.4)
MCHC RBC AUTO-ENTMCNC: 26.2 G/DL (ref 31.4–37.4)
MCHC RBC AUTO-ENTMCNC: 26.3 G/DL (ref 31.4–37.4)
MCHC RBC AUTO-ENTMCNC: 26.5 G/DL (ref 31.4–37.4)
MCHC RBC AUTO-ENTMCNC: 26.5 G/DL (ref 31.4–37.4)
MCHC RBC AUTO-ENTMCNC: 26.8 G/DL (ref 31.4–37.4)
MCHC RBC AUTO-ENTMCNC: 27.8 G/DL (ref 31.4–37.4)
MCHC RBC AUTO-ENTMCNC: 28 G/DL (ref 31.4–37.4)
MCHC RBC AUTO-ENTMCNC: 30.5 G/DL (ref 31.4–37.4)
MCHC RBC AUTO-ENTMCNC: 30.8 G/DL (ref 31.4–37.4)
MCHC RBC AUTO-ENTMCNC: 31.8 G/DL (ref 31.4–37.4)
MCV RBC AUTO: 74 FL (ref 82–98)
MCV RBC AUTO: 75 FL (ref 82–98)
MCV RBC AUTO: 75 FL (ref 82–98)
MCV RBC AUTO: 76 FL (ref 82–98)
MCV RBC AUTO: 79 FL (ref 82–98)
MCV RBC AUTO: 81 FL (ref 82–98)
MCV RBC AUTO: 82 FL (ref 82–98)
MCV RBC AUTO: 83 FL (ref 82–98)
MCV RBC AUTO: 84 FL (ref 82–98)
MCV RBC AUTO: 86 FL (ref 82–98)
MCV RBC AUTO: 86 FL (ref 82–98)
MONO+MESO NFR FLD MANUAL: 1 %
MONOCYTES # BLD AUTO: 0.12 THOUSAND/UL (ref 0–1.22)
MONOCYTES # BLD AUTO: 0.3 THOUSAND/UL (ref 0–1.22)
MONOCYTES # BLD AUTO: 0.39 THOUSAND/UL (ref 0–1.22)
MONOCYTES # BLD AUTO: 0.69 THOUSAND/ΜL (ref 0.17–1.22)
MONOCYTES # BLD AUTO: 0.71 THOUSAND/ΜL (ref 0.17–1.22)
MONOCYTES # BLD AUTO: 0.89 THOUSAND/ΜL (ref 0.17–1.22)
MONOCYTES # BLD AUTO: 0.9 THOUSAND/ΜL (ref 0.17–1.22)
MONOCYTES # BLD AUTO: 0.93 THOUSAND/ΜL (ref 0.17–1.22)
MONOCYTES # BLD AUTO: 0.94 THOUSAND/ΜL (ref 0.17–1.22)
MONOCYTES # BLD AUTO: 1 THOUSAND/ΜL (ref 0.17–1.22)
MONOCYTES # BLD AUTO: 1.25 THOUSAND/ΜL (ref 0.17–1.22)
MONOCYTES NFR BLD AUTO: 6 % (ref 4–12)
MONOCYTES NFR BLD AUTO: 7 % (ref 4–12)
MONOCYTES NFR BLD AUTO: 8 % (ref 4–12)
MONOCYTES NFR BLD AUTO: 8 % (ref 4–12)
MONOCYTES NFR BLD AUTO: 9 % (ref 4–12)
MONOCYTES NFR BLD AUTO: 9 % (ref 4–12)
MONOCYTES NFR BLD: 1 % (ref 4–12)
MONOCYTES NFR BLD: 2 % (ref 4–12)
MONOCYTES NFR BLD: 3 % (ref 4–12)
MV PEAK A VEL: 0.01 M/S
MV PEAK A VEL: 0.45 M/S
MV PEAK E VEL: 77 CM/S
MV PEAK E VEL: 79 CM/S
MV STENOSIS PRESSURE HALF TIME: 35 MS
MV STENOSIS PRESSURE HALF TIME: 56 MS
MV VALVE AREA P 1/2 METHOD: 3.93
MV VALVE AREA P 1/2 METHOD: 6.29
NASAL CANNULA: 2
NASAL CANNULA: 3
NASAL CANNULA: 3
NEUTROPHILS # BLD AUTO: 10.63 THOUSANDS/ΜL (ref 1.85–7.62)
NEUTROPHILS # BLD AUTO: 13.32 THOUSANDS/ΜL (ref 1.85–7.62)
NEUTROPHILS # BLD AUTO: 14.11 THOUSANDS/ΜL (ref 1.85–7.62)
NEUTROPHILS # BLD AUTO: 14.5 THOUSANDS/ΜL (ref 1.85–7.62)
NEUTROPHILS # BLD AUTO: 8.09 THOUSANDS/ΜL (ref 1.85–7.62)
NEUTROPHILS # BLD AUTO: 8.21 THOUSANDS/ΜL (ref 1.85–7.62)
NEUTROPHILS # BLD AUTO: 8.68 THOUSANDS/ΜL (ref 1.85–7.62)
NEUTROPHILS # BLD AUTO: 8.96 THOUSANDS/ΜL (ref 1.85–7.62)
NEUTROPHILS # BLD MANUAL: 11.64 THOUSAND/UL (ref 1.85–7.62)
NEUTROPHILS # BLD MANUAL: 12.58 THOUSAND/UL (ref 1.85–7.62)
NEUTROPHILS # BLD MANUAL: 13.81 THOUSAND/UL (ref 1.85–7.62)
NEUTS BAND NFR BLD MANUAL: 1 % (ref 0–8)
NEUTS BAND NFR BLD MANUAL: 1 % (ref 0–8)
NEUTS BAND NFR BLD MANUAL: 3 % (ref 0–8)
NEUTS SEG NFR BLD AUTO: 44 %
NEUTS SEG NFR BLD AUTO: 83 % (ref 43–75)
NEUTS SEG NFR BLD AUTO: 85 % (ref 43–75)
NEUTS SEG NFR BLD AUTO: 86 % (ref 43–75)
NEUTS SEG NFR BLD AUTO: 87 % (ref 43–75)
NEUTS SEG NFR BLD AUTO: 88 % (ref 43–75)
NEUTS SEG NFR BLD AUTO: 88 % (ref 43–75)
NEUTS SEG NFR BLD AUTO: 89 % (ref 43–75)
NEUTS SEG NFR BLD AUTO: 90 % (ref 43–75)
NEUTS SEG NFR BLD AUTO: 91 % (ref 43–75)
NEUTS SEG NFR BLD AUTO: 92 % (ref 43–75)
NEUTS SEG NFR BLD AUTO: 95 % (ref 43–75)
NITRITE UR QL STRIP: NEGATIVE
NON VENT- BIPAP: ABNORMAL
NON-SQ EPI CELLS URNS QL MICRO: ABNORMAL /HPF
NRBC BLD AUTO-RTO: 0 /100 WBCS
NRBC BLD AUTO-RTO: 0 /100 WBCS
NRBC BLD AUTO-RTO: 1 /100 WBC (ref 0–2)
NRBC BLD AUTO-RTO: 1 /100 WBCS
NRBC BLD AUTO-RTO: 2 /100 WBC (ref 0–2)
NRBC BLD AUTO-RTO: 2 /100 WBCS
NRBC BLD AUTO-RTO: 5 /100 WBCS
NRBC BLD AUTO-RTO: 5 /100 WBCS
O2 CT BLDA-SCNC: 10.3 ML/DL (ref 16–23)
O2 CT BLDA-SCNC: 10.4 ML/DL (ref 16–23)
O2 CT BLDA-SCNC: 10.9 ML/DL (ref 16–23)
O2 CT BLDA-SCNC: 11.2 ML/DL (ref 16–23)
O2 CT BLDA-SCNC: 11.3 ML/DL (ref 16–23)
O2 CT BLDA-SCNC: 11.4 ML/DL (ref 16–23)
O2 CT BLDA-SCNC: 11.4 ML/DL (ref 16–23)
O2 CT BLDA-SCNC: 11.5 ML/DL (ref 16–23)
O2 CT BLDA-SCNC: 11.6 ML/DL (ref 16–23)
O2 CT BLDA-SCNC: 11.6 ML/DL (ref 16–23)
O2 CT BLDA-SCNC: 12.2 ML/DL (ref 16–23)
O2 CT BLDA-SCNC: 12.5 ML/DL (ref 16–23)
O2 CT BLDA-SCNC: 12.5 ML/DL (ref 16–23)
O2 CT BLDA-SCNC: 12.7 ML/DL (ref 16–23)
O2 CT BLDA-SCNC: 12.7 ML/DL (ref 16–23)
O2 CT BLDA-SCNC: 13.8 ML/DL (ref 16–23)
O2 CT BLDA-SCNC: 14.1 ML/DL (ref 16–23)
O2 CT BLDA-SCNC: 14.8 ML/DL (ref 16–23)
O2 CT BLDA-SCNC: 9.4 ML/DL (ref 16–23)
O2 CT BLDV-SCNC: 10 ML/DL
O2 CT BLDV-SCNC: 5.1 ML/DL
O2 CT BLDV-SCNC: 6.5 ML/DL
O2 CT BLDV-SCNC: 7.1 ML/DL
O2 CT BLDV-SCNC: 9.3 ML/DL
O2 CT BLDV-SCNC: 9.6 ML/DL
OXYHGB MFR BLDA: 88 % (ref 94–97)
OXYHGB MFR BLDA: 91.1 % (ref 94–97)
OXYHGB MFR BLDA: 91.4 % (ref 94–97)
OXYHGB MFR BLDA: 91.7 % (ref 94–97)
OXYHGB MFR BLDA: 92.5 % (ref 94–97)
OXYHGB MFR BLDA: 93 % (ref 94–97)
OXYHGB MFR BLDA: 94.8 % (ref 94–97)
OXYHGB MFR BLDA: 95 % (ref 94–97)
OXYHGB MFR BLDA: 95.1 % (ref 94–97)
OXYHGB MFR BLDA: 95.5 % (ref 94–97)
OXYHGB MFR BLDA: 95.6 % (ref 94–97)
OXYHGB MFR BLDA: 95.6 % (ref 94–97)
OXYHGB MFR BLDA: 96.2 % (ref 94–97)
OXYHGB MFR BLDA: 96.5 % (ref 94–97)
OXYHGB MFR BLDA: 96.7 % (ref 94–97)
OXYHGB MFR BLDA: 97 % (ref 94–97)
OXYHGB MFR BLDA: 97.2 % (ref 94–97)
OXYHGB MFR BLDA: 97.4 % (ref 94–97)
OXYHGB MFR BLDA: 97.7 % (ref 94–97)
PATH REV: NO
PCO2 BLD: 31 MMOL/L (ref 21–32)
PCO2 BLD: 41 MMOL/L (ref 21–32)
PCO2 BLD: 60.4 MM HG (ref 36–44)
PCO2 BLD: 80.6 MM HG (ref 36–44)
PCO2 BLD: >103 MM HG (ref 36–44)
PCO2 BLD: >103 MM HG (ref 42–50)
PCO2 BLDA: 112 MM HG (ref 36–44)
PCO2 BLDA: 52.6 MM HG (ref 36–44)
PCO2 BLDA: 60.8 MM HG (ref 36–44)
PCO2 BLDA: 61.1 MM HG (ref 36–44)
PCO2 BLDA: 61.6 MM HG (ref 36–44)
PCO2 BLDA: 63 MM HG (ref 36–44)
PCO2 BLDA: 63.8 MM HG (ref 36–44)
PCO2 BLDA: 66.9 MM HG (ref 36–44)
PCO2 BLDA: 68.4 MM HG (ref 36–44)
PCO2 BLDA: 69.9 MM HG (ref 36–44)
PCO2 BLDA: 70.8 MM HG (ref 36–44)
PCO2 BLDA: 73.4 MM HG (ref 36–44)
PCO2 BLDA: 73.6 MM HG (ref 36–44)
PCO2 BLDA: 76.5 MM HG (ref 36–44)
PCO2 BLDA: 79 MM HG (ref 36–44)
PCO2 BLDA: 79.7 MM HG (ref 36–44)
PCO2 BLDA: 90.1 MM HG (ref 36–44)
PCO2 BLDA: 91.2 MM HG (ref 36–44)
PCO2 BLDA: 92.3 MM HG (ref 36–44)
PCO2 BLDV: 100.7 MM HG (ref 42–50)
PCO2 BLDV: 103.1 MM HG (ref 42–50)
PCO2 BLDV: 68.4 MM HG (ref 42–50)
PCO2 BLDV: 79.6 MM HG (ref 42–50)
PCO2 BLDV: 82.8 MM HG (ref 42–50)
PCO2 BLDV: 88.3 MM HG (ref 42–50)
PEEP MAX SETTING VENT: 6 CM[H2O]
PH BLD: 7 [PH] (ref 7.3–7.4)
PH BLD: 7.13 [PH] (ref 7.35–7.45)
PH BLD: 7.15 [PH] (ref 7.35–7.45)
PH BLD: 7.16 [PH] (ref 7.35–7.45)
PH BLD: 7.16 [PH] (ref 7.35–7.45)
PH BLD: 7.29 [PH] (ref 7.35–7.45)
PH BLD: 7.29 [PH] (ref 7.35–7.45)
PH BLDA: 7.15 [PH] (ref 7.35–7.45)
PH BLDA: 7.18 [PH] (ref 7.35–7.45)
PH BLDA: 7.2 [PH] (ref 7.35–7.45)
PH BLDA: 7.21 [PH] (ref 7.35–7.45)
PH BLDA: 7.24 [PH] (ref 7.35–7.45)
PH BLDA: 7.24 [PH] (ref 7.35–7.45)
PH BLDA: 7.25 [PH] (ref 7.35–7.45)
PH BLDA: 7.28 [PH] (ref 7.35–7.45)
PH BLDA: 7.28 [PH] (ref 7.35–7.45)
PH BLDA: 7.32 [PH] (ref 7.35–7.45)
PH BLDA: 7.33 [PH] (ref 7.35–7.45)
PH BLDA: 7.34 [PH] (ref 7.35–7.45)
PH BLDA: 7.34 [PH] (ref 7.35–7.45)
PH BLDA: 7.35 [PH] (ref 7.35–7.45)
PH BLDA: 7.37 [PH] (ref 7.35–7.45)
PH BLDA: 7.38 [PH] (ref 7.35–7.45)
PH BLDA: 7.4 [PH] (ref 7.35–7.45)
PH BLDA: 7.41 [PH] (ref 7.35–7.45)
PH BLDA: 7.41 [PH] (ref 7.35–7.45)
PH BLDV: 7.23 [PH] (ref 7.3–7.4)
PH BLDV: 7.23 [PH] (ref 7.3–7.4)
PH BLDV: 7.26 [PH] (ref 7.3–7.4)
PH BLDV: 7.27 [PH] (ref 7.3–7.4)
PH BLDV: 7.33 [PH] (ref 7.3–7.4)
PH BLDV: 7.34 [PH] (ref 7.3–7.4)
PH BODY FLUID: 7.6
PH UR STRIP.AUTO: 7 [PH]
PHOSPHATE SERPL-MCNC: 2.1 MG/DL (ref 2.3–4.1)
PHOSPHATE SERPL-MCNC: 2.2 MG/DL (ref 2.3–4.1)
PHOSPHATE SERPL-MCNC: 2.4 MG/DL (ref 2.3–4.1)
PHOSPHATE SERPL-MCNC: 4.1 MG/DL (ref 2.3–4.1)
PHOSPHATE SERPL-MCNC: 4.6 MG/DL (ref 2.3–4.1)
PLATELET # BLD AUTO: 108 THOUSANDS/UL (ref 149–390)
PLATELET # BLD AUTO: 115 THOUSANDS/UL (ref 149–390)
PLATELET # BLD AUTO: 140 THOUSANDS/UL (ref 149–390)
PLATELET # BLD AUTO: 180 THOUSANDS/UL (ref 149–390)
PLATELET # BLD AUTO: 226 THOUSANDS/UL (ref 149–390)
PLATELET # BLD AUTO: 249 THOUSANDS/UL (ref 149–390)
PLATELET # BLD AUTO: 260 THOUSANDS/UL (ref 149–390)
PLATELET # BLD AUTO: 266 THOUSANDS/UL (ref 149–390)
PLATELET # BLD AUTO: 59 THOUSANDS/UL (ref 149–390)
PLATELET # BLD AUTO: 70 THOUSANDS/UL (ref 149–390)
PLATELET # BLD AUTO: 72 THOUSANDS/UL (ref 149–390)
PLATELET # BLD AUTO: 75 THOUSANDS/UL (ref 149–390)
PLATELET # BLD AUTO: 77 THOUSANDS/UL (ref 149–390)
PLATELET # BLD AUTO: 79 THOUSANDS/UL (ref 149–390)
PLATELET BLD QL SMEAR: ABNORMAL
PLATELET BLD QL SMEAR: ADEQUATE
PMV BLD AUTO: 10.1 FL (ref 8.9–12.7)
PMV BLD AUTO: 10.4 FL (ref 8.9–12.7)
PMV BLD AUTO: 10.5 FL (ref 8.9–12.7)
PMV BLD AUTO: 10.8 FL (ref 8.9–12.7)
PMV BLD AUTO: 10.8 FL (ref 8.9–12.7)
PMV BLD AUTO: 9.1 FL (ref 8.9–12.7)
PMV BLD AUTO: 9.2 FL (ref 8.9–12.7)
PMV BLD AUTO: 9.3 FL (ref 8.9–12.7)
PMV BLD AUTO: 9.4 FL (ref 8.9–12.7)
PMV BLD AUTO: 9.4 FL (ref 8.9–12.7)
PMV BLD AUTO: 9.5 FL (ref 8.9–12.7)
PMV BLD AUTO: 9.8 FL (ref 8.9–12.7)
PO2 BLD: 116 MM HG (ref 75–129)
PO2 BLD: 155 MM HG (ref 35–45)
PO2 BLD: 192 MM HG (ref 75–129)
PO2 BLD: 205 MM HG (ref 75–129)
PO2 BLD: 230 MM HG (ref 75–129)
PO2 BLD: 72 MM HG (ref 75–129)
PO2 BLD: 80 MM HG (ref 75–129)
PO2 BLDA: 101.4 MM HG (ref 75–129)
PO2 BLDA: 105.1 MM HG (ref 75–129)
PO2 BLDA: 110.9 MM HG (ref 75–129)
PO2 BLDA: 119.3 MM HG (ref 75–129)
PO2 BLDA: 132.6 MM HG (ref 75–129)
PO2 BLDA: 140.4 MM HG (ref 75–129)
PO2 BLDA: 151.9 MM HG (ref 75–129)
PO2 BLDA: 165 MM HG (ref 75–129)
PO2 BLDA: 241.4 MM HG (ref 75–129)
PO2 BLDA: 292 MM HG (ref 75–129)
PO2 BLDA: 63.9 MM HG (ref 75–129)
PO2 BLDA: 69 MM HG (ref 75–129)
PO2 BLDA: 73.9 MM HG (ref 75–129)
PO2 BLDA: 76.2 MM HG (ref 75–129)
PO2 BLDA: 76.9 MM HG (ref 75–129)
PO2 BLDA: 85.9 MM HG (ref 75–129)
PO2 BLDA: 87.5 MM HG (ref 75–129)
PO2 BLDA: 93.2 MM HG (ref 75–129)
PO2 BLDA: 99.7 MM HG (ref 75–129)
PO2 BLDV: 29.8 MM HG (ref 35–45)
PO2 BLDV: 31.8 MM HG (ref 35–45)
PO2 BLDV: 38 MM HG (ref 35–45)
PO2 BLDV: 49.8 MM HG (ref 35–45)
PO2 BLDV: 55 MM HG (ref 35–45)
PO2 BLDV: 76.9 MM HG (ref 35–45)
POIKILOCYTOSIS BLD QL SMEAR: PRESENT
POTASSIUM BLD-SCNC: 3.8 MMOL/L (ref 3.5–5.3)
POTASSIUM BLD-SCNC: 3.9 MMOL/L (ref 3.5–5.3)
POTASSIUM BLD-SCNC: 3.9 MMOL/L (ref 3.5–5.3)
POTASSIUM BLD-SCNC: 4.2 MMOL/L (ref 3.5–5.3)
POTASSIUM BLD-SCNC: 4.2 MMOL/L (ref 3.5–5.3)
POTASSIUM BLD-SCNC: 4.4 MMOL/L (ref 3.5–5.3)
POTASSIUM BLD-SCNC: 4.4 MMOL/L (ref 3.5–5.3)
POTASSIUM SERPL-SCNC: 3.4 MMOL/L (ref 3.5–5.3)
POTASSIUM SERPL-SCNC: 3.5 MMOL/L (ref 3.5–5.3)
POTASSIUM SERPL-SCNC: 3.7 MMOL/L (ref 3.5–5.3)
POTASSIUM SERPL-SCNC: 4 MMOL/L (ref 3.5–5.3)
POTASSIUM SERPL-SCNC: 4.1 MMOL/L (ref 3.5–5.3)
POTASSIUM SERPL-SCNC: 4.3 MMOL/L (ref 3.5–5.3)
POTASSIUM SERPL-SCNC: 5.2 MMOL/L (ref 3.5–5.3)
POTASSIUM SERPL-SCNC: 5.2 MMOL/L (ref 3.5–5.3)
PR INTERVAL: 264 MS
PROCALCITONIN SERPL-MCNC: 0.41 NG/ML
PROCALCITONIN SERPL-MCNC: 0.75 NG/ML
PROCALCITONIN SERPL-MCNC: 0.97 NG/ML
PROT FLD-MCNC: <3 G/DL
PROT SERPL-MCNC: 4.5 G/DL (ref 6.4–8.4)
PROT SERPL-MCNC: 5 G/DL (ref 6.4–8.4)
PROT SERPL-MCNC: 6.5 G/DL (ref 6.4–8.4)
PROT SERPL-MCNC: 7.4 G/DL (ref 6.4–8.4)
PROT UR STRIP-MCNC: ABNORMAL MG/DL
PROTHROMBIN TIME: 18.4 SECONDS (ref 12.3–15)
PROTHROMBIN TIME: 19.9 SECONDS (ref 12.3–15)
PROTHROMBIN TIME: 24 SECONDS (ref 12.3–15)
PROTHROMBIN TIME: 24.6 SECONDS (ref 12.3–15)
QRS AXIS: -17 DEGREES
QRS AXIS: -39 DEGREES
QRS AXIS: -41 DEGREES
QRS AXIS: -47 DEGREES
QRS AXIS: -47 DEGREES
QRSD INTERVAL: 104 MS
QRSD INTERVAL: 106 MS
QRSD INTERVAL: 92 MS
QRSD INTERVAL: 96 MS
QRSD INTERVAL: 96 MS
QT INTERVAL: 296 MS
QT INTERVAL: 348 MS
QT INTERVAL: 352 MS
QT INTERVAL: 356 MS
QT INTERVAL: 398 MS
QTC INTERVAL: 389 MS
QTC INTERVAL: 462 MS
QTC INTERVAL: 475 MS
QTC INTERVAL: 477 MS
QTC INTERVAL: 533 MS
RA PRESSURE ESTIMATED: 8 MMHG
RBC # BLD AUTO: 2.8 MILLION/UL (ref 3.81–5.12)
RBC # BLD AUTO: 2.9 MILLION/UL (ref 3.81–5.12)
RBC # BLD AUTO: 3.01 MILLION/UL (ref 3.81–5.12)
RBC # BLD AUTO: 3.2 MILLION/UL (ref 3.81–5.12)
RBC # BLD AUTO: 3.24 MILLION/UL (ref 3.81–5.12)
RBC # BLD AUTO: 3.28 MILLION/UL (ref 3.81–5.12)
RBC # BLD AUTO: 3.42 MILLION/UL (ref 3.81–5.12)
RBC # BLD AUTO: 3.59 MILLION/UL (ref 3.81–5.12)
RBC # BLD AUTO: 3.62 MILLION/UL (ref 3.81–5.12)
RBC # BLD AUTO: 3.64 MILLION/UL (ref 3.81–5.12)
RBC # BLD AUTO: 3.86 MILLION/UL (ref 3.81–5.12)
RBC # BLD AUTO: 3.93 MILLION/UL (ref 3.81–5.12)
RBC # BLD AUTO: 4.07 MILLION/UL (ref 3.81–5.12)
RBC #/AREA URNS AUTO: ABNORMAL /HPF
RBC MORPH BLD: PRESENT
RH BLD: POSITIVE
RIGHT ATRIAL 2D VOLUME: 77 ML
RIGHT ATRIAL 2D VOLUME: 82 ML
RIGHT ATRIUM AREA SYSTOLE A4C: 25.4 CM2
RIGHT ATRIUM AREA SYSTOLE A4C: 25.7 CM2
RIGHT VENTRICLE ID DIMENSION: 4.2 CM
RIGHT VENTRICLE ID DIMENSION: 5.1 CM
RV PSP: 65 MMHG
SAO2 % BLD FROM PO2: 100 % (ref 60–85)
SAO2 % BLD FROM PO2: 92 % (ref 60–85)
SARS-COV+SARS-COV-2 AG RESP QL IA.RAPID: NEGATIVE
SITE: NORMAL
SL CV LEFT ATRIUM LENGTH A2C: 7.5 CM
SL CV LV EF: 60
SL CV LV EF: 60
SL CV PED ECHO LEFT VENTRICLE DIASTOLIC VOLUME (MOD BIPLANE) 2D: 51 ML
SL CV PED ECHO LEFT VENTRICLE SYSTOLIC VOLUME (MOD BIPLANE) 2D: 16 ML
SODIUM BLD-SCNC: 143 MMOL/L (ref 136–145)
SODIUM BLD-SCNC: 147 MMOL/L (ref 136–145)
SODIUM BLD-SCNC: 148 MMOL/L (ref 136–145)
SODIUM BLD-SCNC: 150 MMOL/L (ref 136–145)
SODIUM BLD-SCNC: 150 MMOL/L (ref 136–145)
SODIUM SERPL-SCNC: 141 MMOL/L (ref 135–147)
SODIUM SERPL-SCNC: 143 MMOL/L (ref 135–147)
SODIUM SERPL-SCNC: 145 MMOL/L (ref 135–147)
SODIUM SERPL-SCNC: 146 MMOL/L (ref 135–147)
SODIUM SERPL-SCNC: 147 MMOL/L (ref 135–147)
SODIUM SERPL-SCNC: 150 MMOL/L (ref 135–147)
SODIUM SERPL-SCNC: 150 MMOL/L (ref 135–147)
SODIUM SERPL-SCNC: 151 MMOL/L (ref 135–147)
SODIUM SERPL-SCNC: 151 MMOL/L (ref 135–147)
SP GR UR STRIP.AUTO: 1.02 (ref 1–1.03)
SPECIMEN EXPIRATION DATE: NORMAL
SPECIMEN SOURCE: ABNORMAL
T WAVE AXIS: 122 DEGREES
T WAVE AXIS: 133 DEGREES
T WAVE AXIS: 151 DEGREES
T WAVE AXIS: 169 DEGREES
T WAVE AXIS: 251 DEGREES
TOTAL CELLS COUNTED SPEC: 100
TOTAL PROTEIN FLUID: 2.2 G/DL
TPA PPP QL CHRO: 54 % OF NORMAL (ref 77–138)
TR MAX PG: 56 MMHG
TR MAX PG: 57 MMHG
TR PEAK VELOCITY: 3.8 M/S
TR PEAK VELOCITY: 3.8 M/S
TRICUSPID ANNULAR PLANE SYSTOLIC EXCURSION: 1.2 CM
TRICUSPID ANNULAR PLANE SYSTOLIC EXCURSION: 1.2 CM
TRICUSPID VALVE PEAK REGURGITATION VELOCITY: 3.76 M/S
TRICUSPID VALVE PEAK REGURGITATION VELOCITY: 3.78 M/S
TSH SERPL DL<=0.05 MIU/L-ACNC: 1.95 UIU/ML (ref 0.45–4.5)
UNIT DISPENSE STATUS: NORMAL
UNIT PRODUCT CODE: NORMAL
UNIT PRODUCT VOLUME: 250 ML
UNIT PRODUCT VOLUME: 350 ML
UNIT RH: NORMAL
UROBILINOGEN UR STRIP-ACNC: <2 MG/DL
VENT BIPAP FIO2: 30 %
VENT BIPAP FIO2: 40 %
VENTRICULAR RATE: 104 BPM
VENTRICULAR RATE: 104 BPM
VENTRICULAR RATE: 107 BPM
VENTRICULAR RATE: 108 BPM
VENTRICULAR RATE: 113 BPM
WBC # BLD AUTO: 10.14 THOUSAND/UL (ref 4.31–10.16)
WBC # BLD AUTO: 10.42 THOUSAND/UL (ref 4.31–10.16)
WBC # BLD AUTO: 11.24 THOUSAND/UL (ref 4.31–10.16)
WBC # BLD AUTO: 11.94 THOUSAND/UL (ref 4.31–10.16)
WBC # BLD AUTO: 12.38 THOUSAND/UL (ref 4.31–10.16)
WBC # BLD AUTO: 13.1 THOUSAND/UL (ref 4.31–10.16)
WBC # BLD AUTO: 14.89 THOUSAND/UL (ref 4.31–10.16)
WBC # BLD AUTO: 15.18 THOUSAND/UL (ref 4.31–10.16)
WBC # BLD AUTO: 15.31 THOUSAND/UL (ref 4.31–10.16)
WBC # BLD AUTO: 16.46 THOUSAND/UL (ref 4.31–10.16)
WBC # BLD AUTO: 8.06 THOUSAND/UL (ref 4.31–10.16)
WBC # BLD AUTO: 9.46 THOUSAND/UL (ref 4.31–10.16)
WBC # BLD AUTO: 9.71 THOUSAND/UL (ref 4.31–10.16)
WBC # FLD MANUAL: 428 /UL
WBC #/AREA URNS AUTO: ABNORMAL /HPF

## 2024-01-01 PROCEDURE — 85730 THROMBOPLASTIN TIME PARTIAL: CPT

## 2024-01-01 PROCEDURE — 85027 COMPLETE CBC AUTOMATED: CPT

## 2024-01-01 PROCEDURE — 82805 BLOOD GASES W/O2 SATURATION: CPT | Performed by: PHYSICIAN ASSISTANT

## 2024-01-01 PROCEDURE — 99223 1ST HOSP IP/OBS HIGH 75: CPT | Performed by: INTERNAL MEDICINE

## 2024-01-01 PROCEDURE — 82947 ASSAY GLUCOSE BLOOD QUANT: CPT

## 2024-01-01 PROCEDURE — 82805 BLOOD GASES W/O2 SATURATION: CPT

## 2024-01-01 PROCEDURE — 3E033XZ INTRODUCTION OF VASOPRESSOR INTO PERIPHERAL VEIN, PERCUTANEOUS APPROACH: ICD-10-PCS | Performed by: INTERNAL MEDICINE

## 2024-01-01 PROCEDURE — 30233K1 TRANSFUSION OF NONAUTOLOGOUS FROZEN PLASMA INTO PERIPHERAL VEIN, PERCUTANEOUS APPROACH: ICD-10-PCS | Performed by: INTERNAL MEDICINE

## 2024-01-01 PROCEDURE — 80048 BASIC METABOLIC PNL TOTAL CA: CPT

## 2024-01-01 PROCEDURE — NC001 PR NO CHARGE: Performed by: STUDENT IN AN ORGANIZED HEALTH CARE EDUCATION/TRAINING PROGRAM

## 2024-01-01 PROCEDURE — 99291 CRITICAL CARE FIRST HOUR: CPT | Performed by: INTERNAL MEDICINE

## 2024-01-01 PROCEDURE — 85025 COMPLETE CBC W/AUTO DIFF WBC: CPT

## 2024-01-01 PROCEDURE — 85018 HEMOGLOBIN: CPT

## 2024-01-01 PROCEDURE — NC001 PR NO CHARGE: Performed by: INTERNAL MEDICINE

## 2024-01-01 PROCEDURE — 99232 SBSQ HOSP IP/OBS MODERATE 35: CPT | Performed by: SURGERY

## 2024-01-01 PROCEDURE — 94003 VENT MGMT INPAT SUBQ DAY: CPT

## 2024-01-01 PROCEDURE — 99291 CRITICAL CARE FIRST HOUR: CPT | Performed by: STUDENT IN AN ORGANIZED HEALTH CARE EDUCATION/TRAINING PROGRAM

## 2024-01-01 PROCEDURE — 99232 SBSQ HOSP IP/OBS MODERATE 35: CPT | Performed by: INTERNAL MEDICINE

## 2024-01-01 PROCEDURE — 71045 X-RAY EXAM CHEST 1 VIEW: CPT

## 2024-01-01 PROCEDURE — 85362 FIBRIN DEGRADATION PRODUCTS: CPT

## 2024-01-01 PROCEDURE — 36620 INSERTION CATHETER ARTERY: CPT

## 2024-01-01 PROCEDURE — 99233 SBSQ HOSP IP/OBS HIGH 50: CPT | Performed by: INTERNAL MEDICINE

## 2024-01-01 PROCEDURE — 99233 SBSQ HOSP IP/OBS HIGH 50: CPT | Performed by: NURSE PRACTITIONER

## 2024-01-01 PROCEDURE — 89051 BODY FLUID CELL COUNT: CPT | Performed by: STUDENT IN AN ORGANIZED HEALTH CARE EDUCATION/TRAINING PROGRAM

## 2024-01-01 PROCEDURE — 94760 N-INVAS EAR/PLS OXIMETRY 1: CPT

## 2024-01-01 PROCEDURE — 87040 BLOOD CULTURE FOR BACTERIA: CPT | Performed by: INTERNAL MEDICINE

## 2024-01-01 PROCEDURE — 97167 OT EVAL HIGH COMPLEX 60 MIN: CPT

## 2024-01-01 PROCEDURE — 93325 DOPPLER ECHO COLOR FLOW MAPG: CPT | Performed by: INTERNAL MEDICINE

## 2024-01-01 PROCEDURE — 86900 BLOOD TYPING SEROLOGIC ABO: CPT

## 2024-01-01 PROCEDURE — 99232 SBSQ HOSP IP/OBS MODERATE 35: CPT | Performed by: STUDENT IN AN ORGANIZED HEALTH CARE EDUCATION/TRAINING PROGRAM

## 2024-01-01 PROCEDURE — 94660 CPAP INITIATION&MGMT: CPT

## 2024-01-01 PROCEDURE — NC001 PR NO CHARGE: Performed by: RADIOLOGY

## 2024-01-01 PROCEDURE — 82945 GLUCOSE OTHER FLUID: CPT | Performed by: STUDENT IN AN ORGANIZED HEALTH CARE EDUCATION/TRAINING PROGRAM

## 2024-01-01 PROCEDURE — 83615 LACTATE (LD) (LDH) ENZYME: CPT | Performed by: STUDENT IN AN ORGANIZED HEALTH CARE EDUCATION/TRAINING PROGRAM

## 2024-01-01 PROCEDURE — 82948 REAGENT STRIP/BLOOD GLUCOSE: CPT

## 2024-01-01 PROCEDURE — 82330 ASSAY OF CALCIUM: CPT

## 2024-01-01 PROCEDURE — 83516 IMMUNOASSAY NONANTIBODY: CPT

## 2024-01-01 PROCEDURE — 88305 TISSUE EXAM BY PATHOLOGIST: CPT | Performed by: PATHOLOGY

## 2024-01-01 PROCEDURE — 83605 ASSAY OF LACTIC ACID: CPT

## 2024-01-01 PROCEDURE — 87070 CULTURE OTHR SPECIMN AEROBIC: CPT | Performed by: STUDENT IN AN ORGANIZED HEALTH CARE EDUCATION/TRAINING PROGRAM

## 2024-01-01 PROCEDURE — 03HY32Z INSERTION OF MONITORING DEVICE INTO UPPER ARTERY, PERCUTANEOUS APPROACH: ICD-10-PCS | Performed by: INTERNAL MEDICINE

## 2024-01-01 PROCEDURE — 82140 ASSAY OF AMMONIA: CPT

## 2024-01-01 PROCEDURE — 85384 FIBRINOGEN ACTIVITY: CPT

## 2024-01-01 PROCEDURE — 99497 ADVNCD CARE PLAN 30 MIN: CPT | Performed by: NURSE PRACTITIONER

## 2024-01-01 PROCEDURE — 83605 ASSAY OF LACTIC ACID: CPT | Performed by: STUDENT IN AN ORGANIZED HEALTH CARE EDUCATION/TRAINING PROGRAM

## 2024-01-01 PROCEDURE — 93970 EXTREMITY STUDY: CPT

## 2024-01-01 PROCEDURE — 82803 BLOOD GASES ANY COMBINATION: CPT

## 2024-01-01 PROCEDURE — 92610 EVALUATE SWALLOWING FUNCTION: CPT

## 2024-01-01 PROCEDURE — 80048 BASIC METABOLIC PNL TOTAL CA: CPT | Performed by: STUDENT IN AN ORGANIZED HEALTH CARE EDUCATION/TRAINING PROGRAM

## 2024-01-01 PROCEDURE — 84145 PROCALCITONIN (PCT): CPT | Performed by: NURSE PRACTITIONER

## 2024-01-01 PROCEDURE — 84132 ASSAY OF SERUM POTASSIUM: CPT

## 2024-01-01 PROCEDURE — 85300 ANTITHROMBIN III ACTIVITY: CPT

## 2024-01-01 PROCEDURE — 82533 TOTAL CORTISOL: CPT

## 2024-01-01 PROCEDURE — 87077 CULTURE AEROBIC IDENTIFY: CPT

## 2024-01-01 PROCEDURE — 85049 AUTOMATED PLATELET COUNT: CPT

## 2024-01-01 PROCEDURE — 94002 VENT MGMT INPAT INIT DAY: CPT

## 2024-01-01 PROCEDURE — 85014 HEMATOCRIT: CPT

## 2024-01-01 PROCEDURE — 99498 ADVNCD CARE PLAN ADDL 30 MIN: CPT | Performed by: NURSE PRACTITIONER

## 2024-01-01 PROCEDURE — 93010 ELECTROCARDIOGRAM REPORT: CPT | Performed by: INTERNAL MEDICINE

## 2024-01-01 PROCEDURE — 85610 PROTHROMBIN TIME: CPT

## 2024-01-01 PROCEDURE — 83735 ASSAY OF MAGNESIUM: CPT

## 2024-01-01 PROCEDURE — 87811 SARS-COV-2 COVID19 W/OPTIC: CPT | Performed by: STUDENT IN AN ORGANIZED HEALTH CARE EDUCATION/TRAINING PROGRAM

## 2024-01-01 PROCEDURE — 80053 COMPREHEN METABOLIC PANEL: CPT

## 2024-01-01 PROCEDURE — 83735 ASSAY OF MAGNESIUM: CPT | Performed by: PHYSICIAN ASSISTANT

## 2024-01-01 PROCEDURE — 80048 BASIC METABOLIC PNL TOTAL CA: CPT | Performed by: PHYSICIAN ASSISTANT

## 2024-01-01 PROCEDURE — 85007 BL SMEAR W/DIFF WBC COUNT: CPT

## 2024-01-01 PROCEDURE — 93005 ELECTROCARDIOGRAM TRACING: CPT

## 2024-01-01 PROCEDURE — 85576 BLOOD PLATELET AGGREGATION: CPT

## 2024-01-01 PROCEDURE — 70450 CT HEAD/BRAIN W/O DYE: CPT

## 2024-01-01 PROCEDURE — 5A09357 ASSISTANCE WITH RESPIRATORY VENTILATION, LESS THAN 24 CONSECUTIVE HOURS, CONTINUOUS POSITIVE AIRWAY PRESSURE: ICD-10-PCS | Performed by: INTERNAL MEDICINE

## 2024-01-01 PROCEDURE — 84155 ASSAY OF PROTEIN SERUM: CPT | Performed by: STUDENT IN AN ORGANIZED HEALTH CARE EDUCATION/TRAINING PROGRAM

## 2024-01-01 PROCEDURE — 80162 ASSAY OF DIGOXIN TOTAL: CPT

## 2024-01-01 PROCEDURE — NC001 PR NO CHARGE: Performed by: SURGERY

## 2024-01-01 PROCEDURE — 93308 TTE F-UP OR LMTD: CPT

## 2024-01-01 PROCEDURE — 86923 COMPATIBILITY TEST ELECTRIC: CPT

## 2024-01-01 PROCEDURE — 32554 ASPIRATE PLEURA W/O IMAGING: CPT | Performed by: STUDENT IN AN ORGANIZED HEALTH CARE EDUCATION/TRAINING PROGRAM

## 2024-01-01 PROCEDURE — 84100 ASSAY OF PHOSPHORUS: CPT | Performed by: NURSE PRACTITIONER

## 2024-01-01 PROCEDURE — 96365 THER/PROPH/DIAG IV INF INIT: CPT

## 2024-01-01 PROCEDURE — 85730 THROMBOPLASTIN TIME PARTIAL: CPT | Performed by: STUDENT IN AN ORGANIZED HEALTH CARE EDUCATION/TRAINING PROGRAM

## 2024-01-01 PROCEDURE — 85018 HEMOGLOBIN: CPT | Performed by: INTERNAL MEDICINE

## 2024-01-01 PROCEDURE — 85730 THROMBOPLASTIN TIME PARTIAL: CPT | Performed by: PHYSICIAN ASSISTANT

## 2024-01-01 PROCEDURE — P9040 RBC LEUKOREDUCED IRRADIATED: HCPCS

## 2024-01-01 PROCEDURE — 84443 ASSAY THYROID STIM HORMONE: CPT | Performed by: SURGERY

## 2024-01-01 PROCEDURE — 97163 PT EVAL HIGH COMPLEX 45 MIN: CPT

## 2024-01-01 PROCEDURE — 83880 ASSAY OF NATRIURETIC PEPTIDE: CPT | Performed by: STUDENT IN AN ORGANIZED HEALTH CARE EDUCATION/TRAINING PROGRAM

## 2024-01-01 PROCEDURE — 99024 POSTOP FOLLOW-UP VISIT: CPT | Performed by: SURGERY

## 2024-01-01 PROCEDURE — 86901 BLOOD TYPING SEROLOGIC RH(D): CPT

## 2024-01-01 PROCEDURE — 99223 1ST HOSP IP/OBS HIGH 75: CPT | Performed by: NURSE PRACTITIONER

## 2024-01-01 PROCEDURE — 86850 RBC ANTIBODY SCREEN: CPT

## 2024-01-01 PROCEDURE — 87205 SMEAR GRAM STAIN: CPT

## 2024-01-01 PROCEDURE — 73701 CT LOWER EXTREMITY W/DYE: CPT

## 2024-01-01 PROCEDURE — 36415 COLL VENOUS BLD VENIPUNCTURE: CPT | Performed by: STUDENT IN AN ORGANIZED HEALTH CARE EDUCATION/TRAINING PROGRAM

## 2024-01-01 PROCEDURE — 97605 NEG PRS WND THER DME<=50SQCM: CPT | Performed by: SURGERY

## 2024-01-01 PROCEDURE — 84145 PROCALCITONIN (PCT): CPT | Performed by: INTERNAL MEDICINE

## 2024-01-01 PROCEDURE — 84157 ASSAY OF PROTEIN OTHER: CPT | Performed by: STUDENT IN AN ORGANIZED HEALTH CARE EDUCATION/TRAINING PROGRAM

## 2024-01-01 PROCEDURE — 87086 URINE CULTURE/COLONY COUNT: CPT

## 2024-01-01 PROCEDURE — 80048 BASIC METABOLIC PNL TOTAL CA: CPT | Performed by: INTERNAL MEDICINE

## 2024-01-01 PROCEDURE — NC001 PR NO CHARGE

## 2024-01-01 PROCEDURE — 87804 INFLUENZA ASSAY W/OPTIC: CPT | Performed by: STUDENT IN AN ORGANIZED HEALTH CARE EDUCATION/TRAINING PROGRAM

## 2024-01-01 PROCEDURE — 85420 FIBRINOLYTIC PLASMINOGEN: CPT

## 2024-01-01 PROCEDURE — 96375 TX/PRO/DX INJ NEW DRUG ADDON: CPT

## 2024-01-01 PROCEDURE — 82805 BLOOD GASES W/O2 SATURATION: CPT | Performed by: INTERNAL MEDICINE

## 2024-01-01 PROCEDURE — 86022 PLATELET ANTIBODIES: CPT

## 2024-01-01 PROCEDURE — 82805 BLOOD GASES W/O2 SATURATION: CPT | Performed by: NURSE PRACTITIONER

## 2024-01-01 PROCEDURE — 85397 CLOTTING FUNCT ACTIVITY: CPT

## 2024-01-01 PROCEDURE — 92526 ORAL FUNCTION THERAPY: CPT

## 2024-01-01 PROCEDURE — 93970 EXTREMITY STUDY: CPT | Performed by: SURGERY

## 2024-01-01 PROCEDURE — 88112 CYTOPATH CELL ENHANCE TECH: CPT | Performed by: PATHOLOGY

## 2024-01-01 PROCEDURE — 99285 EMERGENCY DEPT VISIT HI MDM: CPT

## 2024-01-01 PROCEDURE — 93308 TTE F-UP OR LMTD: CPT | Performed by: INTERNAL MEDICINE

## 2024-01-01 PROCEDURE — 80048 BASIC METABOLIC PNL TOTAL CA: CPT | Performed by: NURSE PRACTITIONER

## 2024-01-01 PROCEDURE — 83735 ASSAY OF MAGNESIUM: CPT | Performed by: NURSE PRACTITIONER

## 2024-01-01 PROCEDURE — 84484 ASSAY OF TROPONIN QUANT: CPT | Performed by: STUDENT IN AN ORGANIZED HEALTH CARE EDUCATION/TRAINING PROGRAM

## 2024-01-01 PROCEDURE — 84295 ASSAY OF SERUM SODIUM: CPT

## 2024-01-01 PROCEDURE — 30283B1 TRANSFUSION OF NONAUTOLOGOUS 4-FACTOR PROTHROMBIN COMPLEX CONCENTRATE INTO VEIN, PERCUTANEOUS APPROACH: ICD-10-PCS | Performed by: INTERNAL MEDICINE

## 2024-01-01 PROCEDURE — 83605 ASSAY OF LACTIC ACID: CPT | Performed by: NURSE PRACTITIONER

## 2024-01-01 PROCEDURE — 85007 BL SMEAR W/DIFF WBC COUNT: CPT | Performed by: STUDENT IN AN ORGANIZED HEALTH CARE EDUCATION/TRAINING PROGRAM

## 2024-01-01 PROCEDURE — 82330 ASSAY OF CALCIUM: CPT | Performed by: NURSE PRACTITIONER

## 2024-01-01 PROCEDURE — 83735 ASSAY OF MAGNESIUM: CPT | Performed by: INTERNAL MEDICINE

## 2024-01-01 PROCEDURE — 93306 TTE W/DOPPLER COMPLETE: CPT | Performed by: INTERNAL MEDICINE

## 2024-01-01 PROCEDURE — 0JCN0ZZ EXTIRPATION OF MATTER FROM RIGHT LOWER LEG SUBCUTANEOUS TISSUE AND FASCIA, OPEN APPROACH: ICD-10-PCS | Performed by: SURGERY

## 2024-01-01 PROCEDURE — 84100 ASSAY OF PHOSPHORUS: CPT

## 2024-01-01 PROCEDURE — 85027 COMPLETE CBC AUTOMATED: CPT | Performed by: INTERNAL MEDICINE

## 2024-01-01 PROCEDURE — 93306 TTE W/DOPPLER COMPLETE: CPT

## 2024-01-01 PROCEDURE — 85347 COAGULATION TIME ACTIVATED: CPT

## 2024-01-01 PROCEDURE — 81001 URINALYSIS AUTO W/SCOPE: CPT

## 2024-01-01 PROCEDURE — 80076 HEPATIC FUNCTION PANEL: CPT

## 2024-01-01 PROCEDURE — 93321 DOPPLER ECHO F-UP/LMTD STD: CPT | Performed by: INTERNAL MEDICINE

## 2024-01-01 PROCEDURE — 83735 ASSAY OF MAGNESIUM: CPT | Performed by: STUDENT IN AN ORGANIZED HEALTH CARE EDUCATION/TRAINING PROGRAM

## 2024-01-01 PROCEDURE — 10140 I&D HMTMA SEROMA/FLUID COLLJ: CPT | Performed by: SURGERY

## 2024-01-01 PROCEDURE — 85379 FIBRIN DEGRADATION QUANT: CPT

## 2024-01-01 PROCEDURE — 85027 COMPLETE CBC AUTOMATED: CPT | Performed by: STUDENT IN AN ORGANIZED HEALTH CARE EDUCATION/TRAINING PROGRAM

## 2024-01-01 PROCEDURE — 0W993ZZ DRAINAGE OF RIGHT PLEURAL CAVITY, PERCUTANEOUS APPROACH: ICD-10-PCS | Performed by: STUDENT IN AN ORGANIZED HEALTH CARE EDUCATION/TRAINING PROGRAM

## 2024-01-01 PROCEDURE — 87070 CULTURE OTHR SPECIMN AEROBIC: CPT

## 2024-01-01 PROCEDURE — 85025 COMPLETE CBC W/AUTO DIFF WBC: CPT | Performed by: PHYSICIAN ASSISTANT

## 2024-01-01 PROCEDURE — 85025 COMPLETE CBC W/AUTO DIFF WBC: CPT | Performed by: NURSE PRACTITIONER

## 2024-01-01 PROCEDURE — 71275 CT ANGIOGRAPHY CHEST: CPT

## 2024-01-01 PROCEDURE — 80053 COMPREHEN METABOLIC PANEL: CPT | Performed by: NURSE PRACTITIONER

## 2024-01-01 PROCEDURE — 87186 SC STD MICRODIL/AGAR DIL: CPT

## 2024-01-01 PROCEDURE — 93325 DOPPLER ECHO COLOR FLOW MAPG: CPT

## 2024-01-01 PROCEDURE — 82805 BLOOD GASES W/O2 SATURATION: CPT | Performed by: STUDENT IN AN ORGANIZED HEALTH CARE EDUCATION/TRAINING PROGRAM

## 2024-01-01 PROCEDURE — 30233N1 TRANSFUSION OF NONAUTOLOGOUS RED BLOOD CELLS INTO PERIPHERAL VEIN, PERCUTANEOUS APPROACH: ICD-10-PCS | Performed by: INTERNAL MEDICINE

## 2024-01-01 PROCEDURE — 82542 COL CHROMOTOGRAPHY QUAL/QUAN: CPT

## 2024-01-01 PROCEDURE — NC001 PR NO CHARGE: Performed by: NURSE PRACTITIONER

## 2024-01-01 PROCEDURE — 93321 DOPPLER ECHO F-UP/LMTD STD: CPT

## 2024-01-01 PROCEDURE — P9017 PLASMA 1 DONOR FRZ W/IN 8 HR: HCPCS

## 2024-01-01 PROCEDURE — 87205 SMEAR GRAM STAIN: CPT | Performed by: STUDENT IN AN ORGANIZED HEALTH CARE EDUCATION/TRAINING PROGRAM

## 2024-01-01 PROCEDURE — 83986 ASSAY PH BODY FLUID NOS: CPT | Performed by: STUDENT IN AN ORGANIZED HEALTH CARE EDUCATION/TRAINING PROGRAM

## 2024-01-01 PROCEDURE — 84100 ASSAY OF PHOSPHORUS: CPT | Performed by: STUDENT IN AN ORGANIZED HEALTH CARE EDUCATION/TRAINING PROGRAM

## 2024-01-01 RX ORDER — VANCOMYCIN HYDROCHLORIDE 1 G/200ML
1000 INJECTION, SOLUTION INTRAVENOUS EVERY 24 HOURS
Status: DISCONTINUED | OUTPATIENT
Start: 2024-01-01 | End: 2024-01-01

## 2024-01-01 RX ORDER — FUROSEMIDE 10 MG/ML
40 INJECTION INTRAMUSCULAR; INTRAVENOUS ONCE
Status: COMPLETED | OUTPATIENT
Start: 2024-01-01 | End: 2024-01-01

## 2024-01-01 RX ORDER — DIGOXIN 0.25 MG/ML
500 INJECTION INTRAMUSCULAR; INTRAVENOUS ONCE
Status: DISCONTINUED | OUTPATIENT
Start: 2024-01-01 | End: 2024-01-01

## 2024-01-01 RX ORDER — HEPARIN SODIUM 10000 [USP'U]/100ML
3-30 INJECTION, SOLUTION INTRAVENOUS
Status: DISCONTINUED | OUTPATIENT
Start: 2024-01-01 | End: 2024-01-01

## 2024-01-01 RX ORDER — HEPARIN SODIUM 1000 [USP'U]/ML
4800 INJECTION, SOLUTION INTRAVENOUS; SUBCUTANEOUS EVERY 6 HOURS PRN
Status: DISCONTINUED | OUTPATIENT
Start: 2024-01-01 | End: 2024-01-01

## 2024-01-01 RX ORDER — CALCIUM GLUCONATE 20 MG/ML
2 INJECTION, SOLUTION INTRAVENOUS ONCE
Status: COMPLETED | OUTPATIENT
Start: 2024-01-01 | End: 2024-01-01

## 2024-01-01 RX ORDER — ALBUMIN, HUMAN INJ 5% 5 %
SOLUTION INTRAVENOUS CONTINUOUS PRN
Status: DISCONTINUED | OUTPATIENT
Start: 2024-01-01 | End: 2024-01-01

## 2024-01-01 RX ORDER — ALBUMIN (HUMAN) 12.5 G/50ML
25 SOLUTION INTRAVENOUS ONCE
Status: COMPLETED | OUTPATIENT
Start: 2024-01-01 | End: 2024-01-01

## 2024-01-01 RX ORDER — ALBUMIN, HUMAN INJ 5% 5 %
25 SOLUTION INTRAVENOUS ONCE
Status: COMPLETED | OUTPATIENT
Start: 2024-01-01 | End: 2024-01-01

## 2024-01-01 RX ORDER — POTASSIUM CHLORIDE 14.9 MG/ML
20 INJECTION INTRAVENOUS
Status: COMPLETED | OUTPATIENT
Start: 2024-01-01 | End: 2024-01-01

## 2024-01-01 RX ORDER — ATROPINE SULFATE 0.1 MG/ML
0.5 INJECTION INTRAVENOUS ONCE
Status: COMPLETED | OUTPATIENT
Start: 2024-01-01 | End: 2024-01-01

## 2024-01-01 RX ORDER — OLANZAPINE 10 MG/1
10 TABLET, ORALLY DISINTEGRATING ORAL ONCE
Status: COMPLETED | OUTPATIENT
Start: 2024-01-01 | End: 2024-01-01

## 2024-01-01 RX ORDER — SODIUM HYPOCHLORITE 1.25 MG/ML
SOLUTION TOPICAL DAILY
Status: DISCONTINUED | OUTPATIENT
Start: 2024-01-01 | End: 2024-01-01

## 2024-01-01 RX ORDER — BISACODYL 10 MG
10 SUPPOSITORY, RECTAL RECTAL DAILY PRN
Status: DISCONTINUED | OUTPATIENT
Start: 2024-01-01 | End: 2024-01-01 | Stop reason: HOSPADM

## 2024-01-01 RX ORDER — DIGOXIN 0.25 MG/ML
500 INJECTION INTRAMUSCULAR; INTRAVENOUS ONCE
Status: COMPLETED | OUTPATIENT
Start: 2024-01-01 | End: 2024-01-01

## 2024-01-01 RX ORDER — ASPIRIN 81 MG/1
324 TABLET, CHEWABLE ORAL ONCE
Status: COMPLETED | OUTPATIENT
Start: 2024-01-01 | End: 2024-01-01

## 2024-01-01 RX ORDER — ALBUTEROL SULFATE 0.83 MG/ML
2.5 SOLUTION RESPIRATORY (INHALATION) ONCE
Status: COMPLETED | OUTPATIENT
Start: 2024-01-01 | End: 2024-01-01

## 2024-01-01 RX ORDER — SODIUM CHLORIDE, SODIUM GLUCONATE, SODIUM ACETATE, POTASSIUM CHLORIDE, MAGNESIUM CHLORIDE, SODIUM PHOSPHATE, DIBASIC, AND POTASSIUM PHOSPHATE .53; .5; .37; .037; .03; .012; .00082 G/100ML; G/100ML; G/100ML; G/100ML; G/100ML; G/100ML; G/100ML
500 INJECTION, SOLUTION INTRAVENOUS ONCE
Status: COMPLETED | OUTPATIENT
Start: 2024-01-01 | End: 2024-01-01

## 2024-01-01 RX ORDER — DEXTROSE MONOHYDRATE 25 G/50ML
12.5 INJECTION, SOLUTION INTRAVENOUS ONCE
Status: COMPLETED | OUTPATIENT
Start: 2024-01-01 | End: 2024-01-01

## 2024-01-01 RX ORDER — DEXTROSE MONOHYDRATE 25 G/50ML
12.5 INJECTION, SOLUTION INTRAVENOUS ONCE
Status: DISCONTINUED | OUTPATIENT
Start: 2024-01-01 | End: 2024-01-01

## 2024-01-01 RX ORDER — SENNOSIDES 8.6 MG
1 TABLET ORAL
Status: DISCONTINUED | OUTPATIENT
Start: 2024-01-01 | End: 2024-01-01

## 2024-01-01 RX ORDER — SODIUM CHLORIDE, SODIUM GLUCONATE, SODIUM ACETATE, POTASSIUM CHLORIDE, MAGNESIUM CHLORIDE, SODIUM PHOSPHATE, DIBASIC, AND POTASSIUM PHOSPHATE .53; .5; .37; .037; .03; .012; .00082 G/100ML; G/100ML; G/100ML; G/100ML; G/100ML; G/100ML; G/100ML
250 INJECTION, SOLUTION INTRAVENOUS ONCE
Status: COMPLETED | OUTPATIENT
Start: 2024-01-01 | End: 2024-01-01

## 2024-01-01 RX ORDER — FLUMAZENIL 0.1 MG/ML
0.5 INJECTION INTRAVENOUS ONCE
Status: COMPLETED | OUTPATIENT
Start: 2024-01-01 | End: 2024-01-01

## 2024-01-01 RX ORDER — FLUMAZENIL 0.1 MG/ML
INJECTION INTRAVENOUS
Status: COMPLETED
Start: 2024-01-01 | End: 2024-01-01

## 2024-01-01 RX ORDER — DILTIAZEM HYDROCHLORIDE 5 MG/ML
30 INJECTION INTRAVENOUS ONCE
Status: COMPLETED | OUTPATIENT
Start: 2024-01-01 | End: 2024-01-01

## 2024-01-01 RX ORDER — POTASSIUM CHLORIDE 29.8 MG/ML
40 INJECTION INTRAVENOUS ONCE
Status: COMPLETED | OUTPATIENT
Start: 2024-01-01 | End: 2024-01-01

## 2024-01-01 RX ORDER — CHLORHEXIDINE GLUCONATE ORAL RINSE 1.2 MG/ML
SOLUTION DENTAL
Status: COMPLETED
Start: 2024-01-01 | End: 2024-01-01

## 2024-01-01 RX ORDER — HEPARIN SODIUM 1000 [USP'U]/ML
4800 INJECTION, SOLUTION INTRAVENOUS; SUBCUTANEOUS ONCE
Status: COMPLETED | OUTPATIENT
Start: 2024-01-01 | End: 2024-01-01

## 2024-01-01 RX ORDER — LORAZEPAM 2 MG/ML
0.5 INJECTION INTRAMUSCULAR
Status: DISCONTINUED | OUTPATIENT
Start: 2024-01-01 | End: 2024-01-01 | Stop reason: HOSPADM

## 2024-01-01 RX ORDER — DIGOXIN 0.25 MG/ML
250 INJECTION INTRAMUSCULAR; INTRAVENOUS EVERY 6 HOURS
Status: DISCONTINUED | OUTPATIENT
Start: 2024-01-01 | End: 2024-01-01

## 2024-01-01 RX ORDER — VANCOMYCIN HYDROCHLORIDE 1 G/200ML
15 INJECTION, SOLUTION INTRAVENOUS ONCE
Status: DISCONTINUED | OUTPATIENT
Start: 2024-01-01 | End: 2024-01-01

## 2024-01-01 RX ORDER — MIDODRINE HYDROCHLORIDE 2.5 MG/1
2.5 TABLET ORAL
Status: DISCONTINUED | OUTPATIENT
Start: 2024-01-01 | End: 2024-01-01

## 2024-01-01 RX ORDER — OLANZAPINE 10 MG/1
10 TABLET, ORALLY DISINTEGRATING ORAL
Status: DISCONTINUED | OUTPATIENT
Start: 2024-01-01 | End: 2024-01-01

## 2024-01-01 RX ORDER — CEFAZOLIN SODIUM 1 G/50ML
1000 SOLUTION INTRAVENOUS EVERY 12 HOURS
Status: DISCONTINUED | OUTPATIENT
Start: 2024-01-01 | End: 2024-01-01

## 2024-01-01 RX ORDER — ALBUMIN (HUMAN) 12.5 G/50ML
25 SOLUTION INTRAVENOUS ONCE
Status: DISCONTINUED | OUTPATIENT
Start: 2024-01-01 | End: 2024-01-01

## 2024-01-01 RX ORDER — WATER 10 ML/10ML
INJECTION INTRAMUSCULAR; INTRAVENOUS; SUBCUTANEOUS
Status: COMPLETED
Start: 2024-01-01 | End: 2024-01-01

## 2024-01-01 RX ORDER — MIDAZOLAM HYDROCHLORIDE 2 MG/2ML
INJECTION, SOLUTION INTRAMUSCULAR; INTRAVENOUS AS NEEDED
Status: DISCONTINUED | OUTPATIENT
Start: 2024-01-01 | End: 2024-01-01

## 2024-01-01 RX ORDER — CALCIUM CHLORIDE 100 MG/ML
INJECTION INTRAVENOUS; INTRAVENTRICULAR AS NEEDED
Status: DISCONTINUED | OUTPATIENT
Start: 2024-01-01 | End: 2024-01-01

## 2024-01-01 RX ORDER — DEXTROSE MONOHYDRATE 25 G/50ML
50 INJECTION, SOLUTION INTRAVENOUS ONCE
Status: COMPLETED | OUTPATIENT
Start: 2024-01-01 | End: 2024-01-01

## 2024-01-01 RX ORDER — DEXTROSE MONOHYDRATE 25 G/50ML
INJECTION, SOLUTION INTRAVENOUS
Status: COMPLETED
Start: 2024-01-01 | End: 2024-01-01

## 2024-01-01 RX ORDER — MAGNESIUM SULFATE HEPTAHYDRATE 40 MG/ML
2 INJECTION, SOLUTION INTRAVENOUS ONCE
Status: COMPLETED | OUTPATIENT
Start: 2024-01-01 | End: 2024-01-01

## 2024-01-01 RX ORDER — POTASSIUM CHLORIDE 14.9 MG/ML
20 INJECTION INTRAVENOUS ONCE
Status: COMPLETED | OUTPATIENT
Start: 2024-01-01 | End: 2024-01-01

## 2024-01-01 RX ORDER — ALBUMIN, HUMAN INJ 5% 5 %
12.5 SOLUTION INTRAVENOUS ONCE
Status: COMPLETED | OUTPATIENT
Start: 2024-01-01 | End: 2024-01-01

## 2024-01-01 RX ORDER — AMOXICILLIN 250 MG
2 CAPSULE ORAL 2 TIMES DAILY
Status: DISCONTINUED | OUTPATIENT
Start: 2024-01-01 | End: 2024-01-01 | Stop reason: HOSPADM

## 2024-01-01 RX ORDER — HYDROMORPHONE HCL/PF 1 MG/ML
0.5 SYRINGE (ML) INJECTION
Status: DISCONTINUED | OUTPATIENT
Start: 2024-01-01 | End: 2024-01-01 | Stop reason: HOSPADM

## 2024-01-01 RX ORDER — ALBUMIN, HUMAN INJ 5% 5 %
25 SOLUTION INTRAVENOUS ONCE
Status: DISCONTINUED | OUTPATIENT
Start: 2024-01-01 | End: 2024-01-01

## 2024-01-01 RX ORDER — NALOXONE HYDROCHLORIDE 1 MG/ML
1 INJECTION INTRAMUSCULAR; INTRAVENOUS; SUBCUTANEOUS ONCE
Status: COMPLETED | OUTPATIENT
Start: 2024-01-01 | End: 2024-01-01

## 2024-01-01 RX ORDER — OLANZAPINE 10 MG/2ML
5 INJECTION, POWDER, FOR SOLUTION INTRAMUSCULAR ONCE
Status: COMPLETED | OUTPATIENT
Start: 2024-01-01 | End: 2024-01-01

## 2024-01-01 RX ORDER — FENTANYL CITRATE 50 UG/ML
INJECTION, SOLUTION INTRAMUSCULAR; INTRAVENOUS AS NEEDED
Status: DISCONTINUED | OUTPATIENT
Start: 2024-01-01 | End: 2024-01-01

## 2024-01-01 RX ORDER — ENOXAPARIN SODIUM 100 MG/ML
30 INJECTION SUBCUTANEOUS DAILY
Status: DISCONTINUED | OUTPATIENT
Start: 2024-01-01 | End: 2024-01-01

## 2024-01-01 RX ORDER — DEXTROSE MONOHYDRATE 25 G/50ML
25 INJECTION, SOLUTION INTRAVENOUS ONCE
Status: COMPLETED | OUTPATIENT
Start: 2024-01-01 | End: 2024-01-01

## 2024-01-01 RX ORDER — DEXTROSE AND SODIUM CHLORIDE 5; .2 G/100ML; G/100ML
75 INJECTION, SOLUTION INTRAVENOUS CONTINUOUS
Status: DISCONTINUED | OUTPATIENT
Start: 2024-01-01 | End: 2024-01-01

## 2024-01-01 RX ORDER — ARGATROBAN 1 MG/ML
.1-3 INJECTION INTRAVENOUS
Status: CANCELLED | OUTPATIENT
Start: 2024-01-01

## 2024-01-01 RX ORDER — LIDOCAINE HYDROCHLORIDE 20 MG/ML
INJECTION, SOLUTION EPIDURAL; INFILTRATION; INTRACAUDAL; PERINEURAL AS NEEDED
Status: DISCONTINUED | OUTPATIENT
Start: 2024-01-01 | End: 2024-01-01

## 2024-01-01 RX ORDER — FUROSEMIDE 10 MG/ML
20 INJECTION INTRAMUSCULAR; INTRAVENOUS
Status: DISCONTINUED | OUTPATIENT
Start: 2024-01-01 | End: 2024-01-01

## 2024-01-01 RX ORDER — ACETAMINOPHEN 325 MG/1
975 TABLET ORAL EVERY 8 HOURS PRN
Status: DISCONTINUED | OUTPATIENT
Start: 2024-01-01 | End: 2024-01-01 | Stop reason: HOSPADM

## 2024-01-01 RX ORDER — SODIUM CHLORIDE 9 MG/ML
3 INJECTION INTRAVENOUS
Status: DISCONTINUED | OUTPATIENT
Start: 2024-01-01 | End: 2024-01-01

## 2024-01-01 RX ORDER — CHLORHEXIDINE GLUCONATE ORAL RINSE 1.2 MG/ML
15 SOLUTION DENTAL EVERY 12 HOURS SCHEDULED
Status: DISCONTINUED | OUTPATIENT
Start: 2024-01-01 | End: 2024-01-01 | Stop reason: HOSPADM

## 2024-01-01 RX ORDER — DEXMEDETOMIDINE HYDROCHLORIDE 4 UG/ML
.1-.5 INJECTION, SOLUTION INTRAVENOUS
Status: DISCONTINUED | OUTPATIENT
Start: 2024-01-01 | End: 2024-01-01

## 2024-01-01 RX ORDER — DIGOXIN 0.25 MG/ML
250 INJECTION INTRAMUSCULAR; INTRAVENOUS ONCE
Status: COMPLETED | OUTPATIENT
Start: 2024-01-01 | End: 2024-01-01

## 2024-01-01 RX ORDER — HEPARIN SODIUM 1000 [USP'U]/ML
2400 INJECTION, SOLUTION INTRAVENOUS; SUBCUTANEOUS EVERY 6 HOURS PRN
Status: DISCONTINUED | OUTPATIENT
Start: 2024-01-01 | End: 2024-01-01

## 2024-01-01 RX ORDER — MINERAL OIL AND PETROLATUM 150; 830 MG/G; MG/G
OINTMENT OPHTHALMIC
Status: DISCONTINUED | OUTPATIENT
Start: 2024-01-01 | End: 2024-01-01 | Stop reason: HOSPADM

## 2024-01-01 RX ORDER — FAMOTIDINE 10 MG/ML
20 INJECTION, SOLUTION INTRAVENOUS
Status: DISCONTINUED | OUTPATIENT
Start: 2024-01-01 | End: 2024-01-01 | Stop reason: HOSPADM

## 2024-01-01 RX ADMIN — CEFAZOLIN SODIUM 1000 MG: 1 SOLUTION INTRAVENOUS at 15:05

## 2024-01-01 RX ADMIN — FUROSEMIDE 40 MG: 10 INJECTION, SOLUTION INTRAMUSCULAR; INTRAVENOUS at 11:03

## 2024-01-01 RX ADMIN — POTASSIUM CHLORIDE 20 MEQ: 14.9 INJECTION, SOLUTION INTRAVENOUS at 19:25

## 2024-01-01 RX ADMIN — DILTIAZEM HYDROCHLORIDE 30 MG: 5 INJECTION, SOLUTION INTRAVENOUS at 11:05

## 2024-01-01 RX ADMIN — ATROPINE SULFATE 0.5 MG: 0.1 INJECTION INTRAVENOUS at 11:33

## 2024-01-01 RX ADMIN — DEXMEDETOMIDINE HYDROCHLORIDE 0.5 MCG/KG/HR: 4 INJECTION, SOLUTION INTRAVENOUS at 19:42

## 2024-01-01 RX ADMIN — NALOXONE HYDROCHLORIDE 1 MG: 1 INJECTION PARENTERAL at 12:27

## 2024-01-01 RX ADMIN — NOREPINEPHRINE BITARTRATE 6 MCG/MIN: 1 INJECTION INTRAVENOUS at 10:29

## 2024-01-01 RX ADMIN — CHLORHEXIDINE GLUCONATE 15 ML: 1.2 RINSE ORAL at 08:33

## 2024-01-01 RX ADMIN — NOREPINEPHRINE BITARTRATE 5 MCG/MIN: 1 INJECTION INTRAVENOUS at 10:18

## 2024-01-01 RX ADMIN — Medication 8 MCG: at 11:26

## 2024-01-01 RX ADMIN — FAMOTIDINE 20 MG: 10 INJECTION INTRAVENOUS at 11:36

## 2024-01-01 RX ADMIN — LORAZEPAM 0.5 MG: 2 INJECTION INTRAMUSCULAR; INTRAVENOUS at 16:13

## 2024-01-01 RX ADMIN — NOREPINEPHRINE BITARTRATE 7 MCG/MIN: 1 INJECTION INTRAVENOUS at 20:53

## 2024-01-01 RX ADMIN — Medication 10 MCG/MIN: at 22:54

## 2024-01-01 RX ADMIN — CEFEPIME 2000 MG: 2 INJECTION, POWDER, FOR SOLUTION INTRAVENOUS at 15:47

## 2024-01-01 RX ADMIN — BISACODYL 10 MG: 10 SUPPOSITORY RECTAL at 08:58

## 2024-01-01 RX ADMIN — IOHEXOL 100 ML: 350 INJECTION, SOLUTION INTRAVENOUS at 23:40

## 2024-01-01 RX ADMIN — POTASSIUM PHOSPHATE, MONOBASIC AND POTASSIUM PHOSPHATE, DIBASIC 30 MMOL: 224; 236 INJECTION, SOLUTION, CONCENTRATE INTRAVENOUS at 07:15

## 2024-01-01 RX ADMIN — ACETAMINOPHEN 975 MG: 325 TABLET ORAL at 00:45

## 2024-01-01 RX ADMIN — VASOPRESSIN 0.04 UNITS/MIN: 20 INJECTION INTRAVENOUS at 05:49

## 2024-01-01 RX ADMIN — FLUMAZENIL 0.5 MG: 0.1 INJECTION INTRAVENOUS at 12:28

## 2024-01-01 RX ADMIN — CHLORHEXIDINE GLUCONATE 15 ML: 1.2 RINSE ORAL at 08:58

## 2024-01-01 RX ADMIN — FAMOTIDINE 20 MG: 10 INJECTION INTRAVENOUS at 08:58

## 2024-01-01 RX ADMIN — Medication 50 MEQ: at 15:41

## 2024-01-01 RX ADMIN — DEXTROSE 250 ML: 5 SOLUTION INTRAVENOUS at 19:23

## 2024-01-01 RX ADMIN — ALBUMIN (HUMAN) 25 G: 12.5 INJECTION, SOLUTION INTRAVENOUS at 02:14

## 2024-01-01 RX ADMIN — DEXTROSE MONOHYDRATE 25 ML: 25 INJECTION, SOLUTION INTRAVENOUS at 06:11

## 2024-01-01 RX ADMIN — WHITE PETROLATUM 57.7 %-MINERAL OIL 31.9 % EYE OINTMENT: at 21:43

## 2024-01-01 RX ADMIN — CHLORHEXIDINE GLUCONATE 15 ML: 1.2 RINSE ORAL at 21:25

## 2024-01-01 RX ADMIN — Medication 2000 UNITS: at 00:31

## 2024-01-01 RX ADMIN — MIDAZOLAM 0.5 MG: 1 INJECTION INTRAMUSCULAR; INTRAVENOUS at 10:42

## 2024-01-01 RX ADMIN — SODIUM CHLORIDE, SODIUM GLUCONATE, SODIUM ACETATE, POTASSIUM CHLORIDE, MAGNESIUM CHLORIDE, SODIUM PHOSPHATE, DIBASIC, AND POTASSIUM PHOSPHATE 500 ML: .53; .5; .37; .037; .03; .012; .00082 INJECTION, SOLUTION INTRAVENOUS at 08:29

## 2024-01-01 RX ADMIN — MAGNESIUM SULFATE HEPTAHYDRATE 2 G: 40 INJECTION, SOLUTION INTRAVENOUS at 10:56

## 2024-01-01 RX ADMIN — CEFTRIAXONE SODIUM 2000 MG: 10 INJECTION, POWDER, FOR SOLUTION INTRAVENOUS at 07:42

## 2024-01-01 RX ADMIN — IOHEXOL 60 ML: 350 INJECTION, SOLUTION INTRAVENOUS at 12:32

## 2024-01-01 RX ADMIN — Medication 15 MCG/MIN: at 05:31

## 2024-01-01 RX ADMIN — SODIUM HYPOCHLORITE 1 APPLICATION: 1.25 SOLUTION TOPICAL at 08:09

## 2024-01-01 RX ADMIN — DEXTROSE AND SODIUM CHLORIDE 75 ML/HR: 5; .2 INJECTION, SOLUTION INTRAVENOUS at 19:23

## 2024-01-01 RX ADMIN — ASPIRIN 81 MG 324 MG: 81 TABLET ORAL at 10:05

## 2024-01-01 RX ADMIN — FUROSEMIDE 40 MG: 10 INJECTION, SOLUTION INTRAMUSCULAR; INTRAVENOUS at 10:29

## 2024-01-01 RX ADMIN — CHLORHEXIDINE GLUCONATE 15 ML: 1.2 RINSE ORAL at 21:28

## 2024-01-01 RX ADMIN — DEXTROSE MONOHYDRATE 50 ML: 25 INJECTION, SOLUTION INTRAVENOUS at 12:00

## 2024-01-01 RX ADMIN — SODIUM CHLORIDE, SODIUM GLUCONATE, SODIUM ACETATE, POTASSIUM CHLORIDE, MAGNESIUM CHLORIDE, SODIUM PHOSPHATE, DIBASIC, AND POTASSIUM PHOSPHATE 250 ML: .53; .5; .37; .037; .03; .012; .00082 INJECTION, SOLUTION INTRAVENOUS at 11:03

## 2024-01-01 RX ADMIN — CHLORHEXIDINE GLUCONATE 15 ML: 1.2 RINSE ORAL at 20:25

## 2024-01-01 RX ADMIN — SODIUM HYPOCHLORITE: 1.25 SOLUTION TOPICAL at 08:33

## 2024-01-01 RX ADMIN — HEPARIN SODIUM 13 UNITS/KG/HR: 10000 INJECTION, SOLUTION INTRAVENOUS at 08:15

## 2024-01-01 RX ADMIN — POTASSIUM CHLORIDE 40 MEQ: 29.8 INJECTION, SOLUTION INTRAVENOUS at 07:00

## 2024-01-01 RX ADMIN — APIXABAN 10 MG: 5 TABLET, FILM COATED ORAL at 17:21

## 2024-01-01 RX ADMIN — CHLORHEXIDINE GLUCONATE 15 ML: 1.2 RINSE ORAL at 20:13

## 2024-01-01 RX ADMIN — WHITE PETROLATUM 57.7 %-MINERAL OIL 31.9 % EYE OINTMENT: at 21:25

## 2024-01-01 RX ADMIN — Medication 10 MCG/MIN: at 01:18

## 2024-01-01 RX ADMIN — SODIUM CHLORIDE, SODIUM GLUCONATE, SODIUM ACETATE, POTASSIUM CHLORIDE, MAGNESIUM CHLORIDE, SODIUM PHOSPHATE, DIBASIC, AND POTASSIUM PHOSPHATE 500 ML: .53; .5; .37; .037; .03; .012; .00082 INJECTION, SOLUTION INTRAVENOUS at 22:00

## 2024-01-01 RX ADMIN — DEXTROSE MONOHYDRATE 12.5 G: 25 INJECTION, SOLUTION INTRAVENOUS at 00:15

## 2024-01-01 RX ADMIN — LORAZEPAM 0.5 MG: 2 INJECTION INTRAMUSCULAR; INTRAVENOUS at 14:21

## 2024-01-01 RX ADMIN — CHLORHEXIDINE GLUCONATE 15 ML: 1.2 RINSE ORAL at 21:13

## 2024-01-01 RX ADMIN — CEFTRIAXONE SODIUM 2000 MG: 10 INJECTION, POWDER, FOR SOLUTION INTRAVENOUS at 05:35

## 2024-01-01 RX ADMIN — Medication 8 MCG: at 11:30

## 2024-01-01 RX ADMIN — DIGOXIN 250 MCG: 250 INJECTION, SOLUTION INTRAMUSCULAR; INTRAVENOUS; PARENTERAL at 09:53

## 2024-01-01 RX ADMIN — Medication 2 MCG/MIN: at 04:58

## 2024-01-01 RX ADMIN — CHLORHEXIDINE GLUCONATE 15 ML: 1.2 RINSE ORAL at 09:26

## 2024-01-01 RX ADMIN — HEPARIN SODIUM 2400 UNITS: 1000 INJECTION INTRAVENOUS; SUBCUTANEOUS at 00:38

## 2024-01-01 RX ADMIN — FENTANYL CITRATE 12.5 MCG: 50 INJECTION INTRAMUSCULAR; INTRAVENOUS at 10:50

## 2024-01-01 RX ADMIN — APIXABAN 10 MG: 5 TABLET, FILM COATED ORAL at 17:24

## 2024-01-01 RX ADMIN — WHITE PETROLATUM 57.7 %-MINERAL OIL 31.9 % EYE OINTMENT: at 21:32

## 2024-01-01 RX ADMIN — SODIUM HYPOCHLORITE 1 APPLICATION: 1.25 SOLUTION TOPICAL at 08:14

## 2024-01-01 RX ADMIN — CHLORHEXIDINE GLUCONATE 15 ML: 1.2 RINSE ORAL at 08:23

## 2024-01-01 RX ADMIN — CHLORHEXIDINE GLUCONATE 15 ML: 1.2 RINSE ORAL at 08:09

## 2024-01-01 RX ADMIN — CALCIUM GLUCONATE 2 G: 20 INJECTION, SOLUTION INTRAVENOUS at 12:59

## 2024-01-01 RX ADMIN — POTASSIUM CHLORIDE 20 MEQ: 14.9 INJECTION, SOLUTION INTRAVENOUS at 17:26

## 2024-01-01 RX ADMIN — HEPARIN SODIUM 2400 UNITS: 1000 INJECTION INTRAVENOUS; SUBCUTANEOUS at 20:12

## 2024-01-01 RX ADMIN — Medication 8 MCG/MIN: at 05:59

## 2024-01-01 RX ADMIN — ALBUMIN (HUMAN) 25 G: 0.25 INJECTION, SOLUTION INTRAVENOUS at 18:22

## 2024-01-01 RX ADMIN — POTASSIUM CHLORIDE 20 MEQ: 14.9 INJECTION, SOLUTION INTRAVENOUS at 06:27

## 2024-01-01 RX ADMIN — ALBUMIN (HUMAN) 12.5 G: 12.5 INJECTION, SOLUTION INTRAVENOUS at 06:29

## 2024-01-01 RX ADMIN — HEPARIN SODIUM 18 UNITS/KG/HR: 10000 INJECTION, SOLUTION INTRAVENOUS at 16:31

## 2024-01-01 RX ADMIN — HYDROMORPHONE HYDROCHLORIDE 0.5 MG: 1 INJECTION, SOLUTION INTRAMUSCULAR; INTRAVENOUS; SUBCUTANEOUS at 16:13

## 2024-01-01 RX ADMIN — CEFTRIAXONE SODIUM 1000 MG: 10 INJECTION, POWDER, FOR SOLUTION INTRAVENOUS at 14:28

## 2024-01-01 RX ADMIN — CEFEPIME 2000 MG: 2 INJECTION, POWDER, FOR SOLUTION INTRAVENOUS at 02:15

## 2024-01-01 RX ADMIN — NOREPINEPHRINE BITARTRATE 4 MCG/MIN: 1 INJECTION INTRAVENOUS at 22:04

## 2024-01-01 RX ADMIN — CALCIUM GLUCONATE 2 G: 20 INJECTION, SOLUTION INTRAVENOUS at 06:40

## 2024-01-01 RX ADMIN — Medication 8 MCG: at 10:44

## 2024-01-01 RX ADMIN — DEXTROSE MONOHYDRATE 25 ML: 25 INJECTION, SOLUTION INTRAVENOUS at 17:56

## 2024-01-01 RX ADMIN — SODIUM BICARBONATE 50 MEQ: 84 INJECTION INTRAVENOUS at 15:41

## 2024-01-01 RX ADMIN — MIDODRINE HYDROCHLORIDE 2.5 MG: 2.5 TABLET ORAL at 22:51

## 2024-01-01 RX ADMIN — CHLORHEXIDINE GLUCONATE 15 ML: 1.2 RINSE ORAL at 08:24

## 2024-01-01 RX ADMIN — HEPARIN SODIUM 9 UNITS/KG/HR: 10000 INJECTION, SOLUTION INTRAVENOUS at 20:53

## 2024-01-01 RX ADMIN — CALCIUM GLUCONATE 2 G: 20 INJECTION, SOLUTION INTRAVENOUS at 10:56

## 2024-01-01 RX ADMIN — Medication 8 MCG: at 11:31

## 2024-01-01 RX ADMIN — CALCIUM CHLORIDE 0.5 G: 100 INJECTION INTRAVENOUS; INTRAVENTRICULAR at 11:45

## 2024-01-01 RX ADMIN — ACETAMINOPHEN 975 MG: 325 TABLET ORAL at 15:25

## 2024-01-01 RX ADMIN — ALBUMIN (HUMAN) 25 G: 12.5 INJECTION, SOLUTION INTRAVENOUS at 20:01

## 2024-01-01 RX ADMIN — WHITE PETROLATUM 57.7 %-MINERAL OIL 31.9 % EYE OINTMENT: at 22:36

## 2024-01-01 RX ADMIN — LIDOCAINE HYDROCHLORIDE 20 ML: 20 INJECTION, SOLUTION EPIDURAL; INFILTRATION; INTRACAUDAL at 10:26

## 2024-01-01 RX ADMIN — Medication 8 MCG: at 11:10

## 2024-01-01 RX ADMIN — CHLORHEXIDINE GLUCONATE 15 ML: 1.2 RINSE ORAL at 22:36

## 2024-01-01 RX ADMIN — WHITE PETROLATUM 57.7 %-MINERAL OIL 31.9 % EYE OINTMENT: at 21:28

## 2024-01-01 RX ADMIN — OLANZAPINE 5 MG: 10 INJECTION, POWDER, FOR SOLUTION INTRAMUSCULAR at 23:03

## 2024-01-01 RX ADMIN — ALBUMIN (HUMAN): 12.5 INJECTION, SOLUTION INTRAVENOUS at 11:15

## 2024-01-01 RX ADMIN — SODIUM BICARBONATE 50 MEQ: 84 INJECTION INTRAVENOUS at 12:28

## 2024-01-01 RX ADMIN — CALCIUM GLUCONATE 2 G: 20 INJECTION, SOLUTION INTRAVENOUS at 14:03

## 2024-01-01 RX ADMIN — CEFTRIAXONE SODIUM 1000 MG: 10 INJECTION, POWDER, FOR SOLUTION INTRAVENOUS at 13:42

## 2024-01-01 RX ADMIN — DIGOXIN 500 MCG: 250 INJECTION, SOLUTION INTRAMUSCULAR; INTRAVENOUS; PARENTERAL at 09:53

## 2024-01-01 RX ADMIN — CEFAZOLIN SODIUM 1000 MG: 1 SOLUTION INTRAVENOUS at 02:08

## 2024-01-01 RX ADMIN — VANCOMYCIN HYDROCHLORIDE 1000 MG: 1 INJECTION, SOLUTION INTRAVENOUS at 07:36

## 2024-01-01 RX ADMIN — FLUMAZENIL 0.5 MG: 0.1 INJECTION, SOLUTION INTRAVENOUS at 12:28

## 2024-01-01 RX ADMIN — SODIUM CHLORIDE 1500 MG: 0.9 INJECTION, SOLUTION INTRAVENOUS at 17:18

## 2024-01-01 RX ADMIN — WATER 10 ML: 1 INJECTION INTRAMUSCULAR; INTRAVENOUS; SUBCUTANEOUS at 23:03

## 2024-01-01 RX ADMIN — MIDODRINE HYDROCHLORIDE 2.5 MG: 2.5 TABLET ORAL at 06:29

## 2024-01-01 RX ADMIN — FUROSEMIDE 40 MG: 10 INJECTION, SOLUTION INTRAMUSCULAR; INTRAVENOUS at 19:41

## 2024-01-01 RX ADMIN — OLANZAPINE 10 MG: 10 TABLET, ORALLY DISINTEGRATING ORAL at 21:07

## 2024-01-01 RX ADMIN — HEPARIN SODIUM 4800 UNITS: 1000 INJECTION INTRAVENOUS; SUBCUTANEOUS at 16:32

## 2024-01-01 RX ADMIN — WHITE PETROLATUM 57.7 %-MINERAL OIL 31.9 % EYE OINTMENT: at 22:04

## 2024-01-01 RX ADMIN — SODIUM CHLORIDE, SODIUM GLUCONATE, SODIUM ACETATE, POTASSIUM CHLORIDE, MAGNESIUM CHLORIDE, SODIUM PHOSPHATE, DIBASIC, AND POTASSIUM PHOSPHATE 500 ML: .53; .5; .37; .037; .03; .012; .00082 INJECTION, SOLUTION INTRAVENOUS at 16:22

## 2024-01-01 RX ADMIN — DIGOXIN 250 MCG: 250 INJECTION, SOLUTION INTRAMUSCULAR; INTRAVENOUS; PARENTERAL at 23:17

## 2024-01-01 RX ADMIN — DEXTROSE MONOHYDRATE 50 ML: 25 INJECTION, SOLUTION INTRAVENOUS at 12:10

## 2024-01-01 RX ADMIN — Medication 8 MCG: at 11:17

## 2024-01-01 RX ADMIN — Medication 16 MCG: at 11:33

## 2024-01-01 RX ADMIN — CHLORHEXIDINE GLUCONATE 15 ML: 1.2 RINSE ORAL at 08:13

## 2024-01-01 RX ADMIN — CEFTRIAXONE SODIUM 1000 MG: 10 INJECTION, POWDER, FOR SOLUTION INTRAVENOUS at 15:00

## 2024-01-01 RX ADMIN — ALBUMIN (HUMAN) 25 G: 12.5 INJECTION, SOLUTION INTRAVENOUS at 15:51

## 2024-01-01 RX ADMIN — ALBUMIN (HUMAN) 25 G: 12.5 INJECTION, SOLUTION INTRAVENOUS at 05:31

## 2024-01-01 RX ADMIN — CHLORHEXIDINE GLUCONATE 15 ML: 1.2 RINSE ORAL at 09:53

## 2024-01-01 RX ADMIN — Medication 8 MCG: at 10:47

## 2024-01-01 RX ADMIN — CHLORHEXIDINE GLUCONATE 15 ML: 1.2 RINSE ORAL at 20:08

## 2024-01-01 RX ADMIN — MIDAZOLAM 0.5 MG: 1 INJECTION INTRAMUSCULAR; INTRAVENOUS at 10:22

## 2024-01-01 RX ADMIN — Medication 8 MCG: at 11:25

## 2024-01-01 RX ADMIN — DEXMEDETOMIDINE HYDROCHLORIDE 0.3 MCG/KG/HR: 4 INJECTION, SOLUTION INTRAVENOUS at 06:40

## 2024-01-01 RX ADMIN — MAGNESIUM SULFATE HEPTAHYDRATE 2 G: 40 INJECTION, SOLUTION INTRAVENOUS at 05:59

## 2024-01-01 RX ADMIN — CEFTRIAXONE SODIUM 1000 MG: 10 INJECTION, POWDER, FOR SOLUTION INTRAVENOUS at 13:02

## 2024-01-01 RX ADMIN — NOREPINEPHRINE BITARTRATE 2 MCG/MIN: 1 INJECTION INTRAVENOUS at 04:58

## 2024-01-01 RX ADMIN — Medication 16 MCG: at 10:57

## 2024-01-01 RX ADMIN — NOREPINEPHRINE BITARTRATE 3 MCG/MIN: 1 INJECTION INTRAVENOUS at 21:12

## 2024-01-01 RX ADMIN — SODIUM CHLORIDE, SODIUM GLUCONATE, SODIUM ACETATE, POTASSIUM CHLORIDE, MAGNESIUM CHLORIDE, SODIUM PHOSPHATE, DIBASIC, AND POTASSIUM PHOSPHATE 500 ML: .53; .5; .37; .037; .03; .012; .00082 INJECTION, SOLUTION INTRAVENOUS at 04:54

## 2024-01-04 ENCOUNTER — APPOINTMENT (INPATIENT)
Dept: RADIOLOGY | Facility: HOSPITAL | Age: 89
DRG: 871 | End: 2024-01-04
Payer: MEDICARE

## 2024-01-04 ENCOUNTER — HOSPITAL ENCOUNTER (INPATIENT)
Facility: HOSPITAL | Age: 89
LOS: 12 days | Discharge: NURSING, REHAB OR LONG-TERM CARE | DRG: 871 | End: 2024-01-16
Attending: EMERGENCY MEDICINE | Admitting: INTERNAL MEDICINE
Payer: MEDICARE

## 2024-01-04 DIAGNOSIS — A41.9 SEPTIC SHOCK DUE TO URINARY TRACT INFECTION: ICD-10-CM

## 2024-01-04 DIAGNOSIS — I95.9 HYPOTENSION: ICD-10-CM

## 2024-01-04 DIAGNOSIS — T50.905A BRADYCARDIA, DRUG INDUCED: ICD-10-CM

## 2024-01-04 DIAGNOSIS — D63.1 ANEMIA DUE TO CHRONIC KIDNEY DISEASE: ICD-10-CM

## 2024-01-04 DIAGNOSIS — L03.90 CELLULITIS: ICD-10-CM

## 2024-01-04 DIAGNOSIS — R65.21 SEPTIC SHOCK DUE TO URINARY TRACT INFECTION: ICD-10-CM

## 2024-01-04 DIAGNOSIS — N17.9 ACUTE KIDNEY INJURY (HCC): Primary | ICD-10-CM

## 2024-01-04 DIAGNOSIS — S91.302A OPEN WOUND OF LEFT HEEL, INITIAL ENCOUNTER: ICD-10-CM

## 2024-01-04 DIAGNOSIS — N39.0 UTI (URINARY TRACT INFECTION): ICD-10-CM

## 2024-01-04 DIAGNOSIS — N39.0 SEPTIC SHOCK DUE TO URINARY TRACT INFECTION: ICD-10-CM

## 2024-01-04 DIAGNOSIS — N18.9 ANEMIA DUE TO CHRONIC KIDNEY DISEASE: ICD-10-CM

## 2024-01-04 DIAGNOSIS — R00.1 BRADYCARDIA, DRUG INDUCED: ICD-10-CM

## 2024-01-04 PROBLEM — N18.30 CKD (CHRONIC KIDNEY DISEASE) STAGE 3, GFR 30-59 ML/MIN (HCC): Chronic | Status: ACTIVE | Noted: 2021-03-22

## 2024-01-04 PROBLEM — J96.01 ACUTE RESPIRATORY FAILURE WITH HYPOXIA (HCC): Status: RESOLVED | Noted: 2021-05-03 | Resolved: 2024-01-04

## 2024-01-04 PROBLEM — R00.0 TACHYCARDIA: Status: RESOLVED | Noted: 2021-03-22 | Resolved: 2024-01-04

## 2024-01-04 PROBLEM — K92.2 GASTROINTESTINAL HEMORRHAGE: Status: RESOLVED | Noted: 2017-11-13 | Resolved: 2024-01-04

## 2024-01-04 PROBLEM — I48.4 ATYPICAL ATRIAL FLUTTER (HCC): Chronic | Status: ACTIVE | Noted: 2021-03-22

## 2024-01-04 PROBLEM — K92.2 GASTROINTESTINAL BLEEDING: Status: RESOLVED | Noted: 2017-11-13 | Resolved: 2024-01-04

## 2024-01-04 PROBLEM — R79.89 ELEVATED D-DIMER: Status: RESOLVED | Noted: 2021-02-28 | Resolved: 2024-01-04

## 2024-01-04 PROBLEM — K59.00 CONSTIPATION: Status: RESOLVED | Noted: 2021-03-07 | Resolved: 2024-01-04

## 2024-01-04 PROBLEM — I50.32 CHRONIC HEART FAILURE WITH PRESERVED EJECTION FRACTION (HCC): Chronic | Status: ACTIVE | Noted: 2021-05-19

## 2024-01-04 PROBLEM — K92.1 HEMATOCHEZIA: Status: RESOLVED | Noted: 2017-11-13 | Resolved: 2024-01-04

## 2024-01-04 PROBLEM — R79.89 ELEVATED SERUM CREATININE: Status: RESOLVED | Noted: 2021-02-28 | Resolved: 2024-01-04

## 2024-01-04 PROBLEM — D62 ACUTE BLOOD LOSS ANEMIA: Status: RESOLVED | Noted: 2017-11-13 | Resolved: 2024-01-04

## 2024-01-04 PROBLEM — J96.21 ACUTE ON CHRONIC RESPIRATORY FAILURE WITH HYPOXIA (HCC): Status: RESOLVED | Noted: 2021-03-22 | Resolved: 2024-01-04

## 2024-01-04 LAB
ALBUMIN SERPL BCP-MCNC: 3.5 G/DL (ref 3.5–5)
ALP SERPL-CCNC: 106 U/L (ref 34–104)
ALT SERPL W P-5'-P-CCNC: 14 U/L (ref 7–52)
ANION GAP SERPL CALCULATED.3IONS-SCNC: 9 MMOL/L
AST SERPL W P-5'-P-CCNC: 21 U/L (ref 13–39)
ATRIAL RATE: 56 BPM
BACTERIA UR QL AUTO: ABNORMAL /HPF
BASOPHILS # BLD AUTO: 0.01 THOUSANDS/ÂΜL (ref 0–0.1)
BASOPHILS NFR BLD AUTO: 0 % (ref 0–1)
BILIRUB SERPL-MCNC: 0.67 MG/DL (ref 0.2–1)
BILIRUB UR QL STRIP: NEGATIVE
BUN SERPL-MCNC: 37 MG/DL (ref 5–25)
CALCIUM SERPL-MCNC: 9.1 MG/DL (ref 8.4–10.2)
CHLORIDE SERPL-SCNC: 102 MMOL/L (ref 96–108)
CLARITY UR: ABNORMAL
CO2 SERPL-SCNC: 29 MMOL/L (ref 21–32)
COLOR UR: YELLOW
CREAT SERPL-MCNC: 1.95 MG/DL (ref 0.6–1.3)
CRP SERPL QL: 35.3 MG/L
EOSINOPHIL # BLD AUTO: 0.02 THOUSAND/ÂΜL (ref 0–0.61)
EOSINOPHIL NFR BLD AUTO: 0 % (ref 0–6)
ERYTHROCYTE [DISTWIDTH] IN BLOOD BY AUTOMATED COUNT: 20.5 % (ref 11.6–15.1)
ERYTHROCYTE [SEDIMENTATION RATE] IN BLOOD: 54 MM/HOUR (ref 0–29)
FLUAV RNA RESP QL NAA+PROBE: NEGATIVE
FLUBV RNA RESP QL NAA+PROBE: NEGATIVE
GFR SERPL CREATININE-BSD FRML MDRD: 21 ML/MIN/1.73SQ M
GLUCOSE SERPL-MCNC: 112 MG/DL (ref 65–140)
GLUCOSE UR STRIP-MCNC: NEGATIVE MG/DL
HCT VFR BLD AUTO: 32.4 % (ref 34.8–46.1)
HGB BLD-MCNC: 9.2 G/DL (ref 11.5–15.4)
HGB UR QL STRIP.AUTO: NEGATIVE
HYALINE CASTS #/AREA URNS LPF: ABNORMAL /LPF
IMM GRANULOCYTES # BLD AUTO: 0.01 THOUSAND/UL (ref 0–0.2)
IMM GRANULOCYTES NFR BLD AUTO: 0 % (ref 0–2)
KETONES UR STRIP-MCNC: NEGATIVE MG/DL
LACTATE SERPL-SCNC: 2.3 MMOL/L (ref 0.5–2)
LACTATE SERPL-SCNC: 2.7 MMOL/L (ref 0.5–2)
LEUKOCYTE ESTERASE UR QL STRIP: ABNORMAL
LYMPHOCYTES # BLD AUTO: 0.49 THOUSANDS/ÂΜL (ref 0.6–4.47)
LYMPHOCYTES NFR BLD AUTO: 9 % (ref 14–44)
MCH RBC QN AUTO: 23.1 PG (ref 26.8–34.3)
MCHC RBC AUTO-ENTMCNC: 28.4 G/DL (ref 31.4–37.4)
MCV RBC AUTO: 81 FL (ref 82–98)
MONOCYTES # BLD AUTO: 0.56 THOUSAND/ÂΜL (ref 0.17–1.22)
MONOCYTES NFR BLD AUTO: 11 % (ref 4–12)
NEUTROPHILS # BLD AUTO: 4.12 THOUSANDS/ÂΜL (ref 1.85–7.62)
NEUTS SEG NFR BLD AUTO: 80 % (ref 43–75)
NITRITE UR QL STRIP: NEGATIVE
NON-SQ EPI CELLS URNS QL MICRO: ABNORMAL /HPF
NRBC BLD AUTO-RTO: 0 /100 WBCS
PH UR STRIP.AUTO: 7.5 [PH]
PLATELET # BLD AUTO: 220 THOUSANDS/UL (ref 149–390)
PLATELET # BLD AUTO: 223 THOUSANDS/UL (ref 149–390)
PMV BLD AUTO: 9 FL (ref 8.9–12.7)
PMV BLD AUTO: 9.1 FL (ref 8.9–12.7)
POTASSIUM SERPL-SCNC: 3.4 MMOL/L (ref 3.5–5.3)
PROCALCITONIN SERPL-MCNC: <0.05 NG/ML
PROT SERPL-MCNC: 7.6 G/DL (ref 6.4–8.4)
PROT UR STRIP-MCNC: ABNORMAL MG/DL
QRS AXIS: -12 DEGREES
QRSD INTERVAL: 122 MS
QT INTERVAL: 540 MS
QTC INTERVAL: 482 MS
RBC # BLD AUTO: 3.99 MILLION/UL (ref 3.81–5.12)
RBC #/AREA URNS AUTO: ABNORMAL /HPF
RSV RNA RESP QL NAA+PROBE: NEGATIVE
SARS-COV-2 RNA RESP QL NAA+PROBE: NEGATIVE
SODIUM SERPL-SCNC: 140 MMOL/L (ref 135–147)
SP GR UR STRIP.AUTO: 1.02 (ref 1–1.03)
T WAVE AXIS: 172 DEGREES
UROBILINOGEN UR STRIP-ACNC: <2 MG/DL
VENTRICULAR RATE: 48 BPM
WBC # BLD AUTO: 5.21 THOUSAND/UL (ref 4.31–10.16)
WBC #/AREA URNS AUTO: ABNORMAL /HPF

## 2024-01-04 PROCEDURE — 83605 ASSAY OF LACTIC ACID: CPT | Performed by: EMERGENCY MEDICINE

## 2024-01-04 PROCEDURE — 85025 COMPLETE CBC W/AUTO DIFF WBC: CPT

## 2024-01-04 PROCEDURE — NC001 PR NO CHARGE: Performed by: INTERNAL MEDICINE

## 2024-01-04 PROCEDURE — 71045 X-RAY EXAM CHEST 1 VIEW: CPT

## 2024-01-04 PROCEDURE — 93005 ELECTROCARDIOGRAM TRACING: CPT

## 2024-01-04 PROCEDURE — 36415 COLL VENOUS BLD VENIPUNCTURE: CPT

## 2024-01-04 PROCEDURE — 80053 COMPREHEN METABOLIC PANEL: CPT

## 2024-01-04 PROCEDURE — 87086 URINE CULTURE/COLONY COUNT: CPT | Performed by: NURSE PRACTITIONER

## 2024-01-04 PROCEDURE — 0241U HB NFCT DS VIR RESP RNA 4 TRGT: CPT

## 2024-01-04 PROCEDURE — 81001 URINALYSIS AUTO W/SCOPE: CPT

## 2024-01-04 PROCEDURE — 87077 CULTURE AEROBIC IDENTIFY: CPT | Performed by: NURSE PRACTITIONER

## 2024-01-04 PROCEDURE — 87186 SC STD MICRODIL/AGAR DIL: CPT | Performed by: NURSE PRACTITIONER

## 2024-01-04 PROCEDURE — 99291 CRITICAL CARE FIRST HOUR: CPT | Performed by: INTERNAL MEDICINE

## 2024-01-04 PROCEDURE — 85652 RBC SED RATE AUTOMATED: CPT | Performed by: INTERNAL MEDICINE

## 2024-01-04 PROCEDURE — 99285 EMERGENCY DEPT VISIT HI MDM: CPT

## 2024-01-04 PROCEDURE — 85049 AUTOMATED PLATELET COUNT: CPT | Performed by: NURSE PRACTITIONER

## 2024-01-04 PROCEDURE — 84145 PROCALCITONIN (PCT): CPT | Performed by: EMERGENCY MEDICINE

## 2024-01-04 PROCEDURE — 96365 THER/PROPH/DIAG IV INF INIT: CPT

## 2024-01-04 PROCEDURE — 87040 BLOOD CULTURE FOR BACTERIA: CPT | Performed by: EMERGENCY MEDICINE

## 2024-01-04 PROCEDURE — 86140 C-REACTIVE PROTEIN: CPT | Performed by: INTERNAL MEDICINE

## 2024-01-04 PROCEDURE — 02HV33Z INSERTION OF INFUSION DEVICE INTO SUPERIOR VENA CAVA, PERCUTANEOUS APPROACH: ICD-10-PCS | Performed by: OBSTETRICS & GYNECOLOGY

## 2024-01-04 PROCEDURE — 96375 TX/PRO/DX INJ NEW DRUG ADDON: CPT

## 2024-01-04 PROCEDURE — 96367 TX/PROPH/DG ADDL SEQ IV INF: CPT

## 2024-01-04 RX ORDER — SODIUM CHLORIDE, SODIUM GLUCONATE, SODIUM ACETATE, POTASSIUM CHLORIDE, MAGNESIUM CHLORIDE, SODIUM PHOSPHATE, DIBASIC, AND POTASSIUM PHOSPHATE .53; .5; .37; .037; .03; .012; .00082 G/100ML; G/100ML; G/100ML; G/100ML; G/100ML; G/100ML; G/100ML
1000 INJECTION, SOLUTION INTRAVENOUS ONCE
Qty: 1000 ML | Refills: 0 | Status: COMPLETED | OUTPATIENT
Start: 2024-01-04 | End: 2024-01-04

## 2024-01-04 RX ORDER — ALBUMIN, HUMAN INJ 5% 5 %
SOLUTION INTRAVENOUS
Status: DISPENSED
Start: 2024-01-04 | End: 2024-01-05

## 2024-01-04 RX ORDER — SODIUM CHLORIDE, SODIUM GLUCONATE, SODIUM ACETATE, POTASSIUM CHLORIDE, MAGNESIUM CHLORIDE, SODIUM PHOSPHATE, DIBASIC, AND POTASSIUM PHOSPHATE .53; .5; .37; .037; .03; .012; .00082 G/100ML; G/100ML; G/100ML; G/100ML; G/100ML; G/100ML; G/100ML
1000 INJECTION, SOLUTION INTRAVENOUS ONCE
Status: DISCONTINUED | OUTPATIENT
Start: 2024-01-04 | End: 2024-01-04

## 2024-01-04 RX ORDER — CHLORHEXIDINE GLUCONATE ORAL RINSE 1.2 MG/ML
15 SOLUTION DENTAL EVERY 12 HOURS SCHEDULED
Status: DISCONTINUED | OUTPATIENT
Start: 2024-01-04 | End: 2024-01-09

## 2024-01-04 RX ORDER — HEPARIN SODIUM 5000 [USP'U]/ML
5000 INJECTION, SOLUTION INTRAVENOUS; SUBCUTANEOUS EVERY 8 HOURS SCHEDULED
Status: DISCONTINUED | OUTPATIENT
Start: 2024-01-04 | End: 2024-01-05

## 2024-01-04 RX ORDER — ALBUMIN, HUMAN INJ 5% 5 %
25 SOLUTION INTRAVENOUS ONCE
Status: COMPLETED | OUTPATIENT
Start: 2024-01-04 | End: 2024-01-04

## 2024-01-04 RX ORDER — ALBUMIN, HUMAN INJ 5% 5 %
SOLUTION INTRAVENOUS
Status: COMPLETED
Start: 2024-01-04 | End: 2024-01-04

## 2024-01-04 RX ORDER — SODIUM CHLORIDE, SODIUM GLUCONATE, SODIUM ACETATE, POTASSIUM CHLORIDE, MAGNESIUM CHLORIDE, SODIUM PHOSPHATE, DIBASIC, AND POTASSIUM PHOSPHATE .53; .5; .37; .037; .03; .012; .00082 G/100ML; G/100ML; G/100ML; G/100ML; G/100ML; G/100ML; G/100ML
300 INJECTION, SOLUTION INTRAVENOUS ONCE
Status: COMPLETED | OUTPATIENT
Start: 2024-01-04 | End: 2024-01-04

## 2024-01-04 RX ORDER — SODIUM CHLORIDE, SODIUM GLUCONATE, SODIUM ACETATE, POTASSIUM CHLORIDE, MAGNESIUM CHLORIDE, SODIUM PHOSPHATE, DIBASIC, AND POTASSIUM PHOSPHATE .53; .5; .37; .037; .03; .012; .00082 G/100ML; G/100ML; G/100ML; G/100ML; G/100ML; G/100ML; G/100ML
1000 INJECTION, SOLUTION INTRAVENOUS ONCE
Status: COMPLETED | OUTPATIENT
Start: 2024-01-04 | End: 2024-01-04

## 2024-01-04 RX ADMIN — EPINEPHRINE 10 MCG/MIN: 1 INJECTION INTRAMUSCULAR; INTRAVENOUS; SUBCUTANEOUS at 21:51

## 2024-01-04 RX ADMIN — CEFTRIAXONE SODIUM 1000 MG: 10 INJECTION, POWDER, FOR SOLUTION INTRAVENOUS at 12:32

## 2024-01-04 RX ADMIN — NOREPINEPHRINE BITARTRATE 4 MCG/MIN: 1 INJECTION, SOLUTION, CONCENTRATE INTRAVENOUS at 15:37

## 2024-01-04 RX ADMIN — ALBUMIN, HUMAN INJ 5% 25 G: 5 SOLUTION at 18:04

## 2024-01-04 RX ADMIN — HEPARIN SODIUM 5000 UNITS: 5000 INJECTION INTRAVENOUS; SUBCUTANEOUS at 15:44

## 2024-01-04 RX ADMIN — HEPARIN SODIUM 5000 UNITS: 5000 INJECTION INTRAVENOUS; SUBCUTANEOUS at 21:51

## 2024-01-04 RX ADMIN — EPINEPHRINE 2 MCG/MIN: 1 INJECTION INTRAMUSCULAR; INTRAVENOUS; SUBCUTANEOUS at 11:49

## 2024-01-04 RX ADMIN — ALBUMIN (HUMAN) 25 G: 12.5 INJECTION, SOLUTION INTRAVENOUS at 18:04

## 2024-01-04 RX ADMIN — NOREPINEPHRINE BITARTRATE 10 MCG/MIN: 1 INJECTION, SOLUTION, CONCENTRATE INTRAVENOUS at 21:51

## 2024-01-04 RX ADMIN — SODIUM CHLORIDE, SODIUM GLUCONATE, SODIUM ACETATE, POTASSIUM CHLORIDE, MAGNESIUM CHLORIDE, SODIUM PHOSPHATE, DIBASIC, AND POTASSIUM PHOSPHATE 300 ML: .53; .5; .37; .037; .03; .012; .00082 INJECTION, SOLUTION INTRAVENOUS at 13:46

## 2024-01-04 RX ADMIN — EPINEPHRINE 10 MCG/MIN: 1 INJECTION INTRAMUSCULAR; INTRAVENOUS; SUBCUTANEOUS at 20:21

## 2024-01-04 RX ADMIN — SODIUM CHLORIDE, SODIUM GLUCONATE, SODIUM ACETATE, POTASSIUM CHLORIDE AND MAGNESIUM CHLORIDE 1000 ML: 526; 502; 368; 37; 30 INJECTION, SOLUTION INTRAVENOUS at 11:40

## 2024-01-04 RX ADMIN — SODIUM CHLORIDE, SODIUM GLUCONATE, SODIUM ACETATE, POTASSIUM CHLORIDE, MAGNESIUM CHLORIDE, SODIUM PHOSPHATE, DIBASIC, AND POTASSIUM PHOSPHATE 1000 ML: .53; .5; .37; .037; .03; .012; .00082 INJECTION, SOLUTION INTRAVENOUS at 13:45

## 2024-01-04 NOTE — ED PROVIDER NOTES
History  Chief Complaint   Patient presents with    Weakness - Generalized     Pt here by ems from home d/t overall weakness. Per ems patient hasn't been eating or drinking the past few days. Patient had  a witnessed fall(tripped on sock)  this morning -headstrike -thinners      89 yo F PMH atrial flutter on diltiazem and metoprolol presenting with decreased PO intake for 1 week. Reports her right leg is increasingly swollen and has been leaking fluid from the back of her calf. No fevers, cough or congestion. Did not take her medication this AM, bradycardic and hypotensive.          Prior to Admission Medications   Prescriptions Last Dose Informant Patient Reported? Taking?   apixaban (Eliquis) 2.5 mg  Child No No   Sig: Take 1 tablet (2.5 mg total) by mouth 2 (two) times a day   diltiazem (CARDIZEM CD) 180 mg 24 hr capsule   No No   Sig: TAKE 1 CAPSULE(180 MG) BY MOUTH DAILY   furosemide (LASIX) 20 mg tablet   No No   Sig: TAKE 1 TABLET(20 MG) BY MOUTH DAILY   metoprolol succinate (TOPROL-XL) 25 mg 24 hr tablet   No No   Sig: Take 1 tablet (25 mg total) by mouth every 12 (twelve) hours   potassium chloride (MICRO-K) 10 MEQ CR capsule   No No   Sig: Take 1 capsule (10 mEq total) by mouth daily      Facility-Administered Medications: None       Past Medical History:   Diagnosis Date    Acute kidney injury (HCC)     Arthritis     Atrial flutter (HCC)     Rapid heart rate        Past Surgical History:   Procedure Laterality Date    CATARACT EXTRACTION      EGD AND COLONOSCOPY N/A 11/14/2017    Procedure: EGD AND COLONOSCOPY;  Surgeon: Radha Salas MD;  Location: [facility];  Service: Gastroenterology    HIP FRACTURE SURGERY         Family History   Problem Relation Age of Onset    Diabetes Father     Cancer Family      I have reviewed and agree with the history as documented.    E-Cigarette/Vaping    E-Cigarette Use Never User      E-Cigarette/Vaping Substances    Nicotine No     THC No     CBD No     Flavoring No      Other No     Unknown No      Social History     Tobacco Use    Smoking status: Never    Smokeless tobacco: Never    Tobacco comments:     former smoker, per [facility];  started smoking at 17 yo, stopped at 31 yo   Vaping Use    Vaping status: Never Used   Substance Use Topics    Alcohol use: No    Drug use: No        Review of Systems   Constitutional: Negative.    HENT: Negative.     Respiratory: Negative.     Cardiovascular: Negative.    Gastrointestinal: Negative.    Genitourinary: Negative.    Musculoskeletal: Negative.    Neurological: Negative.    Psychiatric/Behavioral: Negative.         Physical Exam  ED Triage Vitals   Temperature Pulse Respirations Blood Pressure SpO2   01/04/24 0942 01/04/24 0942 01/04/24 0942 01/04/24 0945 01/04/24 0942   97.9 °F (36.6 °C) 59 18 (!) 86/50 95 %      Temp Source Heart Rate Source Patient Position - Orthostatic VS BP Location FiO2 (%)   01/04/24 0942 01/04/24 0942 01/04/24 0942 01/04/24 0942 --   Oral Monitor Lying Right arm       Pain Score       01/04/24 0942       No Pain             Orthostatic Vital Signs  Vitals:    01/04/24 1226 01/04/24 1237 01/04/24 1248 01/04/24 1315   BP: (!) 76/53 (!) 86/53 (!) 88/50 90/53   Pulse: 58 62 63 67   Patient Position - Orthostatic VS:    Lying       Physical Exam  Constitutional:       General: She is not in acute distress.  HENT:      Head: Normocephalic.   Eyes:      Extraocular Movements: Extraocular movements intact.      Conjunctiva/sclera: Conjunctivae normal.      Pupils: Pupils are equal, round, and reactive to light.   Cardiovascular:      Rate and Rhythm: Bradycardia present. Rhythm irregular.   Pulmonary:      Effort: Pulmonary effort is normal.      Breath sounds: Normal breath sounds.   Abdominal:      General: Abdomen is flat.      Palpations: Abdomen is soft.   Neurological:      General: No focal deficit present.      Mental Status: She is alert and oriented to person, place, and time.   Psychiatric:         Mood and  Affect: Mood normal.         Behavior: Behavior normal.         Thought Content: Thought content normal.         Judgment: Judgment normal.         ED Medications  Medications   EPINEPHrine 5,000 mcg (STANDARD CONCENTRATION) IV in sodium chloride 0.9% 250 mL (6 mcg/min Intravenous Rate/Dose Change 1/4/24 1248)   multi-electrolyte (ISOLYTE-S PH 7.4) bolus 1,000 mL (0 mL Intravenous Stopped 1/4/24 1152)   ceftriaxone (ROCEPHIN) 1 g/50 mL in dextrose IVPB (0 mg Intravenous Stopped 1/4/24 1314)       Diagnostic Studies  Results Reviewed       Procedure Component Value Units Date/Time    Urine Microscopic [[id number]]  (Abnormal) Collected: 01/04/24 1141    Lab Status: Final result Specimen: Urine, Clean Catch Updated: 01/04/24 1207     RBC, UA 2-4 /hpf      WBC, UA Innumerable /hpf      Epithelial Cells Occasional /hpf      Bacteria, UA Occasional /hpf      Hyaline Casts, UA 0-3 /lpf     UA (URINE) with reflex to Scope [[id number]]  (Abnormal) Collected: 01/04/24 1141    Lab Status: Final result Specimen: Urine, Clean Catch Updated: 01/04/24 1200     Color, UA Yellow     Clarity, UA Turbid     Specific Gravity, UA 1.016     pH, UA 7.5     Leukocytes, UA Large     Nitrite, UA Negative     Protein, UA 70 (1+) mg/dl      Glucose, UA Negative mg/dl      Ketones, UA Negative mg/dl      Urobilinogen, UA <2.0 mg/dl      Bilirubin, UA Negative     Occult Blood, UA Negative    FLU/RSV/COVID - if FLU/RSV clinically relevant [[id number]]  (Normal) Collected: 01/04/24 1030    Lab Status: Final result Specimen: Nares from Nose Updated: 01/04/24 1117     SARS-CoV-2 Negative     INFLUENZA A PCR Negative     INFLUENZA B PCR Negative     RSV PCR Negative    Narrative:      FOR PEDIATRIC PATIENTS - copy/paste COVID Guidelines URL to browser: [email or web address]    SARS-CoV-2 assay is a Nucleic Acid Amplification assay intended for the  qualitative detection of nucleic acid from  SARS-CoV-2 in nasopharyngeal  swabs. Results are for the presumptive identification of SARS-CoV-2 RNA.    Positive results are indicative of infection with SARS-CoV-2, the virus  causing COVID-19, but do not rule out bacterial infection or co-infection  with other viruses. Laboratories within the [place] and its  territories are required to report all positive results to the appropriate  public health authorities. Negative results do not preclude SARS-CoV-2  infection and should not be used as the sole basis for treatment or other  patient management decisions. Negative results must be combined with  clinical observations, patient history, and epidemiological information.  This test has not been FDA cleared or approved.    This test has been authorized by FDA under an Emergency Use Authorization  (EUA). This test is only authorized for the duration of time the  declaration that circumstances exist justifying the authorization of the  emergency use of an in vitro diagnostic tests for detection of SARS-CoV-2  virus and/or diagnosis of COVID-19 infection under section 564(b)(1) of  the Act, 21 U.S.C. 360bbb-3(b)(1), unless the authorization is terminated  or revoked sooner. The test has been validated but independent review by FDA  and CLIA is pending.    Test performed using [email or web address] GeneXpert: This RT-PCR assay targets N2,  a region unique to SARS-CoV-2. A conserved region in the E-gene was chosen  for pan-Sarbecovirus detection which includes SARS-CoV-2.    According to CMS-2020-01-R, this platform meets the definition of high-throughput technology.    Comprehensive metabolic panel [[id number]]  (Abnormal) Collected: 01/04/24 1030    Lab Status: Final result Specimen: Blood from Arm, Left Updated: 01/04/24 1059     Sodium 140 mmol/L      Potassium 3.4 mmol/L      Chloride 102 mmol/L      CO2 29 mmol/L      ANION GAP 9 mmol/L      BUN 37 mg/dL      Creatinine 1.95 mg/dL      Glucose 112 mg/dL      Calcium 9.1  mg/dL      AST 21 U/L      ALT 14 U/L      Alkaline Phosphatase 106 U/L      Total Protein 7.6 g/dL      Albumin 3.5 g/dL      Total Bilirubin 0.67 mg/dL      eGFR 21 ml/min/1.73sq m     Narrative:      National Kidney Disease Foundation guidelines for Chronic Kidney Disease (CKD):     Stage 1 with normal or high GFR (GFR > 90 mL/min/1.73 square meters)    Stage 2 Mild CKD (GFR = 60-89 mL/min/1.73 square meters)    Stage 3A Moderate CKD (GFR = 45-59 mL/min/1.73 square meters)    Stage 3B Moderate CKD (GFR = 30-44 mL/min/1.73 square meters)    Stage 4 Severe CKD (GFR = 15-29 mL/min/1.73 square meters)    Stage 5 End Stage CKD (GFR <15 mL/min/1.73 square meters)  Note: GFR calculation is accurate only with a steady state creatinine    CBC and differential [[id number]]  (Abnormal) Collected: 01/04/24 1030    Lab Status: Final result Specimen: Blood from Arm, Left Updated: 01/04/24 1038     WBC 5.21 Thousand/uL      RBC 3.99 Million/uL      Hemoglobin 9.2 g/dL      Hematocrit 32.4 %      MCV 81 fL      MCH 23.1 pg      MCHC 28.4 g/dL      RDW 20.5 %      MPV 9.1 fL      Platelets 223 Thousands/uL      nRBC 0 /100 WBCs      Neutrophils Relative 80 %      Immat GRANS % 0 %      Lymphocytes Relative 9 %      Monocytes Relative 11 %      Eosinophils Relative 0 %      Basophils Relative 0 %      Neutrophils Absolute 4.12 Thousands/µL      Immature Grans Absolute 0.01 Thousand/uL      Lymphocytes Absolute 0.49 Thousands/µL      Monocytes Absolute 0.56 Thousand/µL      Eosinophils Absolute 0.02 Thousand/µL      Basophils Absolute 0.01 Thousands/µL                    No orders to display         Procedures  ECG 12 Lead Documentation Only    Date/Time: 1/4/2024 12:06 PM    Performed by: Ruchi Shi MD  Authorized by: Ruchi Shi MD    Previous ECG:     Previous ECG:  Compared to current    Similarity:  Changes noted  Interpretation:     Interpretation: abnormal    Rate:     ECG rate assessment: bradycardic    Rhythm:      Rhythm: atrial fibrillation    QRS:     QRS axis:  Normal    QRS intervals:  Wide        ED Course  ED Course as of 01/04/24 1317   Thu Jan 04, 2024   1030 Patient seen and evaluated. CBC CMP UA and viral panel ordered.     Patient hypotensive, 1L of isolyte given with pressure bag. Continues to be hypotensive and bradycardic, placed on epinephrine 1 mcg/min via peripheral IV, BP improved to 93/60.   IV Rocephin given for UTI and LLE cellulitis.   Admit to ICU for HOSSEIN, cellulitis, hypotension.                        SBIRT 20yo+      Flowsheet Row Most Recent Value   Initial Alcohol Screen: US AUDIT-C     1. How often do you have a drink containing alcohol? 0 Filed at: 01/04/2024 1015   2. How many drinks containing alcohol do you have on a typical day you are drinking?  0 Filed at: 01/04/2024 1015   3a. Male UNDER 65: How often do you have five or more drinks on one occasion? 0 Filed at: 01/04/2024 1015   3b. FEMALE Any Age, or MALE 65+: How often do you have 4 or more drinks on one occassion? 0 Filed at: 01/04/2024 1015   Audit-C Score 0 Filed at: 01/04/2024 1015   MARYLOU: How many times in the past year have you...    Used an illegal drug or used a prescription medication for non-medical reasons? Never Filed at: 01/04/2024 1015                  Medical Decision Making  Amount and/or Complexity of Data Reviewed  Labs: ordered.    Risk  Prescription drug management.  Decision regarding hospitalization.          Disposition  Final diagnoses:   Acute kidney injury (HCC)   Cellulitis     Time reflects when diagnosis was documented in both MDM as applicable and the Disposition within this note       Time User Action Codes Description Comment    1/4/2024  1:16 PM Ruchi Shi Add [N17.9] Acute kidney injury (HCC)     1/4/2024  1:16 PM Ruchi Shi Add [L03.90] Cellulitis           ED Disposition       ED Disposition   Admit    Condition   Critical    Date/Time   Thu Jan 4, 2024 1316    Comment   Case was discussed with  Jeremy RODRIGUEZ and the patient's admission status was agreed to be Admission Status: inpatient status to the service of Dr. Galvez .               Follow-up Information    None         Patient's Medications   Discharge Prescriptions    No medications on file     No discharge procedures on file.    PDMP Review       None             ED Provider  Attending physically available and evaluated Aurora Anderson. I managed the patient along with the ED Attending.    Electronically Signed by           Ruchi Shi MD  01/04/24 2419

## 2024-01-04 NOTE — ED ATTENDING ATTESTATION
1/4/2024  I, Malini Bruce DO, saw and evaluated the patient. I have discussed the patient with the resident/non-physician practitioner and agree with the resident's/non-physician practitioner's findings, Plan of Care, and MDM as documented in the resident's/non-physician practitioner's note, except where noted. All available labs and Radiology studies were reviewed.  I was present for key portions of any procedure(s) performed by the resident/non-physician practitioner and I was immediately available to provide assistance.       At this point I agree with the current assessment done in the Emergency Department.  I have conducted an independent evaluation of this patient a history and physical is as follows:    History  Patient is a 89 y.o. year old female who presents for evaluation with ***.   Several days of generalized weakness and decreased appetite/p.o. intake  Has lymphedema bilaterally, but right lower extremity has been more swollen than usual.  Right lower extremity has also been leaking clear fluid.    Current Outpatient Medications   Medication Instructions    apixaban (ELIQUIS) 2.5 mg, Oral, 2 times daily    diltiazem (CARDIZEM CD) 180 mg 24 hr capsule TAKE 1 CAPSULE(180 MG) BY MOUTH DAILY    furosemide (LASIX) 20 mg tablet TAKE 1 TABLET(20 MG) BY MOUTH DAILY    metoprolol succinate (TOPROL-XL) 25 mg, Oral, Every 12 hours    potassium chloride (MICRO-K) 10 MEQ CR capsule 10 mEq, Oral, Daily     Past Medical History:   Diagnosis Date    Acute kidney injury (HCC)     Arthritis     Atrial flutter (HCC)     Rapid heart rate      Past Surgical History:   Procedure Laterality Date    CATARACT EXTRACTION      EGD AND COLONOSCOPY N/A 11/14/2017    Procedure: EGD AND COLONOSCOPY;  Surgeon: Radha Salas MD;  Location: [facility];  Service: Gastroenterology    HIP FRACTURE SURGERY         Objective  Vitals:    01/04/24 1237 01/04/24 1248 01/04/24 1315 01/04/24 1319   BP: (!) 86/53 (!) 88/50 90/53 (!) 89/51   BP  Location:   Right arm    Pulse: 62 63 67 64   Resp:   16    Temp:       TempSrc:       SpO2:   93%    Weight:       Height:           General: NAD, awake, alert.    Head: Normocephalic, atraumatic.  Eyes: PERRL, EOM-I.   ENT: Atraumatic external nose and ears.    Neck: Symmetric, supple, trachea midline.  CV: Bradycardic, irregular rhythm. Bilateral lower extremity edema.  Lungs: Respirations unlabored, no tachypnea. CTAB, lungs sounds equal bilateral.   Abd: soft, NT/ND. No guarding. No peritoneal signs.   MSK: Extremities without tenderness or gross deformity.   Skin: Posterior right lower leg erythematous, mildly warm to the touch, with some serous fluid oozing through the skin.  Neuro: AAOx3, GCS 15, CN II-XII grossly intact. Motor grossly intact. Sensory grossly intact  Psychiatric/Behavioral: Appropriate mood and affect. Behavior normal.    Results Reviewed       Procedure Component Value Units Date/Time    Urine Microscopic [[id number]]  (Abnormal) Collected: 01/04/24 1141    Lab Status: Final result Specimen: Urine, Clean Catch Updated: 01/04/24 1207     RBC, UA 2-4 /hpf      WBC, UA Innumerable /hpf      Epithelial Cells Occasional /hpf      Bacteria, UA Occasional /hpf      Hyaline Casts, UA 0-3 /lpf     UA (URINE) with reflex to Scope [[id number]]  (Abnormal) Collected: 01/04/24 1141    Lab Status: Final result Specimen: Urine, Clean Catch Updated: 01/04/24 1200     Color, UA Yellow     Clarity, UA Turbid     Specific Gravity, UA 1.016     pH, UA 7.5     Leukocytes, UA Large     Nitrite, UA Negative     Protein, UA 70 (1+) mg/dl      Glucose, UA Negative mg/dl      Ketones, UA Negative mg/dl      Urobilinogen, UA <2.0 mg/dl      Bilirubin, UA Negative     Occult Blood, UA Negative    FLU/RSV/COVID - if FLU/RSV clinically relevant [[id number]]  (Normal) Collected: 01/04/24 1030    Lab Status: Final result Specimen: Nares from Nose Updated: 01/04/24 1117     SARS-CoV-2 Negative     INFLUENZA A PCR Negative      INFLUENZA B PCR Negative     RSV PCR Negative    Narrative:      FOR PEDIATRIC PATIENTS - copy/paste COVID Guidelines URL to browser: [email or web address]    SARS-CoV-2 assay is a Nucleic Acid Amplification assay intended for the  qualitative detection of nucleic acid from SARS-CoV-2 in nasopharyngeal  swabs. Results are for the presumptive identification of SARS-CoV-2 RNA.    Positive results are indicative of infection with SARS-CoV-2, the virus  causing COVID-19, but do not rule out bacterial infection or co-infection  with other viruses. Laboratories within the [place] and its  territories are required to report all positive results to the appropriate  public health authorities. Negative results do not preclude SARS-CoV-2  infection and should not be used as the sole basis for treatment or other  patient management decisions. Negative results must be combined with  clinical observations, patient history, and epidemiological information.  This test has not been FDA cleared or approved.    This test has been authorized by FDA under an Emergency Use Authorization  (EUA). This test is only authorized for the duration of time the  declaration that circumstances exist justifying the authorization of the  emergency use of an in vitro diagnostic tests for detection of SARS-CoV-2  virus and/or diagnosis of COVID-19 infection under section 564(b)(1) of  the Act, 21 U.S.C. 360bbb-3(b)(1), unless the authorization is terminated  or revoked sooner. The test has been validated but independent review by FDA  and CLIA is pending.    Test performed using [employer] GeneXpert: This RT-PCR assay targets N2,  a region unique to SARS-CoV-2. A conserved region in the E-gene was chosen  for pan-Sarbecovirus detection which includes SARS-CoV-2.    According to CMS-2020-01-R, this platform meets the definition of high-throughput technology.    Comprehensive metabolic panel  [[id number]]  (Abnormal) Collected: 01/04/24 1030    Lab Status: Final result Specimen: Blood from Arm, Left Updated: 01/04/24 1059     Sodium 140 mmol/L      Potassium 3.4 mmol/L      Chloride 102 mmol/L      CO2 29 mmol/L      ANION GAP 9 mmol/L      BUN 37 mg/dL      Creatinine 1.95 mg/dL      Glucose 112 mg/dL      Calcium 9.1 mg/dL      AST 21 U/L      ALT 14 U/L      Alkaline Phosphatase 106 U/L      Total Protein 7.6 g/dL      Albumin 3.5 g/dL      Total Bilirubin 0.67 mg/dL      eGFR 21 ml/min/1.73sq m     Narrative:      National Kidney Disease Foundation guidelines for Chronic Kidney Disease (CKD):     Stage 1 with normal or high GFR (GFR > 90 mL/min/1.73 square meters)    Stage 2 Mild CKD (GFR = 60-89 mL/min/1.73 square meters)    Stage 3A Moderate CKD (GFR = 45-59 mL/min/1.73 square meters)    Stage 3B Moderate CKD (GFR = 30-44 mL/min/1.73 square meters)    Stage 4 Severe CKD (GFR = 15-29 mL/min/1.73 square meters)    Stage 5 End Stage CKD (GFR <15 mL/min/1.73 square meters)  Note: GFR calculation is accurate only with a steady state creatinine    CBC and differential [[id number]]  (Abnormal) Collected: 01/04/24 1030    Lab Status: Final result Specimen: Blood from Arm, Left Updated: 01/04/24 1038     WBC 5.21 Thousand/uL      RBC 3.99 Million/uL      Hemoglobin 9.2 g/dL      Hematocrit 32.4 %      MCV 81 fL      MCH 23.1 pg      MCHC 28.4 g/dL      RDW 20.5 %      MPV 9.1 fL      Platelets 223 Thousands/uL      nRBC 0 /100 WBCs      Neutrophils Relative 80 %      Immat GRANS % 0 %      Lymphocytes Relative 9 %      Monocytes Relative 11 %      Eosinophils Relative 0 %      Basophils Relative 0 %      Neutrophils Absolute 4.12 Thousands/µL      Immature Grans Absolute 0.01 Thousand/uL      Lymphocytes Absolute 0.49 Thousands/µL      Monocytes Absolute 0.56 Thousand/µL      Eosinophils Absolute 0.02 Thousand/µL      Basophils Absolute 0.01 Thousands/µL           No orders to display     Medications    EPINEPHrine 5,000 mcg (STANDARD CONCENTRATION) IV in sodium chloride 0.9% 250 mL (7 mcg/min Intravenous Rate/Dose Change 1/4/24 1319)   multi-electrolyte (ISOLYTE-S PH 7.4) bolus 1,000 mL (0 mL Intravenous Stopped 1/4/24 1152)   ceftriaxone (ROCEPHIN) 1 g/50 mL in dextrose IVPB (0 mg Intravenous Stopped 1/4/24 1314)     ED Course as of 01/04/24 1324   Thu Jan 04, 2024   1100 Creatinine(!): 1.95  Previously 1.1, meets criteria for an HOSSEIN   1115 Blood Pressure(!): 69/30  IV fluid bolus initiated   1202 Leukocytes, UA(!): Large   1210 WBC, UA(!): Innumerable   1210 Bacteria, UA: Occasional   1210 Epithelial Cells: Occasional       MDM                Critical Care Time  CriticalCare Time    Date/Time: 1/4/2024 1:43 PM    Performed by: Malini Bruce DO  Authorized by: Malini Bruce DO    Critical care provider statement:     Critical care time (minutes):  35    Critical care time was exclusive of:  Separately billable procedures and treating other patients and teaching time    Critical care was necessary to treat or prevent imminent or life-threatening deterioration of the following conditions:  Cardiac failure, shock and sepsis    Critical care was time spent personally by me on the following activities:  Re-evaluation of patient's condition, ordering and review of laboratory studies, ordering and performing treatments and interventions, examination of patient, evaluation of patient's response to treatment, discussions with consultants, development of treatment plan with patient or surrogate and obtaining history from patient or surrogate         epinephrine infusion was started at this time.  Patient also given Rocephin for treatment of presumed cellulitis and UTI.  Given her need for pressors, message was sent to the critical care team for admission.  Upon the critical care team's evaluation, patient was noted to be hypothermic, thus meeting criteria for septic shock.  The rest of the sepsis workup was ordered at this time, and further IV fluids were initiated to reach a total of 30 cc/kg of actual body weight.  Patient was admitted to the critical care service for further evaluation and treatment.          Critical Care Time  CriticalCare Time    Date/Time: 1/4/2024 1:43 PM    Performed by: Malini Bruce DO  Authorized by: Malini Bruce DO    Critical care provider statement:     Critical care time (minutes):  35    Critical care time was exclusive of:  Separately billable procedures and treating other patients and teaching time    Critical care was necessary to treat or prevent imminent or life-threatening deterioration of the following conditions:  Cardiac failure, shock and sepsis    Critical care was time spent personally by me on the following activities:  Re-evaluation of patient's condition, ordering and review of laboratory studies, ordering and performing treatments and interventions, examination of patient, evaluation of patient's response to treatment, discussions with consultants, development of treatment plan with patient or surrogate and obtaining history from patient or surrogate

## 2024-01-04 NOTE — ASSESSMENT & PLAN NOTE
Lab Results   Component Value Date    EGFR 21 01/04/2024    EGFR 43 05/19/2021    EGFR 38 03/25/2021    CREATININE 1.95 (H) 01/04/2024    CREATININE 1.12 05/19/2021    CREATININE 1.23 03/25/2021     Will monitor Cr  Receiving IVF

## 2024-01-04 NOTE — ED NOTES
Letter by Sandra Wing MD at      Author: Sandra Wing MD Service: -- Author Type: --    Filed:  Encounter Date: 10/7/2019 Status: Signed         Patient: Naina Lozada   MR Number: [id number]   YOB: 1923   Date of Visit: 10/7/2019     [facility]      Facility:    [facility] [[id number]]  Code Status: DNR/DNI       Chief Complaint/Reason for Visit:  Chief Complaint   Patient presents with   ? H & P     Re-admit to LTC, on Hospice.       HPI:   Naina is a 89 y.o. female who resides at [facility]. She has diagnoses of HTN, not on BP meds, anemia on iron, DJD, dementia. She has no disruptive behavioral issues, generally pleasantly confused. She has visual impairment and is on eye drops. She was sent in to the hospital from nursing home on 9/25/19 due to increasing confusion. Her hospital info is noted below.    Hospital HPI:  Naina Lozada is a 89 y.o. old female with past medical history significant for dementia, advanced age, nursing home resident who presented for 1 day history of confusion, auditory hallucination, and hypothermia.  Upon evaluation in the ED, she does have confusion but vitals and labs are only remarkable for bradycardia and elevated BNP.  CT head, chest x-ray, and UA are unremarkable for acute changes.  No fevers, chills, chest pain, nausea, vomiting.  She received IV lorazepam 1 mg x 1 prior to transfer up to the floor and was sedated.      Hospital DC Summary;  89 y.o. old female with past medical history significant for dementia, advanced age, nursing home resident who presented for 1 day history of confusion, auditory hallucination, and hypothermia.  Upon evaluation in the ED,vitals and labs are only remarkable for bradycardia and elevated BNP.  CT head, chest x-ray, and UA are unremarkable for acute changes.  No fevers, chills, chest pain, nausea, vomiting.  She received IV lorazepam 1 mg x 1 prior to  Provider made aware of hypotension, verbal order to hang 1L bolus of isolyte.      Rosa Maria Alfred RN  01/04/24 1105       Rosa Maria Alfred RN  01/04/24 1110     transfer up to the floor and was sedated.  Palliative care has evaluated hospice consulted hospice has spoken with nephew who opted for hospice care . Pt will be discharged to NH with hospice care.      Returned to nursing home on 9/27/19.    Today:  Unable to obtain any info from patient at this time. She has had behaviors with agitation, anxiety. Currently on scheduled and prn haldol and scheduled and prn dilaudid. Obtunded now, nurses report not eating much, taking just a little water. She did have a chocolate shake yesterday. She is on comfort meds only though she wasn't on much for routine meds even prior to hospitalization. She seems comfortable at this time. Coughing noted when awake, has atropine for secretions. Using nicole chair. Bowels monitored. Urine output decreased.        Past Medical History:  Past Medical History:   Diagnosis Date   ? Cancer (H)     Anal   ? Constipation     Chronic   ? Dementia    ? Hypertension    ? Sciatica            Surgical History:  No past surgical history on file.  Unable to obtain secondary to dementia, obtunded condition.     Family History:   No family history on file.  Unable to obtain secondary to dementia and obtunded condition.    Social History:    Social History     Socioeconomic History   ? Marital status:      Spouse name: Not on file   ? Number of children: Not on file   ? Years of education: Not on file   ? Highest education level: Not on file   Occupational History   ? Not on file   Social Needs   ? Financial resource strain: Not on file   ? Food insecurity:     Worry: Not on file     Inability: Not on file   ? Transportation needs:     Medical: Not on file     Non-medical: Not on file   Tobacco Use   ? Smoking status: Never Smoker   ? Smokeless tobacco: Never Used   Substance and Sexual Activity   ? Alcohol use: No   ? Drug use: No   ? Sexual activity: Not on file   Lifestyle   ? Physical activity:     Days per week: Not on file     Minutes per session:  Not on file   ? Stress: Not on file   Relationships   ? Social connections:     Talks on phone: Not on file     Gets together: Not on file     Attends Yazidism service: Not on file     Active member of club or organization: Not on file     Attends meetings of clubs or organizations: Not on file     Relationship status: Not on file   ? Intimate partner violence:     Fear of current or ex partner: Not on file     Emotionally abused: Not on file     Physically abused: Not on file     Forced sexual activity: Not on file   Other Topics Concern   ? Not on file   Social History Narrative   ? Not on file        Review of Systems:  Pertinent items are noted in HPI.   Unable to obtain secondary to dementia and obtunded condition.       Physical Exam:   General: Patient is resting in nicole chair, obtunded, not awakened. Cachectic.  Vitals: /67, Temp 96.5, Pulse 75, RR 16, O2 sat 97% RA.  HEENT: Head is NCAT. Eyes show no injection or icterus. Nares negative. Oropharynx somewhat dry, open mouth breathing.  Neck: Supple. No tenderness or adenopathy. No JVD.  Lungs: Poor cooperation. Clear bilaterally. No wheezes.  Cardiovascular: Regular rate and rhythm, systolic murmur.  Back: No spinal or CVA tenderness.  Abdomen: Soft, no tenderness on exam. Bowel sounds present. No guarding rebound or rigidity.  : Deferred.  Extremities: No edema is noted.  Musculoskeletal: Degen changes.   Skin: Warm and dry.  Psych: Unable to assess.       Labs:  Lab Results   Component Value Date    WBC 6.0 09/25/2019    HGB 12.4 09/25/2019    HCT 38.6 09/25/2019    MCV 94 09/25/2019     09/27/2019     Results for orders placed or performed in visit on 12/10/18   Basic Metabolic Panel   Result Value Ref Range    Sodium 142 136 - 145 mmol/L    Potassium 4.3 3.5 - 5.0 mmol/L    Chloride 113 (H) 98 - 107 mmol/L    CO2 22 22 - 31 mmol/L    Anion Gap, Calculation 7 5 - 18 mmol/L    Glucose 75 70 - 125 mg/dL    Calcium 9.4 8.5 - 10.5 mg/dL    BUN  30 (H) 8 - 28 mg/dL    Creatinine 0.71 0.60 - 1.10 mg/dL    GFR MDRD Af Amer >60 >60 mL/min/1.73m2    GFR MDRD Non Af Amer >60 >60 mL/min/1.73m2       Assessment/Plan:  1. Alzheimer dementia. Recent hospitalization with notable rapid decline. On Hospice, comfort meds in place. Agitated and anxious, restless at times, responding to medications for pain and sx control. Continue to follow closely.  2. HTN. Not on medications, no issues, will not be checked often due to end of life.  3. Anemia. Noted per records.   4. End of life cares.   5. Code status DNR/DNI/Hospice.         Electronically signed by: Sandra Wing MD

## 2024-01-04 NOTE — SEPSIS NOTE
"  Sepsis Note   Aurora Anderson 89 y.o. female MRN: [id number]  Unit/Bed#: ED-34 Encounter: [id number]       Initial Sepsis Screening       Row Name 01/04/24 1412                Is the patient's history suggestive of a new or worsening infection? Yes (Proceed)  -ED        Suspected source of infection urinary tract infection  -ED        Indicate SIRS criteria Hyperthemia > 38.3C (100.9F) OR Hypothermia <36C (96.8F);Tachypnea > 20 resp per min  -ED        Are two or more of the above signs & symptoms of infection both present and new to the patient? Yes (Proceed)  -ED        Assess for evidence of organ dysfunction: Are any of the below criteria present within 6 hours of suspected infection and SIRS criteria that are NOT considered to be chronic conditions? Lactate > 2.0;SBP < 90  -ED        Date of presentation of severe sepsis --        Time of presentation of severe sepsis --        Date of presentation of septic shock 01/04/24  -ED        Time of presentation of septic shock 1328  -ED        Fluid Resuscitation: 30 ml/kg IV fluid bolus will be given based on actual body weight  -ED        Is the patient is persistently hypotensive in the hour after fluid bolus administration? If yes, patient meets criteria for vasopressor use. --        Sepsis Note: Click \"NEXT\" below (NOT \"close\") to generate sepsis note based on above information. --                  User Key  (r) = Recorded By, (t) = Taken By, (c) = Cosigned By      Initials Name Provider Type    ED Malini Bruce DO Physician                        Body mass index is 27.96 kg/m².  Wt Readings from Last 1 Encounters:   01/04/24 76.2 kg (167 lb 15.9 oz)     IBW (Ideal Body Weight): 57 kg    Ideal body weight: 57 kg (125 lb 10.6 oz)  Adjusted ideal body weight: 64.7 kg (142 lb 9.5 oz)    "

## 2024-01-04 NOTE — ASSESSMENT & PLAN NOTE
Probable secondary to dehydration and infection  Getting 30 cc/kg IVF  Recheck labs in am  Monitor UO

## 2024-01-04 NOTE — ASSESSMENT & PLAN NOTE
Patient found to by hypothermic and tachypnea, hypotensive  30 cc/kg IVF ordered  Already on Epi infusion for suspected BRASH  Will cont ceftriaxone for UTI  Lactate and BC sent  Will add Levo if cont to be hypotensive  Place MADISON strange for temp of 88

## 2024-01-04 NOTE — LETTER
FAX      To:     [facility]   Company:  Phone:       Fax:        Email:        From:   Omayra Goodman  Phone:       Fax:    Email:      ________________________________________________    Patient scheduled for transport at 5:15pm. AVS attached. RN to call report.      NOTICE:  This communication, including attachments, may contain information that is confidential and protected by the -client or other privilege(s).

## 2024-01-04 NOTE — ASSESSMENT & PLAN NOTE
Hold Toprol and Diltiazem and BP improved  Hold Eliquis for now in case need to start TLC  Monitor on tele

## 2024-01-04 NOTE — ED NOTES
Barehugger placed on patient at this time due to rectal temperature. Provider at bedside.      Rosa Maria Alfred RN  01/04/24 3125

## 2024-01-04 NOTE — H&P
[facility]  H&P  Name: Aurora Anderson 89 y.o. female I MRN: [id number]  Unit/Bed#: ICU 05 I Date of Admission: 1/4/2024   Date of Service: 1/4/2024 I Hospital Day: 0      Assessment/Plan   * Septic shock due to urinary tract infection   Assessment & Plan  Patient found to by hypothermic and tachypnea, hypotensive  30 cc/kg IVF ordered  Already on Epi infusion for suspected BRASH  Will cont ceftriaxone for UTI  Lactate and BC sent  Will add Levo if cont to be hypotensive  Place MADISON strange for temp of 88    HOSSEIN (acute kidney injury) (HCC)  Assessment & Plan  Probable secondary to dehydration and infection  Getting 30 cc/kg IVF  Recheck labs in am  Monitor UO    Chronic heart failure with preserved ejection fraction (HCC)  Assessment & Plan  Wt Readings from Last 3 Encounters:   01/04/24 78 kg (171 lb 15.3 oz)   07/06/21 84.4 kg (186 lb)   07/01/21 84.4 kg (186 lb)     Hold opal eLasix for now given dehydration/ infection  Resume when cleared          Atypical atrial flutter (HCC)  Assessment & Plan  Hold Toprol and Diltiazem and BP improved  Hold Eliquis for now in case need to start TLC  Monitor on tele    CKD (chronic kidney disease) stage 3, GFR 30-59 ml/min (HCC)  Assessment & Plan  Lab Results   Component Value Date    EGFR 21 01/04/2024    EGFR 43 05/19/2021    EGFR 38 03/25/2021    CREATININE 1.95 (H) 01/04/2024    CREATININE 1.12 05/19/2021    CREATININE 1.23 03/25/2021     Will monitor Cr  Receiving IVF         History of Present Illness     HPI: Aurora Anderson is a 89 y.o. w/ pmhx chronic CHF w/ preserved EF, a flutter, CKD 3 who presents with weakness and increased leg swelling.  She reports decreased p.o. intake since Sunday.  She also reports significant swelling of her lower legs and has been oozing fluid from her calves.  She denies any chest pain or SOB.  In the ED patient was found to be hypothermic and had tachypnea.  She was also found to have positive UA suspicious for UTI  but denies any urinary symptoms.  Patient was started on IV epinephrine by the ED for suspected BRASH.  On exam patient at risk for septic shock so she was started on 30 cc/kg of IV fluid.  She was started on ceftriaxone.    History obtained from chart review and the patient.  Review of Systems   Constitutional:  Positive for fatigue.   HENT: Negative.     Eyes: Negative.    Respiratory: Negative.     Cardiovascular: Negative.    Gastrointestinal: Negative.    Genitourinary: Negative.    Musculoskeletal: Negative.    Skin: Negative.    Neurological:  Positive for weakness.   Psychiatric/Behavioral: Negative.     All other systems reviewed and are negative.      Disposition: Critical care    Historical Information   Past Medical History:  No date: Acute kidney injury (HCC)  No date: Arthritis  No date: Atrial flutter (HCC)  No date: Rapid heart rate Past Surgical History:  No date: CATARACT EXTRACTION  11/14/2017: EGD AND COLONOSCOPY; N/A      Comment:  Procedure: EGD AND COLONOSCOPY;  Surgeon: Radha Salas MD;  Location: [facility];  Service: Gastroenterology  No date: HIP FRACTURE SURGERY   Current Outpatient Medications   Medication Instructions    apixaban (ELIQUIS) 2.5 mg, Oral, 2 times daily    diltiazem (CARDIZEM CD) 180 mg 24 hr capsule TAKE 1 CAPSULE(180 MG) BY MOUTH DAILY    furosemide (LASIX) 20 mg tablet TAKE 1 TABLET(20 MG) BY MOUTH DAILY    metoprolol succinate (TOPROL-XL) 25 mg, Oral, Every 12 hours    potassium chloride (MICRO-K) 10 MEQ CR capsule 10 mEq, Oral, Daily    Allergies   Allergen Reactions    Penicillins Rash      Social History     Tobacco Use    Smoking status: Never    Smokeless tobacco: Never    Tobacco comments:     former smoker, per [facility];  started smoking at 17 yo, stopped at 29 yo   Vaping Use    Vaping status: Never Used   Substance Use Topics    Alcohol use: No    Drug use: No    Family History   Problem Relation Age of Onset    Diabetes Father     Cancer  Family           Objective                            Vitals I/O      Most Recent Min/Max in 24hrs   Temp (!) 90.9 °F (32.7 °C) Temp  Min: 88.8 °F (31.6 °C)  Max: 97.9 °F (36.6 °C)   Pulse 77 Pulse  Min: 41  Max: 77   Resp 22 Resp  Min: 16  Max: 34   BP 92/53 BP  Min: 69/48  Max: 92/53   O2 Sat (!) 89 % SpO2  Min: 87 %  Max: 98 %      Intake/Output Summary (Last 24 hours) at 2024 1637  Last data filed at 2024 1152  Gross per 24 hour   Intake 1000 ml   Output --   Net 1000 ml       Diet NPO; Sips of clear liquids    Invasive Monitoring           Physical Exam   Physical Exam  Vitals and nursing note reviewed.   Eyes:      Extraocular Movements: Extraocular movements intact.      Pupils: Pupils are equal, round, and reactive to light.   Skin:     General: Skin is warm and dry.   HENT:      Head: Normocephalic and atraumatic.   Cardiovascular:      Rate and Rhythm: Normal rate and regular rhythm.   Musculoskeletal:         General: Swelling (B/L LE) present. Normal range of motion.      Right lower le+ Pitting Edema present.      Left lower le+ Pitting Edema present.   Abdominal: General: Bowel sounds are normal. There is no distension.      Palpations: Abdomen is soft.      Tenderness: There is no abdominal tenderness.   Constitutional:       General: She is awake.      Appearance: Normal appearance. She is well-developed and overweight.   Pulmonary:      Effort: Pulmonary effort is normal.      Breath sounds: Normal breath sounds.   Psychiatric:         Behavior: Behavior is cooperative.   Neurological:      General: No focal deficit present.      Mental Status: She is alert and oriented to person, place and time. She is calm.      Motor: Strength full and intact in all extremities.            Diagnostic Studies      EKG: Bradycardia, junctional  Imaging: I have personally reviewed pertinent reports.       Medications:  Scheduled PRN   chlorhexidine, 15 mL, Q12H BRISSA  heparin (porcine), 5,000 Units,  Q8H BRISSA          Continuous    epinephrine, 1-10 mcg/min, Last Rate: 10 mcg/min (01/04/24 1429)  norepinephrine, 1-30 mcg/min, Last Rate: 4 mcg/min (01/04/24 1537)         Labs:    CBC    Recent Labs     01/04/24  1030 01/04/24  1544   WBC 5.21  --    HGB 9.2*  --    HCT 32.4*  --     220     BMP    Recent Labs     01/04/24  1030   SODIUM 140   K 3.4*      CO2 29   AGAP 9   BUN 37*   CREATININE 1.95*   CALCIUM 9.1       Coags    No recent results     Additional Electrolytes  No recent results       Blood Gas    No recent results  No recent results LFTs  Recent Labs     01/04/24  1030   ALT 14   AST 21   ALKPHOS 106*   ALB 3.5   TBILI 0.67       Infectious  Recent Labs     01/04/24  1338   PROCALCITONI <0.05     Glucose  Recent Labs     01/04/24  1030   GLUC 112             Critical Care Time Statement: Upon my evaluation, this patient had a high probability of imminent or life-threatening deterioration due to hypothermia/ septic shock, which required my direct attention, intervention, and personal management.  I spent a total of 40 minutes directly providing critical care services, including interpretation of complex medical databases, evaluating for the presence of life-threatening injuries or illnesses, complex medical decision making (to support/prevent further life-threatening deterioration)., interpretation of hemodynamic data, titration of vasoactive medications, titration of continuous IV medications (drips), and ventilator management. This time is exclusive of procedures, teaching, family meetings, and any prior time recorded by providers other than myself.    Anticipated Length of Stay is > 2 midnights  AZRA Malhotra

## 2024-01-04 NOTE — ASSESSMENT & PLAN NOTE
Wt Readings from Last 3 Encounters:   01/04/24 78 kg (171 lb 15.3 oz)   07/06/21 84.4 kg (186 lb)   07/01/21 84.4 kg (186 lb)     Hold opal eLasix for now given dehydration/ infection  Resume when cleared

## 2024-01-05 ENCOUNTER — APPOINTMENT (INPATIENT)
Dept: NON INVASIVE DIAGNOSTICS | Facility: HOSPITAL | Age: 89
DRG: 871 | End: 2024-01-05
Payer: MEDICARE

## 2024-01-05 ENCOUNTER — APPOINTMENT (INPATIENT)
Dept: RADIOLOGY | Facility: HOSPITAL | Age: 89
DRG: 871 | End: 2024-01-05
Payer: MEDICARE

## 2024-01-05 PROBLEM — I89.0 LYMPHEDEMA: Status: ACTIVE | Noted: 2024-01-05

## 2024-01-05 LAB
ANION GAP SERPL CALCULATED.3IONS-SCNC: 11 MMOL/L
AORTIC ROOT: 3.8 CM
APICAL FOUR CHAMBER EJECTION FRACTION: 54 %
ASCENDING AORTA: 3.4 CM
BASOPHILS # BLD AUTO: 0.03 THOUSANDS/ÂΜL (ref 0–0.1)
BASOPHILS NFR BLD AUTO: 0 % (ref 0–1)
BNP SERPL-MCNC: 1806 PG/ML (ref 0–100)
BUN SERPL-MCNC: 36 MG/DL (ref 5–25)
CALCIUM SERPL-MCNC: 8.4 MG/DL (ref 8.4–10.2)
CHLORIDE SERPL-SCNC: 106 MMOL/L (ref 96–108)
CO2 SERPL-SCNC: 25 MMOL/L (ref 21–32)
CREAT SERPL-MCNC: 1.87 MG/DL (ref 0.6–1.3)
E WAVE DECELERATION TIME: 111 MS
E/A RATIO: 3.89
EOSINOPHIL # BLD AUTO: 0 THOUSAND/ÂΜL (ref 0–0.61)
EOSINOPHIL NFR BLD AUTO: 0 % (ref 0–6)
ERYTHROCYTE [DISTWIDTH] IN BLOOD BY AUTOMATED COUNT: 20.6 % (ref 11.6–15.1)
FRACTIONAL SHORTENING: 22 (ref 28–44)
GFR SERPL CREATININE-BSD FRML MDRD: 22 ML/MIN/1.73SQ M
GLUCOSE SERPL-MCNC: 136 MG/DL (ref 65–140)
HCT VFR BLD AUTO: 28.9 % (ref 34.8–46.1)
HGB BLD-MCNC: 8.5 G/DL (ref 11.5–15.4)
IMM GRANULOCYTES # BLD AUTO: 0.07 THOUSAND/UL (ref 0–0.2)
IMM GRANULOCYTES NFR BLD AUTO: 1 % (ref 0–2)
INTERVENTRICULAR SEPTUM IN DIASTOLE (PARASTERNAL SHORT AXIS VIEW): 1.5 CM
INTERVENTRICULAR SEPTUM: 1.5 CM (ref 0.6–1.1)
LAAS-AP2: 27 CM2
LAAS-AP4: 26.3 CM2
LACTATE SERPL-SCNC: 0.9 MMOL/L (ref 0.5–2)
LEFT ATRIUM SIZE: 4.9 CM
LEFT ATRIUM VOLUME (MOD BIPLANE): 82 ML
LEFT ATRIUM VOLUME INDEX (MOD BIPLANE): 44.1 ML/M2
LEFT INTERNAL DIMENSION IN SYSTOLE: 2.5 CM (ref 2.1–4)
LEFT VENTRICULAR INTERNAL DIMENSION IN DIASTOLE: 3.2 CM (ref 3.5–6)
LEFT VENTRICULAR POSTERIOR WALL IN END DIASTOLE: 1.5 CM
LEFT VENTRICULAR STROKE VOLUME: 18 ML
LVSV (TEICH): 18 ML
LYMPHOCYTES # BLD AUTO: 0.19 THOUSANDS/ÂΜL (ref 0.6–4.47)
LYMPHOCYTES NFR BLD AUTO: 2 % (ref 14–44)
MAGNESIUM SERPL-MCNC: 2.2 MG/DL (ref 1.9–2.7)
MCH RBC QN AUTO: 23.5 PG (ref 26.8–34.3)
MCHC RBC AUTO-ENTMCNC: 29.4 G/DL (ref 31.4–37.4)
MCV RBC AUTO: 80 FL (ref 82–98)
MONOCYTES # BLD AUTO: 0.98 THOUSAND/ÂΜL (ref 0.17–1.22)
MONOCYTES NFR BLD AUTO: 9 % (ref 4–12)
MV E'TISSUE VEL-SEP: 9 CM/S
MV PEAK A VEL: 0.28 M/S
MV PEAK E VEL: 109 CM/S
MV STENOSIS PRESSURE HALF TIME: 32 MS
MV VALVE AREA P 1/2 METHOD: 6.88
NEUTROPHILS # BLD AUTO: 10.12 THOUSANDS/ÂΜL (ref 1.85–7.62)
NEUTS SEG NFR BLD AUTO: 88 % (ref 43–75)
NRBC BLD AUTO-RTO: 1 /100 WBCS
PHOSPHATE SERPL-MCNC: 3.5 MG/DL (ref 2.3–4.1)
PLATELET # BLD AUTO: 238 THOUSANDS/UL (ref 149–390)
PMV BLD AUTO: 9.1 FL (ref 8.9–12.7)
POTASSIUM SERPL-SCNC: 3.2 MMOL/L (ref 3.5–5.3)
RA PRESSURE ESTIMATED: 8 MMHG
RBC # BLD AUTO: 3.61 MILLION/UL (ref 3.81–5.12)
RIGHT ATRIAL 2D VOLUME: 74 ML
RIGHT ATRIUM AREA SYSTOLE A4C: 24.5 CM2
RIGHT VENTRICLE ID DIMENSION: 4.4 CM
RV PSP: 46 MMHG
SL CV LEFT ATRIUM LENGTH A2C: 7 CM
SL CV LV EF: 55
SL CV PED ECHO LEFT VENTRICLE DIASTOLIC VOLUME (MOD BIPLANE) 2D: 40 ML
SL CV PED ECHO LEFT VENTRICLE SYSTOLIC VOLUME (MOD BIPLANE) 2D: 23 ML
SODIUM SERPL-SCNC: 142 MMOL/L (ref 135–147)
TR MAX PG: 38 MMHG
TR PEAK VELOCITY: 3.1 M/S
TRICUSPID ANNULAR PLANE SYSTOLIC EXCURSION: 1.5 CM
TRICUSPID VALVE PEAK REGURGITATION VELOCITY: 3.08 M/S
WBC # BLD AUTO: 11.39 THOUSAND/UL (ref 4.31–10.16)

## 2024-01-05 PROCEDURE — 93306 TTE W/DOPPLER COMPLETE: CPT | Performed by: INTERNAL MEDICINE

## 2024-01-05 PROCEDURE — 99222 1ST HOSP IP/OBS MODERATE 55: CPT | Performed by: PODIATRIST

## 2024-01-05 PROCEDURE — 73620 X-RAY EXAM OF FOOT: CPT

## 2024-01-05 PROCEDURE — 94760 N-INVAS EAR/PLS OXIMETRY 1: CPT

## 2024-01-05 PROCEDURE — 97760 ORTHOTIC MGMT&TRAING 1ST ENC: CPT

## 2024-01-05 PROCEDURE — 99291 CRITICAL CARE FIRST HOUR: CPT | Performed by: INTERNAL MEDICINE

## 2024-01-05 PROCEDURE — 83735 ASSAY OF MAGNESIUM: CPT | Performed by: NURSE PRACTITIONER

## 2024-01-05 PROCEDURE — 83880 ASSAY OF NATRIURETIC PEPTIDE: CPT

## 2024-01-05 PROCEDURE — 85025 COMPLETE CBC W/AUTO DIFF WBC: CPT | Performed by: NURSE PRACTITIONER

## 2024-01-05 PROCEDURE — 84100 ASSAY OF PHOSPHORUS: CPT | Performed by: NURSE PRACTITIONER

## 2024-01-05 PROCEDURE — 71045 X-RAY EXAM CHEST 1 VIEW: CPT

## 2024-01-05 PROCEDURE — 83605 ASSAY OF LACTIC ACID: CPT

## 2024-01-05 PROCEDURE — 80048 BASIC METABOLIC PNL TOTAL CA: CPT | Performed by: NURSE PRACTITIONER

## 2024-01-05 PROCEDURE — 93306 TTE W/DOPPLER COMPLETE: CPT

## 2024-01-05 RX ORDER — FUROSEMIDE 10 MG/ML
60 INJECTION INTRAMUSCULAR; INTRAVENOUS ONCE
Status: COMPLETED | OUTPATIENT
Start: 2024-01-05 | End: 2024-01-05

## 2024-01-05 RX ORDER — METOPROLOL TARTRATE 1 MG/ML
5 INJECTION, SOLUTION INTRAVENOUS EVERY 6 HOURS PRN
Status: DISCONTINUED | OUTPATIENT
Start: 2024-01-05 | End: 2024-01-06

## 2024-01-05 RX ORDER — POTASSIUM CHLORIDE 29.8 MG/ML
40 INJECTION INTRAVENOUS ONCE
Status: COMPLETED | OUTPATIENT
Start: 2024-01-05 | End: 2024-01-05

## 2024-01-05 RX ORDER — POTASSIUM CHLORIDE 14.9 MG/ML
20 INJECTION INTRAVENOUS ONCE
Status: COMPLETED | OUTPATIENT
Start: 2024-01-05 | End: 2024-01-05

## 2024-01-05 RX ADMIN — NOREPINEPHRINE BITARTRATE 9 MCG/MIN: 1 INJECTION, SOLUTION, CONCENTRATE INTRAVENOUS at 03:32

## 2024-01-05 RX ADMIN — VASOPRESSIN 0.04 UNITS/MIN: 20 INJECTION INTRAVENOUS at 05:02

## 2024-01-05 RX ADMIN — CHLORHEXIDINE GLUCONATE 15 ML: 1.2 SOLUTION ORAL at 10:15

## 2024-01-05 RX ADMIN — CEFTRIAXONE SODIUM 1000 MG: 10 INJECTION, POWDER, FOR SOLUTION INTRAVENOUS at 13:00

## 2024-01-05 RX ADMIN — FUROSEMIDE 60 MG: 10 INJECTION, SOLUTION INTRAMUSCULAR; INTRAVENOUS at 14:22

## 2024-01-05 RX ADMIN — NOREPINEPHRINE BITARTRATE 7 MCG/MIN: 1 INJECTION, SOLUTION, CONCENTRATE INTRAVENOUS at 22:26

## 2024-01-05 RX ADMIN — APIXABAN 2.5 MG: 2.5 TABLET, FILM COATED ORAL at 18:07

## 2024-01-05 RX ADMIN — POTASSIUM CHLORIDE 20 MEQ: 14.9 INJECTION, SOLUTION INTRAVENOUS at 10:56

## 2024-01-05 RX ADMIN — EPINEPHRINE 8 MCG/MIN: 1 INJECTION INTRAMUSCULAR; INTRAVENOUS; SUBCUTANEOUS at 05:47

## 2024-01-05 RX ADMIN — NOREPINEPHRINE BITARTRATE 6 MCG/MIN: 1 INJECTION, SOLUTION, CONCENTRATE INTRAVENOUS at 12:54

## 2024-01-05 RX ADMIN — POTASSIUM CHLORIDE 40 MEQ: 29.8 INJECTION, SOLUTION INTRAVENOUS at 07:03

## 2024-01-05 RX ADMIN — HEPARIN SODIUM 5000 UNITS: 5000 INJECTION INTRAVENOUS; SUBCUTANEOUS at 05:33

## 2024-01-05 NOTE — PROCEDURES
Central Line Insertion    Date/Time: 1/4/2024 7:17 PM    Performed by: Milagro Montero MD  Authorized by: Milagro Montero MD    Patient location:  Bedside  Procedure performed by consultant: Dr Tristan Ferrari.    Consent:     Consent obtained:  Written    Consent given by:  Patient    Risks discussed:  Bleeding, infection, pneumothorax and incorrect placement    Alternatives discussed:  Delayed treatment  Pre-procedure details:     Hand hygiene: Hand hygiene performed prior to insertion      Sterile barrier technique: All elements of maximal sterile technique followed      Skin preparation:  2% chlorhexidine    Skin preparation agent: Skin preparation agent completely dried prior to procedure    Indications:     Central line indications: medications requiring central line    Anesthesia (see MAR for exact dosages):     Anesthesia method:  Local infiltration    Local anesthetic:  Lidocaine 1% w/o epi  Procedure details:     Location:  Right internal jugular    Vessel type: vein      Laterality:  Right    Approach: percutaneous technique used      Patient position:  Reverse Trendelenburg    Catheter type:  Triple lumen 16cm    Landmarks identified: yes      Ultrasound guidance: yes      Ultrasound image availability:  Images available in PACS    Sterile ultrasound techniques: Sterile gel and sterile probe covers were used      Manometry confirmation: no      Number of attempts:  2    Successful placement: yes      Catheter tip vessel location: subclavian vein    Post-procedure details:     Post-procedure:  Dressing applied and line sutured    Assessment:  Blood return through all ports and free fluid flow    Post-procedure complications: none      Patient tolerance of procedure:  Tolerated well, no immediate complications

## 2024-01-05 NOTE — ASSESSMENT & PLAN NOTE
Hold Toprol and Diltiazem and BP improved  Monitor on tele  Brando Vascor is 4.  Antiarrhythmic should be started if patient is hemodynamically stable.

## 2024-01-05 NOTE — PROGRESS NOTES
Critical access hospital  Progress Note: Critical Care  Name: Aurora Anderson 89 y.o. female I MRN: [id number]  Unit/Bed#: ICU 05 I Date of Admission: 1/4/2024   Date of Service: 1/5/2024 I Hospital Day: 1    Assessment/Plan    * Septic shock (HCC)  Assessment & Plan  Patient found to by hypothermic and tachypnea, hypotensive  30 cc/kg IVF ordered  Already on Epi infusion for shock.  Will cont ceftriaxone for suspected osteomyelitis (very very unlikely)  Monitor with UC and BC.  ESR, CRP, lactic acid elevated.  Will add Levo if cont to be hypotensive  Place MADISON racquelgger for temp of 88    Chest x-ray on my evaluation has hazy in bilateral lung    HOSSEIN (acute kidney injury) (HCC)  Assessment & Plan  Probable secondary to dehydration and infection  Getting 30 cc/kg IVF  Recheck labs in am  Monitor UO    Chronic heart failure with preserved ejection fraction (HCC)  Assessment & Plan  Wt Readings from Last 3 Encounters:   01/04/24 78 kg (171 lb 15.3 oz)   07/06/21 84.4 kg (186 lb)   07/01/21 84.4 kg (186 lb)     Hold home Lasix for now given dehydration/ infection  Resume when cleared    ACE inhibitor/ARB, beta-blocker, statin should be given once patient is hemodynamically stable/no HOSSEIN.          Atypical atrial flutter (HCC)  Assessment & Plan  Hold Toprol and Diltiazem and BP improved  Monitor on tele  Brando Vascor is 4.  Antiarrhythmic should be started if patient is hemodynamically stable.    CKD (chronic kidney disease) stage 3, GFR 30-59 ml/min ([facility])  Assessment & Plan  Lab Results   Component Value Date    EGFR 22 01/05/2024    EGFR 21 01/04/2024    EGFR 43 05/19/2021    CREATININE 1.87 (H) 01/05/2024    CREATININE 1.95 (H) 01/04/2024    CREATININE 1.12 05/19/2021     Will monitor Cr  Receiving IVF  Hemoglobin goal in CKD is more than 10.  Monitor for calcium, phosphorus, vitamin D abnormalities.    Lymphedema  Assessment & Plan  Patient has bilateral pitting lymphedema    Podiatry is on board.        Disposition: Admit to Critical Care     ICU Core Measures     A: Assess, Prevent, and Manage Pain Has pain been assessed? Yes  Need for changes to pain regimen? No   B: Both SAT/SAT  N/A   C: Choice of Sedation RASS Goal: 0 Alert and Calm  Need for changes to sedation or analgesia regimen? No   D: Delirium CAM-ICU: Negative   E: Early Mobility  Plan for early mobility? Yes   F: Family Engagement Plan for family engagement today? Yes       Antibiotic Review: Patient on appropriate coverage based on culture data.  and Awaiting culture results.     Review of Invasive Devices:      Central access plan: Will be removed hen no required.      Prophylaxis:  VTE VTE covered by:  heparin (porcine), Subcutaneous, 5,000 Units at 01/05/24 0533       Stress Ulcer  not ordered           Subjective    HPI/24hr events: No significant overnight events.  Patient's heart rate: 101, RR: 24.    Review of Systems:  Review of systems was reviewed and negative unless stated above in HPI/24-hour events      Objective    Last 24 hrs:                        Vitals I/O      Most Recent Min/Max in 24hrs   Temp 98.6 °F (37 °C) Temp  Min: 88.8 °F (31.6 °C)  Max: 98.6 °F (37 °C)   Pulse 102 Pulse  Min: 41  Max: 112   Resp (!) 31 Resp  Min: 16  Max: 41   /62 BP  Min: 69/48  Max: 118/53   O2 Sat 94 % SpO2  Min: 86 %  Max: 98 %      Intake/Output Summary (Last 24 hours) at 1/5/2024 0850  Last data filed at 1/5/2024 0600  Gross per 24 hour   Intake 3777.3 ml   Output 200 ml   Net 3577.3 ml         Diet NPO; Sips of clear liquids     Invasive Monitoring T/L/D    Invasive Devices       Central Venous Catheter Line  Duration             CVC Central Lines 01/04/24 Triple 16cm <1 day              Peripheral Intravenous Line  Duration             Peripheral IV 01/04/24 Left;Ventral (anterior) Forearm <1 day    Peripheral IV 01/04/24 Right;Ventral (anterior) Forearm <1 day                     Physical Exam  Constitutional:       Appearance: She is  ill-appearing.   Eyes:      Comments: Conjunctival pallor   Cardiovascular:      Rate and Rhythm: Tachycardia present. Rhythm irregular.      Pulses: Normal pulses.      Heart sounds: Normal heart sounds. No murmur heard.     No friction rub. No gallop.   Pulmonary:      Effort: No respiratory distress.      Breath sounds: No wheezing.      Comments: Decreased breath sounds  Abdominal:      General: There is no distension.      Palpations: There is no mass.      Tenderness: There is no abdominal tenderness.      Hernia: No hernia is present.   Musculoskeletal:      Right lower leg: Edema present.      Left lower leg: Edema present.      Comments: Bilateral ulcers on the heels, bilateral lymphedema.   Neurological:      General: No focal deficit present.      Mental Status: She is oriented to person, place, and time.       Diagnostic Studies      ECG:  Atrial flutter (VR: 48), QTc prolongation.  Imaging: I have personally reviewed pertinent reports.       Medications:  Scheduled PRN   cefTRIAXone, 1,000 mg, Intravenous, Q24H  chlorhexidine, 15 mL, Mouth/Throat, Q12H BRISSA  heparin (porcine), 5,000 Units, Subcutaneous, Q8H BRISSA  potassium chloride, 20 mEq, Intravenous, Once  potassium chloride, 40 mEq, Intravenous, Once         Continuous    epinephrine, 1-10 mcg/min, Last Rate: 4 mcg/min (01/05/24 0812)  norepinephrine, 1-30 mcg/min, Last Rate: 7 mcg/min (01/05/24 0607)  vasopressin, 0.04 Units/min, Last Rate: 0.04 Units/min (01/05/24 0502)         Labs:    CBC    Recent Labs     01/04/24  1030 01/04/24  1544 01/05/24  0514   WBC 5.21  --  11.39*   HGB 9.2*  --  8.5*   HCT 32.4*  --  28.9*    220 238     BMP    Recent Labs     01/04/24  1030 01/05/24  0514   SODIUM 140 142   K 3.4* 3.2*    106   CO2 29 25   AGAP 9 11   BUN 37* 36*   CREATININE 1.95* 1.87*   CALCIUM 9.1 8.4       Coags    No recent results     Additional Electrolytes  Recent Labs     01/05/24  0514   MG 2.2   PHOS 3.5          Blood  Gas    No recent results  No recent results LFTs  Recent Labs     01/04/24  1030   ALT 14   AST 21   ALKPHOS 106*   ALB 3.5   TBILI 0.67       Infectious  Recent Labs     01/04/24  1338   PROCALCITONI <0.05      Lab Results   Component Value Date    BLOODCX Received in Microbiology Lab. Culture in Progress. 01/04/2024    BLOODCX Received in Microbiology Lab. Culture in Progress. 01/04/2024      Glucose  Recent Labs     01/04/24  1030 01/05/24  0514   GLUC 112 136         Aline Foster MD

## 2024-01-05 NOTE — PLAN OF CARE
Problem: Potential for Falls  Goal: Patient will remain free of falls  Description: INTERVENTIONS:  - Educate patient/family on patient safety including physical limitations  - Instruct patient to call for assistance with activity   - Consult OT/PT to assist with strengthening/mobility   - Keep Call bell within reach  - Keep bed low and locked with side rails adjusted as appropriate  - Keep care items and personal belongings within reach  - Initiate and maintain comfort rounds  - Make Fall Risk Sign visible to staff  - Apply yellow socks and bracelet for high fall risk patients  - Consider moving patient to room near nurses station  Outcome: Progressing     Problem: Prexisting or High Potential for Compromised Skin Integrity  Goal: Skin integrity is maintained or improved  Description: INTERVENTIONS:  - Identify patients at risk for skin breakdown  - Assess and monitor skin integrity  - Assess and monitor nutrition and hydration status  - Monitor labs   - Assess for incontinence   - Turn and reposition patient  - Assist with mobility/ambulation  - Relieve pressure over bony prominences  - Avoid friction and shearing  - Provide appropriate hygiene as needed including keeping skin clean and dry  - Evaluate need for skin moisturizer/barrier cream  - Collaborate with interdisciplinary team   - Patient/family teaching  - Consider wound care consult   Outcome: Progressing

## 2024-01-05 NOTE — CASE MANAGEMENT
Case Management Assessment & Discharge Planning Note    Patient name Aurora Anderson  Location ICU 05/ICU 05 MRN [id number]  : 1929 Date 2024       Current Admission Date: 2024  Current Admission Diagnosis:Septic shock (HCC)   Patient Active Problem List    Diagnosis Date Noted    Lymphedema 2024    Septic shock (HCC) 2024    HOSSEIN (acute kidney injury) (HCC) 2024    Chronic heart failure with preserved ejection fraction (HCC) 2021    Breast mass, right 2021    CKD (chronic kidney disease) stage 3, GFR 30-59 ml/min (HCC) 2021    Atypical atrial flutter (HCC) 2021    Anemia 2021    Right bundle branch block (RBBB) on electrocardiogram (ECG) 2017    Diverticulosis of colon 2017    Large hiatal hernia 2017    Incidental 6cm right Renal lesion on CT 2017      LOS (days): 1  Geometric Mean LOS (GMLOS) (days): 3.1  Days to GMLOS:2     OBJECTIVE:    Risk of Unplanned Readmission Score: 13.75         Current admission status: Inpatient       Preferred Pharmacy:   [id number] [place], [place] - [address] [address] [facility] [address]  Phone: [phone] Fax: [phone]    Primary Care Provider: Anyi Lopez MD    Primary Insurance: MEDICARE  Secondary Insurance: JOS[employer] MCDONNELL PENDING    ASSESSMENT:  Active Health Care Proxies    There are no active Health Care Proxies on file.       Patient Information  Admitted from:: Home  Mental Status: Alert  During Assessment patient was accompanied by: Daughter (Yana pool ZORAIDA Spear Avinash)  Assessment information provided by:: Daughter  Primary Caregiver: Self  Support Systems: Family members, Daughter  County of Residence: [place]  What city do you live in?: [place]  Home entry access options. Select all that apply.: No steps to enter home  Type of Current Residence: Other (Comment) (in-law suite attached to daughters home)  Living  Arrangements: Lives Alone (daughters home attached)    Activities of Daily Living Prior to Admission  Functional Status: Assistance  Completes ADLs independently?: Yes (Daughter does supervision when pt showers and puts clothes out)  Ambulates independently?: Yes  Does patient use assisted devices?: Yes  Assisted Devices (DME) used: Walker  Does patient currently own DME?: Yes  What DME does the patient currently own?: Walker  Does patient have a history of Outpatient Therapy (PT/OT)?: No  Does the patient have a history of Short-Term Rehab?: Yes  Does patient have a history of HHC?: No  Does patient currently have HHC?: No    Patient Information Continued  Income Source: Pension/alf  Does patient have prescription coverage?: No  Does patient receive dialysis treatments?: No  Does patient have a history of substance abuse?: No  Does patient have a history of Mental Health Diagnosis?: No    Means of Transportation  Means of Transport to Appts:: Family transport    Housing Stability: Unknown (1/5/2024)    Housing Stability Vital Sign     Unable to Pay for Housing in the Last Year: No     Number of Places Lived in the Last Year: Not on file     Unstable Housing in the Last Year: No   Food Insecurity: No Food Insecurity (1/5/2024)    Hunger Vital Sign     Worried About Running Out of Food in the Last Year: Never true     Ran Out of Food in the Last Year: Never true   Transportation Needs: No Transportation Needs (1/5/2024)    PRAPARE - Transportation     Lack of Transportation (Medical): No     Lack of Transportation (Non-Medical): No   Utilities: Not At Risk (1/5/2024)    [facility] Utilities     Threatened with loss of utilities: No     DISCHARGE DETAILS:    Discharge planning discussed with:: pt's daughterYana    Contacts  Patient Contacts: Yana  Relationship to Patient:: Family  Contact Method: In Person  Reason/Outcome: Continuity of Care, Discharge Planning, Emergency Contact, Referral    Other  Referral/Resources/Interventions Provided:  Interventions: Other (Specify)  Referral Comments: CM met with pt and her daughter, Yana, at bedside. Yana's , Avinash, also at bedside. CM introduced self/role with dcp. Pt resides in an in-law suite attached to their home. Avinash is home 24/7. Yana works full time. CM reviewed that PT/OT will eval once appropriate. CM did review that they shoudl anticipate need for STR. Family reports pt was at [facility] in the past and they were happy with their care. Aware CM will f/u with recommendations/referrals.

## 2024-01-05 NOTE — PHYSICAL THERAPY NOTE
PHYSICAL THERAPY ORTHOTIC FITTING NOTE          Patient Name: Aurora Anderson  Today's Date: 1/5/2024          Orthotic Fitting:   Time in: 1606  Time out: 1618  Total Time: 12min    Orthotic Ordered: bilateral heel unloading ankle/foot brace  Orthotic Ordered by: Long Amin DPM  Wearing Schedule: WBAT to both feet w/ heel unloading shoe    Objective: Pt educated on [employer] Globoped heel relief orthosis shoe fit, alignment, and wearing schedule w/ pt agreeable to shoes. Pt measured and fit for size M shoe (pt reports she is a size 9.5 wide) on bilateral feet while pt supine in bed. Pt requires total assistance for donning/doffing brace at this time, as well as for adjustment of shoe as needed. RN aware and arrived to room. Pt confirmed no further questions/concerns at this time. Bilateral shoes doffed and bilateral prevalon boots donned w/ assistance from RN. Pt remained supine in bed w/ all needs w/in reach and prevalon boots donned.      aKry Mora, PT, DPT  01/05/24

## 2024-01-05 NOTE — ASSESSMENT & PLAN NOTE
Patient found to by hypothermic and tachypnea, hypotensive  30 cc/kg IVF ordered  Already on Epi infusion for shock.  Will cont ceftriaxone for suspected osteomyelitis (very very unlikely)  Monitor with UC and BC.  ESR, CRP, lactic acid elevated.  Will add Levo if cont to be hypotensive  Place MADISON alie for temp of 88    Chest x-ray on my evaluation has hazy in bilateral lung

## 2024-01-05 NOTE — PLAN OF CARE
Problem: Potential for Falls  Goal: Patient will remain free of falls  Description: INTERVENTIONS:  - Educate patient/family on patient safety including physical limitations  - Instruct patient to call for assistance with activity   - Consult OT/PT to assist with strengthening/mobility   - Keep Call bell within reach  - Keep bed low and locked with side rails adjusted as appropriate  - Keep care items and personal belongings within reach  - Initiate and maintain comfort rounds  - Make Fall Risk Sign visible to staff  -

## 2024-01-05 NOTE — OCCUPATIONAL THERAPY NOTE
Occupational Therapy Cancellation Note         Patient Name: Aurora Anderson  Today's Date: 1/5/2024 01/05/24 8107   Note Type   Note type Cancelled Session   Cancel Reasons Medical status   Additional Comments OT orders received, chart review completed. Spoke to CAITLIN Stacy who reports that pt with low temp and was just placed on gretel hugger. Will hold OT evaluation at this time and will see when pt is medically appropriate.       Anika Manrique MS, OTR/L

## 2024-01-05 NOTE — PHYSICAL THERAPY NOTE
PHYSICAL THERAPY CANCELLATION NOTE          Patient Name: Aurora Anderson  Today's Date: 1/5/2024 01/05/24 7960   Note Type   Note type Cancelled Session   Cancel Reasons Medical status   Additional Comments PT eval orders received, chart review performed. Attempted to see pt for PT eval; however, pt currently on gretel hugger due to low temp per RN Tawanna. Will hold PT eval at this time. PT will continue to follow.         Kary Mora, PT, DPT  01/05/24

## 2024-01-05 NOTE — ASSESSMENT & PLAN NOTE
Wt Readings from Last 3 Encounters:   01/04/24 78 kg (171 lb 15.3 oz)   07/06/21 84.4 kg (186 lb)   07/01/21 84.4 kg (186 lb)     Hold home Lasix for now given dehydration/ infection  Resume when cleared    ACE inhibitor/ARB, beta-blocker, statin should be given once patient is hemodynamically stable/no HOSSEIN.

## 2024-01-05 NOTE — UTILIZATION REVIEW
Initial Clinical Review    Admission: Date/Time/Statement:   Admission Orders (From admission, onward)       Ordered        01/04/24 1319  INPATIENT ADMISSION  Once                          Orders Placed This Encounter   Procedures    INPATIENT ADMISSION     Standing Status:   Standing     Number of Occurrences:   1     Order Specific Question:   Level of Care     Answer:   Critical Care [15]     Order Specific Question:   Estimated length of stay     Answer:   More than 2 Midnights     Order Specific Question:   Certification     Answer:   I certify that inpatient services are medically necessary for this patient for a duration of greater than two midnights. See H&P and MD Progress Notes for additional information about the patient's course of treatment.     ED Arrival Information       Expected   -    Arrival   1/4/2024 09:33    Acuity   Emergent              Means of arrival   Ambulance    [facility]   [facility]    Service   Critical Care/ICU    Admission type   Emergency              Arrival complaint   Weakness             Chief Complaint   Patient presents with    Weakness - Generalized     Pt here by ems from home d/t overall weakness. Per ems patient hasn't been eating or drinking the past few days. Patient had  a witnessed fall(tripped on sock)  this morning -headstrike -thinners        Initial Presentation: 89 y.o. female from home to ED via ems admitted inpatient due to Beta Blocker toxicity/Septic shock due to suspect UTI/Heart failure/Atrial flutter/acute on CKD.  Presented due to poor intake starting week prior to arrival and right leg with increased edema and leaking from back of calf.  + fall and head strike.  Did not take am medications   on exam: bradycardic and hypotensive. Rhythm irregular.  Massive LE edema.  Bilateral heel ulcers.    UA large leukocytes, innumerable bacteria.   K 3.4.  bun 37. Creatinine 1.95 with baseline of 1.12 - 1.23.   H&H 9.2/32.4.   In the ED hypotensive and given 1  Liter isolyte with pressure bag, started on Epi drip.   Started on Rocephin.   Plan includes:  Hortencia Hugger, Epi drip with target HR > 60.   Add Levophed if continued hypotension.    Volume resuscitation to 30 cc/KG, albumin.  Continue antibiotics.  Consult Podiatry for heel ulcers .  Trend BMP.   Hold home Lasix.    Hold Toprol, Diltiazem and Eliquis.  Telemetry     Date: 1/5/24    Day 2:  heart rate to 101.   On exam:  appears ill.  Conjunctival pallor.   Tachycardia, rhythm irregular.   Decreased breath sounds.  Bilateral heel ulcers and lymphedema.   Ecg atrial flutter, Qtc prolonged.   Continue ceftriaxone.  Hortencia Cobb.   On Epi and Levophed, vasopressin added.     1/5/24 per Podiatry - patient with left heel pressure ulcer, stage 2/Right heel pressure ulcer stage 2/Severe lymphedema.   Wound do not appear infected.   No surgical intervention.   Can Continue ceftriaxone.   Local wound care.  Check bilateral xrays of feet.     ED Triage Vitals   Temperature Pulse Respirations Blood Pressure SpO2   01/04/24 0942 01/04/24 0942 01/04/24 0942 01/04/24 0945 01/04/24 0942   97.9 °F (36.6 °C) 59 18 (!) 86/50 95 %      Temp Source Heart Rate Source Patient Position - Orthostatic VS BP Location FiO2 (%)   01/04/24 0942 01/04/24 0942 01/04/24 0942 01/04/24 0942 --   Oral Monitor Lying Right arm       Pain Score       01/04/24 0942       No Pain          Wt Readings from Last 1 Encounters:   01/04/24 78 kg (171 lb 15.3 oz)     Additional Vital Signs:   01/05/24 1006 98.2 °F (36.8 °C) 99 36 Abnormal  104/75 83 93 % -- -- -- --   01/05/24 0900 98.8 °F (37.1 °C) 106 Abnormal  37 Abnormal  103/60 76 93 % -- -- -- --   01/05/24 0800 98.6 °F (37 °C) 104 34 Abnormal  103/65 80 94 % -- -- -- --   01/05/24 0700 98.2 °F (36.8 °C) 102 37 Abnormal  107/70 84 89 % Abnormal  -- -- -- --   01/05/24 0600 97.7 °F (36.5 °C) 100 25 Abnormal  108/61 80 93 % -- -- -- --   01/05/24 0500 97.3 °F (36.3 °C) Abnormal  102 22 104/58 78 91 % -- -- --  --   01/05/24 0400 97.2 °F (36.2 °C) Abnormal  91 20 96/51 68 93 % -- -- -- --   01/05/24 0300 97.2 °F (36.2 °C) Abnormal  97 24 Abnormal  84/52 Abnormal  64 Abnormal   95 % -- -- -- --   01/05/24 0200 97.3 °F (36.3 °C) Abnormal  103 24 Abnormal  94/66 76 90 % -- -- -- --   01/05/24 0100 97.3 °F (36.3 °C) Abnormal  103 22 96/51 69 90 % -- -- -- --   01/05/24 0000 97.2 °F (36.2 °C) Abnormal  102 22 99/60 76 91 % -- -- -- --   01/04/24 2300 96.8 °F (36 °C) Abnormal  96 22 94/58 71 92 % -- -- -- --   01/04/24 2200 96.1 °F (35.6 °C) Abnormal  92 26 Abnormal  96/53 72 91 % -- -- -- --   01/04/24 2100 95.5 °F (35.3 °C) Abnormal  90 25 Abnormal  92/55 69 94 % -- -- -- --   01/04/24 2000 94.6 °F (34.8 °C) Abnormal  85 24 Abnormal  95/52 69 92 % -- -- -- --   01/04/24 1915 93.7 °F (34.3 °C) Abnormal   87 26 Abnormal  87/53 Abnormal   66 91 % 28 2 L/min -- Sitting   Temp: bairhugger at 01/04/24 1915   BP: titrated levo at 01/04/24 1915 01/04/24 1900 93.6 °F (34.2 °C) Abnormal  89 26 Abnormal  97/58 71 93 % -- -- -- --   01/04/24 1800 92.8 °F (33.8 °C) Abnormal  79 22 83/51 Abnormal  62 Abnormal  98 % -- -- -- --   01/04/24 1700 91.9 °F (33.3 °C) Abnormal  74 21 89/53 Abnormal  65 90 % -- -- -- --   01/04/24 1600 90.9 °F (32.7 °C) Abnormal  77 20 85/47 Abnormal  62 Abnormal  91 % -- -- -- --   01/04/24 1522 90.3 °F (32.4 °C) Abnormal  73 17 83/52 Abnormal  62 Abnormal  98 % -- -- Nasal cannula Lying   01/04/24 1328 88.8 °F (31.6 °C) Abnormal  -- 34 Abnormal  -- -- -- -- -- -- --   01/04/24 1315 -- 67 16 90/53 67 93 % -- -- None (Room air) Lying   01/04/24 1200 -- 45 Abnormal  -- 78/50 Abnormal  60 Abnormal  93 % -- -- None (Room air) --   01/04/24 1114 -- 42 Abnormal   16 69/30 Abnormal   44 Abnormal  95 % -- -- None (Room air) Lying   01/04/24 1100 -- 49 Abnormal  16 77/47 Abnormal  58 Abnormal  96 % -- -- None (Room air) Lying   01/04/24 1000 -- 48 Abnormal  -- 82/59 Abnormal  65 97 %         Pertinent Labs/Diagnostic Test  Results:   XR foot 2 vw left   Final Result by Sarath Dempsey MD (01/05 1019)      No acute osseous abnormality.      Limited examinations bilaterally.      Workstation performed: [id number]         XR foot 2 vw right   Final Result by Sarath Dempsey MD (01/05 1019)      No acute osseous abnormality.      Limited examinations bilaterally.      Workstation performed: [id number]         XR chest portable ICU   Final Result by Alex Medrano MD (01/05 0835)      1. Satisfactory positioning of the right IJ catheter without pneumothorax.      2. Moderate bilateral airspace opacities, which may represent edema and/or infection/inflammation.      3. Small left pleural effusion. Possible trace right pleural effusion.      4. Stable enlarged cardiomediastinal silhouette.                     Workstation performed: [id number]           1/4/24 ecg Atrial fibrillation with slow ventricular response  Left bundle branch block  Abnormal ECG  When compared with ECG of 22-MAR-2021 14:42,  Previous ECG has undetermined rhythm, needs review  Left bundle branch block is now Present    Results from last 7 days   Lab Units 01/04/24  1030   SARS-COV-2  Negative     Results from last 7 days   Lab Units 01/05/24  0514 01/04/24  1544 01/04/24  1030   WBC Thousand/uL 11.39*  --  5.21   HEMOGLOBIN g/dL 8.5*  --  9.2*   HEMATOCRIT % 28.9*  --  32.4*   PLATELETS Thousands/uL 238 220 223   NEUTROS ABS Thousands/µL 10.12*  --  4.12     Results from last 7 days   Lab Units 01/05/24  0514 01/04/24  1030   SODIUM mmol/L 142 140   POTASSIUM mmol/L 3.2* 3.4*   CHLORIDE mmol/L 106 102   CO2 mmol/L 25 29   ANION GAP mmol/L 11 9   BUN mg/dL 36* 37*   CREATININE mg/dL 1.87* 1.95*   EGFR ml/min/1.73sq m 22 21   CALCIUM mg/dL 8.4 9.1   MAGNESIUM mg/dL 2.2  --    PHOSPHORUS mg/dL 3.5  --      Results from last 7 days   Lab Units 01/04/24  1030   AST U/L 21   ALT U/L 14   ALK PHOS U/L 106*   TOTAL PROTEIN g/dL 7.6   ALBUMIN g/dL 3.5   TOTAL BILIRUBIN mg/dL 0.67      Results from last 7 days   Lab Units 01/05/24  0514 01/04/24  1030   GLUCOSE RANDOM mg/dL 136 112     Results from last 7 days   Lab Units 01/04/24  1338   PROCALCITONIN ng/ml <0.05     Results from last 7 days   Lab Units 01/04/24  1544 01/04/24  1338   LACTIC ACID mmol/L 2.7* 2.3*     Results from last 7 days   Lab Units 01/04/24  1544 01/04/24  1338   CRP mg/L  --  35.3*   SED RATE mm/hour 54*  --      Results from last 7 days   Lab Units 01/04/24  1141   CLARITY UA  Turbid   COLOR UA  Yellow   SPEC GRAV UA  1.016   PH UA  7.5   GLUCOSE UA mg/dl Negative   KETONES UA mg/dl Negative   BLOOD UA  Negative   PROTEIN UA mg/dl 70 (1+)*   NITRITE UA  Negative   BILIRUBIN UA  Negative   UROBILINOGEN UA (BE) mg/dl <2.0   LEUKOCYTES UA  Large*   WBC UA /hpf Innumerable*   RBC UA /hpf 2-4*   BACTERIA UA /hpf Occasional   EPITHELIAL CELLS WET PREP /hpf Occasional     Results from last 7 days   Lab Units 01/04/24  1030   INFLUENZA A PCR  Negative   INFLUENZA B PCR  Negative   RSV PCR  Negative     Results from last 7 days   Lab Units 01/04/24  1338   BLOOD CULTURE  Received in Microbiology Lab. Culture in Progress.  Received in Microbiology Lab. Culture in Progress.     ED Treatment:   Medication Administration from 01/04/2024 0933 to 01/04/2024 1504         Date/Time Order Dose Route Action Comments     01/04/2024 1140 EST multi-electrolyte (ISOLYTE-S PH 7.4) bolus 1,000 mL 1,000 mL Intravenous New Bag --     01/04/2024 1429 EST EPINEPHrine 5,000 mcg (STANDARD CONCENTRATION) IV in sodium chloride 0.9% 250 mL 10 mcg/min Intravenous Rate/Dose Change --     01/04/2024 1359 EST EPINEPHrine 5,000 mcg (STANDARD CONCENTRATION) IV in sodium chloride 0.9% 250 mL 9 mcg/min Intravenous Rate/Dose Change --     01/04/2024 1348 EST EPINEPHrine 5,000 mcg (STANDARD CONCENTRATION) IV in sodium chloride 0.9% 250 mL 8 mcg/min Intravenous Rate/Dose Change --     01/04/2024 1319 EST EPINEPHrine 5,000 mcg (STANDARD CONCENTRATION) IV in  sodium chloride 0.9% 250 mL 7 mcg/min Intravenous Rate/Dose Change --     01/04/2024 1248 EST EPINEPHrine 5,000 mcg (STANDARD CONCENTRATION) IV in sodium chloride 0.9% 250 mL 6 mcg/min Intravenous Rate/Dose Change --     01/04/2024 1237 EST EPINEPHrine 5,000 mcg (STANDARD CONCENTRATION) IV in sodium chloride 0.9% 250 mL 5 mcg/min Intravenous Rate/Dose Change --     01/04/2024 1226 EST EPINEPHrine 5,000 mcg (STANDARD CONCENTRATION) IV in sodium chloride 0.9% 250 mL 4 mcg/min Intravenous Rate/Dose Change --     01/04/2024 1202 EST EPINEPHrine 5,000 mcg (STANDARD CONCENTRATION) IV in sodium chloride 0.9% 250 mL 3 mcg/min Intravenous Rate/Dose Change --     01/04/2024 1149 EST EPINEPHrine 5,000 mcg (STANDARD CONCENTRATION) IV in sodium chloride 0.9% 250 mL 2 mcg/min Intravenous New Bag --     01/04/2024 1232 EST ceftriaxone (ROCEPHIN) 1 g/50 mL in dextrose IVPB 1,000 mg Intravenous New Bag --     01/04/2024 1346 EST multi-electrolyte (PLASMALYTE-A/ISOLYTE-S PH 7.4) IV solution 300 mL 300 mL Intravenous New Bag --     01/04/2024 1345 EST multi-electrolyte (PLASMALYTE-A/ISOLYTE-S PH 7.4) IV solution 1,000 mL 1,000 mL Intravenous New Bag --          Past Medical History:   Diagnosis Date    Acute kidney injury (HCC)     Arthritis     Atrial flutter (HCC)     Rapid heart rate      Present on Admission:   Chronic heart failure with preserved ejection fraction ([facility])   CKD (chronic kidney disease) stage 3, GFR 30-59 ml/min ([facility])   Septic shock ([facility])   Atypical atrial flutter ([facility])   HOSSEIN (acute kidney injury) ([facility])      Admitting Diagnosis: Cellulitis [L03.90]  UTI (urinary tract infection) [N39.0]  Weakness [R53.1]  Hypotension [I95.9]  Acute kidney injury (HCC) [N17.9]  Bradycardia, drug induced [R00.1, T50.905A]  Age/Sex: 89 y.o. female  Admission Orders: 1/4/24 1319 inpatient   Scheduled Medications:  cefTRIAXone, 1,000 mg, Intravenous, Q24H  chlorhexidine, 15 mL, Mouth/Throat, Q12H BRISSA  heparin (porcine), 5,000 Units,  Subcutaneous, Q8H BRISSA  potassium chloride, 20 mEq, Intravenous, Once 1056 1/5/24    potassium chloride 40 mEq IVPB (premix)  Dose: 40 mEq  Freq: Once Route: IV  Last Dose: 40 mEq (01/05/24 0703)  Start: 01/05/24 0615 End: 01/05/24 1103       Continuous IV Infusions:  epinephrine, 1-10 mcg/min, Intravenous, Titrated  norepinephrine, 1-30 mcg/min, Intravenous, Titrated  vasopressin, 0.04 Units/min, Intravenous, Continuous      PRN Meds:     Wound care every other day: Place alleyvan foam heel pad on both heels.   Ankle/foot brace to both left and right heels  Wbat both feet with heel off loading boots  Cardio pulmonary monitoring   NPO; Sips of clears.       IP CONSULT TO PODIATRY    Network Utilization Review Department  ATTENTION: Please call with any questions or concerns to [phone] and carefully listen to the prompts so that you are directed to the right person. All voicemails are confidential.   For Discharge needs, contact Care Management DC Support Team at [phone] opt. [phone]  Send all requests for admission clinical reviews, approved or denied determinations and any other requests to dedicated fax number below belonging to the campus where the patient is receiving treatment. List of dedicated fax numbers for the Facilities:  FACILITY NAME UR FAX NUMBER   ADMISSION DENIALS (Administrative/Medical Necessity) [phone]   DISCHARGE SUPPORT TEAM (NETWORK) [phone]   PARENT CHILD HEALTH (Maternity/NICU/Pediatrics) [phone]   [facility] [phone]   [facility] [phone]   Critical access hospital [phone]   [facility] [phone]   [facility] [phone]   [facility] [phone]   [facility] [phone]   [facility] [phone]   [facility] [phone]    [facility] [phone]   [facility] [phone]

## 2024-01-05 NOTE — ASSESSMENT & PLAN NOTE
Lab Results   Component Value Date    EGFR 22 01/05/2024    EGFR 21 01/04/2024    EGFR 43 05/19/2021    CREATININE 1.87 (H) 01/05/2024    CREATININE 1.95 (H) 01/04/2024    CREATININE 1.12 05/19/2021     Will monitor Cr  Receiving IVF  Hemoglobin goal in CKD is more than 10.  Monitor for calcium, phosphorus, vitamin D abnormalities.

## 2024-01-06 PROBLEM — J96.20 ACUTE ON CHRONIC RESPIRATORY FAILURE (HCC): Status: ACTIVE | Noted: 2021-03-22

## 2024-01-06 LAB
ANION GAP SERPL CALCULATED.3IONS-SCNC: 7 MMOL/L
APTT PPP: 48 SECONDS (ref 23–37)
BACTERIA UR CULT: ABNORMAL
BACTERIA UR CULT: ABNORMAL
BUN SERPL-MCNC: 31 MG/DL (ref 5–25)
CALCIUM SERPL-MCNC: 8.2 MG/DL (ref 8.4–10.2)
CHLORIDE SERPL-SCNC: 109 MMOL/L (ref 96–108)
CO2 SERPL-SCNC: 30 MMOL/L (ref 21–32)
CREAT SERPL-MCNC: 1.66 MG/DL (ref 0.6–1.3)
ERYTHROCYTE [DISTWIDTH] IN BLOOD BY AUTOMATED COUNT: 21 % (ref 11.6–15.1)
FERRITIN SERPL-MCNC: 34 NG/ML (ref 11–307)
GFR SERPL CREATININE-BSD FRML MDRD: 26 ML/MIN/1.73SQ M
GLUCOSE SERPL-MCNC: 79 MG/DL (ref 65–140)
HCT VFR BLD AUTO: 29.1 % (ref 34.8–46.1)
HGB BLD-MCNC: 8.4 G/DL (ref 11.5–15.4)
INR PPP: 1.41 (ref 0.84–1.19)
IRON SATN MFR SERPL: 12 % (ref 15–50)
IRON SERPL-MCNC: 36 UG/DL (ref 50–212)
MAGNESIUM SERPL-MCNC: 2.2 MG/DL (ref 1.9–2.7)
MCH RBC QN AUTO: 23.4 PG (ref 26.8–34.3)
MCHC RBC AUTO-ENTMCNC: 28.9 G/DL (ref 31.4–37.4)
MCV RBC AUTO: 81 FL (ref 82–98)
PLATELET # BLD AUTO: 233 THOUSANDS/UL (ref 149–390)
PMV BLD AUTO: 8.6 FL (ref 8.9–12.7)
POTASSIUM SERPL-SCNC: 3.8 MMOL/L (ref 3.5–5.3)
PROTHROMBIN TIME: 17.9 SECONDS (ref 11.6–14.5)
RBC # BLD AUTO: 3.59 MILLION/UL (ref 3.81–5.12)
SODIUM SERPL-SCNC: 146 MMOL/L (ref 135–147)
TIBC SERPL-MCNC: 291 UG/DL (ref 250–450)
UIBC SERPL-MCNC: 255 UG/DL (ref 155–355)
WBC # BLD AUTO: 7.58 THOUSAND/UL (ref 4.31–10.16)

## 2024-01-06 PROCEDURE — 83540 ASSAY OF IRON: CPT

## 2024-01-06 PROCEDURE — 85730 THROMBOPLASTIN TIME PARTIAL: CPT

## 2024-01-06 PROCEDURE — 94760 N-INVAS EAR/PLS OXIMETRY 1: CPT

## 2024-01-06 PROCEDURE — 82728 ASSAY OF FERRITIN: CPT

## 2024-01-06 PROCEDURE — 83735 ASSAY OF MAGNESIUM: CPT

## 2024-01-06 PROCEDURE — 80048 BASIC METABOLIC PNL TOTAL CA: CPT

## 2024-01-06 PROCEDURE — 83550 IRON BINDING TEST: CPT

## 2024-01-06 PROCEDURE — 85610 PROTHROMBIN TIME: CPT

## 2024-01-06 PROCEDURE — 99291 CRITICAL CARE FIRST HOUR: CPT | Performed by: INTERNAL MEDICINE

## 2024-01-06 PROCEDURE — 85027 COMPLETE CBC AUTOMATED: CPT

## 2024-01-06 RX ORDER — FUROSEMIDE 10 MG/ML
60 INJECTION INTRAMUSCULAR; INTRAVENOUS ONCE
Status: COMPLETED | OUTPATIENT
Start: 2024-01-06 | End: 2024-01-06

## 2024-01-06 RX ORDER — POTASSIUM CHLORIDE 20MEQ/15ML
20 LIQUID (ML) ORAL ONCE
Status: COMPLETED | OUTPATIENT
Start: 2024-01-06 | End: 2024-01-06

## 2024-01-06 RX ORDER — POTASSIUM CHLORIDE 20 MEQ/1
20 TABLET, EXTENDED RELEASE ORAL ONCE
Status: DISCONTINUED | OUTPATIENT
Start: 2024-01-06 | End: 2024-01-06

## 2024-01-06 RX ORDER — CEFAZOLIN SODIUM 1 G/50ML
1000 SOLUTION INTRAVENOUS EVERY 8 HOURS
Status: DISCONTINUED | OUTPATIENT
Start: 2024-01-07 | End: 2024-01-08

## 2024-01-06 RX ORDER — SENNOSIDES 8.6 MG
1 TABLET ORAL
Status: DISCONTINUED | OUTPATIENT
Start: 2024-01-06 | End: 2024-01-16 | Stop reason: HOSPADM

## 2024-01-06 RX ORDER — LANOLIN ALCOHOL/MO/W.PET/CERES
3 CREAM (GRAM) TOPICAL
Status: DISCONTINUED | OUTPATIENT
Start: 2024-01-06 | End: 2024-01-16 | Stop reason: HOSPADM

## 2024-01-06 RX ADMIN — NOREPINEPHRINE BITARTRATE 3 MCG/MIN: 1 INJECTION, SOLUTION, CONCENTRATE INTRAVENOUS at 22:20

## 2024-01-06 RX ADMIN — CHLORHEXIDINE GLUCONATE 15 ML: 1.2 SOLUTION ORAL at 08:34

## 2024-01-06 RX ADMIN — FUROSEMIDE 60 MG: 10 INJECTION, SOLUTION INTRAMUSCULAR; INTRAVENOUS at 14:20

## 2024-01-06 RX ADMIN — POTASSIUM CHLORIDE 20 MEQ: 1.5 SOLUTION ORAL at 16:38

## 2024-01-06 RX ADMIN — APIXABAN 2.5 MG: 2.5 TABLET, FILM COATED ORAL at 08:34

## 2024-01-06 RX ADMIN — APIXABAN 2.5 MG: 2.5 TABLET, FILM COATED ORAL at 18:09

## 2024-01-06 RX ADMIN — NOREPINEPHRINE BITARTRATE 6 MCG/MIN: 1 INJECTION, SOLUTION, CONCENTRATE INTRAVENOUS at 08:34

## 2024-01-06 RX ADMIN — SENNOSIDES 8.6 MG: 8.6 TABLET, FILM COATED ORAL at 21:15

## 2024-01-06 RX ADMIN — CEFTRIAXONE SODIUM 1000 MG: 10 INJECTION, POWDER, FOR SOLUTION INTRAVENOUS at 12:19

## 2024-01-06 RX ADMIN — CHLORHEXIDINE GLUCONATE 15 ML: 1.2 SOLUTION ORAL at 21:15

## 2024-01-06 RX ADMIN — Medication 3 MG: at 21:15

## 2024-01-06 NOTE — PROGRESS NOTES
[facility]  Progress Note  Name: Aurora Anderson I  MRN: [id number]  Unit/Bed#: ICU 05 I Date of Admission: 1/4/2024   Date of Service: 1/6/2024 I Hospital Day: 2    Assessment/Plan   * Septic shock (HCC)  Assessment & Plan  Patient found to by hypothermic and tachypnea, hypotensive.   30 cc/kg IVF ordered  Will cont ceftriaxone for UTI, UC growing proteus. BC no growth after 24 h  ESR, CRP,elevated.  Lactic acid currently normalised  Chest x-ray on my evaluation has hazy in bilateral lung    Plan:  Continue on ceftriaxone for UTI and for possible pneumonia  Continue down titration of the pressors.         Lymphedema  Assessment & Plan  Patient has bilateral pitting lymphedema    Podiatry is on board.    HOSSEIN (acute kidney injury) (HCC)  Assessment & Plan  Lab Results   Component Value Date    CREATININE 1.66 (H) 01/06/2024    CREATININE 1.87 (H) 01/05/2024    CREATININE 1.95 (H) 01/04/2024      Probable secondary to dehydration and infection, BL 1.1-1.2  Status post fluid resuscitation with IVF per sepsis protocol  Monitor I/O  Avoid hypoperfusion. Currently on pressors   Avoid nephrotoxic agents    Chronic heart failure with preserved ejection fraction (HCC)  Assessment & Plan  Wt Readings from Last 3 Encounters:   01/05/24 77.6 kg (171 lb)   07/06/21 84.4 kg (186 lb)   07/01/21 84.4 kg (186 lb)     Chest x ray showing pulmonary congestion. BNP 1,800 concern for fluid overload.   Echo showing Ef of 55% and normal SF. Noted TVR moderate to sever and MVR moderate.  Status post one dose lasix IV 60 mg with good output 1.8 L  Will consider giving additional dose of lasix.          Acute on chronic respiratory failure (HCC)  Assessment & Plan  Patient uses 2 L at baseline at bedtime.  Patient developed acute hypoxia requiring midflow 12 L  Chest x ray concerning for pulmonary edema.  Status post one dose of IV lasix 60 mg with decrease in O2 needs.  Will discuss giving another dose of  lasix.    Atypical atrial flutter (HCC)  Assessment & Plan  Hold Toprol and Diltiazem   Monitor on tele  Brando Vascor is 4.  Giving metoprolol 5 mg every 6 hours    CKD (chronic kidney disease) stage 3, GFR 30-59 ml/min ([facility])  Assessment & Plan  Lab Results   Component Value Date    EGFR 26 01/06/2024    EGFR 22 01/05/2024    EGFR 21 01/04/2024    CREATININE 1.66 (H) 01/06/2024    CREATININE 1.87 (H) 01/05/2024    CREATININE 1.95 (H) 01/04/2024     Will monitor Cr  Receiving IVF  Hemoglobin goal in CKD is more than 10.  Monitor for calcium, phosphorus, vitamin D abnormalities.             Disposition: Critical care    ICU Core Measures     A: Assess, Prevent, and Manage Pain Has pain been assessed? Yes  Need for changes to pain regimen? No   B: Both SAT/SAT  N/A   C: Choice of Sedation RASS Goal: 0 Alert and Calm  Need for changes to sedation or analgesia regimen? No   D: Delirium CAM-ICU: Negative   E: Early Mobility  Plan for early mobility? Yes   F: Family Engagement Plan for family engagement today? Yes       Antibiotic Review: Patient on appropriate coverage based on culture data.     Review of Invasive Devices:      Central access plan: Medications requiring central line      Prophylaxis:  VTE VTE covered by:  apixaban, Oral, 2.5 mg at 01/05/24 1807       Stress Ulcer  not ordered         Significant 24hr Events     24hr events: No overnight events. O2 requirements were decreased to 7 L.     Subjective   Review of Systems   Constitutional:  Negative for chills and fever.   HENT:  Negative for ear pain and sore throat.    Eyes:  Negative for pain and visual disturbance.   Respiratory:  Positive for cough. Negative for shortness of breath.    Cardiovascular:  Positive for leg swelling (chronic lymphedema). Negative for chest pain and palpitations.   Gastrointestinal:  Negative for abdominal pain and vomiting.   Genitourinary:  Negative for dysuria and hematuria.   Musculoskeletal:  Negative for arthralgias and  back pain.   Skin:  Negative for color change and rash.   Neurological:  Negative for seizures and syncope.   Psychiatric/Behavioral:  Negative for agitation and confusion.    All other systems reviewed and are negative.     Objective                            Vitals I/O      Most Recent Min/Max in 24hrs   Temp 99 °F (37.2 °C) Temp  Min: 96.6 °F (35.9 °C)  Max: 99.5 °F (37.5 °C)   Pulse (!) 115 Pulse  Min: 88  Max: 118   Resp (!) 28 Resp  Min: 25  Max: 41   /64 BP  Min: 84/51  Max: 142/116   O2 Sat 99 % SpO2  Min: 90 %  Max: 99 %      Intake/Output Summary (Last 24 hours) at 2024 0810  Last data filed at 2024 0540  Gross per 24 hour   Intake 1237.83 ml   Output 1870 ml   Net -632.17 ml       Diet Cardiovascular; Cardiac; Fluid Restriction 2000 ML, Sodium 2 GM    Invasive Monitoring           Physical Exam   Physical Exam  Eyes:      Extraocular Movements: Extraocular movements intact.      Conjunctiva/sclera: Conjunctivae normal.      Pupils: Pupils are equal, round, and reactive to light.   Skin:     General: Skin is warm and dry.      Coloration: Skin is not pale.   HENT:      Head: Normocephalic and atraumatic.      Nose: No congestion or rhinorrhea.      Mouth/Throat:      Mouth: Mucous membranes are dry.   Cardiovascular:      Rate and Rhythm: Tachycardia present. Rhythm irregular.      Pulses: Normal pulses.      Heart sounds: Normal heart sounds.   Musculoskeletal:         General: Normal range of motion.      Right lower le+ Edema present.      Left lower le+ Edema present.      Comments: Heel protectors and Chronic lymphedema   Abdominal: General: Bowel sounds are normal.      Palpations: Abdomen is soft.      Tenderness: There is no abdominal tenderness.   Constitutional:       General: She is not in acute distress.     Appearance: She is well-developed. She is ill-appearing.   Pulmonary:      Effort: Pulmonary effort is normal.      Breath sounds: Rales present. No wheezing.    Neurological:      General: No focal deficit present.      Mental Status: She is alert and oriented to person, place and time. Mental status is at baseline. She is not agitated.            Diagnostic Studies      EKG: Monitoring on Tele, A fib with -120  Imaging: chest xray I have personally reviewed pertinent reports.   and I have personally reviewed pertinent films in PACS     Medications:  Scheduled PRN   apixaban, 2.5 mg, BID  cefTRIAXone, 1,000 mg, Q24H  chlorhexidine, 15 mL, Q12H BRISSA      metoprolol, 5 mg, Q6H PRN       Continuous    norepinephrine, 1-30 mcg/min, Last Rate: 7 mcg/min (01/06/24 0441)         Labs:    CBC    Recent Labs     01/05/24  0514 01/06/24  0449   WBC 11.39* 7.58   HGB 8.5* 8.4*   HCT 28.9* 29.1*    233     BMP    Recent Labs     01/05/24  0514 01/06/24  0449   SODIUM 142 146   K 3.2* 3.8    109*   CO2 25 30   AGAP 11 7   BUN 36* 31*   CREATININE 1.87* 1.66*   CALCIUM 8.4 8.2*       Coags    Recent Labs     01/06/24  0449   INR 1.41*   PTT 48*        Additional Electrolytes  Recent Labs     01/05/24  0514 01/06/24  0449   MG 2.2 2.2   PHOS 3.5  --           Blood Gas    No recent results  No recent results LFTs  Recent Labs     01/04/24  1030   ALT 14   AST 21   ALKPHOS 106*   ALB 3.5   TBILI 0.67       Infectious  Recent Labs     01/04/24  1338   PROCALCITONI <0.05     Glucose  Recent Labs     01/04/24  1030 01/05/24  0514 01/06/24  0449   GLUC 112 136 79                   Ekaterina Ortiz MD

## 2024-01-06 NOTE — ASSESSMENT & PLAN NOTE
Patient uses 2 L at baseline at bedtime.  Patient developed acute hypoxia requiring midflow 12 L  Chest x ray concerning for pulmonary edema.  Status post one dose of IV lasix 60 mg with decrease in O2 needs.  Will discuss giving another dose of lasix.

## 2024-01-06 NOTE — ASSESSMENT & PLAN NOTE
Patient found to by hypothermic and tachypnea, hypotensive.   30 cc/kg IVF ordered  Will cont ceftriaxone for UTI, UC growing proteus. BC no growth after 24 h  ESR, CRP,elevated.  Lactic acid currently normalised  Chest x-ray on my evaluation has hazy in bilateral lung    Plan:  Continue on ceftriaxone for UTI and for possible pneumonia  Continue down titration of the pressors.

## 2024-01-06 NOTE — ASSESSMENT & PLAN NOTE
Lab Results   Component Value Date    EGFR 26 01/06/2024    EGFR 22 01/05/2024    EGFR 21 01/04/2024    CREATININE 1.66 (H) 01/06/2024    CREATININE 1.87 (H) 01/05/2024    CREATININE 1.95 (H) 01/04/2024     Will monitor Cr  Receiving IVF  Hemoglobin goal in CKD is more than 10.  Monitor for calcium, phosphorus, vitamin D abnormalities.

## 2024-01-06 NOTE — ASSESSMENT & PLAN NOTE
Hold Toprol and Diltiazem   Monitor on tele  Brando Vascor is 4.  Giving metoprolol 5 mg every 6 hours

## 2024-01-06 NOTE — ASSESSMENT & PLAN NOTE
Wt Readings from Last 3 Encounters:   01/05/24 77.6 kg (171 lb)   07/06/21 84.4 kg (186 lb)   07/01/21 84.4 kg (186 lb)     Chest x ray showing pulmonary congestion. BNP 1,800 concern for fluid overload.   Echo showing Ef of 55% and normal SF. Noted TVR moderate to sever and MVR moderate.  Status post one dose lasix IV 60 mg with good output 1.8 L  Will consider giving additional dose of lasix.

## 2024-01-06 NOTE — ASSESSMENT & PLAN NOTE
Lab Results   Component Value Date    CREATININE 1.66 (H) 01/06/2024    CREATININE 1.87 (H) 01/05/2024    CREATININE 1.95 (H) 01/04/2024      Probable secondary to dehydration and infection, BL 1.1-1.2  Status post fluid resuscitation with IVF per sepsis protocol  Monitor I/O  Avoid hypoperfusion. Currently on pressors   Avoid nephrotoxic agents

## 2024-01-06 NOTE — PLAN OF CARE
Problem: Potential for Falls  Goal: Patient will remain free of falls  Description: INTERVENTIONS:  - Educate patient/family on patient safety including physical limitations  - Instruct patient to call for assistance with activity   - Consult OT/PT to assist with strengthening/mobility   - Keep Call bell within reach  - Keep bed low and locked with side rails adjusted as appropriate  - Keep care items and personal belongings within reach  - Initiate and maintain comfort rounds  - Apply yellow socks and bracelet for high fall risk patients  - Consider moving patient to room near nurses station  Outcome: Progressing     Problem: Prexisting or High Potential for Compromised Skin Integrity  Goal: Skin integrity is maintained or improved  Description: INTERVENTIONS:  - Identify patients at risk for skin breakdown  - Assess and monitor skin integrity  - Assess and monitor nutrition and hydration status  - Monitor labs   - Assess for incontinence   - Turn and reposition patient  - Assist with mobility/ambulation  - Relieve pressure over bony prominences  - Avoid friction and shearing  - Provide appropriate hygiene as needed including keeping skin clean and dry  - Evaluate need for skin moisturizer/barrier cream  - Collaborate with interdisciplinary team   - Patient/family teaching  - Consider wound care consult   Outcome: Progressing

## 2024-01-07 LAB
ANION GAP SERPL CALCULATED.3IONS-SCNC: 4 MMOL/L
ANISOCYTOSIS BLD QL SMEAR: PRESENT
APTT PPP: 39 SECONDS (ref 23–37)
BASE EX.OXY STD BLDV CALC-SCNC: 69 % (ref 60–80)
BASE EXCESS BLDV CALC-SCNC: 6.4 MMOL/L
BASOPHILS # BLD AUTO: 0.01 THOUSANDS/ÂΜL (ref 0–0.1)
BASOPHILS NFR BLD AUTO: 0 % (ref 0–1)
BODY TEMPERATURE: 97.4 DEGREES FEHRENHEIT
BUN SERPL-MCNC: 29 MG/DL (ref 5–25)
CALCIUM SERPL-MCNC: 8.2 MG/DL (ref 8.4–10.2)
CHLORIDE SERPL-SCNC: 106 MMOL/L (ref 96–108)
CO2 SERPL-SCNC: 36 MMOL/L (ref 21–32)
CREAT SERPL-MCNC: 1.5 MG/DL (ref 0.6–1.3)
EOSINOPHIL # BLD AUTO: 0.04 THOUSAND/ÂΜL (ref 0–0.61)
EOSINOPHIL NFR BLD AUTO: 1 % (ref 0–6)
ERYTHROCYTE [DISTWIDTH] IN BLOOD BY AUTOMATED COUNT: 20.9 % (ref 11.6–15.1)
GFR SERPL CREATININE-BSD FRML MDRD: 29 ML/MIN/1.73SQ M
GLUCOSE SERPL-MCNC: 99 MG/DL (ref 65–140)
HCO3 BLDV-SCNC: 33.5 MMOL/L (ref 24–30)
HCT VFR BLD AUTO: 29 % (ref 34.8–46.1)
HGB BLD-MCNC: 8.1 G/DL (ref 11.5–15.4)
HYPERCHROMIA BLD QL SMEAR: PRESENT
IMM GRANULOCYTES # BLD AUTO: 0.04 THOUSAND/UL (ref 0–0.2)
IMM GRANULOCYTES NFR BLD AUTO: 1 % (ref 0–2)
INR PPP: 1.34 (ref 0.84–1.19)
LYMPHOCYTES # BLD AUTO: 0.42 THOUSANDS/ÂΜL (ref 0.6–4.47)
LYMPHOCYTES NFR BLD AUTO: 7 % (ref 14–44)
MAGNESIUM SERPL-MCNC: 2.2 MG/DL (ref 1.9–2.7)
MCH RBC QN AUTO: 22.8 PG (ref 26.8–34.3)
MCHC RBC AUTO-ENTMCNC: 27.9 G/DL (ref 31.4–37.4)
MCV RBC AUTO: 82 FL (ref 82–98)
MONOCYTES # BLD AUTO: 0.52 THOUSAND/ÂΜL (ref 0.17–1.22)
MONOCYTES NFR BLD AUTO: 9 % (ref 4–12)
NASAL CANNULA: 6
NEUTROPHILS # BLD AUTO: 4.63 THOUSANDS/ÂΜL (ref 1.85–7.62)
NEUTS SEG NFR BLD AUTO: 82 % (ref 43–75)
NRBC BLD AUTO-RTO: 0 /100 WBCS
O2 CT BLDV-SCNC: 8.7 ML/DL
PCO2 BLDV: 64.5 MM HG (ref 42–50)
PH BLDV: 7.33 [PH] (ref 7.3–7.4)
PHOSPHATE SERPL-MCNC: 2.9 MG/DL (ref 2.3–4.1)
PLATELET # BLD AUTO: 204 THOUSANDS/UL (ref 149–390)
PLATELET BLD QL SMEAR: ADEQUATE
PMV BLD AUTO: 8.9 FL (ref 8.9–12.7)
PO2 BLDV: 41.5 MM HG (ref 35–45)
POIKILOCYTOSIS BLD QL SMEAR: PRESENT
POTASSIUM SERPL-SCNC: 3.6 MMOL/L (ref 3.5–5.3)
PROTHROMBIN TIME: 17.3 SECONDS (ref 11.6–14.5)
RBC # BLD AUTO: 3.55 MILLION/UL (ref 3.81–5.12)
RBC MORPH BLD: PRESENT
SODIUM SERPL-SCNC: 146 MMOL/L (ref 135–147)
WBC # BLD AUTO: 5.66 THOUSAND/UL (ref 4.31–10.16)

## 2024-01-07 PROCEDURE — 85730 THROMBOPLASTIN TIME PARTIAL: CPT

## 2024-01-07 PROCEDURE — 85025 COMPLETE CBC W/AUTO DIFF WBC: CPT

## 2024-01-07 PROCEDURE — 80048 BASIC METABOLIC PNL TOTAL CA: CPT

## 2024-01-07 PROCEDURE — 83735 ASSAY OF MAGNESIUM: CPT

## 2024-01-07 PROCEDURE — 85610 PROTHROMBIN TIME: CPT

## 2024-01-07 PROCEDURE — 82805 BLOOD GASES W/O2 SATURATION: CPT

## 2024-01-07 PROCEDURE — 99232 SBSQ HOSP IP/OBS MODERATE 35: CPT | Performed by: INTERNAL MEDICINE

## 2024-01-07 PROCEDURE — 94760 N-INVAS EAR/PLS OXIMETRY 1: CPT

## 2024-01-07 PROCEDURE — 84100 ASSAY OF PHOSPHORUS: CPT

## 2024-01-07 RX ORDER — POTASSIUM CHLORIDE 20 MEQ/1
40 TABLET, EXTENDED RELEASE ORAL ONCE
Status: DISCONTINUED | OUTPATIENT
Start: 2024-01-07 | End: 2024-01-07

## 2024-01-07 RX ORDER — MIDODRINE HYDROCHLORIDE 2.5 MG/1
2.5 TABLET ORAL
Status: DISCONTINUED | OUTPATIENT
Start: 2024-01-07 | End: 2024-01-07

## 2024-01-07 RX ORDER — FUROSEMIDE 20 MG/1
20 TABLET ORAL DAILY
Status: DISCONTINUED | OUTPATIENT
Start: 2024-01-07 | End: 2024-01-09

## 2024-01-07 RX ORDER — POTASSIUM CHLORIDE 29.8 MG/ML
40 INJECTION INTRAVENOUS ONCE
Status: COMPLETED | OUTPATIENT
Start: 2024-01-07 | End: 2024-01-07

## 2024-01-07 RX ADMIN — VASOPRESSIN 0.03 UNITS/MIN: 20 INJECTION INTRAVENOUS at 15:20

## 2024-01-07 RX ADMIN — FUROSEMIDE 20 MG: 20 TABLET ORAL at 13:02

## 2024-01-07 RX ADMIN — APIXABAN 2.5 MG: 2.5 TABLET, FILM COATED ORAL at 17:44

## 2024-01-07 RX ADMIN — APIXABAN 2.5 MG: 2.5 TABLET, FILM COATED ORAL at 09:16

## 2024-01-07 RX ADMIN — CHLORHEXIDINE GLUCONATE 15 ML: 1.2 SOLUTION ORAL at 09:16

## 2024-01-07 RX ADMIN — CHLORHEXIDINE GLUCONATE 15 ML: 1.2 SOLUTION ORAL at 20:07

## 2024-01-07 RX ADMIN — VASOPRESSIN 0.03 UNITS/MIN: 20 INJECTION INTRAVENOUS at 23:55

## 2024-01-07 RX ADMIN — Medication 12.5 MG: at 17:44

## 2024-01-07 RX ADMIN — CEFAZOLIN SODIUM 1000 MG: 1 SOLUTION INTRAVENOUS at 12:13

## 2024-01-07 RX ADMIN — POTASSIUM CHLORIDE 40 MEQ: 29.8 INJECTION, SOLUTION INTRAVENOUS at 07:25

## 2024-01-07 RX ADMIN — MIDODRINE HYDROCHLORIDE 2.5 MG: 2.5 TABLET ORAL at 13:02

## 2024-01-07 RX ADMIN — SENNOSIDES 8.6 MG: 8.6 TABLET, FILM COATED ORAL at 21:21

## 2024-01-07 RX ADMIN — CEFAZOLIN SODIUM 1000 MG: 1 SOLUTION INTRAVENOUS at 20:07

## 2024-01-07 NOTE — PROGRESS NOTES
[facility]  Progress Note: Critical Care  Name: Aurora Anderson 89 y.o. female I MRN: [id number]  Unit/Bed#: ICU 05 I Date of Admission: 1/4/2024   Date of Service: 1/7/2024 I Hospital Day: 3    Assessment/Plan    * Septic shock (HCC)  Assessment & Plan  Patient found to by hypothermic and tachypnea, hypotensive.   Will cont ceftriaxone for UTI, UC growing proteus and E. coli. BC no growth after 48 h  ESR, CRP,elevated.  Lactic acid currently normalised  Chest x-ray on my evaluation has hazy in bilateral lung    Plan:  Continue on  cefazolin for E. coli and Proteus mirabilis UTI and for possible pneumonia  Continue down titration of the pressors.         HOSSEIN (acute kidney injury) (HCC)  Assessment & Plan  Lab Results   Component Value Date    CREATININE 1.50 (H) 01/07/2024    CREATININE 1.66 (H) 01/06/2024    CREATININE 1.87 (H) 01/05/2024      Probable secondary to dehydration and infection, BL 1.1-1.2  Status post fluid resuscitation with IVF per sepsis protocol  Monitor I/O  Avoid hypoperfusion. Currently on pressors   Avoid nephrotoxic agents    Chronic heart failure with preserved ejection fraction (HCC)  Assessment & Plan  Wt Readings from Last 3 Encounters:   01/07/24 75.2 kg (165 lb 12.6 oz)   07/06/21 84.4 kg (186 lb)   07/01/21 84.4 kg (186 lb)     Chest x ray showing pulmonary congestion. BNP 1,800 concern for fluid overload.   Echo showing Ef of 55% and normal SF. Noted TVR moderate to sever and MVR moderate.  Status post one dose lasix IV 60 mg with good output 1.8   Patient may require ACE inhibitor/ARB plus beta-blocker plus statin after discharge.          Atypical atrial flutter (HCC)  Assessment & Plan  Hold Toprol (25 mg every 12 hours at home) and Diltiazem (180 mg daily at home at home).  Monitor on tele  Brando Vascor is 4.    Patient had multiple episodes of heart rate more than 100 last night, so patient should be on rate controlling antiarrhythmic (digoxin ideally  to increase MAP and decreased HR)    CKD (chronic kidney disease) stage 3, GFR 30-59 ml/min ([facility])  Assessment & Plan  Lab Results   Component Value Date    EGFR 29 01/07/2024    EGFR 26 01/06/2024    EGFR 22 01/05/2024    CREATININE 1.50 (H) 01/07/2024    CREATININE 1.66 (H) 01/06/2024    CREATININE 1.87 (H) 01/05/2024     Will monitor Cr  Hemoglobin goal in CKD is more than 10.  Monitor for calcium, phosphorus, vitamin D abnormalities.    Lymphedema  Assessment & Plan  Patient has bilateral pitting lymphedema    Podiatry is on board.    Patient may require lymphedema pump    Acute on chronic respiratory failure (HCC)  Assessment & Plan  Patient told me that she does not use any oxygen at home.  Chest x ray concerning for pulmonary edema.  Status post one dose of IV lasix 60 mg with decrease in O2 needs.     now patient is on 3 L nasal cannula       Disposition:  Stepdown to [facility]    ICU Core Measures     A: Assess, Prevent, and Manage Pain Has pain been assessed? Yes  Need for changes to pain regimen? No   B: Both SAT/SAT  N/A   C: Choice of Sedation RASS Goal: 0 Alert and Calm  Need for changes to sedation or analgesia regimen? No   D: Delirium CAM-ICU: Negative   E: Early Mobility  Plan for early mobility? Yes   F: Family Engagement Plan for family engagement today? Yes       Antibiotic Review: Patient on appropriate coverage based on culture data.     Review of Invasive Devices:      Central access plan:  Central venous catheter should be removed.      Prophylaxis:  VTE VTE covered by:  apixaban, Oral, 2.5 mg at 01/06/24 1809       Stress Ulcer  not ordered           Subjective    HPI/24hr events: None except heart rate more than 100 multiple times overnight and MAP (tolerated episode): 62.    Review of Systems:  Review of systems was reviewed and negative unless stated above in HPI/24-hour events      Objective    Last 24 hrs:                        Vitals I/O      Most Recent Min/Max in 24hrs   Temp (!) 97.3  °F (36.3 °C) Temp  Min: 96.7 °F (35.9 °C)  Max: 98.2 °F (36.8 °C)   Pulse 96 Pulse  Min: 92  Max: 118   Resp 18 Resp  Min: 8  Max: 37   BP (!) 86/52 BP  Min: 86/52  Max: 128/71   O2 Sat 96 % SpO2  Min: 90 %  Max: 100 %      Intake/Output Summary (Last 24 hours) at 1/7/2024 0843  Last data filed at 1/7/2024 0800  Gross per 24 hour   Intake 1028.34 ml   Output 4663 ml   Net -3634.66 ml         Diet Cardiovascular; Cardiac; Fluid Restriction 2000 ML, Sodium 2 GM     Invasive Monitoring T/L/D        Invasive Devices       Central Venous Catheter Line  Duration             CVC Central Lines 01/04/24 Triple 16cm 2 days              Peripheral Intravenous Line  Duration             Peripheral IV 01/04/24 Left;Ventral (anterior) Forearm 2 days    Peripheral IV 01/04/24 Right;Ventral (anterior) Forearm 2 days              Drain  Duration             External Urinary Catheter 1 day                     Physical Exam  Eyes:      General: No scleral icterus.        Left eye: No discharge.      Comments: Conjunctiva pallor   Cardiovascular:      Rate and Rhythm: Tachycardia present. Rhythm irregular.      Heart sounds: Normal heart sounds. No murmur heard.     No friction rub. No gallop.   Pulmonary:      Effort: Pulmonary effort is normal. No respiratory distress.      Breath sounds: Normal breath sounds. No stridor. No wheezing, rhonchi or rales.   Chest:      Chest wall: No tenderness.   Musculoskeletal:      Right lower leg: Edema present.      Left lower leg: Edema present.   Neurological:      General: No focal deficit present.      Mental Status: She is oriented to person, place, and time.   Psychiatric:         Mood and Affect: Mood normal.         Behavior: Behavior normal.         Thought Content: Thought content normal.       Diagnostic Studies      ECG:  Atrial flutter with ventricular rate: 48, QTc prolongation..  Imaging: I have personally reviewed pertinent reports.       Medications:  Scheduled PRN   apixaban, 2.5  mg, Oral, BID  cefazolin, 1,000 mg, Intravenous, Q8H  chlorhexidine, 15 mL, Mouth/Throat, Q12H BRISSA  melatonin, 3 mg, Oral, HS  potassium chloride, 40 mEq, Intravenous, Once  senna, 1 tablet, Oral, HS         Continuous    norepinephrine, 1-30 mcg/min, Last Rate: 2 mcg/min (01/07/24 0523)         Labs:    CBC    Recent Labs     01/06/24 0449 01/07/24  0520   WBC 7.58 5.66   HGB 8.4* 8.1*   HCT 29.1* 29.0*    204     BMP    Recent Labs     01/06/24 0449 01/07/24  0520   SODIUM 146 146   K 3.8 3.6   * 106   CO2 30 36*   AGAP 7 4   BUN 31* 29*   CREATININE 1.66* 1.50*   CALCIUM 8.2* 8.2*       Coags    Recent Labs     01/06/24 0449 01/07/24  0725   INR 1.41* 1.34*   PTT 48* 39*        Additional Electrolytes  Recent Labs     01/06/24 0449 01/07/24  0520   MG 2.2 2.2   PHOS  --  2.9          Blood Gas    No recent results  Recent Labs     01/07/24  0725   PHVEN 7.333   PHI6FCD 64.5*   PO2VEN 41.5   WDM1ROH 33.5*   BEVEN 6.4    LFTs  No recent results    Infectious  No recent results   Lab Results   Component Value Date    BLOODCX No Growth at 48 hrs. 01/04/2024    BLOODCX No Growth at 48 hrs. 01/04/2024    URINECX >100,000 cfu/ml Escherichia coli (A) 01/04/2024    URINECX >100,000 cfu/ml Proteus mirabilis (A) 01/04/2024      Glucose  Recent Labs     01/06/24 0449 01/07/24  0520   GLUC 79 99         Aline Foster MD

## 2024-01-07 NOTE — ASSESSMENT & PLAN NOTE
Hold Toprol (25 mg every 12 hours at home) and Diltiazem (180 mg daily at home at home).  Monitor on tele  Brando Vascor is 4.    Patient had multiple episodes of heart rate more than 100 last night, so patient should be on rate controlling antiarrhythmic (digoxin ideally to increase MAP and decreased HR)

## 2024-01-07 NOTE — PLAN OF CARE
Problem: Potential for Falls  Goal: Patient will remain free of falls  Description: INTERVENTIONS:  - Educate patient/family on patient safety including physical limitations  - Instruct patient to call for assistance with activity   - Consult OT/PT to assist with strengthening/mobility   - Keep Call bell within reach  - Keep bed low and locked with side rails adjusted as appropriate  - Keep care items and personal belongings within reach  - Initiate and maintain comfort rounds  - Make Fall Risk Sign visible to staff

## 2024-01-07 NOTE — ASSESSMENT & PLAN NOTE
Lab Results   Component Value Date    EGFR 29 01/07/2024    EGFR 26 01/06/2024    EGFR 22 01/05/2024    CREATININE 1.50 (H) 01/07/2024    CREATININE 1.66 (H) 01/06/2024    CREATININE 1.87 (H) 01/05/2024     Will monitor Cr  Hemoglobin goal in CKD is more than 10.  Monitor for calcium, phosphorus, vitamin D abnormalities.

## 2024-01-07 NOTE — ASSESSMENT & PLAN NOTE
Lab Results   Component Value Date    CREATININE 1.50 (H) 01/07/2024    CREATININE 1.66 (H) 01/06/2024    CREATININE 1.87 (H) 01/05/2024      Probable secondary to dehydration and infection, BL 1.1-1.2  Status post fluid resuscitation with IVF per sepsis protocol  Monitor I/O  Avoid hypoperfusion. Currently on pressors   Avoid nephrotoxic agents

## 2024-01-07 NOTE — ASSESSMENT & PLAN NOTE
Wt Readings from Last 3 Encounters:   01/07/24 75.2 kg (165 lb 12.6 oz)   07/06/21 84.4 kg (186 lb)   07/01/21 84.4 kg (186 lb)     Chest x ray showing pulmonary congestion. BNP 1,800 concern for fluid overload.   Echo showing Ef of 55% and normal SF. Noted TVR moderate to sever and MVR moderate.  Status post one dose lasix IV 60 mg with good output 1.8   Patient may require ACE inhibitor/ARB plus beta-blocker plus statin after discharge.

## 2024-01-07 NOTE — ASSESSMENT & PLAN NOTE
Patient found to by hypothermic and tachypnea, hypotensive.   Will cont ceftriaxone for UTI, UC growing proteus and E. coli. BC no growth after 48 h  ESR, CRP,elevated.  Lactic acid currently normalised  Chest x-ray on my evaluation has hazy in bilateral lung    Plan:  Continue on  cefazolin for E. coli and Proteus mirabilis UTI and for possible pneumonia  Continue down titration of the pressors.

## 2024-01-07 NOTE — ASSESSMENT & PLAN NOTE
Patient has bilateral pitting lymphedema    Podiatry is on board.    Patient may require lymphedema pump

## 2024-01-07 NOTE — ASSESSMENT & PLAN NOTE
Patient told me that she does not use any oxygen at home.  Chest x ray concerning for pulmonary edema.  Status post one dose of IV lasix 60 mg with decrease in O2 needs.     now patient is on 3 L nasal cannula

## 2024-01-08 ENCOUNTER — APPOINTMENT (INPATIENT)
Dept: RADIOLOGY | Facility: HOSPITAL | Age: 89
DRG: 871 | End: 2024-01-08
Payer: MEDICARE

## 2024-01-08 LAB
ANION GAP SERPL CALCULATED.3IONS-SCNC: 6 MMOL/L
ANISOCYTOSIS BLD QL SMEAR: PRESENT
BASOPHILS # BLD AUTO: 0.01 THOUSANDS/ÂΜL (ref 0–0.1)
BASOPHILS NFR BLD AUTO: 0 % (ref 0–1)
BUN SERPL-MCNC: 25 MG/DL (ref 5–25)
CALCIUM SERPL-MCNC: 8.5 MG/DL (ref 8.4–10.2)
CHLORIDE SERPL-SCNC: 103 MMOL/L (ref 96–108)
CO2 SERPL-SCNC: 35 MMOL/L (ref 21–32)
CREAT SERPL-MCNC: 1.32 MG/DL (ref 0.6–1.3)
EOSINOPHIL # BLD AUTO: 0.03 THOUSAND/ÂΜL (ref 0–0.61)
EOSINOPHIL NFR BLD AUTO: 1 % (ref 0–6)
ERYTHROCYTE [DISTWIDTH] IN BLOOD BY AUTOMATED COUNT: 20.8 % (ref 11.6–15.1)
GFR SERPL CREATININE-BSD FRML MDRD: 34 ML/MIN/1.73SQ M
GLUCOSE SERPL-MCNC: 115 MG/DL (ref 65–140)
HCT VFR BLD AUTO: 25.5 % (ref 34.8–46.1)
HGB BLD-MCNC: 7.1 G/DL (ref 11.5–15.4)
HYPERCHROMIA BLD QL SMEAR: PRESENT
IMM GRANULOCYTES # BLD AUTO: 0.02 THOUSAND/UL (ref 0–0.2)
IMM GRANULOCYTES NFR BLD AUTO: 0 % (ref 0–2)
LYMPHOCYTES # BLD AUTO: 0.39 THOUSANDS/ÂΜL (ref 0.6–4.47)
LYMPHOCYTES NFR BLD AUTO: 8 % (ref 14–44)
MAGNESIUM SERPL-MCNC: 2.1 MG/DL (ref 1.9–2.7)
MCH RBC QN AUTO: 22.9 PG (ref 26.8–34.3)
MCHC RBC AUTO-ENTMCNC: 27.8 G/DL (ref 31.4–37.4)
MCV RBC AUTO: 82 FL (ref 82–98)
MONOCYTES # BLD AUTO: 0.41 THOUSAND/ÂΜL (ref 0.17–1.22)
MONOCYTES NFR BLD AUTO: 9 % (ref 4–12)
NEUTROPHILS # BLD AUTO: 3.97 THOUSANDS/ÂΜL (ref 1.85–7.62)
NEUTS SEG NFR BLD AUTO: 82 % (ref 43–75)
NRBC BLD AUTO-RTO: 0 /100 WBCS
OVALOCYTES BLD QL SMEAR: PRESENT
PHOSPHATE SERPL-MCNC: 2.2 MG/DL (ref 2.3–4.1)
PLATELET # BLD AUTO: 146 THOUSANDS/UL (ref 149–390)
PLATELET BLD QL SMEAR: ADEQUATE
PMV BLD AUTO: 9.1 FL (ref 8.9–12.7)
POIKILOCYTOSIS BLD QL SMEAR: PRESENT
POTASSIUM SERPL-SCNC: 4.2 MMOL/L (ref 3.5–5.3)
RBC # BLD AUTO: 3.1 MILLION/UL (ref 3.81–5.12)
RBC MORPH BLD: PRESENT
SODIUM SERPL-SCNC: 144 MMOL/L (ref 135–147)
WBC # BLD AUTO: 4.83 THOUSAND/UL (ref 4.31–10.16)

## 2024-01-08 PROCEDURE — 97163 PT EVAL HIGH COMPLEX 45 MIN: CPT

## 2024-01-08 PROCEDURE — 99291 CRITICAL CARE FIRST HOUR: CPT | Performed by: INTERNAL MEDICINE

## 2024-01-08 PROCEDURE — 97530 THERAPEUTIC ACTIVITIES: CPT

## 2024-01-08 PROCEDURE — 84100 ASSAY OF PHOSPHORUS: CPT

## 2024-01-08 PROCEDURE — 80048 BASIC METABOLIC PNL TOTAL CA: CPT

## 2024-01-08 PROCEDURE — 97763 ORTHC/PROSTC MGMT SBSQ ENC: CPT

## 2024-01-08 PROCEDURE — 97167 OT EVAL HIGH COMPLEX 60 MIN: CPT

## 2024-01-08 PROCEDURE — 71045 X-RAY EXAM CHEST 1 VIEW: CPT

## 2024-01-08 PROCEDURE — 85025 COMPLETE CBC W/AUTO DIFF WBC: CPT

## 2024-01-08 PROCEDURE — 83735 ASSAY OF MAGNESIUM: CPT

## 2024-01-08 RX ORDER — FUROSEMIDE 20 MG/1
20 TABLET ORAL ONCE
Status: COMPLETED | OUTPATIENT
Start: 2024-01-08 | End: 2024-01-08

## 2024-01-08 RX ADMIN — CHLORHEXIDINE GLUCONATE 15 ML: 1.2 SOLUTION ORAL at 21:10

## 2024-01-08 RX ADMIN — APIXABAN 2.5 MG: 2.5 TABLET, FILM COATED ORAL at 09:14

## 2024-01-08 RX ADMIN — AZITHROMYCIN MONOHYDRATE 500 MG: 500 INJECTION, POWDER, LYOPHILIZED, FOR SOLUTION INTRAVENOUS at 18:00

## 2024-01-08 RX ADMIN — CEFAZOLIN SODIUM 1000 MG: 1 SOLUTION INTRAVENOUS at 13:33

## 2024-01-08 RX ADMIN — FUROSEMIDE 20 MG: 20 TABLET ORAL at 17:59

## 2024-01-08 RX ADMIN — SODIUM PHOSPHATE, MONOBASIC, MONOHYDRATE AND SODIUM PHOSPHATE, DIBASIC, ANHYDROUS 12 MMOL: 142; 276 INJECTION, SOLUTION INTRAVENOUS at 10:54

## 2024-01-08 RX ADMIN — CEFTRIAXONE SODIUM 1000 MG: 10 INJECTION, POWDER, FOR SOLUTION INTRAVENOUS at 15:03

## 2024-01-08 RX ADMIN — CEFAZOLIN SODIUM 1000 MG: 1 SOLUTION INTRAVENOUS at 05:00

## 2024-01-08 RX ADMIN — FUROSEMIDE 20 MG: 20 TABLET ORAL at 09:14

## 2024-01-08 RX ADMIN — Medication 3 MG: at 21:10

## 2024-01-08 RX ADMIN — SENNOSIDES 8.6 MG: 8.6 TABLET, FILM COATED ORAL at 21:10

## 2024-01-08 RX ADMIN — APIXABAN 2.5 MG: 2.5 TABLET, FILM COATED ORAL at 17:59

## 2024-01-08 RX ADMIN — CHLORHEXIDINE GLUCONATE 15 ML: 1.2 SOLUTION ORAL at 09:14

## 2024-01-08 NOTE — ASSESSMENT & PLAN NOTE
Lab Results   Component Value Date    EGFR 45 01/10/2024    EGFR 36 01/09/2024    EGFR 34 01/08/2024    CREATININE 1.06 01/10/2024    CREATININE 1.26 01/09/2024    CREATININE 1.32 (H) 01/08/2024     Will monitor Cr  Hemoglobin goal in CKD is more than 10.  Monitor for calcium, phosphorus, vitamin D abnormalities.

## 2024-01-08 NOTE — ASSESSMENT & PLAN NOTE
At presentation, chest x ray concerning for pulmonary edema.  1/520204: Chest x-ray shows Worsening aeration of the lung fields with worsening left sided consolidation and patchy airspace disease within the right lung. There is central vascular congestion seen on the right. Findings suggest a combination of pneumonia and pulmonary edema   Patient currently saturating at 96% on 2 L via nasal cannula  Bibasilar and middle zone bilateral Rales    Plan:  Furosemide 20 mg daily  Monitor vitals  Respiratory protocol  Incentive spirometry

## 2024-01-08 NOTE — CASE MANAGEMENT
Case Management Discharge Planning Note    Patient name Aurora Anderson  Location ICU 05/ICU 05 MRN [id number]  : 1929 Date 2024       Current Admission Date: 2024  Current Admission Diagnosis:Septic shock (HCC)   Patient Active Problem List    Diagnosis Date Noted    Lymphedema 2024    Septic shock (HCC) 2024    HOSSEIN (acute kidney injury) (HCC) 2024    Chronic heart failure with preserved ejection fraction (HCC) 2021    Breast mass, right 2021    CKD (chronic kidney disease) stage 3, GFR 30-59 ml/min (HCC) 2021    Atypical atrial flutter (HCC) 2021    Acute on chronic respiratory failure ([facility]) 2021    Anemia 2021    Right bundle branch block (RBBB) on electrocardiogram (ECG) 2017    Diverticulosis of colon 2017    Large hiatal hernia 2017    Incidental 6cm right Renal lesion on CT 2017      LOS (days): 4  Geometric Mean LOS (GMLOS) (days): 3.1  Days to GMLOS:-1     OBJECTIVE:  Risk of Unplanned Readmission Score: 13.79         Current admission status: Inpatient   Preferred Pharmacy:   [id number] [place], [place] - [id number] [facility] [id number]  Phone: [phone] Fax: [phone]    Primary Care Provider: Anyi Lopez MD    Primary Insurance: MEDICARE  Secondary Insurance: [employer] PENDING    DISCHARGE DETAILS:    Discharge planning discussed with:: pt and daughterYana  Freedom of Choice: Yes  Comments - Freedom of Choice: STR referrals with preference [facility]    Contacts  Patient Contacts: Yana  Relationship to Patient:: Family  Contact Method: In Person  Reason/Outcome: Continuity of Care, Discharge Planning, Emergency Contact, Referral    Other Referral/Resources/Interventions Provided:  Interventions: Short Term Rehab  Referral Comments: CM met with pt and her daughterYana, at bedside. CM reviewed therapy recs for STR at this time.  Daughter agreeable to referrals with [facility] being their preference. Referrals in [place].    Treatment Team Recommendation: Short Term Rehab  Discharge Destination Plan:: Short Term Rehab

## 2024-01-08 NOTE — PHYSICAL THERAPY NOTE
PHYSICAL THERAPY EVALUATION NOTE    Patient Name: Aurora Anderson  Today's Date: 1/8/2024  AGE:   89 y.o.  Mrn:   [id number]  ADMIT DX:  Cellulitis [L03.90]  UTI (urinary tract infection) [N39.0]  Weakness [R53.1]  Hypotension [I95.9]  Acute kidney injury (HCC) [N17.9]  Bradycardia, drug induced [R00.1, T50.905A]    Past Medical History:   Diagnosis Date    Acute kidney injury (HCC)     Arthritis     Atrial flutter (HCC)     Rapid heart rate      Length Of Stay: 4  PHYSICAL THERAPY EVALUATION :    01/08/24 1113   PT Last Visit   PT Visit Date 01/08/24   Pain Assessment   Pain Assessment Tool 0-10   Pain Score No Pain   Restrictions/Precautions   RLE Weight Bearing Per Order WBAT  (w/ Globoped heel unloading shoe)   LLE Weight Bearing Per Order WBAT  (w/ Globoped heel unloading shoe)   Braces or Orthoses Other (Comment)  (Globoped heel unloading shoe B LEs)   Other Precautions Chair Alarm;Bed Alarm;WBS;Multiple lines;Telemetry;O2;Fall Risk;Fluid restriction;Hard of hearing   Home Living   Type of Home Other (Comment)  (inlaw suite attached to daughter's home)   Home Layout One level;Other (Comment)  (no ALEXA)   Additional Comments lives alone. ambulates w/ walker. independent w/ ADLs (supervision w/ bathing, setup w/ dressing). receives assist w/ IADLs. 1 fall in last 6 months. no history of supplemental oxygen use.   General   Additional Pertinent History 1/8/24 at 6:10 hemoglobin was 7.1.   Family/Caregiver Present No   Cognition   Arousal/Participation Alert   Orientation Level Oriented to person;Oriented to place;Other (Comment)  (pt was identified w/ full name, birth date)   Following Commands Follows one step commands with increased time or repetition   Comments 2L oxygen via nasal cannula. resting pulse ox 96% and 102 BPM, active 93% and 128 BPM. supine blood pressure 86/59.  (major edema B LEs)   Subjective   Subjective pt seen  supine in bed. agreed to PT eval. denied pain or dizziness. occasional input was needed for task focus.   RUE Assessment   RUE Assessment X  (limited shoulder ROM, otherwise WFL)   LUE Assessment   LUE Assessment X  (limited shoulder ROM, otherwise WFL)   RLE Assessment   RLE Assessment X  (hip flexion 3-/5 extension 3+/5, knee flexion 3-/5 extension 3/5, ankle 3-/5)   LLE Assessment   LLE Assessment X  (hip flexion 3-/5 extension 3+/5, knee flexion 3-/5 extension 3/5, ankle 3-/5)   Light Touch   RLE Light Touch Grossly intact   LLE Light Touch Grossly intact   Bed Mobility   Supine to Sit 2  Maximal assistance   Additional items Assist x 1;HOB elevated;Bedrails;Increased time required;Verbal cues;LE management  (for bedrail use, trunk/LE positioning)   Transfers   Sit to Stand 2  Maximal assistance  (deck height of bed was elevated)   Additional items Assist x 1;Increased time required;Verbal cues  (for LE positioning)   Stand to Sit 2  Maximal assistance  (poorly controlled descent to bedside chair)   Additional items Assist x 1;Impulsive;Verbal cues  (for body positioning, safety)   Stand pivot 2  Maximal assistance   Additional items Assist x 1;Increased time required;Verbal cues  (for walker positioning, LE placement)   Ambulation/Elevation   Gait pattern Not appropriate  (pt was unable to advance/retreat)   Assistive Device Rolling walker   Balance   Static Sitting Fair   Static Standing Poor -  (w/ roller walker)   Ambulatory Zero   Activity Tolerance   Activity Tolerance Patient limited by fatigue   Nurse Made Aware spoke to Aniya Roche CM   Assessment   Problem List Decreased strength;Decreased range of motion;Decreased endurance;Impaired balance;Decreased mobility;Decreased safety awareness;Decreased skin integrity;Orthopedic restrictions  (LE edema)   Assessment Pt presents with weakness and increased leg swelling. Dx: septic shock, HOSSEIN on CKD, chronic heart failure, a-flutter, bilateral heel  pressure wounds, and severe lymphedema. order placed for PT eval and tx, w/ activity order of out of bed to chair and fall precautions. Podiatry placed orders for WBAT B LEs w/ heel unloading shoes (previously provided 1/5/24). pt presents w/ comorbidities of CHF, a-flutter, arthritis, hip fx surgery, lymphedema, and CKD and personal factors of advanced age, mobilizing w/ assistive device, and positive fall history. pt presents w/ weakness, decreased ROM, decreased endurance, impaired balance, decreased safety awareness, orthopedic restrictions, fall risk, LE edema, and impaired skin integrity. these impairments are evident in findings from physical examination (weakness, decreased ROM, impaired skin integrity, and edema of extremities), mobility assessment (need for max assist w/ bed mobility and transfers, inability to complete pregait activities, need for input for mobility and breathing technique), and Barthel Index: 25/100. pt needed input for task focus and mobility technique/safety. pt is at risk for falls due to physical and safety awareness deficits. pt's clinical presentation is unstable/unpredictable (evident in tachycardia, poor blood pressure control, need for assist w/ all phases of mobility and inability to ambulate when usually mobilizing independently, need for supplemental oxygen in order to maintain oxygen saturation, and need for input for mobility technique/safety). pt needs inpatient PT tx to improve mobility deficits and progress mobility training as appropriate.   Goals   Patient Goals I want to walk the dog. I want to walk without the walker.   STG Expiration Date 01/22/24   Short Term Goal #1 pt will: Increase bilateral LE strength 1/2 grade to facilitate independent mobility, Perform bed mobility w/ minx1 to increase level of independence, Perform all transfers w/ minx1 to improve independence, Ambulate 100 ft. with roller walker w/ minx1 w/o LOB to improve functional independence,  Increase all balance 1 grade to decrease risk for falls, Complete exercise program independently to increase strength and endurance, Tolerate 3 hr OOB to faciliate upright tolerance, Tolerate standing 2 minutes w/ minx1 to facilitate functional task performance, Improve Barthel Index score to 45 or greater to facilitate independence, and Complete Timed Up and Go or Comfortable Gait Speed to further assess mobility and monitor progress   PT Treatment Day 1   Plan   Treatment/Interventions Functional transfer training;LE strengthening/ROM;Therapeutic exercise;Endurance training;Gait training;Bed mobility;Equipment eval/education;Patient/family training   PT Frequency 3-5x/wk   Discharge Recommendation   Rehab Resource Intensity Level, PT II (Moderate Resource Intensity)   AM-PAC Basic Mobility Inpatient   Turning in Flat Bed Without Bedrails 2   Lying on Back to Sitting on Edge of Flat Bed Without Bedrails 1   Moving Bed to Chair 2   Standing Up From Chair Using Arms 2   Walk in Room 1   Climb 3-5 Stairs With Railing 1   Basic Mobility Inpatient Raw Score 9   Turning Head Towards Sound 4   Follow Simple Instructions 3   Low Function Basic Mobility Raw Score  16   Low Function Basic Mobility Standardized Score  25.72   Highest Level Of Mobility   -[facility] Goal 3: Sit at edge of bed   -HLM Achieved 5: Stand (1 or more minutes)   Barthel Index   Feeding 5   Bathing 0   Grooming Score 0   Dressing Score 0   Bladder Score 0   Bowels Score 10   Toilet Use Score 5   Transfers (Bed/Chair) Score 5   Mobility (Level Surface) Score 0   Stairs Score 0   Barthel Index Score 25   Additional Treatment Session   Start Time 1113   End Time 1139   Treatment Assessment Pt agreed to participate in PT intervention. Pt participated in additional mobilization training to address physical and mobility deficits noted during eval. Sit < - > stand transfer w/ maxx1. Stood 45 seconds w/ roller walker w/ maxx1. Pt was able to weight shift but not  advance/retreat. Additional standing was not possible due to fatigue. Pt declined additional mobilization. Therapist provided education to pt including walker management and transfer technique. Pt shows improvement w/ increased standing tolerance but continued to need the same level of assistance. Therapist provided education to pt regarding Globoped heel unloading shoes including indications for use, donning/doffing technique, need for consistent use at all times, and need for skin checks. Also provided education regarding weight bearing status. pt had fair carryover of education provided (via verbal instruction, demonstration, teachback). pt needed total assist for donning and doffing the shoes.  Further inpatient PT intervention is necessary to decrease fall risk and maximize functional independence.   Equipment Use roller walker, Globoped heel unloading shoes   Additional Treatment Day 1   End of Consult   Patient Position at End of Consult Bedside chair;Bed/Chair alarm activated;All needs within reach  (LE elevated. pt was positioned on softcare cushion.)     The patient's AM-PAC Basic Mobility Inpatient Short Form Raw Score is 9. A Raw score of less than or equal to 16 suggests the patient may benefit from discharge to post-acute rehabilitation services. Please also refer to the recommendation of the Physical Therapist for safe discharge planning.     Skilled PT recommended while in hospital and upon DC to progress pt toward treatment goals.     Jordan Stiles, PT

## 2024-01-08 NOTE — ASSESSMENT & PLAN NOTE
Patient found to by hypothermic and tachypnea, hypotensive.   Currently day 5 of antibiotics.  Patient had ceftriaxone for 3 days, currently day 2 of cefazolin.    Micro: Urine culture shows more than 100,000 colonies of E. coli and Proteus both susceptible to cefazolin.  Blood culture does not show any growth in 72 hours  Chest x-ray shows interval worsening aeration of the lung fields with worsening left sided consolidation and patchy airspace disease within the right lung. There is central vascular congestion seen on the right. Findings suggest a combination of pneumonia and pulmonary edema   Vasopressin on hold since 5.30am today (1/8). Blood pressure of 91/64, MAP 73. Goal MAP more than 70    Plan:  Continue on  cefazolin for E. coli and Proteus mirabilis UTI and for possible pneumonia  Monitor vitals

## 2024-01-08 NOTE — RESPIRATORY THERAPY NOTE
RT Protocol Note  Aurora Anderson 89 y.o. female MRN: [id number]  Unit/Bed#: ICU 05 Encounter: [id number]    Assessment    Principal Problem:    Septic shock (HCC)  Active Problems:    CKD (chronic kidney disease) stage 3, GFR 30-59 ml/min (HCC)    Atypical atrial flutter (HCC)    Acute on chronic respiratory failure (HCC)    Chronic heart failure with preserved ejection fraction (HCC)    HOSSEIN (acute kidney injury) (HCC)      Home Pulmonary Medications:       Past Medical History:   Diagnosis Date    Acute kidney injury (HCC)     Arthritis     Atrial flutter (HCC)     Rapid heart rate      Social History     Socioeconomic History    Marital status:      Spouse name: None    Number of children: 3    Years of education: None    Highest education level: None   Occupational History    None   Tobacco Use    Smoking status: Never    Smokeless tobacco: Never    Tobacco comments:     former smoker, per [facility];  started smoking at 15 yo, stopped at 29 yo   Vaping Use    Vaping status: Never Used   Substance and Sexual Activity    Alcohol use: No    Drug use: No    Sexual activity: None   Other Topics Concern    None   Social History Narrative    Inadequate exercise     Social Determinants of Health     Financial Resource Strain: Not on file   Food Insecurity: No Food Insecurity (1/5/2024)    Hunger Vital Sign     Worried About Running Out of Food in the Last Year: Never true     Ran Out of Food in the Last Year: Never true   Transportation Needs: No Transportation Needs (1/5/2024)    PRAPARE - Transportation     Lack of Transportation (Medical): No     Lack of Transportation (Non-Medical): No   Physical Activity: Not on file   Stress: Not on file   Social Connections: Not on file   Intimate Partner Violence: Not on file   Housing Stability: Unknown (1/5/2024)    Housing Stability Vital Sign     Unable to Pay for Housing in the Last Year: No     Number of Places Lived in the Last Year: Not on file     Unstable  "Housing in the Last Year: No       Subjective         Objective    Physical Exam:   Assessment Type: Assess only  General Appearance: Awake, Alert  Respiratory Pattern: Normal  Chest Assessment: Chest expansion symmetrical  Bilateral Breath Sounds: Clear, Diminished    Vitals:  Blood pressure 101/71, pulse 101, temperature 97.7 °F (36.5 °C), resp. rate 21, height 5' 5\" (1.651 m), weight 76.3 kg (168 lb 3.4 oz), SpO2 97%.          Imaging and other studies: I have personally reviewed pertinent reports.            Plan    Respiratory Plan: No distress/Pulmonary history, Discontinue Protocol        Resp Comments: Pt denied any significant pulmonary hx. Pt denies any hx of COPD or asthma. Pt denied any home nebulizers or inhaler. Pt denied any home oxygen or cpap/bipap device. Pt has no need to respiratory support at this time. Will d/c protocol.   "

## 2024-01-08 NOTE — ASSESSMENT & PLAN NOTE
Wt Readings from Last 3 Encounters:   01/08/24 76.3 kg (168 lb 3.4 oz)   07/06/21 84.4 kg (186 lb)   07/01/21 84.4 kg (186 lb)     Chest x ray showing pulmonary congestion. BNP 1,800 concern for fluid overload.   Echo showing Ef of 55% and normal SF. Noted TVR moderate to sever and MVR moderate.  Patient is -1996, over the last 24 hours  Maintain diuresis, fluid restriction, low-salt diet  Continue metoprolol

## 2024-01-08 NOTE — ASSESSMENT & PLAN NOTE
Hold Toprol (25 mg every 12 hours at home) and Diltiazem (180 mg daily at home at home).  Monitor on tele  [id number] 4.  Patient currently in A-fib with heart rate of 106  Currently being rate controlled with metoprolol

## 2024-01-08 NOTE — PLAN OF CARE
Problem: PHYSICAL THERAPY ADULT  Goal: Performs mobility at highest level of function for planned discharge setting.  See evaluation for individualized goals.  Description: Treatment/Interventions: Functional transfer training, LE strengthening/ROM, Therapeutic exercise, Endurance training, Gait training, Bed mobility, Equipment eval/education, Patient/family training          See flowsheet documentation for full assessment, interventions and recommendations.  Note:    Problem List: Decreased strength, Decreased range of motion, Decreased endurance, Impaired balance, Decreased mobility, Decreased safety awareness, Decreased skin integrity, Orthopedic restrictions (LE edema)  Assessment: Pt presents with weakness and increased leg swelling. Dx: septic shock, HOSSEIN on CKD, chronic heart failure, a-flutter, bilateral heel pressure wounds, and severe lymphedema. order placed for PT eval and tx, w/ activity order of out of bed to chair and fall precautions. Podiatry placed orders for WBAT B LEs w/ heel unloading shoes (previously provided 1/5/24). pt presents w/ comorbidities of CHF, a-flutter, arthritis, hip fx surgery, lymphedema, and CKD and personal factors of advanced age, mobilizing w/ assistive device, and positive fall history. pt presents w/ weakness, decreased ROM, decreased endurance, impaired balance, decreased safety awareness, orthopedic restrictions, fall risk, LE edema, and impaired skin integrity. these impairments are evident in findings from physical examination (weakness, decreased ROM, impaired skin integrity, and edema of extremities), mobility assessment (need for max assist w/ bed mobility and transfers, inability to complete pregait activities, need for input for mobility and breathing technique), and Barthel Index: 25/100. pt needed input for task focus and mobility technique/safety. pt is at risk for falls due to physical and safety awareness deficits. pt's clinical presentation is  unstable/unpredictable (evident in tachycardia, poor blood pressure control, need for assist w/ all phases of mobility and inability to ambulate when usually mobilizing independently, need for supplemental oxygen in order to maintain oxygen saturation, and need for input for mobility technique/safety). pt needs inpatient PT tx to improve mobility deficits and progress mobility training as appropriate.        Rehab Resource Intensity Level, PT: II (Moderate Resource Intensity)    See flowsheet documentation for full assessment.

## 2024-01-08 NOTE — PLAN OF CARE
Problem: Potential for Falls  Goal: Patient will remain free of falls  Description: INTERVENTIONS:  - Educate patient/family on patient safety including physical limitations  - Instruct patient to call for assistance with activity   - Consult OT/PT to assist with strengthening/mobility   - Keep Call bell within reach  - Keep bed low and locked with side rails adjusted as appropriate  - Keep care items and personal belongings within reach  - Initiate and maintain comfort rounds  - Make Fall Risk Sign visible to staff  - Obtain necessary fall risk management equipment  - Apply yellow socks and bracelet for high fall risk patients  - Consider moving patient to room near nurses station  Outcome: Progressing     Problem: Prexisting or High Potential for Compromised Skin Integrity  Goal: Skin integrity is maintained or improved  Description: INTERVENTIONS:  - Identify patients at risk for skin breakdown  - Assess and monitor skin integrity  - Assess and monitor nutrition and hydration status  - Monitor labs   - Assess for incontinence   - Turn and reposition patient  - Assist with mobility/ambulation  - Relieve pressure over bony prominences  - Avoid friction and shearing  - Provide appropriate hygiene as needed including keeping skin clean and dry  - Evaluate need for skin moisturizer/barrier cream  - Collaborate with interdisciplinary team   - Patient/family teaching  - Consider wound care consult   Outcome: Progressing

## 2024-01-08 NOTE — PLAN OF CARE
Problem: OCCUPATIONAL THERAPY ADULT  Goal: Performs self-care activities at highest level of function for planned discharge setting.  See evaluation for individualized goals.  Description: Treatment Interventions: ADL retraining, Functional transfer training, Endurance training, Cognitive reorientation, Patient/family training, Equipment evaluation/education, Compensatory technique education, Energy conservation, Activityengagement          See flowsheet documentation for full assessment, interventions and recommendations.   1/8/2024 1536 by HELENA Burgess  Note: Limitation: Decreased ADL status, Decreased UE strength, Decreased Safe judgement during ADL, Decreased cognition, Decreased endurance, Decreased self-care trans, Decreased high-level ADLs (impaired pain, balance, fxnl mobility, act amber, fxnl reach, trunk control, standing amber, strength, attention to task, direction following, safety awareness, insight, problem solving, learning new tasks)  Prognosis: Good  Assessment: Pt is a 89 y.o. female seen for OT evaluation s/p admission to [facility] on 1/4/2024 due to weakness + LE swelling. Diagnosed with Septic shock (HCC). Personal and env factors supporting pt at time of IE include  supportive daughter + ZORAIDA providing 24/7 care, FFSU at home, (A) with IADLs . Personal and env factors inhibiting engagement in occupations include advanced age and difficulty with ADLs and IADLs, use of RW at baseline . Performance deficits that affect the pt’s occupational performance can be seen above. Due to pt's current functional limitations and medical complications pt is functioning below baseline. Pt would benefit from continued skilled OT treatment in order to maximize safety, independence and overall performance with ADLs, functional mobility, functional transfers, and cognition in order to achieve highest level of function.     Rehab Resource Intensity Level, OT: II (Moderate Resource Intensity)

## 2024-01-08 NOTE — ASSESSMENT & PLAN NOTE
Lab Results   Component Value Date    CREATININE 1.32 (H) 01/08/2024    CREATININE 1.50 (H) 01/07/2024    CREATININE 1.66 (H) 01/06/2024      Probable secondary to dehydration and infection, BL 1.1-1.2  Status post fluid resuscitation with IVF per sepsis protocol  Monitor I/O  Avoid hypoperfusion. Currently on pressors   Avoid nephrotoxic agents  1/8: Current creatinine at 1.32.  Slightly above the baseline, however acute event likely resolved

## 2024-01-08 NOTE — PROGRESS NOTES
Formerly [facility], later [facility]  Progress Note  Name: Aurora Anderson I  MRN: [id number]  Unit/Bed#: ICU 05 I Date of Admission: 1/4/2024   Date of Service: 1/8/2024 I Hospital Day: 4    Assessment/Plan   * Septic shock (HCC)  Assessment & Plan  Patient found to by hypothermic and tachypnea, hypotensive.   Currently day 5 of antibiotics.  Patient had ceftriaxone for 3 days, currently day 2 of cefazolin.    Micro: Urine culture shows more than 100,000 colonies of E. coli and Proteus both susceptible to cefazolin.  Blood culture does not show any growth in 72 hours  Chest x-ray shows interval worsening aeration of the lung fields with worsening left sided consolidation and patchy airspace disease within the right lung. There is central vascular congestion seen on the right. Findings suggest a combination of pneumonia and pulmonary edema   Vasopressin on hold since 5.30am today (1/8). Blood pressure of 91/64, MAP 73. Goal MAP more than 70    Plan:  Continue on  cefazolin for E. coli and Proteus mirabilis UTI and for possible pneumonia  Monitor vitals           HOSSEIN (acute kidney injury) (HCC)  Assessment & Plan  Lab Results   Component Value Date    CREATININE 1.32 (H) 01/08/2024    CREATININE 1.50 (H) 01/07/2024    CREATININE 1.66 (H) 01/06/2024      Probable secondary to dehydration and infection, BL 1.1-1.2  Status post fluid resuscitation with IVF per sepsis protocol  Monitor I/O  Avoid hypoperfusion. Currently on pressors   Avoid nephrotoxic agents  1/8: Current creatinine at 1.32.  Slightly above the baseline, however acute event likely resolved    Chronic heart failure with preserved ejection fraction (HCC)  Assessment & Plan  Wt Readings from Last 3 Encounters:   01/08/24 76.3 kg (168 lb 3.4 oz)   07/06/21 84.4 kg (186 lb)   07/01/21 84.4 kg (186 lb)     Chest x ray showing pulmonary congestion. BNP 1,800 concern for fluid overload.   Echo showing Ef of 55% and normal SF. Noted TVR moderate to sever and MVR  moderate.  Patient is -1996, over the last 24 hours  Maintain diuresis, fluid restriction, low-salt diet  Continue metoprolol        Atypical atrial flutter (HCC)  Assessment & Plan  Hold Toprol (25 mg every 12 hours at home) and Diltiazem (180 mg daily at home at home).  Monitor on tele  [id number] 4.  Patient currently in A-fib with heart rate of 106  Currently being rate controlled with metoprolol    CKD (chronic kidney disease) stage 3, GFR 30-59 ml/min ([facility])  Assessment & Plan  Lab Results   Component Value Date    EGFR 34 01/08/2024    EGFR 29 01/07/2024    EGFR 26 01/06/2024    CREATININE 1.32 (H) 01/08/2024    CREATININE 1.50 (H) 01/07/2024    CREATININE 1.66 (H) 01/06/2024     Will monitor Cr  Hemoglobin goal in CKD is more than 10.  Monitor for calcium, phosphorus, vitamin D abnormalities.    Lymphedema  Assessment & Plan  Patient has bilateral pitting lymphedema    Podiatry is on board.    Patient may require lymphedema pump    Acute on chronic respiratory failure (HCC)  Assessment & Plan  At presentation, chest x ray concerning for pulmonary edema.  1/520204: Chest x-ray shows Worsening aeration of the lung fields with worsening left sided consolidation and patchy airspace disease within the right lung. There is central vascular congestion seen on the right. Findings suggest a combination of pneumonia and pulmonary edema   Patient currently saturating at 96% on 2 L via nasal cannula  Bibasilar and middle zone bilateral Rales    Plan:  Furosemide 20 mg daily  Monitor vitals  Respiratory protocol  Incentive spirometry               Disposition: Critical care    ICU Core Measures     A: Assess, Prevent, and Manage Pain Has pain been assessed? Yes  Need for changes to pain regimen? No   B: Both SAT/SAT  N/A   C: Choice of Sedation RASS Goal: 0 Alert and Calm  Need for changes to sedation or analgesia regimen? No   D: Delirium CAM-ICU: Negative   E: Early Mobility  Plan for early mobility? Yes   F: Family  Engagement Plan for family engagement today? Yes       Antibiotic Review: Patient on appropriate coverage based on culture data.     Review of Invasive Devices:            Prophylaxis:  VTE VTE covered by:  apixaban, Oral, 2.5 mg at 01/07/24 1744       Stress Ulcer  not ordered         Significant 24hr Events     24hr events: Overnight events: Vasopressin was called at 5:30 AM as the patient was maintaining MAP in 80s.     Subjective   Review of Systems   Constitutional:  Positive for fatigue. Negative for chills and fever.   HENT:  Negative for ear pain, postnasal drip and sore throat.    Eyes:  Negative for pain and visual disturbance.   Respiratory:  Positive for cough. Negative for shortness of breath.    Cardiovascular:  Positive for leg swelling. Negative for chest pain and palpitations.   Gastrointestinal:  Negative for abdominal pain, constipation, diarrhea and vomiting.   Endocrine: Positive for cold intolerance.   Genitourinary:  Negative for dysuria, frequency, hematuria and urgency.   Musculoskeletal:  Negative for arthralgias and back pain.   Skin:  Negative for color change and rash.   Neurological:  Negative for seizures and syncope.   All other systems reviewed and are negative.     Objective                            Vitals I/O      Most Recent Min/Max in 24hrs   Temp 97.7 °F (36.5 °C) Temp  Min: 95.9 °F (35.5 °C)  Max: 99.5 °F (37.5 °C)   Pulse (!) 106 Pulse  Min: 91  Max: 118   Resp 22 Resp  Min: 7  Max: 27   BP 91/64 BP  Min: 70/51  Max: 121/84   O2 Sat 97 % SpO2  Min: 87 %  Max: 100 %      Intake/Output Summary (Last 24 hours) at 1/8/2024 0740  Last data filed at 1/8/2024 0627  Gross per 24 hour   Intake 1254.58 ml   Output 3251 ml   Net -1996.42 ml       Diet Cardiovascular; Cardiac; Fluid Restriction 2000 ML, Sodium 2 GM    Invasive Monitoring           Physical Exam   Physical Exam  Vitals and nursing note reviewed.   Skin:     General: Skin is warm and dry.      Coloration: Skin is pale.       Findings: No rash.   HENT:      Nose: No nasal tenderness or nasogastric tube.      Mouth/Throat:      Mouth: Mucous membranes are dry.   Neck:      Vascular: No JVD.   Cardiovascular:      Rate and Rhythm: Tachycardia present. Rhythm irregular.      Heart sounds: Murmur heard.      No friction rub. No gallop.   Musculoskeletal:      Right lower le+ Edema present.      Left lower le+ Edema present.   Abdominal: General: Bowel sounds are normal. There is no distension.      Palpations: Abdomen is soft.      Tenderness: There is no abdominal tenderness.   Constitutional:       General: She is not in acute distress.  Pulmonary:      Effort: Pulmonary effort is normal.      Breath sounds: Rales present.   Chest:      Chest wall: No tenderness.   Neurological:      General: No focal deficit present.      Mental Status: She is alert and oriented to person, place and time. Mental status is at baseline. She is calm.      Cranial Nerves: No dysarthria.      Sensory: Sensation is intact.      Motor: Strength full and intact in all extremities.   Genitourinary/Anorectal:  external catheter present.          Diagnostic Studies      EKG: Afib   Imaging: I have personally reviewed pertinent reports.       Medications:  Scheduled PRN   apixaban, 2.5 mg, BID  cefazolin, 1,000 mg, Q8H  chlorhexidine, 15 mL, Q12H BRISSA  furosemide, 20 mg, Daily  melatonin, 3 mg, HS  metoprolol tartrate, 12.5 mg, Q12H BRISSA  senna, 1 tablet, HS          Continuous    norepinephrine, 1-30 mcg/min, Last Rate: Stopped (24 1700)  vasopressin, 0.03 Units/min, Last Rate: Stopped (24 0530)         Labs:    CBC    Recent Labs     24  0520   WBC 5.66   HGB 8.1*   HCT 29.0*        BMP    Recent Labs     24  0520 24  0610   SODIUM 146 144   K 3.6 4.2    103   CO2 36* 35*   AGAP 4 6   BUN 29* 25   CREATININE 1.50* 1.32*   CALCIUM 8.2* 8.5       Coags    Recent Labs     24  0725   INR 1.34*   PTT 39*         Additional Electrolytes  Recent Labs     01/07/24  0520 01/08/24  0610   MG 2.2 2.1   PHOS 2.9 2.2*          Blood Gas    No recent results  Recent Labs     01/07/24  0725   PHVEN 7.333   DYT6ITA 64.5*   PO2VEN 41.5   AWP8KAM 33.5*   BEVEN 6.4   [id number] 69.0    LFTs  No recent results    Infectious  No recent results  Glucose  Recent Labs     01/07/24  0520 01/08/24  0610   GLUC 99 115                   Mal Cheema MD

## 2024-01-08 NOTE — ASSESSMENT & PLAN NOTE
Lab Results   Component Value Date    EGFR 34 01/08/2024    EGFR 29 01/07/2024    EGFR 26 01/06/2024    CREATININE 1.32 (H) 01/08/2024    CREATININE 1.50 (H) 01/07/2024    CREATININE 1.66 (H) 01/06/2024     Will monitor Cr  Hemoglobin goal in CKD is more than 10.  Monitor for calcium, phosphorus, vitamin D abnormalities.

## 2024-01-08 NOTE — OCCUPATIONAL THERAPY NOTE
Occupational Therapy Evaluation     Patient Name: Aurora Anderson  Today's Date: 1/8/2024  Problem List  Principal Problem:    Septic shock (HCC)  Active Problems:    CKD (chronic kidney disease) stage 3, GFR 30-59 ml/min (HCC)    Atypical atrial flutter (HCC)    Acute on chronic respiratory failure (HCC)    Chronic heart failure with preserved ejection fraction (HCC)    HOSSEIN (acute kidney injury) (HCC)    Past Medical History  Past Medical History:   Diagnosis Date    Acute kidney injury (HCC)     Arthritis     Atrial flutter (HCC)     Rapid heart rate      Past Surgical History  Past Surgical History:   Procedure Laterality Date    CATARACT EXTRACTION      EGD AND COLONOSCOPY N/A 11/14/2017    Procedure: EGD AND COLONOSCOPY;  Surgeon: Radha Salas MD;  Location: [facility];  Service: Gastroenterology    HIP FRACTURE SURGERY             01/08/24 1445   OT Last Visit   OT Visit Date 01/08/24   Note Type   Note type Evaluation   Pain Assessment   Pain Assessment Tool 0-10   Pain Score No Pain   Restrictions/Precautions   Weight Bearing Precautions Per Order Yes   RLE Weight Bearing Per Order WBAT  (in heel offloading shoe)   LLE Weight Bearing Per Order WBAT  (in heel offloading shoe)   Other Precautions Cognitive;Chair Alarm;Bed Alarm;Multiple lines;O2;Fall Risk;Pain  (2L O2)   Home Living   Type of Home Apartment  (in law suite attached to daughter's home)   Home Layout One level  (0 ALEXA)   Bathroom Shower/Tub Walk-in shower   Bathroom Toilet Standard   Bathroom Equipment Grab bars in shower  (denies shower chair: edu on importance of sitting and not WB through heels during shower)   Bathroom Accessibility Accessible   Home Equipment Walker   Additional Comments use of RW (with tray attachment) at baseline, mod I with functional mobility   Prior Function   Level of [place] Needs assistance with ADLs  ((A) with showers, (I) with dressing + toileting. (A) with shoes when going out, otherwise does not wear  "shoes)   Lives With Daughter  (+ ZORAIDA)   Receives Help From Family  (ZORAIDA is home during the day and assists as needed)   IADLs Family/Friend/Other provides transportation;Family/Friend/Other provides meals;Family/Friend/Other provides medication management  ((I) with folding laundry while sitting on couch, daughter completes other IADLs)   Falls in the last 6 months 1 to 4  (1: leading to admission)   Vocational Retired  ()   Lifestyle   Autonomy PTA pt living with daughter + ZORAIDA in an in-law suite attached to their home, pt requiring (A) with ADLs and IADLs, (+)fall, (-)drives, use of RW at baseline   Reciprocal Relationships supportive daughter + ZORAIDA   Service to Others retired   Intrinsic Gratification enjoys watching crime shows   General   Additional Pertinent History Admit due to weakness + LE swelling. Pt with open wounds on heels. Seen by podiatry who recommends B heel offloading shoes. PMH: CHF, a flutter, CKD, glaucoma   Family/Caregiver Present Yes  (daughter Honey)   Subjective   Subjective \"Well what are we going to tell my boyfriend if I go to rehab?\" Pt later stating that she doesn't have a boyfriend that she likes to joke about having one   ADL   Eating Assistance 5  Supervision/Setup   Grooming Assistance 4  Minimal Assistance   UB Bathing Assistance 2  Maximal Assistance   LB Bathing Assistance 2  Maximal Assistance   UB Dressing Assistance 4  Minimal Assistance   UB Dressing Deficit Increased time to complete;Supervision/safety;Verbal cueing;Thread RUE;Thread LUE;Pull around back   LB Dressing Assistance 2  Maximal Assistance   LB Dressing Deficit Increased time to complete;Supervision/safety;Verbal cueing;Don/doff R shoe;Don/doff L shoe  (donning globoped shoes)   Toileting Assistance  2  Maximal Assistance   Additional Comments Did not observe eating, grooming, UB bathing, LB bathing, and toileting at time of evaluation, with use of clinical reasoning, pt's performance throughout " evaluation indicates that pt may be able to perform these tasks at the levels listed above   Bed Mobility   Additional Comments OOB and in chair upon arrival and end of session   Transfers   Sit to Stand 3  Moderate assistance   Additional items Assist x 1;Increased time required;Verbal cues   Stand to Sit 3  Moderate assistance   Additional items Assist x 1;Increased time required;Verbal cues   Additional Comments use of RW   Functional Mobility   Functional Mobility 2  Maximal assistance   Additional Comments Ax1, short distance 3-4 steps forward + chair follow. LLE buckling and requiring (A) to guide hips back to recliner chair   Additional items Rolling walker   Balance   Static Sitting Fair +   Dynamic Sitting Fair   Static Standing Poor   Dynamic Standing Poor   Ambulatory Poor -   Activity Tolerance   Activity Tolerance Patient limited by fatigue;Patient limited by pain   Medical Staff Made Aware CAITLIN Gerardo, TUCKER Kwan   RUE Assessment   RUE Assessment   (limited shldr ROM, grossly 4-/5 strength)   LUE Assessment   LUE Assessment   (limited shldr ROM, grossly 4-/5 strength)   Hand Function   Gross Motor Coordination Functional   Fine Motor Coordination Functional   Vision - Complex Assessment   Additional Comments glaucoma, unable to read name badge, or see tv. Reports able to see outlines of people/objects   Cognition   Overall Cognitive Status Impaired   Arousal/Participation Alert;Cooperative   Attention Attends with cues to redirect   Orientation Level Oriented X4  (with increased time and some cuiing)   Memory Decreased short term memory;Decreased recall of recent events;Decreased recall of precautions   Following Commands Follows one step commands with increased time or repetition   Comments poor higher level cognitive deficits, limited insight   Assessment   Limitation Decreased ADL status;Decreased UE strength;Decreased Safe judgement during ADL;Decreased cognition;Decreased endurance;Decreased self-care  trans;Decreased high-level ADLs  (impaired pain, balance, fxnl mobility, act amber, fxnl reach, trunk control, standing amber, strength, attention to task, direction following, safety awareness, insight, problem solving, learning new tasks)   Prognosis Good   Assessment Pt is a 89 y.o. female seen for OT evaluation s/p admission to [facility] on 1/4/2024 due to weakness + LE swelling. Diagnosed with Septic shock (HCC). Personal and env factors supporting pt at time of IE include  supportive daughter + ZORAIDA providing 24/7 care, FFSU at home, (A) with IADLs . Personal and env factors inhibiting engagement in occupations include advanced age and difficulty with ADLs and IADLs, use of RW at baseline . Performance deficits that affect the pt’s occupational performance can be seen above. Due to pt's current functional limitations and medical complications pt is functioning below baseline. Pt would benefit from continued skilled OT treatment in order to maximize safety, independence and overall performance with ADLs, functional mobility, functional transfers, and cognition in order to achieve highest level of function.   Goals   Patient Goals to be able to walk by herself to the bathroom   LT Time Frame 10-14   Long Term Goal see goals listed below   Plan   Treatment Interventions ADL retraining;Functional transfer training;Endurance training;Cognitive reorientation;Patient/family training;Equipment evaluation/education;Compensatory technique education;Energy conservation;Activityengagement   Goal Expiration Date 01/18/24   OT Treatment Day 0   OT Frequency 3-5x/wk   Discharge Recommendation   Rehab Resource Intensity Level, OT II (Moderate Resource Intensity)   AM-PAC Daily Activity Inpatient   Lower Body Dressing 2   Bathing 2   Toileting 2   Upper Body Dressing 2   Grooming 2   Eating 3   Daily Activity Raw Score 13   Daily Activity Standardized Score (Calc for Raw Score >=11) 32.03   AM-PAC Applied Cognition Inpatient    Following a Speech/Presentation 3   Understanding Ordinary Conversation 4   Taking Medications 2   Remembering Where Things Are Placed or Put Away 3   Remembering List of 4-5 Errands 2   Taking Care of Complicated Tasks 1   Applied Cognition Raw Score 15   Applied Cognition Standardized Score 33.54   End of Consult   Patient Position at End of Consult Bedside chair;Bed/Chair alarm activated;All needs within reach       GOALS:     -Patient will perform grooming tasks standing at sink with overall Supervision in order to increase overall independence    -Patient will be Supervision with UB dressing using AE and AD as needed in order to increase (I) with ADLs    -Patient will be Supervision with UB bathing using AE and AD as needed in order to increase (I) with ADLs    -Patient will be Min A  with LB dressing with use of AE and AD as needed in order to increase (I) with ADLs    -Patient will be Min A  with LB bathing with use of AE and AD as needed in order to increase (I) with ADLs    -Patient will complete toileting w/ Mod A  w/ G hygiene/thoroughness in order to reduce caregiver burden    -Patient will demonstrate Mod A x 1 with bed mobility for ability to manage own comfort and initiate OOB tasks.     -Patient will perform functional transfers with Min A x 1 to/from all surfaces using DME as needed in order to increase (I) with functional tasks    -Patient will be Mod A x 1 with functional mobility to/from bathroom for increased independence with toileting tasks    -Patient will tolerate therapeutic activities for greater than 30 min, in order to increase tolerance for functional activities.     -Patient will engage in ongoing cognitive assessment in order to assist with safe discharge planning/recommendations.      The patient's raw score on the -PAC Daily Activity Inpatient Short Form is 13. A raw score of less than 19 suggests the patient may benefit from discharge to post-acute rehabilitation services.  Please refer to the recommendation of the Occupational Therapist for safe discharge planning.      Anika Manrique MS, OTR/L

## 2024-01-09 PROBLEM — N17.9 AKI (ACUTE KIDNEY INJURY) (HCC): Status: RESOLVED | Noted: 2024-01-04 | Resolved: 2024-01-09

## 2024-01-09 LAB
ANION GAP SERPL CALCULATED.3IONS-SCNC: 5 MMOL/L
BACTERIA BLD CULT: NORMAL
BACTERIA BLD CULT: NORMAL
BUN SERPL-MCNC: 22 MG/DL (ref 5–25)
CA-I BLD-SCNC: 1.03 MMOL/L (ref 1.12–1.32)
CALCIUM SERPL-MCNC: 8.3 MG/DL (ref 8.4–10.2)
CHLORIDE SERPL-SCNC: 101 MMOL/L (ref 96–108)
CO2 SERPL-SCNC: 36 MMOL/L (ref 21–32)
CREAT SERPL-MCNC: 1.26 MG/DL (ref 0.6–1.3)
ERYTHROCYTE [DISTWIDTH] IN BLOOD BY AUTOMATED COUNT: 20.6 % (ref 11.6–15.1)
GFR SERPL CREATININE-BSD FRML MDRD: 36 ML/MIN/1.73SQ M
GLUCOSE SERPL-MCNC: 73 MG/DL (ref 65–140)
HCT VFR BLD AUTO: 28.7 % (ref 34.8–46.1)
HGB BLD-MCNC: 8 G/DL (ref 11.5–15.4)
MAGNESIUM SERPL-MCNC: 2.1 MG/DL (ref 1.9–2.7)
MCH RBC QN AUTO: 23.1 PG (ref 26.8–34.3)
MCHC RBC AUTO-ENTMCNC: 27.9 G/DL (ref 31.4–37.4)
MCV RBC AUTO: 83 FL (ref 82–98)
PHOSPHATE SERPL-MCNC: 2.9 MG/DL (ref 2.3–4.1)
PLATELET # BLD AUTO: 118 THOUSANDS/UL (ref 149–390)
PMV BLD AUTO: 9.2 FL (ref 8.9–12.7)
POTASSIUM SERPL-SCNC: 3.7 MMOL/L (ref 3.5–5.3)
RBC # BLD AUTO: 3.47 MILLION/UL (ref 3.81–5.12)
SODIUM SERPL-SCNC: 142 MMOL/L (ref 135–147)
WBC # BLD AUTO: 3.9 THOUSAND/UL (ref 4.31–10.16)

## 2024-01-09 PROCEDURE — 84100 ASSAY OF PHOSPHORUS: CPT

## 2024-01-09 PROCEDURE — 83735 ASSAY OF MAGNESIUM: CPT

## 2024-01-09 PROCEDURE — 82330 ASSAY OF CALCIUM: CPT

## 2024-01-09 PROCEDURE — 99232 SBSQ HOSP IP/OBS MODERATE 35: CPT | Performed by: INTERNAL MEDICINE

## 2024-01-09 PROCEDURE — NC001 PR NO CHARGE: Performed by: INTERNAL MEDICINE

## 2024-01-09 PROCEDURE — 80048 BASIC METABOLIC PNL TOTAL CA: CPT

## 2024-01-09 PROCEDURE — 93005 ELECTROCARDIOGRAM TRACING: CPT

## 2024-01-09 PROCEDURE — 85027 COMPLETE CBC AUTOMATED: CPT

## 2024-01-09 RX ORDER — ONDANSETRON 2 MG/ML
4 INJECTION INTRAMUSCULAR; INTRAVENOUS ONCE
Status: DISCONTINUED | OUTPATIENT
Start: 2024-01-09 | End: 2024-01-16 | Stop reason: HOSPADM

## 2024-01-09 RX ORDER — CALCIUM GLUCONATE 20 MG/ML
1 INJECTION, SOLUTION INTRAVENOUS ONCE
Status: COMPLETED | OUTPATIENT
Start: 2024-01-09 | End: 2024-01-09

## 2024-01-09 RX ORDER — FUROSEMIDE 20 MG/1
20 TABLET ORAL DAILY
Status: DISCONTINUED | OUTPATIENT
Start: 2024-01-10 | End: 2024-01-09

## 2024-01-09 RX ORDER — FUROSEMIDE 20 MG/1
20 TABLET ORAL DAILY
Status: DISCONTINUED | OUTPATIENT
Start: 2024-01-09 | End: 2024-01-09

## 2024-01-09 RX ADMIN — CHLORHEXIDINE GLUCONATE 15 ML: 1.2 SOLUTION ORAL at 08:16

## 2024-01-09 RX ADMIN — CEFTRIAXONE SODIUM 1000 MG: 10 INJECTION, POWDER, FOR SOLUTION INTRAVENOUS at 15:41

## 2024-01-09 RX ADMIN — Medication 12.5 MG: at 21:46

## 2024-01-09 RX ADMIN — SENNOSIDES 8.6 MG: 8.6 TABLET, FILM COATED ORAL at 21:46

## 2024-01-09 RX ADMIN — AZITHROMYCIN MONOHYDRATE 500 MG: 500 INJECTION, POWDER, LYOPHILIZED, FOR SOLUTION INTRAVENOUS at 17:26

## 2024-01-09 RX ADMIN — Medication 3 MG: at 21:46

## 2024-01-09 RX ADMIN — APIXABAN 2.5 MG: 2.5 TABLET, FILM COATED ORAL at 18:03

## 2024-01-09 RX ADMIN — FUROSEMIDE 20 MG: 20 TABLET ORAL at 09:11

## 2024-01-09 RX ADMIN — CALCIUM GLUCONATE 1 G: 20 INJECTION, SOLUTION INTRAVENOUS at 09:11

## 2024-01-09 RX ADMIN — APIXABAN 2.5 MG: 2.5 TABLET, FILM COATED ORAL at 08:16

## 2024-01-09 NOTE — PLAN OF CARE
Problem: Potential for Falls  Goal: Patient will remain free of falls  Description: INTERVENTIONS:  - Educate patient/family on patient safety including physical limitations  - Instruct patient to call for assistance with activity   - Consult OT/PT to assist with strengthening/mobility   - Keep Call bell within reach  - Keep bed low and locked with side rails adjusted as appropriate  - Keep care items and personal belongings within reach  - Initiate and maintain comfort rounds  - Make Fall Risk Sign visible to staff  - Offer Toileting every 2 Hours, in advance of need  - Initiate/Maintain bed alarm  - Obtain necessary fall risk management equipment  - Apply yellow socks and bracelet for high fall risk patients  - Consider moving patient to room near nurses station  Outcome: Progressing     Problem: Prexisting or High Potential for Compromised Skin Integrity  Goal: Skin integrity is maintained or improved  Description: INTERVENTIONS:  - Identify patients at risk for skin breakdown  - Assess and monitor skin integrity  - Assess and monitor nutrition and hydration status  - Monitor labs   - Assess for incontinence   - Turn and reposition patient  - Assist with mobility/ambulation  - Relieve pressure over bony prominences  - Avoid friction and shearing  - Provide appropriate hygiene as needed including keeping skin clean and dry  - Evaluate need for skin moisturizer/barrier cream  - Collaborate with interdisciplinary team   - Patient/family teaching  - Consider wound care consult   Outcome: Progressing

## 2024-01-09 NOTE — ASSESSMENT & PLAN NOTE
Wt Readings from Last 3 Encounters:   01/09/24 72.5 kg (159 lb 13.3 oz)   07/06/21 84.4 kg (186 lb)   07/01/21 84.4 kg (186 lb)     Chest x ray showing pulmonary congestion. BNP 1,800 concern for fluid overload.   Echo showing Ef of 55% and normal SF. Noted TVR moderate to sever and MVR moderate.  Patient is +183ml in last 24 hours  Maintain diuresis, fluid restriction, low-salt diet  Continue metoprolol

## 2024-01-09 NOTE — ASSESSMENT & PLAN NOTE
Hold Toprol (25 mg every 12 hours at home) and Diltiazem (180 mg daily at home at home).  Monitor on tele  [id number].  Patient currently in A-fib with heart rate of 83  Currently being rate controlled with metoprolol

## 2024-01-09 NOTE — PLAN OF CARE
Problem: Potential for Falls  Goal: Patient will remain free of falls  Description: INTERVENTIONS:  - Educate patient/family on patient safety including physical limitations  - Instruct patient to call for assistance with activity   - Consult OT/PT to assist with strengthening/mobility   - Keep Call bell within reach  - Keep bed low and locked with side rails adjusted as appropriate  - Keep care items and personal belongings within reach  - Initiate and maintain comfort rounds  - Make Fall Risk Sign visible to staff  Problem: PAIN - ADULT  Goal: Verbalizes/displays adequate comfort level or baseline comfort level  Description: Interventions:  - Encourage patient to monitor pain and request assistance  - Assess pain using appropriate pain scale  - Administer analgesics based on type and severity of pain and evaluate response  - Implement non-pharmacological measures as appropriate and evaluate response  - Consider cultural and social influences on pain and pain management  - Notify physician/advanced practitioner if interventions unsuccessful or patient reports new pain  Outcome: Progressing     Problem: INFECTION - ADULT  Goal: Absence or prevention of progression during hospitalization  Description: INTERVENTIONS:  - Assess and monitor for signs and symptoms of infection  - Monitor lab/diagnostic results  - Monitor all insertion sites, i.e. indwelling lines, tubes, and drains  - Monitor endotracheal if appropriate and nasal secretions for changes in amount and color  - [place] appropriate cooling/warming therapies per order  - Administer medications as ordered  - Instruct and encourage patient and family to use good hand hygiene technique  - Identify and instruct in appropriate isolation precautions for identified infection/condition  Outcome: Progressing     Problem: DISCHARGE PLANNING  Goal: Discharge to home or other facility with appropriate resources  Description: INTERVENTIONS:  - Identify barriers to  discharge w/patient and caregiver  - Arrange for needed discharge resources and transportation as appropriate  - Identify discharge learning needs (meds, wound care, etc.)  - Arrange for interpretive services to assist at discharge as needed  - Refer to Case Management Department for coordinating discharge planning if the patient needs post-hospital services based on physician/advanced practitioner order or complex needs related to functional status, cognitive ability, or social support system  Outcome: Progressing     Problem: Knowledge Deficit  Goal: Patient/family/caregiver demonstrates understanding of disease process, treatment plan, medications, and discharge instructions  Description: Complete learning assessment and assess knowledge base.  Interventions:  - Provide teaching at level of understanding  - Provide teaching via preferred learning methods  Outcome: Progressing     - Apply yellow socks and bracelet for high fall risk patients  - Consider moving patient to room near nurses station  Outcome: Progressing

## 2024-01-09 NOTE — ASSESSMENT & PLAN NOTE
At presentation, chest x ray concerning for pulmonary edema.  1/520204: Chest x-ray shows Worsening aeration of the lung fields with worsening left sided consolidation and patchy airspace disease within the right lung. There is central vascular congestion seen on the right. Findings suggest a combination of pneumonia and pulmonary edema   Patient currently saturating at 100% on 1 L via nasal cannula, but desaturates to 80% when O2 is turned off  Bibasilar and middle zone bilateral Rales    Plan:  Furosemide 20 mg daily  Monitor vitals  Respiratory protocol  Incentive spirometry

## 2024-01-09 NOTE — ASSESSMENT & PLAN NOTE
Lab Results   Component Value Date    EGFR 36 01/09/2024    EGFR 34 01/08/2024    EGFR 29 01/07/2024    CREATININE 1.26 01/09/2024    CREATININE 1.32 (H) 01/08/2024    CREATININE 1.50 (H) 01/07/2024     Will monitor Cr  Hemoglobin goal in CKD is more than 10.  Monitor for calcium, phosphorus, vitamin D abnormalities.

## 2024-01-09 NOTE — ASSESSMENT & PLAN NOTE
Patient found to by hypothermic and tachypnea, hypotensive.   Currently day 6 of antibiotics.  Patient had ceftriaxone for 2 days initially, cefazolin for 3 days, was transition back to ceftriaxone with additional cover with azithromycin currently day 3 of ceftriaxone  Micro: Urine culture shows more than 100,000 colonies of E. coli and Proteus both susceptible to cefazolin.  Blood culture does not show any growth in 72 hours  Chest x-ray shows interval worsening aeration of the lung fields with worsening left sided consolidation and patchy airspace disease within the right lung. There is central vascular congestion seen on the right. Findings suggest a combination of pneumonia and pulmonary edema   Vasopressin on hold since 5.00pm (1/8). Blood pressure of MAP ranging between 70-91. Goal MAP more than 65    Plan:  Continue on  ceftriaxone and Azithromycin  Abx day 6  Monitor vitals

## 2024-01-09 NOTE — PROGRESS NOTES
Critical Care Interval Transfer Note:    Please refer to progress note from earlier today for full details.     Barriers to discharge:   Last dose of azithromycin  Patient still needs to be weaned off of oxygen.  Currently, the patient is saturating at 94% on 1 L of oxygen via nasal cannula but desaturates to 80% off oxygen.  May require home O2 eval.     Consults: IP CONSULT TO CASE MANAGEMENT  IP CONSULT TO PODIATRY    Recommended to review admission imaging for incidental findings and document in discharge navigator: Chart reviewed, no known incidental findings noted at this time.      Discharge Plan: Anticipate discharge in 24-48 hrs to discharge location to be determined pending rehab evaluations.            Patient seen and evaluated by Critical Care today and deemed to be appropriate for transfer to [facility] with Telemetry. Spoke to Dr. Hsu from [facility] to accept transfer. Critical care can be contacted via [facility] with any questions or concerns.

## 2024-01-09 NOTE — PROGRESS NOTES
[facility]  Progress Note  Name: Aurora Anderson I  MRN: [id number]  Unit/Bed#: ICU 05 I Date of Admission: 1/4/2024   Date of Service: 1/9/2024 I Hospital Day: 5    Assessment/Plan   * Septic shock (HCC)  Assessment & Plan  Patient found to by hypothermic and tachypnea, hypotensive.   Currently day 6 of antibiotics.  Patient had ceftriaxone for 2 days initially, cefazolin for 3 days, was transition back to ceftriaxone with additional cover with azithromycin currently day 3 of ceftriaxone  Micro: Urine culture shows more than 100,000 colonies of E. coli and Proteus both susceptible to cefazolin.  Blood culture does not show any growth in 72 hours  Chest x-ray shows interval worsening aeration of the lung fields with worsening left sided consolidation and patchy airspace disease within the right lung. There is central vascular congestion seen on the right. Findings suggest a combination of pneumonia and pulmonary edema   Vasopressin on hold since 5.00pm (1/8). Blood pressure of MAP ranging between 70-91. Goal MAP more than 65    Plan:  Continue on  ceftriaxone and Azithromycin  Abx day 6  Monitor vitals           HOSSEIN (acute kidney injury) (HCC)-resolved as of 1/9/2024  Assessment & Plan  Lab Results   Component Value Date    CREATININE 1.26 01/09/2024    CREATININE 1.32 (H) 01/08/2024    CREATININE 1.50 (H) 01/07/2024      Probable secondary to dehydration and infection, BL 1.1-1.2  Status post fluid resuscitation with IVF per sepsis protocol  Monitor I/O  Avoid hypoperfusion. Currently on pressors   Avoid nephrotoxic agents  1/9: Current creatinine now 1.26.  Acute event resolved.    Chronic heart failure with preserved ejection fraction (HCC)  Assessment & Plan  Wt Readings from Last 3 Encounters:   01/09/24 72.5 kg (159 lb 13.3 oz)   07/06/21 84.4 kg (186 lb)   07/01/21 84.4 kg (186 lb)     Chest x ray showing pulmonary congestion. BNP 1,800 concern for fluid overload.   Echo showing Ef  of 55% and normal SF. Noted TVR moderate to sever and MVR moderate.  Patient is +183ml in last 24 hours  Maintain diuresis, fluid restriction, low-salt diet  Continue metoprolol        Atypical atrial flutter (HCC)  Assessment & Plan  Hold Toprol (25 mg every 12 hours at home) and Diltiazem (180 mg daily at home at home).  Monitor on tele  [id number] 4.  Patient currently in A-fib with heart rate of 83  Currently being rate controlled with metoprolol    CKD (chronic kidney disease) stage 3, GFR 30-59 ml/min ([facility])  Assessment & Plan  Lab Results   Component Value Date    EGFR 36 01/09/2024    EGFR 34 01/08/2024    EGFR 29 01/07/2024    CREATININE 1.26 01/09/2024    CREATININE 1.32 (H) 01/08/2024    CREATININE 1.50 (H) 01/07/2024     Will monitor Cr  Hemoglobin goal in CKD is more than 10.  Monitor for calcium, phosphorus, vitamin D abnormalities.    Acute on chronic respiratory failure (HCC)  Assessment & Plan  At presentation, chest x ray concerning for pulmonary edema.  1/520204: Chest x-ray shows Worsening aeration of the lung fields with worsening left sided consolidation and patchy airspace disease within the right lung. There is central vascular congestion seen on the right. Findings suggest a combination of pneumonia and pulmonary edema   Patient currently saturating at 100% on 1 L via nasal cannula, but desaturates to 80% when O2 is turned off  Bibasilar and middle zone bilateral Rales    Plan:  Furosemide 20 mg daily  Monitor vitals  Respiratory protocol  Incentive spirometry               Disposition: Critical care    ICU Core Measures     A: Assess, Prevent, and Manage Pain Has pain been assessed? Yes  Need for changes to pain regimen? No   B: Both SAT/SAT  N/A   C: Choice of Sedation RASS Goal: 0 Alert and Calm  Need for changes to sedation or analgesia regimen? No   D: Delirium CAM-ICU: Negative   E: Early Mobility  Plan for early mobility? Yes   F: Family Engagement Plan for family engagement today?  Yes       Antibiotic Review: Patient on appropriate coverage based on culture data.     Review of Invasive Devices:            Prophylaxis:  VTE VTE covered by:  apixaban, Oral, 2.5 mg at 01/09/24 0816       Stress Ulcer  not ordered         Significant 24hr Events     24hr events: Overnight, MAP range between 70-91.  Patient remained off pressors.  No other pertinent events reported     Subjective   Review of Systems   Constitutional:  Positive for fatigue. Negative for chills and fever.   HENT:  Negative for ear pain and sore throat.    Eyes:  Negative for pain and visual disturbance.   Respiratory:  Positive for cough (dry). Negative for shortness of breath.    Cardiovascular:  Positive for leg swelling. Negative for chest pain and palpitations.   Gastrointestinal:  Negative for abdominal pain and vomiting.   Genitourinary:  Negative for dysuria, frequency, hematuria and urgency.   Musculoskeletal:  Negative for arthralgias and back pain.   Skin:  Negative for color change and rash.   Neurological:  Negative for dizziness, seizures, syncope and weakness.   All other systems reviewed and are negative.     Objective                            Vitals I/O      Most Recent Min/Max in 24hrs   Temp (!) 96.5 °F (35.8 °C) Temp  Min: 96.5 °F (35.8 °C)  Max: 97.7 °F (36.5 °C)   Pulse 88 Pulse  Min: 80  Max: 112   Resp 20 Resp  Min: 10  Max: 29   BP 95/62 BP  Min: 79/50  Max: 124/73   O2 Sat 96 % SpO2  Min: 93 %  Max: 100 %      Intake/Output Summary (Last 24 hours) at 1/9/2024 0854  Last data filed at 1/9/2024 0300  Gross per 24 hour   Intake 858.45 ml   Output 675 ml   Net 183.45 ml       Diet Cardiovascular; Cardiac; Fluid Restriction 2000 ML, Sodium 2 GM    Invasive Monitoring           Physical Exam   Physical Exam  Vitals and nursing note reviewed.   Eyes:      Extraocular Movements: Extraocular movements intact.      Pupils: Pupils are equal, round, and reactive to light.   Skin:     General: Skin is warm and dry.    HENT:      Nose: No congestion or rhinorrhea.   Cardiovascular:      Rate and Rhythm: Normal rate. Rhythm irregular.      Heart sounds: Murmur heard.      No friction rub. No gallop.   Musculoskeletal:      Right lower le+ Edema present.      Left lower le+ Edema present.   Abdominal:      Palpations: Abdomen is soft.      Tenderness: There is no abdominal tenderness.   Pulmonary:      Effort: Pulmonary effort is normal.      Breath sounds: Rales present.   Neurological:      General: No focal deficit present.      Mental Status: She is alert and oriented to person, place and time. Mental status is at baseline.      Sensory: Sensation is intact.      Motor: Strength full and intact in all extremities.            Diagnostic Studies      EKG: On continuous telemetry, atrial fibrillation with a rate of 83 bpm  Imaging:  I have personally reviewed pertinent films in PACS     Medications:  Scheduled PRN   apixaban, 2.5 mg, BID  azithromycin, 500 mg, Q24H  calcium gluconate, 1 g, Once  cefTRIAXone, 1,000 mg, Q24H  chlorhexidine, 15 mL, Q12H BRISSA  furosemide, 20 mg, Daily  melatonin, 3 mg, HS  metoprolol tartrate, 12.5 mg, Q12H BRISSA  senna, 1 tablet, HS          Continuous    vasopressin, 0.03 Units/min, Last Rate: Stopped (24 1323)         Labs:    CBC    Recent Labs     24  0610 24  0516   WBC 4.83 3.90*   HGB 7.1* 8.0*   HCT 25.5* 28.7*   * 118*     BMP    Recent Labs     24  0610 24  0516   SODIUM 144 142   K 4.2 3.7    101   CO2 35* 36*   AGAP 6 5   BUN 25 22   CREATININE 1.32* 1.26   CALCIUM 8.5 8.3*       Coags    No recent results     Additional Electrolytes  Recent Labs     24  0610 24  0516   MG 2.1 2.1   PHOS 2.2* 2.9   CAIONIZED  --  1.03*          Blood Gas    No recent results  No recent results LFTs  No recent results    Infectious  No recent results  Glucose  Recent Labs     24  0610 24  0516   GLUC 115 73                   Mal  MD Deni

## 2024-01-09 NOTE — ASSESSMENT & PLAN NOTE
Lab Results   Component Value Date    CREATININE 1.26 01/09/2024    CREATININE 1.32 (H) 01/08/2024    CREATININE 1.50 (H) 01/07/2024      Probable secondary to dehydration and infection, BL 1.1-1.2  Status post fluid resuscitation with IVF per sepsis protocol  Monitor I/O  Avoid hypoperfusion. Currently on pressors   Avoid nephrotoxic agents  1/9: Current creatinine now 1.26.  Acute event resolved.

## 2024-01-10 LAB
ANION GAP SERPL CALCULATED.3IONS-SCNC: 4 MMOL/L
ATRIAL RATE: 87 BPM
BUN SERPL-MCNC: 19 MG/DL (ref 5–25)
CALCIUM SERPL-MCNC: 8.4 MG/DL (ref 8.4–10.2)
CHLORIDE SERPL-SCNC: 102 MMOL/L (ref 96–108)
CO2 SERPL-SCNC: 37 MMOL/L (ref 21–32)
CREAT SERPL-MCNC: 1.06 MG/DL (ref 0.6–1.3)
ERYTHROCYTE [DISTWIDTH] IN BLOOD BY AUTOMATED COUNT: 20.3 % (ref 11.6–15.1)
GFR SERPL CREATININE-BSD FRML MDRD: 45 ML/MIN/1.73SQ M
GLUCOSE SERPL-MCNC: 69 MG/DL (ref 65–140)
HCT VFR BLD AUTO: 27 % (ref 34.8–46.1)
HGB BLD-MCNC: 7.6 G/DL (ref 11.5–15.4)
MAGNESIUM SERPL-MCNC: 2 MG/DL (ref 1.9–2.7)
MCH RBC QN AUTO: 23.5 PG (ref 26.8–34.3)
MCHC RBC AUTO-ENTMCNC: 28.1 G/DL (ref 31.4–37.4)
MCV RBC AUTO: 84 FL (ref 82–98)
PLATELET # BLD AUTO: 114 THOUSANDS/UL (ref 149–390)
PMV BLD AUTO: 9.5 FL (ref 8.9–12.7)
POTASSIUM SERPL-SCNC: 3.7 MMOL/L (ref 3.5–5.3)
QRS AXIS: -22 DEGREES
QRSD INTERVAL: 108 MS
QT INTERVAL: 360 MS
QTC INTERVAL: 466 MS
RBC # BLD AUTO: 3.23 MILLION/UL (ref 3.81–5.12)
SODIUM SERPL-SCNC: 143 MMOL/L (ref 135–147)
T WAVE AXIS: 171 DEGREES
VENTRICULAR RATE: 101 BPM
WBC # BLD AUTO: 4.16 THOUSAND/UL (ref 4.31–10.16)

## 2024-01-10 PROCEDURE — 80048 BASIC METABOLIC PNL TOTAL CA: CPT

## 2024-01-10 PROCEDURE — 83735 ASSAY OF MAGNESIUM: CPT

## 2024-01-10 PROCEDURE — 85027 COMPLETE CBC AUTOMATED: CPT

## 2024-01-10 PROCEDURE — 99232 SBSQ HOSP IP/OBS MODERATE 35: CPT | Performed by: INTERNAL MEDICINE

## 2024-01-10 RX ORDER — FUROSEMIDE 20 MG/1
20 TABLET ORAL ONCE
Status: DISCONTINUED | OUTPATIENT
Start: 2024-01-10 | End: 2024-01-11

## 2024-01-10 RX ADMIN — Medication 12.5 MG: at 09:50

## 2024-01-10 RX ADMIN — AZITHROMYCIN MONOHYDRATE 500 MG: 500 INJECTION, POWDER, LYOPHILIZED, FOR SOLUTION INTRAVENOUS at 15:47

## 2024-01-10 RX ADMIN — APIXABAN 2.5 MG: 2.5 TABLET, FILM COATED ORAL at 18:50

## 2024-01-10 RX ADMIN — Medication 12.5 MG: at 20:46

## 2024-01-10 RX ADMIN — APIXABAN 2.5 MG: 2.5 TABLET, FILM COATED ORAL at 09:50

## 2024-01-10 NOTE — ASSESSMENT & PLAN NOTE
Hold Toprol (25 mg every 12 hours at home) and Diltiazem (180 mg daily at home at home).  Monitor on tele  [id number] 4.  Patient currently in A-fib with heart rate of 83  Currently being rate controlled with metoprolol

## 2024-01-10 NOTE — ASSESSMENT & PLAN NOTE
Patient found to by hypothermic and tachypnea, hypotensive.   Currently day 6 of antibiotics.  Patient had ceftriaxone for 2 days initially, cefazolin for 3 days, was transition back to ceftriaxone with additional cover with azithromycin currently day 3 of ceftriaxone  Micro: Urine culture shows more than 100,000 colonies of E. coli and Proteus both susceptible to cefazolin.  Blood culture does not show any growth in 72 hours  Chest x-ray shows interval worsening aeration of the lung fields with worsening left sided consolidation and patchy airspace disease within the right lung. There is central vascular congestion seen on the right. Findings suggest a combination of pneumonia and pulmonary edema   Vasopressin on hold since 5.00pm (1/8). Blood pressure of MAP ranging between 70-91. Goal MAP more than 65    Plan:  Continue azithromycin through 1/10, done with ceftriaxone  Monitor vitals

## 2024-01-10 NOTE — ASSESSMENT & PLAN NOTE
Hold Toprol (25 mg every 12 hours at home) and Diltiazem (180 mg daily at home at home).  Monitor on tele  [id number] 4.  Patient currently in A-fib with heart rate of 83  Currently being rate controlled with metoprolol

## 2024-01-10 NOTE — ASSESSMENT & PLAN NOTE
At presentation, chest x ray concerning for pulmonary edema.  1/520204: Chest x-ray shows Worsening aeration of the lung fields with worsening left sided consolidation and patchy airspace disease within the right lung. There is central vascular congestion seen on the right. Findings suggest a combination of pneumonia and pulmonary edema   Patient currently saturating at 100% on 2 L via nasal cannula, but desaturates to 80% when O2 is turned off  Bibasilar and middle zone bilateral Rales    Plan:  Furosemide 20 mg daily  Monitor vitals  Respiratory protocol  Incentive spirometry

## 2024-01-10 NOTE — PROGRESS NOTES
[facility]  Progress Note  Name: Aurora Anderson I  MRN: [id number]  Unit/Bed#: ICU 05 I Date of Admission: 1/4/2024   Date of Service: 1/10/2024 I Hospital Day: 6    Assessment/Plan   Chronic heart failure with preserved ejection fraction (HCC)  Assessment & Plan  Wt Readings from Last 3 Encounters:   01/10/24 75.1 kg (165 lb 9.1 oz)   07/06/21 84.4 kg (186 lb)   07/01/21 84.4 kg (186 lb)     Chest x ray showing pulmonary congestion. BNP 1,800 concern for fluid overload.   Echo showing Ef of 55% and normal SF. Noted TVR moderate to sever and MVR moderate.  Patient is +445ml in last 24 hours  Maintain diuresis, fluid restriction, low-salt diet  Continue metoprolol        Acute on chronic respiratory failure (HCC)  Assessment & Plan  At presentation, chest x ray concerning for pulmonary edema.  1/520204: Chest x-ray shows Worsening aeration of the lung fields with worsening left sided consolidation and patchy airspace disease within the right lung. There is central vascular congestion seen on the right. Findings suggest a combination of pneumonia and pulmonary edema   Patient currently saturating at 100% on 2 L via nasal cannula, but desaturates to 80% when O2 is turned off  Bibasilar and middle zone bilateral Rales    Plan:  Furosemide 20 mg daily  Monitor vitals  Respiratory protocol  Incentive spirometry    Atypical atrial flutter (HCC)  Assessment & Plan  Hold Toprol (25 mg every 12 hours at home) and Diltiazem (180 mg daily at home at home).  Monitor on tele  [id number] 4.  Patient currently in A-fib with heart rate of 83  Currently being rate controlled with metoprolol    CKD (chronic kidney disease) stage 3, GFR 30-59 ml/min ([facility])  Assessment & Plan  Lab Results   Component Value Date    EGFR 45 01/10/2024    EGFR 36 01/09/2024    EGFR 34 01/08/2024    CREATININE 1.06 01/10/2024    CREATININE 1.26 01/09/2024    CREATININE 1.32 (H) 01/08/2024     Will monitor Cr  Hemoglobin goal in  CKD is more than 10.  Monitor for calcium, phosphorus, vitamin D abnormalities.    * Septic shock (HCC)  Assessment & Plan  Patient found to by hypothermic and tachypnea, hypotensive.   Currently day 6 of antibiotics.  Patient had ceftriaxone for 2 days initially, cefazolin for 3 days, was transition back to ceftriaxone with additional cover with azithromycin currently day 3 of ceftriaxone  Micro: Urine culture shows more than 100,000 colonies of E. coli and Proteus both susceptible to cefazolin.  Blood culture does not show any growth in 72 hours  Chest x-ray shows interval worsening aeration of the lung fields with worsening left sided consolidation and patchy airspace disease within the right lung. There is central vascular congestion seen on the right. Findings suggest a combination of pneumonia and pulmonary edema   Vasopressin on hold since 5.00pm (1/8). Blood pressure of MAP ranging between 70-91. Goal MAP more than 65    Plan:  Continue azithromycin through 1/10, done with ceftriaxone  Monitor vitals           VTE Pharmacologic Prophylaxis:   High Risk (Score >/= 5) - Pharmacological DVT Prophylaxis Ordered: apixaban (Eliquis). Sequential Compression Devices Ordered.    Mobility:   Basic Mobility Inpatient Raw Score: 9  -[facility] Goal: 3: Sit at edge of bed  -[facility] Achieved: 2: Bed activities/Dependent transfer    Patient Centered Rounds: I performed bedside rounds with nursing staff today.  Discussions with Specialists or Other Care Team Provider: Podiatry    Education and Discussions with Family / Patient: Patient declined call to .     Current Length of Stay: 6 day(s)  Current Patient Status: Inpatient   Discharge Plan: Anticipate discharge in 24-48 hrs to home.    Code Status: Level 3 - DNAR and DNI    Subjective:   Patient seen at bedside today.  No acute events overnight.  Patient states that she is feeling better compared to yesterday.  Reports absolutely no problems.  Patient denies any  fevers, chills, headaches, chest pain, shortness of breath, abdominal pain, nausea, vomiting, constipation, diarrhea.  Patient has no acute or significant concerns or complaints.  Patient is doing well overall.      Objective:     Vitals:   Temp (24hrs), Av.1 °F (36.2 °C), Min:96.5 °F (35.8 °C), Max:97.7 °F (36.5 °C)    Temp:  [96.5 °F (35.8 °C)-97.7 °F (36.5 °C)] 97.5 °F (36.4 °C)  HR:  [] 104  Resp:  [11-33] 20  BP: ()/(57-74) 84/57  SpO2:  [88 %-100 %] 95 %  Body mass index is 27.55 kg/m².     Input and Output Summary (last 24 hours):     Intake/Output Summary (Last 24 hours) at 1/10/2024 0901  Last data filed at 1/10/2024 0201  Gross per 24 hour   Intake 1170 ml   Output 725 ml   Net 445 ml       Physical Exam:   Physical Exam  HENT:      Head: Normocephalic and atraumatic.      Mouth/Throat:      Mouth: Mucous membranes are moist.   Eyes:      General: No scleral icterus.        Left eye: No discharge.      Comments: Conjunctiva pallor   Cardiovascular:      Rate and Rhythm: Tachycardia present. Rhythm irregular.      Heart sounds: Normal heart sounds. No murmur heard.     No friction rub. No gallop.   Pulmonary:      Effort: Pulmonary effort is normal. No respiratory distress.      Breath sounds: Normal breath sounds. No stridor. No wheezing, rhonchi or rales.   Chest:      Chest wall: No tenderness.   Abdominal:      Palpations: There is no mass.      Tenderness: There is no abdominal tenderness. There is no guarding.   Musculoskeletal:      Right lower leg: Edema present.      Left lower leg: Edema present.   Skin:     General: Skin is warm and dry.      Capillary Refill: Capillary refill takes less than 2 seconds.   Neurological:      General: No focal deficit present.      Mental Status: She is oriented to person, place, and time.   Psychiatric:         Mood and Affect: Mood normal.         Behavior: Behavior normal.         Thought Content: Thought content normal.         Additional Data:      Labs:  Results from last 7 days   Lab Units 01/10/24  0518 01/09/24  0516 01/08/24  0610   WBC Thousand/uL 4.16*   < > 4.83   HEMOGLOBIN g/dL 7.6*   < > 7.1*   HEMATOCRIT % 27.0*   < > 25.5*   PLATELETS Thousands/uL 114*   < > 146*   NEUTROS PCT %  --   --  82*   LYMPHS PCT %  --   --  8*   MONOS PCT %  --   --  9   EOS PCT %  --   --  1    < > = values in this interval not displayed.     Results from last 7 days   Lab Units 01/10/24  0518 01/05/24  0514 01/04/24  1030   SODIUM mmol/L 143   < > 140   POTASSIUM mmol/L 3.7   < > 3.4*   CHLORIDE mmol/L 102   < > 102   CO2 mmol/L 37*   < > 29   BUN mg/dL 19   < > 37*   CREATININE mg/dL 1.06   < > 1.95*   ANION GAP mmol/L 4   < > 9   CALCIUM mg/dL 8.4   < > 9.1   ALBUMIN g/dL  --   --  3.5   TOTAL BILIRUBIN mg/dL  --   --  0.67   ALK PHOS U/L  --   --  106*   ALT U/L  --   --  14   AST U/L  --   --  21   GLUCOSE RANDOM mg/dL 69   < > 112    < > = values in this interval not displayed.     Results from last 7 days   Lab Units 01/07/24  0725   INR  1.34*             Results from last 7 days   Lab Units 01/05/24  1355 01/04/24  1544 01/04/24  1338   LACTIC ACID mmol/L 0.9 2.7* 2.3*   PROCALCITONIN ng/ml  --   --  <0.05       Lines/Drains:  Invasive Devices       Peripheral Intravenous Line  Duration             Peripheral IV 01/08/24 Left;Ventral (anterior) Forearm 2 days    Peripheral IV 01/09/24 Right;Ventral (anterior) Forearm 1 day                      Telemetry:  Telemetry Orders (From admission, onward)               24 Hour Telemetry Monitoring  Continuous x 24 Hours (Telem)        Question:  Reason for 24 Hour Telemetry  Answer:  Decompensated CHF- and any one of the following: continuous diuretic infusion or total diuretic dose >200 mg daily, associated electrolyte derangement (I.e. K < 3.0), ionotropic drip (continuous infusion), hx of ventricular arrhythmia, or new EF < 35%                          XR chest portable ICU   Final Result by Long Ferreira MD  (01/08 1120)      Cardiomegaly      Increased pulmonary edema      Slight decreased bilateral pleural effusions      Decreased left base infiltrate/atelectasis                  Workstation performed: [id number]         XR chest portable ICU   Final Result by Kevni Razo DO (01/05 1419)      Worsening aeration of the lung fields with worsening left sided consolidation and patchy airspace disease within the right lung. There is central vascular congestion seen on the right. Findings suggest a combination of pneumonia and pulmonary edema.                  Workstation performed: [id number]         XR foot 2 vw left   Final Result by Sarath Dempsey MD (01/05 1019)      No acute osseous abnormality.      Limited examinations bilaterally.      Workstation performed: [id number]         XR foot 2 vw right   Final Result by Sarath Dempsey MD (01/05 1019)      No acute osseous abnormality.      Limited examinations bilaterally.      Workstation performed: [id number]         XR chest portable ICU   Final Result by Alex Medrano MD (01/05 0835)      1. Satisfactory positioning of the right IJ catheter without pneumothorax.      2. Moderate bilateral airspace opacities, which may represent edema and/or infection/inflammation.      3. Small left pleural effusion. Possible trace right pleural effusion.      4. Stable enlarged cardiomediastinal silhouette.                     Workstation performed: [id number]             Imaging: Reviewed radiology reports from this admission including: above    Recent Cultures (last 7 days):   Results from last 7 days   Lab Units 01/04/24  1338 01/04/24  1141   BLOOD CULTURE  No Growth After 5 Days.  No Growth After 5 Days.  --    URINE CULTURE   --  >100,000 cfu/ml Escherichia coli*  >100,000 cfu/ml Proteus mirabilis*       Last 24 Hours Medication List:   Current Facility-Administered Medications   Medication Dose Route Frequency Provider Last Rate    apixaban  2.5 mg Oral BID  Mal Cheema MD      azithromycin  500 mg Intravenous Q24H Mal Cheema  mg (01/09/24 1726)    melatonin  3 mg Oral HS Mal Cheema MD      metoprolol tartrate  12.5 mg Oral Q12H [facility] Mal Cheema MD      ondansetron  4 mg Intravenous Once AZRA Brownlee      senna  1 tablet Oral HS Mal Cheema MD          Today, Patient Was Seen By: Kevin Herman MD    **Please Note: This note may have been constructed using a voice recognition system.**

## 2024-01-10 NOTE — ASSESSMENT & PLAN NOTE
Wt Readings from Last 3 Encounters:   01/10/24 75.1 kg (165 lb 9.1 oz)   07/06/21 84.4 kg (186 lb)   07/01/21 84.4 kg (186 lb)     Chest x ray showing pulmonary congestion. BNP 1,800 concern for fluid overload.   Echo showing Ef of 55% and normal SF. Noted TVR moderate to sever and MVR moderate.  Patient is +445ml in last 24 hours  Maintain diuresis, fluid restriction, low-salt diet  Continue metoprolol

## 2024-01-10 NOTE — ASSESSMENT & PLAN NOTE
Wt Readings from Last 3 Encounters:   01/10/24 75.1 kg (165 lb 9.1 oz)   07/06/21 84.4 kg (186 lb)   07/01/21 84.4 kg (186 lb)     Chest x ray showing pulmonary congestion. BNP 1,800 concern for fluid overload.   Echo showing Ef of 55% and normal SF. Noted TVR moderate to sever and MVR moderate.  Patient is +183ml in last 24 hours  Maintain diuresis, fluid restriction, low-salt diet  Continue metoprolol

## 2024-01-10 NOTE — CASE MANAGEMENT
Case Management Discharge Planning Note    Patient name Aurora Anderson  Location ICU 05/ICU 05 MRN [id number]  : 1929 Date 1/10/2024       Current Admission Date: 2024  Current Admission Diagnosis:Septic shock (HCC)   Patient Active Problem List    Diagnosis Date Noted    Lymphedema 2024    Septic shock (HCC) 2024    Chronic heart failure with preserved ejection fraction (HCC) 2021    Breast mass, right 2021    CKD (chronic kidney disease) stage 3, GFR 30-59 ml/min (HCC) 2021    Atypical atrial flutter (HCC) 2021    Acute on chronic respiratory failure (HCC) 2021    Anemia 2021    Right bundle branch block (RBBB) on electrocardiogram (ECG) 2017    Diverticulosis of colon 2017    Large hiatal hernia 2017    Incidental 6cm right Renal lesion on CT 2017      LOS (days): 6  Geometric Mean LOS (GMLOS) (days): 5.1  Days to GMLOS:-0.9     OBJECTIVE:  Risk of Unplanned Readmission Score: 11.58         Current admission status: Inpatient   Preferred Pharmacy:   [id number] [place], [place] - [address] [id number] [facility] [address]  Phone: [phone] Fax: [phone]    Primary Care Provider: Anyi Lopez MD    Primary Insurance: MEDICARE  Secondary Insurance: JOSÉ MCDONNELL PENDING    DISCHARGE DETAILS:    Discharge planning discussed with:: pt and daughterYana  Freedom of Choice: Yes  Comments - Freedom of Choice: List provided    Contacts  Patient Contacts: Yana  Relationship to Patient:: Family  Contact Method: In Person  Reason/Outcome: Continuity of Care, Discharge Planning, Emergency Contact, Referral    Other Referral/Resources/Interventions Provided:  Interventions: Short Term Rehab  Referral Comments: CM met with pt and her daughter, Yana, at bedside. CM provided list of options: [facility], [facility], and [facility]. Pt's daughter preference is [facility]. CM  reviewed that [facility] could not accept due to secondary being MA Pending. As per Yana the insurance was the same (Medicare part A and no secondary) the previous admission to [facility]. As per pt's daughter they are able to assist with co-payment if pt exceeds her 20 days there. Pts daughter would also be interested in [facility]. Daughter will review [facility]. Other two she is not interested in at this time. Yana monahan CM will send messages to both [facility] and [facility]. [facility] notified of previous admission to their facility and family ability to assist with co-pay if pt exceeds 20 days. CM did request that admissions reach out to the daughter if this is an options. CM also sent message to [facility]. They will review.

## 2024-01-10 NOTE — CASE MANAGEMENT
Case Management Progress Note    Patient name Aurora Anderson  Location ICU 05/ICU 05 MRN [id number]  : 1929 Date 1/10/2024       LOS (days): 6  Geometric Mean LOS (GMLOS) (days): 5.1  Days to GMLOS:-1        OBJECTIVE:        Current admission status: Inpatient  Preferred Pharmacy:   [id number] [place], [place] - [address] [id number] [facility] [place] [address]  Phone: [phone] Fax: [phone]    Primary Care Provider: Anyi Lopez MD    Primary Insurance: MEDICARE  Secondary Insurance: JOSÉ MCDONNELL PENDING    PROGRESS NOTE:    [facility] unable to accept. [facility] able to offer a bed.     CM spoke with pt's daughter, Yana. Accepts bed at [facility]. CM reserved via [facility].

## 2024-01-11 LAB
ANION GAP SERPL CALCULATED.3IONS-SCNC: 4 MMOL/L
BASOPHILS # BLD AUTO: 0.01 THOUSANDS/ÂΜL (ref 0–0.1)
BASOPHILS NFR BLD AUTO: 0 % (ref 0–1)
BUN SERPL-MCNC: 18 MG/DL (ref 5–25)
CALCIUM SERPL-MCNC: 8.6 MG/DL (ref 8.4–10.2)
CHLORIDE SERPL-SCNC: 103 MMOL/L (ref 96–108)
CO2 SERPL-SCNC: 36 MMOL/L (ref 21–32)
CREAT SERPL-MCNC: 1.04 MG/DL (ref 0.6–1.3)
EOSINOPHIL # BLD AUTO: 0.05 THOUSAND/ÂΜL (ref 0–0.61)
EOSINOPHIL NFR BLD AUTO: 1 % (ref 0–6)
ERYTHROCYTE [DISTWIDTH] IN BLOOD BY AUTOMATED COUNT: 20.3 % (ref 11.6–15.1)
GFR SERPL CREATININE-BSD FRML MDRD: 46 ML/MIN/1.73SQ M
GLUCOSE SERPL-MCNC: 69 MG/DL (ref 65–140)
HCT VFR BLD AUTO: 30.7 % (ref 34.8–46.1)
HGB BLD-MCNC: 8.3 G/DL (ref 11.5–15.4)
IMM GRANULOCYTES # BLD AUTO: 0.04 THOUSAND/UL (ref 0–0.2)
IMM GRANULOCYTES NFR BLD AUTO: 1 % (ref 0–2)
LYMPHOCYTES # BLD AUTO: 0.56 THOUSANDS/ÂΜL (ref 0.6–4.47)
LYMPHOCYTES NFR BLD AUTO: 13 % (ref 14–44)
MAGNESIUM SERPL-MCNC: 2.1 MG/DL (ref 1.9–2.7)
MCH RBC QN AUTO: 22.7 PG (ref 26.8–34.3)
MCHC RBC AUTO-ENTMCNC: 27 G/DL (ref 31.4–37.4)
MCV RBC AUTO: 84 FL (ref 82–98)
MONOCYTES # BLD AUTO: 0.52 THOUSAND/ÂΜL (ref 0.17–1.22)
MONOCYTES NFR BLD AUTO: 12 % (ref 4–12)
NEUTROPHILS # BLD AUTO: 3.15 THOUSANDS/ÂΜL (ref 1.85–7.62)
NEUTS SEG NFR BLD AUTO: 73 % (ref 43–75)
NRBC BLD AUTO-RTO: 1 /100 WBCS
PLATELET # BLD AUTO: 140 THOUSANDS/UL (ref 149–390)
PMV BLD AUTO: 9.5 FL (ref 8.9–12.7)
POTASSIUM SERPL-SCNC: 4.1 MMOL/L (ref 3.5–5.3)
RBC # BLD AUTO: 3.65 MILLION/UL (ref 3.81–5.12)
SODIUM SERPL-SCNC: 143 MMOL/L (ref 135–147)
WBC # BLD AUTO: 4.33 THOUSAND/UL (ref 4.31–10.16)

## 2024-01-11 PROCEDURE — 97530 THERAPEUTIC ACTIVITIES: CPT

## 2024-01-11 PROCEDURE — 83735 ASSAY OF MAGNESIUM: CPT

## 2024-01-11 PROCEDURE — 80048 BASIC METABOLIC PNL TOTAL CA: CPT

## 2024-01-11 PROCEDURE — 85025 COMPLETE CBC W/AUTO DIFF WBC: CPT

## 2024-01-11 PROCEDURE — 97110 THERAPEUTIC EXERCISES: CPT

## 2024-01-11 PROCEDURE — 99232 SBSQ HOSP IP/OBS MODERATE 35: CPT | Performed by: INTERNAL MEDICINE

## 2024-01-11 RX ORDER — FUROSEMIDE 20 MG/1
20 TABLET ORAL DAILY
Status: DISCONTINUED | OUTPATIENT
Start: 2024-01-11 | End: 2024-01-16 | Stop reason: HOSPADM

## 2024-01-11 RX ADMIN — Medication 12.5 MG: at 21:48

## 2024-01-11 RX ADMIN — FUROSEMIDE 20 MG: 20 TABLET ORAL at 11:14

## 2024-01-11 RX ADMIN — APIXABAN 2.5 MG: 2.5 TABLET, FILM COATED ORAL at 17:33

## 2024-01-11 RX ADMIN — Medication 3 MG: at 21:48

## 2024-01-11 RX ADMIN — Medication 12.5 MG: at 08:50

## 2024-01-11 RX ADMIN — APIXABAN 2.5 MG: 2.5 TABLET, FILM COATED ORAL at 08:50

## 2024-01-11 RX ADMIN — SENNOSIDES 8.6 MG: 8.6 TABLET, FILM COATED ORAL at 21:48

## 2024-01-11 NOTE — CASE MANAGEMENT
Case Management Discharge Planning Note    Patient name Aurora Anderson  Location S /S -01 MRN [id number]  : 1929 Date 2024       Current Admission Date: 2024  Current Admission Diagnosis:Septic shock (HCC)   Patient Active Problem List    Diagnosis Date Noted    Lymphedema 2024    Septic shock (HCC) 2024    Chronic heart failure with preserved ejection fraction (HCC) 2021    Breast mass, right 2021    CKD (chronic kidney disease) stage 3, GFR 30-59 ml/min (HCC) 2021    Atypical atrial flutter (HCC) 2021    Acute on chronic respiratory failure (HCC) 2021    Anemia 2021    Right bundle branch block (RBBB) on electrocardiogram (ECG) 2017    Diverticulosis of colon 2017    Large hiatal hernia 2017    Incidental 6cm right Renal lesion on CT 2017      LOS (days): 7  Geometric Mean LOS (GMLOS) (days): 5.1  Days to GMLOS:-2     OBJECTIVE:  Risk of Unplanned Readmission Score: 11.66         Current admission status: Inpatient   Preferred Pharmacy:   [id number] [place], [place] - [address] [address] [facility] [address]  Phone: [phone] Fax: [phone]    Primary Care Provider: Anyi Lopez MD    Primary Insurance: MEDICARE  Secondary Insurance: JOSÉ MCDONNELL PENDING    DISCHARGE DETAILS:    Discharge planning discussed with:: VM left for patient's Yana mercado  Freedom of Choice: Yes (re: rehab)  Comments - [place] of Choice: [facility] had been reserved in [facility], but per ICU CM, family interested in speaking with [facility] reports [facility] do not have a bed until Saturday. VM left for Yana mercado, requesting return call  CM contacted family/caregiver?: Yes (VM left for daughter)  Were Treatment Team discharge recommendations reviewed with patient/caregiver?: Yes  Did patient/caregiver verbalize understanding of patient care needs?: N/A- going to  facility  Were patient/caregiver advised of the risks associated with not following Treatment Team discharge recommendations?: Yes    Contacts  Patient Contacts: rogelio Millan  Relationship to Patient:: Family  Contact Method: Phone  Phone Number: [phone]  Reason/Outcome: Continuity of Care, Discharge Planning, Emergency Contact, Referral    Requested Home Health Care         Is the patient interested in HHC at discharge?: No    DME Referral Provided  Referral made for DME?: No    Other Referral/Resources/Interventions Provided:  Interventions: SNF, Short Term Rehab  Referral Comments: Per rounds, patient medically stable for d/c today. [facility] had been reserved in [facility], so message sent to them to inquire about bed availability - response received that the earliest they will have a bed will be Saturday - nothing available today or tomorrow. Per ICU CM, patient's daughter had been interested in speaking with admissions for [facility] - message received from [facility] in [facility] relaying that VM was left for daughter this afternoon. Call made to daughter, Yana, and VM left requesting return call. Will continue to follow for STR choice and transfer.    Would you like to participate in our [facility] service program?  : No - Declined    Treatment Team Recommendation: Short Term Rehab  Discharge Destination Plan:: Short Term Rehab

## 2024-01-11 NOTE — ASSESSMENT & PLAN NOTE
Lab Results   Component Value Date    EGFR 46 01/11/2024    EGFR 45 01/10/2024    EGFR 36 01/09/2024    CREATININE 1.04 01/11/2024    CREATININE 1.06 01/10/2024    CREATININE 1.26 01/09/2024     Will monitor Cr  Hemoglobin goal in CKD is more than 10.  Monitor for calcium, phosphorus, vitamin D abnormalities.

## 2024-01-11 NOTE — ASSESSMENT & PLAN NOTE
Patient found to by hypothermic and tachypnea, hypotensive.   Currently day 6 of antibiotics.  Patient had ceftriaxone for 2 days initially, cefazolin for 3 days, was transition back to ceftriaxone with additional cover with azithromycin currently day 3 of ceftriaxone  Micro: Urine culture shows more than 100,000 colonies of E. coli and Proteus both susceptible to cefazolin.  Blood culture does not show any growth in 72 hours  Chest x-ray shows interval worsening aeration of the lung fields with worsening left sided consolidation and patchy airspace disease within the right lung. There is central vascular congestion seen on the right. Findings suggest a combination of pneumonia and pulmonary edema   Vasopressin on hold since 5.00pm (1/8). Blood pressure of MAP ranging between 70-91. Goal MAP more than 65    Plan:  Time with ceftriaxone and azithromycin  Monitor vitals

## 2024-01-11 NOTE — ASSESSMENT & PLAN NOTE
Hold Toprol (25 mg every 12 hours at home) and Diltiazem (180 mg daily at home at home).  Monitor on tele  [id number] 4.  Patient currently in A-fib with heart rate of 83  Currently being rate controlled with metoprolol

## 2024-01-11 NOTE — CASE MANAGEMENT
Case Management Discharge Planning Note    Patient name Aurora Anderson  Location S /S -01 MRN [id number]  : 1929 Date 2024       Current Admission Date: 2024  Current Admission Diagnosis:Septic shock (HCC)   Patient Active Problem List    Diagnosis Date Noted    Lymphedema 2024    Septic shock (HCC) 2024    Chronic heart failure with preserved ejection fraction (HCC) 2021    Breast mass, right 2021    CKD (chronic kidney disease) stage 3, GFR 30-59 ml/min (HCC) 2021    Atypical atrial flutter (HCC) 2021    Acute on chronic respiratory failure (HCC) 2021    Anemia 2021    Right bundle branch block (RBBB) on electrocardiogram (ECG) 2017    Diverticulosis of colon 2017    Large hiatal hernia 2017    Incidental 6cm right Renal lesion on CT 2017      LOS (days): 7  Geometric Mean LOS (GMLOS) (days): 5.1  Days to GMLOS:-2.1     OBJECTIVE:  Risk of Unplanned Readmission Score: 11.66         Current admission status: Inpatient   Preferred Pharmacy:   [id number] [place], [place] - [address] [id number] [facility] [address]  Phone: [phone] Fax: [phone]    Primary Care Provider: Anyi Lopez MD    Primary Insurance: MEDICARE  Secondary Insurance: JOSÉ MCDONNELL PENDING    DISCHARGE DETAILS:    Discharge planning discussed with:: patient's daughterYana, by phone  Freedom of Choice: Yes (re: rehab)  Comments - Freedom of Choice: rehab referral re-opened as [facility] not available, however daughter still requesting to speak with CM before making decision and reports concern about d/c as she does not feel patient is ready for rehab  CM contacted family/caregiver?: Yes (daughterYana, by phone)  Were Treatment Team discharge recommendations reviewed with patient/caregiver?: Yes  Did patient/caregiver verbalize understanding of patient care needs?: N/A-  going to facility  Were patient/caregiver advised of the risks associated with not following Treatment Team discharge recommendations?: Yes    Contacts  Patient Contacts: rogelio Millan  Relationship to Patient:: Family  Contact Method: Phone  Phone Number: [phone]  Reason/Outcome: Continuity of Care, Discharge Planning, Emergency Contact, Referral    Requested Home Health Care         Is the patient interested in HHC at discharge?: No    DME Referral Provided  Referral made for DME?: No    Other Referral/Resources/Interventions Provided:  Interventions: SNF, Short Term Rehab  Referral Comments: Return call received from patient's daughter, Yana. Reviewed with Yana patient's readiness for d/c and relayed that [facility] does not have any current female bed available. Reviewed other bed offers recieved ([facility], [facility]], [facility]) but daughter states she wants to talk to [facility] prior to making any decision. Relayed that message was received from [facility] stating that they reached out this afternoon, but Yana denies getting VM from them. Reviewed concern from [facility] about patient's lack of Medicare Part B coverage and pending MA status; daughter reports that they have never applied for Medicaid in [place], and is not sure why patient has an MA-Pending status. Reports that family is willing to pay for non-covered costs at [facility] and states that they did not have an issue the last time she had gone there. Aware that this writer will reach out to [facility] again to follow-up, but relayed that at this time, they had not offered a bed and since patient is stable for discharge we have to look at the current options available. Daughter reports she does not feel patient is stable for d/c and relays that she was told that patient will need to remain in the hospital to address swallowing issues she has, as patient is not eating. Reports that  she thought patient would remain in the hospital through the weekend, but informed daughter that she has multiple bed offers at this time, and ultimately will need to choose from facility that is available as hospital will not hold patient waiting for a preferred facility to open up as there are options in place. Daughter requesting [facility], but aware that they had declined, stating no bed available - message sent to them to see if availability changed; awaiting response.  Daughter aware that this writer will follow-up with MDs re: d/c date, as MDs had said this morning that patient can be discharged to rehab. TT sent to MD to review above conversation - will meet with patient/family tomorrow together to discuss medical stability and rehab choice. Met with patient at bedside, to discuss above, but she states she would like daughter to confirm rehab plan. Patient aware that this writer will return tomorrow to review rehab options. Messages sent to pended facilities and referral expanded in [facility] to see if any options are available. VM left for Katlyn at [facility] and message sent in [facility] to request follow-up with family.    Would you like to participate in our [facility] service program?  : No - Declined    Treatment Team Recommendation: Short Term Rehab  Discharge Destination Plan:: Short Term Rehab

## 2024-01-11 NOTE — PROGRESS NOTES
[facility]  Progress Note  Name: Aurora Anderson I  MRN: [id number]  Unit/Bed#: S -01 I Date of Admission: 1/4/2024   Date of Service: 1/11/2024 I Hospital Day: 7    Assessment/Plan   Chronic heart failure with preserved ejection fraction (HCC)  Assessment & Plan  Wt Readings from Last 3 Encounters:   01/11/24 63.5 kg (140 lb)   07/06/21 84.4 kg (186 lb)   07/01/21 84.4 kg (186 lb)     Chest x ray showing pulmonary congestion. BNP 1,800 concern for fluid overload.   Echo showing Ef of 55% and normal SF. Noted TVR moderate to sever and MVR moderate.  Patient is -295ml in last 24 hours  Maintain diuresis, fluid restriction, low-salt diet  Continue metoprolol  Resume home Lasix dose: 20mg p.o. Lasix        Acute on chronic respiratory failure (HCC)  Assessment & Plan  At presentation, chest x ray concerning for pulmonary edema.  1/520204: Chest x-ray shows Worsening aeration of the lung fields with worsening left sided consolidation and patchy airspace disease within the right lung. There is central vascular congestion seen on the right. Findings suggest a combination of pneumonia and pulmonary edema   Patient currently saturating at 100% on 2 L via nasal cannula, but desaturates to 80% when O2 is turned off  Bibasilar and middle zone bilateral Rales    Plan:  Furosemide 20 mg daily  Monitor vitals  Respiratory protocol  Incentive spirometry    Atypical atrial flutter (HCC)  Assessment & Plan  Hold Toprol (25 mg every 12 hours at home) and Diltiazem (180 mg daily at home at home).  Monitor on tele  [id number] 4.  Patient currently in A-fib with heart rate of 83  Currently being rate controlled with metoprolol    CKD (chronic kidney disease) stage 3, GFR 30-59 ml/min ([facility])  Assessment & Plan  Lab Results   Component Value Date    EGFR 46 01/11/2024    EGFR 45 01/10/2024    EGFR 36 01/09/2024    CREATININE 1.04 01/11/2024    CREATININE 1.06 01/10/2024    CREATININE 1.26 01/09/2024      Will monitor Cr  Hemoglobin goal in CKD is more than 10.  Monitor for calcium, phosphorus, vitamin D abnormalities.    * Septic shock (HCC)  Assessment & Plan  Patient found to by hypothermic and tachypnea, hypotensive.   Currently day 6 of antibiotics.  Patient had ceftriaxone for 2 days initially, cefazolin for 3 days, was transition back to ceftriaxone with additional cover with azithromycin currently day 3 of ceftriaxone  Micro: Urine culture shows more than 100,000 colonies of E. coli and Proteus both susceptible to cefazolin.  Blood culture does not show any growth in 72 hours  Chest x-ray shows interval worsening aeration of the lung fields with worsening left sided consolidation and patchy airspace disease within the right lung. There is central vascular congestion seen on the right. Findings suggest a combination of pneumonia and pulmonary edema   Vasopressin on hold since 5.00pm (1/8). Blood pressure of MAP ranging between 70-91. Goal MAP more than 65    Plan:  Time with ceftriaxone and azithromycin  Monitor vitals           VTE Pharmacologic Prophylaxis:   High Risk (Score >/= 5) - Pharmacological DVT Prophylaxis Ordered: apixaban (Eliquis). Sequential Compression Devices Ordered.    Mobility:   Basic Mobility Inpatient Raw Score: 9  -[facility] Goal: 3: Sit at edge of bed  -[facility] Achieved: 2: Bed activities/Dependent transfer    Patient Centered Rounds: I performed bedside rounds with nursing staff today.  Discussions with Specialists or Other Care Team Provider: Podiatry    Education and Discussions with Family / Patient: Patient declined call to .     Current Length of Stay: 7 day(s)  Current Patient Status: Inpatient   Discharge Plan: Anticipate discharge in 24-48 hrs to home.    Code Status: Level 3 - DNAR and DNI    Subjective:   Patient seen at bedside today.  No acute events overnight.  Patient states that she is feeling well today.  Reports absolutely no problems.  Patient denies any  fevers, chills, headaches, chest pain, shortness of breath, abdominal pain, nausea, vomiting, constipation, diarrhea.  Patient has no acute or significant concerns or complaints.  Patient is doing well overall.  We are waiting placement for rehab.      Objective:     Vitals:   Temp (24hrs), Av.7 °F (36.5 °C), Min:97.5 °F (36.4 °C), Max:97.8 °F (36.6 °C)    Temp:  [97.5 °F (36.4 °C)-97.8 °F (36.6 °C)] 97.8 °F (36.6 °C)  HR:  [] 96  Resp:  [16-20] 20  BP: ()/(66-75) 107/74  SpO2:  [85 %-93 %] 93 %  Body mass index is 23.3 kg/m².     Input and Output Summary (last 24 hours):     Intake/Output Summary (Last 24 hours) at 2024 1312  Last data filed at 2024 1130  Gross per 24 hour   Intake 460 ml   Output 550 ml   Net -90 ml       Physical Exam:   Physical Exam  HENT:      Head: Normocephalic and atraumatic.      Mouth/Throat:      Mouth: Mucous membranes are moist.   Eyes:      General: No scleral icterus.        Left eye: No discharge.      Comments: Conjunctiva pallor   Cardiovascular:      Rate and Rhythm: Tachycardia present. Rhythm irregular.      Heart sounds: Normal heart sounds. No murmur heard.     No friction rub. No gallop.   Pulmonary:      Effort: Pulmonary effort is normal. No respiratory distress.      Breath sounds: Normal breath sounds. No stridor. No wheezing, rhonchi or rales.   Chest:      Chest wall: No tenderness.   Abdominal:      Palpations: There is no mass.      Tenderness: There is no abdominal tenderness. There is no guarding.   Musculoskeletal:      Right lower leg: Edema present.      Left lower leg: Edema present.   Skin:     General: Skin is warm and dry.      Capillary Refill: Capillary refill takes less than 2 seconds.   Neurological:      General: No focal deficit present.      Mental Status: She is oriented to person, place, and time.   Psychiatric:         Mood and Affect: Mood normal.         Behavior: Behavior normal.         Thought Content: Thought content  normal.         Additional Data:     Labs:  Results from last 7 days   Lab Units 01/11/24  0529   WBC Thousand/uL 4.33   HEMOGLOBIN g/dL 8.3*   HEMATOCRIT % 30.7*   PLATELETS Thousands/uL 140*   NEUTROS PCT % 73   LYMPHS PCT % 13*   MONOS PCT % 12   EOS PCT % 1     Results from last 7 days   Lab Units 01/11/24  0529   SODIUM mmol/L 143   POTASSIUM mmol/L 4.1   CHLORIDE mmol/L 103   CO2 mmol/L 36*   BUN mg/dL 18   CREATININE mg/dL 1.04   ANION GAP mmol/L 4   CALCIUM mg/dL 8.6   GLUCOSE RANDOM mg/dL 69     Results from last 7 days   Lab Units 01/07/24  0725   INR  1.34*             Results from last 7 days   Lab Units 01/05/24  1355 01/04/24  1544 01/04/24  1338   LACTIC ACID mmol/L 0.9 2.7* 2.3*   PROCALCITONIN ng/ml  --   --  <0.05       Lines/Drains:  Invasive Devices       Peripheral Intravenous Line  Duration             Peripheral IV 01/08/24 Left;Ventral (anterior) Forearm 3 days    Peripheral IV 01/09/24 Right;Ventral (anterior) Forearm 2 days                      Telemetry:  Telemetry Orders (From admission, onward)               24 Hour Telemetry Monitoring  Continuous x 24 Hours (Telem)        Question:  Reason for 24 Hour Telemetry  Answer:  Decompensated CHF- and any one of the following: continuous diuretic infusion or total diuretic dose >200 mg daily, associated electrolyte derangement (I.e. K < 3.0), ionotropic drip (continuous infusion), hx of ventricular arrhythmia, or new EF < 35%                          XR chest portable ICU   Final Result by Long Ferreira MD (01/08 1120)      Cardiomegaly      Increased pulmonary edema      Slight decreased bilateral pleural effusions      Decreased left base infiltrate/atelectasis                  Workstation performed: [id number]         XR chest portable ICU   Final Result by Kevin Razo DO (01/05 6369)      Worsening aeration of the lung fields with worsening left sided consolidation and patchy airspace disease within the right lung. There  is central vascular congestion seen on the right. Findings suggest a combination of pneumonia and pulmonary edema.                  Workstation performed: [id number]         XR foot 2 vw left   Final Result by Sarath Dempesy MD (01/05 1019)      No acute osseous abnormality.      Limited examinations bilaterally.      Workstation performed: [id number]         XR foot 2 vw right   Final Result by Sarath Dempsey MD (01/05 1019)      No acute osseous abnormality.      Limited examinations bilaterally.      Workstation performed: [id number]         XR chest portable ICU   Final Result by Alex Medrano MD (01/05 0835)      1. Satisfactory positioning of the right IJ catheter without pneumothorax.      2. Moderate bilateral airspace opacities, which may represent edema and/or infection/inflammation.      3. Small left pleural effusion. Possible trace right pleural effusion.      4. Stable enlarged cardiomediastinal silhouette.                     Workstation performed: [id number]             Imaging: Reviewed radiology reports from this admission including: above    Recent Cultures (last 7 days):   Results from last 7 days   Lab Units 01/04/24  1338   BLOOD CULTURE  No Growth After 5 Days.  No Growth After 5 Days.       Last 24 Hours Medication List:   Current Facility-Administered Medications   Medication Dose Route Frequency Provider Last Rate    apixaban  2.5 mg Oral BID Mal Cheema MD      furosemide  20 mg Oral Daily Kevin Herman MD      melatonin  3 mg Oral HS Mal Cheema MD      metoprolol tartrate  12.5 mg Oral Q12H [facility] Mal Cheema MD      ondansetron  4 mg Intravenous Once Mckenzie Adler MD      senna  1 tablet Oral  Mal Cheema MD          Today, Patient Was Seen By: Kevin Herman MD    **Please Note: This note may have been constructed using a voice recognition system.**

## 2024-01-11 NOTE — PLAN OF CARE
Problem: PHYSICAL THERAPY ADULT  Goal: Performs mobility at highest level of function for planned discharge setting.  See evaluation for individualized goals.  Description: Treatment/Interventions: Functional transfer training, LE strengthening/ROM, Therapeutic exercise, Endurance training, Gait training, Bed mobility, Equipment eval/education, Patient/family training          See flowsheet documentation for full assessment, interventions and recommendations.  Outcome: Progressing  Note:    Problem List: Decreased strength, Decreased range of motion, Decreased endurance, Impaired balance, Decreased mobility, Decreased safety awareness, Decreased skin integrity, Pain, Orthopedic restrictions (LE Edema)  Assessment: pt began tx session lying supine in the bed and was agreeable to participate in PT intervention after some motivation and encouragement. Since previous PT intervention pt has shown progress with bed mobility as pt was able to complete a supine<>sit EOB transfer with /s as pt utilized bed rail and required +time to complete transfer. While seated EOB pt w/o LOB while being educated on all functional transfers to and from RW. pt continues to remain consistant with requiring mod ax1 for all functional transfers to and from RW with VC's for hand placement while ascending to RW and descending back into recliner. While performing SPT pt also required VC's for RW management. pt was able to participate in TE activities while seated in recliner with no increases in pain and good form. Ambulation not posssible in todays tx session due to fatigue as pt staed she will ambulate tomorrow but not today. Post tx session pt continues to demonstrate the following defiicts: limited activity tolerance, funcitonal mobility and OOB ambulation. pt would benefit from continued skilled PT intervention in order to address deficits listed above. Continue to recommend Dc w/ level 2 moderate rehab resource intensity when medically  cleared        Rehab Resource Intensity Level, PT: II (Moderate Resource Intensity)    See flowsheet documentation for full assessment.

## 2024-01-11 NOTE — PHYSICAL THERAPY NOTE
PHYSICAL THERAPY NOTE          Patient Name: Aurora Anderson  Today's Date: 1/11/2024 01/11/24 1532   PT Last Visit   PT Visit Date 01/11/24   Note Type   Note Type Treatment   Pain Assessment   Pain Assessment Tool 0-10   Pain Score No Pain   Patient's Stated Pain Goal No pain   Hospital Pain Intervention(s) Repositioned;Ambulation/increased activity;Emotional support;Rest   Multiple Pain Sites No   Restrictions/Precautions   Weight Bearing Precautions Per Order Yes   RLE Weight Bearing Per Order WBAT  (in hel off laoding shoe)   LLE Weight Bearing Per Order WBAT  (in heel off loading shoe)   Braces or Orthoses   (globoped heel off loading shoes BLE's)   Other Precautions Cognitive;Chair Alarm;Bed Alarm;O2;Fall Risk;Pain  (2L NC 02)   General   Chart Reviewed Yes   Additional Pertinent History Sp02 pre tx session 94%, seated EOB 90%, post tx in recliner 91%   Response to Previous Treatment Patient with no complaints from previous session.   Family/Caregiver Present Yes   Cognition   Overall Cognitive Status Impaired   Arousal/Participation Alert;Responsive;Cooperative   Attention Attends with cues to redirect   Orientation Level Oriented X4   Memory Decreased short term memory;Decreased recall of recent events;Decreased recall of precautions   Following Commands Follows one step commands with increased time or repetition   Comments pt was pleasant and cooperatiev aftersome encouragement and motivation in order to participate in PT intervention.   Subjective   Subjective pt was agreeable to participate in PT intervention and stated 0/10, pain pre/post tx session   Bed Mobility   Supine to Sit 5  Supervision   Additional items Assist x 1;HOB elevated;Bedrails;Increased time required;Verbal cues   Sit to Supine Unable to assess   Additional Comments pt seated OON in the recliner post tx session with call bell, chair alarm activated  , family present and all pt needs met   Transfers   Sit to Stand 3  Moderate assistance   Additional items Assist x 1;Increased time required;Verbal cues   Stand to Sit 3  Moderate assistance   Additional items Assist x 1;Armrests;Increased time required;Verbal cues   Stand pivot 3  Moderate assistance   Additional items Assist x 1;Armrests;Increased time required;Verbal cues   Additional Comments Slight progress with funcitonal transfers as pt continues to require RW for all funcitonal transfers but pt was able to complete all transfers with mod ax1 and VC's for hand placement whiel ascending to RW and descending into recliner   Ambulation/Elevation   Gait pattern Not tested  (pt with increased fatigue post SPT)   Balance   Static Sitting Fair +   Dynamic Sitting Fair   Static Standing Poor +   Dynamic Standing Poor   Ambulatory Poor -   Endurance Deficit   Endurance Deficit Yes   Endurance Deficit Description limited activity tolerance and ambulation distance   Activity Tolerance   Activity Tolerance Patient limited by fatigue;Other (Comment)  (generalized weakness)   Nurse Made Aware Spoke to RN   Exercises   Hip Abduction Sitting;10 reps;AROM;Bilateral   Hip Adduction Sitting;10 reps;AROM;Bilateral  (pillow squeezes)   Knee AROM Long Arc Quad Sitting;10 reps;AROM;Bilateral   Ankle Pumps Supine;10 reps;AROM;Bilateral   Assessment   Problem List Decreased strength;Decreased range of motion;Decreased endurance;Impaired balance;Decreased mobility;Decreased safety awareness;Decreased skin integrity;Pain;Orthopedic restrictions  (LE Edema)   Assessment pt began tx session lying supine in the bed and was agreeable to participate in PT intervention after some motivation and encouragement. Since previous PT intervention pt has shown progress with bed mobility as pt was able to complete a supine<>sit EOB transfer with /s as pt utilized bed rail and required +time to complete transfer. While seated EOB pt w/o LOB while  being educated on all functional transfers to and from RW. pt continues to remain consistant with requiring mod ax1 for all functional transfers to and from RW with VC's for hand placement while ascending to RW and descending back into recliner. While performing SPT pt also required VC's for RW management. pt was able to participate in TE activities while seated in recliner with no increases in pain and good form. Ambulation not posssible in todays tx session due to fatigue as pt staed she will ambulate tomorrow but not today. Post tx session pt continues to demonstrate the following defiicts: limited activity tolerance, funcitonal mobility and OOB ambulation. pt would benefit from continued skilled PT intervention in order to address deficits listed above. Continue to recommend Dc w/ level 2 moderate rehab resource intensity when medically cleared   Goals   Patient Goals none stated this tx session   STG Expiration Date 01/22/24   PT Treatment Day 2   Plan   Treatment/Interventions Functional transfer training;LE strengthening/ROM;Therapeutic exercise;Endurance training;Patient/family training;Equipment eval/education;Bed mobility;Gait training;Spoke to nursing   Progress Slow progress, decreased activity tolerance   PT Frequency 3-5x/wk   Discharge Recommendation   Rehab Resource Intensity Level, PT II (Moderate Resource Intensity)   AM-PAC Basic Mobility Inpatient   Turning in Flat Bed Without Bedrails 3   Lying on Back to Sitting on Edge of Flat Bed Without Bedrails 3   Moving Bed to Chair 2   Standing Up From Chair Using Arms 2   Walk in Room 1   Climb 3-5 Stairs With Railing 1   Basic Mobility Inpatient Raw Score 12   Basic Mobility Standardized Score 32.23   Highest Level Of Mobility   -HLM Goal 4: Move to chair/commode   -HLM Achieved 4: Move to chair/commode   Education   Education Provided Mobility training;Assistive device;Other  (bed mobility, functional transferrs)   Patient Demonstrates  acceptance/verbal understanding   End of Consult   Patient Position at End of Consult Bedside chair;Bed/Chair alarm activated;All needs within reach   The patient's AM-PAC Basic Mobility Inpatient Short Form Raw Score is 12. A Raw score of less than or equal to 16 suggests the patient may benefit from discharge to post-acute rehabilitation services. Please also refer to the recommendation of the Physical Therapist for safe discharge planning.    Dino Dietz

## 2024-01-11 NOTE — DISCHARGE INSTR - AVS FIRST PAGE
Dear Aurora Anderson,     It was our pleasure to care for you here at [facility].  It is our hope that we were always able to exceed the expected standards for your care during your stay.  You were hospitalized due to urosepsis.  You were cared for on the South third floor by Kevin Herman MD under the service of Sheri Gallegos MD with the [facility] who covers for your primary care physician (PCP), Anyi Lopez MD, while you were hospitalized.  If you have any questions or concerns related to this hospitalization, you may contact us at .  For follow up as well as any medication refills, we recommend that you follow up with your primary care physician.  A registered nurse will reach out to you by phone within a few days after your discharge to answer any additional questions that you may have after going home.  However, at this time we provide for you here, the most important instructions / recommendations at discharge:     Notable Medication Adjustments -   Please resume taking your home medications  Please begin taking ferrous sulfate 325 mg once daily  Testing Required after Discharge -   None   ** Please contact your PCP to request testing orders for any of the testing recommended here **  Important follow up information -   Please follow-up with your primary care provider and cardiologist as routine  Other Instructions -   Please return to the emergency department if you have new or worsening symptoms like those that brought you in, fever, chills, pain when urinating, or shortness of breath.  Please review this entire after visit summary as additional general instructions including medication list, appointments, activity, diet, any pertinent wound care, and other additional recommendations from your care team that may be provided for you.      Sincerely,     Kevin Herman MD

## 2024-01-11 NOTE — PLAN OF CARE
Problem: Potential for Falls  Goal: Patient will remain free of falls  Description: INTERVENTIONS:  - Educate patient/family on patient safety including physical limitations  - Instruct patient to call for assistance with activity   - Consult OT/PT to assist with strengthening/mobility   - Keep Call bell within reach  - Keep bed low and locked with side rails adjusted as appropriate  - Keep care items and personal belongings within reach  - Initiate and maintain comfort rounds  - Make Fall Risk Sign visible to staff  - Offer Toileting every 2 Hours, in advance of need  - Initiate/Maintain bed and chair alarm  - Obtain necessary fall risk management equipment:   - Apply yellow socks and bracelet for high fall risk patients  - Consider moving patient to room near nurses station  Outcome: Progressing     Problem: Prexisting or High Potential for Compromised Skin Integrity  Goal: Skin integrity is maintained or improved  Description: INTERVENTIONS:  - Identify patients at risk for skin breakdown  - Assess and monitor skin integrity  - Assess and monitor nutrition and hydration status  - Monitor labs   - Assess for incontinence   - Turn and reposition patient  - Assist with mobility/ambulation  - Relieve pressure over bony prominences  - Avoid friction and shearing  - Provide appropriate hygiene as needed including keeping skin clean and dry  - Evaluate need for skin moisturizer/barrier cream  - Collaborate with interdisciplinary team   - Patient/family teaching  - Consider wound care consult   Outcome: Progressing     Problem: PAIN - ADULT  Goal: Verbalizes/displays adequate comfort level or baseline comfort level  Description: Interventions:  - Encourage patient to monitor pain and request assistance  - Assess pain using appropriate pain scale  - Administer analgesics based on type and severity of pain and evaluate response  - Implement non-pharmacological measures as appropriate and evaluate response  - Consider  cultural and social influences on pain and pain management  - Notify physician/advanced practitioner if interventions unsuccessful or patient reports new pain  Outcome: Progressing     Problem: INFECTION - ADULT  Goal: Absence or prevention of progression during hospitalization  Description: INTERVENTIONS:  - Assess and monitor for signs and symptoms of infection  - Monitor lab/diagnostic results  - Monitor all insertion sites, i.e. indwelling lines, tubes, and drains  - Monitor endotracheal if appropriate and nasal secretions for changes in amount and color  - Sophia appropriate cooling/warming therapies per order  - Administer medications as ordered  - Instruct and encourage patient and family to use good hand hygiene technique  - Identify and instruct in appropriate isolation precautions for identified infection/condition  Outcome: Progressing     Problem: SAFETY ADULT  Goal: Patient will remain free of falls  Description: INTERVENTIONS:  - Educate patient/family on patient safety including physical limitations  - Instruct patient to call for assistance with activity   - Consult OT/PT to assist with strengthening/mobility   - Keep Call bell within reach  - Keep bed low and locked with side rails adjusted as appropriate  - Keep care items and personal belongings within reach  - Initiate and maintain comfort rounds  - Make Fall Risk Sign visible to staff  - Offer Toileting every 2 Hours, in advance of need  - Initiate/Maintain bed and chair alarm  - Obtain necessary fall risk management equipment:   - Apply yellow socks and bracelet for high fall risk patients  - Consider moving patient to room near nurses station  Outcome: Progressing  Goal: Maintain or return to baseline ADL function  Description: INTERVENTIONS:  -  Assess patient's ability to carry out ADLs; assess patient's baseline for ADL function and identify physical deficits which impact ability to perform ADLs (bathing, care of mouth/teeth, toileting,  grooming, dressing, etc.)  - Assess/evaluate cause of self-care deficits   - Assess range of motion  - Assess patient's mobility; develop plan if impaired  - Assess patient's need for assistive devices and provide as appropriate  - Encourage maximum independence but intervene and supervise when necessary  - Involve family in performance of ADLs  - Assess for home care needs following discharge   - Consider OT consult to assist with ADL evaluation and planning for discharge  - Provide patient education as appropriate  Outcome: Progressing  Goal: Maintains/Returns to pre admission functional level  Description: INTERVENTIONS:  - Perform AM-PAC 6 Click Basic Mobility/ Daily Activity assessment daily.  - Set and communicate daily mobility goal to care team and patient/family/caregiver.   - Collaborate with rehabilitation services on mobility goals if consulted  - Perform Range of Motion 3 times a day.  - Reposition patient every 2 hours.  - Dangle patient 3 times a day  - Stand patient 3 times a day  - Ambulate patient 3 times a day  - Out of bed to chair 3 times a day   - Out of bed for meals 3 times a day  - Out of bed for toileting  - Record patient progress and toleration of activity level   Outcome: Progressing     Problem: DISCHARGE PLANNING  Goal: Discharge to home or other facility with appropriate resources  Description: INTERVENTIONS:  - Identify barriers to discharge w/patient and caregiver  - Arrange for needed discharge resources and transportation as appropriate  - Identify discharge learning needs (meds, wound care, etc.)  - Arrange for interpretive services to assist at discharge as needed  - Refer to Case Management Department for coordinating discharge planning if the patient needs post-hospital services based on physician/advanced practitioner order or complex needs related to functional status, cognitive ability, or social support system  Outcome: Progressing     Problem: Knowledge Deficit  Goal:  Patient/family/caregiver demonstrates understanding of disease process, treatment plan, medications, and discharge instructions  Description: Complete learning assessment and assess knowledge base.  Interventions:  - Provide teaching at level of understanding  - Provide teaching via preferred learning methods  Outcome: Progressing

## 2024-01-11 NOTE — ASSESSMENT & PLAN NOTE
Wt Readings from Last 3 Encounters:   01/11/24 63.5 kg (140 lb)   07/06/21 84.4 kg (186 lb)   07/01/21 84.4 kg (186 lb)     Chest x ray showing pulmonary congestion. BNP 1,800 concern for fluid overload.   Echo showing Ef of 55% and normal SF. Noted TVR moderate to sever and MVR moderate.  Patient is -295ml in last 24 hours  Maintain diuresis, fluid restriction, low-salt diet  Continue metoprolol  Resume home Lasix dose: 20mg p.o. Lasix

## 2024-01-12 LAB
ANION GAP SERPL CALCULATED.3IONS-SCNC: 5 MMOL/L
BASOPHILS # BLD AUTO: 0.02 THOUSANDS/ÂΜL (ref 0–0.1)
BASOPHILS NFR BLD AUTO: 0 % (ref 0–1)
BUN SERPL-MCNC: 27 MG/DL (ref 5–25)
CALCIUM SERPL-MCNC: 8.5 MG/DL (ref 8.4–10.2)
CHLORIDE SERPL-SCNC: 102 MMOL/L (ref 96–108)
CO2 SERPL-SCNC: 33 MMOL/L (ref 21–32)
CREAT SERPL-MCNC: 1.03 MG/DL (ref 0.6–1.3)
EOSINOPHIL # BLD AUTO: 0.06 THOUSAND/ÂΜL (ref 0–0.61)
EOSINOPHIL NFR BLD AUTO: 1 % (ref 0–6)
ERYTHROCYTE [DISTWIDTH] IN BLOOD BY AUTOMATED COUNT: 20.7 % (ref 11.6–15.1)
GFR SERPL CREATININE-BSD FRML MDRD: 46 ML/MIN/1.73SQ M
GLUCOSE SERPL-MCNC: 78 MG/DL (ref 65–140)
HCT VFR BLD AUTO: 29.5 % (ref 34.8–46.1)
HGB BLD-MCNC: 7.8 G/DL (ref 11.5–15.4)
IMM GRANULOCYTES # BLD AUTO: 0.03 THOUSAND/UL (ref 0–0.2)
IMM GRANULOCYTES NFR BLD AUTO: 1 % (ref 0–2)
LYMPHOCYTES # BLD AUTO: 0.63 THOUSANDS/ÂΜL (ref 0.6–4.47)
LYMPHOCYTES NFR BLD AUTO: 12 % (ref 14–44)
MAGNESIUM SERPL-MCNC: 2.3 MG/DL (ref 1.9–2.7)
MCH RBC QN AUTO: 22.9 PG (ref 26.8–34.3)
MCHC RBC AUTO-ENTMCNC: 26.4 G/DL (ref 31.4–37.4)
MCV RBC AUTO: 87 FL (ref 82–98)
MONOCYTES # BLD AUTO: 0.55 THOUSAND/ÂΜL (ref 0.17–1.22)
MONOCYTES NFR BLD AUTO: 10 % (ref 4–12)
NEUTROPHILS # BLD AUTO: 4.02 THOUSANDS/ÂΜL (ref 1.85–7.62)
NEUTS SEG NFR BLD AUTO: 76 % (ref 43–75)
NRBC BLD AUTO-RTO: 1 /100 WBCS
PLATELET # BLD AUTO: 163 THOUSANDS/UL (ref 149–390)
PMV BLD AUTO: 9.5 FL (ref 8.9–12.7)
POTASSIUM SERPL-SCNC: 4.4 MMOL/L (ref 3.5–5.3)
RBC # BLD AUTO: 3.4 MILLION/UL (ref 3.81–5.12)
SODIUM SERPL-SCNC: 140 MMOL/L (ref 135–147)
WBC # BLD AUTO: 5.31 THOUSAND/UL (ref 4.31–10.16)

## 2024-01-12 PROCEDURE — 80048 BASIC METABOLIC PNL TOTAL CA: CPT

## 2024-01-12 PROCEDURE — 85025 COMPLETE CBC W/AUTO DIFF WBC: CPT

## 2024-01-12 PROCEDURE — 83735 ASSAY OF MAGNESIUM: CPT

## 2024-01-12 PROCEDURE — 99232 SBSQ HOSP IP/OBS MODERATE 35: CPT | Performed by: INTERNAL MEDICINE

## 2024-01-12 PROCEDURE — 92610 EVALUATE SWALLOWING FUNCTION: CPT

## 2024-01-12 RX ADMIN — APIXABAN 2.5 MG: 2.5 TABLET, FILM COATED ORAL at 11:16

## 2024-01-12 RX ADMIN — Medication 12.5 MG: at 11:17

## 2024-01-12 RX ADMIN — FUROSEMIDE 20 MG: 20 TABLET ORAL at 11:17

## 2024-01-12 RX ADMIN — APIXABAN 2.5 MG: 2.5 TABLET, FILM COATED ORAL at 21:32

## 2024-01-12 RX ADMIN — Medication 3 MG: at 21:32

## 2024-01-12 RX ADMIN — Medication 12.5 MG: at 21:33

## 2024-01-12 NOTE — ASSESSMENT & PLAN NOTE
Hold Toprol (25 mg every 12 hours at home) and Diltiazem (180 mg daily at home at home).  Monitor on tele  [id number].  Patient currently in A-fib with heart rate of 83  Currently being rate controlled with metoprolol

## 2024-01-12 NOTE — ASSESSMENT & PLAN NOTE
Wt Readings from Last 3 Encounters:   01/11/24 63.5 kg (140 lb)   07/06/21 84.4 kg (186 lb)   07/01/21 84.4 kg (186 lb)     Chest x ray showing pulmonary congestion. BNP 1,800 concern for fluid overload.   Echo showing Ef of 55% and normal SF. Noted TVR moderate to sever and MVR moderate.  Patient is -150ml in last 24 hours  Maintain diuresis, fluid restriction, low-salt diet  Continue metoprolol 12.5 BID  Resume home Lasix dose: 20mg p.o. Lasix

## 2024-01-12 NOTE — CASE MANAGEMENT
Case Management Discharge Planning Note    Patient name Aurora Anderson  Location S /S -01 MRN [id number]  : 1929 Date 2024       Current Admission Date: 2024  Current Admission Diagnosis:Septic shock (HCC)   Patient Active Problem List    Diagnosis Date Noted    Lymphedema 2024    Septic shock (HCC) 2024    Chronic heart failure with preserved ejection fraction (HCC) 2021    Breast mass, right 2021    CKD (chronic kidney disease) stage 3, GFR 30-59 ml/min (HCC) 2021    Atypical atrial flutter (HCC) 2021    Acute on chronic respiratory failure (HCC) 2021    Anemia 2021    Right bundle branch block (RBBB) on electrocardiogram (ECG) 2017    Diverticulosis of colon 2017    Large hiatal hernia 2017    Incidental 6cm right Renal lesion on CT 2017      LOS (days): 8  Geometric Mean LOS (GMLOS) (days): 5.1  Days to GMLOS:-3.1     OBJECTIVE:  Risk of Unplanned Readmission Score: 12.08         Current admission status: Inpatient   Preferred Pharmacy:   [id number] [place], [place] - [address] [place] [id number] [facility] [address]  Phone: [phone] Fax: [phone]    Primary Care Provider: Anyi Lopze MD    Primary Insurance: MEDICARE  Secondary Insurance: J[employer]CDONNELL PENDING    DISCHARGE DETAILS:    Discharge planning discussed with:: patient's daughter, Yana, and grandchildren at bedside  Freedom of Choice: Yes (re: rehab)  Comments - [place] of Choice: [facility]  CM contacted family/caregiver?: Yes  Were Treatment Team discharge recommendations reviewed with patient/caregiver?: Yes  Did patient/caregiver verbalize understanding of patient care needs?: N/A- going to facility  Were patient/caregiver advised of the risks associated with not following Treatment Team discharge recommendations?: Yes    Contacts  Patient Contacts: Yana  daughter  Relationship to Patient:: Family  Contact Method: Phone  Phone Number: [phone]  Reason/Outcome: Continuity of Care, Discharge Planning, Emergency Contact, Referral    Requested Home Health Care         Is the patient interested in HHC at discharge?: No    DME Referral Provided  Referral made for DME?: No    Other Referral/Resources/Interventions Provided:  Interventions: SNF, Short Term Rehab  Referral Comments: Met with patient and family at bedside to review STR plan. Initially family reporting they did not feel patient was stable for discharge secondary to swallowing concerns/medical stability. Speech therapy to assess and then this writer returned with MD to review readiness for d/c and facility choices. Family aware that currently beds are offered from [facility], [facility] and [facility]. Family back and forth re: which facility they prefer and want to review with other family members. To follow-up with this writer once decision made, however aware that until they confirm choice, beds will not be held, so no guarantee option will remain available. Family understanding of same.    Would you like to participate in our [facility] service program?  : No - Declined    Treatment Team Recommendation: Short Term Rehab  Discharge Destination Plan:: Short Term Rehab

## 2024-01-12 NOTE — PLAN OF CARE
Downgrade diet to mechanical soft solids, continue thin liquids  Meds as tolerated  Aspiration and reflux precautions

## 2024-01-12 NOTE — SPEECH THERAPY NOTE
"Speech-Language Pathology Bedside Swallow Evaluation        Patient Name: Aurora Anderson    Today's Date: 1/12/2024     Problem List  Principal Problem:    Septic shock (HCC)  Active Problems:    CKD (chronic kidney disease) stage 3, GFR 30-59 ml/min (HCC)    Atypical atrial flutter (HCC)    Acute on chronic respiratory failure (HCC)    Chronic heart failure with preserved ejection fraction (HCC)         Past Medical History  Past Medical History:   Diagnosis Date    Acute kidney injury (HCC)     Arthritis     Atrial flutter (HCC)     Rapid heart rate        Past Surgical History  Past Surgical History:   Procedure Laterality Date    CATARACT EXTRACTION      EGD AND COLONOSCOPY N/A 11/14/2017    Procedure: EGD AND COLONOSCOPY;  Surgeon: Radha Salas MD;  Location: [facility];  Service: Gastroenterology    HIP FRACTURE SURGERY         Summary   Pt presents moderate oral and suspected mild pharyngeal dysphagia characterized by prolonged mastication and difficulty breaking down solids, reduced hyolaryngeal excursion, no overt s/s of aspiration.     Recommendations:   Diet: mechanically altered/level 2 diet and thin liquids   Meds: whole with liquid   Frequent Oral care: 2x/day  Aspiration precautions and reflux precautions, compensatory swallowing strategies: upright posture and alternating bites and sips  Other Recommendations/ considerations:  Will continue SLP for diet tolerance, continue SLP follow up post dc      Current Medical Status  Pt is a 89 y.o. female who presented to [facility]  with acute kidney injury secondary to sepsis of uncertain origin, with probable retention of beta-blockers and calcium channel blockers leading to beta-blocker toxicity.  Patient currently on epinephrine drip and mentating well. .    Past medical history:   Please see H&P for details    Special Studies:  Chest x-ray of 1/8/2024 with findings of:  \"FINDINGS:  Persistent cardiomegaly.  Persistent increased pulmonary " "edema.  Bilateral pleural effusions.  Decreased left base opacity.  No pneumothorax.     Large hiatal hernia\"    Social/Education/Vocational Hx:  Pt lives with family    Swallow Information   Prior speech/swallowing tx:   Current Risks for Dysphagia & Aspiration:  hiatal hernia may increase risk for post prandial reflux pneumonitis  Current Symptoms/Concerns: prolonged chewing, no appetite- avoiding chewables  Current Diet: regular diet and thin liquids   Baseline Diet: regular diet and thin liquids  Takes pills- with thin liquids    Baseline Assessment   Behavior/Cognition: alert  Speech/Language Status: able to participate in conversation and able to follow commands  Patient Positioning: upright in bed     Swallow Mechanism Exam   Facial: symmetrical  Labial: WFL  Lingual:  left lingual atrophy, good protrusion & ROM  Velum: symmetrical  Mandible: adequate ROM  Dentition: full dentures and upper dentures  Vocal quality:clear/adequate and Pt/family note recent vocal change since admission    Volitional Cough: strong/productive   Respiratory: Room air    Consistencies Assessed and Performance   Consistencies Administered: thin liquids, nectar thick, honey thick, puree, mechanical soft solids, and soft solids    Oral Stage:   Pt with very prolonged mastication given spoon of chicken noodle soup containing bite-sized piece of chicken and carrot. Much more timely chewing given moist /chopped egg salad although still not completely breaking down pieces of egg white. Tolerated applesauce well with timely AP transfer. Took liquids well: HTL via spoon, NTL via cup/straw, thin liquids via cup/straw. Good lip seal and oral containment on all trials, occasional mild oral residue with soft solids which clears with a liquid wash. Needs encouragement to take trials, says she gets full and does not feel like eating.    Pharyngeal Stage:   Pt with noted decreased hyolaryngeal excursion via palpation. Swallow initiation appears WFL " for consistencies assessed. No overt s/s of aspiration with any consistencies    Esophageal Concerns: belching and large hiatal hernia per imaging      Results Reviewed with: patient, RN, MD, and family   Dysphagia Goals: pt will tolerate mechanical soft solids with thin liquids without s/s of aspiration x2 sessions  Discharge recommendation:  continue SLP for dysphagia at next facility      Heidy Baker MS, CCC-SLP

## 2024-01-12 NOTE — ASSESSMENT & PLAN NOTE
At presentation, chest x ray concerning for pulmonary edema.  1/520204: Chest x-ray shows Worsening aeration of the lung fields with worsening left sided consolidation and patchy airspace disease within the right lung. There is central vascular congestion seen on the right. Findings suggest a combination of pneumonia and pulmonary edema   Patient currently saturating at 100% on RA  Bibasilar and middle zone bilateral Rales    Plan:  Furosemide 20 mg PO daily  Monitor vitals  Respiratory protocol  Incentive spirometry

## 2024-01-12 NOTE — ASSESSMENT & PLAN NOTE
Lab Results   Component Value Date    EGFR 46 01/12/2024    EGFR 46 01/11/2024    EGFR 45 01/10/2024    CREATININE 1.03 01/12/2024    CREATININE 1.04 01/11/2024    CREATININE 1.06 01/10/2024     Will monitor Cr  Hemoglobin goal in CKD is more than 10.  Monitor for calcium, phosphorus, vitamin D abnormalities.

## 2024-01-12 NOTE — ASSESSMENT & PLAN NOTE
Patient found to by hypothermic and tachypnea, hypotensive.   Currently day 6 of antibiotics.  Patient had ceftriaxone for 2 days initially, cefazolin for 3 days, was transition back to ceftriaxone with additional cover with azithromycin currently day 3 of ceftriaxone  Micro: Urine culture shows more than 100,000 colonies of E. coli and Proteus both susceptible to cefazolin.  Blood culture does not show any growth in 72 hours  Chest x-ray shows interval worsening aeration of the lung fields with worsening left sided consolidation and patchy airspace disease within the right lung. There is central vascular congestion seen on the right. Findings suggest a combination of pneumonia and pulmonary edema   Vasopressin on hold since 5.00pm (1/8). Blood pressure of MAP ranging between 70-91. Goal MAP more than 65    Plan:  Done with ceftriaxone and azithromycin  Monitor vitals

## 2024-01-12 NOTE — CASE MANAGEMENT
Case Management Progress Note    Patient name Aurora Anderson  Location S /S -01 MRN [id number]  : 1929 Date 2024       LOS (days): 8  Geometric Mean LOS (GMLOS) (days): 5.1  Days to GMLOS:-3.1        OBJECTIVE:        Current admission status: Inpatient  Preferred Pharmacy:   [id number] [place], [place] - [address] [place] [id number]  [facility] [place] [address]  Phone: [phone] Fax: [phone]    Primary Care Provider: Anyi Lopez MD    Primary Insurance: MEDICARE  Secondary Insurance: J[employer] PENDING    PROGRESS NOTE:    Daughter and grandson to desk to relay [facility] is first choice. State family will be coming in this morning and they will be stopping by there this afternoon to look at facility, but confirmed wish to reserve agency in [place] for anticipated transfer tomorrow.      Confirmed with Carmela that bed available at [facility] for after 1:00pm tomorrow, .

## 2024-01-12 NOTE — PROGRESS NOTES
[facility]  Progress Note  Name: Aurora Anderson I  MRN: [id number]  Unit/Bed#: S -[facility] I Date of Admission: 1/4/2024   Date of Service: 1/12/2024 I Hospital Day: 8    Assessment/Plan   Chronic heart failure with preserved ejection fraction (HCC)  Assessment & Plan  Wt Readings from Last 3 Encounters:   01/11/24 63.5 kg (140 lb)   07/06/21 84.4 kg (186 lb)   07/01/21 84.4 kg (186 lb)     Chest x ray showing pulmonary congestion. BNP 1,800 concern for fluid overload.   Echo showing Ef of 55% and normal SF. Noted TVR moderate to sever and MVR moderate.  Patient is -150ml in last 24 hours  Maintain diuresis, fluid restriction, low-salt diet  Continue metoprolol 12.5 BID  Resume home Lasix dose: 20mg p.o. Lasix        Acute on chronic respiratory failure (HCC)  Assessment & Plan  At presentation, chest x ray concerning for pulmonary edema.  1/520204: Chest x-ray shows Worsening aeration of the lung fields with worsening left sided consolidation and patchy airspace disease within the right lung. There is central vascular congestion seen on the right. Findings suggest a combination of pneumonia and pulmonary edema   Patient currently saturating at 100% on RA  Bibasilar and middle zone bilateral Rales    Plan:  Furosemide 20 mg PO daily  Monitor vitals  Respiratory protocol  Incentive spirometry    Atypical atrial flutter (HCC)  Assessment & Plan  Hold Toprol (25 mg every 12 hours at home) and Diltiazem (180 mg daily at home at home).  Monitor on tele  [id number] 4.  Patient currently in A-fib with heart rate of 83  Currently being rate controlled with metoprolol    CKD (chronic kidney disease) stage 3, GFR 30-59 ml/min ([facility])  Assessment & Plan  Lab Results   Component Value Date    EGFR 46 01/12/2024    EGFR 46 01/11/2024    EGFR 45 01/10/2024    CREATININE 1.03 01/12/2024    CREATININE 1.04 01/11/2024    CREATININE 1.06 01/10/2024     Will monitor Cr  Hemoglobin goal in CKD is more than  10.  Monitor for calcium, phosphorus, vitamin D abnormalities.    * Septic shock (HCC)  Assessment & Plan  Patient found to by hypothermic and tachypnea, hypotensive.   Currently day 6 of antibiotics.  Patient had ceftriaxone for 2 days initially, cefazolin for 3 days, was transition back to ceftriaxone with additional cover with azithromycin currently day 3 of ceftriaxone  Micro: Urine culture shows more than 100,000 colonies of E. coli and Proteus both susceptible to cefazolin.  Blood culture does not show any growth in 72 hours  Chest x-ray shows interval worsening aeration of the lung fields with worsening left sided consolidation and patchy airspace disease within the right lung. There is central vascular congestion seen on the right. Findings suggest a combination of pneumonia and pulmonary edema   Vasopressin on hold since 5.00pm (1/8). Blood pressure of MAP ranging between 70-91. Goal MAP more than 65    Plan:  Done with ceftriaxone and azithromycin  Monitor vitals           VTE Pharmacologic Prophylaxis:   High Risk (Score >/= 5) - Pharmacological DVT Prophylaxis Ordered: apixaban (Eliquis). Sequential Compression Devices Ordered.    Mobility:   Basic Mobility Inpatient Raw Score: 12  -[facility] Goal: 4: Move to chair/commode  -HL Achieved: 4: Move to chair/commode    Patient Centered Rounds: I performed bedside rounds with nursing staff today.  Discussions with Specialists or Other Care Team Provider: Podiatry    Education and Discussions with Family / Patient: Patient declined call to .     Current Length of Stay: 8 day(s)  Current Patient Status: Inpatient   Discharge Plan: Anticipate discharge in 24-48 hrs to home.    Code Status: Level 3 - DNAR and DNI    Subjective:   Patient seen at bedside today.  No acute events overnight.  Patient states that she is feeling well today.  Passed bedside swallow exam with recommendation of dysphagia 2, thin liquids.  Reports absolutely no problems.   Family is at bedside attempting to decide on rehab placement.  Patient denies any fevers, chills, headaches, chest pain, shortness of breath, abdominal pain, nausea, vomiting, constipation, diarrhea.  Patient has no acute or significant concerns or complaints.  Patient is doing well overall.  We are waiting placement for rehab.      Objective:     Vitals:   Temp (24hrs), Av.5 °F (36.4 °C), Min:97.4 °F (36.3 °C), Max:97.5 °F (36.4 °C)    Temp:  [97.4 °F (36.3 °C)-97.5 °F (36.4 °C)] 97.5 °F (36.4 °C)  HR:  [] 94  Resp:  [18] 18  BP: (100-107)/(68-70) 100/68  SpO2:  [93 %-95 %] 95 %  Body mass index is 23.3 kg/m².     Input and Output Summary (last 24 hours):     Intake/Output Summary (Last 24 hours) at 2024 1456  Last data filed at 2024 1300  Gross per 24 hour   Intake 360 ml   Output 300 ml   Net 60 ml       Physical Exam:   Physical Exam  HENT:      Head: Normocephalic and atraumatic.      Mouth/Throat:      Mouth: Mucous membranes are moist.   Eyes:      General: No scleral icterus.        Left eye: No discharge.      Comments: Conjunctiva pallor   Cardiovascular:      Rate and Rhythm: Tachycardia present. Rhythm irregular.      Heart sounds: Normal heart sounds. No murmur heard.     No friction rub. No gallop.   Pulmonary:      Effort: Pulmonary effort is normal. No respiratory distress.      Breath sounds: Normal breath sounds. No stridor. No wheezing, rhonchi or rales.   Chest:      Chest wall: No tenderness.   Abdominal:      Palpations: There is no mass.      Tenderness: There is no abdominal tenderness. There is no guarding.   Musculoskeletal:      Right lower leg: Edema present.      Left lower leg: Edema present.   Skin:     General: Skin is warm and dry.      Capillary Refill: Capillary refill takes less than 2 seconds.   Neurological:      General: No focal deficit present.      Mental Status: She is oriented to person, place, and time.   Psychiatric:         Mood and Affect: Mood  normal.         Behavior: Behavior normal.         Thought Content: Thought content normal.         Additional Data:     Labs:  Results from last 7 days   Lab Units 01/12/24  0620   WBC Thousand/uL 5.31   HEMOGLOBIN g/dL 7.8*   HEMATOCRIT % 29.5*   PLATELETS Thousands/uL 163   NEUTROS PCT % 76*   LYMPHS PCT % 12*   MONOS PCT % 10   EOS PCT % 1     Results from last 7 days   Lab Units 01/12/24  0620   SODIUM mmol/L 140   POTASSIUM mmol/L 4.4   CHLORIDE mmol/L 102   CO2 mmol/L 33*   BUN mg/dL 27*   CREATININE mg/dL 1.03   ANION GAP mmol/L 5   CALCIUM mg/dL 8.5   GLUCOSE RANDOM mg/dL 78     Results from last 7 days   Lab Units 01/07/24  0725   INR  1.34*                     Lines/Drains:  Invasive Devices       Peripheral Intravenous Line  Duration             Peripheral IV 01/08/24 Left;Ventral (anterior) Forearm 4 days    Peripheral IV 01/09/24 Right;Ventral (anterior) Forearm 3 days                      Telemetry:  Telemetry Orders (From admission, onward)               24 Hour Telemetry Monitoring  Continuous x 24 Hours (Telem)        Question:  Reason for 24 Hour Telemetry  Answer:  Decompensated CHF- and any one of the following: continuous diuretic infusion or total diuretic dose >200 mg daily, associated electrolyte derangement (I.e. K < 3.0), ionotropic drip (continuous infusion), hx of ventricular arrhythmia, or new EF < 35%                          XR chest portable ICU   Final Result by Long Ferreira MD (01/08 1120)      Cardiomegaly      Increased pulmonary edema      Slight decreased bilateral pleural effusions      Decreased left base infiltrate/atelectasis                  Workstation performed: [id number]         XR chest portable ICU   Final Result by Kevin Razo DO (01/05 7959)      Worsening aeration of the lung fields with worsening left sided consolidation and patchy airspace disease within the right lung. There is central vascular congestion seen on the right. Findings suggest  a combination of pneumonia and pulmonary edema.                  Workstation performed: [id number]         XR foot 2 vw left   Final Result by Sarath Dempsey MD (01/05 1019)      No acute osseous abnormality.      Limited examinations bilaterally.      Workstation performed: [id number]         XR foot 2 vw right   Final Result by Sarath Dempsey MD (01/05 1019)      No acute osseous abnormality.      Limited examinations bilaterally.      Workstation performed: [id number]         XR chest portable ICU   Final Result by Alex Medrano MD (01/05 0835)      1. Satisfactory positioning of the right IJ catheter without pneumothorax.      2. Moderate bilateral airspace opacities, which may represent edema and/or infection/inflammation.      3. Small left pleural effusion. Possible trace right pleural effusion.      4. Stable enlarged cardiomediastinal silhouette.                     Workstation performed: [id number]             Imaging: Reviewed radiology reports from this admission including: above    Recent Cultures (last 7 days):           Last 24 Hours Medication List:   Current Facility-Administered Medications   Medication Dose Route Frequency Provider Last Rate    apixaban  2.5 mg Oral BID Mal Cheema MD      furosemide  20 mg Oral Daily Kevin Herman MD      melatonin  3 mg Oral  Mal Cheema MD      metoprolol tartrate  12.5 mg Oral Q12H On license of [facility] Mal Cheema MD      ondansetron  4 mg Intravenous Once Mckenzie Adler MD      senna  1 tablet Oral  Mal Cheema MD          Today, Patient Was Seen By: Kevin Herman MD    **Please Note: This note may have been constructed using a voice recognition system.**

## 2024-01-13 PROBLEM — N18.9 ANEMIA DUE TO CHRONIC KIDNEY DISEASE: Status: ACTIVE | Noted: 2024-01-13

## 2024-01-13 PROBLEM — D63.1 ANEMIA DUE TO CHRONIC KIDNEY DISEASE: Status: ACTIVE | Noted: 2024-01-13

## 2024-01-13 PROBLEM — J96.20 ACUTE ON CHRONIC RESPIRATORY FAILURE (HCC): Status: RESOLVED | Noted: 2021-03-22 | Resolved: 2024-01-13

## 2024-01-13 LAB
ABO GROUP BLD: NORMAL
ANION GAP SERPL CALCULATED.3IONS-SCNC: 2 MMOL/L
BASOPHILS # BLD AUTO: 0.02 THOUSANDS/ÂΜL (ref 0–0.1)
BASOPHILS NFR BLD AUTO: 0 % (ref 0–1)
BLD GP AB SCN SERPL QL: NEGATIVE
BUN SERPL-MCNC: 26 MG/DL (ref 5–25)
CALCIUM SERPL-MCNC: 8.3 MG/DL (ref 8.4–10.2)
CHLORIDE SERPL-SCNC: 102 MMOL/L (ref 96–108)
CO2 SERPL-SCNC: 38 MMOL/L (ref 21–32)
CREAT SERPL-MCNC: 0.99 MG/DL (ref 0.6–1.3)
EOSINOPHIL # BLD AUTO: 0.07 THOUSAND/ÂΜL (ref 0–0.61)
EOSINOPHIL NFR BLD AUTO: 1 % (ref 0–6)
ERYTHROCYTE [DISTWIDTH] IN BLOOD BY AUTOMATED COUNT: 20.7 % (ref 11.6–15.1)
GFR SERPL CREATININE-BSD FRML MDRD: 48 ML/MIN/1.73SQ M
GLUCOSE SERPL-MCNC: 84 MG/DL (ref 65–140)
HCT VFR BLD AUTO: 24.7 % (ref 34.8–46.1)
HCT VFR BLD AUTO: 31.7 % (ref 34.8–46.1)
HGB BLD-MCNC: 6.9 G/DL (ref 11.5–15.4)
HGB BLD-MCNC: 9 G/DL (ref 11.5–15.4)
IMM GRANULOCYTES # BLD AUTO: 0.07 THOUSAND/UL (ref 0–0.2)
IMM GRANULOCYTES NFR BLD AUTO: 1 % (ref 0–2)
LYMPHOCYTES # BLD AUTO: 0.61 THOUSANDS/ÂΜL (ref 0.6–4.47)
LYMPHOCYTES NFR BLD AUTO: 9 % (ref 14–44)
MAGNESIUM SERPL-MCNC: 2.1 MG/DL (ref 1.9–2.7)
MCH RBC QN AUTO: 23.5 PG (ref 26.8–34.3)
MCHC RBC AUTO-ENTMCNC: 27.5 G/DL (ref 31.4–37.4)
MCV RBC AUTO: 86 FL (ref 82–98)
MONOCYTES # BLD AUTO: 0.68 THOUSAND/ÂΜL (ref 0.17–1.22)
MONOCYTES NFR BLD AUTO: 10 % (ref 4–12)
NEUTROPHILS # BLD AUTO: 5.7 THOUSANDS/ÂΜL (ref 1.85–7.62)
NEUTS SEG NFR BLD AUTO: 79 % (ref 43–75)
NRBC BLD AUTO-RTO: 1 /100 WBCS
PLATELET # BLD AUTO: 171 THOUSANDS/UL (ref 149–390)
PMV BLD AUTO: 9.8 FL (ref 8.9–12.7)
POTASSIUM SERPL-SCNC: 4.6 MMOL/L (ref 3.5–5.3)
RBC # BLD AUTO: 2.89 MILLION/UL (ref 3.81–5.12)
RH BLD: POSITIVE
SODIUM SERPL-SCNC: 142 MMOL/L (ref 135–147)
SPECIMEN EXPIRATION DATE: NORMAL
WBC # BLD AUTO: 7.15 THOUSAND/UL (ref 4.31–10.16)

## 2024-01-13 PROCEDURE — 99232 SBSQ HOSP IP/OBS MODERATE 35: CPT | Performed by: INTERNAL MEDICINE

## 2024-01-13 PROCEDURE — 85025 COMPLETE CBC W/AUTO DIFF WBC: CPT

## 2024-01-13 PROCEDURE — 85018 HEMOGLOBIN: CPT

## 2024-01-13 PROCEDURE — 86923 COMPATIBILITY TEST ELECTRIC: CPT

## 2024-01-13 PROCEDURE — 85014 HEMATOCRIT: CPT

## 2024-01-13 PROCEDURE — 83735 ASSAY OF MAGNESIUM: CPT

## 2024-01-13 PROCEDURE — 86850 RBC ANTIBODY SCREEN: CPT

## 2024-01-13 PROCEDURE — 86901 BLOOD TYPING SEROLOGIC RH(D): CPT

## 2024-01-13 PROCEDURE — 30233N1 TRANSFUSION OF NONAUTOLOGOUS RED BLOOD CELLS INTO PERIPHERAL VEIN, PERCUTANEOUS APPROACH: ICD-10-PCS

## 2024-01-13 PROCEDURE — P9040 RBC LEUKOREDUCED IRRADIATED: HCPCS

## 2024-01-13 PROCEDURE — 86900 BLOOD TYPING SEROLOGIC ABO: CPT

## 2024-01-13 PROCEDURE — 80048 BASIC METABOLIC PNL TOTAL CA: CPT

## 2024-01-13 RX ORDER — FERROUS SULFATE 325(65) MG
325 TABLET ORAL
Status: DISCONTINUED | OUTPATIENT
Start: 2024-01-13 | End: 2024-01-16 | Stop reason: HOSPADM

## 2024-01-13 RX ORDER — PANTOPRAZOLE SODIUM 40 MG/1
40 TABLET, DELAYED RELEASE ORAL
Status: DISCONTINUED | OUTPATIENT
Start: 2024-01-13 | End: 2024-01-16 | Stop reason: HOSPADM

## 2024-01-13 RX ADMIN — FUROSEMIDE 20 MG: 20 TABLET ORAL at 08:20

## 2024-01-13 RX ADMIN — Medication 12.5 MG: at 08:20

## 2024-01-13 RX ADMIN — APIXABAN 2.5 MG: 2.5 TABLET, FILM COATED ORAL at 08:20

## 2024-01-13 RX ADMIN — FERROUS SULFATE TAB 325 MG (65 MG ELEMENTAL FE) 325 MG: 325 (65 FE) TAB at 08:20

## 2024-01-13 RX ADMIN — APIXABAN 2.5 MG: 2.5 TABLET, FILM COATED ORAL at 17:49

## 2024-01-13 RX ADMIN — PANTOPRAZOLE SODIUM 40 MG: 40 TABLET, DELAYED RELEASE ORAL at 17:49

## 2024-01-13 RX ADMIN — Medication 3 MG: at 21:13

## 2024-01-13 RX ADMIN — Medication 12.5 MG: at 21:13

## 2024-01-13 NOTE — DISCHARGE SUMMARY
Formerly [facility]  Discharge- Aurora Anderson 12/16/1929, 89 y.o. female MRN: [id number]  Unit/Bed#: S -[facility] Encounter: [id number]  Primary Care Provider: Anyi Lopez MD   Date and time admitted to hospital: 1/4/2024  9:43 AM    * Chronic heart failure with preserved ejection fraction (HCC)  Assessment & Plan  Wt Readings from Last 3 Encounters:   01/15/24 61 kg (134 lb 7.7 oz)   07/06/21 84.4 kg (186 lb)   07/01/21 84.4 kg (186 lb)     Chest x ray showing pulmonary congestion. BNP 1,800 concern for fluid overload.   Echo showing Ef of 55% and normal SF. Noted TVR moderate to sever and MVR moderate.  Patient is -85ml in last 24 hours    Plan:  Maintain diuresis, fluid restriction, low-salt diet  Continue metoprolol 25 daily  Continue home Lasix dose: 20mg p.o. Lasix        Atypical atrial flutter (HCC)  Assessment & Plan  Home Toprol 50 Mg Toprol 25mg daily  Restart home diltiazem  Continue Eliquis  Follow-up with cardiology outpatient  [id number] 4    Anemia due to chronic kidney disease and iron deficiency  Assessment & Plan     Lab Results   Component Value Date    IRON 36 (L) 01/06/2024    FERRITIN 34 01/06/2024    TIBC 291 01/06/2024    CONCFE 12 (L) 01/06/2024      Recent Labs     01/14/24  0601 01/15/24  1121 01/16/24  0630   HGB 7.9* 8.0* 8.0*        POA: Hgb stable at baseline 7-8, likely mixed anemia of CKD/chronic disease and JOSE G.  Patient required transfusion of PRBCs 1 unit, no signs of active bleeding at that time.  Currently stable hemoglobin, baseline 7-8    Plan:  Will continue PTA Eliquis for now given a flutter and no evidence of active bleeding at this time  Start po iron daily for JOSE G    CKD (chronic kidney disease) stage 3, GFR 30-59 ml/min (Formerly [facility])  Assessment & Plan  Lab Results   Component Value Date    EGFR 55 01/16/2024    EGFR 49 01/14/2024    EGFR 48 01/13/2024    CREATININE 0.89 01/16/2024    CREATININE 0.98 01/14/2024    CREATININE 0.99 01/13/2024      Hemoglobin goal in CKD is more than 10.  Currently at baseline      Medical Problems       Resolved Problems  Date Reviewed: 1/13/2024            Resolved    Acute on chronic respiratory failure (HCC) 1/13/2024     Resolved by  Kevin Herman MD    Septic shock ([facility]) 1/14/2024     Resolved by  Kevin Herman MD    HOSSEIN (acute kidney injury) ([facility]) 1/9/2024     Resolved by  Mal Cheema MD        Discharging Resident: Kevin Herman MD  Discharging Attending: Sheri Gallegos MD  PCP: Anyi Lopez MD  Admission Date:   Admission Orders (From admission, onward)       Ordered        01/04/24 1319  INPATIENT ADMISSION  Once                        Discharge Date: 01/16/24    Consultations During Hospital Stay:  Podiatry    Procedures Performed:   None    Significant Findings / Test Results:   None    Incidental Findings:   None    Test Results Pending at Discharge (will require follow up):  None     Outpatient Tests Requested:  None    Complications: None    Reason for Admission: Weakness and leg swelling     Hospital Course:   Aurora Anderson is a 89 y.o. female patient who originally presented to the hospital on 1/4/2024 due to septic shock secondary to urinary tract infection.  Upon presentation to the ED hospital patient was found to have positive UA which grew out Proteus and E. coli.  Patient required IV epinephrine for suspected BRASH, and was supplied 30 cc/kg of IV fluid, and transferred to the ICU.  Patient received a full dose of ceftriaxone and azithromycin demonstrating clinical improvement.  Patient was weaned off of pressors and on 1/10 was deemed safe for de-escalation to MedSurg.    Patient developed HOSSEIN likely secondary to shock requiring withholding of home Lasix, however this resolved on 1/10.    Patient developed hypoxemic respiratory failure with suspected etiology of fluid overload.  Patient was diuresed with oral Lasix and was on room air by time of discharge.      On the morning of 1/13  patient was found to be anemic to 6.9 likely a combination of iron deficiency and chronic kidney disease, this did not represent a significant drop and there was no sign of active bleeding.  Patient was transfused 1 packed red blood cells.  Hemoglobin was followed up 2 hours later with good effect.    Patient's family continued to have concerns about patient's discharge including swallowing, oxygen requirements, strength, snow, and more. These were each addressed iteratively with swallow study, chest x-ray, education on rehab. and DC was delayed till 1/16.    Patient is being discharged to [facility].    Patient and family expressed understanding of the above plan and all questions were answered.    Please see above list of diagnoses and related plan for additional information.     Condition at Discharge: good    Discharge Day Visit / Exam:   Subjective:  Patient seen at bedside today.  Patient received 1 unit packed red blood cells for slight hemoglobin drop without signs of active bleeding.  Patient asked me to call her daughter prior to transfusion, I attempted to do so but got no answer.  Patient consented for blood transfusion.  Patient states that she is feeling better compared to yesterday.  No symptoms of lightheadedness/dizziness. Patient denies any fevers, chills, headaches, chest pain, shortness of breath, abdominal pain, nausea, vomiting, constipation, diarrhea.  Patient has no acute or significant concerns or complaints.  Patient is doing well overall.    Vitals:    01/16/24 0931   BP: 92/54   Pulse: 93   Resp:    Temp:    SpO2: 95%     Exam:   Physical Exam  HENT:      Head: Normocephalic and atraumatic.      Mouth/Throat:      Mouth: Mucous membranes are moist.   Eyes:      General: No scleral icterus.        Left eye: No discharge.      Comments: Conjunctiva pallor   Cardiovascular:      Rate and Rhythm: Tachycardia present. Rhythm irregular.      Heart sounds: Normal heart sounds.  No murmur heard.     No friction rub. No gallop.   Pulmonary:      Effort: Pulmonary effort is normal. No respiratory distress.      Breath sounds: Normal breath sounds. No stridor. No wheezing, rhonchi or rales.   Chest:      Chest wall: No tenderness.   Abdominal:      Palpations: Abdomen is soft. There is no mass.      Tenderness: There is no abdominal tenderness. There is no guarding.   Musculoskeletal:      Right lower leg: Edema present.      Left lower leg: Edema present.   Skin:     General: Skin is warm and dry.      Capillary Refill: Capillary refill takes less than 2 seconds.   Neurological:      General: No focal deficit present.      Mental Status: She is oriented to person, place, and time.   Psychiatric:         Mood and Affect: Mood normal.         Behavior: Behavior normal.         Thought Content: Thought content normal.         Discussion with Family: Updated  (daughter) at bedside.    Discharge instructions/Information to patient and family:   See after visit summary for information provided to patient and family.      Provisions for Follow-Up Care:  See after visit summary for information related to follow-up care and any pertinent home health orders.      Mobility at time of Discharge:   Basic Mobility Inpatient Raw Score: 12  -HLM Goal: 4: Move to chair/commode  -HLM Achieved: 4: Move to chair/commode     Disposition:   Acute Rehab at [facility]    Planned Readmission: No    Discharge Medications:  See after visit summary for reconciled discharge medications provided to patient and/or family.      **Please Note: This note may have been constructed using a voice recognition system**

## 2024-01-13 NOTE — PLAN OF CARE
Problem: Potential for Falls  Goal: Patient will remain free of falls  Description: INTERVENTIONS:  - Educate patient/family on patient safety including physical limitations  - Instruct patient to call for assistance with activity   - Consult OT/PT to assist with strengthening/mobility   - Keep Call bell within reach  - Keep bed low and locked with side rails adjusted as appropriate  - Keep care items and personal belongings within reach  - Initiate and maintain comfort rounds  - Make Fall Risk Sign visible to staff  - Offer Toileting every  Hours, in advance of need  - Initiate/Maintain alarm  - Obtain necessary fall risk management equipment:   - Apply yellow socks and bracelet for high fall risk patients  - Consider moving patient to room near nurses station  Outcome: Progressing     Problem: Prexisting or High Potential for Compromised Skin Integrity  Goal: Skin integrity is maintained or improved  Description: INTERVENTIONS:  - Identify patients at risk for skin breakdown  - Assess and monitor skin integrity  - Assess and monitor nutrition and hydration status  - Monitor labs   - Assess for incontinence   - Turn and reposition patient  - Assist with mobility/ambulation  - Relieve pressure over bony prominences  - Avoid friction and shearing  - Provide appropriate hygiene as needed including keeping skin clean and dry  - Evaluate need for skin moisturizer/barrier cream  - Collaborate with interdisciplinary team   - Patient/family teaching  - Consider wound care consult   Outcome: Progressing     Problem: PAIN - ADULT  Goal: Verbalizes/displays adequate comfort level or baseline comfort level  Description: Interventions:  - Encourage patient to monitor pain and request assistance  - Assess pain using appropriate pain scale  - Administer analgesics based on type and severity of pain and evaluate response  - Implement non-pharmacological measures as appropriate and evaluate response  - Consider cultural and  social influences on pain and pain management  - Notify physician/advanced practitioner if interventions unsuccessful or patient reports new pain  Outcome: Progressing     Problem: INFECTION - ADULT  Goal: Absence or prevention of progression during hospitalization  Description: INTERVENTIONS:  - Assess and monitor for signs and symptoms of infection  - Monitor lab/diagnostic results  - Monitor all insertion sites, i.e. indwelling lines, tubes, and drains  - Monitor endotracheal if appropriate and nasal secretions for changes in amount and color  - [place] appropriate cooling/warming therapies per order  - Administer medications as ordered  - Instruct and encourage patient and family to use good hand hygiene technique  - Identify and instruct in appropriate isolation precautions for identified infection/condition  Outcome: Progressing     Problem: SAFETY ADULT  Goal: Patient will remain free of falls  Description: INTERVENTIONS:  - Educate patient/family on patient safety including physical limitations  - Instruct patient to call for assistance with activity   - Consult OT/PT to assist with strengthening/mobility   - Keep Call bell within reach  - Keep bed low and locked with side rails adjusted as appropriate  - Keep care items and personal belongings within reach  - Initiate and maintain comfort rounds  - Make Fall Risk Sign visible to staff  - Offer Toileting every  Hours, in advance of need  - Initiate/Maintain alarm  - Obtain necessary fall risk management equipment:   - Apply yellow socks and bracelet for high fall risk patients  - Consider moving patient to room near nurses station  Outcome: Progressing  Goal: Maintain or return to baseline ADL function  Description: INTERVENTIONS:  -  Assess patient's ability to carry out ADLs; assess patient's baseline for ADL function and identify physical deficits which impact ability to perform ADLs (bathing, care of mouth/teeth, toileting, grooming, dressing, etc.)  -  Assess/evaluate cause of self-care deficits   - Assess range of motion  - Assess patient's mobility; develop plan if impaired  - Assess patient's need for assistive devices and provide as appropriate  - Encourage maximum independence but intervene and supervise when necessary  - Involve family in performance of ADLs  - Assess for home care needs following discharge   - Consider OT consult to assist with ADL evaluation and planning for discharge  - Provide patient education as appropriate  Outcome: Progressing  Goal: Maintains/Returns to pre admission functional level  Description: INTERVENTIONS:  - Perform AM-PAC 6 Click Basic Mobility/ Daily Activity assessment daily.  - Set and communicate daily mobility goal to care team and patient/family/caregiver.   - Collaborate with rehabilitation services on mobility goals if consulted  - Perform Range of Motion  times a day.  - Reposition patient every  hours.  - Dangle patient  times a day  - Stand patient  times a day  - Ambulate patient  times a day  - Out of bed to chair  times a day   - Out of bed for meals  times a day  - Out of bed for toileting  - Record patient progress and toleration of activity level   Outcome: Progressing

## 2024-01-13 NOTE — ASSESSMENT & PLAN NOTE
Lab Results   Component Value Date    IRON 36 (L) 01/06/2024    FERRITIN 34 01/06/2024    TIBC 291 01/06/2024    CONCFE 12 (L) 01/06/2024      Recent Labs     01/11/24  0529 01/12/24  0620 01/13/24  0442   HGB 8.3* 7.8* 6.9*      POA: Hgb stable at baseline 7-8, likely mixed anemia of CKD/chronic disease and JOSE G. Hgb trending down, <7 today am. No evidence of active bleeding at this time.    Plan:  Will continue PTA Eliquis for now given a flutter and no evidence of active bleeding at this time  Will transfuse PRBC x 1 unit, f/u posttransfusion H&H  Start po iron daily for JOSE G

## 2024-01-13 NOTE — ASSESSMENT & PLAN NOTE
Continue Lopressor 12.5 every 12  Holding home diltiazem  Continue Eliquis  Follow-up with cardiology outpatient  [id number] 4.

## 2024-01-13 NOTE — CASE MANAGEMENT
Case Management Discharge Planning Note    Patient name Aurora Anderson  Location S /S -01 MRN [id number]  : 1929 Date 2024       Current Admission Date: 2024  Current Admission Diagnosis:Septic shock (HCC)   Patient Active Problem List    Diagnosis Date Noted    Anemia due to chronic kidney disease and iron deficiency 2024    Lymphedema 2024    Septic shock (HCC) 2024    Chronic heart failure with preserved ejection fraction (HCC) 2021    Breast mass, right 2021    CKD (chronic kidney disease) stage 3, GFR 30-59 ml/min (HCC) 2021    Atypical atrial flutter (HCC) 2021    Anemia 2021    Right bundle branch block (RBBB) on electrocardiogram (ECG) 2017    Diverticulosis of colon 2017    Large hiatal hernia 2017    Incidental 6cm right Renal lesion on CT 2017      LOS (days): 9  Geometric Mean LOS (GMLOS) (days): 5.1  Days to GMLOS:-3.8     OBJECTIVE:  Risk of Unplanned Readmission Score: 15.13         Current admission status: Inpatient   Preferred Pharmacy:   [id number] [place], [place] - [address] [id number] [facility] [address]  Phone: [phone] Fax: [phone]    Primary Care Provider: Anyi Lopez MD    Primary Insurance: MEDICARE  Secondary Insurance: J[employer]CD[employer]NELL PENDING    DISCHARGE DETAILS:                                                                                                 Additional Comments: CM notified by Provider that pt would be likely for d/c . Outreach to [facility] to notify. CM to follow up  for potential d/c and return coordination.

## 2024-01-13 NOTE — ASSESSMENT & PLAN NOTE
Lab Results   Component Value Date    EGFR 48 01/13/2024    EGFR 46 01/12/2024    EGFR 46 01/11/2024    CREATININE 0.99 01/13/2024    CREATININE 1.03 01/12/2024    CREATININE 1.04 01/11/2024     Will monitor Cr  Hemoglobin goal in CKD is more than 10.  Monitor for calcium, phosphorus, vitamin D abnormalities.

## 2024-01-13 NOTE — ASSESSMENT & PLAN NOTE
Wt Readings from Last 3 Encounters:   01/13/24 68.4 kg (150 lb 12.7 oz)   07/06/21 84.4 kg (186 lb)   07/01/21 84.4 kg (186 lb)     Chest x ray showing pulmonary congestion. BNP 1,800 concern for fluid overload.   Echo showing Ef of 55% and normal SF. Noted TVR moderate to sever and MVR moderate.  Patient is -85ml in last 24 hours  Maintain diuresis, fluid restriction, low-salt diet  Continue metoprolol 12.5 BID  Continue home Lasix dose: 20mg p.o. Lasix

## 2024-01-13 NOTE — PROGRESS NOTES
[facility]  Progress Note  Name: Aurora Anderosn I  MRN: [id number]  Unit/Bed#: S -01 I Date of Admission: 1/4/2024   Date of Service: 1/13/2024 I Hospital Day: 9    Assessment/Plan   * Septic shock (HCC)  Assessment & Plan  Patient found to by hypothermic and tachypnea, hypotensive.   Currently day 6 of antibiotics.  Patient had ceftriaxone for 2 days initially, cefazolin for 3 days, was transition back to ceftriaxone with additional cover with azithromycin currently day 3 of ceftriaxone  Micro: Urine culture shows more than 100,000 colonies of E. coli and Proteus both susceptible to cefazolin.  Blood culture does not show any growth in 72 hours  Chest x-ray shows interval worsening aeration of the lung fields with worsening left sided consolidation and patchy airspace disease within the right lung. There is central vascular congestion seen on the right. Findings suggest a combination of pneumonia and pulmonary edema   Vasopressin on hold since 5.00pm (1/8). Blood pressure of MAP ranging between 70-91. Goal MAP more than 65    Plan:  Done with ceftriaxone and azithromycin  Monitor vitals    Anemia due to chronic kidney disease and iron deficiency  Assessment & Plan     Lab Results   Component Value Date    IRON 36 (L) 01/06/2024    FERRITIN 34 01/06/2024    TIBC 291 01/06/2024    CONCFE 12 (L) 01/06/2024      Recent Labs     01/11/24  0529 01/12/24  0620 01/13/24  0442   HGB 8.3* 7.8* 6.9*      POA: Hgb stable at baseline 7-8, likely mixed anemia of CKD/chronic disease and JOSE G. Hgb trending down, <7 today am. No evidence of active bleeding at this time.    Plan:  Will continue PTA Eliquis for now given a flutter and no evidence of active bleeding at this time  Will transfuse PRBC x 1 unit, f/u posttransfusion H&H  Start po iron daily for JOSE G    Chronic heart failure with preserved ejection fraction (HCC)  Assessment & Plan  Wt Readings from Last 3 Encounters:   01/13/24 68.4 kg (150  lb 12.7 oz)   07/06/21 84.4 kg (186 lb)   07/01/21 84.4 kg (186 lb)     Chest x ray showing pulmonary congestion. BNP 1,800 concern for fluid overload.   Echo showing Ef of 55% and normal SF. Noted TVR moderate to sever and MVR moderate.  Patient is -85ml in last 24 hours  Maintain diuresis, fluid restriction, low-salt diet  Continue metoprolol 12.5 BID  Continue home Lasix dose: 20mg p.o. Lasix        Atypical atrial flutter (HCC)  Assessment & Plan  Continue Lopressor 12.5 every 12  Holding home diltiazem  Continue Eliquis  Follow-up with cardiology outpatient  [id number].    CKD (chronic kidney disease) stage 3, GFR 30-59 ml/min ([facility])  Assessment & Plan  Lab Results   Component Value Date    EGFR 48 01/13/2024    EGFR 46 01/12/2024    EGFR 46 01/11/2024    CREATININE 0.99 01/13/2024    CREATININE 1.03 01/12/2024    CREATININE 1.04 01/11/2024     Will monitor Cr  Hemoglobin goal in CKD is more than 10.  Monitor for calcium, phosphorus, vitamin D abnormalities.    Acute on chronic respiratory failure (HCC)-resolved as of 1/13/2024  Assessment & Plan  At presentation, chest x ray concerning for pulmonary edema.  1/520204: Chest x-ray shows Worsening aeration of the lung fields with worsening left sided consolidation and patchy airspace disease within the right lung. There is central vascular congestion seen on the right. Findings suggest a combination of pneumonia and pulmonary edema   Patient currently saturating at 100% on RA  Bibasilar and middle zone bilateral Rales    Plan:  Furosemide 20 mg PO daily  Monitor vitals  Respiratory protocol  Incentive spirometry           VTE Pharmacologic Prophylaxis:   High Risk (Score >/= 5) - Pharmacological DVT Prophylaxis Ordered: apixaban (Eliquis). Sequential Compression Devices Ordered.    Mobility:   Basic Mobility Inpatient Raw Score: 12  -HLM Goal: 4: Move to chair/commode  -HLM Achieved: 4: Move to chair/commode    Patient Centered Rounds: I performed bedside  rounds with nursing staff today.  Discussions with Specialists or Other Care Team Provider: Podiatry    Education and Discussions with Family / Patient: Patient declined call to .     Current Length of Stay: 9 day(s)  Current Patient Status: Inpatient   Discharge Plan: Anticipate discharge in 24-48 hrs to home.    Code Status: Level 3 - DNAR and DNI    Subjective:   Patient seen at bedside today.  CBC this morning demonstrated hemoglobin of 6.9.  Patient was asymptomatic.  No signs of acute bleeding.  Likely etiology combination of iron deficiency and anemia of chronic disease 2/2 CKD. Was transfused 1 unit packed red blood cells and tolerated well.  We have scheduled a recheck of hemoglobin for 2 hours and transfusion.  Patient states that she is feeling well today.  New complaints and reports absolutely no problems.  Family is at bedside and requests Ensure dietary supplements.  Patient denies any fevers, chills, headaches, chest pain, shortness of breath, abdominal pain, nausea, vomiting, constipation, diarrhea.  Patient has no acute or significant concerns or complaints.  Patient is doing well overall.  We will watch the patient for 1 more day given recent transfusion prior to discharge to rehab tomorrow.      Objective:     Vitals:   Temp (24hrs), Av.6 °F (36.4 °C), Min:97.4 °F (36.3 °C), Max:98 °F (36.7 °C)    Temp:  [97.4 °F (36.3 °C)-98 °F (36.7 °C)] 98 °F (36.7 °C)  HR:  [] 96  Resp:  [17-20] 20  BP: ()/(54-69) 90/61  SpO2:  [95 %-99 %] 95 %  Body mass index is 25.09 kg/m².     Input and Output Summary (last 24 hours):     Intake/Output Summary (Last 24 hours) at 2024 1134  Last data filed at 2024 0901  Gross per 24 hour   Intake 240 ml   Output 325 ml   Net -85 ml       Physical Exam:   Physical Exam  HENT:      Head: Normocephalic and atraumatic.      Mouth/Throat:      Mouth: Mucous membranes are moist.   Eyes:      General: No scleral icterus.        Left eye: No  discharge.      Comments: Conjunctiva pallor   Cardiovascular:      Rate and Rhythm: Tachycardia present. Rhythm irregular.      Heart sounds: Normal heart sounds. No murmur heard.     No friction rub. No gallop.   Pulmonary:      Effort: Pulmonary effort is normal. No respiratory distress.      Breath sounds: Normal breath sounds. No stridor. No wheezing, rhonchi or rales.   Chest:      Chest wall: No tenderness.   Abdominal:      Palpations: There is no mass.      Tenderness: There is no abdominal tenderness. There is no guarding.   Musculoskeletal:      Right lower leg: Edema present.      Left lower leg: Edema present.   Skin:     General: Skin is warm and dry.      Capillary Refill: Capillary refill takes less than 2 seconds.   Neurological:      General: No focal deficit present.      Mental Status: She is oriented to person, place, and time.   Psychiatric:         Mood and Affect: Mood normal.         Behavior: Behavior normal.         Thought Content: Thought content normal.         Additional Data:     Labs:  Results from last 7 days   Lab Units 01/13/24  0442   WBC Thousand/uL 7.15   HEMOGLOBIN g/dL 6.9*   HEMATOCRIT % 24.7*   PLATELETS Thousands/uL 171   NEUTROS PCT % 79*   LYMPHS PCT % 9*   MONOS PCT % 10   EOS PCT % 1     Results from last 7 days   Lab Units 01/13/24  0442   SODIUM mmol/L 142   POTASSIUM mmol/L 4.6   CHLORIDE mmol/L 102   CO2 mmol/L 38*   BUN mg/dL 26*   CREATININE mg/dL 0.99   ANION GAP mmol/L 2   CALCIUM mg/dL 8.3*   GLUCOSE RANDOM mg/dL 84     Results from last 7 days   Lab Units 01/07/24  0725   INR  1.34*                     Lines/Drains:  Invasive Devices       Peripheral Intravenous Line  Duration             Peripheral IV 01/09/24 Right;Ventral (anterior) Forearm 4 days    Peripheral IV 01/13/24 Left Antecubital <1 day                      Telemetry:  Telemetry Orders (From admission, onward)               24 Hour Telemetry Monitoring  Continuous x 24 Hours (Telem)         Question:  Reason for 24 Hour Telemetry  Answer:  Decompensated CHF- and any one of the following: continuous diuretic infusion or total diuretic dose >200 mg daily, associated electrolyte derangement (I.e. K < 3.0), ionotropic drip (continuous infusion), hx of ventricular arrhythmia, or new EF < 35%                          XR chest portable ICU   Final Result by Long Ferreira MD (01/08 1120)      Cardiomegaly      Increased pulmonary edema      Slight decreased bilateral pleural effusions      Decreased left base infiltrate/atelectasis                  Workstation performed: [id number]         XR chest portable ICU   Final Result by Kevin Razo DO (01/05 1419)      Worsening aeration of the lung fields with worsening left sided consolidation and patchy airspace disease within the right lung. There is central vascular congestion seen on the right. Findings suggest a combination of pneumonia and pulmonary edema.                  Workstation performed: [id number]         XR foot 2 vw left   Final Result by Sarath Dempsey MD (01/05 1019)      No acute osseous abnormality.      Limited examinations bilaterally.      Workstation performed: [id number]         XR foot 2 vw right   Final Result by Sarath Dempsey MD (01/05 1019)      No acute osseous abnormality.      Limited examinations bilaterally.      Workstation performed: [id number]         XR chest portable ICU   Final Result by Alex Medrano MD (01/05 0835)      1. Satisfactory positioning of the right IJ catheter without pneumothorax.      2. Moderate bilateral airspace opacities, which may represent edema and/or infection/inflammation.      3. Small left pleural effusion. Possible trace right pleural effusion.      4. Stable enlarged cardiomediastinal silhouette.                     Workstation performed: [id number]             Imaging: Reviewed radiology reports from this admission including: above    Recent Cultures (last 7 days):            Last 24 Hours Medication List:   Current Facility-Administered Medications   Medication Dose Route Frequency Provider Last Rate    apixaban  2.5 mg Oral BID Mal Cheema MD      ferrous sulfate  325 mg Oral Daily With Breakfast Cherie Nowak MD      furosemide  20 mg Oral Daily Kevin Herman MD      melatonin  3 mg Oral HS Mal Cheema MD      metoprolol tartrate  12.5 mg Oral Q12H [facility] Mal Cheema MD      ondansetron  4 mg Intravenous Once Mckenzie Macie Adler MD      pantoprazole  40 mg Oral BID AC Kevin Herman MD      senna  1 tablet Oral HS Mal Cheema MD          Today, Patient Was Seen By: Keivn Herman MD    **Please Note: This note may have been constructed using a voice recognition system.**

## 2024-01-14 PROBLEM — R65.21 SEPTIC SHOCK (HCC): Status: RESOLVED | Noted: 2024-01-04 | Resolved: 2024-01-14

## 2024-01-14 PROBLEM — A41.9 SEPTIC SHOCK (HCC): Status: RESOLVED | Noted: 2024-01-04 | Resolved: 2024-01-14

## 2024-01-14 LAB
ABO GROUP BLD BPU: NORMAL
ANION GAP SERPL CALCULATED.3IONS-SCNC: 3 MMOL/L
BASOPHILS # BLD AUTO: 0.03 THOUSANDS/ÂΜL (ref 0–0.1)
BASOPHILS NFR BLD AUTO: 0 % (ref 0–1)
BPU ID: NORMAL
BUN SERPL-MCNC: 23 MG/DL (ref 5–25)
CALCIUM SERPL-MCNC: 8.4 MG/DL (ref 8.4–10.2)
CHLORIDE SERPL-SCNC: 101 MMOL/L (ref 96–108)
CO2 SERPL-SCNC: 38 MMOL/L (ref 21–32)
CREAT SERPL-MCNC: 0.98 MG/DL (ref 0.6–1.3)
CROSSMATCH: NORMAL
EOSINOPHIL # BLD AUTO: 0.07 THOUSAND/ÂΜL (ref 0–0.61)
EOSINOPHIL NFR BLD AUTO: 1 % (ref 0–6)
ERYTHROCYTE [DISTWIDTH] IN BLOOD BY AUTOMATED COUNT: 20.1 % (ref 11.6–15.1)
GFR SERPL CREATININE-BSD FRML MDRD: 49 ML/MIN/1.73SQ M
GLUCOSE SERPL-MCNC: 77 MG/DL (ref 65–140)
HCT VFR BLD AUTO: 27.6 % (ref 34.8–46.1)
HGB BLD-MCNC: 7.9 G/DL (ref 11.5–15.4)
IMM GRANULOCYTES # BLD AUTO: 0.07 THOUSAND/UL (ref 0–0.2)
IMM GRANULOCYTES NFR BLD AUTO: 1 % (ref 0–2)
LYMPHOCYTES # BLD AUTO: 0.61 THOUSANDS/ÂΜL (ref 0.6–4.47)
LYMPHOCYTES NFR BLD AUTO: 9 % (ref 14–44)
MAGNESIUM SERPL-MCNC: 2.1 MG/DL (ref 1.9–2.7)
MCH RBC QN AUTO: 24.5 PG (ref 26.8–34.3)
MCHC RBC AUTO-ENTMCNC: 28.6 G/DL (ref 31.4–37.4)
MCV RBC AUTO: 85 FL (ref 82–98)
MONOCYTES # BLD AUTO: 0.73 THOUSAND/ÂΜL (ref 0.17–1.22)
MONOCYTES NFR BLD AUTO: 10 % (ref 4–12)
NEUTROPHILS # BLD AUTO: 5.67 THOUSANDS/ÂΜL (ref 1.85–7.62)
NEUTS SEG NFR BLD AUTO: 79 % (ref 43–75)
NRBC BLD AUTO-RTO: 1 /100 WBCS
PLATELET # BLD AUTO: 196 THOUSANDS/UL (ref 149–390)
PMV BLD AUTO: 10 FL (ref 8.9–12.7)
POTASSIUM SERPL-SCNC: 3.9 MMOL/L (ref 3.5–5.3)
RBC # BLD AUTO: 3.23 MILLION/UL (ref 3.81–5.12)
SODIUM SERPL-SCNC: 142 MMOL/L (ref 135–147)
UNIT DISPENSE STATUS: NORMAL
UNIT PRODUCT CODE: NORMAL
UNIT PRODUCT VOLUME: 350 ML
UNIT RH: NORMAL
WBC # BLD AUTO: 7.18 THOUSAND/UL (ref 4.31–10.16)

## 2024-01-14 PROCEDURE — 80048 BASIC METABOLIC PNL TOTAL CA: CPT

## 2024-01-14 PROCEDURE — 99232 SBSQ HOSP IP/OBS MODERATE 35: CPT | Performed by: INTERNAL MEDICINE

## 2024-01-14 PROCEDURE — 83735 ASSAY OF MAGNESIUM: CPT

## 2024-01-14 PROCEDURE — 85025 COMPLETE CBC W/AUTO DIFF WBC: CPT

## 2024-01-14 RX ADMIN — Medication 12.5 MG: at 21:36

## 2024-01-14 RX ADMIN — FERROUS SULFATE TAB 325 MG (65 MG ELEMENTAL FE) 325 MG: 325 (65 FE) TAB at 09:14

## 2024-01-14 RX ADMIN — Medication 3 MG: at 21:36

## 2024-01-14 RX ADMIN — PANTOPRAZOLE SODIUM 40 MG: 40 TABLET, DELAYED RELEASE ORAL at 09:14

## 2024-01-14 RX ADMIN — PANTOPRAZOLE SODIUM 40 MG: 40 TABLET, DELAYED RELEASE ORAL at 17:00

## 2024-01-14 RX ADMIN — APIXABAN 2.5 MG: 2.5 TABLET, FILM COATED ORAL at 09:14

## 2024-01-14 RX ADMIN — Medication 12.5 MG: at 09:12

## 2024-01-14 RX ADMIN — APIXABAN 2.5 MG: 2.5 TABLET, FILM COATED ORAL at 17:00

## 2024-01-14 NOTE — PROGRESS NOTES
[facility]  Progress Note  Name: Aurora Anderson I  MRN: [id number]  Unit/Bed#: S -01 I Date of Admission: 1/4/2024   Date of Service: 1/14/2024 I Hospital Day: 10    Assessment/Plan   * Septic shock (HCC)-resolved as of 1/14/2024  Assessment & Plan  Patient found to by hypothermic and tachypnea, hypotensive.   Currently day 6 of antibiotics.  Patient had ceftriaxone for 2 days initially, cefazolin for 3 days, was transition back to ceftriaxone with additional cover with azithromycin currently day 3 of ceftriaxone  Micro: Urine culture shows more than 100,000 colonies of E. coli and Proteus both susceptible to cefazolin.  Blood culture does not show any growth in 72 hours  Chest x-ray shows interval worsening aeration of the lung fields with worsening left sided consolidation and patchy airspace disease within the right lung. There is central vascular congestion seen on the right. Findings suggest a combination of pneumonia and pulmonary edema   Vasopressin on hold since 5.00pm (1/8). Blood pressure of MAP ranging between 70-91. 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 Hgb trending down, <7 today am. No evidence of active bleeding at this time.    Plan:  Will continue PTA Eliquis for now given a flutter and no evidence of active bleeding at this time  Transfused  PRBC x 1 unit, posttransfusion H&H stable  Start po iron daily for JOSE G    Chronic heart failure with preserved ejection fraction (HCC)  Assessment & Plan  Wt Readings from Last  3 Encounters:   01/14/24 63.7 kg (140 lb 6.9 oz)   07/06/21 84.4 kg (186 lb)   07/01/21 84.4 kg (186 lb)     Chest x ray showing pulmonary congestion. BNP 1,800 concern for fluid overload.   Echo showing Ef of 55% and normal SF. Noted TVR moderate to sever and MVR moderate.  Patient is -85ml in last 24 hours  Maintain diuresis, fluid restriction, low-salt diet  Continue metoprolol 12.5 BID  Continue home Lasix dose: 20mg p.o. Lasix        Atypical atrial flutter (HCC)  Assessment & Plan  Continue Lopressor 12.5 every 12  Holding home diltiazem  Continue Eliquis  Follow-up with cardiology outpatient  [id number] 4    CKD (chronic kidney disease) stage 3, GFR 30-59 ml/min ([facility])  Assessment & Plan  Lab Results   Component Value Date    EGFR 49 01/14/2024    EGFR 48 01/13/2024    EGFR 46 01/12/2024    CREATININE 0.98 01/14/2024    CREATININE 0.99 01/13/2024    CREATININE 1.03 01/12/2024     Hemoglobin goal in CKD is more than 10.  Monitor for calcium, phosphorus, vitamin D abnormalities.               VTE Pharmacologic Prophylaxis:   Moderate Risk (Score 3-4) - Pharmacological DVT Prophylaxis Ordered: apixaban (Eliquis).    Mobility:   Basic Mobility Inpatient Raw Score: 12  -HLM Goal: 4: Move to chair/commode  -HLM Achieved: 2: Bed activities/Dependent transfer  HLM Goal achieved. Continue to encourage appropriate mobility.    Patient Centered Rounds: I performed bedside rounds with nursing staff today.  Discussions with Specialists or Other Care Team Provider: N/A     Education and Discussions with Family / Patient: Attempted to update  (daughter) via phone. Left voicemail.     Current Length of Stay: 10 day(s)  Current Patient Status: Inpatient   Discharge Plan: Anticipate discharge tomorrow to home.    Code Status: Level 3 - DNAR and DNI    Subjective:   No acute events overnight. Discussed further inpatient monitoring of hgb pending possible discharge tomorrow AM. Patient with no further  complaints at this time.     Objective:     Vitals:   Temp (24hrs), Av °F (36.7 °C), Min:97.7 °F (36.5 °C), Max:98.3 °F (36.8 °C)    Temp:  [97.7 °F (36.5 °C)-98.3 °F (36.8 °C)] 98.3 °F (36.8 °C)  HR:  [] 87  Resp:  [18-19] 19  BP: ()/(64-71) 92/64  SpO2:  [88 %-99 %] 97 %  Body mass index is 23.37 kg/m².     Input and Output Summary (last 24 hours):     Intake/Output Summary (Last 24 hours) at 2024 1154  Last data filed at 2024 0918  Gross per 24 hour   Intake 710 ml   Output 2269 ml   Net -1559 ml       Physical Exam:   Physical Exam  HENT:      Head: Normocephalic and atraumatic.      Mouth/Throat:      Mouth: Mucous membranes are moist.   Eyes:      General: No scleral icterus.        Left eye: No discharge.      Comments: Conjunctiva pallor   Cardiovascular:      Rate and Rhythm: Normal rate. Rhythm irregular.      Heart sounds: Normal heart sounds. No murmur heard.     No friction rub. No gallop.   Pulmonary:      Effort: Pulmonary effort is normal. No respiratory distress.      Breath sounds: Normal breath sounds. No stridor. No wheezing, rhonchi or rales.   Chest:      Chest wall: No tenderness.   Abdominal:      Palpations: There is no mass.      Tenderness: There is no abdominal tenderness. There is no guarding.   Musculoskeletal:      Right lower leg: Edema present.      Left lower leg: Edema present.   Skin:     General: Skin is warm and dry.      Capillary Refill: Capillary refill takes less than 2 seconds.   Neurological:      General: No focal deficit present.      Mental Status: She is oriented to person, place, and time.   Psychiatric:         Mood and Affect: Mood normal.         Behavior: Behavior normal.         Thought Content: Thought content normal.          Additional Data:     Labs:  Results from last 7 days   Lab Units 24  0601   WBC Thousand/uL 7.18   HEMOGLOBIN g/dL 7.9*   HEMATOCRIT % 27.6*   PLATELETS Thousands/uL 196   NEUTROS PCT % 79*   LYMPHS PCT % 9*    MONOS PCT % 10   EOS PCT % 1     Results from last 7 days   Lab Units 01/14/24  0601   SODIUM mmol/L 142   POTASSIUM mmol/L 3.9   CHLORIDE mmol/L 101   CO2 mmol/L 38*   BUN mg/dL 23   CREATININE mg/dL 0.98   ANION GAP mmol/L 3   CALCIUM mg/dL 8.4   GLUCOSE RANDOM mg/dL 77                       Lines/Drains:  Invasive Devices       Peripheral Intravenous Line  Duration             Peripheral IV 01/13/24 Left Antecubital 1 day                          Imaging: No pertinent imaging reviewed.    Recent Cultures (last 7 days):         Last 24 Hours Medication List:   Current Facility-Administered Medications   Medication Dose Route Frequency Provider Last Rate    apixaban  2.5 mg Oral BID Mal Cheema MD      ferrous sulfate  325 mg Oral Daily With Breakfast Cherie Nowak MD      furosemide  20 mg Oral Daily Kevin Herman MD      melatonin  3 mg Oral HS Mal Cheema MD      metoprolol tartrate  12.5 mg Oral Q12H [facility] Mal Cheema MD      ondansetron  4 mg Intravenous Once Mckenzie Adler MD      pantoprazole  40 mg Oral BID AC Kevin Herman MD      senna  1 tablet Oral HS Mal Cheema MD          Today, Patient Was Seen By: Saulo Brown MD    **Please Note: This note may have been constructed using a voice recognition system.**

## 2024-01-14 NOTE — ASSESSMENT & PLAN NOTE
Continue Lopressor 12.5 every 12  Holding home diltiazem  Continue Eliquis  Follow-up with cardiology outpatient  [id number] 4

## 2024-01-14 NOTE — ASSESSMENT & PLAN NOTE
Wt Readings from Last 3 Encounters:   01/14/24 63.7 kg (140 lb 6.9 oz)   07/06/21 84.4 kg (186 lb)   07/01/21 84.4 kg (186 lb)     Chest x ray showing pulmonary congestion. BNP 1,800 concern for fluid overload.   Echo showing Ef of 55% and normal SF. Noted TVR moderate to sever and MVR moderate.  Patient is -85ml in last 24 hours  Maintain diuresis, fluid restriction, low-salt diet  Continue metoprolol 12.5 BID  Continue home Lasix dose: 20mg p.o. Lasix

## 2024-01-14 NOTE — ASSESSMENT & PLAN NOTE
Patient found to by hypothermic and tachypnea, hypotensive.   Currently day 6 of antibiotics.  Patient had ceftriaxone for 2 days initially, cefazolin for 3 days, was transition back to ceftriaxone with additional cover with azithromycin currently day 3 of ceftriaxone  Micro: Urine culture shows more than 100,000 colonies of E. coli and Proteus both susceptible to cefazolin.  Blood culture does not show any growth in 72 hours  Chest x-ray shows interval worsening aeration of the lung fields with worsening left sided consolidation and patchy airspace disease within the right lung. There is central vascular congestion seen on the right. Findings suggest a combination of pneumonia and pulmonary edema   Vasopressin on hold since 5.00pm (1/8). Blood pressure of MAP ranging between 70-91. Goal MAP more than 65    Plan:  Finished course with ceftriaxone and azithromycin  Monitor vitals

## 2024-01-14 NOTE — ASSESSMENT & PLAN NOTE
Lab Results   Component Value Date    EGFR 49 01/14/2024    EGFR 48 01/13/2024    EGFR 46 01/12/2024    CREATININE 0.98 01/14/2024    CREATININE 0.99 01/13/2024    CREATININE 1.03 01/12/2024     Hemoglobin goal in CKD is more than 10.  Monitor for calcium, phosphorus, vitamin D abnormalities.

## 2024-01-14 NOTE — PLAN OF CARE
Problem: Potential for Falls  Goal: Patient will remain free of falls  Description: INTERVENTIONS:  - Educate patient/family on patient safety including physical limitations  - Instruct patient to call for assistance with activity   - Consult OT/PT to assist with strengthening/mobility   - Keep Call bell within reach  - Keep bed low and locked with side rails adjusted as appropriate  - Keep care items and personal belongings within reach  - Initiate and maintain comfort rounds  - Make Fall Risk Sign visible to staff  - Offer Toileting every 2 Hours, in advance of need  - Initiate/Maintain bed alarm  - Apply yellow socks and bracelet for high fall risk patients  - Consider moving patient to room near nurses station  Outcome: Progressing     Problem: Prexisting or High Potential for Compromised Skin Integrity  Goal: Skin integrity is maintained or improved  Description: INTERVENTIONS:  - Identify patients at risk for skin breakdown  - Assess and monitor skin integrity  - Assess and monitor nutrition and hydration status  - Monitor labs   - Assess for incontinence   - Turn and reposition patient  - Assist with mobility/ambulation  - Relieve pressure over bony prominences  - Avoid friction and shearing  - Provide appropriate hygiene as needed including keeping skin clean and dry  - Evaluate need for skin moisturizer/barrier cream  - Collaborate with interdisciplinary team   - Patient/family teaching  - Consider wound care consult   Outcome: Progressing     Problem: PAIN - ADULT  Goal: Verbalizes/displays adequate comfort level or baseline comfort level  Description: Interventions:  - Encourage patient to monitor pain and request assistance  - Assess pain using appropriate pain scale  - Administer analgesics based on type and severity of pain and evaluate response  - Implement non-pharmacological measures as appropriate and evaluate response  - Consider cultural and social influences on pain and pain management  -  Notify physician/advanced practitioner if interventions unsuccessful or patient reports new pain  Outcome: Progressing     Problem: INFECTION - ADULT  Goal: Absence or prevention of progression during hospitalization  Description: INTERVENTIONS:  - Assess and monitor for signs and symptoms of infection  - Monitor lab/diagnostic results  - Monitor all insertion sites, i.e. indwelling lines, tubes, and drains  - Monitor endotracheal if appropriate and nasal secretions for changes in amount and color  - [place] appropriate cooling/warming therapies per order  - Administer medications as ordered  - Instruct and encourage patient and family to use good hand hygiene technique  - Identify and instruct in appropriate isolation precautions for identified infection/condition  Outcome: Progressing     Problem: SAFETY ADULT  Goal: Patient will remain free of falls  Description: INTERVENTIONS:  - Educate patient/family on patient safety including physical limitations  - Instruct patient to call for assistance with activity   - Consult OT/PT to assist with strengthening/mobility   - Keep Call bell within reach  - Keep bed low and locked with side rails adjusted as appropriate  - Keep care items and personal belongings within reach  - Initiate and maintain comfort rounds  - Make Fall Risk Sign visible to staff  - Offer Toileting every 2 Hours, in advance of need  - Initiate/Maintain bed alarm  - Apply yellow socks and bracelet for high fall risk patients  - Consider moving patient to room near nurses station  Outcome: Progressing  Goal: Maintain or return to baseline ADL function  Description: INTERVENTIONS:  -  Assess patient's ability to carry out ADLs; assess patient's baseline for ADL function and identify physical deficits which impact ability to perform ADLs (bathing, care of mouth/teeth, toileting, grooming, dressing, etc.)  - Assess/evaluate cause of self-care deficits   - Assess range of motion  - Assess patient's  mobility; develop plan if impaired  - Assess patient's need for assistive devices and provide as appropriate  - Encourage maximum independence but intervene and supervise when necessary  - Involve family in performance of ADLs  - Assess for home care needs following discharge   - Consider OT consult to assist with ADL evaluation and planning for discharge  - Provide patient education as appropriate  Outcome: Progressing  Goal: Maintains/Returns to pre admission functional level  Description: INTERVENTIONS:  - Perform AM-PAC 6 Click Basic Mobility/ Daily Activity assessment daily.  - Set and communicate daily mobility goal to care team and patient/family/caregiver.   - Collaborate with rehabilitation services on mobility goals if consulted  - Perform Range of Motion 3 times a day.  - Reposition patient every 2 hours.  - Dangle patient 3 times a day  - Stand patient 3 times a day  - Ambulate patient 3 times a day  - Out of bed to chair 3 times a day   - Out of bed for meals 3 times a day  - Out of bed for toileting  - Record patient progress and toleration of activity level   Outcome: Progressing     Problem: DISCHARGE PLANNING  Goal: Discharge to home or other facility with appropriate resources  Description: INTERVENTIONS:  - Identify barriers to discharge w/patient and caregiver  - Arrange for needed discharge resources and transportation as appropriate  - Identify discharge learning needs (meds, wound care, etc.)  - Arrange for interpretive services to assist at discharge as needed  - Refer to Case Management Department for coordinating discharge planning if the patient needs post-hospital services based on physician/advanced practitioner order or complex needs related to functional status, cognitive ability, or social support system  Outcome: Progressing     Problem: Knowledge Deficit  Goal: Patient/family/caregiver demonstrates understanding of disease process, treatment plan, medications, and discharge  instructions  Description: Complete learning assessment and assess knowledge base.  Interventions:  - Provide teaching at level of understanding  - Provide teaching via preferred learning methods  Outcome: Progressing

## 2024-01-14 NOTE — ASSESSMENT & PLAN NOTE
Lab Results   Component Value Date    IRON 36 (L) 01/06/2024    FERRITIN 34 01/06/2024    TIBC 291 01/06/2024    CONCFE 12 (L) 01/06/2024      Recent Labs     01/13/24  0442 01/13/24  1405 01/14/24  0601   HGB 6.9* 9.0* 7.9*        POA: Hgb stable at baseline 7-8, likely mixed anemia of CKD/chronic disease and JOSE G. Hgb trending down, <7 today am. No evidence of active bleeding at this time.    Plan:  Will continue PTA Eliquis for now given a flutter and no evidence of active bleeding at this time  Transfused  PRBC x 1 unit, posttransfusion H&H stable  Start po iron daily for JOSE G

## 2024-01-15 LAB
ERYTHROCYTE [DISTWIDTH] IN BLOOD BY AUTOMATED COUNT: 21.1 % (ref 11.6–15.1)
HCT VFR BLD AUTO: 28.7 % (ref 34.8–46.1)
HGB BLD-MCNC: 8 G/DL (ref 11.5–15.4)
MCH RBC QN AUTO: 24.4 PG (ref 26.8–34.3)
MCHC RBC AUTO-ENTMCNC: 27.9 G/DL (ref 31.4–37.4)
MCV RBC AUTO: 88 FL (ref 82–98)
PLATELET # BLD AUTO: 217 THOUSANDS/UL (ref 149–390)
PMV BLD AUTO: 9.1 FL (ref 8.9–12.7)
RBC # BLD AUTO: 3.28 MILLION/UL (ref 3.81–5.12)
WBC # BLD AUTO: 6.99 THOUSAND/UL (ref 4.31–10.16)

## 2024-01-15 PROCEDURE — 85027 COMPLETE CBC AUTOMATED: CPT

## 2024-01-15 PROCEDURE — 99232 SBSQ HOSP IP/OBS MODERATE 35: CPT | Performed by: INTERNAL MEDICINE

## 2024-01-15 RX ORDER — POLYETHYLENE GLYCOL 3350 17 G/17G
17 POWDER, FOR SOLUTION ORAL DAILY
Status: DISCONTINUED | OUTPATIENT
Start: 2024-01-15 | End: 2024-01-16 | Stop reason: HOSPADM

## 2024-01-15 RX ADMIN — APIXABAN 2.5 MG: 2.5 TABLET, FILM COATED ORAL at 08:36

## 2024-01-15 RX ADMIN — APIXABAN 2.5 MG: 2.5 TABLET, FILM COATED ORAL at 17:06

## 2024-01-15 RX ADMIN — FERROUS SULFATE TAB 325 MG (65 MG ELEMENTAL FE) 325 MG: 325 (65 FE) TAB at 08:35

## 2024-01-15 RX ADMIN — PANTOPRAZOLE SODIUM 40 MG: 40 TABLET, DELAYED RELEASE ORAL at 08:36

## 2024-01-15 RX ADMIN — Medication 12.5 MG: at 08:36

## 2024-01-15 RX ADMIN — POLYETHYLENE GLYCOL 3350 17 G: 17 POWDER, FOR SOLUTION ORAL at 14:25

## 2024-01-15 RX ADMIN — Medication 3 MG: at 21:01

## 2024-01-15 RX ADMIN — SENNOSIDES 8.6 MG: 8.6 TABLET, FILM COATED ORAL at 21:00

## 2024-01-15 RX ADMIN — Medication 12.5 MG: at 21:00

## 2024-01-15 NOTE — ASSESSMENT & PLAN NOTE
Continue Lopressor 12.5 every 12  Holding home diltiazem, in the setting of hypotension will likely continue to hold in the outpatient setting  Continue Eliquis  Follow-up with cardiology outpatient  [id number] 4

## 2024-01-15 NOTE — ASSESSMENT & PLAN NOTE
Wt Readings from Last 3 Encounters:   01/15/24 61 kg (134 lb 7.7 oz)   07/06/21 84.4 kg (186 lb)   07/01/21 84.4 kg (186 lb)     Chest x ray showing pulmonary congestion. BNP 1,800 concern for fluid overload.   Echo showing Ef of 55% and normal SF. Noted TVR moderate to sever and MVR moderate.  Patient is -85ml in last 24 hours    Plan:  Maintain diuresis, fluid restriction, low-salt diet  Monitor ins and outs  Continue metoprolol 12.5 BID  Continue home Lasix dose: 20mg p.o. Lasix

## 2024-01-15 NOTE — CASE MANAGEMENT
Case Management Progress Note    Patient name Aurora Anderson  Location S /S -01 MRN [id number]  : 1929 Date 1/15/2024       LOS (days): 11  Geometric Mean LOS (GMLOS) (days): 5.1  Days to GMLOS:-6.1        OBJECTIVE:        Current admission status: Inpatient  Preferred Pharmacy:   [id number] [place], [place] - [address] [id number]  [facility] [place] [address]  Phone: [phone] Fax: [phone]    Primary Care Provider: Anyi Lopez MD    Primary Insurance: MEDICARE  Secondary Insurance: JOS[employer] MCDONNELL PENDING    PROGRESS NOTE:    Confirmation received from [facility] that they still have a bed available for patient today. TT with MD who relays that labs are stable, but patient has not had a BM since the . Plan is for patient to remain overnight and anticipate d/c tomorrow as long as she's able to have a bowel movement. Update sent to [facility] relaying same.

## 2024-01-15 NOTE — PLAN OF CARE
Problem: Potential for Falls  Goal: Patient will remain free of falls  Description: INTERVENTIONS:  - Educate patient/family on patient safety including physical limitations  - Instruct patient to call for assistance with activity   - Consult OT/PT to assist with strengthening/mobility   - Keep Call bell within reach  - Keep bed low and locked with side rails adjusted as appropriate  - Keep care items and personal belongings within reach  - Initiate and maintain comfort rounds  - Make Fall Risk Sign visible to staff  - Offer Toileting every 2 Hours, in advance of need  - Initiate/Maintain alarm  - Obtain necessary fall risk management equipment:   - Apply yellow socks and bracelet for high fall risk patients  - Consider moving patient to room near nurses station  Outcome: Progressing     Problem: Prexisting or High Potential for Compromised Skin Integrity  Goal: Skin integrity is maintained or improved  Description: INTERVENTIONS:  - Identify patients at risk for skin breakdown  - Assess and monitor skin integrity  - Assess and monitor nutrition and hydration status  - Monitor labs   - Assess for incontinence   - Turn and reposition patient  - Assist with mobility/ambulation  - Relieve pressure over bony prominences  - Avoid friction and shearing  - Provide appropriate hygiene as needed including keeping skin clean and dry  - Evaluate need for skin moisturizer/barrier cream  - Collaborate with interdisciplinary team   - Patient/family teaching  - Consider wound care consult   Outcome: Progressing     Problem: PAIN - ADULT  Goal: Verbalizes/displays adequate comfort level or baseline comfort level  Description: Interventions:  - Encourage patient to monitor pain and request assistance  - Assess pain using appropriate pain scale  - Administer analgesics based on type and severity of pain and evaluate response  - Implement non-pharmacological measures as appropriate and evaluate response  - Consider cultural and  social influences on pain and pain management  - Notify physician/advanced practitioner if interventions unsuccessful or patient reports new pain  Outcome: Progressing     Problem: INFECTION - ADULT  Goal: Absence or prevention of progression during hospitalization  Description: INTERVENTIONS:  - Assess and monitor for signs and symptoms of infection  - Monitor lab/diagnostic results  - Monitor all insertion sites, i.e. indwelling lines, tubes, and drains  - Monitor endotracheal if appropriate and nasal secretions for changes in amount and color  - [place] appropriate cooling/warming therapies per order  - Administer medications as ordered  - Instruct and encourage patient and family to use good hand hygiene technique  - Identify and instruct in appropriate isolation precautions for identified infection/condition  Outcome: Progressing     Problem: SAFETY ADULT  Goal: Patient will remain free of falls  Description: INTERVENTIONS:  - Educate patient/family on patient safety including physical limitations  - Instruct patient to call for assistance with activity   - Consult OT/PT to assist with strengthening/mobility   - Keep Call bell within reach  - Keep bed low and locked with side rails adjusted as appropriate  - Keep care items and personal belongings within reach  - Initiate and maintain comfort rounds  - Make Fall Risk Sign visible to staff  - Offer Toileting every 2 Hours, in advance of need  - Initiate/Maintain alarm  - Obtain necessary fall risk management equipment:   - Apply yellow socks and bracelet for high fall risk patients  - Consider moving patient to room near nurses station  Outcome: Progressing  Goal: Maintain or return to baseline ADL function  Description: INTERVENTIONS:  -  Assess patient's ability to carry out ADLs; assess patient's baseline for ADL function and identify physical deficits which impact ability to perform ADLs (bathing, care of mouth/teeth, toileting, grooming, dressing,  etc.)  - Assess/evaluate cause of self-care deficits   - Assess range of motion  - Assess patient's mobility; develop plan if impaired  - Assess patient's need for assistive devices and provide as appropriate  - Encourage maximum independence but intervene and supervise when necessary  - Involve family in performance of ADLs  - Assess for home care needs following discharge   - Consider OT consult to assist with ADL evaluation and planning for discharge  - Provide patient education as appropriate  Outcome: Progressing  Goal: Maintains/Returns to pre admission functional level  Description: INTERVENTIONS:  - Perform AM-PAC 6 Click Basic Mobility/ Daily Activity assessment daily.  - Set and communicate daily mobility goal to care team and patient/family/caregiver.   - Collaborate with rehabilitation services on mobility goals if consulted  - Perform Range of Motion 2 times a day.  - Reposition patient every 2 hours.  - Dangle patient 2 times a day  - Stand patient 2 times a day  - Ambulate patient 2 times a day  - Out of bed to chair 2 times a day   - Out of bed for meals 2 times a day  - Out of bed for toileting  - Record patient progress and toleration of activity level   Outcome: Progressing     Problem: DISCHARGE PLANNING  Goal: Discharge to home or other facility with appropriate resources  Description: INTERVENTIONS:  - Identify barriers to discharge w/patient and caregiver  - Arrange for needed discharge resources and transportation as appropriate  - Identify discharge learning needs (meds, wound care, etc.)  - Arrange for interpretive services to assist at discharge as needed  - Refer to Case Management Department for coordinating discharge planning if the patient needs post-hospital services based on physician/advanced practitioner order or complex needs related to functional status, cognitive ability, or social support system  Outcome: Progressing     Problem: Knowledge Deficit  Goal:  Patient/family/caregiver demonstrates understanding of disease process, treatment plan, medications, and discharge instructions  Description: Complete learning assessment and assess knowledge base.  Interventions:  - Provide teaching at level of understanding  - Provide teaching via preferred learning methods  Outcome: Progressing

## 2024-01-15 NOTE — ASSESSMENT & PLAN NOTE
Lab Results   Component Value Date    IRON 36 (L) 01/06/2024    FERRITIN 34 01/06/2024    TIBC 291 01/06/2024    CONCFE 12 (L) 01/06/2024      Recent Labs     01/13/24  1405 01/14/24  0601 01/15/24  1121   HGB 9.0* 7.9* 8.0*      POA: Hgb stable at baseline 7-8, likely mixed anemia of CKD/chronic disease and JOSE G.  Patient required transfusion of PRBCs 1 unit, no signs of active bleeding at that time.  Currently stable hemoglobin, baseline 7-8    Plan:  Will continue PTA Eliquis for now given a flutter and no evidence of active bleeding at this time  Start po iron daily for JOSE G

## 2024-01-15 NOTE — PROGRESS NOTES
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  Patient required transfusion of PRBCs 1 unit, no signs of active bleeding at that time.  Currently stable hemoglobin, baseline 7-8    Plan:  Will continue PTA Eliquis for now given a flutter and no evidence of active bleeding at this time  Start po iron daily for JOSE G    Atypical atrial flutter (HCC)  Assessment & Plan  Continue Lopressor 12.5 every 12  Holding home diltiazem, in the setting of hypotension will likely continue to hold in the outpatient setting  Continue Eliquis  Follow-up with cardiology outpatient  [id number]    Chronic heart failure with preserved ejection fraction (HCC)  Assessment & Plan  Wt Readings from Last 3 Encounters:   01/15/24 61 kg (134 lb 7.7 oz)   07/06/21 84.4 kg (186 lb)   07/01/21 84.4 kg (186 lb)     Chest x ray showing pulmonary congestion. BNP 1,800 concern for fluid overload.   Echo showing Ef of 55% and normal SF. Noted TVR moderate to sever and MVR moderate.  Patient is -85ml in last 24 hours    Plan:  Maintain diuresis, fluid restriction, low-salt diet  Monitor ins and outs  Continue metoprolol 12.5 BID  Continue home Lasix dose: 20mg p.o. Lasix        CKD (chronic kidney disease) stage 3, GFR 30-59 ml/min ([facility])  Assessment & Plan  Lab Results   Component Value Date    EGFR 49 01/14/2024    EGFR 48 01/13/2024    EGFR 46 01/12/2024    CREATININE 0.98 01/14/2024     CREATININE 0.99 2024    CREATININE 1.03 2024     Hemoglobin goal in CKD is more than 10.  Currently at baseline               VTE Pharmacologic Prophylaxis:   Moderate Risk (Score 3-4) - Pharmacological DVT Prophylaxis Ordered: apixaban (Eliquis).    Mobility:   Basic Mobility Inpatient Raw Score: 12  JH-HLM Goal: 4: Move to chair/commode  JH-HLM Achieved: 2: Bed activities/Dependent transfer  HLM Goal NOT achieved. Continue with multidisciplinary rounding and encourage appropriate mobility to improve upon HLM goals.    Patient Centered Rounds: I performed bedside rounds with nursing staff today.  Discussions with Specialists or Other Care Team Provider: Attending physician, senior resident    Education and Discussions with Family / Patient: Updated  (daughter) at bedside.    Current Length of Stay: 11 day(s)  Current Patient Status: Inpatient   Discharge Plan: Anticipate discharge tomorrow to rehab facility.    Code Status: Level 3 - DNAR and DNI    Subjective:   Evaluated patient bedside, patient stated that she was feeling well and is eager to leave.  Denying any other symptoms at this time, stated she has no lightheadedness, dizziness, shortness of breath, chest pains, palpitations, abdominal pains, melena, hematochezia.  Was able to speak with daughter later in the afternoon, concerned that patient is not moving well and is on oxygen.  Did not want patient to be discharged.  Explained that patient is likely deconditioned and will likely need continued physical therapy and Occupational Therapy in the outpatient setting which will be provided in the rehab.  Patient is also going on supplemental oxygen and should continue with her incentive spirometry.  Concerns over patient not having bowel movement were also addressed, will keep patient 1 more day and add extra bowel regimen for patient.    Objective:     Vitals:   Temp (24hrs), Av.7 °F (36.5 °C), Min:97.6 °F (36.4 °C), Max:97.8 °F  (36.6 °C)    Temp:  [97.6 °F (36.4 °C)-97.8 °F (36.6 °C)] 97.6 °F (36.4 °C)  HR:  [] 108  Resp:  [16-18] 16  BP: ()/(66-71) 96/66  SpO2:  [92 %-96 %] 92 %  Body mass index is 22.38 kg/m².     Input and Output Summary (last 24 hours):     Intake/Output Summary (Last 24 hours) at 1/15/2024 1539  Last data filed at 1/15/2024 0900  Gross per 24 hour   Intake 690 ml   Output 700 ml   Net -10 ml       Physical Exam:   Physical Exam  Vitals and nursing note reviewed.   Constitutional:       General: She is not in acute distress.     Appearance: She is well-developed.   HENT:      Head: Normocephalic and atraumatic.      Mouth/Throat:      Mouth: Mucous membranes are dry.   Eyes:      Conjunctiva/sclera: Conjunctivae normal.   Cardiovascular:      Rate and Rhythm: Normal rate. Rhythm irregular.      Heart sounds: Murmur heard.   Pulmonary:      Effort: Pulmonary effort is normal. No respiratory distress.      Breath sounds: Normal breath sounds.      Comments: Poor inspiratory effort, reaching to 250 mL on I-S  Abdominal:      Palpations: Abdomen is soft.      Tenderness: There is no abdominal tenderness.   Musculoskeletal:      Cervical back: Neck supple.      Right lower leg: Edema present.      Left lower leg: Edema present.   Skin:     General: Skin is warm and dry.      Capillary Refill: Capillary refill takes less than 2 seconds.   Neurological:      Mental Status: She is alert.   Psychiatric:         Mood and Affect: Mood normal.          Additional Data:     Labs:  Results from last 7 days   Lab Units 01/15/24  1121 01/14/24  0601   WBC Thousand/uL 6.99 7.18   HEMOGLOBIN g/dL 8.0* 7.9*   HEMATOCRIT % 28.7* 27.6*   PLATELETS Thousands/uL 217 196   NEUTROS PCT %  --  79*   LYMPHS PCT %  --  9*   MONOS PCT %  --  10   EOS PCT %  --  1     Results from last 7 days   Lab Units 01/14/24  0601   SODIUM mmol/L 142   POTASSIUM mmol/L 3.9   CHLORIDE mmol/L 101   CO2 mmol/L 38*   BUN mg/dL 23   CREATININE mg/dL  0.98   ANION GAP mmol/L 3   CALCIUM mg/dL 8.4   GLUCOSE RANDOM mg/dL 77                       Lines/Drains:  Invasive Devices       Peripheral Intravenous Line  Duration             Peripheral IV 01/13/24 Left Antecubital 2 days                          Imaging: Reviewed radiology reports from this admission including: chest xray    Recent Cultures (last 7 days):         Last 24 Hours Medication List:   Current Facility-Administered Medications   Medication Dose Route Frequency Provider Last Rate    apixaban  2.5 mg Oral BID Mal Cheema MD      ferrous sulfate  325 mg Oral Daily With Breakfast Cherie Nowak MD      furosemide  20 mg Oral Daily Kevin Herman MD      melatonin  3 mg Oral HS Mal Cheema MD      metoprolol tartrate  12.5 mg Oral Q12H [facility] Mal Cheema MD      ondansetron  4 mg Intravenous Once Mckenzie Adler MD      pantoprazole  40 mg Oral BID AC Kevin Herman MD      polyethylene glycol  17 g Oral Daily Tawanda Elizabeth MD      senna  1 tablet Oral HS Mal Cheema MD          Today, Patient Was Seen By: Tawanda Elizabeth MD    **Please Note: This note may have been constructed using a voice recognition system.**

## 2024-01-15 NOTE — ASSESSMENT & PLAN NOTE
[facility] EM[facility]ENC[facility] DEPARTMENT ENCOUNTER    Basic Information  Patient: Parth Ramirez Age: 82 year old Sex: male  MRN: [id number] Encounter Date: 4/3/2021, 1:36 AM  PCP: Nicola Holland MD    Chief Complaint  Chief Complaint   Patient presents with   • Seizures       History of Present Illness    I have reviewed the non physician practitioners documentation, personally taken the patient's history, performed an exam and agree with the physical findings, diagnosis and management plan.      Orders  Medications   sodium chloride (PF) 0.9 % injection 2 mL (has no administration in time range)   allopurinol (ZYLOPRIM) tablet 300 mg (has no administration in time range)   amLODIPine (NORVASC) tablet 5 mg (has no administration in time range)   folic acid (FOLATE) tablet 1,200 mcg (has no administration in time range)   latanoprost (XALATAN) 0.005 % ophthalmic solution 1 drop (has no administration in time range)   levETIRAcetam (KepPRA) tablet 750 mg (has no administration in time range)   phenyTOIN (DILANTIN) EX capsule 100 mg (has no administration in time range)   pantoprazole (PROTONIX) EC tablet 40 mg (has no administration in time range)   cyanocobalamin (Vitamin B-12) tablet 1,000 mcg (has no administration in time range)   brimonidine (ALPHAGAN) 0.2 % ophthalmic solution 2 drop (has no administration in time range)     And   timolol (TIMOPTIC) 0.5 % ophthalmic solution 2 drop (has no administration in time range)   sodium chloride (NORMAL SALINE) 0.9 % bolus 500 mL (0 mLs Intravenous Completed 4/2/21 8703)   phenyTOIN (DILANTIN) EX capsule 200 mg (200 mg Oral Given 4/2/21 7689)         Laboratory and Radiology Results    Results for orders placed or performed during the hospital encounter of 04/02/21   Comprehensive Metabolic Panel   Result Value Ref Range    Fasting Status      Sodium 143 135 - 145 mmol/L    Potassium 4.0 3.4 - 5.1 mmol/L    Chloride 108 (H) 98 - 107 mmol/L    Carbon Dioxide 29 21 - 32 mmol/L  Lab Results   Component Value Date    EGFR 49 01/14/2024    EGFR 48 01/13/2024    EGFR 46 01/12/2024    CREATININE 0.98 01/14/2024    CREATININE 0.99 01/13/2024    CREATININE 1.03 01/12/2024     Hemoglobin goal in CKD is more than 10.  Currently at baseline      Anion Gap 10 10 - 20 mmol/L    Glucose 102 (H) 65 - 99 mg/dL    BUN 19 6 - 20 mg/dL    Creatinine 1.17 0.67 - 1.17 mg/dL    Glomerular Filtration Rate 67 (L) >90 mL/min/1.73m2    BUN/ Creatinine Ratio 16 7 - 25    Calcium 9.6 8.4 - 10.2 mg/dL    Bilirubin, Total 0.2 0.2 - 1.0 mg/dL    GOT/AST 13 <=37 Units/L    GPT/ALT 22 <64 Units/L    Alkaline Phosphatase 51 45 - 117 Units/L    Albumin 3.5 (L) 3.6 - 5.1 g/dL    Protein, Total 7.1 6.4 - 8.2 g/dL    Globulin 3.6 2.0 - 4.0 g/dL    A/G Ratio 1.0 1.0 - 2.4   Troponin I Ultra Sensitive   Result Value Ref Range    Troponin I, Ultra Sensitive 0.03 <=0.04 ng/mL   Prothrombin Time   Result Value Ref Range    Prothrombin Time 10.9 9.7 - 11.8 sec    INR 1.0 <=5.0   Partial Thromboplastin Time   Result Value Ref Range    PTT 24 22 - 30 sec   Magnesium   Result Value Ref Range    Magnesium 2.2 1.7 - 2.4 mg/dL   Urinalysis & Reflex Microscopy With Culture If Indicated   Result Value Ref Range    COLOR, URINALYSIS Yellow     APPEARANCE, URINALYSIS Clear     GLUCOSE, URINALYSIS Negative Negative mg/dL    BILIRUBIN, URINALYSIS Negative Negative    KETONES, URINALYSIS Negative Negative mg/dL    SPECIFIC GRAVITY, URINALYSIS 1.017 1.005 - 1.030    OCCULT BLOOD, URINALYSIS Negative Negative    PH, URINALYSIS 5.0 5.0 - 7.0    PROTEIN, URINALYSIS Negative Negative mg/dL    UROBILINOGEN, URINALYSIS 0.2 0.2, 1.0 mg/dL    NITRITE, URINALYSIS Negative Negative    LEUKOCYTE ESTERASE, URINALYSIS Negative Negative   CBC with Automated Differential (performable only)   Result Value Ref Range    WBC 6.5 4.2 - 11.0 K/mcL    RBC 4.21 (L) 4.50 - 5.90 mil/mcL    HGB 12.5 (L) 13.0 - 17.0 g/dL    HCT 39.5 39.0 - 51.0 %    MCV 93.8 78.0 - 100.0 fl    MCH 29.7 26.0 - 34.0 pg    MCHC 31.6 (L) 32.0 - 36.5 g/dL    RDW-CV 13.4 11.0 - 15.0 %    RDW-SD 45.8 39.0 - 50.0 fL     140 - 450 K/mcL    NRBC 0 <=0 /100 WBC    Neutrophil, Percent 70 %    Lymphocytes, Percent 18 %    Mono, Percent 8 %     Eosinophils, Percent 3 %    Basophils, Percent 1 %    Immature Granulocytes 0 %    Absolute Neutrophils 4.5 1.8 - 7.7 K/mcL    Absolute Lymphocytes 1.2 1.0 - 4.0 K/mcL    Absolute Monocytes 0.5 0.3 - 0.9 K/mcL    Absolute Eosinophils  0.2 0.0 - 0.5 K/mcL    Absolute Basophils 0.0 0.0 - 0.3 K/mcL    Absolute Immmature Granulocytes 0.0 0.0 - 0.2 K/mcL   Gold Top   Result Value Ref Range    Extra Tube Hold for Add Ons    Troponin I Ultra Sensitive   Result Value Ref Range    Troponin I, Ultra Sensitive 0.03 <=0.04 ng/mL   Phenytoin   Result Value Ref Range    Phenytoin 6.5 (L) 10.0 - 20.0 mcg/mL   Rapid SARS-CoV-2 by PCR    Specimen: Nasopharyngeal; Swab   Result Value Ref Range    Rapid SARS-COV-2 by PCR Not Detected Not Detected / Detected / Presumptive Positive / Inhibitors present    Isolation Guidelines      Procedural Comment         CT HEAD WO CONTRAST   Final Result   1. Right frontotemporal craniotomy changes.   2.  Left occipital ventriculoperitoneal shunt with normal caliber   ventricular system.   3.  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   4.  Atrophy with chronic microangiopathic changes.      Electronically Signed by: PARAMJIT MATIAS M.D.    Signed on: 4/2/2021 5:12 PM          XR CHEST PA OR AP 1 VIEW   Final Result   1.  No acute cardiopulmonary findings visualized by x-ray.      **The absence of findings should not deter follow-up or further evaluation   of concerning clinical findings.      Electronically Signed by: DAMIR LAGUNA M.D.    Signed on: 4/2/2021 5:13 PM                Physical Exam  ED Triage Vitals [04/02/21 1511]   /63   Heart Rate (!) 54   Resp 18   Temp 97.6 °F (36.4 °C)   SpO2 100 %      General:  Alert.  Skin:  Warm. Dry. Intact.  Head:  Normocephalic. Atraumatic.  Eye:  PERRL. EOMI. Normal conjunctiva.  ENMT:  Oral mucosa moist. No pharyngeal erythema or exudate.  Cardiovascular:  Regular rate and rhythm. No murmur. Normal peripheral perfusion. No  edema.  Respiratory:  Lungs CTA. Respirations are non-labored. BS equal.  Gastrointestinal:  Soft. Nontender. Non distended. Normal BS.  Back: Nontender. Normal alignment.  Musculoskeletal:  Normal ROM. No deformities.  Neurological:  A/O x 4. No focal neurological deficit observed. CN II-XII intact. Normal sensory. Normal motor.        ED Course  I participated in the following activities of this patients care: the medical history, the physical exam, medical decision making.   I personally performed: supervision of the patient's care.   The case was discussed with: the non physician practitioners, Midlevel Provider.   Evaluation and management service: I agree with the evaluation and management decisions made in this patient's care.   Results interpretation: I agree with the study interpretation in this patient's care, History, physical, EMR documentation completed by Midlevel Provider has been reviewed and agree.     Vitals:    04/02/21 1941 04/02/21 2041 04/02/21 2222 04/02/21 2320   BP: (!) 140/72 (!) 153/88  (!) 162/82   BP Location: LUE - Left upper extremity LUE - Left upper extremity     Patient Position: Semi-Byers's Semi-Byers's     Pulse: 64 67  61   Resp: 16 16  15   Temp:    97.5 °F (36.4 °C)   TempSrc:    Oral   SpO2: 100% 99%  99%   Weight:       Height:   5' 7\" (1.702 m)      Patient is asymptomatic for COVID like symptoms, COVID swab sent for screening purposes.     Reviewed diagnostic results with the pt, as well as plan to stay. Pt understands and agrees to stay for further workup and management.    Impression and Plan  Diagnosis:  1. Near syncope        Condition:  STABLE      Disposition:  Admit 4/2/2021  8:19 PM  Telemetry Bed?: Yes  Admitting Physician: VISHAL MARQUEZ [[id number]]  Is this a telephone or verbal order?: This is a telephone order from the admitting physician  Comments: NON COVID      Counseled:  Patient, Regarding diagnosis, Regarding diagnostic results, Regarding treatment and  Patient understood    Ekaterina De Luna MD, 4/3/2021 1:36 AM             Ekaterina De Luna MD  04/03/21 0137

## 2024-01-16 ENCOUNTER — APPOINTMENT (INPATIENT)
Dept: RADIOLOGY | Facility: HOSPITAL | Age: 89
DRG: 871 | End: 2024-01-16
Payer: MEDICARE

## 2024-01-16 ENCOUNTER — TELEPHONE (OUTPATIENT)
Dept: OTHER | Facility: OTHER | Age: 89
End: 2024-01-16

## 2024-01-16 LAB
ANION GAP SERPL CALCULATED.3IONS-SCNC: 3 MMOL/L
BUN SERPL-MCNC: 18 MG/DL (ref 5–25)
CALCIUM SERPL-MCNC: 8.5 MG/DL (ref 8.4–10.2)
CHLORIDE SERPL-SCNC: 104 MMOL/L (ref 96–108)
CO2 SERPL-SCNC: 36 MMOL/L (ref 21–32)
CREAT SERPL-MCNC: 0.89 MG/DL (ref 0.6–1.3)
ERYTHROCYTE [DISTWIDTH] IN BLOOD BY AUTOMATED COUNT: 22 % (ref 11.6–15.1)
GFR SERPL CREATININE-BSD FRML MDRD: 55 ML/MIN/1.73SQ M
GLUCOSE SERPL-MCNC: 81 MG/DL (ref 65–140)
HCT VFR BLD AUTO: 29.1 % (ref 34.8–46.1)
HGB BLD-MCNC: 8 G/DL (ref 11.5–15.4)
MCH RBC QN AUTO: 24.3 PG (ref 26.8–34.3)
MCHC RBC AUTO-ENTMCNC: 27.5 G/DL (ref 31.4–37.4)
MCV RBC AUTO: 88 FL (ref 82–98)
PLATELET # BLD AUTO: 232 THOUSANDS/UL (ref 149–390)
PMV BLD AUTO: 9 FL (ref 8.9–12.7)
POTASSIUM SERPL-SCNC: 4.3 MMOL/L (ref 3.5–5.3)
RBC # BLD AUTO: 3.29 MILLION/UL (ref 3.81–5.12)
SODIUM SERPL-SCNC: 143 MMOL/L (ref 135–147)
WBC # BLD AUTO: 5.87 THOUSAND/UL (ref 4.31–10.16)

## 2024-01-16 PROCEDURE — 85027 COMPLETE CBC AUTOMATED: CPT

## 2024-01-16 PROCEDURE — 80048 BASIC METABOLIC PNL TOTAL CA: CPT

## 2024-01-16 PROCEDURE — 71045 X-RAY EXAM CHEST 1 VIEW: CPT

## 2024-01-16 PROCEDURE — 99238 HOSP IP/OBS DSCHRG MGMT 30/<: CPT | Performed by: HOSPITALIST

## 2024-01-16 RX ORDER — GUAIFENESIN 600 MG/1
600 TABLET, EXTENDED RELEASE ORAL EVERY 12 HOURS SCHEDULED
Status: DISCONTINUED | OUTPATIENT
Start: 2024-01-16 | End: 2024-01-16 | Stop reason: HOSPADM

## 2024-01-16 RX ORDER — FERROUS SULFATE 325(65) MG
325 TABLET ORAL
Qty: 30 TABLET | Refills: 0
Start: 2024-01-16 | End: 2024-02-15

## 2024-01-16 RX ADMIN — POLYETHYLENE GLYCOL 3350 17 G: 17 POWDER, FOR SOLUTION ORAL at 09:28

## 2024-01-16 RX ADMIN — Medication 12.5 MG: at 09:28

## 2024-01-16 RX ADMIN — APIXABAN 2.5 MG: 2.5 TABLET, FILM COATED ORAL at 09:28

## 2024-01-16 RX ADMIN — PANTOPRAZOLE SODIUM 40 MG: 40 TABLET, DELAYED RELEASE ORAL at 06:29

## 2024-01-16 RX ADMIN — FERROUS SULFATE TAB 325 MG (65 MG ELEMENTAL FE) 325 MG: 325 (65 FE) TAB at 09:31

## 2024-01-16 RX ADMIN — GUAIFENESIN 600 MG: 600 TABLET ORAL at 09:28

## 2024-01-16 NOTE — CASE MANAGEMENT
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 2024    Lymphedema 2024    Chronic heart failure with preserved ejection fraction (HCC) 2021    Breast mass, right 2021    CKD (chronic kidney disease) stage 3, GFR 30-59 ml/min (HCC) 2021    Atypical atrial flutter (HCC) 2021    Anemia 2021    Right bundle branch block (RBBB) on electrocardiogram (ECG) 2017    Diverticulosis of colon 2017    Large hiatal hernia 2017    Incidental 6cm right Renal lesion on CT 2017      LOS (days): 12  Geometric Mean LOS (GMLOS) (days): 5.1  Days to GMLOS:-7.1     OBJECTIVE:  Risk of Unplanned Readmission Score: 12.91         Current admission status: Inpatient   Preferred Pharmacy:   [id number] [place], [place] - [address] [place] [address] [facility] [address]  Phone: [phone] Fax: [phone]    Primary Care Provider: Anyi Lopez MD    Primary Insurance: MEDICARE  Secondary Insurance: JOSÉ MCDONNELL PENDING    DISCHARGE DETAILS:    Discharge planning discussed with:: patient's daughter, Yana -  left  [place]edom of Choice: Yes (re: rehab)  Comments - [place] of Choice: [facility]  CM contacted family/caregiver?: Yes  Were Treatment Team discharge recommendations reviewed with patient/caregiver?: Yes  Did patient/caregiver verbalize understanding of patient care needs?: N/A- going to facility  Were patient/caregiver advised of the risks associated with not following Treatment Team discharge recommendations?: Yes    Contacts  Patient Contacts: Yana  daughter  Relationship to Patient:: Family  Contact Method: Phone  Phone Number: [phone] -  left  Reason/Outcome: Continuity of Care, Discharge Planning, Emergency Contact, Referral    Requested Home Health Care         Is the patient interested in HHC at discharge?: No    DME Referral Provided  Referral made for DME?: No    Other Referral/Resources/Interventions Provided:  Interventions: SNF, Short Term Rehab  Referral Comments: Transport confirmed in [facility] for 5:15pm with [facility]. Call made to patient's daughter, Yana, and VM left relaying same. RN and MD aware of pick-up time. Message sent to [facility] to relay same - AVS sent to them via fax and in [facility] for their records.    Would you like to participate in our [facility] service program?  : No - Declined    Treatment Team Recommendation: Short Term Rehab, SNF  Discharge Destination Plan:: SNF, Short Term Rehab ([facility])  Transport at Discharge : [facility]  Dispatcher Contacted: Yes  Number/Name of Dispatcher: [facility]  Transported by (Company and Unit #): [facility]  ETA of Transport (Date): 01/16/24  ETA of Transport (Time): 1715 (confirmed)              IMM Given (Date):: 01/16/24  IMM Given to:: Family

## 2024-01-16 NOTE — ASSESSMENT & PLAN NOTE
Lab Results   Component Value Date    EGFR 55 01/16/2024    EGFR 49 01/14/2024    EGFR 48 01/13/2024    CREATININE 0.89 01/16/2024    CREATININE 0.98 01/14/2024    CREATININE 0.99 01/13/2024     Hemoglobin goal in CKD is more than 10.  Currently at baseline

## 2024-01-16 NOTE — PLAN OF CARE
Problem: Potential for Falls  Goal: Patient will remain free of falls  Description: INTERVENTIONS:  - Educate patient/family on patient safety including physical limitations  - Instruct patient to call for assistance with activity   - Consult OT/PT to assist with strengthening/mobility   - Keep Call bell within reach  - Keep bed low and locked with side rails adjusted as appropriate  - Keep care items and personal belongings within reach  - Initiate and maintain comfort rounds  - Apply yellow socks and bracelet for high fall risk patients  - Consider moving patient to room near nurses station  Outcome: Progressing     Problem: Prexisting or High Potential for Compromised Skin Integrity  Goal: Skin integrity is maintained or improved  Description: INTERVENTIONS:  - Identify patients at risk for skin breakdown  - Assess and monitor skin integrity  - Assess and monitor nutrition and hydration status  - Monitor labs   - Assess for incontinence   - Turn and reposition patient  - Assist with mobility/ambulation  - Relieve pressure over bony prominences  - Avoid friction and shearing  - Provide appropriate hygiene as needed including keeping skin clean and dry  - Evaluate need for skin moisturizer/barrier cream  - Collaborate with interdisciplinary team   - Patient/family teaching  - Consider wound care consult   Outcome: Progressing     Problem: PAIN - ADULT  Goal: Verbalizes/displays adequate comfort level or baseline comfort level  Description: Interventions:  - Encourage patient to monitor pain and request assistance  - Assess pain using appropriate pain scale  - Administer analgesics based on type and severity of pain and evaluate response  - Implement non-pharmacological measures as appropriate and evaluate response  - Consider cultural and social influences on pain and pain management  - Notify physician/advanced practitioner if interventions unsuccessful or patient reports new pain  Outcome: Progressing      Problem: INFECTION - ADULT  Goal: Absence or prevention of progression during hospitalization  Description: INTERVENTIONS:  - Assess and monitor for signs and symptoms of infection  - Monitor lab/diagnostic results  - Monitor all insertion sites, i.e. indwelling lines, tubes, and drains  - Monitor endotracheal if appropriate and nasal secretions for changes in amount and color  - [place] appropriate cooling/warming therapies per order  - Administer medications as ordered  - Instruct and encourage patient and family to use good hand hygiene technique  - Identify and instruct in appropriate isolation precautions for identified infection/condition  Outcome: Progressing     Problem: SAFETY ADULT  Goal: Patient will remain free of falls  Description: INTERVENTIONS:  - Educate patient/family on patient safety including physical limitations  - Instruct patient to call for assistance with activity   - Consult OT/PT to assist with strengthening/mobility   - Keep Call bell within reach  - Keep bed low and locked with side rails adjusted as appropriate  - Keep care items and personal belongings within reach  - Initiate and maintain comfort rounds  - Apply yellow socks and bracelet for high fall risk patients  - Consider moving patient to room near nurses station  Outcome: Progressing  Goal: Maintain or return to baseline ADL function  Description: INTERVENTIONS:  -  Assess patient's ability to carry out ADLs; assess patient's baseline for ADL function and identify physical deficits which impact ability to perform ADLs (bathing, care of mouth/teeth, toileting, grooming, dressing, etc.)  - Assess/evaluate cause of self-care deficits   - Assess range of motion  - Assess patient's mobility; develop plan if impaired  - Assess patient's need for assistive devices and provide as appropriate  - Encourage maximum independence but intervene and supervise when necessary  - Involve family in performance of ADLs  - Assess for home care  needs following discharge   - Consider OT consult to assist with ADL evaluation and planning for discharge  - Provide patient education as appropriate  Outcome: Progressing  Goal: Maintains/Returns to pre admission functional level  Description: INTERVENTIONS:  - Perform AM-PAC 6 Click Basic Mobility/ Daily Activity assessment daily.  - Set and communicate daily mobility goal to care team and patient/family/caregiver.   - Collaborate with rehabilitation services on mobility goals if consulted  - Out of bed for toileting  - Record patient progress and toleration of activity level   Outcome: Progressing     Problem: DISCHARGE PLANNING  Goal: Discharge to home or other facility with appropriate resources  Description: INTERVENTIONS:  - Identify barriers to discharge w/patient and caregiver  - Arrange for needed discharge resources and transportation as appropriate  - Identify discharge learning needs (meds, wound care, etc.)  - Arrange for interpretive services to assist at discharge as needed  - Refer to Case Management Department for coordinating discharge planning if the patient needs post-hospital services based on physician/advanced practitioner order or complex needs related to functional status, cognitive ability, or social support system  Outcome: Progressing     Problem: Knowledge Deficit  Goal: Patient/family/caregiver demonstrates understanding of disease process, treatment plan, medications, and discharge instructions  Description: Complete learning assessment and assess knowledge base.  Interventions:  - Provide teaching at level of understanding  - Provide teaching via preferred learning methods  Outcome: Progressing

## 2024-01-16 NOTE — ASSESSMENT & PLAN NOTE
Home Toprol 50 Mg Toprol 25mg daily  Restart home diltiazem  Continue Eliquis  Follow-up with cardiology outpatient  [id number]

## 2024-01-16 NOTE — TELEPHONE ENCOUNTER
Brittney from [facility] called to review orders for new admission.Paged on call via TC.

## 2024-01-16 NOTE — ASSESSMENT & PLAN NOTE
Wt Readings from Last 3 Encounters:   01/15/24 61 kg (134 lb 7.7 oz)   07/06/21 84.4 kg (186 lb)   07/01/21 84.4 kg (186 lb)     Chest x ray showing pulmonary congestion. BNP 1,800 concern for fluid overload.   Echo showing Ef of 55% and normal SF. Noted TVR moderate to sever and MVR moderate.  Patient is -85ml in last 24 hours    Plan:  Maintain diuresis, fluid restriction, low-salt diet  Continue metoprolol 25 daily  Continue home Lasix dose: 20mg p.o. Lasix

## 2024-01-16 NOTE — CASE MANAGEMENT
Case Management Discharge Planning Note    Patient name Aurora Anderson  Location S /S -01 MRN [id number]  : 1929 Date 2024       Current Admission Date: 2024  Current Admission Diagnosis:Chronic heart failure with preserved ejection fraction (HCC)   Patient Active Problem List    Diagnosis Date Noted    Anemia due to chronic kidney disease and iron deficiency 2024    Lymphedema 2024    Chronic heart failure with preserved ejection fraction (HCC) 2021    Breast mass, right 2021    CKD (chronic kidney disease) stage 3, GFR 30-59 ml/min (HCC) 2021    Atypical atrial flutter (HCC) 2021    Anemia 2021    Right bundle branch block (RBBB) on electrocardiogram (ECG) 2017    Diverticulosis of colon 2017    Large hiatal hernia 2017    Incidental 6cm right Renal lesion on CT 2017      LOS (days): 12  Geometric Mean LOS (GMLOS) (days): 5.1  Days to GMLOS:-7.1     OBJECTIVE:  Risk of Unplanned Readmission Score: 12.91         Current admission status: Inpatient   Preferred Pharmacy:   [id number] [place], [place] - [address] [place] [address] [facility] [address]  Phone: [phone] Fax: [phone]    Primary Care Provider: Anyi Lopez MD    Primary Insurance: MEDICARE  Secondary Insurance: JOSÉ MCDONNELL PENDING    DISCHARGE DETAILS:    Discharge planning discussed with:: patient's daughter, Yana, at bedside  [place] of Choice: Yes (re: rehab)  Comments - [place] of Choice: [facility]  CM contacted family/caregiver?: Yes  Were Treatment Team discharge recommendations reviewed with patient/caregiver?: Yes  Did patient/caregiver verbalize understanding of patient care needs?: N/A- going to facility  Were patient/caregiver advised of the risks associated with not following Treatment Team discharge recommendations?: Yes    Contacts  Patient Contacts: Yana  daughter  Relationship to Patient:: Family  Contact Method: In Person  Reason/Outcome: Continuity of Care, Discharge Planning, Emergency Contact, Referral         DME Referral Provided  Referral made for DME?: No    Other Referral/Resources/Interventions Provided:  Interventions: SNF, Short Term Rehab  Referral Comments: Patient had BM this morning; repeat chest xray normal and patient clear for d/c per attending. Met with with patient, daughter and other family at bedside to review same. Family inquiring about ability to hold transfer until tomorrow, but reviewed that patient is medically stable for d/c, therefore they would need to appeal d/c in order for patient to remain another day. IMM provided to them for their records, but they decline any wish to appeal at this time. Aware that transport will be requested for 3:30pm, and this writer will follow-up once pick-up time confirmed. Transport entered in [employer] for 3:30pm; awaiting confirmation of same. Message sent to [facility] relaying plan for d/c today - confirmed ability to accept patient this afternoon. Will follow-up once pick-up confirmed.    Would you like to participate in our [facility] service program?  : No - Declined    Treatment Team Recommendation: Short Term Rehab, SNF  Discharge Destination Plan:: SNF, Short Term Rehab ([facility])  Transport at Discharge : [facility]  Dispatcher Contacted: Yes  Number/Name of Dispatcher: [facility]     ETA of Transport (Date): 01/16/24  ETA of Transport (Time): 1530 (requested; awaiting confirmation)              IMM Given (Date):: 01/16/24  IMM Given to:: Family

## 2024-01-16 NOTE — ASSESSMENT & PLAN NOTE
Lab Results   Component Value Date    IRON 36 (L) 01/06/2024    FERRITIN 34 01/06/2024    TIBC 291 01/06/2024    CONCFE 12 (L) 01/06/2024      Recent Labs     01/14/24  0601 01/15/24  1121 01/16/24  0630   HGB 7.9* 8.0* 8.0*        POA: Hgb stable at baseline 7-8, likely mixed anemia of CKD/chronic disease and JOSE G.  Patient required transfusion of PRBCs 1 unit, no signs of active bleeding at that time.  Currently stable hemoglobin, baseline 7-8    Plan:  Will continue PTA Eliquis for now given a flutter and no evidence of active bleeding at this time  Start po iron daily for JOSE G

## 2024-01-17 ENCOUNTER — NURSING HOME VISIT (OUTPATIENT)
Dept: GERIATRICS | Facility: OTHER | Age: 89
End: 2024-01-17

## 2024-01-17 ENCOUNTER — TELEPHONE (OUTPATIENT)
Dept: INTERNAL MEDICINE CLINIC | Facility: CLINIC | Age: 89
End: 2024-01-17

## 2024-01-17 ENCOUNTER — NURSING HOME VISIT (OUTPATIENT)
Dept: WOUND CARE | Facility: HOSPITAL | Age: 89
End: 2024-01-17

## 2024-01-17 VITALS
RESPIRATION RATE: 18 BRPM | DIASTOLIC BLOOD PRESSURE: 71 MMHG | OXYGEN SATURATION: 93 % | SYSTOLIC BLOOD PRESSURE: 105 MMHG | TEMPERATURE: 97 F | HEART RATE: 112 BPM | WEIGHT: 142.4 LBS | BODY MASS INDEX: 23.7 KG/M2

## 2024-01-17 DIAGNOSIS — I89.0 LYMPHEDEMA: ICD-10-CM

## 2024-01-17 DIAGNOSIS — L89.890 PRESSURE INJURY OF RIGHT FOOT, UNSTAGEABLE (HCC): ICD-10-CM

## 2024-01-17 DIAGNOSIS — I50.32 CHRONIC HEART FAILURE WITH PRESERVED EJECTION FRACTION (HCC): Chronic | ICD-10-CM

## 2024-01-17 DIAGNOSIS — K44.9 LARGE HIATAL HERNIA: ICD-10-CM

## 2024-01-17 DIAGNOSIS — J96.21 ACUTE ON CHRONIC RESPIRATORY FAILURE WITH HYPOXIA (HCC): ICD-10-CM

## 2024-01-17 DIAGNOSIS — I48.4 ATYPICAL ATRIAL FLUTTER (HCC): Chronic | ICD-10-CM

## 2024-01-17 DIAGNOSIS — A41.51 SEPSIS DUE TO ESCHERICHIA COLI WITH OTHER ACUTE RENAL FAILURE AND SEPTIC SHOCK (HCC): Primary | ICD-10-CM

## 2024-01-17 DIAGNOSIS — R26.2 AMBULATORY DYSFUNCTION: ICD-10-CM

## 2024-01-17 DIAGNOSIS — N18.31 ANEMIA DUE TO STAGE 3A CHRONIC KIDNEY DISEASE: ICD-10-CM

## 2024-01-17 DIAGNOSIS — D63.1 ANEMIA DUE TO STAGE 3A CHRONIC KIDNEY DISEASE: ICD-10-CM

## 2024-01-17 DIAGNOSIS — N17.9 AKI (ACUTE KIDNEY INJURY) (HCC): ICD-10-CM

## 2024-01-17 DIAGNOSIS — N18.31 STAGE 3A CHRONIC KIDNEY DISEASE (HCC): Chronic | ICD-10-CM

## 2024-01-17 DIAGNOSIS — L89.890 PRESSURE INJURY OF LEFT FOOT, UNSTAGEABLE (HCC): Primary | ICD-10-CM

## 2024-01-17 DIAGNOSIS — R65.21 SEPSIS DUE TO ESCHERICHIA COLI WITH OTHER ACUTE RENAL FAILURE AND SEPTIC SHOCK (HCC): Primary | ICD-10-CM

## 2024-01-17 DIAGNOSIS — N17.8 SEPSIS DUE TO ESCHERICHIA COLI WITH OTHER ACUTE RENAL FAILURE AND SEPTIC SHOCK (HCC): Primary | ICD-10-CM

## 2024-01-17 DIAGNOSIS — L89.150 PRESSURE INJURY OF SACRAL REGION, UNSTAGEABLE (HCC): ICD-10-CM

## 2024-01-17 NOTE — ASSESSMENT & PLAN NOTE
Sacral  Wound is covered with slough, deepest tissue not visible  Selective debridement performed  Ordered local wound care for enzymatic debridement, Santyl  Offload, on air mattress  Increase protein intake  Follow-up next week

## 2024-01-17 NOTE — ASSESSMENT & PLAN NOTE
Lab Results   Component Value Date    EGFR 55 01/16/2024    EGFR 49 01/14/2024    EGFR 48 01/13/2024    CREATININE 0.89 01/16/2024    CREATININE 0.98 01/14/2024    CREATININE 0.99 01/13/2024   Stable at baseline  Avoid NSAIDs, avoid hypotension.

## 2024-01-17 NOTE — ASSESSMENT & PLAN NOTE
Pulmonary edema noted on CXR on admission  Was placed on oxygen therapy in the hospital  Also treated with diuresis for CHF exacerbation  Continue oxygen 2L NC and wean oxygen as able  Continue home dose of Lasix.  Monitor respiratory status closely

## 2024-01-17 NOTE — ASSESSMENT & PLAN NOTE
Wt Readings from Last 3 Encounters:   01/17/24 64.6 kg (142 lb 6.4 oz)   01/15/24 61 kg (134 lb 7.7 oz)   07/06/21 84.4 kg (186 lb)   BNP elevated on admission 1800, CXR with pulmonary congestion.  S/p OFR, diuresis, low-salt diet  Echo showed normal EF 55%.  Continue toprol, lasix, OFR  Monitor vitals, weight, BMP

## 2024-01-17 NOTE — ASSESSMENT & PLAN NOTE
Left plantar  Wound is stable, covered with scab, seems superficial  Local wound care with moisturizing lotion  Continue to offload, ordered heel boots  Follow-up next week

## 2024-01-17 NOTE — ASSESSMENT & PLAN NOTE
S/p 1U PRBC transfusion for Hgb   Hgb 8.0 1/16/24  Continue iron supplement  Monitor vitals closely. Trend CBC

## 2024-01-17 NOTE — ASSESSMENT & PLAN NOTE
Bcx no growth after 5 days  Urine Cx growth of E.Coli and Proteus Mirabilis  S/p Ceftriaxone and azithromycin  Monitor vitals, fever curve, trend WBC.

## 2024-01-17 NOTE — ASSESSMENT & PLAN NOTE
Positive lymphedema of bilateral lower legs  Moisturizing lotion daily  Refer to lymphedema clinic on discharge

## 2024-01-17 NOTE — PROGRESS NOTES
[facility]  [address]  [place] [address]  [facility]  History and Physical    NAME: Aurora Anderson  AGE: 89 y.o. SEX: female [phone]    DATE OF ENCOUNTER: 1/17/24    Code status:   DNR/DNI    Assessment and Plan     Sepsis due to Escherichia coli with acute renal failure and septic shock (HCC)  Bcx no growth after 5 days  Urine Cx growth of E.Coli and Proteus Mirabilis  S/p Ceftriaxone and azithromycin  Monitor vitals, fever curve, trend WBC.    Chronic heart failure with preserved ejection fraction (HCC)  Wt Readings from Last 3 Encounters:   01/17/24 64.6 kg (142 lb 6.4 oz)   01/15/24 61 kg (134 lb 7.7 oz)   07/06/21 84.4 kg (186 lb)   BNP elevated on admission 1800, CXR with pulmonary congestion.  S/p OFR, diuresis, low-salt diet  Echo showed normal EF 55%.  Continue toprol, lasix, OFR  Monitor vitals, weight, BMP    Atypical atrial flutter (HCC)  Continue home dose Toprol, diltiazem, AC with Eliquis  FU with cardiology outpatient    Anemia due to chronic kidney disease and iron deficiency  S/p 1U PRBC transfusion for Hgb   Hgb 8.0 1/16/24  Continue iron supplement  Monitor vitals closely. Trend CBC    CKD (chronic kidney disease) stage 3, GFR 30-59 ml/min ([facility])  Lab Results   Component Value Date    EGFR 55 01/16/2024    EGFR 49 01/14/2024    EGFR 48 01/13/2024    CREATININE 0.89 01/16/2024    CREATININE 0.98 01/14/2024    CREATININE 0.99 01/13/2024   Stable at baseline  Avoid NSAIDs, avoid hypotension.    Large hiatal hernia  Maintain on pantoprazole 40 mg bid    Acute on chronic respiratory failure (HCC)  Pulmonary edema noted on CXR on admission  Was placed on oxygen therapy in the hospital  Also treated with diuresis for CHF exacerbation  Continue oxygen 2L NC and wean oxygen as able  Continue home dose of Lasix.  Monitor respiratory status closely    CBC and CMP ordered.  All medications and routine orders were reviewed and updated as  needed.    Plan discussed with: Patient and daughter. Coordination of care with nursing, OT/PT, SW.    Chief Complaint     Seen for admission at Nursing Facility    History of Present Illness     89 y.o. female who is seen today, following hospitalization at [facility] from 1/4/24 to 1/16/24 for Proteus and E.Coli urosepsis. She was treated with Ceftriaxone and azithromycin with significant improvement. Her hospital course was complicated with septic shock requiring pressors in the ICU, HOSSEIN, and hypoxemic respiratory failure. She was d/c to [facility]hab on 1/16/24. Please see assessment and plan for further details.  She is sitting in wheelchair, watching TV comfortably. She states she slept well last night. She had breakfast with no cough, nausea, or vomiting. She had BM yesterday. Denies CP, palpitations, or any discomfort.    HISTORY:  Past Medical History:   Diagnosis Date    Acute kidney injury (HCC)     Acute on chronic respiratory failure (HCC)     Arthritis     Atrial flutter (HCC)     Rapid heart rate      Family History   Problem Relation Age of Onset    Diabetes Father     Cancer Family      Social History     Socioeconomic History    Marital status:      Spouse name: None    Number of children: 3    Years of education: None    Highest education level: None   Occupational History    None   Tobacco Use    Smoking status: Never    Smokeless tobacco: Never    Tobacco comments:     former smoker, per [employer];  started smoking at 17 yo, stopped at 31 yo   Vaping Use    Vaping status: Never Used   Substance and Sexual Activity    Alcohol use: No    Drug use: No    Sexual activity: None   Other Topics Concern    None   Social History Narrative    Inadequate exercise     Social Determinants of Health     Financial Resource Strain: Not on file   Food Insecurity: No Food Insecurity (1/5/2024)    Hunger Vital Sign     Worried About Running Out of Food in the Last Year: Never true     Ran Out of Food in the  Last Year: Never true   Transportation Needs: No Transportation Needs (1/5/2024)    PRAPARE - Transportation     Lack of Transportation (Medical): No     Lack of Transportation (Non-Medical): No   Physical Activity: Not on file   Stress: Not on file   Social Connections: Not on file   Intimate Partner Violence: Not on file   Housing Stability: Unknown (1/5/2024)    Housing Stability Vital Sign     Unable to Pay for Housing in the Last Year: No     Number of Places Lived in the Last Year: Not on file     Unstable Housing in the Last Year: No       Allergies:  Allergies   Allergen Reactions    Penicillins Rash       Review of Systems     Review of Systems   Respiratory:  Positive for shortness of breath (on oxygen). Negative for cough.    Cardiovascular:  Negative for chest pain.   Gastrointestinal:  Negative for abdominal pain.   All other systems reviewed and are negative.    Medications and orders     All medications reviewed and updated in alf EMR.    Objective     Vitals:    01/17/24 0552   BP: 105/71   Pulse: (!) 112   Resp: 18   Temp: (!) 97 °F (36.1 °C)   SpO2: 93%   Weight: 64.6 kg (142 lb 6.4 oz)        Physical Exam  Vitals and nursing note reviewed.   Constitutional:       General: She is not in acute distress.  HENT:      Head: Atraumatic.   Eyes:      General:         Right eye: No discharge.         Left eye: No discharge.      Conjunctiva/sclera: Conjunctivae normal.   Cardiovascular:      Rate and Rhythm: Normal rate. Rhythm irregular.      Heart sounds: No murmur heard.  Pulmonary:      Effort: Pulmonary effort is normal.      Breath sounds: Normal breath sounds. No wheezing, rhonchi or rales.      Comments: Diminished breath sounds b/l  Abdominal:      General: Bowel sounds are normal. There is no distension.      Palpations: Abdomen is soft.      Tenderness: There is no abdominal tenderness.   Musculoskeletal:         General: No deformity.      Cervical back: Neck supple.      Right lower  leg: Edema (1+) present.      Left lower leg: Edema (1+) present.   Skin:     General: Skin is warm.      Findings: Bruising (scattered ecchymoses anterior b/l forearms) present.      Comments: Thickening and hyperpigmentation 2/2 chronic lymphedema b/l LE   Neurological:      Mental Status: She is alert.      Comments: Oriented to person only. States she is in the hospital. Knows date and month.   Pleasant, cooperative. Follows commands   Psychiatric:         Behavior: Behavior normal.     Pertinent Laboratory/Diagnostic Studies:   The following labs/studies were reviewed please see chart or hospital paperwork for details.    Labs & Imaging:  Lab Results   Component Value Date    WBC 5.87 01/16/2024    HGB 8.0 (L) 01/16/2024    HCT 29.1 (L) 01/16/2024    MCV 88 01/16/2024     01/16/2024     Lab Results   Component Value Date    SODIUM 143 01/16/2024    K 4.3 01/16/2024     01/16/2024    CO2 36 (H) 01/16/2024    AGAP 3 01/16/2024    BUN 18 01/16/2024    CREATININE 0.89 01/16/2024    GLUC 81 01/16/2024    CALCIUM 8.5 01/16/2024    AST 21 01/04/2024    ALT 14 01/04/2024    ALKPHOS 106 (H) 01/04/2024    TP 7.6 01/04/2024    TBILI 0.67 01/04/2024    EGFR 55 01/16/2024     Lab Results   Component Value Date    ZAQ4BEZKZCRJ 1.918 03/22/2021     I have spent 50 minutes with Patient and family today including taking history from family, patient, review of records, charting, and counseling regarding diagnosis and management of conditions.    Tonya Stafford MD  1/17/2024

## 2024-01-17 NOTE — ASSESSMENT & PLAN NOTE
Right plantar  Wound is stable, covered with eschar, seems superficial  Local wound care with moisturizing lotion  Continue to offload, ordered heel boots  Follow-up next week

## 2024-01-17 NOTE — PROGRESS NOTES
[facility] WOUND CARE MANAGEMENT   AND HYPERBARIC MEDICINE CENTER       Patient ID: Aurora Anderson is a 89 y.o. female Date of Birth 12/13/1929     Location of Service: [facility]    Performed wound round with: Wound team     Chief Complaint : Sacrum, left plantar and right plantar    Wound Instructions:  Wound: Sacral  Cleanse with normal saline solution  Apply Skin-Prep to periwound area  Apply Santyl collagenase to wound bed and cover with bordered foam  Daily and as needed for soiling    Left and right plantar  Cleanse with mild soap and water  Apply CeraVe moisturizing lotion or substitute moisturizing lotion to bilateral lower legs including left and right plantar  Daily and as needed for soiling  Continue heel boots when in bed  Offload all wounds  Turn and reposition frequently  Monitor for infection or worsening    Allergies  Penicillins      Assessment & Plan:  1. Pressure injury of left foot, unstageable (HCC)  Assessment & Plan:  Left plantar  Wound is stable, covered with scab, seems superficial  Local wound care with moisturizing lotion  Continue to offload, ordered heel boots  Follow-up next week      2. Pressure injury of right foot, unstageable (HCC)  Assessment & Plan:  Right plantar  Wound is stable, covered with eschar, seems superficial  Local wound care with moisturizing lotion  Continue to offload, ordered heel boots  Follow-up next week      3. Ambulatory dysfunction  Assessment & Plan:  Short-term rehab      4. Pressure injury of sacral region, unstageable (HCC)  Assessment & Plan:  Sacral  Wound is covered with slough, deepest tissue not visible  Selective debridement performed  Ordered local wound care for enzymatic debridement, Santyl  Offload, on air mattress  Increase protein intake  Follow-up next week      5. Lymphedema  Assessment & Plan:  Positive lymphedema of bilateral lower legs  Moisturizing lotion daily  Refer to lymphedema clinic on discharge               Subjective:    January 17, 2024.  New consult for wound to sacrum and bilateral feet.  Patient was referred by Senior care team.  Patient currently admitted at [facility] for short-term rehab.  Patient have chronic kidney disease and lymphedema.  Patient was seen with the facility wound team.    Wound history: As per medical record review, the wound on the sacrum and bilateral feet is chronic.  Patient was seen by podiatry for the wound on the bilateral feet on January 5, 2024.  Ordered to continue local wound care.  X-ray was completed on both feet, nonsignificant result.     Received patient in bed, seems comfortable.  Denies pain.  Patient have a good appetite.  Patient is minimal assistance with turning and repositioning in bed.  Patient is incontinent of both bowel and bladder.        Review of Systems   Constitutional: Negative.    Respiratory: Negative.     Cardiovascular: Negative.    Musculoskeletal:  Positive for gait problem.   Skin:  Positive for wound.   Psychiatric/Behavioral: Negative.         Objective:    Physical Exam  Constitutional:       Appearance: Normal appearance.   Cardiovascular:      Rate and Rhythm: Normal rate.   Pulmonary:      Effort: Pulmonary effort is normal.   Genitourinary:     Comments: Incontinent  Musculoskeletal:      Right lower leg: Edema present.      Left lower leg: Edema present.      Comments: Positive lymphedema on bilateral lower leg  Dry skin   Skin:     Findings: Lesion present.      Comments: Sacrum: Wound size is 1.9 x 2.4 x 0.1 cm.,  100% slough, moderate amount of serous drainage, periwound is normal, with no obvious sign of infection, denies pain    Left plantar: Wound size is 1.6 x 1.1 cm.,  100% scab, no drainage, no obvious sign of infection    Right plantar: Wound size is 2 x 1.5 cm.,  100% eschar, no drainage, no obvious sign of infection, denies pain   Neurological:      Mental Status: She is alert.              Debridement    Universal Protocol:  Consent:  "Verbal consent obtained.  Risks and benefits: risks, benefits and alternatives were discussed  Consent given by: patient  Time out: Immediately prior to procedure a \"time out\" was called to verify the correct patient, procedure, equipment, support staff and site/side marked as required.  Timeout called at: 1/17/2024 10:02 AM.  Patient understanding: patient states understanding of the procedure being performed  Patient identity confirmed: verbally with patient    Debridement Details  Performed by: NP (sacrum)  Debridement type: selective  Pain control: lidocaine 4%      Post-debridement measurements  Length (cm): 1.9  Width (cm): 2.4  Depth (cm): 0.1  Percent debrided: 100%  Surface Area (cm^2): 4.56  Area Debrided (cm^2): 4.56  Volume (cm^3): 0.46    Devitalized tissue debrided: biofilm, fibrin and slough  Instrument(s) utilized: blade  Bleeding: small  Hemostasis obtained with: pressure  Procedural pain (0-10): 0  Post-procedural pain: 0   Response to treatment: procedure was tolerated well               Patient's care was coordinated with nursing facility staff. Recent vitals, labs and updated medications were reviewed on EMR or chart system of facility. Past Medical, surgical, social, medication and allergy history and patient's previous records were reviewed and updated as appropriate: Most up-to date information is available in the facility EMR where the patient is currently admitted.    Patient Active Problem List   Diagnosis    Right bundle branch block (RBBB) on electrocardiogram (ECG)    Diverticulosis of colon    Large hiatal hernia    Incidental 6cm right Renal lesion on CT    Anemia    CKD (chronic kidney disease) stage 3, GFR 30-59 ml/min ([facility])    Atypical atrial flutter (HCC)    Acute on chronic respiratory failure (HCC)    Breast mass, right    Chronic heart failure with preserved ejection fraction (HCC)    HOSSEIN (acute kidney injury) (HCC)    Lymphedema    Anemia due to chronic kidney disease and iron " deficiency    Sepsis due to Escherichia coli with acute renal failure and septic shock (HCC)    Pressure injury of left foot, unstageable (HCC)    Pressure injury of right foot, unstageable (HCC)    Ambulatory dysfunction    Pressure injury of sacral region, unstageable (HCC)     Past Medical History:   Diagnosis Date    Acute kidney injury (HCC)     Acute on chronic respiratory failure (HCC)     Arthritis     Atrial flutter (HCC)     Rapid heart rate      Past Surgical History:   Procedure Laterality Date    CATARACT EXTRACTION      EGD AND COLONOSCOPY N/A 11/14/2017    Procedure: EGD AND COLONOSCOPY;  Surgeon: Radha Salas MD;  [facility]: [facility];  Service: Gastroenterology    HIP FRACTURE SURGERY       Social History     Socioeconomic History    Marital status:      Spouse name: None    Number of children: 3    Years of education: None    Highest education level: None   Occupational History    None   Tobacco Use    Smoking status: Never    Smokeless tobacco: Never    Tobacco comments:     former smoker, per [employer];  started smoking at 17 yo, stopped at 31 yo   Vaping Use    Vaping status: Never Used   Substance and Sexual Activity    Alcohol use: No    Drug use: No    Sexual activity: None   Other Topics Concern    None   Social History Narrative    Inadequate exercise     Social Determinants of Health     Financial Resource Strain: Not on file   Food Insecurity: No Food Insecurity (1/5/2024)    Hunger Vital Sign     Worried About Running Out of Food in the Last Year: Never true     Ran Out of Food in the Last Year: Never true   Transportation Needs: No Transportation Needs (1/5/2024)    PRAPARE - Transportation     Lack of Transportation (Medical): No     Lack of Transportation (Non-Medical): No   Physical Activity: Not on file   Stress: Not on file   Social Connections: Not on file   Intimate Partner Violence: Not on file   Housing Stability: Unknown (1/5/2024)    Housing Stability Vital Sign      "Unable to Pay for Housing in the Last Year: No     Number of Places Lived in the Last Year: Not on file     Unstable Housing in the Last Year: No        Current Outpatient Medications:     apixaban (Eliquis) 2.5 mg, Take 1 tablet (2.5 mg total) by mouth 2 (two) times a day, Disp: , Rfl:     diltiazem (CARDIZEM CD) 180 mg 24 hr capsule, TAKE 1 CAPSULE(180 MG) BY MOUTH DAILY, Disp: 90 capsule, Rfl: 0    ferrous sulfate 325 (65 Fe) mg tablet, Take 1 tablet (325 mg total) by mouth daily with breakfast, Disp: 30 tablet, Rfl: 0    furosemide (LASIX) 20 mg tablet, TAKE 1 TABLET(20 MG) BY MOUTH DAILY, Disp: 30 tablet, Rfl: 6    metoprolol succinate (TOPROL-XL) 25 mg 24 hr tablet, Take 1 tablet (25 mg total) by mouth every 12 (twelve) hours, Disp: 180 tablet, Rfl: 3    potassium chloride (MICRO-K) 10 MEQ CR capsule, Take 1 capsule (10 mEq total) by mouth daily, Disp: 90 capsule, Rfl: 3  No current facility-administered medications for this visit.  Family History   Problem Relation Age of Onset    Diabetes Father     Cancer Family               Coordination of Care: Wound team aware of the treatment plan. Facility nurse will provide wound treatment and monitor the wound for any changes.     Patient / Staff education : Patient / Staff was given education on sign of infection and pressure ulcer prevention. Patient/ Staff verbalized understanding     Follow up :  Next week    Voice-recognition software may have been used in the preparation of this document. Occasional wrong word or \"sound-alike\" substitutions may have occurred due to the inherent limitations of voice recognition software. Interpretation should be guided by context.      AZRA Sánchez  "

## 2024-01-18 VITALS
RESPIRATION RATE: 18 BRPM | HEART RATE: 73 BPM | BODY MASS INDEX: 23.7 KG/M2 | WEIGHT: 142.4 LBS | SYSTOLIC BLOOD PRESSURE: 112 MMHG | DIASTOLIC BLOOD PRESSURE: 68 MMHG | OXYGEN SATURATION: 93 % | TEMPERATURE: 97 F

## 2024-01-18 NOTE — ASSESSMENT & PLAN NOTE
Multifactorial  Continue PT/OT  Fall Precautions  Ensure adequate nutrition/hydration   Monitor CBC/BMP    following for d/c planning

## 2024-01-18 NOTE — PROGRESS NOTES
[facility]  [address] [place] [address] [address]  [phone]  [place] [facility]  Code 31 (STR)        NAME: Aurora Anderson  AGE: 89 y.o. SEX: female CODE STATUS: No CPR    DATE OF ENCOUNTER: 1/19/2024    Assessment and Plan     1. Sepsis due to Escherichia coli with other acute renal failure and septic shock (HCC)  Assessment & Plan:  Recent hospitalization for septic shock d/t UTI  Urine culture positive for Proteus and E.coli, Bld Cultures no growth  Patient treated with ceftriaxone, azithromycin  Continue to monitor      2. Atypical atrial flutter (HCC)  Assessment & Plan:  HR controlled, 73  Continue toprol XL 25 mg q 12  Continue diltiazem 180 mg daily      3. Ambulatory dysfunction  Assessment & Plan:  Multifactorial  Continue PT/OT  Fall Precautions  Ensure adequate nutrition/hydration   Monitor CBC/BMP    following for d/c planning        4. Chronic heart failure with preserved ejection fraction (HCC)  Assessment & Plan:  Wt Readings from Last 3 Encounters:   01/18/24 64.6 kg (142 lb 6.4 oz)   01/17/24 64.6 kg (142 lb 6.4 oz)   01/15/24 61 kg (134 lb 7.7 oz)   1/5/24 Echo showing EF of 55%  Weight stable  B/l LE lymphedema  Patient reports improvement in swelling since hospitalization  Continue Metoprolol ER 25 mg BID  Continue Lasix 20 mg daily with potassium supplement  Continue weekly weights  Continue 2000 ml FR              5. Stage 3a chronic kidney disease (HCC)  Assessment & Plan:  Lab Results   Component Value Date    EGFR 55 01/16/2024    EGFR 49 01/14/2024    EGFR 48 01/13/2024    CREATININE 0.89 01/16/2024    CREATININE 0.98 01/14/2024    CREATININE 0.99 01/13/2024   Avoid nephrotoxins  Encourage adequate hydration  Avoid hypotension  Monitor kidney functions, BMP 1/24/24      6. Lymphedema  Assessment & Plan:  Chronic B/L LE edema  Improved with diuresis  Continue moisturizing lotion daily  Would benefit from lymphedema clinic as outpatient            All  medications and routine orders were reviewed and updated as needed.    Chief Complaint     STR follow up visit    Patient's care was coordinated with nursing facility staff. Recent vitals, labs, and updated medications were review on [facility] system in facility.  Past Medical and Surgical History      Past Medical History:   Diagnosis Date    Acute kidney injury (HCC)     Acute on chronic respiratory failure (HCC)     Arthritis     Atrial flutter (HCC)     Rapid heart rate      Past Surgical History:   Procedure Laterality Date    CATARACT EXTRACTION      EGD AND COLONOSCOPY N/A 11/14/2017    Procedure: EGD AND COLONOSCOPY;  Surgeon: Radha Salas MD;  Location: [facility];  Service: Gastroenterology    HIP FRACTURE SURGERY       Allergies   Allergen Reactions    Penicillins Rash          History of Present Illness     HPI  Aurora Anderson is a 89 year old female, she is a STR patient of [facility] since 1/16/24. Past Medical Hx including but not limited to A flutter, lymphedema, CHF, CKD, anemia. She was seen in collaboration with nursing for medical mgmt and STR follow up.   Hospital Course  Patient was admitted to the hospital 1/4/24 for septic shock due to UTI. Urine culture grew Proteus and E. Coli. Patient received IV epinephrine for suspected BRASH, and was supplied 30 cc/kg of IV fluids and was transferred to the ICU. Patient received Ceftriaxone and azithromycin. Patient was weaned off pressors and was downgraded to medsurg. Patient developed HOSSEIN and Lasix was held. Patient developed respiratory failure requiring diuresis. Patient also had episode of anemia with Hgb of 6.9 with no sign of active bleeding, patient received 1 unit PRBC.  Patient was discharged to [facility].  Rehab Course   Aurora was seen and examined at bedside today. Patient is AAOx 3. On exam patient is sitting in her chair and does not appear to be in distress.  She denies pain,difficulty sleeping, and has a fair appetite.   Chronic B/L LE edema appears stable. Patient followed by wound care NP for sacral wound. Patient has been participating in therapy.    She denies CP/SOB/N/V/D. Denies lightheadedness, dizziness, headaches, vision changes. Patient states they are eating well and staying hydrated. Denies any bowel or bladder issues. Per review of SNF records, Patient is eating 3 meals per day, consuming 25-50 %. Last documented BM 1/18/24. No concerns from nursing at this time.    The patient's allergies, past medical, surgical, social and family history were reviewed and unchanged.    Review of Systems     Review of Systems   Constitutional:  Positive for activity change and appetite change. Negative for fever.   HENT: Negative.  Negative for congestion, sneezing and sore throat.    Eyes: Negative.    Respiratory: Negative.  Negative for cough and shortness of breath.    Cardiovascular:  Positive for leg swelling. Negative for chest pain and palpitations.   Gastrointestinal:  Negative for abdominal distention, constipation, diarrhea, nausea and vomiting.   Endocrine: Negative.    Genitourinary: Negative.  Negative for difficulty urinating and dysuria.   Musculoskeletal:  Positive for gait problem.   Skin:  Positive for wound.        Sacral wound, lymphedema b/l LE   Allergic/Immunologic: Negative.    Neurological:  Positive for weakness. Negative for dizziness and headaches.   Hematological: Negative.    Psychiatric/Behavioral:  Negative for sleep disturbance.          Objective     Vitals:   Vitals:    01/18/24 1157   BP: 112/68   Pulse: 73   Resp: 18   Temp: (!) 97 °F (36.1 °C)   SpO2: 93%         Physical Exam  Vitals and nursing note reviewed.   Constitutional:       General: She is not in acute distress.     Appearance: Normal appearance. She is not ill-appearing.   HENT:      Head: Normocephalic and atraumatic.      Nose: No congestion.   Eyes:      Conjunctiva/sclera: Conjunctivae normal.   Cardiovascular:      Rate and  Rhythm: Rhythm irregular.      Pulses: Normal pulses.      Heart sounds: Normal heart sounds.   Pulmonary:      Effort: Pulmonary effort is normal. No respiratory distress.      Breath sounds: Normal breath sounds. No wheezing.   Abdominal:      General: Bowel sounds are normal. There is no distension.      Palpations: Abdomen is soft.      Tenderness: There is no abdominal tenderness.   Musculoskeletal:      Right lower leg: Edema present.      Left lower leg: Edema present.      Comments: Chronic lymphedema   Skin:     General: Skin is warm.      Findings: Bruising present.      Comments: Sacral wound   Neurological:      Mental Status: She is alert and oriented to person, place, and time.      Motor: Weakness present.      Gait: Gait abnormal.   Psychiatric:         Mood and Affect: Mood normal.         Behavior: Behavior normal.         Pertinent Laboratory/Diagnostic Studies:   Reviewed in facility chart-stable      Current Medications   Medications reviewed and updated see facility MAR for details.      Current Outpatient Medications:     apixaban (Eliquis) 2.5 mg, Take 1 tablet (2.5 mg total) by mouth 2 (two) times a day, Disp: , Rfl:     collagenase (SANTYL) ointment, Apply 1 Application topically daily, Disp: , Rfl:     diltiazem (CARDIZEM CD) 180 mg 24 hr capsule, TAKE 1 CAPSULE(180 MG) BY MOUTH DAILY, Disp: 90 capsule, Rfl: 0    Emollient (CeraVe Daily Moisturizing) LOTN, Apply 1 Application topically in the morning, Disp: , Rfl:     ferrous sulfate 325 (65 Fe) mg tablet, Take 1 tablet (325 mg total) by mouth daily with breakfast, Disp: 30 tablet, Rfl: 0    furosemide (LASIX) 20 mg tablet, TAKE 1 TABLET(20 MG) BY MOUTH DAILY, Disp: 30 tablet, Rfl: 6    metoprolol succinate (TOPROL-XL) 25 mg 24 hr tablet, Take 1 tablet (25 mg total) by mouth every 12 (twelve) hours, Disp: 180 tablet, Rfl: 3    Nutritional Supplements (ProSource Plus) LIQD, Take 30 mL by mouth in the morning, Disp: , Rfl:     potassium  "chloride (MICRO-K) 10 MEQ CR capsule, Take 1 capsule (10 mEq total) by mouth daily, Disp: 90 capsule, Rfl: 3     Please note:  Voice-recognition software may have been used in the preparation of this document.  Occasional wrong word or \"sound-alike\" substitutions may have occurred due to the inherent limitations of voice recognition software.  Interpretation should be guided by context.         AZRA Lewis  1/19/2024  3:44 PM    "

## 2024-01-18 NOTE — ASSESSMENT & PLAN NOTE
Recent hospitalization for septic shock d/t UTI  Urine culture positive for Proteus and E.coli, Bld Cultures no growth  Patient treated with ceftriaxone, azithromycin  Continue to monitor

## 2024-01-18 NOTE — ASSESSMENT & PLAN NOTE
Wt Readings from Last 3 Encounters:   01/18/24 64.6 kg (142 lb 6.4 oz)   01/17/24 64.6 kg (142 lb 6.4 oz)   01/15/24 61 kg (134 lb 7.7 oz)   1/5/24 Echo showing EF of 55%  Weight stable  B/l LE lymphedema  Patient reports improvement in swelling since hospitalization  Continue Metoprolol ER 25 mg BID  Continue Lasix 20 mg daily with potassium supplement  Continue weekly weights  Continue 2000 ml FR

## 2024-01-19 ENCOUNTER — NURSING HOME VISIT (OUTPATIENT)
Dept: GERIATRICS | Facility: OTHER | Age: 89
End: 2024-01-19

## 2024-01-19 VITALS
DIASTOLIC BLOOD PRESSURE: 70 MMHG | RESPIRATION RATE: 16 BRPM | HEART RATE: 104 BPM | WEIGHT: 134.48 LBS | SYSTOLIC BLOOD PRESSURE: 109 MMHG | HEIGHT: 65 IN | BODY MASS INDEX: 22.41 KG/M2 | OXYGEN SATURATION: 96 % | TEMPERATURE: 97.7 F

## 2024-01-19 DIAGNOSIS — N18.31 STAGE 3A CHRONIC KIDNEY DISEASE (HCC): Chronic | ICD-10-CM

## 2024-01-19 DIAGNOSIS — A41.51 SEPSIS DUE TO ESCHERICHIA COLI WITH OTHER ACUTE RENAL FAILURE AND SEPTIC SHOCK (HCC): Primary | ICD-10-CM

## 2024-01-19 DIAGNOSIS — R26.2 AMBULATORY DYSFUNCTION: ICD-10-CM

## 2024-01-19 DIAGNOSIS — I50.32 CHRONIC HEART FAILURE WITH PRESERVED EJECTION FRACTION (HCC): Chronic | ICD-10-CM

## 2024-01-19 DIAGNOSIS — I48.4 ATYPICAL ATRIAL FLUTTER (HCC): Chronic | ICD-10-CM

## 2024-01-19 DIAGNOSIS — N17.8 SEPSIS DUE TO ESCHERICHIA COLI WITH OTHER ACUTE RENAL FAILURE AND SEPTIC SHOCK (HCC): Primary | ICD-10-CM

## 2024-01-19 DIAGNOSIS — R65.21 SEPSIS DUE TO ESCHERICHIA COLI WITH OTHER ACUTE RENAL FAILURE AND SEPTIC SHOCK (HCC): Primary | ICD-10-CM

## 2024-01-19 DIAGNOSIS — I89.0 LYMPHEDEMA: ICD-10-CM

## 2024-01-19 RX ORDER — AMINO ACIDS/PROTEIN HYDROLYS 11G-40/45
30 LIQUID IN PACKET (ML) ORAL DAILY
COMMUNITY

## 2024-01-19 RX ORDER — CERAMIDE 1,3,6-II/SALICYLIC/B3
1 CLEANSER (ML) TOPICAL DAILY
COMMUNITY

## 2024-01-19 NOTE — ASSESSMENT & PLAN NOTE
Chronic B/L LE edema  Improved with diuresis  Continue moisturizing lotion daily  Would benefit from lymphedema clinic as outpatient

## 2024-01-19 NOTE — ASSESSMENT & PLAN NOTE
Called patient per providers request and relayed results below. Patient demonstrated verbal understanding and had no further questions or concerns at this time.    Patient also stated that she needed refills on polyethylene glycol (MIRALAX) 17 g packet and carbamide peroxide (DEBROX) 6.5 % otic solution.    Sent to [facility].   Lab Results   Component Value Date    EGFR 55 01/16/2024    EGFR 49 01/14/2024    EGFR 48 01/13/2024    CREATININE 0.89 01/16/2024    CREATININE 0.98 01/14/2024    CREATININE 0.99 01/13/2024   Avoid nephrotoxins  Encourage adequate hydration  Avoid hypotension  Monitor kidney functions, BMP 1/24/24

## 2024-01-23 ENCOUNTER — NURSING HOME VISIT (OUTPATIENT)
Dept: GERIATRICS | Facility: OTHER | Age: 89
End: 2024-01-23

## 2024-01-23 VITALS
HEART RATE: 72 BPM | RESPIRATION RATE: 18 BRPM | SYSTOLIC BLOOD PRESSURE: 146 MMHG | DIASTOLIC BLOOD PRESSURE: 87 MMHG | TEMPERATURE: 97 F | OXYGEN SATURATION: 93 %

## 2024-01-23 DIAGNOSIS — I50.32 CHRONIC HEART FAILURE WITH PRESERVED EJECTION FRACTION (HCC): Chronic | ICD-10-CM

## 2024-01-23 DIAGNOSIS — A41.51 SEPSIS DUE TO ESCHERICHIA COLI WITH OTHER ACUTE RENAL FAILURE AND SEPTIC SHOCK (HCC): Primary | ICD-10-CM

## 2024-01-23 DIAGNOSIS — I89.0 LYMPHEDEMA: ICD-10-CM

## 2024-01-23 DIAGNOSIS — R26.2 AMBULATORY DYSFUNCTION: ICD-10-CM

## 2024-01-23 DIAGNOSIS — J96.21 ACUTE ON CHRONIC RESPIRATORY FAILURE WITH HYPOXIA (HCC): ICD-10-CM

## 2024-01-23 DIAGNOSIS — R52 PAIN: ICD-10-CM

## 2024-01-23 DIAGNOSIS — I48.4 ATYPICAL ATRIAL FLUTTER (HCC): Chronic | ICD-10-CM

## 2024-01-23 DIAGNOSIS — R65.21 SEPSIS DUE TO ESCHERICHIA COLI WITH OTHER ACUTE RENAL FAILURE AND SEPTIC SHOCK (HCC): Primary | ICD-10-CM

## 2024-01-23 DIAGNOSIS — N17.8 SEPSIS DUE TO ESCHERICHIA COLI WITH OTHER ACUTE RENAL FAILURE AND SEPTIC SHOCK (HCC): Primary | ICD-10-CM

## 2024-01-23 PROCEDURE — 99309 SBSQ NF CARE MODERATE MDM 30: CPT

## 2024-01-23 RX ORDER — ACETAMINOPHEN 325 MG/1
650 TABLET ORAL EVERY 6 HOURS PRN
Start: 2024-01-23

## 2024-01-23 NOTE — ASSESSMENT & PLAN NOTE
Recently hospitalized d/t septic shock d/t UTI  CXR showed pulmonary edema  Placed on Oxygen and was given diuretics for CHF exacerbation   Continues on O2 NC 93%  Continue torsemide 20 mg daily, potassium supplement

## 2024-01-23 NOTE — PROGRESS NOTES
[facility]  [address] [place] [address] [phone]  [place] [facility]  Code 31 (STR)        NAME: Aurora Anderson  AGE: 89 y.o. SEX: female CODE STATUS: No CPR    DATE OF ENCOUNTER: 1/23/2024    Assessment and Plan     1. Sepsis due to Escherichia coli with other acute renal failure and septic shock (HCC)  Assessment & Plan:  Recent hospitalization for septic shock d/t UTI  Urine culture positive for Proteus and E.coli, Bld Cultures no growth  Patient treated with ceftriaxone, azithromycin  Continue to monitor      2. Lymphedema  Assessment & Plan:  Chronic B/L LE edema  Improved with diuresis  Continue moisturizing lotion daily  Would benefit from lymphedema clinic as outpatient       3. Ambulatory dysfunction  Assessment & Plan:  Multifactorial  Continue PT/OT  Fall Precautions  Ensure adequate nutrition/hydration   Monitor CBC/BMP    following for d/c planning        4. Chronic heart failure with preserved ejection fraction (HCC)  Assessment & Plan:  Wt Readings from Last 3 Encounters:   01/18/24 64.6 kg (142 lb 6.4 oz)   01/17/24 64.6 kg (142 lb 6.4 oz)   01/15/24 61 kg (134 lb 7.7 oz)   1/5/24 Echo showing EF of 55%  Weight stable  B/l LE lymphedema  Patient reports improvement in swelling since hospitalization  Continue Metoprolol ER 25 mg BID  Continue Lasix 20 mg daily with potassium supplement  Continue weekly weights  Continue 2000 ml FR              5. Atypical atrial flutter (HCC)  Assessment & Plan:  HR controlled, 72  Denies cp/palpitations  Continue Eliquis 2.5 mg BID  Continue toprol XL 25 mg q 12  Continue diltiazem 180 mg daily      6. Acute on chronic respiratory failure with hypoxia (HCC)  Assessment & Plan:  Recently hospitalized d/t septic shock d/t UTI  CXR showed pulmonary edema  Placed on Oxygen and was given diuretics for CHF exacerbation   Continues on O2 NC 93%  Continue torsemide 20 mg daily, potassium supplement       7. Pain  -     acetaminophen  (TYLENOL) 325 mg tablet; Take 2 tablets (650 mg total) by mouth every 6 (six) hours as needed for mild pain         All medications and routine orders were reviewed and updated as needed.    Chief Complaint     STR follow up visit    Patient's care was coordinated with nursing facility staff. Recent vitals, labs, and updated medications were review on [facility] system in facility.  Past Medical and Surgical History      Past Medical History:   Diagnosis Date    Acute kidney injury (HCC)     Acute on chronic respiratory failure (HCC)     Arthritis     Atrial flutter (HCC)     Rapid heart rate      Past Surgical History:   Procedure Laterality Date    CATARACT EXTRACTION      EGD AND COLONOSCOPY N/A 11/14/2017    Procedure: EGD AND COLONOSCOPY;  Surgeon: Radha Salas MD;  Location: [facility];  Service: Gastroenterology    HIP FRACTURE SURGERY       Allergies   Allergen Reactions    Penicillins Rash          History of Present Illness     HPI  Aurora Anderson is a 89 year old female, she is a STR patient of [facility] since 1/16/24. Past Medical Hx including but not limited to A flutter, lymphedema, CHF, CKD, anemia. She was seen in collaboration with nursing for medical mgmt and STR follow up.   Hospital Course  Patient was admitted to the hospital 1/4/24 for septic shock due to UTI. Urine culture grew Proteus and E. Coli. Patient received IV epinephrine for suspected BRASH, and was supplied 30 cc/kg of IV fluids and was transferred to the ICU. Patient received Ceftriaxone and azithromycin. Patient was weaned off pressors and was downgraded to medsurg. Patient developed HOSSEIN and Lasix was held. Patient developed respiratory failure requiring diuresis. Patient also had episode of anemia with Hgb of 6.9 with no sign of active bleeding, patient received 1 unit PRBC.  Patient was discharged to [facility].  Rehab Course   Aurora was seen and examined at bedside today. Patient is AAOx 3. On exam patient is  resting in bed and does not appear to be in distress.  She c/o pain in her sacral area d/t wound, followed by wound NP. Will place order for Tylenol Q 6 hours PRN. She denies difficulty sleeping, and has a fair appetite.  Chronic B/L LE edema appears stable.  Patient has been participating in therapy.    Patient reports she refused her Iron tablet this morning because her daughter told her she should not be taking them anymore. Additionally she questioned whether she should be taking Eliquis. Patient had been started on Iron tablets d/t anemia. Last Hgb 1/22/24 was 8.1, recommend continuing Iron. Chadsvasc score of 4, patient has had nose bleeds in the past. Consider benefit of continuing Eliquis.  Called and left message for patients daughter Yana, awaiting call back.    She denies CP/SOB/N/V/D. Denies lightheadedness, dizziness, headaches, vision changes. Patient states they are eating well and staying hydrated. Denies any bowel or bladder issues. Per review of SNF records, Patient is eating 3 meals per day, consuming 25-50 %. Last documented BM 1/22/24. No concerns from nursing at this time.    The patient's allergies, past medical, surgical, social and family history were reviewed and unchanged.    Review of Systems     Review of Systems   Constitutional:  Positive for activity change and appetite change. Negative for fever.   HENT: Negative.  Negative for congestion, sneezing and sore throat.    Eyes: Negative.    Respiratory: Negative.  Negative for cough and shortness of breath.    Cardiovascular:  Positive for leg swelling. Negative for chest pain and palpitations.   Gastrointestinal:  Negative for abdominal distention, constipation, diarrhea, nausea and vomiting.   Endocrine: Negative.    Genitourinary: Negative.  Negative for difficulty urinating and dysuria.   Musculoskeletal:  Positive for gait problem.   Skin:  Positive for wound.        Sacral wound, lymphedema b/l LE   Allergic/Immunologic:  Negative.    Neurological:  Positive for weakness. Negative for dizziness and headaches.   Hematological: Negative.    Psychiatric/Behavioral:  Negative for sleep disturbance.          Objective     Vitals:   Vitals:    01/23/24 0832   BP: 146/87   Pulse: 72   Resp: 18   Temp: (!) 97 °F (36.1 °C)   SpO2: 93%           Physical Exam  Vitals and nursing note reviewed.   Constitutional:       General: She is not in acute distress.     Appearance: Normal appearance. She is not ill-appearing.   HENT:      Head: Normocephalic and atraumatic.      Nose: No congestion.   Eyes:      Conjunctiva/sclera: Conjunctivae normal.   Cardiovascular:      Rate and Rhythm: Rhythm irregular.      Pulses: Normal pulses.      Heart sounds: Normal heart sounds.   Pulmonary:      Effort: Pulmonary effort is normal. No respiratory distress.      Breath sounds: Normal breath sounds. No wheezing.   Abdominal:      General: Bowel sounds are normal. There is no distension.      Palpations: Abdomen is soft.      Tenderness: There is no abdominal tenderness.   Musculoskeletal:      Right lower leg: Edema present.      Left lower leg: Edema present.      Comments: Chronic lymphedema   Skin:     General: Skin is warm.      Findings: Bruising present.      Comments: Sacral wound   Neurological:      Mental Status: She is alert and oriented to person, place, and time.      Motor: Weakness present.      Gait: Gait abnormal.   Psychiatric:         Mood and Affect: Mood normal.         Behavior: Behavior normal.         Pertinent Laboratory/Diagnostic Studies:   Reviewed in facility chart-stable      Current Medications   Medications reviewed and updated see facility MAR for details.      Current Outpatient Medications:     acetaminophen (TYLENOL) 325 mg tablet, Take 2 tablets (650 mg total) by mouth every 6 (six) hours as needed for mild pain, Disp: , Rfl:     apixaban (Eliquis) 2.5 mg, Take 1 tablet (2.5 mg total) by mouth 2 (two) times a day, Disp: ,  "Rfl:     collagenase (SANTYL) ointment, Apply 1 Application topically daily, Disp: , Rfl:     diltiazem (CARDIZEM CD) 180 mg 24 hr capsule, TAKE 1 CAPSULE(180 MG) BY MOUTH DAILY, Disp: 90 capsule, Rfl: 0    Emollient (CeraVe Daily Moisturizing) LOTN, Apply 1 Application topically in the morning, Disp: , Rfl:     ferrous sulfate 325 (65 Fe) mg tablet, Take 1 tablet (325 mg total) by mouth daily with breakfast, Disp: 30 tablet, Rfl: 0    furosemide (LASIX) 20 mg tablet, TAKE 1 TABLET(20 MG) BY MOUTH DAILY, Disp: 30 tablet, Rfl: 6    metoprolol succinate (TOPROL-XL) 25 mg 24 hr tablet, Take 1 tablet (25 mg total) by mouth every 12 (twelve) hours, Disp: 180 tablet, Rfl: 3    Nutritional Supplements (ProSource Plus) LIQD, Take 30 mL by mouth in the morning, Disp: , Rfl:     potassium chloride (MICRO-K) 10 MEQ CR capsule, Take 1 capsule (10 mEq total) by mouth daily, Disp: 90 capsule, Rfl: 3     Please note:  Voice-recognition software may have been used in the preparation of this document.  Occasional wrong word or \"sound-alike\" substitutions may have occurred due to the inherent limitations of voice recognition software.  Interpretation should be guided by context.         AZRA Lewis  1/23/2024  1:30 PM    "

## 2024-01-23 NOTE — ASSESSMENT & PLAN NOTE
HR controlled, 72  Denies cp/palpitations  Continue Eliquis 2.5 mg BID  Continue toprol XL 25 mg q 12  Continue diltiazem 180 mg daily

## 2024-01-24 ENCOUNTER — NURSING HOME VISIT (OUTPATIENT)
Dept: WOUND CARE | Facility: HOSPITAL | Age: 89
End: 2024-01-24

## 2024-01-24 VITALS
HEART RATE: 68 BPM | OXYGEN SATURATION: 93 % | DIASTOLIC BLOOD PRESSURE: 68 MMHG | TEMPERATURE: 97 F | RESPIRATION RATE: 18 BRPM | SYSTOLIC BLOOD PRESSURE: 136 MMHG

## 2024-01-24 DIAGNOSIS — R26.2 AMBULATORY DYSFUNCTION: ICD-10-CM

## 2024-01-24 DIAGNOSIS — L89.890 PRESSURE INJURY OF LEFT FOOT, UNSTAGEABLE (HCC): ICD-10-CM

## 2024-01-24 DIAGNOSIS — I89.0 LYMPHEDEMA: ICD-10-CM

## 2024-01-24 DIAGNOSIS — L89.150 PRESSURE INJURY OF SACRAL REGION, UNSTAGEABLE (HCC): Primary | ICD-10-CM

## 2024-01-24 DIAGNOSIS — L89.890 PRESSURE INJURY OF RIGHT FOOT, UNSTAGEABLE (HCC): ICD-10-CM

## 2024-01-24 PROCEDURE — 99309 SBSQ NF CARE MODERATE MDM 30: CPT | Performed by: NURSE PRACTITIONER

## 2024-01-24 PROCEDURE — 97597 DBRDMT OPN WND 1ST 20 CM/<: CPT | Performed by: NURSE PRACTITIONER

## 2024-01-24 NOTE — PROGRESS NOTES
[facility]  [address] [address] [phone]  [place] [facility]  Code 31 (STR)        NAME: Aurora Anderson  AGE: 89 y.o. SEX: female CODE STATUS: No CPR    DATE OF ENCOUNTER: 1/25/2024    Assessment and Plan     1. Atypical atrial flutter (HCC)  Assessment & Plan:  HR controlled, 68  Denies cp/palpitations  Toprol XL 25 mg daily  Continue diltiazem 180 mg daily    PLAN:  Patient had been refusing Eliquis, Had discussion with patients daughter Yana.  She said her mom did not want to take Eliquis and was certain she will not take it when she goes home. We discussed the risks of stroke vs fall/bleeding risk. She was agreeable to discontinue Eliquis.  In addition, she asked to reduce the Metoprolol dose as it makes her Mom tired. Will d/c Eliquis, reduce Metoprolol to 25 mg daily and adjust as needed.    Orders:  -     metoprolol succinate (TOPROL-XL) 25 mg 24 hr tablet; Take 1 tablet (25 mg total) by mouth daily    2. Chronic heart failure with preserved ejection fraction (HCC)  Assessment & Plan:  Wt Readings from Last 3 Encounters:   01/18/24 64.6 kg (142 lb 6.4 oz)   01/17/24 64.6 kg (142 lb 6.4 oz)   01/15/24 61 kg (134 lb 7.7 oz)   1/5/24 Echo showing EF of 55%  Weight stable  B/l LE lymphedema  Patient reports improvement in swelling since hospitalization  Continue Metoprolol ER 25 mg daily  Continue Lasix 20 mg daily with potassium supplement  Continue weekly weights  Continue 2000 ml FR            Orders:  -     potassium chloride (MICRO-K) 10 MEQ CR capsule; Take 1 capsule (10 mEq total) by mouth every other day    3. Stage 3a chronic kidney disease (HCC)  Assessment & Plan:  Lab Results   Component Value Date    EGFR 55 01/16/2024    EGFR 49 01/14/2024    EGFR 48 01/13/2024    CREATININE 0.89 01/16/2024    CREATININE 0.98 01/14/2024    CREATININE 0.99 01/13/2024   Avoid nephrotoxins  Encourage adequate hydration  Avoid hypotension  Monitor kidney functions, BMP  1/24/24      4. Lymphedema  Assessment & Plan:  Chronic B/L LE edema  Improved with diuresis  Continue moisturizing lotion daily  Would benefit from lymphedema clinic as outpatient       5. Ambulatory dysfunction  Assessment & Plan:  Multifactorial  Continue PT/OT  Fall Precautions  Ensure adequate nutrition/hydration   Monitor CBC/BMP    following for d/c planning        6. Sepsis due to Escherichia coli with other acute renal failure and septic shock (HCC)  Assessment & Plan:  Recent hospitalization for septic shock d/t UTI  Urine culture positive for Proteus and E.coli, Bld Cultures no growth  Patient treated with ceftriaxone, azithromycin  Continue to monitor      7. Acute on chronic respiratory failure with hypoxia (HCC)  Assessment & Plan:  Recently hospitalized d/t septic shock d/t UTI  CXR showed pulmonary edema  Placed on Oxygen and was given diuretics for CHF exacerbation   Continues on O2 NC 93%  Continue torsemide 20 mg daily, potassium supplement       8. Atrial flutter (HCC)  -     furosemide (LASIX) 20 mg tablet; Take 1 tablet (20 mg total) by mouth every other day    9. Chronic respiratory failure with hypoxia (HCC)  -     furosemide (LASIX) 20 mg tablet; Take 1 tablet (20 mg total) by mouth every other day         All medications and routine orders were reviewed and updated as needed.    Chief Complaint     STR follow up visit    Patient's care was coordinated with nursing facility staff. Recent vitals, labs, and updated medications were review on [facility] system in facility.  Past Medical and Surgical History      Past Medical History:   Diagnosis Date    Acute kidney injury (HCC)     Acute on chronic respiratory failure (HCC)     Arthritis     Atrial flutter (HCC)     Rapid heart rate      Past Surgical History:   Procedure Laterality Date    CATARACT EXTRACTION      EGD AND COLONOSCOPY N/A 11/14/2017    Procedure: EGD AND COLONOSCOPY;  Surgeon: Radha Salas MD;  Location:  [facility];  Service: Gastroenterology    HIP FRACTURE SURGERY       Allergies   Allergen Reactions    Penicillins Rash          History of Present Illness     HPI  Aurora Anderson is a 89 year old female, she is a STR patient of [facility] since 1/16/24. Past Medical Hx including but not limited to A flutter, lymphedema, CHF, CKD, anemia. She was seen in collaboration with nursing for medical mgmt and STR follow up.   Hospital Course  Patient was admitted to the hospital 1/4/24 for septic shock due to UTI. Urine culture grew Proteus and E. Coli. Patient received IV epinephrine for suspected BRASH, and was supplied 30 cc/kg of IV fluids and was transferred to the ICU. Patient received Ceftriaxone and azithromycin. Patient was weaned off pressors and was downgraded to medsurg. Patient developed HOSSEIN and Lasix was held. Patient developed respiratory failure requiring diuresis. Patient also had episode of anemia with Hgb of 6.9 with no sign of active bleeding, patient received 1 unit PRBC.  Patient was discharged to [facility].  Rehab Course   Aurora was seen and examined at bedside today. Patient is AAOx 3. On exam patient is sitting in her chair and does not appear to be in distress.  She has been fatigued and tired the last day but has no other symptoms.    Discussed POC with patients daughter Yana. Medications changes discussed with facility MD. She says her Mom will not take Eliquis.  We discussed risk vs benefits and she would like Eliquis discontinued.  She asked if we could change Lasix and potassium to every other day for concerns of dehydration. Will change to QOD and monitor fluid status.  She says her mom gas history of becoming very tired while taking metoprolol and asked if we could reduce dose, will trial her on 25 mg daily from 50 mg daily. Will consult palliative care.   She denies CP/SOB/N/V/D. Denies lightheadedness, dizziness, headaches, vision changes. Patient states they are eating well and  staying hydrated. Denies any bowel or bladder issues. Per review of SNF records, Patient is eating 3 meals per day, consuming 25-50 %. Last documented BM 1/22/24. No concerns from nursing at this time.    The patient's allergies, past medical, surgical, social and family history were reviewed and unchanged.    Review of Systems     Review of Systems   Constitutional:  Positive for activity change and appetite change. Negative for fever.   HENT: Negative.  Negative for congestion, sneezing and sore throat.    Eyes: Negative.    Respiratory: Negative.  Negative for cough and shortness of breath.    Cardiovascular:  Positive for leg swelling. Negative for chest pain and palpitations.   Gastrointestinal:  Negative for abdominal distention, constipation, diarrhea, nausea and vomiting.   Endocrine: Negative.    Genitourinary: Negative.  Negative for difficulty urinating and dysuria.   Musculoskeletal:  Positive for gait problem.   Skin:  Positive for wound.        Sacral wound, lymphedema b/l LE   Allergic/Immunologic: Negative.    Neurological:  Positive for weakness. Negative for dizziness and headaches.   Hematological: Negative.    Psychiatric/Behavioral:  Negative for sleep disturbance.          Objective     Vitals:   Vitals:    01/24/24 1729   BP: 136/68   Pulse: 68   Resp: 18   Temp: (!) 97 °F (36.1 °C)   SpO2: 93%             Physical Exam  Vitals and nursing note reviewed.   Constitutional:       General: She is not in acute distress.     Appearance: Normal appearance. She is not ill-appearing.   HENT:      Head: Normocephalic and atraumatic.      Nose: No congestion.   Eyes:      Conjunctiva/sclera: Conjunctivae normal.   Cardiovascular:      Rate and Rhythm: Rhythm irregular.      Pulses: Normal pulses.      Heart sounds: Normal heart sounds.   Pulmonary:      Effort: Pulmonary effort is normal. No respiratory distress.      Breath sounds: Normal breath sounds. No wheezing.   Abdominal:      General: Bowel  "sounds are normal. There is no distension.      Palpations: Abdomen is soft.      Tenderness: There is no abdominal tenderness.   Musculoskeletal:      Right lower leg: Edema present.      Left lower leg: Edema present.      Comments: Chronic lymphedema   Skin:     General: Skin is warm.      Findings: Bruising present.      Comments: Sacral wound   Neurological:      Mental Status: She is alert and oriented to person, place, and time.      Motor: Weakness present.      Gait: Gait abnormal.   Psychiatric:         Mood and Affect: Mood normal.         Behavior: Behavior normal.         Pertinent Laboratory/Diagnostic Studies:   Reviewed in facility chart-stable      Current Medications   Medications reviewed and updated see facility MAR for details.      Current Outpatient Medications:     furosemide (LASIX) 20 mg tablet, Take 1 tablet (20 mg total) by mouth every other day, Disp: , Rfl:     metoprolol succinate (TOPROL-XL) 25 mg 24 hr tablet, Take 1 tablet (25 mg total) by mouth daily, Disp: , Rfl:     potassium chloride (MICRO-K) 10 MEQ CR capsule, Take 1 capsule (10 mEq total) by mouth every other day, Disp: , Rfl:     acetaminophen (TYLENOL) 325 mg tablet, Take 2 tablets (650 mg total) by mouth every 6 (six) hours as needed for mild pain, Disp: , Rfl:     collagenase (SANTYL) ointment, Apply 1 Application topically daily, Disp: , Rfl:     diltiazem (CARDIZEM CD) 180 mg 24 hr capsule, TAKE 1 CAPSULE(180 MG) BY MOUTH DAILY, Disp: 90 capsule, Rfl: 0    Emollient (CeraVe Daily Moisturizing) LOTN, Apply 1 Application topically in the morning, Disp: , Rfl:     ferrous sulfate 325 (65 Fe) mg tablet, Take 1 tablet (325 mg total) by mouth daily with breakfast, Disp: 30 tablet, Rfl: 0    Nutritional Supplements (ProSource Plus) LIQD, Take 30 mL by mouth in the morning, Disp: , Rfl:      Please note:  Voice-recognition software may have been used in the preparation of this document.  Occasional wrong word or \"sound-alike\" " substitutions may have occurred due to the inherent limitations of voice recognition software.  Interpretation should be guided by context.         AZRA Lewis  1/25/2024  2:03 PM

## 2024-01-25 ENCOUNTER — NURSING HOME VISIT (OUTPATIENT)
Dept: GERIATRICS | Facility: OTHER | Age: 89
End: 2024-01-25

## 2024-01-25 DIAGNOSIS — R65.21 SEPSIS DUE TO ESCHERICHIA COLI WITH OTHER ACUTE RENAL FAILURE AND SEPTIC SHOCK (HCC): ICD-10-CM

## 2024-01-25 DIAGNOSIS — J96.11 CHRONIC RESPIRATORY FAILURE WITH HYPOXIA (HCC): ICD-10-CM

## 2024-01-25 DIAGNOSIS — I89.0 LYMPHEDEMA: ICD-10-CM

## 2024-01-25 DIAGNOSIS — A41.51 SEPSIS DUE TO ESCHERICHIA COLI WITH OTHER ACUTE RENAL FAILURE AND SEPTIC SHOCK (HCC): ICD-10-CM

## 2024-01-25 DIAGNOSIS — I48.4 ATYPICAL ATRIAL FLUTTER (HCC): Primary | Chronic | ICD-10-CM

## 2024-01-25 DIAGNOSIS — N17.8 SEPSIS DUE TO ESCHERICHIA COLI WITH OTHER ACUTE RENAL FAILURE AND SEPTIC SHOCK (HCC): ICD-10-CM

## 2024-01-25 DIAGNOSIS — I48.92 ATRIAL FLUTTER (HCC): ICD-10-CM

## 2024-01-25 DIAGNOSIS — I50.32 CHRONIC HEART FAILURE WITH PRESERVED EJECTION FRACTION (HCC): Chronic | ICD-10-CM

## 2024-01-25 DIAGNOSIS — N18.31 STAGE 3A CHRONIC KIDNEY DISEASE (HCC): Chronic | ICD-10-CM

## 2024-01-25 DIAGNOSIS — R26.2 AMBULATORY DYSFUNCTION: ICD-10-CM

## 2024-01-25 DIAGNOSIS — J96.21 ACUTE ON CHRONIC RESPIRATORY FAILURE WITH HYPOXIA (HCC): ICD-10-CM

## 2024-01-25 PROCEDURE — 99309 SBSQ NF CARE MODERATE MDM 30: CPT

## 2024-01-25 RX ORDER — FUROSEMIDE 20 MG/1
20 TABLET ORAL EVERY OTHER DAY
Start: 2024-01-25

## 2024-01-25 RX ORDER — POTASSIUM CHLORIDE 750 MG/1
10 CAPSULE, EXTENDED RELEASE ORAL EVERY OTHER DAY
Start: 2024-01-25

## 2024-01-25 RX ORDER — METOPROLOL SUCCINATE 25 MG/1
25 TABLET, EXTENDED RELEASE ORAL DAILY
Start: 2024-01-25

## 2024-01-25 NOTE — ASSESSMENT & PLAN NOTE
Wt Readings from Last 3 Encounters:   01/18/24 64.6 kg (142 lb 6.4 oz)   01/17/24 64.6 kg (142 lb 6.4 oz)   01/15/24 61 kg (134 lb 7.7 oz)   1/5/24 Echo showing EF of 55%  Weight stable  B/l LE lymphedema  Patient reports improvement in swelling since hospitalization  Continue Metoprolol ER 25 mg daily  Continue Lasix 20 mg daily with potassium supplement  Continue weekly weights  Continue 2000 ml FR

## 2024-01-25 NOTE — ASSESSMENT & PLAN NOTE
Left foot plantar  Wound is stable, 100% eschar, no obvious sign of infection  Continue CeraVe lotion  Continue to offload  Follow-up next week

## 2024-01-25 NOTE — ASSESSMENT & PLAN NOTE
Sacral wound  Wound size almost the same as last week, 2.4 x 1.8 cm compared to last week measurement of 1.9 x 2.4 cm  Wound is still covered with slough, deepest wound bed not visible.  Selective debridement performed.  Depth is 0.3 postdebridement  Continue enzymatic debridement with Santyl  Patient recently lose weight weight on January 23, 2024 is 138.2 compared to weight on January 16 which is 142.4.  Patient currently on Ensure Plus, patient have a poor appetite.  Under the care of ADRIANA.  Currently using an air mattress  Follow-up next week

## 2024-01-25 NOTE — ASSESSMENT & PLAN NOTE
HR controlled, 68  Denies cp/palpitations  Toprol XL 25 mg daily  Continue diltiazem 180 mg daily    PLAN:  Patient had been refusing Eliquis, Had discussion with patients daughter Yana.  She said her mom did not want to take Eliquis and was certain she will not take it when she goes home. We discussed the risks of stroke vs fall/bleeding risk. She was agreeable to discontinue Eliquis.  In addition, she asked to reduce the Metoprolol dose as it makes her Mom tired. Will d/c Eliquis, reduce Metoprolol to 25 mg daily and adjust as needed.

## 2024-01-25 NOTE — PROGRESS NOTES
[facility] WOUND CARE MANAGEMENT   AND HYPERBARIC MEDICINE CENTER       Patient ID: Aurora Anderson is a 89 y.o. female Date of Birth 12/13/1929     [facility]    Performed wound round with: Wound team     Chief Complaint : Sacrum, left plantar and right plantar    Wound Instructions:  Wound: Sacral  Cleanse with normal saline solution  Apply Skin-Prep to periwound area  Apply Santyl collagenase to wound bed and cover with bordered foam  Daily and as needed for soiling    Left and right plantar  Cleanse with mild soap and water  Apply CeraVe moisturizing lotion or substitute moisturizing lotion to bilateral lower legs including left and right plantar  Daily and as needed for soiling  Continue heel boots when in bed  Offload all wounds  Turn and reposition frequently  Monitor for infection or worsening    Allergies  Penicillins      Assessment & Plan:  1. Pressure injury of sacral region, unstageable (HCC)  Assessment & Plan:  Sacral wound  Wound size almost the same as last week, 2.4 x 1.8 cm compared to last week measurement of 1.9 x 2.4 cm  Wound is still covered with slough, deepest wound bed not visible.  Selective debridement performed.  Depth is 0.3 postdebridement  Continue enzymatic debridement with Santyl  Patient recently lose weight weight on January 23, 2024 is 138.2 compared to weight on January 16 which is 142.4.  Patient currently on Ensure Plus, patient have a poor appetite.  Under the care of ADRIANA.  Currently using an air mattress  Follow-up next week      2. Lymphedema  Assessment & Plan:  Positive lymphedema of bilateral lower legs  Moisturizing lotion daily  Refer to lymphedema clinic on discharge      3. Pressure injury of left foot, unstageable (HCC)  Assessment & Plan:  Left foot plantar  Wound is stable, 100% eschar, no obvious sign of infection  Continue CeraVe lotion  Continue to offload  Follow-up next week      4. Pressure injury of right foot, unstageable  ([facility])  Assessment & Plan:  Right foot plantar   100% eschar, no drainage, no obvious sign of infection  Continue to offload  Continue CeraVe lotion  Follow-up next week        5. Ambulatory dysfunction  Assessment & Plan:  On short-term rehab                 Subjective:   January 17, 2024.  New consult for wound to sacrum and bilateral feet.  Patient was referred by Senior care team.  Patient currently admitted at [facility] for short-term rehab.  Patient have chronic kidney disease and lymphedema.  Patient was seen with the facility wound team.    Wound history: As per medical record review, the wound on the sacrum and bilateral feet is chronic.  Patient was seen by podiatry for the wound on the bilateral feet on January 5, 2024.  Ordered to continue local wound care.  X-ray was completed on both feet, nonsignificant result.     Received patient in bed, seems comfortable.  Denies pain.  Patient have a good appetite.  Patient is minimal assistance with turning and repositioning in bed.  Patient is incontinent of both bowel and bladder.    1/24/2024 Follow up for wound on the Sacrum and bilateral feet . Received patient, not in distress. Facility staff did not report any significant issues related to the wound. Denies pain.           Review of Systems   Constitutional: Negative.    Respiratory: Negative.     Cardiovascular: Negative.    Musculoskeletal:  Positive for gait problem.   Skin:  Positive for wound.   Psychiatric/Behavioral: Negative.         Objective:    Physical Exam  Constitutional:       Appearance: Normal appearance.   Cardiovascular:      Rate and Rhythm: Normal rate.   Pulmonary:      Effort: Pulmonary effort is normal.   Genitourinary:     Comments: Incontinent  Musculoskeletal:      Right lower leg: Edema present.      Left lower leg: Edema present.      Comments: Positive lymphedema on bilateral lower leg  Dry skin   Skin:     Findings: Lesion present.      Comments: Sacrum: Wound size is  "2.4 x 1.8 cm.,  100% slough, periwound normal, no drainage, no obvious sign of infection, denies pain    Right  plantar foot: Wound size is 1.5 x 0.8 cm.,  100% scab, periwound is dry, no drainage, no obvious sign of infection    Left plantar foot : Wound size is 3 x 1.6 cm.,  100% eschar, no drainage, periwound is dry, with no obvious sign of infection   Neurological:      Mental Status: She is alert.              Debridement    Universal Protocol:  Consent: Verbal consent obtained.  Risks and benefits: risks, benefits and alternatives were discussed  Consent given by: patient  Time out: Immediately prior to procedure a \"time out\" was called to verify the correct patient, procedure, equipment, support staff and site/side marked as required.  Timeout called at: 1/24/2024 9:31 AM.  Patient understanding: patient states understanding of the procedure being performed  Patient identity confirmed: verbally with patient    Debridement Details  Performed by: NP (sacrum)  Debridement type: selective  Pain control: lidocaine 4%      Post-debridement measurements  Length (cm): 2.4  Width (cm): 1.8  Depth (cm): 0.3  Percent debrided: 100%  Surface Area (cm^2): 4.32  Area Debrided (cm^2): 4.32  Volume (cm^3): 1.3    Devitalized tissue debrided: biofilm, fibrin and necrotic debris  Instrument(s) utilized: curette  Bleeding: small  Hemostasis obtained with: pressure  Procedural pain (0-10): 0  Post-procedural pain: 0   Response to treatment: procedure was tolerated well               Patient's care was coordinated with nursing facility staff. Recent vitals, labs and updated medications were reviewed on EMR or chart system of facility. Past Medical, surgical, social, medication and allergy history and patient's previous records were reviewed and updated as appropriate: Most up-to date information is available in the facility EMR where the patient is currently admitted.    Patient Active Problem List   Diagnosis    Right bundle " branch block (RBBB) on electrocardiogram (ECG)    Diverticulosis of colon    Large hiatal hernia    Incidental 6cm right Renal lesion on CT    Anemia    CKD (chronic kidney disease) stage 3, GFR 30-59 ml/min (HCC)    Atypical atrial flutter (HCC)    Acute on chronic respiratory failure (HCC)    Breast mass, right    Chronic heart failure with preserved ejection fraction (HCC)    HOSSEIN (acute kidney injury) (HCC)    Lymphedema    Anemia due to chronic kidney disease and iron deficiency    Sepsis due to Escherichia coli with acute renal failure and septic shock (HCC)    Pressure injury of left foot, unstageable (HCC)    Pressure injury of right foot, unstageable (HCC)    Ambulatory dysfunction    Pressure injury of sacral region, unstageable (HCC)     Past Medical History:   Diagnosis Date    Acute kidney injury (HCC)     Acute on chronic respiratory failure (HCC)     Arthritis     Atrial flutter (HCC)     Rapid heart rate      Past Surgical History:   Procedure Laterality Date    CATARACT EXTRACTION      EGD AND COLONOSCOPY N/A 11/14/2017    Procedure: EGD AND COLONOSCOPY;  Surgeon: Radha Salas MD;  Location: [facility];  Service: Gastroenterology    HIP FRACTURE SURGERY       Social History     Socioeconomic History    Marital status:      Spouse name: Not on file    Number of children: 3    Years of education: Not on file    Highest education level: Not on file   Occupational History    Not on file   Tobacco Use    Smoking status: Never    Smokeless tobacco: Never    Tobacco comments:     former smoker, per [employer];  started smoking at 15 yo, stopped at 31 yo   Vaping Use    Vaping status: Never Used   Substance and Sexual Activity    Alcohol use: No    Drug use: No    Sexual activity: Not on file   Other Topics Concern    Not on file   Social History Narrative    Inadequate exercise     Social Determinants of Health     Financial Resource Strain: Not on file   Food Insecurity: No Food Insecurity  (1/5/2024)    Hunger Vital Sign     Worried About Running Out of Food in the Last Year: Never true     Ran Out of Food in the Last Year: Never true   Transportation Needs: No Transportation Needs (1/5/2024)    PRAPARE - Transportation     Lack of Transportation (Medical): No     Lack of Transportation (Non-Medical): No   Physical Activity: Not on file   Stress: Not on file   Social Connections: Not on file   Intimate Partner Violence: Not on file   Housing Stability: Unknown (1/5/2024)    Housing Stability Vital Sign     Unable to Pay for Housing in the Last Year: No     Number of Places Lived in the Last Year: Not on file     Unstable Housing in the Last Year: No        Current Outpatient Medications:     acetaminophen (TYLENOL) 325 mg tablet, Take 2 tablets (650 mg total) by mouth every 6 (six) hours as needed for mild pain, Disp: , Rfl:     apixaban (Eliquis) 2.5 mg, Take 1 tablet (2.5 mg total) by mouth 2 (two) times a day, Disp: , Rfl:     collagenase (SANTYL) ointment, Apply 1 Application topically daily, Disp: , Rfl:     diltiazem (CARDIZEM CD) 180 mg 24 hr capsule, TAKE 1 CAPSULE(180 MG) BY MOUTH DAILY, Disp: 90 capsule, Rfl: 0    Emollient (CeraVe Daily Moisturizing) LOTN, Apply 1 Application topically in the morning, Disp: , Rfl:     ferrous sulfate 325 (65 Fe) mg tablet, Take 1 tablet (325 mg total) by mouth daily with breakfast, Disp: 30 tablet, Rfl: 0    furosemide (LASIX) 20 mg tablet, TAKE 1 TABLET(20 MG) BY MOUTH DAILY, Disp: 30 tablet, Rfl: 6    metoprolol succinate (TOPROL-XL) 25 mg 24 hr tablet, Take 1 tablet (25 mg total) by mouth every 12 (twelve) hours, Disp: 180 tablet, Rfl: 3    Nutritional Supplements (ProSource Plus) LIQD, Take 30 mL by mouth in the morning, Disp: , Rfl:     potassium chloride (MICRO-K) 10 MEQ CR capsule, Take 1 capsule (10 mEq total) by mouth daily, Disp: 90 capsule, Rfl: 3  Family History   Problem Relation Age of Onset    Diabetes Father     Cancer Family      "          Coordination of Care: Wound team aware of the treatment plan. Facility nurse will provide wound treatment and monitor the wound for any changes.     Patient / Staff education : Patient / Staff was given education on sign of infection and pressure ulcer prevention. Patient/ Staff verbalized understanding     Follow up :  Next week    Voice-recognition software may have been used in the preparation of this document. Occasional wrong word or \"sound-alike\" substitutions may have occurred due to the inherent limitations of voice recognition software. Interpretation should be guided by context.      AZRA Sánchez  "

## 2024-01-25 NOTE — ASSESSMENT & PLAN NOTE
Lab Results   Component Value Date    EGFR 55 01/16/2024    EGFR 49 01/14/2024    EGFR 48 01/13/2024    CREATININE 0.89 01/16/2024    CREATININE 0.98 01/14/2024    CREATININE 0.99 01/13/2024   Avoid nephrotoxins  Encourage adequate hydration  Avoid hypotension  Monitor kidney functions, BMP 1/24/24

## 2024-01-25 NOTE — ASSESSMENT & PLAN NOTE
Right foot plantar   100% eschar, no drainage, no obvious sign of infection  Continue to offload  Continue CeraVe lotion  Follow-up next week

## 2024-01-30 ENCOUNTER — NURSING HOME VISIT (OUTPATIENT)
Dept: GERIATRICS | Facility: OTHER | Age: 89
End: 2024-01-30

## 2024-01-30 VITALS
BODY MASS INDEX: 23 KG/M2 | WEIGHT: 138.2 LBS | SYSTOLIC BLOOD PRESSURE: 122 MMHG | HEART RATE: 72 BPM | RESPIRATION RATE: 18 BRPM | DIASTOLIC BLOOD PRESSURE: 76 MMHG | TEMPERATURE: 97.6 F | OXYGEN SATURATION: 95 %

## 2024-01-30 DIAGNOSIS — N18.31 STAGE 3A CHRONIC KIDNEY DISEASE (HCC): Chronic | ICD-10-CM

## 2024-01-30 DIAGNOSIS — A41.51 SEPSIS DUE TO ESCHERICHIA COLI WITH OTHER ACUTE RENAL FAILURE AND SEPTIC SHOCK (HCC): Primary | ICD-10-CM

## 2024-01-30 DIAGNOSIS — N17.8 SEPSIS DUE TO ESCHERICHIA COLI WITH OTHER ACUTE RENAL FAILURE AND SEPTIC SHOCK (HCC): Primary | ICD-10-CM

## 2024-01-30 DIAGNOSIS — I89.0 LYMPHEDEMA: ICD-10-CM

## 2024-01-30 DIAGNOSIS — J96.21 ACUTE ON CHRONIC RESPIRATORY FAILURE WITH HYPOXIA (HCC): ICD-10-CM

## 2024-01-30 DIAGNOSIS — I48.4 ATYPICAL ATRIAL FLUTTER (HCC): Chronic | ICD-10-CM

## 2024-01-30 DIAGNOSIS — R65.21 SEPSIS DUE TO ESCHERICHIA COLI WITH OTHER ACUTE RENAL FAILURE AND SEPTIC SHOCK (HCC): Primary | ICD-10-CM

## 2024-01-30 DIAGNOSIS — R26.2 AMBULATORY DYSFUNCTION: ICD-10-CM

## 2024-01-30 DIAGNOSIS — I50.32 CHRONIC HEART FAILURE WITH PRESERVED EJECTION FRACTION (HCC): Chronic | ICD-10-CM

## 2024-01-30 PROCEDURE — 99309 SBSQ NF CARE MODERATE MDM 30: CPT

## 2024-01-30 NOTE — ASSESSMENT & PLAN NOTE
Chronic B/L LE edema  Improved with diuresis  Continue moisturizing lotion daily  Would benefit from lymphedema clinic as outpatient   Continue Lasix 20 mg QOD, with potassium supplement

## 2024-01-30 NOTE — ASSESSMENT & PLAN NOTE
Lab Results   Component Value Date    EGFR 55 01/16/2024    EGFR 49 01/14/2024    EGFR 48 01/13/2024    CREATININE 0.89 01/16/2024    CREATININE 0.98 01/14/2024    CREATININE 0.99 01/13/2024 1/22/24 Cr 1.2/ bun 16/gfr 42  Avoid nephrotoxins  Encourage adequate hydration  Avoid hypotension  Monitor kidney functions, BMP 1/31/24

## 2024-01-30 NOTE — ASSESSMENT & PLAN NOTE
HR controlled, 72  Denies cp/palpitations  Toprol XL 25 mg daily  Continue diltiazem 180 mg daily

## 2024-01-30 NOTE — PROGRESS NOTES
[facility]  [address] [address] [phone]  [place] [facility]  Code 31 (STR)        NAME: Aurora Anderson  AGE: 89 y.o. SEX: female CODE STATUS: No CPR    DATE OF ENCOUNTER: 1/30/2024    Assessment and Plan     1. Sepsis due to Escherichia coli with other acute renal failure and septic shock (HCC)  Assessment & Plan:  Recent hospitalization for septic shock d/t UTI  Urine culture positive for Proteus and E.coli, Bld Cultures no growth  Patient treated with ceftriaxone, azithromycin  Continue to monitor      2. Stage 3a chronic kidney disease (HCC)  Assessment & Plan:  Lab Results   Component Value Date    EGFR 55 01/16/2024    EGFR 49 01/14/2024    EGFR 48 01/13/2024    CREATININE 0.89 01/16/2024    CREATININE 0.98 01/14/2024    CREATININE 0.99 01/13/2024 1/22/24 Cr 1.2/ bun 16/gfr 42  Avoid nephrotoxins  Encourage adequate hydration  Avoid hypotension  Monitor kidney functions, BMP 1/31/24      3. Atypical atrial flutter (HCC)  Assessment & Plan:  HR controlled, 72  Denies cp/palpitations  Toprol XL 25 mg daily  Continue diltiazem 180 mg daily        4. Acute on chronic respiratory failure with hypoxia (HCC)  Assessment & Plan:  Recently hospitalized d/t septic shock d/t UTI  CXR showed pulmonary edema  Placed on Oxygen and was given diuretics for CHF exacerbation   Continues on O2 NC 95%  Continue Lasix 20 mg, potassium supplement QOD      5. Chronic heart failure with preserved ejection fraction (HCC)  Assessment & Plan:  Wt Readings from Last 3 Encounters:   01/30/24 62.7 kg (138 lb 3.2 oz)   01/18/24 64.6 kg (142 lb 6.4 oz)   01/17/24 64.6 kg (142 lb 6.4 oz)   1/5/24 Echo showing EF of 55%  Weight stable  B/l LE lymphedema  Patient reports improvement in swelling since hospitalization  Continue Metoprolol ER 25 mg daily  Continue Lasix 20 mg QOD with potassium supplement  Continue weekly weights  Continue 2000 ml FR        6. Lymphedema  Assessment & Plan:  Chronic B/L LE  edema  Improved with diuresis  Continue moisturizing lotion daily  Would benefit from lymphedema clinic as outpatient   Continue Lasix 20 mg QOD, with potassium supplement      7. Ambulatory dysfunction  Assessment & Plan:  Multifactorial  Continue PT/OT  Fall Precautions  Ensure adequate nutrition/hydration   Monitor CBC/BMP    following for d/c planning             All medications and routine orders were reviewed and updated as needed.    Chief Complaint     STR follow up visit    Patient's care was coordinated with nursing facility staff. Recent vitals, labs, and updated medications were review on [facility] system in facility.  Past Medical and Surgical History      Past Medical History:   Diagnosis Date    Acute kidney injury (HCC)     Acute on chronic respiratory failure (HCC)     Arthritis     Atrial flutter (HCC)     Rapid heart rate      Past Surgical History:   Procedure Laterality Date    CATARACT EXTRACTION      EGD AND COLONOSCOPY N/A 11/14/2017    Procedure: EGD AND COLONOSCOPY;  Surgeon: Radha Salas MD;  Location: [facility];  Service: Gastroenterology    HIP FRACTURE SURGERY       Allergies   Allergen Reactions    Penicillins Rash          History of Present Illness     HPI  Aurora Anderson is a 89 year old female, she is a STR patient of [facility] since 1/16/24. Past Medical Hx including but not limited to A flutter, lymphedema, CHF, CKD, anemia. She was seen in collaboration with nursing for medical mgmt and STR follow up.   Hospital Course  Patient was admitted to the hospital 1/4/24 for septic shock due to UTI. Urine culture grew Proteus and E. Coli. Patient received IV epinephrine for suspected BRASH, and was supplied 30 cc/kg of IV fluids and was transferred to the ICU. Patient received Ceftriaxone and azithromycin. Patient was weaned off pressors and was downgraded to medsurg. Patient developed HOSSEIN and Lasix was held. Patient developed respiratory failure  requiring diuresis. Patient also had episode of anemia with Hgb of 6.9 with no sign of active bleeding, patient received 1 unit PRBC.  Patient was discharged to [facility].  Rehab Course   Aurora was seen and examined at bedside today. Patient is AAOx 3. On exam patient is resting in bed and does not appear to be in distress.  She reports her appetite has been decreased, continue prosource, ensure supplements.  She denies pain, difficulty sleeping. Repeat labs 1/31/24.  She denies CP/SOB/N/V/D. Denies lightheadedness, dizziness, headaches, vision changes. Patient states they are eating well and staying hydrated. Denies any bowel or bladder issues. Per review of SNF records, Patient is eating 3 meals per day, consuming 25-50 %. Last documented BM 1/30/24. No concerns from nursing at this time.    The patient's allergies, past medical, surgical, social and family history were reviewed and unchanged.    Review of Systems     Review of Systems   Constitutional:  Positive for activity change and appetite change. Negative for fever.   HENT: Negative.  Negative for congestion, sneezing and sore throat.    Eyes: Negative.    Respiratory: Negative.  Negative for cough and shortness of breath.    Cardiovascular:  Positive for leg swelling. Negative for chest pain and palpitations.   Gastrointestinal:  Negative for abdominal distention, constipation, diarrhea, nausea and vomiting.   Endocrine: Negative.    Genitourinary: Negative.  Negative for difficulty urinating and dysuria.   Musculoskeletal:  Positive for gait problem.   Skin:  Positive for wound.        Sacral wound, lymphedema b/l LE   Allergic/Immunologic: Negative.    Neurological:  Positive for weakness. Negative for dizziness and headaches.   Hematological: Negative.    Psychiatric/Behavioral:  Negative for sleep disturbance.          Objective     Vitals:   Vitals:    01/30/24 0935   BP: 122/76   Pulse: 72   Resp: 18   Temp: 97.6 °F (36.4 °C)   SpO2: 95%         Physical  Exam  Vitals and nursing note reviewed.   Constitutional:       General: She is not in acute distress.     Appearance: Normal appearance. She is not ill-appearing.   HENT:      Head: Normocephalic and atraumatic.      Nose: No congestion.   Eyes:      Conjunctiva/sclera: Conjunctivae normal.   Cardiovascular:      Rate and Rhythm: Rhythm irregular.      Pulses: Normal pulses.      Heart sounds: Normal heart sounds.   Pulmonary:      Effort: Pulmonary effort is normal. No respiratory distress.      Breath sounds: Normal breath sounds. No wheezing.   Abdominal:      General: Bowel sounds are normal. There is no distension.      Palpations: Abdomen is soft.      Tenderness: There is no abdominal tenderness.   Musculoskeletal:      Right lower leg: Edema present.      Left lower leg: Edema present.      Comments: Chronic lymphedema   Skin:     General: Skin is warm.      Findings: Bruising present.      Comments: Sacral wound   Neurological:      Mental Status: She is alert and oriented to person, place, and time.      Motor: Weakness present.      Gait: Gait abnormal.   Psychiatric:         Mood and Affect: Mood normal.         Behavior: Behavior normal.         Pertinent Laboratory/Diagnostic Studies:   Reviewed in facility chart-stable      Current Medications   Medications reviewed and updated see facility MAR for details.      Current Outpatient Medications:     acetaminophen (TYLENOL) 325 mg tablet, Take 2 tablets (650 mg total) by mouth every 6 (six) hours as needed for mild pain, Disp: , Rfl:     collagenase (SANTYL) ointment, Apply 1 Application topically daily, Disp: , Rfl:     diltiazem (CARDIZEM CD) 180 mg 24 hr capsule, TAKE 1 CAPSULE(180 MG) BY MOUTH DAILY, Disp: 90 capsule, Rfl: 0    Emollient (CeraVe Daily Moisturizing) LOTN, Apply 1 Application topically in the morning, Disp: , Rfl:     ferrous sulfate 325 (65 Fe) mg tablet, Take 1 tablet (325 mg total) by mouth daily with breakfast, Disp: 30 tablet,  "Rfl: 0    furosemide (LASIX) 20 mg tablet, Take 1 tablet (20 mg total) by mouth every other day, Disp: , Rfl:     metoprolol succinate (TOPROL-XL) 25 mg 24 hr tablet, Take 1 tablet (25 mg total) by mouth daily, Disp: , Rfl:     Nutritional Supplements (ProSource Plus) LIQD, Take 30 mL by mouth in the morning, Disp: , Rfl:     potassium chloride (MICRO-K) 10 MEQ CR capsule, Take 1 capsule (10 mEq total) by mouth every other day, Disp: , Rfl:      Please note:  Voice-recognition software may have been used in the preparation of this document.  Occasional wrong word or \"sound-alike\" substitutions may have occurred due to the inherent limitations of voice recognition software.  Interpretation should be guided by context.         AZRA Lewis  1/30/2024  3:00 PM    "

## 2024-01-30 NOTE — ASSESSMENT & PLAN NOTE
Recently hospitalized d/t septic shock d/t UTI  CXR showed pulmonary edema  Placed on Oxygen and was given diuretics for CHF exacerbation   Continues on O2 NC 95%  Continue Lasix 20 mg, potassium supplement QOD

## 2024-01-30 NOTE — ASSESSMENT & PLAN NOTE
Wt Readings from Last 3 Encounters:   01/30/24 62.7 kg (138 lb 3.2 oz)   01/18/24 64.6 kg (142 lb 6.4 oz)   01/17/24 64.6 kg (142 lb 6.4 oz)   1/5/24 Echo showing EF of 55%  Weight stable  B/l LE lymphedema  Patient reports improvement in swelling since hospitalization  Continue Metoprolol ER 25 mg daily  Continue Lasix 20 mg QOD with potassium supplement  Continue weekly weights  Continue 2000 ml FR

## 2024-01-31 ENCOUNTER — NURSING HOME VISIT (OUTPATIENT)
Dept: WOUND CARE | Facility: HOSPITAL | Age: 89
End: 2024-01-31

## 2024-01-31 ENCOUNTER — TELEPHONE (OUTPATIENT)
Dept: OTHER | Facility: OTHER | Age: 89
End: 2024-01-31

## 2024-01-31 DIAGNOSIS — L89.154 PRESSURE INJURY OF SACRAL REGION, STAGE 4 (HCC): ICD-10-CM

## 2024-01-31 DIAGNOSIS — L89.890 PRESSURE INJURY OF RIGHT FOOT, UNSTAGEABLE (HCC): ICD-10-CM

## 2024-01-31 DIAGNOSIS — L89.890 PRESSURE INJURY OF LEFT FOOT, UNSTAGEABLE (HCC): ICD-10-CM

## 2024-01-31 NOTE — PROGRESS NOTES
[facility] WOUND CARE MANAGEMENT   AND HYPERBARIC MEDICINE CENTER       Patient ID: Aurora Anderson is a 89 y.o. female Date of Birth 12/13/1929     [facility]    Performed wound round with: Wound team     Chief Complaint : Sacrum, left plantar and right plantar    Wound Instructions:  Wound: Sacral  Cleanse with Dakin's quarter strength  Apply Skin-Prep to periwound area  Gently pack the wound cavity loosely with dry sterile dressing moistened with Dakin's quarter strength and cover with ABD pad  Daily and as needed for soiling  Order x-ray of the sacrum to rule out osteomyelitis    Left and right plantar  Cleanse with mild soap and water  Apply CeraVe moisturizing lotion or hydraguard to bilateral lower legs including left and right plantar  Daily and as needed for soiling  Continue heel boots when in bed  Offload all wounds  Turn and reposition frequently  Monitor for infection or worsening    Allergies  Penicillins      Assessment & Plan:  1. Pressure injury of sacral region, stage 4 (HCC)  Assessment & Plan:  Sacral wound  Wound worsened, cover with 100% slough with purulent discharge.  Surgical debridement performed.  Remove necrotic tissue.  Patient tolerated procedure.  Wound measurement postdebridement is 1.2 x 1.2 x 2 cm with undermining all edges, deepest undermining at 12:00, 4 cm.  Probe to bone, with no obvious sign of infection  Change local wound care with wet-to-dry with Dakin's quarter strength  Patient recently lose weight weight on January 23, 2024 is 138.2 compared to weight on January 16 which is 142.4.  Patient currently on Ensure Plus, patient have a poor appetite.  Under the care of RDBen  Senior care team was made aware, stat CBC was ordered.  Negative leukocytosis.  WBC is within normal limit.  As per report, patient refused air mattress.  Educated patient on the benefit of using air mattress, patient admitted to use an air mattress.  Ordered x-ray to rule out  osteomyelitis.  Patient is high risk for infection.  Follow-up next week    Orders:  -     Debridement    2. Pressure injury of left foot, unstageable (HCC)  Assessment & Plan:  Left heel  Wound improved, increased granulation  Continue local wound care with hydraguard or CeraVe moisturizing lotion  Continue to offload  Follow-up next week      3. Pressure injury of right foot, unstageable (HCC)  Assessment & Plan:  Right foot  Wound is healed  Skin is dry                   Subjective:   January 17, 2024.  New consult for wound to sacrum and bilateral feet.  Patient was referred by Senior care team.  Patient currently admitted at [facility] for short-term rehab.  Patient have chronic kidney disease and lymphedema.  Patient was seen with the facility wound team.    Wound history: As per medical record review, the wound on the sacrum and bilateral feet is chronic.  Patient was seen by podiatry for the wound on the bilateral feet on January 5, 2024.  Ordered to continue local wound care.  X-ray was completed on both feet, nonsignificant result.     Received patient in bed, seems comfortable.  Denies pain.  Patient have a good appetite.  Patient is minimal assistance with turning and repositioning in bed.  Patient is incontinent of both bowel and bladder.    1/24/2024 Follow up for wound on the Sacrum and bilateral feet . Received patient, not in distress. Facility staff did not report any significant issues related to the wound. Denies pain.     1/31/2024 Follow up for wound on the sacrum and bilateral feet . Received patient, not in distress. Facility staff did not report any significant issues related to the wound. As per report, patient had been refusing air mattress. Patient have a poor appetite, only ate 0-25% of her meals yesterday. Reviewed laboratory result on 1/31/2024 - WBC is 7.7.            Review of Systems   Constitutional: Negative.    Respiratory: Negative.     Cardiovascular: Negative.   "  Musculoskeletal:  Positive for gait problem.   Skin:  Positive for wound.   Psychiatric/Behavioral: Negative.         Objective:    Physical Exam  Constitutional:       Appearance: Normal appearance.   Cardiovascular:      Rate and Rhythm: Normal rate.   Pulmonary:      Effort: Pulmonary effort is normal.   Genitourinary:     Comments: Incontinent  Musculoskeletal:      Right lower leg: Edema present.      Left lower leg: Edema present.      Comments: Positive lymphedema on bilateral lower leg  Dry skin   Skin:     Findings: Lesion present.      Comments: Sacrum: Wound size is ( post debridement) 1.2 x 1.2 x 2 cm.,  Undermining all edges, deepest at 12:00, 4 cm, probe to bone, with moderate amount of purulent discharge ( pre debridement), no malodor, no redness or erythema    Right  plantar foot: Wound is healed, dry skin     left plantar foot : Wound size is 1.6 x 1 cm.,  50% eschar, 50% granulation no drainage, periwound is dry, with no obvious sign of infection   Neurological:      Mental Status: She is alert.              Debridement    Universal Protocol:  Consent: Verbal consent obtained.  Risks and benefits: risks, benefits and alternatives were discussed  Consent given by: patient  Time out: Immediately prior to procedure a \"time out\" was called to verify the correct patient, procedure, equipment, support staff and site/side marked as required.  Timeout called at: 1/31/2024 9:23 AM.  Patient understanding: patient states understanding of the procedure being performed  Patient identity confirmed: verbally with patient    Debridement Details  Performed by: NP (sacrum)  Debridement type: surgical  Level of debridement: subcutaneous tissue      Post-debridement measurements  Length (cm): 1.2  Width (cm): 1.2  Depth (cm): 0.2  Percent debrided: 100%  Surface Area (cm^2): 1.44  Area Debrided (cm^2): 1.44  Volume (cm^3): 0.29    Tissue and other material debrided: subcutaneous tissue  Devitalized tissue debrided: " biofilm and slough  Instrument(s) utilized: scissors and forceps  Bleeding: small  Hemostasis obtained with: pressure  Procedural pain (0-10): 0  Post-procedural pain: 0   Response to treatment: procedure was tolerated well               Patient's care was coordinated with nursing facility staff. Recent vitals, labs and updated medications were reviewed on EMR or chart system of facility. Past Medical, surgical, social, medication and allergy history and patient's previous records were reviewed and updated as appropriate: Most up-to date information is available in the facility EMR where the patient is currently admitted.    Patient Active Problem List   Diagnosis    Right bundle branch block (RBBB) on electrocardiogram (ECG)    Diverticulosis of colon    Large hiatal hernia    Incidental 6cm right Renal lesion on CT    Anemia    CKD (chronic kidney disease) stage 3, GFR 30-59 ml/min ([facility])    Atypical atrial flutter (HCC)    Acute on chronic respiratory failure (HCC)    Breast mass, right    Chronic heart failure with preserved ejection fraction (HCC)    HOSSEIN (acute kidney injury) ([facility])    Lymphedema    Anemia due to chronic kidney disease and iron deficiency    Sepsis due to Escherichia coli with acute renal failure and septic shock ([facility])    Pressure injury of left foot, unstageable (HCC)    Pressure injury of right foot, unstageable (HCC)    Ambulatory dysfunction    Pressure injury of sacral region, stage 4 ([facility])     Past Medical History:   Diagnosis Date    Acute kidney injury (HCC)     Acute on chronic respiratory failure (HCC)     Arthritis     Atrial flutter (HCC)     Rapid heart rate      Past Surgical History:   Procedure Laterality Date    CATARACT EXTRACTION      EGD AND COLONOSCOPY N/A 11/14/2017    Procedure: EGD AND COLONOSCOPY;  Surgeon: Radha Salas MD;  Location: [facility];  Service: Gastroenterology    HIP FRACTURE SURGERY       Social History     Socioeconomic History    Marital status:       Spouse name: None    Number of children: 3    Years of education: None    Highest education level: None   Occupational History    None   Tobacco Use    Smoking status: Never    Smokeless tobacco: Never    Tobacco comments:     former smoker, per [employer];  started smoking at 17 yo, stopped at 29 yo   Vaping Use    Vaping status: Never Used   Substance and Sexual Activity    Alcohol use: No    Drug use: No    Sexual activity: None   Other Topics Concern    None   Social History Narrative    Inadequate exercise     Social Determinants of Health     Financial Resource Strain: Not on file   Food Insecurity: No Food Insecurity (1/5/2024)    Hunger Vital Sign     Worried About Running Out of Food in the Last Year: Never true     Ran Out of Food in the Last Year: Never true   Transportation Needs: No Transportation Needs (1/5/2024)    PRAPARE - Transportation     Lack of Transportation (Medical): No     Lack of Transportation (Non-Medical): No   Physical Activity: Not on file   Stress: Not on file   Social Connections: Not on file   Intimate Partner Violence: Not on file   Housing Stability: Unknown (1/5/2024)    Housing Stability Vital Sign     Unable to Pay for Housing in the Last Year: No     Number of Places Lived in the Last Year: Not on file     Unstable Housing in the Last Year: No        Current Outpatient Medications:     acetaminophen (TYLENOL) 325 mg tablet, Take 2 tablets (650 mg total) by mouth every 6 (six) hours as needed for mild pain, Disp: , Rfl:     collagenase (SANTYL) ointment, Apply 1 Application topically daily, Disp: , Rfl:     diltiazem (CARDIZEM CD) 180 mg 24 hr capsule, TAKE 1 CAPSULE(180 MG) BY MOUTH DAILY, Disp: 90 capsule, Rfl: 0    Emollient (CeraVe Daily Moisturizing) LOTN, Apply 1 Application topically in the morning, Disp: , Rfl:     ferrous sulfate 325 (65 Fe) mg tablet, Take 1 tablet (325 mg total) by mouth daily with breakfast, Disp: 30 tablet, Rfl: 0    furosemide (LASIX) 20 mg  "tablet, Take 1 tablet (20 mg total) by mouth every other day, Disp: , Rfl:     metoprolol succinate (TOPROL-XL) 25 mg 24 hr tablet, Take 1 tablet (25 mg total) by mouth daily, Disp: , Rfl:     Nutritional Supplements (ProSource Plus) LIQD, Take 30 mL by mouth in the morning, Disp: , Rfl:     potassium chloride (MICRO-K) 10 MEQ CR capsule, Take 1 capsule (10 mEq total) by mouth every other day, Disp: , Rfl:   Family History   Problem Relation Age of Onset    Diabetes Father     Cancer Family               Coordination of Care: Wound team aware of the treatment plan. Facility nurse will provide wound treatment and monitor the wound for any changes.     Patient / Staff education : Patient / Staff was given education on sign of infection and pressure ulcer prevention. Patient/ Staff verbalized understanding     Follow up :  Next week    Voice-recognition software may have been used in the preparation of this document. Occasional wrong word or \"sound-alike\" substitutions may have occurred due to the inherent limitations of voice recognition software. Interpretation should be guided by context.      AZRA Sánchez  "

## 2024-01-31 NOTE — ASSESSMENT & PLAN NOTE
Sacral wound  Wound worsened, cover with 100% slough with purulent discharge.  Surgical debridement performed.  Remove necrotic tissue.  Patient tolerated procedure.  Wound measurement postdebridement is 1.2 x 1.2 x 2 cm with undermining all edges, deepest undermining at 12:00, 4 cm.  Probe to bone, with no obvious sign of infection  Change local wound care with wet-to-dry with Dakin's quarter strength  Patient recently lose weight weight on January 23, 2024 is 138.2 compared to weight on January 16 which is 142.4.  Patient currently on Ensure Plus, patient have a poor appetite.  Under the care of RD.  Senior care team was made aware, stat CBC was ordered.  Negative leukocytosis.  WBC is within normal limit.  As per report, patient refused air mattress.  Educated patient on the benefit of using air mattress, patient admitted to use an air mattress.  Ordered x-ray to rule out osteomyelitis.  Patient is high risk for infection.  Follow-up next week

## 2024-01-31 NOTE — ASSESSMENT & PLAN NOTE
Left heel  Wound improved, increased granulation  Continue local wound care with hydraguard or CeraVe moisturizing lotion  Continue to offload  Follow-up next week

## 2024-02-01 ENCOUNTER — NURSING HOME VISIT (OUTPATIENT)
Dept: GERIATRICS | Facility: OTHER | Age: 89
End: 2024-02-01

## 2024-02-01 VITALS
RESPIRATION RATE: 18 BRPM | OXYGEN SATURATION: 96 % | DIASTOLIC BLOOD PRESSURE: 64 MMHG | SYSTOLIC BLOOD PRESSURE: 102 MMHG | HEART RATE: 98 BPM | TEMPERATURE: 97.8 F

## 2024-02-01 DIAGNOSIS — N17.8 SEPSIS DUE TO ESCHERICHIA COLI WITH OTHER ACUTE RENAL FAILURE AND SEPTIC SHOCK (HCC): ICD-10-CM

## 2024-02-01 DIAGNOSIS — N18.31 STAGE 3A CHRONIC KIDNEY DISEASE (HCC): Chronic | ICD-10-CM

## 2024-02-01 DIAGNOSIS — J96.21 ACUTE ON CHRONIC RESPIRATORY FAILURE WITH HYPOXIA (HCC): ICD-10-CM

## 2024-02-01 DIAGNOSIS — R26.2 AMBULATORY DYSFUNCTION: Primary | ICD-10-CM

## 2024-02-01 DIAGNOSIS — I89.0 LYMPHEDEMA: ICD-10-CM

## 2024-02-01 DIAGNOSIS — R62.7 FAILURE TO THRIVE IN ADULT: ICD-10-CM

## 2024-02-01 DIAGNOSIS — R65.21 SEPSIS DUE TO ESCHERICHIA COLI WITH OTHER ACUTE RENAL FAILURE AND SEPTIC SHOCK (HCC): ICD-10-CM

## 2024-02-01 DIAGNOSIS — A41.51 SEPSIS DUE TO ESCHERICHIA COLI WITH OTHER ACUTE RENAL FAILURE AND SEPTIC SHOCK (HCC): ICD-10-CM

## 2024-02-01 PROCEDURE — 99309 SBSQ NF CARE MODERATE MDM 30: CPT

## 2024-02-01 NOTE — ASSESSMENT & PLAN NOTE
Patient has had decrease in appetite over the last few days  BP low, HR elevated 110  Discussed starting Mirtazapine as appetite stimulant with patients daughter, she would prefer to wait at this time  Started on clysis last night 1/2 NSS at 60 ml/hour x 3 L  Patient reports feeling a little better today  Encourage PO intake  Followed by palliative care  Continue to monitor

## 2024-02-01 NOTE — ASSESSMENT & PLAN NOTE
Lab Results   Component Value Date    EGFR 55 01/16/2024    EGFR 49 01/14/2024    EGFR 48 01/13/2024    CREATININE 0.89 01/16/2024    CREATININE 0.98 01/14/2024    CREATININE 0.99 01/13/2024 1/31/24 Cr 0.9/ bun 13/gfr 58  Avoid nephrotoxins  Encourage adequate hydration  Avoid hypotension  Monitor kidney functions, BMP 1/31/24

## 2024-02-01 NOTE — PROGRESS NOTES
[facility]  [address] [facility] [address] [phone]  [place] [facility]  Code 31 (STR)        NAME: Aurora Anderson  AGE: 89 y.o. SEX: female CODE STATUS: No CPR    DATE OF ENCOUNTER: 2/1/2024    Assessment and Plan     1. Ambulatory dysfunction  Assessment & Plan:  Multifactorial  Continue PT/OT  Fall Precautions  Ensure adequate nutrition/hydration   Monitor CBC/BMP    following for d/c planning        2. Lymphedema  Assessment & Plan:  Chronic B/L LE edema  Improved with diuresis  Continue moisturizing lotion daily  Would benefit from lymphedema clinic as outpatient   Continue Lasix 20 mg QOD, with potassium supplement      3. Sepsis due to Escherichia coli with other acute renal failure and septic shock (HCC)  Assessment & Plan:  Recent hospitalization for septic shock d/t UTI  Urine culture positive for Proteus and E.coli, Bld Cultures no growth  Patient treated with ceftriaxone, azithromycin  Continue to monitor      4. Stage 3a chronic kidney disease (HCC)  Assessment & Plan:  Lab Results   Component Value Date    EGFR 55 01/16/2024    EGFR 49 01/14/2024    EGFR 48 01/13/2024    CREATININE 0.89 01/16/2024    CREATININE 0.98 01/14/2024    CREATININE 0.99 01/13/2024 1/31/24 Cr 0.9/ bun 13/gfr 58  Avoid nephrotoxins  Encourage adequate hydration  Avoid hypotension  Monitor kidney functions, BMP 1/31/24      5. Acute on chronic respiratory failure with hypoxia (HCC)  Assessment & Plan:  Recently hospitalized d/t septic shock d/t UTI  CXR showed pulmonary edema  Placed on Oxygen and was given diuretics for CHF exacerbation   Continues on O2 NC 95%  Continue Lasix 20 mg, potassium supplement QOD      6. Failure to thrive in adult  Assessment & Plan:  Patient has had decrease in appetite over the last few days  BP low, HR elevated 110  Discussed starting Mirtazapine as appetite stimulant with patients daughter, she would prefer to wait at this time  Started on clysis last night  1/2 NSS at 60 ml/hour x 3 L  Patient reports feeling a little better today  Encourage PO intake  Followed by palliative care  Continue to monitor           All medications and routine orders were reviewed and updated as needed.    Chief Complaint     STR follow up visit    Patient's care was coordinated with nursing facility staff. Recent vitals, labs, and updated medications were review on [facility] system in facility.  Past Medical and Surgical History      Past Medical History:   Diagnosis Date    Acute kidney injury (HCC)     Acute on chronic respiratory failure (HCC)     Arthritis     Atrial flutter (HCC)     Rapid heart rate      Past Surgical History:   Procedure Laterality Date    CATARACT EXTRACTION      EGD AND COLONOSCOPY N/A 11/14/2017    Procedure: EGD AND COLONOSCOPY;  Surgeon: Radha Salas MD;  Location: [facility];  Service: Gastroenterology    HIP FRACTURE SURGERY       Allergies   Allergen Reactions    Penicillins Rash          History of Present Illness     HPI  Aurora Anderson is a 89 year old female, she is a STR patient of [facility] since 1/16/24. Past Medical Hx including but not limited to A flutter, lymphedema, CHF, CKD, anemia. She was seen in collaboration with nursing for medical mgmt and STR follow up.   Hospital Course  Patient was admitted to the hospital 1/4/24 for septic shock due to UTI. Urine culture grew Proteus and E. Coli. Patient received IV epinephrine for suspected BRASH, and was supplied 30 cc/kg of IV fluids and was transferred to the ICU. Patient received Ceftriaxone and azithromycin. Patient was weaned off pressors and was downgraded to medsurg. Patient developed HOSSEIN and Lasix was held. Patient developed respiratory failure requiring diuresis. Patient also had episode of anemia with Hgb of 6.9 with no sign of active bleeding, patient received 1 unit PRBC.  Patient was discharged to [facility].  Rehab Course   Aurora was seen and examined at bedside today.  Patient is AAOx 3. On exam patient is resting in bed and does not appear to be in distress.  Patient has had decreased PO intake for the last 2-3 days.  BP had been lower 85-90/50-60's, HR was 110.  Patient was started on clysis 1/2 NSS at 60 mls per hour x 3 L. Some improvement today, patient reports feeling a little better today. Discussed POC with patients daughter.  We talked about starting Mirtazapine 7.5 mg at HS for appetite stimulant, she would prefer to wait and see how she does. Will continue to monitor.   She denies CP/SOB/N/V/D. Denies lightheadedness, dizziness, headaches, vision changes. Patient states they are eating well and staying hydrated. Denies any bowel or bladder issues. Per review of SNF records, Patient is eating 3 meals per day, consuming 25-50 %. Last documented BM 1/30/24. No concerns from nursing at this time.    The patient's allergies, past medical, surgical, social and family history were reviewed and unchanged.    Review of Systems     Review of Systems   Constitutional:  Positive for activity change and appetite change. Negative for fever.   HENT: Negative.  Negative for congestion, sneezing and sore throat.    Eyes: Negative.    Respiratory: Negative.  Negative for cough and shortness of breath.    Cardiovascular:  Positive for leg swelling. Negative for chest pain and palpitations.   Gastrointestinal:  Negative for abdominal distention, constipation, diarrhea, nausea and vomiting.   Endocrine: Negative.    Genitourinary: Negative.  Negative for difficulty urinating and dysuria.   Musculoskeletal:  Positive for gait problem.   Skin:  Positive for wound.        Sacral wound, lymphedema b/l LE   Allergic/Immunologic: Negative.    Neurological:  Positive for weakness. Negative for dizziness and headaches.   Hematological: Negative.    Psychiatric/Behavioral:  Negative for sleep disturbance.          Objective     Vitals:   Vitals:    02/01/24 1013   BP: 102/64   Pulse: 98   Resp: 18    Temp: 97.8 °F (36.6 °C)   SpO2: 96%           Physical Exam  Vitals and nursing note reviewed.   Constitutional:       General: She is not in acute distress.     Appearance: Normal appearance. She is not ill-appearing.   HENT:      Head: Normocephalic and atraumatic.      Nose: No congestion.   Eyes:      Conjunctiva/sclera: Conjunctivae normal.   Cardiovascular:      Rate and Rhythm: Rhythm irregular.      Pulses: Normal pulses.      Heart sounds: Normal heart sounds.   Pulmonary:      Effort: Pulmonary effort is normal. No respiratory distress.      Breath sounds: Normal breath sounds. No wheezing.   Abdominal:      General: Bowel sounds are normal. There is no distension.      Palpations: Abdomen is soft.      Tenderness: There is no abdominal tenderness.   Musculoskeletal:      Right lower leg: Edema present.      Left lower leg: Edema present.      Comments: Chronic lymphedema   Skin:     General: Skin is warm.      Findings: Bruising present.      Comments: Sacral wound   Neurological:      Mental Status: She is alert and oriented to person, place, and time.      Motor: Weakness present.      Gait: Gait abnormal.   Psychiatric:         Mood and Affect: Mood normal.         Behavior: Behavior normal.         Pertinent Laboratory/Diagnostic Studies:   Reviewed in facility chart-stable      Current Medications   Medications reviewed and updated see facility MAR for details.      Current Outpatient Medications:     acetaminophen (TYLENOL) 325 mg tablet, Take 2 tablets (650 mg total) by mouth every 6 (six) hours as needed for mild pain, Disp: , Rfl:     collagenase (SANTYL) ointment, Apply 1 Application topically daily, Disp: , Rfl:     diltiazem (CARDIZEM CD) 180 mg 24 hr capsule, TAKE 1 CAPSULE(180 MG) BY MOUTH DAILY, Disp: 90 capsule, Rfl: 0    Emollient (CeraVe Daily Moisturizing) LOTN, Apply 1 Application topically in the morning, Disp: , Rfl:     ferrous sulfate 325 (65 Fe) mg tablet, Take 1 tablet (325 mg  "total) by mouth daily with breakfast, Disp: 30 tablet, Rfl: 0    furosemide (LASIX) 20 mg tablet, Take 1 tablet (20 mg total) by mouth every other day, Disp: , Rfl:     metoprolol succinate (TOPROL-XL) 25 mg 24 hr tablet, Take 1 tablet (25 mg total) by mouth daily, Disp: , Rfl:     Nutritional Supplements (ProSource Plus) LIQD, Take 30 mL by mouth in the morning, Disp: , Rfl:     potassium chloride (MICRO-K) 10 MEQ CR capsule, Take 1 capsule (10 mEq total) by mouth every other day, Disp: , Rfl:      Please note:  Voice-recognition software may have been used in the preparation of this document.  Occasional wrong word or \"sound-alike\" substitutions may have occurred due to the inherent limitations of voice recognition software.  Interpretation should be guided by context.         AZRA Lewis  2/1/2024  2:05 PM    "

## 2024-02-05 ENCOUNTER — NURSING HOME VISIT (OUTPATIENT)
Dept: GERIATRICS | Facility: OTHER | Age: 89
End: 2024-02-05

## 2024-02-05 VITALS
RESPIRATION RATE: 18 BRPM | DIASTOLIC BLOOD PRESSURE: 65 MMHG | OXYGEN SATURATION: 96 % | TEMPERATURE: 97.8 F | HEART RATE: 64 BPM | SYSTOLIC BLOOD PRESSURE: 104 MMHG

## 2024-02-05 DIAGNOSIS — A41.51 SEPSIS DUE TO ESCHERICHIA COLI WITH OTHER ACUTE RENAL FAILURE AND SEPTIC SHOCK (HCC): ICD-10-CM

## 2024-02-05 DIAGNOSIS — N17.8 SEPSIS DUE TO ESCHERICHIA COLI WITH OTHER ACUTE RENAL FAILURE AND SEPTIC SHOCK (HCC): ICD-10-CM

## 2024-02-05 DIAGNOSIS — J96.21 ACUTE ON CHRONIC RESPIRATORY FAILURE WITH HYPOXIA (HCC): ICD-10-CM

## 2024-02-05 DIAGNOSIS — R26.2 AMBULATORY DYSFUNCTION: ICD-10-CM

## 2024-02-05 DIAGNOSIS — R62.7 FAILURE TO THRIVE IN ADULT: ICD-10-CM

## 2024-02-05 DIAGNOSIS — R65.21 SEPSIS DUE TO ESCHERICHIA COLI WITH OTHER ACUTE RENAL FAILURE AND SEPTIC SHOCK (HCC): ICD-10-CM

## 2024-02-05 DIAGNOSIS — I48.4 ATYPICAL ATRIAL FLUTTER (HCC): Primary | Chronic | ICD-10-CM

## 2024-02-05 DIAGNOSIS — I50.32 CHRONIC HEART FAILURE WITH PRESERVED EJECTION FRACTION (HCC): Chronic | ICD-10-CM

## 2024-02-05 DIAGNOSIS — N18.31 STAGE 3A CHRONIC KIDNEY DISEASE (HCC): Chronic | ICD-10-CM

## 2024-02-05 DIAGNOSIS — I89.0 LYMPHEDEMA: ICD-10-CM

## 2024-02-05 PROCEDURE — 99309 SBSQ NF CARE MODERATE MDM 30: CPT

## 2024-02-05 NOTE — ASSESSMENT & PLAN NOTE
Patient continues with poor appetite, consuming approximately 25 % of meals  Had discussed starting Mirtazapine as appetite stimulant with patients daughter, she declined at this time  Patient will eat more of the food her daughter brings in  Patient with stage IV sacral wound  Encourage PO intake  Followed by palliative care  Continue to monitor

## 2024-02-05 NOTE — ASSESSMENT & PLAN NOTE
Recently hospitalized d/t septic shock 2/2 UTI  CXR showed pulmonary edema  Placed on Oxygen and was given diuretics for CHF exacerbation   Continues on O2 NC 96%  Continue Lasix 20 mg, potassium supplement QOD

## 2024-02-05 NOTE — PROGRESS NOTES
[facility]  [address] [address] [phone]  [place] [facility]  Code 31 (STR)        NAME: Aurora Anderson  AGE: 89 y.o. SEX: female CODE STATUS: No CPR    DATE OF ENCOUNTER: 2/5/2024    Assessment and Plan     1. Atypical atrial flutter (HCC)  Assessment & Plan:  HR controlled, 64  Denies cp/palpitations  Toprol XL 25 mg daily  Continue diltiazem 180 mg daily        2. Acute on chronic respiratory failure with hypoxia (HCC)  Assessment & Plan:  Recently hospitalized d/t septic shock 2/2 UTI  CXR showed pulmonary edema  Placed on Oxygen and was given diuretics for CHF exacerbation   Continues on O2 NC 96%  Continue Lasix 20 mg, potassium supplement QOD      3. Chronic heart failure with preserved ejection fraction (HCC)  Assessment & Plan:  Wt Readings from Last 3 Encounters:   01/30/24 62.7 kg (138 lb 3.2 oz)   01/18/24 64.6 kg (142 lb 6.4 oz)   01/17/24 64.6 kg (142 lb 6.4 oz)   1/5/24 Echo showing EF of 55%  Weight stable  B/l LE lymphedema  Patient reports improvement in swelling since hospitalization  Continue Metoprolol ER 25 mg daily  Continue Lasix 20 mg QOD with potassium supplement  Continue weekly weights  Continue 2000 ml FR        4. Stage 3a chronic kidney disease (HCC)  Assessment & Plan:  Lab Results   Component Value Date    EGFR 55 01/16/2024    EGFR 49 01/14/2024    EGFR 48 01/13/2024    CREATININE 0.89 01/16/2024    CREATININE 0.98 01/14/2024    CREATININE 0.99 01/13/2024 1/31/24 Cr 0.9/ bun 13/gfr 58  Avoid nephrotoxins  Encourage adequate hydration  Avoid hypotension  Monitor kidney functions      5. Failure to thrive in adult  Assessment & Plan:  Patient continues with poor appetite, consuming approximately 25 % of meals  Had discussed starting Mirtazapine as appetite stimulant with patients daughter, she declined at this time  Patient will eat more of the food her daughter brings in  Patient with stage IV sacral wound  Encourage PO intake  Followed by  palliative care  Continue to monitor      6. Ambulatory dysfunction  Assessment & Plan:  Multifactorial  Continue PT/OT  Fall Precautions  Ensure adequate nutrition/hydration   Monitor CBC/BMP    following for d/c planning        7. Lymphedema  Assessment & Plan:  Chronic B/L LE edema  Improved with diuresis  Continue moisturizing lotion daily  Would benefit from lymphedema clinic as outpatient   Continue Lasix 20 mg QOD, with potassium supplement      8. Sepsis due to Escherichia coli with other acute renal failure and septic shock (HCC)  Assessment & Plan:  Recent hospitalization for septic shock d/t UTI  Urine culture positive for Proteus and E.coli, Bld Cultures no growth  Patient treated with ceftriaxone, azithromycin  Continue to monitor           All medications and routine orders were reviewed and updated as needed.    Chief Complaint     STR follow up visit    Patient's care was coordinated with nursing facility staff. Recent vitals, labs, and updated medications were review on [facility] system in facility.  Past Medical and Surgical History      Past Medical History:   Diagnosis Date    Acute kidney injury (HCC)     Acute on chronic respiratory failure (HCC)     Arthritis     Atrial flutter (HCC)     Rapid heart rate      Past Surgical History:   Procedure Laterality Date    CATARACT EXTRACTION      EGD AND COLONOSCOPY N/A 11/14/2017    Procedure: EGD AND COLONOSCOPY;  Surgeon: Radha Salas MD;  Location[facility];  Service: Gastroenterology    HIP FRACTURE SURGERY       Allergies   Allergen Reactions    Penicillins Rash          History of Present Illness     HPI  Aurora Anderson is a 89 year old female, she is a STR patient of [facility] since 1/16/24. Past Medical Hx including but not limited to A flutter, lymphedema, CHF, CKD, anemia. She was seen in collaboration with nursing for medical mgmt and STR follow up.   Hospital Course  Patient was admitted to the  hospital 1/4/24 for septic shock due to UTI. Urine culture grew Proteus and E. Coli. Patient received IV epinephrine for suspected BRASH, and was supplied 30 cc/kg of IV fluids and was transferred to the ICU. Patient received Ceftriaxone and azithromycin. Patient was weaned off pressors and was downgraded to medsurg. Patient developed HOSSEIN and Lasix was held. Patient developed respiratory failure requiring diuresis. Patient also had episode of anemia with Hgb of 6.9 with no sign of active bleeding, patient received 1 unit PRBC.  Patient was discharged to [facility].  Rehab Course   Aurora was seen and examined at bedside today. Patient is AAOx 3. On exam patient is resting in bed and does not appear to be in distress. Patient c/o pain at sacral wound, stage IV.  Patient has not been routinely taking PRN pain meds.  Patient followed by wound NP, bedside debridement done last week.  Xray done was negative for osteomyelitis, and WBC 7.7, VS have stable. Patients daughter would like wound culture done with next wound rounds this Wednesday. Patient continues to have poor appetite, continue prosource and supplements. Patient states she was having therapy today.  She denies CP/SOB/N/V/D. Denies lightheadedness, dizziness, headaches, vision changes. Denies any bowel or bladder issues. Per review of SNF records, Patient is eating 3 meals per day, consuming 25-50 %. Last documented BM 2/5/24. No concerns from nursing at this time.    The patient's allergies, past medical, surgical, social and family history were reviewed and unchanged.    Review of Systems     Review of Systems   Constitutional:  Positive for activity change and appetite change. Negative for fever.   HENT: Negative.  Negative for congestion, sneezing and sore throat.    Eyes: Negative.    Respiratory: Negative.  Negative for cough and shortness of breath.    Cardiovascular:  Positive for leg swelling. Negative for chest pain and palpitations.   Gastrointestinal:   Negative for abdominal distention, constipation, diarrhea, nausea and vomiting.   Endocrine: Negative.    Genitourinary: Negative.  Negative for difficulty urinating and dysuria.   Musculoskeletal:  Positive for gait problem.   Skin:  Positive for wound.        Sacral wound, lymphedema b/l LE   Allergic/Immunologic: Negative.    Neurological:  Positive for weakness. Negative for dizziness and headaches.   Hematological: Negative.    Psychiatric/Behavioral:  Negative for sleep disturbance.          Objective     Vitals:   Vitals:    02/05/24 1114   BP: 104/65   Pulse: 64   Resp: 18   Temp: 97.8 °F (36.6 °C)   SpO2: 96%             Physical Exam  Vitals and nursing note reviewed.   Constitutional:       General: She is not in acute distress.     Appearance: Normal appearance. She is not ill-appearing.   HENT:      Head: Normocephalic and atraumatic.      Nose: No congestion.   Eyes:      Conjunctiva/sclera: Conjunctivae normal.   Cardiovascular:      Rate and Rhythm: Rhythm irregular.      Pulses: Normal pulses.      Heart sounds: Normal heart sounds.   Pulmonary:      Effort: Pulmonary effort is normal. No respiratory distress.      Breath sounds: Normal breath sounds. No wheezing.   Abdominal:      General: Bowel sounds are normal. There is no distension.      Palpations: Abdomen is soft.      Tenderness: There is no abdominal tenderness.   Musculoskeletal:      Right lower leg: Edema present.      Left lower leg: Edema present.      Comments: Chronic lymphedema   Skin:     General: Skin is warm.      Findings: Bruising present.      Comments: Sacral wound   Neurological:      Mental Status: She is alert and oriented to person, place, and time.      Motor: Weakness present.      Gait: Gait abnormal.   Psychiatric:         Mood and Affect: Mood normal.         Behavior: Behavior normal.         Pertinent Laboratory/Diagnostic Studies:   Reviewed in facility chart-stable      Current Medications   Medications reviewed  "and updated see facility MAR for details.      Current Outpatient Medications:     acetaminophen (TYLENOL) 325 mg tablet, Take 2 tablets (650 mg total) by mouth every 6 (six) hours as needed for mild pain, Disp: , Rfl:     collagenase (SANTYL) ointment, Apply 1 Application topically daily, Disp: , Rfl:     diltiazem (CARDIZEM CD) 180 mg 24 hr capsule, TAKE 1 CAPSULE(180 MG) BY MOUTH DAILY, Disp: 90 capsule, Rfl: 0    Emollient (CeraVe Daily Moisturizing) LOTN, Apply 1 Application topically in the morning, Disp: , Rfl:     ferrous sulfate 325 (65 Fe) mg tablet, Take 1 tablet (325 mg total) by mouth daily with breakfast, Disp: 30 tablet, Rfl: 0    furosemide (LASIX) 20 mg tablet, Take 1 tablet (20 mg total) by mouth every other day, Disp: , Rfl:     metoprolol succinate (TOPROL-XL) 25 mg 24 hr tablet, Take 1 tablet (25 mg total) by mouth daily, Disp: , Rfl:     Nutritional Supplements (ProSource Plus) LIQD, Take 30 mL by mouth in the morning, Disp: , Rfl:     potassium chloride (MICRO-K) 10 MEQ CR capsule, Take 1 capsule (10 mEq total) by mouth every other day, Disp: , Rfl:      Please note:  Voice-recognition software may have been used in the preparation of this document.  Occasional wrong word or \"sound-alike\" substitutions may have occurred due to the inherent limitations of voice recognition software.  Interpretation should be guided by context.         AZRA Lewis  2/5/2024  1:48 PM    "

## 2024-02-05 NOTE — ASSESSMENT & PLAN NOTE
HR controlled, 64  Denies cp/palpitations  Toprol XL 25 mg daily  Continue diltiazem 180 mg daily

## 2024-02-05 NOTE — ASSESSMENT & PLAN NOTE
Lab Results   Component Value Date    EGFR 55 01/16/2024    EGFR 49 01/14/2024    EGFR 48 01/13/2024    CREATININE 0.89 01/16/2024    CREATININE 0.98 01/14/2024    CREATININE 0.99 01/13/2024 1/31/24 Cr 0.9/ bun 13/gfr 58  Avoid nephrotoxins  Encourage adequate hydration  Avoid hypotension  Monitor kidney functions

## 2024-02-07 ENCOUNTER — NURSING HOME VISIT (OUTPATIENT)
Dept: GERIATRICS | Facility: OTHER | Age: 89
End: 2024-02-07

## 2024-02-07 ENCOUNTER — NURSING HOME VISIT (OUTPATIENT)
Dept: WOUND CARE | Facility: HOSPITAL | Age: 89
End: 2024-02-07

## 2024-02-07 VITALS
OXYGEN SATURATION: 96 % | RESPIRATION RATE: 18 BRPM | SYSTOLIC BLOOD PRESSURE: 106 MMHG | TEMPERATURE: 97.8 F | HEART RATE: 66 BPM | WEIGHT: 128 LBS | BODY MASS INDEX: 21.3 KG/M2 | DIASTOLIC BLOOD PRESSURE: 66 MMHG

## 2024-02-07 DIAGNOSIS — L89.890 PRESSURE INJURY OF LEFT FOOT, UNSTAGEABLE (HCC): ICD-10-CM

## 2024-02-07 DIAGNOSIS — J96.21 ACUTE ON CHRONIC RESPIRATORY FAILURE WITH HYPOXIA (HCC): ICD-10-CM

## 2024-02-07 DIAGNOSIS — I48.4 ATYPICAL ATRIAL FLUTTER (HCC): Primary | Chronic | ICD-10-CM

## 2024-02-07 DIAGNOSIS — R62.7 FAILURE TO THRIVE IN ADULT: ICD-10-CM

## 2024-02-07 DIAGNOSIS — R26.2 AMBULATORY DYSFUNCTION: ICD-10-CM

## 2024-02-07 DIAGNOSIS — L89.154 PRESSURE INJURY OF SACRAL REGION, STAGE 4 (HCC): ICD-10-CM

## 2024-02-07 DIAGNOSIS — I89.0 LYMPHEDEMA: ICD-10-CM

## 2024-02-07 DIAGNOSIS — N18.31 STAGE 3A CHRONIC KIDNEY DISEASE (HCC): Chronic | ICD-10-CM

## 2024-02-07 DIAGNOSIS — D63.1 ANEMIA DUE TO STAGE 3A CHRONIC KIDNEY DISEASE: ICD-10-CM

## 2024-02-07 DIAGNOSIS — N18.31 ANEMIA DUE TO STAGE 3A CHRONIC KIDNEY DISEASE: ICD-10-CM

## 2024-02-07 DIAGNOSIS — L89.154 PRESSURE INJURY OF SACRAL REGION, STAGE 4 (HCC): Primary | ICD-10-CM

## 2024-02-07 DIAGNOSIS — I50.32 CHRONIC HEART FAILURE WITH PRESERVED EJECTION FRACTION (HCC): Chronic | ICD-10-CM

## 2024-02-07 PROCEDURE — 99308 SBSQ NF CARE LOW MDM 20: CPT | Performed by: NURSE PRACTITIONER

## 2024-02-07 PROCEDURE — 99309 SBSQ NF CARE MODERATE MDM 30: CPT

## 2024-02-07 PROCEDURE — 97597 DBRDMT OPN WND 1ST 20 CM/<: CPT | Performed by: NURSE PRACTITIONER

## 2024-02-07 NOTE — ASSESSMENT & PLAN NOTE
Sacral wound  Wound  improved compared to last week, decreasing necrotic tissue.  Increased granulation  Selective debridement performed  Continue local wound care with wet-to-dry with Dakin's quarter strength  Patient recently lose weight weight on January 23, 2024 is 138.2 compared to weight on January 16 which is 142.4.  Patient currently on Ensure Plus, patient have a poor appetite.  Under the care of ADRIANA.  Result of the xray on 1/31 showed (-) osteomyelitis  Wound culture ordered.   I spoke to patient's daughter and provided update on the wound.  Daughter is aware that it will take time to heal the wound, probably a year.  I also discussed with patient the possibility of starting wound VAC depending on wound presentation on next visit.  Patient is also concerned with infection.    Follow-up next week

## 2024-02-07 NOTE — ASSESSMENT & PLAN NOTE
Recently hospitalized d/t septic shock 2/2 UTI  CXR showed pulmonary edema  Placed on Oxygen and was given diuretics for CHF exacerbation   Continues on O2 NC 96%, LS decreased  Continue Lasix 20 mg, potassium supplement QOD

## 2024-02-07 NOTE — PROGRESS NOTES
[facility]  [address] [place] [address] [phone]  [facility]  Code 31 (STR)        NAME: Aurora Anderson  AGE: 89 y.o. SEX: female CODE STATUS: No CPR    DATE OF ENCOUNTER: 2/7/2024    Assessment and Plan     1. Atypical atrial flutter (HCC)  Assessment & Plan:  HR controlled, 66  Denies cp/palpitations  Toprol XL 25 mg daily  Continue diltiazem 180 mg daily        2. Chronic heart failure with preserved ejection fraction (HCC)  Assessment & Plan:  Wt Readings from Last 3 Encounters:   02/07/24 58.1 kg (128 lb)   01/30/24 62.7 kg (138 lb 3.2 oz)   01/18/24 64.6 kg (142 lb 6.4 oz)   1/5/24 Echo showing EF of 55%  Weight stable  B/l LE lymphedema stable  Continue Metoprolol ER 25 mg daily  Continue Lasix 20 mg QOD with potassium supplement  Continue weekly weights  Continue 2000 ml FR        3. Stage 3a chronic kidney disease (HCC)  Assessment & Plan:  Lab Results   Component Value Date    EGFR 55 01/16/2024    EGFR 49 01/14/2024    EGFR 48 01/13/2024    CREATININE 0.89 01/16/2024    CREATININE 0.98 01/14/2024    CREATININE 0.99 01/13/2024 1/31/24 Cr 0.9/ bun 13/gfr 58  Avoid nephrotoxins  Encourage adequate hydration  Avoid hypotension  Monitor kidney functions      4. Acute on chronic respiratory failure with hypoxia (HCC)  Assessment & Plan:  Recently hospitalized d/t septic shock 2/2 UTI  CXR showed pulmonary edema  Placed on Oxygen and was given diuretics for CHF exacerbation   Continues on O2 NC 96%, LS decreased  Continue Lasix 20 mg, potassium supplement QOD      5. Lymphedema  Assessment & Plan:  Chronic B/L LE lymphedema  Continue moisturizing lotion daily  Would benefit from lymphedema clinic as outpatient   Continue Lasix 20 mg QOD, with potassium supplement      6. Anemia due to stage 3a chronic kidney disease   Assessment & Plan:  1/31/24 H/H 8.8/30  Continue ferrous sulfate daily  Continue to trend hgb      7. Ambulatory dysfunction  Assessment & Plan:  Multifactorial  in the setting of advanced age and acute/chronic medical conditions  Continue PT/OT  Fall Precautions  Ensure adequate nutrition/hydration   Monitor CBC/BMP    following for d/c planning        8. Pressure injury of sacral region, stage 4 (HCC)  Assessment & Plan:  Followed by wound NP  Xray negative for osteomyelitis  1/31/24 WBC 7.7  Continue to offload, local wound care      9. Failure to thrive in adult  Assessment & Plan:  Appetite has slightly improved, consuming 50% of meals  Had discussed starting Mirtazapine as appetite stimulant with patients daughter, she declined at this time  Patient will eat more of the food her daughter brings in  Patient with stage IV sacral wound  Encourage PO intake  Followed by palliative care  Continue to monitor           All medications and routine orders were reviewed and updated as needed.    Chief Complaint     STR follow up visit    Patient's care was coordinated with nursing facility staff. Recent vitals, labs, and updated medications were review on [facility] system in facility.  Past Medical and Surgical History      Past Medical History:   Diagnosis Date    Acute kidney injury (HCC)     Acute on chronic respiratory failure (HCC)     Arthritis     Atrial flutter (HCC)     Rapid heart rate      Past Surgical History:   Procedure Laterality Date    CATARACT EXTRACTION      EGD AND COLONOSCOPY N/A 11/14/2017    Procedure: EGD AND COLONOSCOPY;  Surgeon: Radha Salas MD;  Location: [facility];  Service: Gastroenterology    HIP FRACTURE SURGERY       Allergies   Allergen Reactions    Penicillins Rash          History of Present Illness     HPI  Aurora Anderson is a 89 year old female, she is a STR patient of [facility] since 1/16/24. Past Medical Hx including but not limited to A flutter, lymphedema, CHF, CKD, anemia. She was seen in collaboration with nursing for medical mgmt and STR follow up.     Hospital Course  Patient was admitted to  the hospital 1/4/24 for septic shock due to UTI. Urine culture grew Proteus and E. Coli. Patient received IV epinephrine for suspected BRASH, and was supplied 30 cc/kg of IV fluids and was transferred to the ICU. Patient received Ceftriaxone and azithromycin. Patient was weaned off pressors and was downgraded to medsurg. Patient developed HOSSEIN and Lasix was held. Patient developed respiratory failure requiring diuresis. Patient also had episode of anemia with Hgb of 6.9 with no sign of active bleeding, patient received 1 unit PRBC.  Patient was discharged to [facility].    Rehab Course   Aurora was seen and examined at bedside today. Patient is AAOx 3. On exam patient is in her chair having lunch and does not appear to be in distress. Patient c/o pain at sacral wound, stage IV.  Patient has not been routinely taking PRN pain meds.  Patient followed by wound NP, bedside debridement done last week, continue local wound care, offloading.  Xray done was negative for osteomyelitis, and WBC 7.7, VS have stable. Patients daughter would like wound culture done with next wound rounds this Wednesday. Patient appetite slightly improved, continue prosource and supplements. She denies CP/SOB/N/V/D. Denies lightheadedness, dizziness, headaches, vision changes. Denies any bowel or bladder issues. Per review of SNF records, Patient is eating 2-3 meals per day, consuming 50 %. Last documented BM 2/7/24. No concerns from nursing at this time.    The patient's allergies, past medical, surgical, social and family history were reviewed and unchanged.    Review of Systems     Review of Systems   Constitutional:  Positive for activity change and appetite change. Negative for fever.   HENT: Negative.  Negative for congestion, sneezing and sore throat.    Eyes: Negative.    Respiratory: Negative.  Negative for cough and shortness of breath.    Cardiovascular:  Positive for leg swelling. Negative for chest pain and palpitations.   Gastrointestinal:   Negative for abdominal distention, constipation, diarrhea, nausea and vomiting.   Endocrine: Negative.    Genitourinary: Negative.  Negative for difficulty urinating and dysuria.   Musculoskeletal:  Positive for gait problem.   Skin:  Positive for wound.        Sacral wound, lymphedema b/l LE   Allergic/Immunologic: Negative.    Neurological:  Positive for weakness. Negative for dizziness and headaches.   Hematological: Negative.    Psychiatric/Behavioral:  Negative for sleep disturbance.          Objective     Vitals:   Vitals:    02/07/24 1631   BP: 106/66   Pulse: 66   Resp: 18   Temp: 97.8 °F (36.6 °C)   SpO2: 96%     Physical Exam  Vitals and nursing note reviewed.   Constitutional:       General: She is not in acute distress.     Appearance: Normal appearance. She is not ill-appearing.   HENT:      Head: Normocephalic and atraumatic.      Nose: No congestion.   Eyes:      Conjunctiva/sclera: Conjunctivae normal.   Cardiovascular:      Rate and Rhythm: Rhythm irregular.      Pulses: Normal pulses.      Heart sounds: Normal heart sounds.   Pulmonary:      Effort: Pulmonary effort is normal. No respiratory distress.      Breath sounds: Normal breath sounds. No wheezing.   Abdominal:      General: Bowel sounds are normal. There is no distension.      Palpations: Abdomen is soft.      Tenderness: There is no abdominal tenderness.   Musculoskeletal:      Right lower leg: Edema present.      Left lower leg: Edema present.      Comments: Chronic lymphedema   Skin:     General: Skin is warm.      Findings: Bruising present.      Comments: Sacral wound   Neurological:      Mental Status: She is alert and oriented to person, place, and time.      Motor: Weakness present.      Gait: Gait abnormal.   Psychiatric:         Mood and Affect: Mood normal.         Behavior: Behavior normal.         Pertinent Laboratory/Diagnostic Studies:   Reviewed in facility chart-stable      Current Medications   Medications reviewed and  "updated see facility MAR for details.      Current Outpatient Medications:     acetaminophen (TYLENOL) 325 mg tablet, Take 2 tablets (650 mg total) by mouth every 6 (six) hours as needed for mild pain, Disp: , Rfl:     diltiazem (CARDIZEM CD) 180 mg 24 hr capsule, TAKE 1 CAPSULE(180 MG) BY MOUTH DAILY, Disp: 90 capsule, Rfl: 0    Emollient (CeraVe Daily Moisturizing) LOTN, Apply 1 Application topically in the morning, Disp: , Rfl:     ferrous sulfate 325 (65 Fe) mg tablet, Take 1 tablet (325 mg total) by mouth daily with breakfast, Disp: 30 tablet, Rfl: 0    furosemide (LASIX) 20 mg tablet, Take 1 tablet (20 mg total) by mouth every other day, Disp: , Rfl:     metoprolol succinate (TOPROL-XL) 25 mg 24 hr tablet, Take 1 tablet (25 mg total) by mouth daily, Disp: , Rfl:     Nutritional Supplements (ProSource Plus) LIQD, Take 30 mL by mouth in the morning, Disp: , Rfl:     potassium chloride (MICRO-K) 10 MEQ CR capsule, Take 1 capsule (10 mEq total) by mouth every other day, Disp: , Rfl:      Please note:  Voice-recognition software may have been used in the preparation of this document.  Occasional wrong word or \"sound-alike\" substitutions may have occurred due to the inherent limitations of voice recognition software.  Interpretation should be guided by context.         AZRA Lewis  2/7/2024  4:45 PM    "

## 2024-02-07 NOTE — ASSESSMENT & PLAN NOTE
Multifactorial in the setting of advanced age and acute/chronic medical conditions  Continue PT/OT  Fall Precautions  Ensure adequate nutrition/hydration   Monitor CBC/BMP    following for d/c planning

## 2024-02-07 NOTE — ASSESSMENT & PLAN NOTE
HR controlled, 66  Denies cp/palpitations  Toprol XL 25 mg daily  Continue diltiazem 180 mg daily

## 2024-02-07 NOTE — ASSESSMENT & PLAN NOTE
Left heel  Wound is stable, 100% scab, no sign of infection  Continue hydraguard  Continue to offload

## 2024-02-07 NOTE — ASSESSMENT & PLAN NOTE
Wt Readings from Last 3 Encounters:   02/07/24 58.1 kg (128 lb)   01/30/24 62.7 kg (138 lb 3.2 oz)   01/18/24 64.6 kg (142 lb 6.4 oz)   1/5/24 Echo showing EF of 55%  Weight stable  B/l LE lymphedema stable  Continue Metoprolol ER 25 mg daily  Continue Lasix 20 mg QOD with potassium supplement  Continue weekly weights  Continue 2000 ml FR

## 2024-02-07 NOTE — ASSESSMENT & PLAN NOTE
Appetite has slightly improved, consuming 50% of meals  Had discussed starting Mirtazapine as appetite stimulant with patients daughter, she declined at this time  Patient will eat more of the food her daughter brings in  Patient with stage IV sacral wound  Encourage PO intake  Followed by palliative care  Continue to monitor

## 2024-02-07 NOTE — ASSESSMENT & PLAN NOTE
Chronic B/L LE lymphedema  Continue moisturizing lotion daily  Would benefit from lymphedema clinic as outpatient   Continue Lasix 20 mg QOD, with potassium supplement

## 2024-02-07 NOTE — ASSESSMENT & PLAN NOTE
Followed by wound NP  Xray negative for osteomyelitis  1/31/24 WBC 7.7  Continue to offload, local wound care

## 2024-02-07 NOTE — PROGRESS NOTES
[facility] WOUND CARE MANAGEMENT   AND HYPERBARIC MEDICINE CENTER       Patient ID: Aurora Anderson is a 89 y.o. female Date of Birth 12/13/1929     [facility] of Service: [facility]    Performed wound round with: Wound team     Chief Complaint : Sacrum, left plantar and right plantar    Wound Instructions:  Wound: Sacral  Cleanse with Dakin's quarter strength  Apply Skin-Prep to periwound area  Gently pack the wound cavity loosely with dry sterile dressing moistened with Dakin's quarter strength and cover with ABD pad  Daily and as needed for soiling    Order wound culture    Left and right plantar  Cleanse with mild soap and water  Apply CeraVe moisturizing lotion or hydraguard to bilateral lower legs including left and right plantar  Daily and as needed for soiling  Continue heel boots when in bed  Offload all wounds  Turn and reposition frequently  Monitor for infection or worsening    Allergies  Penicillins      Assessment & Plan:  1. Pressure injury of sacral region, stage 4 ([facility])  Assessment & Plan:  Sacral wound  Wound  improved compared to last week, decreasing necrotic tissue.  Increased granulation  Selective debridement performed  Continue local wound care with wet-to-dry with Dakin's quarter strength  Patient recently lose weight weight on January 23, 2024 is 138.2 compared to weight on January 16 which is 142.4.  Patient currently on Ensure Plus, patient have a poor appetite.  Under the care of ADRIANA.  Result of the xray on 1/31 showed (-) osteomyelitis  Wound culture ordered.   I spoke to patient's daughter and provided update on the wound.  Daughter is aware that it will take time to heal the wound, probably a year.  I also discussed with patient the possibility of starting wound VAC depending on wound presentation on next visit.  Patient is also concerned with infection.    Follow-up next week      2. Pressure injury of left foot, unstageable (HCC)  Assessment & Plan:  Left heel  Wound is stable,  100% scab, no sign of infection  Continue hydraguard  Continue to offload      3. Ambulatory dysfunction  Assessment & Plan:  On STR                     Subjective:   January 17, 2024.  New consult for wound to sacrum and bilateral feet.  Patient was referred by Senior care team.  Patient currently admitted at [facility] for short-term rehab.  Patient have chronic kidney disease and lymphedema.  Patient was seen with the facility wound team.    Wound history: As per medical record review, the wound on the sacrum and bilateral feet is chronic.  Patient was seen by podiatry for the wound on the bilateral feet on January 5, 2024.  Ordered to continue local wound care.  X-ray was completed on both feet, nonsignificant result.     Received patient in bed, seems comfortable.  Denies pain.  Patient have a good appetite.  Patient is minimal assistance with turning and repositioning in bed.  Patient is incontinent of both bowel and bladder.    1/24/2024 Follow up for wound on the Sacrum and bilateral feet . Received patient, not in distress. Facility staff did not report any significant issues related to the wound. Denies pain.     1/31/2024 Follow up for wound on the sacrum and bilateral feet . Received patient, not in distress. Facility staff did not report any significant issues related to the wound. As per report, patient had been refusing air mattress. Patient have a poor appetite, only ate 0-25% of her meals yesterday. Reviewed laboratory result on 1/31/2024 - WBC is 7.7.    2/7/2024 Follow up for wound on the sacrum and bilateral feet . Received patient, not in distress. Facility staff did not report any significant issues related to the wound. As per report, patient's daughter is requesting for antibiotic for the wound.               Review of Systems   Constitutional: Negative.    Respiratory: Negative.     Cardiovascular: Negative.    Musculoskeletal:  Positive for gait problem.   Skin:  Positive for wound.  "  Psychiatric/Behavioral: Negative.         Objective:    Physical Exam  Constitutional:       Appearance: Normal appearance.   Cardiovascular:      Rate and Rhythm: Normal rate.   Pulmonary:      Effort: Pulmonary effort is normal.   Genitourinary:     Comments: Incontinent  Musculoskeletal:      Right lower leg: Edema present.      Left lower leg: Edema present.      Comments: Positive lymphedema on bilateral lower leg  Dry skin   Skin:     Findings: Lesion present.      Comments: Sacrum: Wound size is ( post debridement) 2.6 x 2.2 x 3 cm., Undermining all edges,  probe to bone, with large amount of serous drainage, no malodor, no redness or erythema    Right  plantar foot: Wound is healed, dry skin     left plantar foot : Wound size is 1.3 x 0.8 cm, 100% scab, periwound is dry, with no obvious sign of infection   Neurological:      Mental Status: She is alert.              Debridement    Universal Protocol:  Consent: Verbal consent obtained.  Risks and benefits: risks, benefits and alternatives were discussed  Consent given by: patient  Time out: Immediately prior to procedure a \"time out\" was called to verify the correct patient, procedure, equipment, support staff and site/side marked as required.  Timeout called at: 2/7/2024 10:37 AM.  Patient understanding: patient states understanding of the procedure being performed  Patient identity confirmed: verbally with patient    Debridement Details  Performed by: NP (sacrum)  Debridement type: selective  Pain control: lidocaine 4%      Post-debridement measurements  Length (cm): 2.6  Width (cm): 2.2  Depth (cm): 3  Percent debrided: 50%  Surface Area (cm^2): 5.72  Area Debrided (cm^2): 2.86  Volume (cm^3): 17.16    Devitalized tissue debrided: biofilm, fibrin, necrotic debris and slough  Instrument(s) utilized: scissors  Bleeding: none  Hemostasis obtained with: not applicable  Procedural pain (0-10): 0  Post-procedural pain: 0   Response to treatment: procedure was " tolerated well               Patient's care was coordinated with nursing facility staff. Recent vitals, labs and updated medications were reviewed on EMR or chart system of facility. Past Medical, surgical, social, medication and allergy history and patient's previous records were reviewed and updated as appropriate: Most up-to date information is available in the facility EMR where the patient is currently admitted.    Patient Active Problem List   Diagnosis    Right bundle branch block (RBBB) on electrocardiogram (ECG)    Diverticulosis of colon    Large hiatal hernia    Incidental 6cm right Renal lesion on CT    Anemia    CKD (chronic kidney disease) stage 3, GFR 30-59 ml/min ([facility])    Atypical atrial flutter (HCC)    Acute on chronic respiratory failure (HCC)    Breast mass, right    Chronic heart failure with preserved ejection fraction (HCC)    HOSSEIN (acute kidney injury) ([facility])    Lymphedema    Anemia due to chronic kidney disease and iron deficiency    Sepsis due to Escherichia coli with acute renal failure and septic shock ([facility])    Pressure injury of left foot, unstageable (HCC)    Pressure injury of right foot, unstageable (HCC)    Ambulatory dysfunction    Pressure injury of sacral region, stage 4 (HCC)    Failure to thrive in adult     Past Medical History:   Diagnosis Date    Acute kidney injury (HCC)     Acute on chronic respiratory failure (HCC)     Arthritis     Atrial flutter (HCC)     Rapid heart rate      Past Surgical History:   Procedure Laterality Date    CATARACT EXTRACTION      EGD AND COLONOSCOPY N/A 11/14/2017    Procedure: EGD AND COLONOSCOPY;  Surgeon: Radha Salas MD;  Location: [facility];  Service: Gastroenterology    HIP FRACTURE SURGERY       Social History     Socioeconomic History    Marital status:      Spouse name: None    Number of children: 3    Years of education: None    Highest education level: None   Occupational History    None   Tobacco Use    Smoking status: Never     Smokeless tobacco: Never    Tobacco comments:     former smoker, per [employer];  started smoking at 17 yo, stopped at 29 yo   Vaping Use    Vaping status: Never Used   Substance and Sexual Activity    Alcohol use: No    Drug use: No    Sexual activity: None   Other Topics Concern    None   Social History Narrative    Inadequate exercise     Social Determinants of Health     Financial Resource Strain: Not on file   Food Insecurity: No Food Insecurity (1/5/2024)    Hunger Vital Sign     Worried About Running Out of Food in the Last Year: Never true     Ran Out of Food in the Last Year: Never true   Transportation Needs: No Transportation Needs (1/5/2024)    PRAPARE - Transportation     Lack of Transportation (Medical): No     Lack of Transportation (Non-Medical): No   Physical Activity: Not on file   Stress: Not on file   Social Connections: Not on file   Intimate Partner Violence: Not on file   Housing Stability: Unknown (1/5/2024)    Housing Stability Vital Sign     Unable to Pay for Housing in the Last Year: No     Number of Places Lived in the Last Year: Not on file     Unstable Housing in the Last Year: No        Current Outpatient Medications:     acetaminophen (TYLENOL) 325 mg tablet, Take 2 tablets (650 mg total) by mouth every 6 (six) hours as needed for mild pain, Disp: , Rfl:     diltiazem (CARDIZEM CD) 180 mg 24 hr capsule, TAKE 1 CAPSULE(180 MG) BY MOUTH DAILY, Disp: 90 capsule, Rfl: 0    Emollient (CeraVe Daily Moisturizing) LOTN, Apply 1 Application topically in the morning, Disp: , Rfl:     ferrous sulfate 325 (65 Fe) mg tablet, Take 1 tablet (325 mg total) by mouth daily with breakfast, Disp: 30 tablet, Rfl: 0    furosemide (LASIX) 20 mg tablet, Take 1 tablet (20 mg total) by mouth every other day, Disp: , Rfl:     metoprolol succinate (TOPROL-XL) 25 mg 24 hr tablet, Take 1 tablet (25 mg total) by mouth daily, Disp: , Rfl:     Nutritional Supplements (ProSource Plus) LIQD, Take 30 mL by mouth in the  "morning, Disp: , Rfl:     potassium chloride (MICRO-K) 10 MEQ CR capsule, Take 1 capsule (10 mEq total) by mouth every other day, Disp: , Rfl:   Family History   Problem Relation Age of Onset    Diabetes Father     Cancer Family               Coordination of Care: Wound team aware of the treatment plan. Facility nurse will provide wound treatment and monitor the wound for any changes.     Patient / Staff education : Patient / Staff was given education on sign of infection and pressure ulcer prevention. Patient/ Staff verbalized understanding     I have spent a total time of 30 minutes on 02/07/24 in caring for this patient including Risks and benefits of tx options, Instructions for management, Patient and family education, Importance of tx compliance, Risk factor reductions, Impressions, Counseling / Coordination of care, Documenting in the medical record, Reviewing / ordering tests, medicine, procedures  , and Communicating with other healthcare professionals .     Follow up :  Next week    Voice-recognition software may have been used in the preparation of this document. Occasional wrong word or \"sound-alike\" substitutions may have occurred due to the inherent limitations of voice recognition software. Interpretation should be guided by context.      AZRA Sánchez  "

## 2024-02-14 ENCOUNTER — NURSING HOME VISIT (OUTPATIENT)
Dept: GERIATRICS | Facility: OTHER | Age: 89
End: 2024-02-14

## 2024-02-14 ENCOUNTER — NURSING HOME VISIT (OUTPATIENT)
Dept: WOUND CARE | Facility: HOSPITAL | Age: 89
End: 2024-02-14

## 2024-02-14 VITALS
WEIGHT: 128 LBS | DIASTOLIC BLOOD PRESSURE: 56 MMHG | SYSTOLIC BLOOD PRESSURE: 98 MMHG | OXYGEN SATURATION: 96 % | TEMPERATURE: 97.8 F | BODY MASS INDEX: 21.3 KG/M2 | HEART RATE: 90 BPM | RESPIRATION RATE: 18 BRPM

## 2024-02-14 DIAGNOSIS — N18.31 STAGE 3A CHRONIC KIDNEY DISEASE (HCC): Chronic | ICD-10-CM

## 2024-02-14 DIAGNOSIS — I50.32 CHRONIC HEART FAILURE WITH PRESERVED EJECTION FRACTION (HCC): Chronic | ICD-10-CM

## 2024-02-14 DIAGNOSIS — L89.890 PRESSURE INJURY OF RIGHT FOOT, UNSTAGEABLE (HCC): ICD-10-CM

## 2024-02-14 DIAGNOSIS — I48.4 ATYPICAL ATRIAL FLUTTER (HCC): Primary | Chronic | ICD-10-CM

## 2024-02-14 DIAGNOSIS — R26.2 AMBULATORY DYSFUNCTION: ICD-10-CM

## 2024-02-14 DIAGNOSIS — L89.154 PRESSURE INJURY OF SACRAL REGION, STAGE 4 (HCC): Primary | ICD-10-CM

## 2024-02-14 DIAGNOSIS — L89.154 PRESSURE INJURY OF SACRAL REGION, STAGE 4 (HCC): ICD-10-CM

## 2024-02-14 DIAGNOSIS — J96.21 ACUTE ON CHRONIC RESPIRATORY FAILURE WITH HYPOXIA (HCC): ICD-10-CM

## 2024-02-14 DIAGNOSIS — L89.890 PRESSURE INJURY OF LEFT FOOT, UNSTAGEABLE (HCC): ICD-10-CM

## 2024-02-14 DIAGNOSIS — R62.7 FAILURE TO THRIVE IN ADULT: ICD-10-CM

## 2024-02-14 PROCEDURE — 99309 SBSQ NF CARE MODERATE MDM 30: CPT | Performed by: NURSE PRACTITIONER

## 2024-02-14 PROCEDURE — 99308 SBSQ NF CARE LOW MDM 20: CPT | Performed by: NURSE PRACTITIONER

## 2024-02-14 NOTE — PROGRESS NOTES
[facility] WOUND CARE MANAGEMENT   AND HYPERBARIC MEDICINE CENTER       Patient ID: Aurora Anderson is a 89 y.o. female Date of Birth 12/13/1929     Location of Service[facility]    Performed wound round with: Wound team     Chief Complaint : Sacrum, left plantar and right plantar    Wound Instructions:  Wound: Sacral  Cleanse the wound bed with NSS or wound cleanser  Apply Skin-Prep to periwound area   Gently pack the wound cavity loosely with mesalt strip and cover with ABD pad  Daily and as needed for soiling    Left and right plantar  Cleanse with mild soap and water  Apply CeraVe moisturizing lotion or hydraguard to bilateral lower legs including left and right plantar  Daily and as needed for soiling  Continue heel boots when in bed  Offload all wounds  Turn and reposition frequently  Monitor for infection or worsening    Allergies  Penicillins      Assessment & Plan:  1. Pressure injury of sacral region, stage 4 ([facility])  Assessment & Plan:  Sacral wound  Wound significantly improved, decrease in wound size compared to last week, increased granulation  Change local wound care to Mesalt  Patient recently lose weight weight on January 23, 2024 is 138.2 compared to weight on January 16 which is 142.4.  Patient currently on Ensure Plus, as per report, patient has been refusing to drink the Ensure plus  Result of the xray on 1/31 showed (-) osteomyelitis  Wound culture ordered - Few gram positive rods. Wound does not have any obvious sign of infection.   I spoke to patient's daughter and provided update on the wound.  Aware that we will not start the wound vac. Patient is concerned of who will be taking care of her mother when she goes home.   Clinical factors affecting wound healing includes poor appetite, medical complexity, limited range of motion, incontinence, and poor sensation.  Follow-up next week      2. Pressure injury of left foot, unstageable ([facility])  Assessment & Plan:  Left foot  Wound is stable,  100% scab, wound size almost the same as last week  Continue local wound care with hydraguard  Continue to offload  Follow-up next week      3. Pressure injury of right foot, unstageable (HCC)  Assessment & Plan:  Right foot  Wound improved, decrease in wound size compared to last week  Continue local wound care with hydraguard  Continue to offload  Follow-up next week      4. Ambulatory dysfunction  Assessment & Plan:  On 24/7 restorative care                       Subjective:   January 17, 2024.  New consult for wound to sacrum and bilateral feet.  Patient was referred by Senior care team.  Patient currently admitted at [facility] for short-term rehab.  Patient have chronic kidney disease and lymphedema.  Patient was seen with the facility wound team.    Wound history: As per medical record review, the wound on the sacrum and bilateral feet is chronic.  Patient was seen by podiatry for the wound on the bilateral feet on January 5, 2024.  Ordered to continue local wound care.  X-ray was completed on both feet, nonsignificant result.     Received patient in bed, seems comfortable.  Denies pain.  Patient have a good appetite.  Patient is minimal assistance with turning and repositioning in bed.  Patient is incontinent of both bowel and bladder.    1/24/2024 Follow up for wound on the Sacrum and bilateral feet . Received patient, not in distress. Facility staff did not report any significant issues related to the wound. Denies pain.     1/31/2024 Follow up for wound on the sacrum and bilateral feet . Received patient, not in distress. Facility staff did not report any significant issues related to the wound. As per report, patient had been refusing air mattress. Patient have a poor appetite, only ate 0-25% of her meals yesterday. Reviewed laboratory result on 1/31/2024 - WBC is 7.7.    2/7/2024 Follow up for wound on the sacrum and bilateral feet . Received patient, not in distress. Facility staff did not  report any significant issues related to the wound. As per report, patient's daughter is requesting for antibiotic for the wound.     2/14/2024 Follow up for wound on the sacrum and bilateral feet . Received patient, not in distress. Facility staff did not report any significant issues related to the wound. Denies pain..              Review of Systems   Constitutional: Negative.    Respiratory: Negative.     Cardiovascular: Negative.    Musculoskeletal:  Positive for gait problem.   Skin:  Positive for wound.   Psychiatric/Behavioral: Negative.         Objective:    Physical Exam  Constitutional:       Appearance: Normal appearance.   Cardiovascular:      Rate and Rhythm: Normal rate.   Pulmonary:      Effort: Pulmonary effort is normal.   Genitourinary:     Comments: Incontinent  Musculoskeletal:      Right lower leg: Edema present.      Left lower leg: Edema present.      Comments: Positive lymphedema on bilateral lower leg  Dry skin   Skin:     Findings: Lesion present.      Comments: Sacral wound: Wound size is 1.9 x 2 x 1 cm.,  Undermining all edges, deepest at 12, 5 cm, 100% granulation, probe to bone, periwound is normal, with no obvious sign of infection    Right plantar foot: Wound size is 2 x 1.7 cm.,  100% scab, no drainage, no obvious sign of infection    Left plantar foot: Wound size is 1.3 x 0.8 cm.,  100% scab, no drainage, no obvious infection   Neurological:      Mental Status: She is alert.              Procedures           Patient's care was coordinated with nursing facility staff. Recent vitals, labs and updated medications were reviewed on EMR or chart system of facility. Past Medical, surgical, social, medication and allergy history and patient's previous records were reviewed and updated as appropriate: Most up-to date information is available in the facility EMR where the patient is currently admitted.    Patient Active Problem List   Diagnosis    Right bundle branch block (RBBB) on  electrocardiogram (ECG)    Diverticulosis of colon    Large hiatal hernia    Incidental 6cm right Renal lesion on CT    Anemia    CKD (chronic kidney disease) stage 3, GFR 30-59 ml/min (HCC)    Atypical atrial flutter (HCC)    Acute on chronic respiratory failure (HCC)    Breast mass, right    Chronic heart failure with preserved ejection fraction (HCC)    HOSSEIN (acute kidney injury) (HCC)    Lymphedema    Anemia due to chronic kidney disease and iron deficiency    Sepsis due to Escherichia coli with acute renal failure and septic shock (HCC)    Pressure injury of left foot, unstageable (HCC)    Pressure injury of right foot, unstageable (HCC)    Ambulatory dysfunction    Pressure injury of sacral region, stage 4 (HCC)    Failure to thrive in adult     Past Medical History:   Diagnosis Date    Acute kidney injury (HCC)     Acute on chronic respiratory failure (HCC)     Arthritis     Atrial flutter (HCC)     Rapid heart rate      Past Surgical History:   Procedure Laterality Date    CATARACT EXTRACTION      EGD AND COLONOSCOPY N/A 11/14/2017    Procedure: EGD AND COLONOSCOPY;  Surgeon: Radha Salas MD;  Location: [facility];  Service: Gastroenterology    HIP FRACTURE SURGERY       Social History     Socioeconomic History    Marital status:      Spouse name: None    Number of children: 3    Years of education: None    Highest education level: None   Occupational History    None   Tobacco Use    Smoking status: Never    Smokeless tobacco: Never    Tobacco comments:     former smoker, per [employer];  started smoking at 15 yo, stopped at 29 yo   Vaping Use    Vaping status: Never Used   Substance and Sexual Activity    Alcohol use: No    Drug use: No    Sexual activity: None   Other Topics Concern    None   Social History Narrative    Inadequate exercise     Social Determinants of Health     Financial Resource Strain: Not on file   Food Insecurity: No Food Insecurity (1/5/2024)    Hunger Vital Sign     Worried  About Running Out of Food in the Last Year: Never true     Ran Out of Food in the Last Year: Never true   Transportation Needs: No Transportation Needs (1/5/2024)    PRAPARE - Transportation     Lack of Transportation (Medical): No     Lack of Transportation (Non-Medical): No   Physical Activity: Not on file   Stress: Not on file   Social Connections: Not on file   Intimate Partner Violence: Not on file   Housing Stability: Unknown (1/5/2024)    Housing Stability Vital Sign     Unable to Pay for Housing in the Last Year: No     Number of Places Lived in the Last Year: Not on file     Unstable Housing in the Last Year: No        Current Outpatient Medications:     acetaminophen (TYLENOL) 325 mg tablet, Take 2 tablets (650 mg total) by mouth every 6 (six) hours as needed for mild pain, Disp: , Rfl:     diltiazem (CARDIZEM CD) 180 mg 24 hr capsule, TAKE 1 CAPSULE(180 MG) BY MOUTH DAILY, Disp: 90 capsule, Rfl: 0    Emollient (CeraVe Daily Moisturizing) LOTN, Apply 1 Application topically in the morning, Disp: , Rfl:     ferrous sulfate 325 (65 Fe) mg tablet, Take 1 tablet (325 mg total) by mouth daily with breakfast, Disp: 30 tablet, Rfl: 0    furosemide (LASIX) 20 mg tablet, Take 1 tablet (20 mg total) by mouth every other day, Disp: , Rfl:     metoprolol succinate (TOPROL-XL) 25 mg 24 hr tablet, Take 1 tablet (25 mg total) by mouth daily, Disp: , Rfl:     Nutritional Supplements (ProSource Plus) LIQD, Take 30 mL by mouth in the morning, Disp: , Rfl:     potassium chloride (MICRO-K) 10 MEQ CR capsule, Take 1 capsule (10 mEq total) by mouth every other day, Disp: , Rfl:   Family History   Problem Relation Age of Onset    Diabetes Father     Cancer Family               Coordination of Care: Wound team aware of the treatment plan. Facility nurse will provide wound treatment and monitor the wound for any changes.     Patient / Staff education : Patient / Staff was given education on sign of infection and pressure ulcer  "prevention. Patient/ Staff verbalized understanding     I have spent a total time of 30 minutes on 02/14/24 in caring for this patient including Risks and benefits of tx options, Instructions for management, Patient and family education, Importance of tx compliance, Risk factor reductions, Impressions, Counseling / Coordination of care, Documenting in the medical record, Reviewing / ordering tests, medicine, procedures  , and Communicating with other healthcare professionals .     Follow up :  Next week    Voice-recognition software may have been used in the preparation of this document. Occasional wrong word or \"sound-alike\" substitutions may have occurred due to the inherent limitations of voice recognition software. Interpretation should be guided by context.      AZRA Sánchez  "

## 2024-02-14 NOTE — PROGRESS NOTES
[facility]  [address] [address]  [phone]  FACILITY: [facility]  Code 31 (STR)        NAME: Aurora Anderson  AGE: 89 y.o. SEX: female CODE STATUS: No CPR    DATE OF ENCOUNTER: 2/14/2024    Assessment and Plan     1. Atypical atrial flutter (HCC)  Assessment & Plan:  HR irregular but controlled on exam   Continue Toprol XL 25 mg daily   Continue Diltiazem 180 mg Daily   No AC due to age and risks outweigh benefits       2. Chronic heart failure with preserved ejection fraction (HCC)  Assessment & Plan:  Wt Readings from Last 3 Encounters:   02/14/24 58.1 kg (128 lb)   02/07/24 58.1 kg (128 lb)   01/30/24 62.7 kg (138 lb 3.2 oz)     Echo (1/5/24) EF 55%  Hx of chronic B/L Lower leg lymphedema- very mild on exam today - non pitting   Weights are stable upon review    Continue Beta Blocker   Continue Lasix as ordered with potassium supplement  Continue to monitor weights   Cardiac diet   2000 mL Fluid Restriction         3. Stage 3a chronic kidney disease (HCC)  Assessment & Plan:  Lab Results   Component Value Date    EGFR 55 01/16/2024    EGFR 49 01/14/2024    EGFR 48 01/13/2024    CREATININE 0.89 01/16/2024    CREATININE 0.98 01/14/2024    CREATININE 0.99 01/13/2024     Baseline creatinine appears to be 0.8-1.0  Stable at rehab  Avoid NSAIDS, Avoid Hypotension   Renally dose medications as appropriate   Trend BMP's at rehab      4. Acute on chronic respiratory failure with hypoxia (HCC)  Assessment & Plan:  Last hospital stay with septic shock  Chest X ray with pulmonary edema and was requiring Oxygen and IV diuretics  Lungs clear on exam today   Continues on Oxygen at rehab. 2 L NC 96%  Continue Lasix with potassium supplements   Attempt to wean off Oxygen       5. Pressure injury of sacral region, stage 4 (HCC)  Assessment & Plan:  Followed by [facility] CR[facility] weekly   X ray ( 1/31/24) negative for osteomyelitis   Wound improved today per Wound care with decrease in wound  size with increased granulation   Continue local wound care with Mesalt  Continue Protein supplements   Off load         6. Failure to thrive in adult  Assessment & Plan:  Initially upon presentation to rehab patient had poor appetite and was not progressing well with therapy  Mirtazapine offered and declined   Daughter began to bring in food from home with some improvement  Patient now consuming about 50% of her meals  Followed by Palliative Care at rehab           All medications and routine orders were reviewed and updated as needed.    Chief Complaint     STR follow up visit    Past Medical and Surgica History      Past Medical History:   Diagnosis Date    Acute kidney injury (HCC)     Acute on chronic respiratory failure (HCC)     Arthritis     Atrial flutter (HCC)     Rapid heart rate      Past Surgical History:   Procedure Laterality Date    CATARACT EXTRACTION      EGD AND COLONOSCOPY N/A 11/14/2017    Procedure: EGD AND COLONOSCOPY;  Surgeon: Radha Salas MD;  Location: [facility];  Service: Gastroenterology    HIP FRACTURE SURGERY       Allergies   Allergen Reactions    Penicillins Rash          History of Present Illness     Aurora Anderson is a 89 y.o. female admitted to [facility] following hospital stay for Septic Shock due to UTI. Patient has a past medical Hx including but not limited to Aflutter, CHF, CKD, Lymphedema, Anemia.     Patient is seen in collaboration with nursing for medical mgmt and STR follow up.     Patient initially presented to hospital with septic shock, urine cx grew proteus and e. Coli, treated with IV ceftriaxone and Azithromycin. Hospital course complicated by suspected BRASH with bradycardia, renal failure. She was treated in ICU with pressors and IV fluids. She did require IV diuresis for respiratory failure with fluid overload. She received 1 unit PRBCs for Hemoglobin 6.9 with improvement. She was deconditioned and recommended discharge to post acute rehab.   "    Seen and examined at bedside today. Patient is a reliable historian. She is resting in bed, does not appear in distress. She is pleasant and smiling. She states she is doing well. She currently denies pain but states her sacrum/buttocks hurt \"If I sit or lie on it the wrong way\". Otherwise she denies CP/SOB/N/V/D. Denies lightheadedness, dizziness, headaches, vision changes. Patient states she is eating well and staying hydrated. Denies any bowel or bladder issues. Patient is actively participating in therapy. No concerns from nursing at this time.          The patient's allergies, past medical, surgical, social and family history were reviewed and unchanged.    Review of Systems     Review of Systems   Musculoskeletal:  Positive for gait problem.   Skin:  Positive for wound.   All other systems reviewed and are negative.        Objective     Vitals:   Vitals:    02/14/24 1400   BP: 98/56   Pulse: 90   Resp: 18   Temp: 97.8 °F (36.6 °C)   SpO2: 96%         Physical Exam  Vitals and nursing note reviewed.   Constitutional:       General: She is not in acute distress.     Appearance: Normal appearance.   HENT:      Head: Normocephalic and atraumatic.      Nose: No congestion or rhinorrhea.      Mouth/Throat:      Mouth: Mucous membranes are moist.   Eyes:      General: No scleral icterus.     Conjunctiva/sclera: Conjunctivae normal.      Pupils: Pupils are equal, round, and reactive to light.   Cardiovascular:      Rate and Rhythm: Normal rate. Rhythm irregular.      Pulses: Normal pulses.      Heart sounds: Normal heart sounds. No murmur heard.  Pulmonary:      Effort: Pulmonary effort is normal. No respiratory distress.      Breath sounds: Normal breath sounds. No wheezing, rhonchi or rales.   Abdominal:      General: Bowel sounds are normal. There is no distension.      Palpations: Abdomen is soft. There is no mass.      Tenderness: There is no abdominal tenderness.      Hernia: No hernia is present. "   Musculoskeletal:         General: No swelling or tenderness.      Right lower leg: Edema (mild nonpitting) present.      Left lower leg: Edema (mild nonpitting) present.   Lymphadenopathy:      Cervical: No cervical adenopathy.   Skin:     General: Skin is warm and dry.      Coloration: Skin is not pale.      Findings: No rash.      Comments: Sacral wound not visualized on exam today- reviewed Wound Care CRNP notes    Neurological:      General: No focal deficit present.      Mental Status: She is alert and oriented to person, place, and time. Mental status is at baseline.      Motor: Weakness present.      Gait: Gait abnormal.   Psychiatric:         Mood and Affect: Mood normal.         Behavior: Behavior normal.         Pertinent Laboratory/Diagnostic Studies:     Reviewed in facility chart      Current Medications   Medications reviewed and updated see facility MAR for details.      Current Outpatient Medications:     acetaminophen (TYLENOL) 325 mg tablet, Take 2 tablets (650 mg total) by mouth every 6 (six) hours as needed for mild pain, Disp: , Rfl:     diltiazem (CARDIZEM CD) 180 mg 24 hr capsule, TAKE 1 CAPSULE(180 MG) BY MOUTH DAILY, Disp: 90 capsule, Rfl: 0    Emollient (CeraVe Daily Moisturizing) LOTN, Apply 1 Application topically in the morning, Disp: , Rfl:     ferrous sulfate 325 (65 Fe) mg tablet, Take 1 tablet (325 mg total) by mouth daily with breakfast, Disp: 30 tablet, Rfl: 0    furosemide (LASIX) 20 mg tablet, Take 1 tablet (20 mg total) by mouth every other day, Disp: , Rfl:     metoprolol succinate (TOPROL-XL) 25 mg 24 hr tablet, Take 1 tablet (25 mg total) by mouth daily, Disp: , Rfl:     Nutritional Supplements (ProSource Plus) LIQD, Take 30 mL by mouth in the morning, Disp: , Rfl:     potassium chloride (MICRO-K) 10 MEQ CR capsule, Take 1 capsule (10 mEq total) by mouth every other day, Disp: , Rfl:      Please note:  Voice-recognition software may have been used in the preparation of this  "document.  Occasional wrong word or \"sound-alike\" substitutions may have occurred due to the inherent limitations of voice recognition software.  Interpretation should be guided by context.         AZRA Vasquez  2/14/2024    "

## 2024-02-15 NOTE — ASSESSMENT & PLAN NOTE
Right foot  Wound improved, decrease in wound size compared to last week  Continue local wound care with hydraguard  Continue to offload  Follow-up next week

## 2024-02-15 NOTE — ASSESSMENT & PLAN NOTE
Left foot  Wound is stable, 100% scab, wound size almost the same as last week  Continue local wound care with hydraguard  Continue to offload  Follow-up next week

## 2024-02-15 NOTE — ASSESSMENT & PLAN NOTE
Sacral wound  Wound significantly improved, decrease in wound size compared to last week, increased granulation  Change local wound care to [facility]  Patient recently lose weight weight on January 23, 2024 is 138.2 compared to weight on January 16 which is 142.4.  Patient currently on Ensure Plus, as per report, patient has been refusing to drink the Ensure plus  Result of the xray on 1/31 showed (-) osteomyelitis  Wound culture ordered - Few gram positive rods. Wound does not have any obvious sign of infection.   I spoke to patient's daughter and provided update on the wound.  Aware that we will not start the wound vac. Patient is concerned of who will be taking care of her mother when she goes home.   Clinical factors affecting wound healing includes poor appetite, medical complexity, limited range of motion, incontinence, and poor sensation.  Follow-up next week

## 2024-02-16 NOTE — ASSESSMENT & PLAN NOTE
Wt Readings from Last 3 Encounters:   02/14/24 58.1 kg (128 lb)   02/07/24 58.1 kg (128 lb)   01/30/24 62.7 kg (138 lb 3.2 oz)     Echo (1/5/24) EF 55%  Hx of chronic B/L Lower leg lymphedema- very mild on exam today - non pitting   Weights are stable upon review    Continue Beta Blocker   Continue Lasix as ordered with potassium supplement  Continue to monitor weights   Cardiac diet   2000 mL Fluid Restriction

## 2024-02-16 NOTE — ASSESSMENT & PLAN NOTE
Last hospital stay with septic shock  Chest X ray with pulmonary edema and was requiring Oxygen and IV diuretics  Lungs clear on exam today   Continues on Oxygen at rehab. 2 L NC 96%  Continue Lasix with potassium supplements   Attempt to wean off Oxygen

## 2024-02-16 NOTE — ASSESSMENT & PLAN NOTE
· Chronic B/L LE lymphedema  · Continue moisturizing lotion daily  · Would benefit from lymphedema clinic as outpatient   · Continue Lasix 20 mg QOD, with potassium supplement

## 2024-02-16 NOTE — ASSESSMENT & PLAN NOTE
HR irregular but controlled on exam   Continue Toprol XL 25 mg daily   Continue Diltiazem 180 mg Daily   No AC due to age and risks outweigh benefits

## 2024-02-16 NOTE — ASSESSMENT & PLAN NOTE
Initially upon presentation to rehab patient had poor appetite and was not progressing well with therapy  Mirtazapine offered and declined   Daughter began to bring in food from home with some improvement  Patient now consuming about 50% of her meals  Followed by Palliative Care at rehab

## 2024-02-16 NOTE — ASSESSMENT & PLAN NOTE
Followed by [facility] CR[facility] weekly   X ray ( 1/31/24) negative for osteomyelitis   Wound improved today per Wound care with decrease in wound size with increased granulation   Continue local wound care with Mesalt  Continue Protein supplements   Off load

## 2024-02-16 NOTE — ASSESSMENT & PLAN NOTE
Lab Results   Component Value Date    EGFR 55 01/16/2024    EGFR 49 01/14/2024    EGFR 48 01/13/2024    CREATININE 0.89 01/16/2024    CREATININE 0.98 01/14/2024    CREATININE 0.99 01/13/2024     Baseline creatinine appears to be 0.8-1.0  Stable at rehab  Avoid NSAIDS, Avoid Hypotension   Renally dose medications as appropriate   Trend BMP's at rehab

## 2024-02-19 ENCOUNTER — NURSING HOME VISIT (OUTPATIENT)
Dept: GERIATRICS | Facility: OTHER | Age: 89
End: 2024-02-19

## 2024-02-19 VITALS
RESPIRATION RATE: 18 BRPM | HEART RATE: 66 BPM | SYSTOLIC BLOOD PRESSURE: 100 MMHG | TEMPERATURE: 97.8 F | OXYGEN SATURATION: 96 % | DIASTOLIC BLOOD PRESSURE: 60 MMHG | WEIGHT: 128 LBS | BODY MASS INDEX: 21.3 KG/M2

## 2024-02-19 DIAGNOSIS — R26.2 AMBULATORY DYSFUNCTION: ICD-10-CM

## 2024-02-19 DIAGNOSIS — I48.4 ATYPICAL ATRIAL FLUTTER (HCC): Chronic | ICD-10-CM

## 2024-02-19 DIAGNOSIS — I89.0 LYMPHEDEMA: ICD-10-CM

## 2024-02-19 DIAGNOSIS — I50.32 CHRONIC HEART FAILURE WITH PRESERVED EJECTION FRACTION (HCC): Chronic | ICD-10-CM

## 2024-02-19 DIAGNOSIS — N18.31 STAGE 3A CHRONIC KIDNEY DISEASE (HCC): Primary | Chronic | ICD-10-CM

## 2024-02-19 PROCEDURE — 99309 SBSQ NF CARE MODERATE MDM 30: CPT

## 2024-02-19 NOTE — ASSESSMENT & PLAN NOTE
Wt Readings from Last 3 Encounters:   02/19/24 58.1 kg (128 lb)   02/14/24 58.1 kg (128 lb)   02/07/24 58.1 kg (128 lb)   1/5/24 Echo showing EF of 55%  Weight stable  B/l LE lymphedema stable  Continue Metoprolol ER 25 mg daily  Continue Lasix 20 mg QOD with potassium supplement  Continue weekly weights  Continue 2000 ml FR

## 2024-02-19 NOTE — PROGRESS NOTES
[facility]  [address] [address] [phone]  [facility]  Code 31 (STR)        NAME: Aurora Anderson  AGE: 89 y.o. SEX: female CODE STATUS: No CPR    DATE OF ENCOUNTER: 2/19/2024    Assessment and Plan     1. Stage 3a chronic kidney disease (HCC)  Assessment & Plan:  Lab Results   Component Value Date    EGFR 55 01/16/2024    EGFR 49 01/14/2024    EGFR 48 01/13/2024    CREATININE 0.89 01/16/2024    CREATININE 0.98 01/14/2024    CREATININE 0.99 01/13/2024 1/31/24 Cr 0.9/ bun 13/gfr 58  Avoid nephrotoxins  Encourage adequate hydration  Avoid hypotension  Monitor kidney functions      2. Chronic heart failure with preserved ejection fraction (HCC)  Assessment & Plan:  Wt Readings from Last 3 Encounters:   02/19/24 58.1 kg (128 lb)   02/14/24 58.1 kg (128 lb)   02/07/24 58.1 kg (128 lb)   1/5/24 Echo showing EF of 55%  Weight stable  B/l LE lymphedema stable  Continue Metoprolol ER 25 mg daily  Continue Lasix 20 mg QOD with potassium supplement  Continue weekly weights  Continue 2000 ml FR        3. Atypical atrial flutter (HCC)  Assessment & Plan:  HR controlled, 66  Denies cp/palpitations  Toprol XL 25 mg daily  Continue diltiazem 180 mg daily        4. Ambulatory dysfunction  Assessment & Plan:  Multifactorial in the setting of advanced age and acute/chronic medical conditions  Continue PT/OT  Fall Precautions  Ensure adequate nutrition/hydration   Monitor CBC/BMP    following for d/c planning        5. Lymphedema  Assessment & Plan:  Chronic B/L LE lymphedema  Continue moisturizing lotion daily  Would benefit from lymphedema clinic as outpatient   Continue Lasix 20 mg QOD, with potassium supplement           All medications and routine orders were reviewed and updated as needed.    Chief Complaint     STR follow up visit    Patient's care was coordinated with nursing facility staff. Recent vitals, labs, and updated medications were review on [facility]  system in facility.  Past Medical and Surgical History      Past Medical History:   Diagnosis Date    Acute kidney injury (HCC)     Acute on chronic respiratory failure (HCC)     Arthritis     Atrial flutter (HCC)     Rapid heart rate      Past Surgical History:   Procedure Laterality Date    CATARACT EXTRACTION      EGD AND COLONOSCOPY N/A 11/14/2017    Procedure: EGD AND COLONOSCOPY;  Surgeon: Radha Salas MD;  [facility];  Service: Gastroenterology    HIP FRACTURE SURGERY       Allergies   Allergen Reactions    Penicillins Rash          History of Present Illness     HPI  Aurora Anderson is a 89 year old female, she is a STR patient of [facility] since 1/16/24. Past Medical Hx including but not limited to A flutter, lymphedema, CHF, CKD, anemia. She was seen in collaboration with nursing for medical mgmt and STR follow up.     Hospital Course  Patient was admitted to the hospital 1/4/24 for septic shock due to UTI. Urine culture grew Proteus and E. Coli. Patient received IV epinephrine for suspected BRASH, and was supplied 30 cc/kg of IV fluids and was transferred to the ICU. Patient received Ceftriaxone and azithromycin. Patient was weaned off pressors and was downgraded to medsurg. Patient developed HOSSEIN and Lasix was held. Patient developed respiratory failure requiring diuresis. Patient also had episode of anemia with Hgb of 6.9 with no sign of active bleeding, patient received 1 unit PRBC.  Patient was discharged to [facility].    Rehab Course   Aurora was seen and examined at bedside today. Patient is AAOx 3. On exam patient resting in bed and does not appear to be in distress. Patients daughter is at bedside, we discussed wound healing and her plans for discharge. Patient states she is feeling ok, her appetite is good if food is brought in.  She continues to be followed by wound NP, with local wound care.  She denies CP/SOB/N/V/D. Denies lightheadedness, dizziness, headaches, vision changes.  Denies any bowel or bladder issues. Per review of SNF records, Patient is eating 2-3 meals per day, consuming 50 %. Last documented BM 2/18/24. No concerns from nursing at this time.    The patient's allergies, past medical, surgical, social and family history were reviewed and unchanged.    Review of Systems     Review of Systems   Constitutional:  Positive for activity change and appetite change. Negative for fever.   HENT: Negative.  Negative for congestion, sneezing and sore throat.    Eyes: Negative.    Respiratory: Negative.  Negative for cough and shortness of breath.    Cardiovascular:  Positive for leg swelling. Negative for chest pain and palpitations.   Gastrointestinal:  Negative for abdominal distention, constipation, diarrhea, nausea and vomiting.   Endocrine: Negative.    Genitourinary: Negative.  Negative for difficulty urinating and dysuria.   Musculoskeletal:  Positive for gait problem.   Skin:  Positive for wound.        Sacral wound, lymphedema b/l LE   Allergic/Immunologic: Negative.    Neurological:  Positive for weakness. Negative for dizziness and headaches.   Hematological: Negative.    Psychiatric/Behavioral:  Negative for sleep disturbance.          Objective     Vitals:   Vitals:    02/19/24 1045   BP: 100/60   Pulse: 66   Resp: 18   Temp: 97.8 °F (36.6 °C)   SpO2: 96%     Physical Exam  Vitals and nursing note reviewed.   Constitutional:       General: She is not in acute distress.     Appearance: Normal appearance. She is not ill-appearing.   HENT:      Head: Normocephalic and atraumatic.      Nose: No congestion.   Eyes:      Conjunctiva/sclera: Conjunctivae normal.   Cardiovascular:      Rate and Rhythm: Rhythm irregular.      Pulses: Normal pulses.      Heart sounds: Normal heart sounds.   Pulmonary:      Effort: Pulmonary effort is normal. No respiratory distress.      Breath sounds: Normal breath sounds. No wheezing.   Abdominal:      General: Bowel sounds are normal. There is no  "distension.      Palpations: Abdomen is soft.      Tenderness: There is no abdominal tenderness.   Musculoskeletal:      Right lower leg: Edema present.      Left lower leg: Edema present.      Comments: Chronic lymphedema   Skin:     General: Skin is warm.      Comments: Sacral wound   Neurological:      Mental Status: She is alert and oriented to person, place, and time.      Motor: Weakness present.      Gait: Gait abnormal.   Psychiatric:         Mood and Affect: Mood normal.         Behavior: Behavior normal.         Pertinent Laboratory/Diagnostic Studies:   Reviewed in facility chart-stable      Current Medications   Medications reviewed and updated see facility MAR for details.      Current Outpatient Medications:     acetaminophen (TYLENOL) 325 mg tablet, Take 2 tablets (650 mg total) by mouth every 6 (six) hours as needed for mild pain, Disp: , Rfl:     diltiazem (CARDIZEM CD) 180 mg 24 hr capsule, TAKE 1 CAPSULE(180 MG) BY MOUTH DAILY, Disp: 90 capsule, Rfl: 0    Emollient (CeraVe Daily Moisturizing) LOTN, Apply 1 Application topically in the morning, Disp: , Rfl:     ferrous sulfate 325 (65 Fe) mg tablet, Take 1 tablet (325 mg total) by mouth daily with breakfast, Disp: 30 tablet, Rfl: 0    furosemide (LASIX) 20 mg tablet, Take 1 tablet (20 mg total) by mouth every other day, Disp: , Rfl:     metoprolol succinate (TOPROL-XL) 25 mg 24 hr tablet, Take 1 tablet (25 mg total) by mouth daily, Disp: , Rfl:     Nutritional Supplements (ProSource Plus) LIQD, Take 30 mL by mouth in the morning, Disp: , Rfl:     potassium chloride (MICRO-K) 10 MEQ CR capsule, Take 1 capsule (10 mEq total) by mouth every other day, Disp: , Rfl:      Please note:  Voice-recognition software may have been used in the preparation of this document.  Occasional wrong word or \"sound-alike\" substitutions may have occurred due to the inherent limitations of voice recognition software.  Interpretation should be guided by context.   "       AZRA Lewis  2/19/2024  2:22 PM

## 2024-02-21 ENCOUNTER — NURSING HOME VISIT (OUTPATIENT)
Dept: GERIATRICS | Facility: OTHER | Age: 89
End: 2024-02-21

## 2024-02-21 ENCOUNTER — NURSING HOME VISIT (OUTPATIENT)
Dept: WOUND CARE | Facility: HOSPITAL | Age: 89
End: 2024-02-21

## 2024-02-21 VITALS
DIASTOLIC BLOOD PRESSURE: 68 MMHG | BODY MASS INDEX: 21.3 KG/M2 | HEART RATE: 68 BPM | RESPIRATION RATE: 18 BRPM | TEMPERATURE: 97.8 F | SYSTOLIC BLOOD PRESSURE: 100 MMHG | OXYGEN SATURATION: 96 % | WEIGHT: 128 LBS

## 2024-02-21 DIAGNOSIS — L89.890 PRESSURE INJURY OF LEFT FOOT, UNSTAGEABLE (HCC): ICD-10-CM

## 2024-02-21 DIAGNOSIS — I89.0 LYMPHEDEMA: ICD-10-CM

## 2024-02-21 DIAGNOSIS — L89.154 PRESSURE INJURY OF SACRAL REGION, STAGE 4 (HCC): Primary | ICD-10-CM

## 2024-02-21 DIAGNOSIS — I48.4 ATYPICAL ATRIAL FLUTTER (HCC): Chronic | ICD-10-CM

## 2024-02-21 DIAGNOSIS — R26.2 AMBULATORY DYSFUNCTION: ICD-10-CM

## 2024-02-21 DIAGNOSIS — I50.32 CHRONIC HEART FAILURE WITH PRESERVED EJECTION FRACTION (HCC): Chronic | ICD-10-CM

## 2024-02-21 DIAGNOSIS — N18.31 STAGE 3A CHRONIC KIDNEY DISEASE (HCC): Chronic | ICD-10-CM

## 2024-02-21 DIAGNOSIS — L89.890 PRESSURE INJURY OF RIGHT FOOT, UNSTAGEABLE (HCC): ICD-10-CM

## 2024-02-21 DIAGNOSIS — R62.7 FAILURE TO THRIVE IN ADULT: ICD-10-CM

## 2024-02-21 PROBLEM — A41.51 SEPSIS DUE TO ESCHERICHIA COLI WITH ACUTE RENAL FAILURE AND SEPTIC SHOCK (HCC): Status: RESOLVED | Noted: 2024-01-17 | Resolved: 2024-02-21

## 2024-02-21 PROBLEM — N17.9 SEPSIS DUE TO ESCHERICHIA COLI WITH ACUTE RENAL FAILURE AND SEPTIC SHOCK (HCC): Status: RESOLVED | Noted: 2024-01-17 | Resolved: 2024-02-21

## 2024-02-21 PROBLEM — R65.21 SEPSIS DUE TO ESCHERICHIA COLI WITH ACUTE RENAL FAILURE AND SEPTIC SHOCK (HCC): Status: RESOLVED | Noted: 2024-01-17 | Resolved: 2024-02-21

## 2024-02-21 PROCEDURE — 99309 SBSQ NF CARE MODERATE MDM 30: CPT

## 2024-02-21 NOTE — ASSESSMENT & PLAN NOTE
Left foot  Wound is stable, covered with 100% eschar  Continue hydraguard  Continue to offload  Patient is for discharge, referred to outpatient wound center

## 2024-02-21 NOTE — PROGRESS NOTES
[facility] WOUND CARE MANAGEMENT   AND HYPERBARIC MEDICINE CENTER       Patient ID: Aurora Anderson is a 89 y.o. female Date of Birth 12/13/1929     [facility]    Performed wound round with: Wound team     Chief Complaint : Sacrum, left plantar and right plantar    Wound Instructions:  Wound: Sacral  Cleanse the wound bed with NSS or wound cleanser  Apply Skin-Prep to periwound area   Gently pack the wound cavity loosely with mesalt strip and cover with ABD pad  Daily and as needed for soiling    Refer to outpatient wound center    Left and right plantar  Cleanse with mild soap and water  Apply CeraVe moisturizing lotion or hydraguard to bilateral lower legs including left and right plantar  Daily and as needed for soiling    Continue heel boots when in bed  Offload all wounds  Turn and reposition frequently  Monitor for infection or worsening    Allergies  Penicillins      Assessment & Plan:  1. Pressure injury of sacral region, stage 4 ([facility])  Assessment & Plan:  Sacral wound  Wound size increased, however I noticed a decrease in granulation.  No obvious sign of infection.  Continue local wound care to [facility].  The wound will benefit from, however, caregiver will not be able to manage wound VAC at home even with the visiting nurse.  Patient recently lose weight weight on January 23, 2024 is 138.2 compared to weight on January 16 which is 142.4.  Patient currently on Ensure Plus, as per report, patient has been refusing to drink the Ensure plus  Result of the xray on 1/31 showed (-) osteomyelitis  Wound culture ordered - Few gram positive rods. Wound does not have any obvious sign of infection. Senior care team aware.   Patient's daughter was present during visit, provided update on the wound.  All questions answered.  Clinical factors affecting wound healing includes poor appetite, medical complexity, limited range of motion, incontinence, and poor sensation.  Referred to outpatient wound  center for follow-up      2. Pressure injury of left foot, unstageable ([facility])  Assessment & Plan:  Left foot  Wound is stable, covered with 100% eschar  Continue hydraguard  Continue to offload  Patient is for discharge, referred to outpatient wound center      3. Pressure injury of right foot, unstageable (HCC)  Assessment & Plan:  Right foot  Wound is healed  Moisturizing lotion daily      4. Ambulatory dysfunction  Assessment & Plan:  On 24/7 restorative care                         Subjective:   January 17, 2024.  New consult for wound to sacrum and bilateral feet.  Patient was referred by Senior care team.  Patient currently admitted at [facility] for short-term rehab.  Patient have chronic kidney disease and lymphedema.  Patient was seen with the facility wound team.    Wound history: As per medical record review, the wound on the sacrum and bilateral feet is chronic.  Patient was seen by podiatry for the wound on the bilateral feet on January 5, 2024.  Ordered to continue local wound care.  X-ray was completed on both feet, nonsignificant result.     Received patient in bed, seems comfortable.  Denies pain.  Patient have a good appetite.  Patient is minimal assistance with turning and repositioning in bed.  Patient is incontinent of both bowel and bladder.    1/24/2024 Follow up for wound on the Sacrum and bilateral feet . Received patient, not in distress. Facility staff did not report any significant issues related to the wound. Denies pain.     1/31/2024 Follow up for wound on the sacrum and bilateral feet . Received patient, not in distress. Facility staff did not report any significant issues related to the wound. As per report, patient had been refusing air mattress. Patient have a poor appetite, only ate 0-25% of her meals yesterday. Reviewed laboratory result on 1/31/2024 - WBC is 7.7.    2/7/2024 Follow up for wound on the sacrum and bilateral feet . Received patient, not in distress. Facility  staff did not report any significant issues related to the wound. As per report, patient's daughter is requesting for antibiotic for the wound.     2/14/2024 Follow up for wound on the sacrum and bilateral feet . Received patient, not in distress. Facility staff did not report any significant issues related to the wound. Denies pain..    February 21, 2024.  Follow-up for wound on the sacrum and bilateral feet.  Received patient in bed, seems comfortable.  Patient's daughter, Laura, was present during visit.  No significant issues reported related to the wound.  Patient will wound on the left lower leg that is covered with wound dressing, managed by facility wound team.            Review of Systems   Constitutional: Negative.    Respiratory: Negative.     Cardiovascular: Negative.    Musculoskeletal:  Positive for gait problem.   Skin:  Positive for wound.   Psychiatric/Behavioral: Negative.         Objective:    Physical Exam  Constitutional:       Appearance: Normal appearance.   Cardiovascular:      Rate and Rhythm: Normal rate.   Pulmonary:      Effort: Pulmonary effort is normal.   Genitourinary:     Comments: Incontinent  Musculoskeletal:      Right lower leg: Edema present.      Left lower leg: Edema present.      Comments: Positive lymphedema on bilateral lower leg  Dry skin   Skin:     Findings: Lesion present.      Comments: Sacrum: Wound size is 3.1 x 2.2 x 1 cm.,  Undermining all edges, deepest at 12, 5 cm, with large amount of serosanguineous drainage, 100% granulation, periwound is normal, with no obvious sign of infection    Right plantar foot: Wound is healed, dry skin    Left plantar foot: Wound size is 1.2 x 0.6 cm.,  100% scab, no drainage, no obvious sign of infection   Neurological:      Mental Status: She is alert.              Procedures           Patient's care was coordinated with nursing facility staff. Recent vitals, labs and updated medications were reviewed on EMR or chart system of  facility. Past Medical, surgical, social, medication and allergy history and patient's previous records were reviewed and updated as appropriate: Most up-to date information is available in the facility EMR where the patient is currently admitted.    Patient Active Problem List   Diagnosis    Right bundle branch block (RBBB) on electrocardiogram (ECG)    Diverticulosis of colon    Large hiatal hernia    Incidental 6cm right Renal lesion on CT    Anemia    CKD (chronic kidney disease) stage 3, GFR 30-59 ml/min ([facility])    Atypical atrial flutter (HCC)    Acute on chronic respiratory failure (HCC)    Breast mass, right    Chronic heart failure with preserved ejection fraction (HCC)    HOSSEIN (acute kidney injury) ([facility])    Lymphedema    Anemia due to chronic kidney disease and iron deficiency    Sepsis due to Escherichia coli with acute renal failure and septic shock ([facility])    Pressure injury of left foot, unstageable (HCC)    Pressure injury of right foot, unstageable (HCC)    Ambulatory dysfunction    Pressure injury of sacral region, stage 4 ([facility])    Failure to thrive in adult     Past Medical History:   Diagnosis Date    Acute kidney injury (HCC)     Acute on chronic respiratory failure (HCC)     Arthritis     Atrial flutter (HCC)     Rapid heart rate      Past Surgical History:   Procedure Laterality Date    CATARACT EXTRACTION      EGD AND COLONOSCOPY N/A 11/14/2017    Procedure: EGD AND COLONOSCOPY;  Surgeon: Radha Salas MD;  [facility];  Service: Gastroenterology    HIP FRACTURE SURGERY       Social History     Socioeconomic History    Marital status:      Spouse name: None    Number of children: 3    Years of education: None    Highest education level: None   Occupational History    None   Tobacco Use    Smoking status: Never    Smokeless tobacco: Never    Tobacco comments:     former smoker, per [employer];  started smoking at 15 yo, stopped at 29 yo   Vaping Use    Vaping status: Never Used    Substance and Sexual Activity    Alcohol use: No    Drug use: No    Sexual activity: None   Other Topics Concern    None   Social History Narrative    Inadequate exercise     Social Determinants of Health     Financial Resource Strain: Not on file   Food Insecurity: No Food Insecurity (1/5/2024)    Hunger Vital Sign     Worried About Running Out of Food in the Last Year: Never true     Ran Out of Food in the Last Year: Never true   Transportation Needs: No Transportation Needs (1/5/2024)    PRAPARE - Transportation     Lack of Transportation (Medical): No     Lack of Transportation (Non-Medical): No   Physical Activity: Not on file   Stress: Not on file   Social Connections: Not on file   Intimate Partner Violence: Not on file   Housing Stability: Unknown (1/5/2024)    Housing Stability Vital Sign     Unable to Pay for Housing in the Last Year: No     Number of Places Lived in the Last Year: Not on file     Unstable Housing in the Last Year: No        Current Outpatient Medications:     acetaminophen (TYLENOL) 325 mg tablet, Take 2 tablets (650 mg total) by mouth every 6 (six) hours as needed for mild pain, Disp: , Rfl:     diltiazem (CARDIZEM CD) 180 mg 24 hr capsule, TAKE 1 CAPSULE(180 MG) BY MOUTH DAILY, Disp: 90 capsule, Rfl: 0    Emollient (CeraVe Daily Moisturizing) LOTN, Apply 1 Application topically in the morning, Disp: , Rfl:     ferrous sulfate 325 (65 Fe) mg tablet, Take 1 tablet (325 mg total) by mouth daily with breakfast, Disp: 30 tablet, Rfl: 0    furosemide (LASIX) 20 mg tablet, Take 1 tablet (20 mg total) by mouth every other day, Disp: , Rfl:     metoprolol succinate (TOPROL-XL) 25 mg 24 hr tablet, Take 1 tablet (25 mg total) by mouth daily, Disp: , Rfl:     Nutritional Supplements (ProSource Plus) LIQD, Take 30 mL by mouth in the morning, Disp: , Rfl:     potassium chloride (MICRO-K) 10 MEQ CR capsule, Take 1 capsule (10 mEq total) by mouth every other day, Disp: , Rfl:   Family History  "  Problem Relation Age of Onset    Diabetes Father     Cancer Family               Coordination of Care: Wound team aware of the treatment plan. Facility nurse will provide wound treatment and monitor the wound for any changes.     Patient / Staff education : Patient / Staff was given education on sign of infection and pressure ulcer prevention. Patient/ Staff verbalized understanding     Follow up :  Sign off.     Voice-recognition software may have been used in the preparation of this document. Occasional wrong word or \"sound-alike\" substitutions may have occurred due to the inherent limitations of voice recognition software. Interpretation should be guided by context.      AZRA Sánchez  "

## 2024-02-21 NOTE — PROGRESS NOTES
[facility]  [address] [address] [phone]  [facility]  Code 31 (STR)        NAME: Aurora Anderson  AGE: 89 y.o. SEX: female CODE STATUS: No CPR    DATE OF ENCOUNTER: 2/21/2024    Assessment and Plan     1. Pressure injury of sacral region, stage 4 (HCC)  Assessment & Plan:  Followed by wound NP  1/31/24 Xray negative for osteomyelitis  Continue to offload, local wound care  Continue protein supplement      2. Failure to thrive in adult  Assessment & Plan:  Appetite has slightly improved, consuming 50% of meals  Had discussed starting Mirtazapine as appetite stimulant with patients daughter, she declined at this time  Patient will eat more of the food her daughter brings in  Patient with stage IV sacral wound  Encourage PO intake  Followed by palliative care  Continue to monitor      3. Ambulatory dysfunction  Assessment & Plan:  Multifactorial in the setting of advanced age and acute/chronic medical conditions  Continue PT/OT  Fall Precautions  Ensure adequate nutrition/hydration   Monitor CBC/BMP    following for d/c planning        4. Lymphedema  Assessment & Plan:  Chronic B/L LE lymphedema  Continue moisturizing lotion daily  Would benefit from lymphedema clinic as outpatient   Continue Lasix 20 mg QOD, with potassium supplement      5. Atypical atrial flutter (HCC)  Assessment & Plan:  HR controlled, 68  Denies cp/palpitations  Toprol XL 25 mg daily  Continue diltiazem 180 mg daily        6. Chronic heart failure with preserved ejection fraction (HCC)  Assessment & Plan:  Wt Readings from Last 3 Encounters:   02/21/24 58.1 kg (128 lb)   02/19/24 58.1 kg (128 lb)   02/14/24 58.1 kg (128 lb)   1/5/24 Echo showing EF of 55%  Weight stable  B/l LE lymphedema stable  Continue Metoprolol ER 25 mg daily  Continue Lasix 20 mg QOD with potassium supplement  Continue weekly weights  Continue 2000 ml FR        7. Stage 3a chronic kidney disease (HCC)  Assessment &  Plan:  Lab Results   Component Value Date    EGFR 55 01/16/2024    EGFR 49 01/14/2024    EGFR 48 01/13/2024    CREATININE 0.89 01/16/2024    CREATININE 0.98 01/14/2024    CREATININE 0.99 01/13/2024 1/31/24 Cr 0.9/ bun 13/gfr 58  Avoid nephrotoxins  Encourage adequate hydration  Avoid hypotension  Monitor kidney functions           All medications and routine orders were reviewed and updated as needed.    Chief Complaint     STR follow up visit    Patient's care was coordinated with nursing facility staff. Recent vitals, labs, and updated medications were review on [facility] system in facility.  Past Medical and Surgical History      Past Medical History:   Diagnosis Date    Acute kidney injury (HCC)     Acute on chronic respiratory failure (HCC)     Arthritis     Atrial flutter (HCC)     Rapid heart rate      Past Surgical History:   Procedure Laterality Date    CATARACT EXTRACTION      EGD AND COLONOSCOPY N/A 11/14/2017    Procedure: EGD AND COLONOSCOPY;  Surgeon: Radha Salas MD;  Location: [facility];  Service: Gastroenterology    HIP FRACTURE SURGERY       Allergies   Allergen Reactions    Penicillins Rash          History of Present Illness     HPI  Aurora Anderson is a 89 year old female, she is a STR patient of [facility] since 1/16/24. Past Medical Hx including but not limited to A flutter, lymphedema, CHF, CKD, anemia. She was seen in collaboration with nursing for medical mgmt and STR follow up.     Hospital Course  Patient was admitted to the hospital 1/4/24 for septic shock due to UTI. Urine culture grew Proteus and E. Coli. Patient received IV epinephrine for suspected BRASH, and was supplied 30 cc/kg of IV fluids and was transferred to the ICU. Patient received Ceftriaxone and azithromycin. Patient was weaned off pressors and was downgraded to medsurg. Patient developed HOSSEIN and Lasix was held. Patient developed respiratory failure requiring diuresis. Patient also had episode of  anemia with Hgb of 6.9 with no sign of active bleeding, patient received 1 unit PRBC.  Patient was discharged to [facility].    Rehab Course   Aurora was seen and examined at bedside today. Patient is AAOx 3. On exam patient resting in bed and does not appear to be in distress. Patient states she is doing ok today. C/o pain at sacral wound site, wound improving per wound NP. Continue local wound care. She denies CP/SOB/N/V/D. Denies lightheadedness, dizziness, headaches, vision changes. Denies any bowel or bladder issues. Per review of SNF records, Patient is eating 2-3 meals per day, consuming 50 %. Last documented BM 2/20/24. No concerns from nursing at this time.    The patient's allergies, past medical, surgical, social and family history were reviewed and unchanged.    Review of Systems     Review of Systems   Constitutional:  Positive for activity change and appetite change. Negative for fever.   HENT: Negative.  Negative for congestion, sneezing and sore throat.    Eyes: Negative.    Respiratory: Negative.  Negative for cough and shortness of breath.    Cardiovascular:  Positive for leg swelling. Negative for chest pain and palpitations.   Gastrointestinal:  Negative for abdominal distention, constipation, diarrhea, nausea and vomiting.   Endocrine: Negative.    Genitourinary: Negative.  Negative for difficulty urinating and dysuria.   Musculoskeletal:  Positive for gait problem.   Skin:  Positive for wound.        Sacral wound, lymphedema b/l LE   Allergic/Immunologic: Negative.    Neurological:  Positive for weakness. Negative for dizziness and headaches.   Hematological: Negative.    Psychiatric/Behavioral:  Negative for sleep disturbance.          Objective     Vitals:   Vitals:    02/21/24 1142   BP: 100/68   Pulse: 68   Resp: 18   Temp: 97.8 °F (36.6 °C)   SpO2: 96%       Physical Exam  Vitals and nursing note reviewed.   Constitutional:       General: She is not in acute distress.     Appearance: Normal  appearance. She is not ill-appearing.   HENT:      Head: Normocephalic and atraumatic.      Nose: No congestion.   Eyes:      Conjunctiva/sclera: Conjunctivae normal.   Cardiovascular:      Rate and Rhythm: Rhythm irregular.      Pulses: Normal pulses.      Heart sounds: Normal heart sounds.   Pulmonary:      Effort: Pulmonary effort is normal. No respiratory distress.      Breath sounds: Normal breath sounds. No wheezing.   Abdominal:      General: Bowel sounds are normal. There is no distension.      Palpations: Abdomen is soft.      Tenderness: There is no abdominal tenderness.   Musculoskeletal:      Right lower leg: Edema present.      Left lower leg: Edema present.      Comments: Chronic lymphedema   Skin:     General: Skin is warm.      Comments: Sacral wound   Neurological:      Mental Status: She is alert and oriented to person, place, and time.      Motor: Weakness present.      Gait: Gait abnormal.   Psychiatric:         Mood and Affect: Mood normal.         Behavior: Behavior normal.         Pertinent Laboratory/Diagnostic Studies:   Reviewed in facility chart-stable      Current Medications   Medications reviewed and updated see facility MAR for details.      Current Outpatient Medications:     acetaminophen (TYLENOL) 325 mg tablet, Take 2 tablets (650 mg total) by mouth every 6 (six) hours as needed for mild pain, Disp: , Rfl:     diltiazem (CARDIZEM CD) 180 mg 24 hr capsule, TAKE 1 CAPSULE(180 MG) BY MOUTH DAILY, Disp: 90 capsule, Rfl: 0    Emollient (CeraVe Daily Moisturizing) LOTN, Apply 1 Application topically in the morning, Disp: , Rfl:     ferrous sulfate 325 (65 Fe) mg tablet, Take 1 tablet (325 mg total) by mouth daily with breakfast, Disp: 30 tablet, Rfl: 0    furosemide (LASIX) 20 mg tablet, Take 1 tablet (20 mg total) by mouth every other day, Disp: , Rfl:     metoprolol succinate (TOPROL-XL) 25 mg 24 hr tablet, Take 1 tablet (25 mg total) by mouth daily, Disp: , Rfl:     Nutritional  "Supplements (ProSource Plus) LIQD, Take 30 mL by mouth in the morning, Disp: , Rfl:     potassium chloride (MICRO-K) 10 MEQ CR capsule, Take 1 capsule (10 mEq total) by mouth every other day, Disp: , Rfl:      Please note:  Voice-recognition software may have been used in the preparation of this document.  Occasional wrong word or \"sound-alike\" substitutions may have occurred due to the inherent limitations of voice recognition software.  Interpretation should be guided by context.         AZRA Lewis  2/21/2024  11:54 AM    "

## 2024-02-21 NOTE — ASSESSMENT & PLAN NOTE
Followed by wound NP  1/31/24 Xray negative for osteomyelitis  Continue to offload, local wound care  Continue protein supplement

## 2024-02-21 NOTE — ASSESSMENT & PLAN NOTE
HR controlled, 68  Denies cp/palpitations  Toprol XL 25 mg daily  Continue diltiazem 180 mg daily

## 2024-02-21 NOTE — ASSESSMENT & PLAN NOTE
Sacral wound  Wound size increased, however I noticed a decrease in granulation.  No obvious sign of infection.  Continue local wound care to [facility].  The wound will benefit from, however, caregiver will not be able to manage wound VAC at home even with the visiting nurse.  Patient recently lose weight weight on January 23, 2024 is 138.2 compared to weight on January 16 which is 142.4.  Patient currently on Ensure Plus, as per report, patient has been refusing to drink the Ensure plus  Result of the xray on 1/31 showed (-) osteomyelitis  Wound culture ordered - Few gram positive rods. Wound does not have any obvious sign of infection. Senior care team aware.   Patient's daughter was present during visit, provided update on the wound.  All questions answered.  Clinical factors affecting wound healing includes poor appetite, medical complexity, limited range of motion, incontinence, and poor sensation.  Referred to outpatient wound center for follow-up

## 2024-02-21 NOTE — ASSESSMENT & PLAN NOTE
Wt Readings from Last 3 Encounters:   02/21/24 58.1 kg (128 lb)   02/19/24 58.1 kg (128 lb)   02/14/24 58.1 kg (128 lb)   1/5/24 Echo showing EF of 55%  Weight stable  B/l LE lymphedema stable  Continue Metoprolol ER 25 mg daily  Continue Lasix 20 mg QOD with potassium supplement  Continue weekly weights  Continue 2000 ml FR

## 2024-02-28 ENCOUNTER — NURSING HOME VISIT (OUTPATIENT)
Dept: WOUND CARE | Facility: HOSPITAL | Age: 89
End: 2024-02-28

## 2024-02-28 DIAGNOSIS — L89.154 PRESSURE INJURY OF SACRAL REGION, STAGE 4 (HCC): Primary | ICD-10-CM

## 2024-02-28 DIAGNOSIS — R26.2 AMBULATORY DYSFUNCTION: ICD-10-CM

## 2024-02-28 DIAGNOSIS — L89.890 PRESSURE INJURY OF LEFT FOOT, UNSTAGEABLE (HCC): ICD-10-CM

## 2024-02-28 NOTE — ASSESSMENT & PLAN NOTE
Left foot  Wound improved, almost healed  Continue hydraguard  Continue to offload  Patient is for discharge, referred to outpatient wound center

## 2024-02-28 NOTE — ASSESSMENT & PLAN NOTE
Sacral wound  Wound size improved, decrease in wound size  Continue local wound care to [facility].  The wound will benefit from, however, caregiver will not be able to manage wound VAC at home even with the visiting nurse.  Patient recently lose weight weight on January 23, 2024 is 138.2 compared to weight on January 16 which is 142.4.  Patient currently on Ensure Plus, as per report, patient has been refusing to drink the Ensure plus  Result of the xray on 1/31 showed (-) osteomyelitis  Wound culture ordered - Few gram positive rods. Wound does not have any obvious sign of infection. Senior care team aware.   Clinical factors affecting wound healing includes poor appetite, medical complexity, limited range of motion, incontinence, and poor sensation.  Referred to outpatient wound center for follow-up

## 2024-02-28 NOTE — PROGRESS NOTES
[facility] WOUND CARE MANAGEMENT   AND HYPERBARIC MEDICINE CENTER       Patient ID: Aurora Anderson is a 89 y.o. female Date of Birth 12/13/1929     [facility]    Performed wound round with: Wound team     Chief Complaint : Sacrum, left plantar    Wound Instructions:  Wound: Sacral  Cleanse the wound bed with NSS or wound cleanser  Apply Skin-Prep to periwound area   Gently pack the wound cavity loosely with mesalt strip and cover with ABD pad  Daily and as needed for soiling    Refer to outpatient wound center    Left and right plantar  Cleanse with mild soap and water  Apply CeraVe moisturizing lotion or hydraguard to bilateral lower legs including left and right plantar  Daily and as needed for soiling    Continue heel boots when in bed  Offload all wounds  Turn and reposition frequently  Monitor for infection or worsening    Allergies  Penicillins      Assessment & Plan:  1. Pressure injury of sacral region, stage 4 ([facility])  Assessment & Plan:  Sacral wound  Wound size improved, decrease in wound size  Continue local wound care to [facility].  The wound will benefit from, however, caregiver will not be able to manage wound VAC at home even with the visiting nurse.  Patient recently lose weight weight on January 23, 2024 is 138.2 compared to weight on January 16 which is 142.4.  Patient currently on Ensure Plus, as per report, patient has been refusing to drink the Ensure plus  Result of the xray on 1/31 showed (-) osteomyelitis  Wound culture ordered - Few gram positive rods. Wound does not have any obvious sign of infection. Senior care team aware.   Clinical factors affecting wound healing includes poor appetite, medical complexity, limited range of motion, incontinence, and poor sensation.  Referred to outpatient wound center for follow-up      2. Pressure injury of left foot, unstageable ([facility])  Assessment & Plan:  Left foot  Wound improved, almost healed  Continue hydraguard  Continue to  offload  Patient is for discharge, referred to outpatient wound center      3. Ambulatory dysfunction  Assessment & Plan:  On 24/7 restorative care                           Subjective:   January 17, 2024.  New consult for wound to sacrum and bilateral feet.  Patient was referred by Senior care team.  Patient currently admitted at [facility] for short-term rehab.  Patient have chronic kidney disease and lymphedema.  Patient was seen with the facility wound team.    Wound history: As per medical record review, the wound on the sacrum and bilateral feet is chronic.  Patient was seen by podiatry for the wound on the bilateral feet on January 5, 2024.  Ordered to continue local wound care.  X-ray was completed on both feet, nonsignificant result.     Received patient in bed, seems comfortable.  Denies pain.  Patient have a good appetite.  Patient is minimal assistance with turning and repositioning in bed.  Patient is incontinent of both bowel and bladder.    1/24/2024 Follow up for wound on the Sacrum and bilateral feet . Received patient, not in distress. Facility staff did not report any significant issues related to the wound. Denies pain.     1/31/2024 Follow up for wound on the sacrum and bilateral feet . Received patient, not in distress. Facility staff did not report any significant issues related to the wound. As per report, patient had been refusing air mattress. Patient have a poor appetite, only ate 0-25% of her meals yesterday. Reviewed laboratory result on 1/31/2024 - WBC is 7.7.    2/7/2024 Follow up for wound on the sacrum and bilateral feet . Received patient, not in distress. Facility staff did not report any significant issues related to the wound. As per report, patient's daughter is requesting for antibiotic for the wound.     2/14/2024 Follow up for wound on the sacrum and bilateral feet . Received patient, not in distress. Facility staff did not report any significant issues related to the  wound. Denies pain..    February 21, 2024.  Follow-up for wound on the sacrum and bilateral feet.  Received patient in bed, seems comfortable.  Patient's daughter, Laura, was present during visit.  No significant issues reported related to the wound.  Patient will wound on the left lower leg that is covered with wound dressing, managed by facility wound team.    2/28/2024 Follow up for wound on the sacrum and left foot plantar . Received patient, not in distress. Facility staff did not report any significant issues related to the wound. As per patient, she is for discharge this week.               Review of Systems   Constitutional: Negative.    Respiratory: Negative.     Cardiovascular: Negative.    Musculoskeletal:  Positive for gait problem.   Skin:  Positive for wound.   Psychiatric/Behavioral: Negative.         Objective:    Physical Exam  Constitutional:       Appearance: Normal appearance.   Cardiovascular:      Rate and Rhythm: Normal rate.   Pulmonary:      Effort: Pulmonary effort is normal.   Genitourinary:     Comments: Incontinent  Musculoskeletal:      Right lower leg: Edema present.      Left lower leg: Edema present.      Comments: Positive lymphedema on bilateral lower leg  Dry skin   Skin:     Findings: Lesion present.      Comments: Sacrum: Wound size is 2.3 x 1.9 x 1cm.   Undermining 12 - 3, deepest at 12, 5 cm, with moderate amount of serous  drainage, 100% granulation, periwound is normal, with no obvious sign of infection    Right plantar foot: Wound is healed, dry skin    Left plantar foot: Wound size is 1.1 x 0.6 cm.,  100% eschar, no drainage, no obvious sign of infection   Neurological:      Mental Status: She is alert.              Procedures           Patient's care was coordinated with nursing facility staff. Recent vitals, labs and updated medications were reviewed on EMR or chart system of facility. Past Medical, surgical, social, medication and allergy history and patient's previous  records were reviewed and updated as appropriate: Most up-to date information is available in the facility EMR where the patient is currently admitted.    Patient Active Problem List   Diagnosis    Right bundle branch block (RBBB) on electrocardiogram (ECG)    Diverticulosis of colon    Large hiatal hernia    Incidental 6cm right Renal lesion on CT    Anemia    CKD (chronic kidney disease) stage 3, GFR 30-59 ml/min (HCC)    Atypical atrial flutter (HCC)    Acute on chronic respiratory failure (HCC)    Breast mass, right    Chronic heart failure with preserved ejection fraction (HCC)    HOSSEIN (acute kidney injury) (HCC)    Lymphedema    Anemia due to chronic kidney disease and iron deficiency    Pressure injury of left foot, unstageable (HCC)    Pressure injury of right foot, unstageable (HCC)    Ambulatory dysfunction    Pressure injury of sacral region, stage 4 (HCC)    Failure to thrive in adult     Past Medical History:   Diagnosis Date    Acute kidney injury (HCC)     Acute on chronic respiratory failure (HCC)     Arthritis     Atrial flutter (HCC)     Rapid heart rate      Past Surgical History:   Procedure Laterality Date    CATARACT EXTRACTION      EGD AND COLONOSCOPY N/A 11/14/2017    Procedure: EGD AND COLONOSCOPY;  Surgeon: Radha Salas MD;  Location: [facility];  Service: Gastroenterology    HIP FRACTURE SURGERY       Social History     Socioeconomic History    Marital status:      Spouse name: None    Number of children: 3    Years of education: None    Highest education level: None   Occupational History    None   Tobacco Use    Smoking status: Never    Smokeless tobacco: Never    Tobacco comments:     former smoker, per [employer];  started smoking at 15 yo, stopped at 29 yo   Vaping Use    Vaping status: Never Used   Substance and Sexual Activity    Alcohol use: No    Drug use: No    Sexual activity: None   Other Topics Concern    None   Social History Narrative    Inadequate exercise     Social  Determinants of Health     Financial Resource Strain: Not on file   Food Insecurity: No Food Insecurity (1/5/2024)    Hunger Vital Sign     Worried About Running Out of Food in the Last Year: Never true     Ran Out of Food in the Last Year: Never true   Transportation Needs: No Transportation Needs (1/5/2024)    PRAPARE - Transportation     Lack of Transportation (Medical): No     Lack of Transportation (Non-Medical): No   Physical Activity: Not on file   Stress: Not on file   Social Connections: Not on file   Intimate Partner Violence: Not on file   Housing Stability: Unknown (1/5/2024)    Housing Stability Vital Sign     Unable to Pay for Housing in the Last Year: No     Number of Places Lived in the Last Year: Not on file     Unstable Housing in the Last Year: No        Current Outpatient Medications:     acetaminophen (TYLENOL) 325 mg tablet, Take 2 tablets (650 mg total) by mouth every 6 (six) hours as needed for mild pain, Disp: , Rfl:     diltiazem (CARDIZEM CD) 180 mg 24 hr capsule, TAKE 1 CAPSULE(180 MG) BY MOUTH DAILY, Disp: 90 capsule, Rfl: 0    Emollient (CeraVe Daily Moisturizing) LOTN, Apply 1 Application topically in the morning, Disp: , Rfl:     ferrous sulfate 325 (65 Fe) mg tablet, Take 1 tablet (325 mg total) by mouth daily with breakfast, Disp: 30 tablet, Rfl: 0    furosemide (LASIX) 20 mg tablet, Take 1 tablet (20 mg total) by mouth every other day, Disp: , Rfl:     metoprolol succinate (TOPROL-XL) 25 mg 24 hr tablet, Take 1 tablet (25 mg total) by mouth daily, Disp: , Rfl:     Nutritional Supplements (ProSource Plus) LIQD, Take 30 mL by mouth in the morning, Disp: , Rfl:     potassium chloride (MICRO-K) 10 MEQ CR capsule, Take 1 capsule (10 mEq total) by mouth every other day, Disp: , Rfl:   Family History   Problem Relation Age of Onset    Diabetes Father     Cancer Family               Coordination of Care: Wound team aware of the treatment plan. Facility nurse will provide wound treatment  "and monitor the wound for any changes.     Patient / Staff education : Patient / Staff was given education on sign of infection and pressure ulcer prevention. Patient/ Staff verbalized understanding     Follow up :  Sign off.     Voice-recognition software may have been used in the preparation of this document. Occasional wrong word or \"sound-alike\" substitutions may have occurred due to the inherent limitations of voice recognition software. Interpretation should be guided by context.      AZRA Sánchez  "

## 2024-03-01 ENCOUNTER — NURSING HOME VISIT (OUTPATIENT)
Dept: GERIATRICS | Facility: OTHER | Age: 89
End: 2024-03-01

## 2024-03-01 VITALS
TEMPERATURE: 97.8 F | RESPIRATION RATE: 18 BRPM | DIASTOLIC BLOOD PRESSURE: 70 MMHG | HEART RATE: 72 BPM | SYSTOLIC BLOOD PRESSURE: 122 MMHG | OXYGEN SATURATION: 96 %

## 2024-03-01 DIAGNOSIS — R62.7 FAILURE TO THRIVE IN ADULT: ICD-10-CM

## 2024-03-01 DIAGNOSIS — L89.154 PRESSURE INJURY OF SACRAL REGION, STAGE 4 (HCC): ICD-10-CM

## 2024-03-01 DIAGNOSIS — N18.31 ANEMIA DUE TO STAGE 3A CHRONIC KIDNEY DISEASE: ICD-10-CM

## 2024-03-01 DIAGNOSIS — I50.32 CHRONIC HEART FAILURE WITH PRESERVED EJECTION FRACTION (HCC): Chronic | ICD-10-CM

## 2024-03-01 DIAGNOSIS — D63.1 ANEMIA DUE TO STAGE 3A CHRONIC KIDNEY DISEASE: ICD-10-CM

## 2024-03-01 DIAGNOSIS — N18.31 STAGE 3A CHRONIC KIDNEY DISEASE (HCC): Chronic | ICD-10-CM

## 2024-03-01 DIAGNOSIS — R26.2 AMBULATORY DYSFUNCTION: ICD-10-CM

## 2024-03-01 DIAGNOSIS — I48.4 ATYPICAL ATRIAL FLUTTER (HCC): Primary | Chronic | ICD-10-CM

## 2024-03-01 NOTE — ASSESSMENT & PLAN NOTE
Followed by wound NP  1/31/24 Xray negative for osteomyelitis  Continue to offload, local wound care  Continue protein supplement  OP follow up with wound center

## 2024-03-01 NOTE — LETTER
March 1, 2024     Anyi Lopez MD  [address] [place] [id number]    Patient: Aurora Anderson   YOB: 1929   Date of Visit: 3/1/2024       Dear Dr. Lopez:    [facility]  [address] [address]  [phone]  DISCHARGE SUMMARY  [facility]  Code 31    NAME: Aurora Anderson  AGE: 89 y.o. SEX: female   CODE STATUS: No CPR    DATE OF ADMISSION: 1/16/24   DATE OF DISCHARGE: 3/4/24   DISCHARGE DISPOSITION: Stable for discharge to home with family support and home health PT/OT/SN services.   Reason for Admission: Patient was admitted to [facility] for [facility] after hospitalization for UTI.    Past Medical and Surgical History:   Past Medical History:   Diagnosis Date   • Acute kidney injury (HCC)    • Acute on chronic respiratory failure (HCC)    • Arthritis    • Atrial flutter (HCC)    • Rapid heart rate       Past Surgical History:   Procedure Laterality Date   • CATARACT EXTRACTION     • EGD AND COLONOSCOPY N/A 11/14/2017    Procedure: EGD AND COLONOSCOPY;  Surgeon: Radha Salas MD;  Location: [facility];  Service: Gastroenterology   • HIP FRACTURE SURGERY         Course of stay:   HPI  Aurora Anderson is a 89 year old female, she is a STR patient of [facility] since 1/16/24. Past Medical Hx including but not limited to A flutter, lymphedema, CHF, CKD, anemia. She was seen in collaboration with nursing for medical mgmt and STR follow up.      Hospital Course  Patient was admitted to the hospital 1/4/24 for septic shock due to UTI. Urine culture grew Proteus and E. Coli. Patient received IV epinephrine for suspected BRASH, and was supplied 30 cc/kg of IV fluids and was transferred to the ICU. Patient received Ceftriaxone and azithromycin. Patient was weaned off pressors and was downgraded to medsurg. Patient developed HOSSEIN and Lasix was held. Patient developed respiratory failure requiring diuresis. Patient also had episode of  anemia with Hgb of 6.9 with no sign of active bleeding, patient received 1 unit PRBC.  Patient was discharged to [facility].     Rehab Course   Aurora was seen and examined at bedside today. Patient is AAOx 3. On exam patient resting in bed and does not appear to be in distress. Patient states she feels ok today. C/o pain at sacral wound site, wound improving, size decreasing per wound NP. Continue local wound care. Lymphedema stable, will continue QOD lasix. She denies CP/SOB/N/V/D. Denies lightheadedness, dizziness, headaches, vision changes. Denies any bowel or bladder issues. Per review of SNF records, Patient is eating 2-3 meals per day, consuming %. Last documented BM 3/1/24. No concerns from nursing at this time.  During the patients stay at [facility], she received skilled nursing care, PT, OT, social service support, dietician support, and medical management.  Pt scheduled to be discharged on 3/4/24.  ROS:  Review of Systems   Constitutional:  Positive for activity change and appetite change. Negative for fever.   HENT: Negative.  Negative for congestion, sneezing and sore throat.    Eyes: Negative.    Respiratory: Negative.  Negative for cough and shortness of breath.    Cardiovascular:  Positive for leg swelling. Negative for chest pain and palpitations.   Gastrointestinal:  Negative for abdominal distention, constipation, diarrhea, nausea and vomiting.   Endocrine: Negative.    Genitourinary: Negative.  Negative for difficulty urinating and dysuria.   Musculoskeletal:  Positive for gait problem.   Skin:  Positive for wound.        Sacral wound, lymphedema b/l LE   Allergic/Immunologic: Negative.    Neurological:  Positive for weakness. Negative for dizziness and headaches.   Hematological: Negative.    Psychiatric/Behavioral:  Negative for sleep disturbance.        PHYSICAL EXAM:  VITALS:   Vitals:    03/01/24 1718   BP: 122/70   Pulse: 72   Resp: 18   Temp: 97.8 °F (36.6 °C)   SpO2: 96%        Physical  Exam  Vitals and nursing note reviewed.   Constitutional:       General: She is not in acute distress.     Appearance: Normal appearance. She is not ill-appearing.   HENT:      Head: Normocephalic and atraumatic.      Nose: No congestion.   Eyes:      Conjunctiva/sclera: Conjunctivae normal.   Cardiovascular:      Rate and Rhythm: Rhythm irregular.      Pulses: Normal pulses.      Heart sounds: Normal heart sounds.   Pulmonary:      Effort: Pulmonary effort is normal. No respiratory distress.      Breath sounds: Normal breath sounds. No wheezing.   Abdominal:      General: Bowel sounds are normal. There is no distension.      Palpations: Abdomen is soft.      Tenderness: There is no abdominal tenderness.   Musculoskeletal:      Right lower leg: Edema present.      Left lower leg: Edema present.      Comments: Chronic lymphedema, stable   Skin:     General: Skin is warm.      Comments: Sacral wound   Neurological:      Mental Status: She is alert and oriented to person, place, and time.      Motor: Weakness present.      Gait: Gait abnormal.   Psychiatric:         Mood and Affect: Mood normal.         Behavior: Behavior normal.         Admission Diagnoses:   1. Atypical atrial flutter (HCC)  Assessment & Plan:  HR controlled, 72  Denies cp/palpitations  Toprol XL 25 mg daily  Continue diltiazem 180 mg daily        2. Chronic heart failure with preserved ejection fraction (HCC)  Assessment & Plan:  Wt Readings from Last 3 Encounters:   02/21/24 58.1 kg (128 lb)   02/19/24 58.1 kg (128 lb)   02/14/24 58.1 kg (128 lb)   1/5/24 Echo showing EF of 55%  Weight stable  B/l LE lymphedema stable  Continue Metoprolol ER 25 mg daily  Continue Lasix 20 mg QOD with potassium supplement  Continue weekly weights  Continue 2000 ml FR        3. Stage 3a chronic kidney disease (HCC)  Assessment & Plan:  Lab Results   Component Value Date    EGFR 55 01/16/2024    EGFR 49 01/14/2024    EGFR 48 01/13/2024    CREATININE 0.89 01/16/2024     CREATININE 0.98 01/14/2024    CREATININE 0.99 01/13/2024 1/31/24 Cr 0.9/ bun 13/gfr 58  Avoid nephrotoxins  Encourage adequate hydration  Avoid hypotension  Monitor kidney functions      4. Anemia due to stage 3a chronic kidney disease   Assessment & Plan:  1/31/24 H/H 8.8/30  Continue ferrous sulfate daily  OP follow up with PCP      5. Ambulatory dysfunction  Assessment & Plan:  Multifactorial in the setting of advanced age and acute/chronic medical conditions  Continue PT/OT  Fall Precautions  Ensure adequate nutrition/hydration   Monitor CBC/BMP    following for d/c planning        6. Pressure injury of sacral region, stage 4 (HCC)  Assessment & Plan:  Followed by wound NP  1/31/24 Xray negative for osteomyelitis  Continue to offload, local wound care  Continue protein supplement  OP follow up with wound center      7. Failure to thrive in adult  Assessment & Plan:  Appetite has slightly improved, consuming % of meals  Had discussed starting Mirtazapine as appetite stimulant with patients daughter, she declined at this time  Patient will eat more of the food her daughter brings in  Patient with stage IV sacral wound, continue prosource  Encourage PO intake  Followed by palliative care  Continue to monitor           Follow-up Recommendations:    Outpatient Follow up with PCP in the next 2 weeks  Op wound care  [facility] PT/OT/SN services     Labs and testing performed during stay:  1/31/24  WBC COUNT, AUTOMATED 7.7 K/CU.MM 3.5-11.0  Final              RBC COUNT 3.4 M/CU.MM 3.3-4.9  Final             HEMOGLOBIN 8.8 g/dL 10.7-15.1 L Final             HEMATOCRIT 30 PERCENT 32-46 L Final             MCH 26 pg 25-32  Final             MCV 88 fL   Final             MCHC 29 g/dL 31-36 L Final             RDW 22.7 % 11.6-14.4 H Final             PLATELETS, AUTOMATED 235 K/CU.-400  Final             MPV 9.2 fL 9.4-12.4 L Final     CREATININE 0.9 mg/dL 0.6-1.5  Final               GLUCOSE 62 mg/dL 65-99 L Final             UREA NITROGEN 13 mg/dL 8-23  Final             SODIUM 139 mMOL/L 135-145  Final             POTASSIUM 3.9 mMOL/L 3.4-5.3  Final             CHLORIDE 100 mmol/L   Final             UREA N / CREAT RATIO 14.4 RATIO 12-20  Final             CO2 36 mmol/L 22-32 H Final             OSMOLALITY 286 mOsm/kG 275-305  Final     The Serum Osmolality Reference Range has been  adjusted based on current information provided  by the [facility].        CALCIUM 8.5 mg/dL 8.4-10.2  Final             GFR 58 See note >60 L Final       Discharge Medications: See discharge medication list which was reviewed and signed.      Current Outpatient Medications:   •  acetaminophen (TYLENOL) 325 mg tablet, Take 2 tablets (650 mg total) by mouth every 6 (six) hours as needed for mild pain, Disp: , Rfl:   •  diltiazem (CARDIZEM CD) 180 mg 24 hr capsule, TAKE 1 CAPSULE(180 MG) BY MOUTH DAILY, Disp: 90 capsule, Rfl: 0  •  Emollient (CeraVe Daily Moisturizing) LOTN, Apply 1 Application topically in the morning, Disp: , Rfl:   •  ferrous sulfate 325 (65 Fe) mg tablet, Take 1 tablet (325 mg total) by mouth daily with breakfast, Disp: 30 tablet, Rfl: 0  •  furosemide (LASIX) 20 mg tablet, Take 1 tablet (20 mg total) by mouth every other day, Disp: , Rfl:   •  metoprolol succinate (TOPROL-XL) 25 mg 24 hr tablet, Take 1 tablet (25 mg total) by mouth daily, Disp: , Rfl:   •  Nutritional Supplements (ProSource Plus) LIQD, Take 30 mL by mouth in the morning, Disp: , Rfl:   •  potassium chloride (MICRO-K) 10 MEQ CR capsule, Take 1 capsule (10 mEq total) by mouth every other day, Disp: , Rfl:      Discussion with patient/family and further instructions:  -Fall precautions  -Aspiration precautions  -Bleeding precautions  -Monitor for signs/symptoms of infection  -Medication list was reviewed and signed  -DME form was completed    Status at time of discharge: Stable     Billing based on time. Time spent on unit,  "40 minutes. Time spent counseling pt on debility/condition, 30 minutes.    Please note:  Voice-recognition software may have been used in the preparation of this document.  Occasional wrong word or \"sound-alike\" substitutions may have occurred due to the inherent limitations of voice recognition software.  Interpretation should be guided by context.        AZRA Lewis  3/1/2024   "

## 2024-03-01 NOTE — PROGRESS NOTES
[facility]  [address] [address]  [phone]  DISCHARGE SUMMARY  Facility[facility]  Code 31    NAME: Aurora Anderson  AGE: 89 y.o. SEX: female   CODE STATUS: No CPR    DATE OF ADMISSION: 1/16/24   DATE OF DISCHARGE: 3/4/24   DISCHARGE DISPOSITION: Stable for discharge to home with family support and home health PT/OT/SN services.   Reason for Admission: Patient was admitted to [facility] for rehabilitation after hospitalization for UTI.    Past Medical and Surgical History:   Past Medical History:   Diagnosis Date    Acute kidney injury (HCC)     Acute on chronic respiratory failure (HCC)     Arthritis     Atrial flutter (HCC)     Rapid heart rate       Past Surgical History:   Procedure Laterality Date    CATARACT EXTRACTION      EGD AND COLONOSCOPY N/A 11/14/2017    Procedure: EGD AND COLONOSCOPY;  Surgeon: Radha Salas MD;  Location[facility];  Service: Gastroenterology    HIP FRACTURE SURGERY         Course of stay:   HPI  Aurora Anderson is a 89 year old female, she is a STR patient of [facility] since 1/16/24. Past Medical Hx including but not limited to A flutter, lymphedema, CHF, CKD, anemia. She was seen in collaboration with nursing for medical mgmt and STR follow up.      Hospital Course  Patient was admitted to the hospital 1/4/24 for septic shock due to UTI. Urine culture grew Proteus and E. Coli. Patient received IV epinephrine for suspected BRASH, and was supplied 30 cc/kg of IV fluids and was transferred to the ICU. Patient received Ceftriaxone and azithromycin. Patient was weaned off pressors and was downgraded to medsurg. Patient developed HOSSEIN and Lasix was held. Patient developed respiratory failure requiring diuresis. Patient also had episode of anemia with Hgb of 6.9 with no sign of active bleeding, patient received 1 unit PRBC.  [facility].     Rehab Course   Aurora was seen and examined at bedside today. Patient is AAOx 3. On  exam patient resting in bed and does not appear to be in distress. Patient states she feels ok today. C/o pain at sacral wound site, wound improving, size decreasing per wound NP. Continue local wound care. Lymphedema stable, will continue QOD lasix. She denies CP/SOB/N/V/D. Denies lightheadedness, dizziness, headaches, vision changes. Denies any bowel or bladder issues. Per review of SNF records, Patient is eating 2-3 meals per day, consuming %. Last documented BM 3/1/24. No concerns from nursing at this time.  During the patients stay at [facility], she received skilled nursing care, PT, OT, social service support, dietician support, and medical management.  Pt scheduled to be discharged on 3/4/24.  ROS:  Review of Systems   Constitutional:  Positive for activity change and appetite change. Negative for fever.   HENT: Negative.  Negative for congestion, sneezing and sore throat.    Eyes: Negative.    Respiratory: Negative.  Negative for cough and shortness of breath.    Cardiovascular:  Positive for leg swelling. Negative for chest pain and palpitations.   Gastrointestinal:  Negative for abdominal distention, constipation, diarrhea, nausea and vomiting.   Endocrine: Negative.    Genitourinary: Negative.  Negative for difficulty urinating and dysuria.   Musculoskeletal:  Positive for gait problem.   Skin:  Positive for wound.        Sacral wound, lymphedema b/l LE   Allergic/Immunologic: Negative.    Neurological:  Positive for weakness. Negative for dizziness and headaches.   Hematological: Negative.    Psychiatric/Behavioral:  Negative for sleep disturbance.        PHYSICAL EXAM:  VITALS:   Vitals:    03/01/24 1718   BP: 122/70   Pulse: 72   Resp: 18   Temp: 97.8 °F (36.6 °C)   SpO2: 96%        Physical Exam  Vitals and nursing note reviewed.   Constitutional:       General: She is not in acute distress.     Appearance: Normal appearance. She is not ill-appearing.   HENT:      Head: Normocephalic and atraumatic.       Nose: No congestion.   Eyes:      Conjunctiva/sclera: Conjunctivae normal.   Cardiovascular:      Rate and Rhythm: Rhythm irregular.      Pulses: Normal pulses.      Heart sounds: Normal heart sounds.   Pulmonary:      Effort: Pulmonary effort is normal. No respiratory distress.      Breath sounds: Normal breath sounds. No wheezing.   Abdominal:      General: Bowel sounds are normal. There is no distension.      Palpations: Abdomen is soft.      Tenderness: There is no abdominal tenderness.   Musculoskeletal:      Right lower leg: Edema present.      Left lower leg: Edema present.      Comments: Chronic lymphedema, stable   Skin:     General: Skin is warm.      Comments: Sacral wound   Neurological:      Mental Status: She is alert and oriented to person, place, and time.      Motor: Weakness present.      Gait: Gait abnormal.   Psychiatric:         Mood and Affect: Mood normal.         Behavior: Behavior normal.         Admission Diagnoses:   1. Atypical atrial flutter (HCC)  Assessment & Plan:  HR controlled, 72  Denies cp/palpitations  Toprol XL 25 mg daily  Continue diltiazem 180 mg daily        2. Chronic heart failure with preserved ejection fraction (HCC)  Assessment & Plan:  Wt Readings from Last 3 Encounters:   02/21/24 58.1 kg (128 lb)   02/19/24 58.1 kg (128 lb)   02/14/24 58.1 kg (128 lb)   1/5/24 Echo showing EF of 55%  Weight stable  B/l LE lymphedema stable  Continue Metoprolol ER 25 mg daily  Continue Lasix 20 mg QOD with potassium supplement  Continue weekly weights  Continue 2000 ml FR        3. Stage 3a chronic kidney disease (HCC)  Assessment & Plan:  Lab Results   Component Value Date    EGFR 55 01/16/2024    EGFR 49 01/14/2024    EGFR 48 01/13/2024    CREATININE 0.89 01/16/2024    CREATININE 0.98 01/14/2024    CREATININE 0.99 01/13/2024 1/31/24 Cr 0.9/ bun 13/gfr 58  Avoid nephrotoxins  Encourage adequate hydration  Avoid hypotension  Monitor kidney functions      4. Anemia due to stage  3a chronic kidney disease   Assessment & Plan:  1/31/24 H/H 8.8/30  Continue ferrous sulfate daily  OP follow up with PCP      5. Ambulatory dysfunction  Assessment & Plan:  Multifactorial in the setting of advanced age and acute/chronic medical conditions  Continue PT/OT  Fall Precautions  Ensure adequate nutrition/hydration   Monitor CBC/BMP    following for d/c planning        6. Pressure injury of sacral region, stage 4 (HCC)  Assessment & Plan:  Followed by wound NP  1/31/24 Xray negative for osteomyelitis  Continue to offload, local wound care  Continue protein supplement  OP follow up with wound center      7. Failure to thrive in adult  Assessment & Plan:  Appetite has slightly improved, consuming % of meals  Had discussed starting Mirtazapine as appetite stimulant with patients daughter, she declined at this time  Patient will eat more of the food her daughter brings in  Patient with stage IV sacral wound, continue prosource  Encourage PO intake  Followed by palliative care  Continue to monitor           Follow-up Recommendations:    Outpatient Follow up with PCP in the next 2 weeks  Op wound care  [facility] PT/OT/SN services     Labs and testing performed during stay:  1/31/24  WBC COUNT, AUTOMATED 7.7 K/CU.MM 3.5-11.0  Final              RBC COUNT 3.4 M/CU.MM 3.3-4.9  Final             HEMOGLOBIN 8.8 g/dL 10.7-15.1 L Final             HEMATOCRIT 30 PERCENT 32-46 L Final             MCH 26 pg 25-32  Final             MCV 88 fL   Final             MCHC 29 g/dL 31-36 L Final             RDW 22.7 % 11.6-14.4 H Final             PLATELETS, AUTOMATED 235 K/CU.-400  Final             MPV 9.2 fL 9.4-12.4 L Final     CREATININE 0.9 mg/dL 0.6-1.5  Final              GLUCOSE 62 mg/dL 65-99 L Final             UREA NITROGEN 13 mg/dL 8-23  Final             SODIUM 139 mMOL/L 135-145  Final             POTASSIUM 3.9 mMOL/L 3.4-5.3  Final             CHLORIDE 100 mmol/L    "Final             UREA N / CREAT RATIO 14.4 RATIO 12-20  Final             CO2 36 mmol/L 22-32 H Final             OSMOLALITY 286 mOsm/kG 275-305  Final     The Serum Osmolality Reference Range has been  adjusted based on current information provided  by the [facility].        CALCIUM 8.5 mg/dL 8.4-10.2  Final             GFR 58 See note >60 L Final       Discharge Medications: See discharge medication list which was reviewed and signed.      Current Outpatient Medications:     acetaminophen (TYLENOL) 325 mg tablet, Take 2 tablets (650 mg total) by mouth every 6 (six) hours as needed for mild pain, Disp: , Rfl:     diltiazem (CARDIZEM CD) 180 mg 24 hr capsule, TAKE 1 CAPSULE(180 MG) BY MOUTH DAILY, Disp: 90 capsule, Rfl: 0    Emollient (CeraVe Daily Moisturizing) LOTN, Apply 1 Application topically in the morning, Disp: , Rfl:     ferrous sulfate 325 (65 Fe) mg tablet, Take 1 tablet (325 mg total) by mouth daily with breakfast, Disp: 30 tablet, Rfl: 0    furosemide (LASIX) 20 mg tablet, Take 1 tablet (20 mg total) by mouth every other day, Disp: , Rfl:     metoprolol succinate (TOPROL-XL) 25 mg 24 hr tablet, Take 1 tablet (25 mg total) by mouth daily, Disp: , Rfl:     Nutritional Supplements (ProSource Plus) LIQD, Take 30 mL by mouth in the morning, Disp: , Rfl:     potassium chloride (MICRO-K) 10 MEQ CR capsule, Take 1 capsule (10 mEq total) by mouth every other day, Disp: , Rfl:      Discussion with patient/family and further instructions:  -Fall precautions  -Aspiration precautions  -Bleeding precautions  -Monitor for signs/symptoms of infection  -Medication list was reviewed and signed  -DME form was completed    Status at time of discharge: Stable     Billing based on time. Time spent on unit, 40 minutes. Time spent counseling pt on debility/condition, 30 minutes.    Please note:  Voice-recognition software may have been used in the preparation of this document.  Occasional wrong word or \"sound-alike\" " substitutions may have occurred due to the inherent limitations of voice recognition software.  Interpretation should be guided by context.        AZRA Lewis  3/1/2024

## 2024-03-01 NOTE — ASSESSMENT & PLAN NOTE
Appetite has slightly improved, consuming % of meals  Had discussed starting Mirtazapine as appetite stimulant with patients daughter, she declined at this time  Patient will eat more of the food her daughter brings in  Patient with stage IV sacral wound, continue prosource  Encourage PO intake  Followed by palliative care  Continue to monitor

## 2024-03-08 ENCOUNTER — NURSING HOME VISIT (OUTPATIENT)
Dept: GERIATRICS | Facility: OTHER | Age: 89
End: 2024-03-08

## 2024-03-08 VITALS
TEMPERATURE: 97.8 F | OXYGEN SATURATION: 97 % | HEART RATE: 66 BPM | DIASTOLIC BLOOD PRESSURE: 68 MMHG | RESPIRATION RATE: 18 BRPM | SYSTOLIC BLOOD PRESSURE: 112 MMHG

## 2024-03-08 DIAGNOSIS — R42 DIZZINESS: Primary | ICD-10-CM

## 2024-03-08 DIAGNOSIS — R26.2 AMBULATORY DYSFUNCTION: ICD-10-CM

## 2024-03-08 DIAGNOSIS — R62.7 FAILURE TO THRIVE IN ADULT: ICD-10-CM

## 2024-03-08 DIAGNOSIS — I89.0 LYMPHEDEMA: ICD-10-CM

## 2024-03-08 PROCEDURE — 99309 SBSQ NF CARE MODERATE MDM 30: CPT

## 2024-03-08 NOTE — ASSESSMENT & PLAN NOTE
Discharge Instructions    Discharged to home by car with relative. Discharged via wheelchair, hospital escort: Yes.  Special equipment needed: Not Applicable    Be sure to schedule a follow-up appointment with your primary care doctor or any specialists as instructed.     Discharge Plan:   Diet Plan: Discussed  Activity Level: Discussed  Confirmed Follow up Appointment: Patient to Call and Schedule Appointment  Confirmed Symptoms Management: Discussed  Medication Reconciliation Updated: Yes    I understand that a diet low in cholesterol, fat, and sodium is recommended for good health. Unless I have been given specific instructions below for another diet, I accept this instruction as my diet prescription.   Other diet: resume home diet    Special Instructions: None    · Is patient discharged on Warfarin / Coumadin?   No   COVID-19  COVID-19 is a respiratory infection that is caused by a virus called severe acute respiratory syndrome coronavirus 2 (SARS-CoV-2). The disease is also known as coronavirus disease or novel coronavirus. In some people, the virus may not cause any symptoms. In others, it may cause a serious infection. The infection can get worse quickly and can lead to complications, such as:  · Pneumonia, or infection of the lungs.  · Acute respiratory distress syndrome or ARDS. This is fluid build-up in the lungs.  · Acute respiratory failure. This is a condition in which there is not enough oxygen passing from the lungs to the body.  · Sepsis or septic shock. This is a serious bodily reaction to an infection.  · Blood clotting problems.  · Secondary infections due to bacteria or fungus.  The virus that causes COVID-19 is contagious. This means that it can spread from person to person through droplets from coughs and sneezes (respiratory secretions).  What are the causes?  This illness is caused by a virus. You may catch the virus by:  · Breathing in droplets from an infected person's cough or  Patient reports dizziness when sitting up   Patient has had this before, noted when her PO intake is decreased  Will discontinue Lasix, potassium  Order place for JODY stockings, patient refused  BP has been stable, occasionally sbp under 100  Ordered orthostatic BP's but patient unable to stand.  Advised to change positions slowly  Spoke with daughter about possible adding Midodrine, she said she did not want her taking additional medications.  Internal ear exam done, TM visualized and normal  BP this morning 139/67   sneeze.  · Touching something, like a table or a doorknob, that was exposed to the virus (contaminated) and then touching your mouth, nose, or eyes.  What increases the risk?  Risk for infection  You are more likely to be infected with this virus if you:  · Live in or travel to an area with a COVID-19 outbreak.  · Come in contact with a sick person who recently traveled to an area with a COVID-19 outbreak.  · Provide care for or live with a person who is infected with COVID-19.  Risk for serious illness  You are more likely to become seriously ill from the virus if you:  · Are 65 years of age or older.  · Have a long-term disease that lowers your body's ability to fight infection (immunocompromised).  · Live in a nursing home or long-term care facility.  · Have a long-term (chronic) disease such as:  ? Chronic lung disease, including chronic obstructive pulmonary disease or asthma  ? Heart disease.  ? Diabetes.  ? Chronic kidney disease.  ? Liver disease.  · Are obese.  What are the signs or symptoms?  Symptoms of this condition can range from mild to severe. Symptoms may appear any time from 2 to 14 days after being exposed to the virus. They include:  · A fever.  · A cough.  · Difficulty breathing.  · Chills.  · Muscle pains.  · A sore throat.  · Loss of taste or smell.  Some people may also have stomach problems, such as nausea, vomiting, or diarrhea.  Other people may not have any symptoms of COVID-19.  How is this diagnosed?  This condition may be diagnosed based on:  · Your signs and symptoms, especially if:  ? You live in an area with a COVID-19 outbreak.  ? You recently traveled to or from an area where the virus is common.  ? You provide care for or live with a person who was diagnosed with COVID-19.  · A physical exam.  · Lab tests, which may include:  ? A nasal swab to take a sample of fluid from your nose.  ? A throat swab to take a sample of fluid from your throat.  ? A sample of mucus from your lungs  (sputum).  ? Blood tests.  · Imaging tests, which may include, X-rays, CT scan, or ultrasound.  How is this treated?  At present, there is no medicine to treat COVID-19. Medicines that treat other diseases are being used on a trial basis to see if they are effective against COVID-19.  Your health care provider will talk with you about ways to treat your symptoms. For most people, the infection is mild and can be managed at home with rest, fluids, and over-the-counter medicines.  Treatment for a serious infection usually takes places in a hospital intensive care unit (ICU). It may include one or more of the following treatments. These treatments are given until your symptoms improve.  · Receiving fluids and medicines through an IV.  · Supplemental oxygen. Extra oxygen is given through a tube in the nose, a face mask, or a carmona.  · Positioning you to lie on your stomach (prone position). This makes it easier for oxygen to get into the lungs.  · Continuous positive airway pressure (CPAP) or bi-level positive airway pressure (BPAP) machine. This treatment uses mild air pressure to keep the airways open. A tube that is connected to a motor delivers oxygen to the body.  · Ventilator. This treatment moves air into and out of the lungs by using a tube that is placed in your windpipe.  · Tracheostomy. This is a procedure to create a hole in the neck so that a breathing tube can be inserted.  · Extracorporeal membrane oxygenation (ECMO). This procedure gives the lungs a chance to recover by taking over the functions of the heart and lungs. It supplies oxygen to the body and removes carbon dioxide.  Follow these instructions at home:  Lifestyle  · If you are sick, stay home except to get medical care. Your health care provider will tell you how long to stay home. Call your health care provider before you go for medical care.  · Rest at home as told by your health care provider.  · Do not use any products that contain nicotine  or tobacco, such as cigarettes, e-cigarettes, and chewing tobacco. If you need help quitting, ask your health care provider.  · Return to your normal activities as told by your health care provider. Ask your health care provider what activities are safe for you.  General instructions  · Take over-the-counter and prescription medicines only as told by your health care provider.  · Drink enough fluid to keep your urine pale yellow.  · Keep all follow-up visits as told by your health care provider. This is important.  How is this prevented?    There is no vaccine to help prevent COVID-19 infection. However, there are steps you can take to protect yourself and others from this virus.  To protect yourself:   · Do not travel to areas where COVID-19 is a risk. The areas where COVID-19 is reported change often. To identify high-risk areas and travel restrictions, check the [facility] travel website: wwwnc.cdc.gov/travel/notices  · If you live in, or must travel to, an area where COVID-19 is a risk, take precautions to avoid infection.  ? Stay away from people who are sick.  ? Wash your hands often with soap and water for 20 seconds. If soap and water are not available, use an alcohol-based hand .  ? Avoid touching your mouth, face, eyes, or nose.  ? Avoid going out in public, follow guidance from your state and local health authorities.  ? If you must go out in public, wear a cloth face covering or face mask.  ? Disinfect objects and surfaces that are frequently touched every day. This may include:  § Counters and tables.  § Doorknobs and light switches.  § Sinks and faucets.  § Electronics, such as phones, remote controls, keyboards, computers, and tablets.  To protect others:  If you have symptoms of COVID-19, take steps to prevent the virus from spreading to others.  · If you think you have a COVID-19 infection, contact your health care provider right away. Tell your health care team that you think you may have a  COVID-19 infection.  · Stay home. Leave your house only to seek medical care. Do not use public transport.  · Do not travel while you are sick.  · Wash your hands often with soap and water for 20 seconds. If soap and water are not available, use alcohol-based hand .  · Stay away from other members of your household. Let healthy household members care for children and pets, if possible. If you have to care for children or pets, wash your hands often and wear a mask. If possible, stay in your own room, separate from others. Use a different bathroom.  · Make sure that all people in your household wash their hands well and often.  · Cough or sneeze into a tissue or your sleeve or elbow. Do not cough or sneeze into your hand or into the air.  · Wear a cloth face covering or face mask.  Where to find more information  · Centers for Disease Control and Prevention: www.cdc.gov/coronavirus/2019-ncov/index.html  · World Health Organization: www.who.int/health-topics/coronavirus  Contact a health care provider if:  · You live in or have traveled to an area where COVID-19 is a risk and you have symptoms of the infection.  · You have had contact with someone who has COVID-19 and you have symptoms of the infection.  Get help right away if:  · You have trouble breathing.  · You have pain or pressure in your chest.  · You have confusion.  · You have bluish lips and fingernails.  · You have difficulty waking from sleep.  · You have symptoms that get worse.  These symptoms may represent a serious problem that is an emergency. Do not wait to see if the symptoms will go away. Get medical help right away. Call your local emergency services (911 in the [place] Do not drive yourself to the hospital. Let the emergency medical personnel know if you think you have COVID-19.  Summary  · COVID-19 is a respiratory infection that is caused by a virus. It is also known as coronavirus disease or novel coronavirus. It can cause serious  infections, such as pneumonia, acute respiratory distress syndrome, acute respiratory failure, or sepsis.  · The virus that causes COVID-19 is contagious. This means that it can spread from person to person through droplets from coughs and sneezes.  · You are more likely to develop a serious illness if you are 65 years of age or older, have a weak immunity, live in a nursing home, or have chronic disease.  · There is no medicine to treat COVID-19. Your health care provider will talk with you about ways to treat your symptoms.  · Take steps to protect yourself and others from infection. Wash your hands often and disinfect objects and surfaces that are frequently touched every day. Stay away from people who are sick and wear a mask if you are sick.  This information is not intended to replace advice given to you by your health care provider. Make sure you discuss any questions you have with your health care provider.  Document Released: 01/23/2020 Document Revised: 05/14/2020 Document Reviewed: 01/23/2020  [employer] Patient Education © 2020 [employer]      Depression / Suicide Risk    As you are discharged from this [facility] facility, it is important to learn how to keep safe from harming yourself.    Recognize the warning signs:  · Abrupt changes in personality, positive or negative- including increase in energy   · Giving away possessions  · Change in eating patterns- significant weight changes-  positive or negative  · Change in sleeping patterns- unable to sleep or sleeping all the time   · Unwillingness or inability to communicate  · Depression  · Unusual sadness, discouragement and loneliness  · Talk of wanting to die  · Neglect of personal appearance   · Rebelliousness- reckless behavior  · Withdrawal from people/activities they love  · Confusion- inability to concentrate     If you or a loved one observes any of these behaviors or has concerns about self-harm, here's what you can do:  · Talk about it-  your feelings and reasons for harming yourself  · Remove any means that you might use to hurt yourself (examples: pills, rope, extension cords, firearm)  · Get professional help from the community (Mental Health, Substance Abuse, psychological counseling)  · Do not be alone:Call your Safe Contact- someone whom you trust who will be there for you.  · Call your local CRISIS HOTLINE 368-0661 or 801-120-6531  · Call your local Children's Mobile Crisis Response Team [place] (459) 772-3307 or www.[employer]  · Call the toll free National Suicide Prevention Hotlines   · National Suicide Prevention Lifeline 812-428-CNSJ (8883)  · National Hope Line Network 800-SUICIDE (873-1918)

## 2024-03-08 NOTE — ASSESSMENT & PLAN NOTE
Appetite:consuming 50% of meals  Patient with stage IV sacral wound, continue prosource  Encourage PO intake  Followed by palliative care  Continue to monitor

## 2024-03-08 NOTE — PROGRESS NOTES
[facility]  [address] [address]  [phone]  [place] [facility] [place] [facility]  Code 31 (STR)  Acute Visit      NAME: Aurora Anderson  AGE: 89 y.o. SEX: female CODE STATUS: No CPR    DATE OF ENCOUNTER: 3/8/2024    Assessment and Plan     1. Dizziness  Assessment & Plan:  Patient reports dizziness when sitting up   Patient has had this before, noted when her PO intake is decreased  Will discontinue Lasix, potassium  Order place for JODY stockings, patient refused  BP has been stable, occasionally sbp under 100  Ordered orthostatic BP's but patient unable to stand.  Advised to change positions slowly  Spoke with daughter about possible adding Midodrine, she said she did not want her taking additional medications.  Internal ear exam done, TM visualized and normal  BP this morning 139/67      2. Failure to thrive in adult  Assessment & Plan:  Appetite:consuming 50% of meals  Patient with stage IV sacral wound, continue prosource  Encourage PO intake  Followed by palliative care  Continue to monitor      3. Ambulatory dysfunction  Assessment & Plan:  Multifactorial in the setting of advanced age and acute/chronic medical conditions  Continue PT/OT  Fall Precautions  Ensure adequate nutrition/hydration   Monitor CBC/BMP    following for d/c planning        4. Lymphedema  Assessment & Plan:  Chronic B/L LE lymphedema  Continue moisturizing lotion daily  Would benefit from lymphedema clinic as outpatient   Patient having episodes of dizziness, as edema is stable will discontinue Lasix and postassium, at this time and monitor.           All medications and routine orders were reviewed and updated as needed.    Chief Complaint     STR follow up visit    Patient's care was coordinated with nursing facility staff. Recent vitals, labs, and updated medications were review on [facility] system in facility.  Past Medical and Surgical History      Past Medical History:   Diagnosis Date    Acute  kidney injury (HCC)     Acute on chronic respiratory failure (HCC)     Arthritis     Atrial flutter (HCC)     Rapid heart rate      Past Surgical History:   Procedure Laterality Date    CATARACT EXTRACTION      EGD AND COLONOSCOPY N/A 11/14/2017    Procedure: EGD AND COLONOSCOPY;  Surgeon: Radha Salas MD;  Location: [facility];  Service: Gastroenterology    HIP FRACTURE SURGERY       Allergies   Allergen Reactions    Penicillins Rash          History of Present Illness     HPI  Aurora Anderson is a 89 year old female, she is a STR patient of [facility] since 1/16/24. Past Medical Hx including but not limited to A flutter, lymphedema, CHF, CKD, anemia. She was seen in collaboration with nursing for medical mgmt and STR follow up.     Hospital Course  Patient was admitted to the hospital 1/4/24 for septic shock due to UTI. Urine culture grew Proteus and E. Coli. Patient received IV epinephrine for suspected BRASH, and was supplied 30 cc/kg of IV fluids and was transferred to the ICU. Patient received Ceftriaxone and azithromycin. Patient was weaned off pressors and was downgraded to medsurg. Patient developed HOSSEIN and Lasix was held. Patient developed respiratory failure requiring diuresis. Patient also had episode of anemia with Hgb of 6.9 with no sign of active bleeding, patient received 1 unit PRBC.  Patient was discharged to [facility].      Aurora was seen and examined at bedside today. Patient is AAOx 3. On exam patient resting in bed and does not appear to be in distress. Patient states she is doing ok today.   Patient has c/o dizziness when sitting up, her mobility has been limited and she has difficulty standing, mainly staying in bed throughout the day.  Inner ear exam done today to r/o cause of dizziness, TM visualized and was normal. She has had episodes of hypotension and likely has some orthostasis with position changes. Spoke with patients daughter America, she would like her mom to be on the least  amount of meds possible. Requesting Lasix be d/c'd, LE lymphedema is stable. We talked about restarting Lasix if edema worsens and she was in agreement.  Order placed for JODY stockings however patient refused them.  Orthostatic BP's ordered however patient unable to stand for them.  Will continue to monitor BP. Patient has anticipated discharge next week.  No concerns from nursing at this time.    The patient's allergies, past medical, surgical, social and family history were reviewed and unchanged.    Review of Systems     Review of Systems   Constitutional:  Positive for activity change and appetite change. Negative for fever.   HENT: Negative.  Negative for congestion, sneezing and sore throat.    Eyes: Negative.    Respiratory: Negative.  Negative for cough and shortness of breath.    Cardiovascular:  Positive for leg swelling. Negative for chest pain and palpitations.   Gastrointestinal:  Negative for abdominal distention, constipation, diarrhea, nausea and vomiting.   Endocrine: Negative.    Genitourinary: Negative.  Negative for difficulty urinating and dysuria.   Musculoskeletal:  Positive for gait problem.   Skin:  Positive for wound.        Sacral wound, lymphedema b/l LE   Allergic/Immunologic: Negative.    Neurological:  Positive for dizziness and weakness. Negative for headaches.   Hematological: Negative.    Psychiatric/Behavioral:  Negative for sleep disturbance.          Objective     Vitals:   Vitals:    03/08/24 1051   BP: 112/68   Pulse: 66   Resp: 18   Temp: 97.8 °F (36.6 °C)   SpO2: 97%         Physical Exam  Vitals and nursing note reviewed.   Constitutional:       General: She is not in acute distress.     Appearance: Normal appearance. She is not ill-appearing.   HENT:      Head: Normocephalic and atraumatic.      Nose: No congestion.   Eyes:      Conjunctiva/sclera: Conjunctivae normal.   Cardiovascular:      Rate and Rhythm: Rhythm irregular.      Pulses: Normal pulses.      Heart sounds:  "Normal heart sounds.   Pulmonary:      Effort: Pulmonary effort is normal. No respiratory distress.      Breath sounds: Normal breath sounds. No wheezing.   Abdominal:      General: Bowel sounds are normal. There is no distension.      Palpations: Abdomen is soft.      Tenderness: There is no abdominal tenderness.   Musculoskeletal:      Right lower leg: Edema present.      Left lower leg: Edema present.      Comments: Chronic lymphedema   Skin:     General: Skin is warm.      Comments: Sacral wound   Neurological:      Mental Status: She is alert and oriented to person, place, and time.      Motor: Weakness present.      Gait: Gait abnormal.   Psychiatric:         Mood and Affect: Mood normal.         Behavior: Behavior normal.         Pertinent Laboratory/Diagnostic Studies:   Reviewed in facility chart-stable      Current Medications   Medications reviewed and updated see facility MAR for details.      Current Outpatient Medications:     acetaminophen (TYLENOL) 325 mg tablet, Take 2 tablets (650 mg total) by mouth every 6 (six) hours as needed for mild pain, Disp: , Rfl:     diltiazem (CARDIZEM CD) 180 mg 24 hr capsule, TAKE 1 CAPSULE(180 MG) BY MOUTH DAILY, Disp: 90 capsule, Rfl: 0    Emollient (CeraVe Daily Moisturizing) LOTN, Apply 1 Application topically in the morning, Disp: , Rfl:     ferrous sulfate 325 (65 Fe) mg tablet, Take 1 tablet (325 mg total) by mouth daily with breakfast, Disp: 30 tablet, Rfl: 0    metoprolol succinate (TOPROL-XL) 25 mg 24 hr tablet, Take 1 tablet (25 mg total) by mouth daily, Disp: , Rfl:     Nutritional Supplements (ProSource Plus) LIQD, Take 30 mL by mouth in the morning, Disp: , Rfl:      Please note:  Voice-recognition software may have been used in the preparation of this document.  Occasional wrong word or \"sound-alike\" substitutions may have occurred due to the inherent limitations of voice recognition software.  Interpretation should be guided by context.   "       AZRA Lewis  3/8/2024  11:12 AM

## 2024-03-08 NOTE — ASSESSMENT & PLAN NOTE
Chronic B/L LE lymphedema  Continue moisturizing lotion daily  Would benefit from lymphedema clinic as outpatient   Patient having episodes of dizziness, as edema is stable will discontinue Lasix and postassium, at this time and monitor.

## 2024-03-11 VITALS
TEMPERATURE: 97.7 F | RESPIRATION RATE: 18 BRPM | HEART RATE: 68 BPM | OXYGEN SATURATION: 96 % | DIASTOLIC BLOOD PRESSURE: 78 MMHG | SYSTOLIC BLOOD PRESSURE: 111 MMHG

## 2024-03-11 NOTE — PROGRESS NOTES
[facility]  [address] [facility] [address] [facility] [address]  [phone]  Discharge Summary  Facility: [facility]  Code 31      NAME: Aurora Anderson  AGE: 89 y.o. SEX: female   CODE STATUS: No CPR    DATE OF ADMISSION: 1/16/24   Date Of Discharge 3/13/24   DISCHARGE DISPOSITION: Stable for discharge to home with family support and home health PT/OT/SN services.   Reason for Admission: Patient was admitted to [facility] for rehabilitation after hospitalization for UTI.    Past Medical and Surgical History:   Past Medical History:   Diagnosis Date    Acute kidney injury (HCC)     Acute on chronic respiratory failure (HCC)     Arthritis     Atrial flutter (HCC)     Rapid heart rate       Past Surgical History:   Procedure Laterality Date    CATARACT EXTRACTION      EGD AND COLONOSCOPY N/A 11/14/2017    Procedure: EGD AND COLONOSCOPY;  Surgeon: Radha Salas MD;  [facility]: [facility];  Service: Gastroenterology    HIP FRACTURE SURGERY         Course of stay:   HPI  Aurora Anderson is a 89 year old female, she is a STR patient of [facility] since 1/16/24. Past Medical Hx including but not limited to A flutter, lymphedema, CHF, CKD, anemia. She was seen in collaboration with nursing for medical mgmt and STR follow up.      Hospital Course  Patient was admitted to the hospital 1/4/24 for septic shock due to UTI. Urine culture grew Proteus and E. Coli. Patient received IV epinephrine for suspected BRASH, and was supplied 30 cc/kg of IV fluids and was transferred to the ICU. Patient received Ceftriaxone and azithromycin. Patient was weaned off pressors and was downgraded to medsurg. Patient developed HOSSEIN and Lasix was held. Patient developed respiratory failure requiring diuresis. Patient also had episode of anemia with Hgb of 6.9 with no sign of active bleeding, patient received 1 unit PRBC.  Patient was discharged to [facility].     Rehab Course   Aurora was seen and examined at bedside today. Patient is AAOx 3. On  exam patient resting in bed and does not appear to be in distress. Patient states she feels ok today. Continue local wound care. Lymphedema stable, lasix discontinued as edema is stable and patients daughter requested discontinuation. Patients daughter will monitor edema and contact PCP if necessary. Patient refusing TEDS, patients daughter stated she has compression socks at home that she will try to get her to wear. She denies CP/SOB/N/V/D. Denies lightheadedness, dizziness, headaches, vision changes. Denies any bowel or bladder issues. Per review of SNF records, Patient is eating 2-3 meals per day, consuming %. Last documented BM 3/11/24. No concerns from nursing at this time.  During the patients stay at [facility], she received skilled nursing care, PT, OT, social service support, dietician support, and medical management.    ROS:  Review of Systems   Constitutional:  Positive for activity change and appetite change. Negative for fever.   HENT: Negative.  Negative for congestion, sneezing and sore throat.    Eyes: Negative.    Respiratory: Negative.  Negative for cough and shortness of breath.    Cardiovascular:  Positive for leg swelling. Negative for chest pain and palpitations.   Gastrointestinal:  Negative for abdominal distention, constipation, diarrhea, nausea and vomiting.   Endocrine: Negative.    Genitourinary: Negative.  Negative for difficulty urinating and dysuria.   Musculoskeletal:  Positive for gait problem.   Skin:  Positive for wound.        Sacral wound, lymphedema b/l LE   Allergic/Immunologic: Negative.    Neurological:  Positive for weakness. Negative for dizziness and headaches.   Hematological: Negative.    Psychiatric/Behavioral:  Negative for sleep disturbance.        PHYSICAL EXAM:  VITALS:   Vitals:    03/11/24 1728   BP: 111/78   Pulse: 68   Resp: 18   Temp: 97.7 °F (36.5 °C)   SpO2: 96%          Physical Exam  Vitals and nursing note reviewed.   Constitutional:       General: She is  not in acute distress.     Appearance: Normal appearance. She is not ill-appearing.   HENT:      Head: Normocephalic and atraumatic.      Nose: No congestion.   Eyes:      Conjunctiva/sclera: Conjunctivae normal.   Cardiovascular:      Rate and Rhythm: Rhythm irregular.      Pulses: Normal pulses.      Heart sounds: Normal heart sounds.   Pulmonary:      Effort: Pulmonary effort is normal. No respiratory distress.      Breath sounds: Normal breath sounds. No wheezing.   Abdominal:      General: Bowel sounds are normal. There is no distension.      Palpations: Abdomen is soft.      Tenderness: There is no abdominal tenderness.   Musculoskeletal:      Right lower leg: Edema present.      Left lower leg: Edema present.      Comments: Chronic lymphedema, stable   Skin:     General: Skin is warm.      Comments: Sacral wound   Neurological:      Mental Status: She is alert and oriented to person, place, and time.      Motor: Weakness present.      Gait: Gait abnormal.   Psychiatric:         Mood and Affect: Mood normal.         Behavior: Behavior normal.         Admission Diagnoses:   1. Atypical atrial flutter (HCC)  Assessment & Plan:  HR controlled, 68  Denies cp/palpitations  Toprol XL 25 mg daily  Continue diltiazem 180 mg daily        2. Chronic heart failure with preserved ejection fraction (HCC)  Assessment & Plan:  Wt Readings from Last 3 Encounters:   02/21/24 58.1 kg (128 lb)   02/19/24 58.1 kg (128 lb)   02/14/24 58.1 kg (128 lb)   1/5/24 Echo showing EF of 55%  Weight stable  B/l LE lymphedema stable  Continue Metoprolol ER 25 mg daily  Continue Lasix 20 mg QOD with potassium supplement  Continue weekly weights  Continue 2000 ml FR        3. Stage 3a chronic kidney disease (HCC)  Assessment & Plan:  Lab Results   Component Value Date    EGFR 55 01/16/2024    EGFR 49 01/14/2024    EGFR 48 01/13/2024    CREATININE 0.89 01/16/2024    CREATININE 0.98 01/14/2024    CREATININE 0.99 01/13/2024   3/5/24 Cr 1.0/ bun  19/gfr 52  Avoid nephrotoxins  Encourage adequate hydration  Avoid hypotension  Monitor kidney functions      4. Lymphedema  Assessment & Plan:  Chronic B/L LE lymphedema  Continue moisturizing lotion daily  Would benefit from lymphedema clinic as outpatient   Patient having episodes of dizziness, as edema is stable will discontinue Lasix and postassium, at this time and monitor.      5. Ambulatory dysfunction  Assessment & Plan:  Multifactorial in the setting of advanced age and acute/chronic medical conditions  Continue PT/OT  Fall Precautions  Ensure adequate nutrition/hydration   Monitor CBC/BMP    following for d/c planning        6. Failure to thrive in adult  Assessment & Plan:  Appetite:consuming 50% of meals  Patient with stage IV sacral wound, continue prosource  Encourage PO intake  Followed by palliative care  Continue to monitor           Follow-up Recommendations:    Outpatient Follow up with PCP in the next 2 weeks  Op wound care  [facility] PT/OT/SN services     Labs and testing performed during stay:  1/31/24  WBC COUNT, AUTOMATED 7.7 K/CU.MM 3.5-11.0  Final              RBC COUNT 3.4 M/CU.MM 3.3-4.9  Final             HEMOGLOBIN 8.8 g/dL 10.7-15.1 L Final             HEMATOCRIT 30 PERCENT 32-46 L Final             MCH 26 pg 25-32  Final             MCV 88 fL   Final             MCHC 29 g/dL 31-36 L Final             RDW 22.7 % 11.6-14.4 H Final             PLATELETS, AUTOMATED 235 K/CU.-400  Final             MPV 9.2 fL 9.4-12.4 L Final     CREATININE 0.9 mg/dL 0.6-1.5  Final              GLUCOSE 62 mg/dL 65-99 L Final             UREA NITROGEN 13 mg/dL 8-23  Final             SODIUM 139 mMOL/L 135-145  Final             POTASSIUM 3.9 mMOL/L 3.4-5.3  Final             CHLORIDE 100 mmol/L   Final             UREA N / CREAT RATIO 14.4 RATIO 12-20  Final             CO2 36 mmol/L 22-32 H Final             OSMOLALITY 286 mOsm/kG 275-305  Final     The Serum  Osmolality Reference Range has been  adjusted based on current information provided  by the [facility].        CALCIUM 8.5 mg/dL 8.4-10.2  Final             GFR 58 See note >60 L Final     3/5/24  WBC COUNT, AUTOMATED 5.7 K/CU.MM 3.5-11.0  Final              RBC COUNT 3.8 M/CU.MM 3.3-4.9  Final             HEMOGLOBIN 9.7 g/dL 10.7-15.1 L Final             HEMATOCRIT 34 PERCENT 32-46  Final             MCH 26 pg 25-32  Final             MCV 88 fL   Final             MCHC 29 g/dL 31-36 L Final             RDW 22.3 % 11.6-14.4 H Final             PLATELETS, AUTOMATED 282 K/CU.-400  Final             MPV 8.5 fL 9.4-12.4 L Final           BASIC METABOLIC PANEL      CREATININE 1.0 mg/dL 0.6-1.5  Final              GLUCOSE 61 mg/dL 65-99 L Final             UREA NITROGEN 19 mg/dL 8-23  Final             SODIUM 141 mMOL/L 135-145  Final             POTASSIUM 4.5 mMOL/L 3.4-5.3  Final             CHLORIDE 102 mmol/L   Final             UREA N / CREAT RATIO 19.0 RATIO 12-20  Final             CO2 35 mmol/L 22-32 H Final             OSMOLALITY 292 mOsm/kG 275-305  Final     The Serum Osmolality Reference Range has been  adjusted based on current information provided  by the [facility].        CALCIUM 8.4 mg/dL 8.4-10.2  Final             GFR 52 See note >60 L Final     Discharge Medications: See discharge medication list which was reviewed and signed.      Current Outpatient Medications:     acetaminophen (TYLENOL) 325 mg tablet, Take 2 tablets (650 mg total) by mouth every 6 (six) hours as needed for mild pain, Disp: , Rfl:     diltiazem (CARDIZEM CD) 180 mg 24 hr capsule, TAKE 1 CAPSULE(180 MG) BY MOUTH DAILY, Disp: 90 capsule, Rfl: 0    Emollient (CeraVe Daily Moisturizing) LOTN, Apply 1 Application topically in the morning, Disp: , Rfl:     ferrous sulfate 325 (65 Fe) mg tablet, Take 1 tablet (325 mg total) by mouth daily with breakfast, Disp: 30 tablet, Rfl: 0    metoprolol succinate (TOPROL-XL) 25 mg  "24 hr tablet, Take 1 tablet (25 mg total) by mouth daily, Disp: , Rfl:     Nutritional Supplements (ProSource Plus) LIQD, Take 30 mL by mouth in the morning, Disp: , Rfl:      Discussion with patient/family and further instructions:  -Fall precautions  -Aspiration precautions  -Bleeding precautions  -Monitor for signs/symptoms of infection  -Medication list was reviewed and signed  -DME form was completed    Status at time of discharge: Stable     Billing based on time. Time spent on unit, 40 minutes. Time spent counseling pt on debility/condition, 30 minutes.    Please note:  Voice-recognition software may have been used in the preparation of this document.  Occasional wrong word or \"sound-alike\" substitutions may have occurred due to the inherent limitations of voice recognition software.  Interpretation should be guided by context.        AZRA Lewis  3/12/2024   "

## 2024-03-11 NOTE — ASSESSMENT & PLAN NOTE
Lab Results   Component Value Date    EGFR 55 01/16/2024    EGFR 49 01/14/2024    EGFR 48 01/13/2024    CREATININE 0.89 01/16/2024    CREATININE 0.98 01/14/2024    CREATININE 0.99 01/13/2024   3/5/24 Cr 1.0/ bun 19/gfr 52  Avoid nephrotoxins  Encourage adequate hydration  Avoid hypotension  Monitor kidney functions

## 2024-03-12 ENCOUNTER — NURSING HOME VISIT (OUTPATIENT)
Dept: GERIATRICS | Facility: OTHER | Age: 89
End: 2024-03-12

## 2024-03-12 DIAGNOSIS — R26.2 AMBULATORY DYSFUNCTION: ICD-10-CM

## 2024-03-12 DIAGNOSIS — N18.31 STAGE 3A CHRONIC KIDNEY DISEASE (HCC): Chronic | ICD-10-CM

## 2024-03-12 DIAGNOSIS — I48.4 ATYPICAL ATRIAL FLUTTER (HCC): Primary | Chronic | ICD-10-CM

## 2024-03-12 DIAGNOSIS — I89.0 LYMPHEDEMA: ICD-10-CM

## 2024-03-12 DIAGNOSIS — R62.7 FAILURE TO THRIVE IN ADULT: ICD-10-CM

## 2024-03-12 DIAGNOSIS — I50.32 CHRONIC HEART FAILURE WITH PRESERVED EJECTION FRACTION (HCC): Chronic | ICD-10-CM

## 2024-03-12 PROCEDURE — 99316 NF DSCHRG MGMT 30 MIN+: CPT

## 2024-03-13 ENCOUNTER — NURSING HOME VISIT (OUTPATIENT)
Dept: WOUND CARE | Facility: HOSPITAL | Age: 89
End: 2024-03-13

## 2024-03-13 DIAGNOSIS — L89.154 PRESSURE INJURY OF SACRAL REGION, STAGE 4 (HCC): Primary | ICD-10-CM

## 2024-03-13 DIAGNOSIS — L89.890 PRESSURE INJURY OF LEFT FOOT, UNSTAGEABLE (HCC): ICD-10-CM

## 2024-03-13 NOTE — PROGRESS NOTES
[facility] WOUND CARE MANAGEMENT   AND HYPERBARIC MEDICINE CENTER       Patient ID: Aurora Anderson is a 89 y.o. female Date of Birth 12/13/1929     [facility]    Performed wound round with: Wound team     Chief Complaint : Sacrum, left plantar    Wound Instructions:  Wound: Sacral  Cleanse the wound bed with NSS or wound cleanser  Apply Skin-Prep to periwound area   Gently pack the wound cavity loosely with calcium alginate and cover with bordered foam  Daily and as needed for soiling    Left and right plantar  Cleanse with mild soap and water  Apply CeraVe moisturizing lotion or hydraguard to bilateral lower legs including left and right plantar  Daily and as needed for soiling    Continue heel boots when in bed  Offload all wounds  Turn and reposition frequently  Monitor for infection or worsening    Allergies  Penicillins      Assessment & Plan:  1. Pressure injury of sacral region, stage 4 ([facility])  Assessment & Plan:  Sacral wound  Wound is stable, full-thickness, with no obvious sign of infection  Local wound care was changed to calcium alginate to manage drainage.  Also I did not show any significant improvement from using a Mesalt.  Result of the xray on 1/31 showed (-) osteomyelitis  Wound culture ordered - Few gram positive rods. Wound does not have any obvious sign of infection. Senior care team aware.   Clinical factors affecting wound healing includes poor appetite, medical complexity, limited range of motion, incontinence, and poor sensation.  Referred to outpatient wound center for follow-up once discharged from the facility      2. Pressure injury of left foot, unstageable ([facility])  Assessment & Plan:  Left foot  Wound improved, 100% scab, seems superficial  Continue moisturizing lotion  Continue to offload                             Subjective:   January 17, 2024.  New consult for wound to sacrum and bilateral feet.  Patient was referred by Senior care team.  Patient currently  admitted at [facility] for short-term rehab.  Patient have chronic kidney disease and lymphedema.  Patient was seen with the facility wound team.    Wound history: As per medical record review, the wound on the sacrum and bilateral feet is chronic.  Patient was seen by podiatry for the wound on the bilateral feet on January 5, 2024.  Ordered to continue local wound care.  X-ray was completed on both feet, nonsignificant result.     Received patient in bed, seems comfortable.  Denies pain.  Patient have a good appetite.  Patient is minimal assistance with turning and repositioning in bed.  Patient is incontinent of both bowel and bladder.    1/24/2024 Follow up for wound on the Sacrum and bilateral feet . Received patient, not in distress. Facility staff did not report any significant issues related to the wound. Denies pain.     1/31/2024 Follow up for wound on the sacrum and bilateral feet . Received patient, not in distress. Facility staff did not report any significant issues related to the wound. As per report, patient had been refusing air mattress. Patient have a poor appetite, only ate 0-25% of her meals yesterday. Reviewed laboratory result on 1/31/2024 - WBC is 7.7.    2/7/2024 Follow up for wound on the sacrum and bilateral feet . Received patient, not in distress. Facility staff did not report any significant issues related to the wound. As per report, patient's daughter is requesting for antibiotic for the wound.     2/14/2024 Follow up for wound on the sacrum and bilateral feet . Received patient, not in distress. Facility staff did not report any significant issues related to the wound. Denies pain..    February 21, 2024.  Follow-up for wound on the sacrum and bilateral feet.  Received patient in bed, seems comfortable.  Patient's daughter, Laura, was present during visit.  No significant issues reported related to the wound.  Patient will wound on the left lower leg that is covered with wound  dressing, managed by facility wound team.    2/28/2024 Follow up for wound on the sacrum and left foot plantar . Received patient, not in distress. Facility staff did not report any significant issues related to the wound. As per patient, she is for discharge this week.     3/13/2024 Follow up for wound on the sacrum and left plantar . Received patient, not in distress. Facility staff did not report any significant issues related to the wound. As per report, patient's stay in [facility] was extended.                 Review of Systems   Constitutional: Negative.    Respiratory: Negative.     Cardiovascular: Negative.    Musculoskeletal:  Positive for gait problem.   Skin:  Positive for wound.   Psychiatric/Behavioral: Negative.         Objective:    Physical Exam  Constitutional:       Appearance: Normal appearance.   Cardiovascular:      Rate and Rhythm: Normal rate.   Pulmonary:      Effort: Pulmonary effort is normal.   Genitourinary:     Comments: Incontinent  Musculoskeletal:      Right lower leg: Edema present.      Left lower leg: Edema present.      Comments: Positive lymphedema on bilateral lower leg  Dry skin   Skin:     Findings: Lesion present.      Comments: Sacrum: Wound size is 2.5 x 2 x 0.5 cm.,  100% granulation, moderate amount of serous drainage, undermining all edges, deepest at 12, 6 cm, moderate amount of serous drainage, periwound is normal, with no obvious sign of infection    Left plantar: Wound size is 0.4 x 0.4 cm.,  100% scab, no drainage   Neurological:      Mental Status: She is alert.              Procedures           Patient's care was coordinated with nursing facility staff. Recent vitals, labs and updated medications were reviewed on EMR or chart system of facility. Past Medical, surgical, social, medication and allergy history and patient's previous records were reviewed and updated as appropriate: Most up-to date information is available in the facility EMR where the patient  is currently admitted.    Patient Active Problem List   Diagnosis    Right bundle branch block (RBBB) on electrocardiogram (ECG)    Diverticulosis of colon    Large hiatal hernia    Incidental 6cm right Renal lesion on CT    Anemia    CKD (chronic kidney disease) stage 3, GFR 30-59 ml/min (HCC)    Atypical atrial flutter (HCC)    Acute on chronic respiratory failure (HCC)    Breast mass, right    Chronic heart failure with preserved ejection fraction (HCC)    HOSSEIN (acute kidney injury) (HCC)    Lymphedema    Anemia due to chronic kidney disease and iron deficiency    Pressure injury of left foot, unstageable (HCC)    Pressure injury of right foot, unstageable (HCC)    Ambulatory dysfunction    Pressure injury of sacral region, stage 4 (HCC)    Failure to thrive in adult    Dizziness     Past Medical History:   Diagnosis Date    Acute kidney injury (HCC)     Acute on chronic respiratory failure (HCC)     Arthritis     Atrial flutter (HCC)     Rapid heart rate      Past Surgical History:   Procedure Laterality Date    CATARACT EXTRACTION      EGD AND COLONOSCOPY N/A 11/14/2017    Procedure: EGD AND COLONOSCOPY;  Surgeon: Radha Salas MD;  Location: [facility];  Service: Gastroenterology    HIP FRACTURE SURGERY       Social History     Socioeconomic History    Marital status:      Spouse name: None    Number of children: 3    Years of education: None    Highest education level: None   Occupational History    None   Tobacco Use    Smoking status: Never    Smokeless tobacco: Never    Tobacco comments:     former smoker, per [employer];  started smoking at 15 yo, stopped at 29 yo   Vaping Use    Vaping status: Never Used   Substance and Sexual Activity    Alcohol use: No    Drug use: No    Sexual activity: None   Other Topics Concern    None   Social History Narrative    Inadequate exercise     Social Determinants of Health     Financial Resource Strain: Not on file   Food Insecurity: No Food Insecurity (1/5/2024)     Hunger Vital Sign     Worried About Running Out of Food in the Last Year: Never true     Ran Out of Food in the Last Year: Never true   Transportation Needs: No Transportation Needs (1/5/2024)    PRAPARE - Transportation     Lack of Transportation (Medical): No     Lack of Transportation (Non-Medical): No   Physical Activity: Not on file   Stress: Not on file   Social Connections: Not on file   Intimate Partner Violence: Not on file   Housing Stability: Unknown (1/5/2024)    Housing Stability Vital Sign     Unable to Pay for Housing in the Last Year: No     Number of Places Lived in the Last Year: Not on file     Unstable Housing in the Last Year: No        Current Outpatient Medications:     acetaminophen (TYLENOL) 325 mg tablet, Take 2 tablets (650 mg total) by mouth every 6 (six) hours as needed for mild pain, Disp: , Rfl:     diltiazem (CARDIZEM CD) 180 mg 24 hr capsule, TAKE 1 CAPSULE(180 MG) BY MOUTH DAILY, Disp: 90 capsule, Rfl: 0    Emollient (CeraVe Daily Moisturizing) LOTN, Apply 1 Application topically in the morning, Disp: , Rfl:     ferrous sulfate 325 (65 Fe) mg tablet, Take 1 tablet (325 mg total) by mouth daily with breakfast, Disp: 30 tablet, Rfl: 0    metoprolol succinate (TOPROL-XL) 25 mg 24 hr tablet, Take 1 tablet (25 mg total) by mouth daily, Disp: , Rfl:     Nutritional Supplements (ProSource Plus) LIQD, Take 30 mL by mouth in the morning, Disp: , Rfl:   Family History   Problem Relation Age of Onset    Diabetes Father     Cancer Family               Coordination of Care: Wound team aware of the treatment plan. Facility nurse will provide wound treatment and monitor the wound for any changes.     Patient / Staff education : Patient / Staff was given education on sign of infection and pressure ulcer prevention. Patient/ Staff verbalized understanding     Follow up :  Next week    Voice-recognition software may have been used in the preparation of this document. Occasional wrong word or  "\"sound-alike\" substitutions may have occurred due to the inherent limitations of voice recognition software. Interpretation should be guided by context.      AZRA Sánchez  "

## 2024-03-14 NOTE — ASSESSMENT & PLAN NOTE
Left foot  Wound improved, 100% scab, seems superficial  Continue moisturizing lotion  Continue to offload

## 2024-03-14 NOTE — ASSESSMENT & PLAN NOTE
Sacral wound  Wound is stable, full-thickness, with no obvious sign of infection  Local wound care was changed to calcium alginate to manage drainage.  Also I did not show any significant improvement from using a Mesalt.  Result of the xray on 1/31 showed (-) osteomyelitis  Wound culture ordered - Few gram positive rods. Wound does not have any obvious sign of infection. Senior care team aware.   Clinical factors affecting wound healing includes poor appetite, medical complexity, limited range of motion, incontinence, and poor sensation.  Referred to outpatient wound center for follow-up once discharged from the facility

## 2024-03-22 ENCOUNTER — TELEPHONE (OUTPATIENT)
Dept: INTERNAL MEDICINE CLINIC | Facility: CLINIC | Age: 89
End: 2024-03-22

## 2024-03-26 DIAGNOSIS — I48.92 ATRIAL FLUTTER (HCC): ICD-10-CM

## 2024-03-26 RX ORDER — DILTIAZEM HYDROCHLORIDE 180 MG/1
CAPSULE, COATED, EXTENDED RELEASE ORAL
Qty: 90 CAPSULE | Refills: 0 | Status: SHIPPED | OUTPATIENT
Start: 2024-03-26

## 2024-04-01 DIAGNOSIS — I48.4 ATYPICAL ATRIAL FLUTTER (HCC): Chronic | ICD-10-CM

## 2024-04-01 RX ORDER — METOPROLOL SUCCINATE 25 MG/1
25 TABLET, EXTENDED RELEASE ORAL DAILY
Qty: 30 TABLET | Refills: 5 | Status: SHIPPED | OUTPATIENT
Start: 2024-04-01 | End: 2024-04-01

## 2024-04-01 RX ORDER — METOPROLOL SUCCINATE 25 MG/1
25 TABLET, EXTENDED RELEASE ORAL DAILY
Qty: 90 TABLET | Refills: 1 | Status: SHIPPED | OUTPATIENT
Start: 2024-04-01

## 2024-04-12 ENCOUNTER — TELEPHONE (OUTPATIENT)
Dept: INTERNAL MEDICINE CLINIC | Facility: CLINIC | Age: 89
End: 2024-04-12

## 2024-04-12 NOTE — TELEPHONE ENCOUNTER
I called back Yana however got her voicemail. Unable to leave [facility] due her mailbox being full

## 2024-04-12 NOTE — TELEPHONE ENCOUNTER
Hi, this is Yana Physical Therapist with [facility]. Regarding client Aurora Anderson, birth date [birth year] to get a verbal order to increase physical therapy frequency from once a week to twice a week effective next week. If you could give me a call back [phone] thank you

## 2024-04-19 ENCOUNTER — TELEPHONE (OUTPATIENT)
Dept: INTERNAL MEDICINE CLINIC | Facility: CLINIC | Age: 89
End: 2024-04-19

## 2024-04-19 NOTE — TELEPHONE ENCOUNTER
Nurse is having difficult time going to see patient and daughter didn't want Wednesday . She has reached out to daughter and son in law, not one is answering her, so she missed appt, as the family is upset with her.

## 2024-04-23 ENCOUNTER — TELEPHONE (OUTPATIENT)
Age: 89
End: 2024-04-23

## 2024-04-23 NOTE — TELEPHONE ENCOUNTER
Yani called from [facility] [phone] to verify we received a physician order for Dr. Lopez to be signed from 4/12/24.      Thank you

## 2024-04-29 ENCOUNTER — OFFICE VISIT (OUTPATIENT)
Dept: INTERNAL MEDICINE CLINIC | Facility: CLINIC | Age: 89
End: 2024-04-29

## 2024-04-29 VITALS
BODY MASS INDEX: 23.6 KG/M2 | HEIGHT: 64 IN | OXYGEN SATURATION: 100 % | HEART RATE: 107 BPM | SYSTOLIC BLOOD PRESSURE: 90 MMHG | TEMPERATURE: 98.2 F | WEIGHT: 138.2 LBS | DIASTOLIC BLOOD PRESSURE: 70 MMHG

## 2024-04-29 DIAGNOSIS — N18.31 STAGE 3A CHRONIC KIDNEY DISEASE (HCC): Chronic | ICD-10-CM

## 2024-04-29 DIAGNOSIS — L89.154 PRESSURE INJURY OF SACRAL REGION, STAGE 4 (HCC): ICD-10-CM

## 2024-04-29 DIAGNOSIS — I48.4 ATYPICAL ATRIAL FLUTTER (HCC): Chronic | ICD-10-CM

## 2024-04-29 DIAGNOSIS — N18.32 STAGE 3B CHRONIC KIDNEY DISEASE (HCC): ICD-10-CM

## 2024-04-29 DIAGNOSIS — D64.9 ANEMIA, UNSPECIFIED TYPE: ICD-10-CM

## 2024-04-29 DIAGNOSIS — J96.21 ACUTE ON CHRONIC RESPIRATORY FAILURE WITH HYPOXIA (HCC): Primary | ICD-10-CM

## 2024-04-29 PROBLEM — N17.9 AKI (ACUTE KIDNEY INJURY) (HCC): Status: RESOLVED | Noted: 2024-01-04 | Resolved: 2024-04-29

## 2024-04-29 PROCEDURE — 99214 OFFICE O/P EST MOD 30 MIN: CPT | Performed by: INTERNAL MEDICINE

## 2024-04-29 RX ORDER — METOPROLOL SUCCINATE 25 MG/1
25 TABLET, EXTENDED RELEASE ORAL DAILY
Qty: 90 TABLET | Refills: 1 | Status: SHIPPED | OUTPATIENT
Start: 2024-04-29

## 2024-04-29 NOTE — ASSESSMENT & PLAN NOTE
It is unclear why this patient is off Eliquis.  For now we will continue metoprolol and Cardizem, will get blood work to make sure her anemia is stabilized.

## 2024-04-29 NOTE — PROGRESS NOTES
Assessment/Plan:    Atypical atrial flutter (HCC)  It is unclear why this patient is off Eliquis.  For now we will continue metoprolol and Cardizem, will get blood work to make sure her anemia is stabilized.    Acute on chronic respiratory failure (HCC)  Resolved.  Her oxygen on room air with oxygen 2 L per nasal cannula with 100%, without oxygen after 10 minutes was 95%, she was able to walk 2 minutes and her oxygen was 100%.      Falls Plan of Care: Balance, strength, and gait training instructions were provided.      Subjective:      Patient ID: Aurora Anderson is a 89 y.o. female.    Patient is here for follow-up visit she was admitted in the hospital in January 2024 with UTI and sepsis, she was treated with antibiotics unfortunately with IV fluids she got a acute CHF exacerbation and had to be treated with oxygen and Lasix.  She was discharged to a nursing home and recovered well.  She also has a sacral wound has been followed by wound care and is healing.  She has been using oxygen since she was discharged from the hospital but states that she walks around about 50 feet from her bedroom to her bathroom or sometimes with a rash with a walker she has been using 2 L of oxygen by nasal cannula to use that intermittently and states that she does not use it at night.  Her daughter has a oximeter at home and her oxygen is usually above 93% with or without oxygen.  Patient has no acute complaints, she denies shortness of breath, chest pain, palpitation.  It is unclear why this patient stopped Eliquis      The following portions of the patient's history were reviewed and updated as appropriate: allergies, current medications, past family history, past medical history, past social history, past surgical history, and problem list.    Review of Systems   Constitutional:  Negative for appetite change and fatigue.   Respiratory:  Negative for cough, chest tightness, shortness of breath and wheezing.    Cardiovascular:   "Negative for chest pain, palpitations and leg swelling.   Gastrointestinal:  Negative for abdominal pain, nausea and vomiting.   Genitourinary:  Negative for difficulty urinating and frequency.   Musculoskeletal:  Negative for arthralgias and joint swelling.   Skin:  Negative for rash.   Neurological:  Negative for dizziness and headaches.   Psychiatric/Behavioral:  Negative for confusion and sleep disturbance.        Objective:      BP 90/70 (BP Location: Left arm, Patient Position: Sitting, Cuff Size: Large)   Pulse (!) 107   Temp 98.2 °F (36.8 °C) (Tympanic)   Ht 5' 4\" (1.626 m)   Wt 62.7 kg (138 lb 3.2 oz)   LMP  (LMP Unknown)   SpO2 100%   BMI 23.72 kg/m²          Physical Exam  Vitals and nursing note reviewed.   Constitutional:       General: She is not in acute distress.     Appearance: She is well-developed.   HENT:      Head: Normocephalic and atraumatic.   Eyes:      Conjunctiva/sclera: Conjunctivae normal.      Pupils: Pupils are equal, round, and reactive to light.   Neck:      Thyroid: No thyromegaly.   Cardiovascular:      Rate and Rhythm: Tachycardia present. Rhythm irregular.      Heart sounds: Normal heart sounds.   Pulmonary:      Effort: Pulmonary effort is normal. No respiratory distress.      Breath sounds: Normal breath sounds. No wheezing.   Abdominal:      General: Bowel sounds are normal. There is no distension.      Palpations: Abdomen is soft.      Tenderness: There is no abdominal tenderness.   Musculoskeletal:         General: Normal range of motion.      Cervical back: Normal range of motion and neck supple.      Right lower leg: Edema (chronic) present.      Left lower leg: Edema (chronic) present.   Skin:     General: Skin is warm and dry.      Capillary Refill: Capillary refill takes less than 2 seconds.   Neurological:      General: No focal deficit present.      Mental Status: She is alert and oriented to person, place, and time.      Motor: No abnormal muscle tone. "   Psychiatric:         Mood and Affect: Mood normal.         Thought Content: Thought content normal.         Judgment: Judgment normal.

## 2024-04-29 NOTE — LETTER
Patient Aurora Anderson, date of birth December 13, 1929 is currently under my care.  Patient was evaluated in my office on April 29, 2024.  Based on her current physical examination and vital signs patient no longer needs portable oxygen, her oxygen is stable at rest and on exertion.      Please call my office if any questions or concerns,          Anyi Lopez MD    04/29/2024

## 2024-04-29 NOTE — ASSESSMENT & PLAN NOTE
Resolved.  Her oxygen on room air with oxygen 2 L per nasal cannula with 100%, without oxygen after 10 minutes was 95%, she was able to walk 2 minutes and her oxygen was 100%.

## 2024-05-08 ENCOUNTER — TELEPHONE (OUTPATIENT)
Dept: PULMONOLOGY | Facility: CLINIC | Age: 89
End: 2024-05-08

## 2024-08-07 NOTE — ASSESSMENT & PLAN NOTE
-She has not had a migraine headache for very long   Patient had elevated in d-dimer during hospital admission earlier this month  D-Dimer in ED 3/22/2021 noted to be 5  12  PE scan negative  No results for input(s): FERRITIN, CRP, DDIMER in the last 72 hours    - Increased dose of  eliquis 5 mg BID

## 2024-09-12 PROBLEM — I26.99 PULMONARY EMBOLISM (HCC): Status: ACTIVE | Noted: 2024-01-01

## 2024-09-12 PROBLEM — J90 PLEURAL EFFUSION, RIGHT: Status: ACTIVE | Noted: 2024-01-01

## 2024-09-12 PROBLEM — E87.29 RESPIRATORY ACIDOSIS: Status: ACTIVE | Noted: 2017-11-13

## 2024-09-12 PROBLEM — S31.000A SACRAL WOUND: Status: ACTIVE | Noted: 2024-01-01

## 2024-09-12 NOTE — RESPIRATORY THERAPY NOTE
RT Protocol Note  Aurora Anderson 89 y.o. female MRN: [id number]  Unit/Bed#: ED-18 Encounter: [id number]    Assessment    Principal Problem:    Pulmonary embolism (HCC)  Active Problems:    Respiratory acidosis    Stage 3b chronic kidney disease (HCC)    Atypical atrial flutter (HCC)    Acute on chronic respiratory failure (HCC)    Chronic heart failure with preserved ejection fraction (HCC)    Anemia due to chronic kidney disease and iron deficiency    Sacral wound    Pleural effusion, right      Home Pulmonary Medications:  none     Past Medical History:   Diagnosis Date    Acute kidney injury (HCC)     Acute on chronic respiratory failure (HCC)     HOSSEIN (acute kidney injury) (HCC) 01/04/2024    Arthritis     Atrial flutter (HCC)     Rapid heart rate      Social History     Socioeconomic History    Marital status:      Spouse name: None    Number of children: 3    Years of education: None    Highest education level: None   Occupational History    None   Tobacco Use    Smoking status: Never    Smokeless tobacco: Never    Tobacco comments:     former smoker, per [employer];  started smoking at 17 yo, stopped at 31 yo   Vaping Use    Vaping status: Never Used   Substance and Sexual Activity    Alcohol use: No    Drug use: No    Sexual activity: None   Other Topics Concern    None   Social History Narrative    Inadequate exercise     Social Determinants of Health     Financial Resource Strain: Not on file   Food Insecurity: No Food Insecurity (9/12/2024)    Hunger Vital Sign     Worried About Running Out of Food in the Last Year: Never true     Ran Out of Food in the Last Year: Never true   Transportation Needs: No Transportation Needs (9/12/2024)    PRAPARE - Transportation     Lack of Transportation (Medical): No     Lack of Transportation (Non-Medical): No   Physical Activity: Insufficiently Active (4/29/2024)    Exercise Vital Sign     Days of Exercise per Week: 7 days     Minutes of Exercise per Session: 10  "min   Stress: No Stress Concern Present (4/29/2024)    [employer] [place] [employer] - Occupational Stress Questionnaire     Feeling of Stress : Not at all   Social Connections: Socially Isolated (4/29/2024)    Social Connection and Isolation Panel [NHANES]     Frequency of Communication with Friends and Family: More than three times a week     Frequency of Social Gatherings with Friends and Family: More than three times a week     Attends [employer] Services: Never     Active Member of Clubs or Organizations: No     Attends Club or Organization Meetings: Never     Marital Status:    Intimate Partner Violence: Not At Risk (4/29/2024)    Humiliation, Afraid, Rape, and Kick questionnaire     Fear of Current or Ex-Partner: No     Emotionally Abused: No     Physically Abused: No     Sexually Abused: No   Housing Stability: Unknown (9/12/2024)    Housing Stability Vital Sign     Unable to Pay for Housing in the Last Year: No     Number of Times Moved in the Last Year: Not on file     Homeless in the Last Year: No       Subjective    Subjective Data: pt getting echocardiogram during evaluation    Objective    Physical Exam:   Assessment Type: Assess only  General Appearance: Alert, Awake  Respiratory Pattern: Tachypneic  Chest Assessment: Chest expansion symmetrical  Bilateral Breath Sounds: Diminished  Cough: None  O2 Device: NC 4 LPM    Vitals:  Blood pressure 95/63, pulse (!) 107, temperature 98.4 °F (36.9 °C), temperature source Oral, resp. rate 16, height 5' 4\" (1.626 m), weight 62.6 kg (138 lb), SpO2 98%.          Imaging and other studies: Reviewed radiology reports from this admission including: chest xray.    O2 Device: NC 4 LPM     Plan   Pt is not bronchospasmic at this time , no intervention required at this time, Will follow as needed    Respiratory Plan: Discontinue Protocol        Resp Comments: pt s/p thorcentesis   "

## 2024-09-12 NOTE — ASSESSMENT & PLAN NOTE
9/12 VBG: pH 7.265, pCO2 68.4, pO2 29.8, HCO3 30.4  Likely secondary to hypoventilation secondary to large R. Pleural effusion identified on CT PE 9/12      Plan:  -s/p Thoracentesis on 9/12  -f/u VBG  -Consider bipap if respiratory acidosis does not improve

## 2024-09-12 NOTE — ED NOTES
Provider at the bedside for thoracentesis, will obtain EKG/troponin after bedside procedure complete     Rosio Aparicio RN  09/12/24 2274

## 2024-09-12 NOTE — Clinical Note
Case was discussed with PENNY and the patient's admission status was agreed to be Admission Status: inpatient status to the service of Dr. Mckeon.

## 2024-09-12 NOTE — PROCEDURES
Thoracentesis (Bedside)    Date/Time: 9/12/2024 2:42 PM    Performed by: Mary Beth Lopez MD  Authorized by: Mary Beth Lopez MD    Patient location:  Bedside  Consent:     Consent obtained:  Written    Consent given by:  Healthcare agent    Risks discussed:  Bleeding, infection, pain, pneumothorax, nerve damage and incomplete drainage    Alternatives discussed:  No treatment  Universal protocol:     Procedure explained and questions answered to patient or proxy's satisfaction: yes      Relevant documents present and verified: yes      Test results available and properly labeled: yes      Radiology Images displayed and confirmed.  If images not available, report reviewed: yes      Site/side marked: yes      Patient identity confirmed:  Verbally with patient  Indications:     Procedure Purpose: diagnostic and therapeutic      Indications: pleural effusion    Anesthesia (see MAR for exact dosages):     Anesthesia method:  Local infiltration    Local anesthetic:  Lidocaine 1% w/o epi  Procedure details:     Preparation: Patient was prepped and draped in usual sterile fashion      Standard thoracentesis cath kit used: Yes      Patient position:  Sitting    Laterality:  Right    Location:  Midaxillary line    Intercostal space:  5th    Ultrasound guidance: no      Indwelling catheter placed: no      Number of attempts:  1    Drainage color:  Yellow    Drainage characteristics:  Serosanguinous    Fluid removed amount:  1100cc  Post-procedure details:     Chest x-ray performed: no      Patient tolerance of procedure:  Tolerated well, no immediate complications

## 2024-09-12 NOTE — H&P
H&P - Critical Care/ICU   Name: Aurora Anderson 89 y.o. female I MRN: [id number]  Unit/Bed#: ED-18 I Date of Admission: 9/12/2024   Date of Service: 9/12/2024 I Hospital Day: 0       Assessment & Plan  Pulmonary embolism (HCC)  CT PE 9/12: Bilateral segmental and subsegmental emboli with evidence of right heart strain, calculated ratio of right ventricular to left ventricular diameter (RV/LV ratio) is 1.2.     Plan:  -Start Heparin VTE/PE infusion  -Monitor INR  -Monitor pt respiratory status  Chronic heart failure with preserved ejection fraction (HCC)  Wt Readings from Last 3 Encounters:   09/12/24 62.6 kg (138 lb)   04/29/24 62.7 kg (138 lb 3.2 oz)   02/21/24 58.1 kg (128 lb)     Home regimen: Lasix 20 mg every other day    Patient fluid overloaded on examination on 9/12.  CTA PE 9/12: Bilateral ground glass opacities which could be edema vs. Interstitial pneumonia   Administered 40 mg Lasix IV in the ED    Plan:  -F/u echo  -F/u UOP, received 40 Lasix IV in ED, 25g of % albumin    Atypical atrial flutter (HCC)  Previous home regimen: Metoprolol 25 mg qd, Diltiazem 180 mg qd, Eliquis (patient refused to take)  Will avoid cardizem and beta blocker given patient currently softer blood pressures and right side heart strain    Plan:    Stage 3b chronic kidney disease (HCC)  Lab Results   Component Value Date    EGFR 31 09/12/2024    EGFR 55 01/16/2024    EGFR 49 01/14/2024    CREATININE 1.42 (H) 09/12/2024    CREATININE 0.89 01/16/2024    CREATININE 0.98 01/14/2024     Baseline Cr 1-1.25    Plan:  -Avoid nephrotoxic medications  -Continue monitoring BMP  Respiratory acidosis  9/12 VBG: pH 7.265, pCO2 68.4, pO2 29.8, HCO3 30.4  Likely secondary to hypoventilation secondary to large R. Pleural effusion identified on CT PE 9/12      Plan:  -s/p Thoracentesis on 9/12  -f/u VBG  -Consider bipap if respiratory acidosis does not improve    Acute on chronic respiratory failure (HCC)  Likely contributed to by known pulmonary  emboli, pleural effusion now s/p thora, heart failure exacerbation given patient's fluid overloaded status and  with wnl troponins.     CTA PE 9/12: bilateral segmental and subsegmental emboli with evidence of right heart strain, RV/LV ratio 1.2, bilateral ground glass opacities which could be edema vs. Interstitial pneumonia with RLL opacity which can represent atelectasis vs. Infiltrate. Large R pleural effusion, R. Breast mass suspicious for neoplasm.    Plan:  -Currently on 2L NC  -Wean O2 as tolerated to maintain SpO2>95%  Anemia due to chronic kidney disease and iron deficiency  Home medication: Iron 325 mg po qAM  Hgb of 8.0 today, around baseline of 8.0 on previous encounters.    Plan:  -Restart Iron 325 mg when mentation improves and patient tolerates po intake  -f/u AM CBC  Sacral wound  Patient followed by wound care outpatient.     Plan:  -Wound care f/u inpatient  Pleural effusion, right  Identified on 9/12 CT PE:  Large R pleural effusion  S/p thoracentesis on 9/12 one liter of fluid drained  Blood LD on 9/12: 318  Total Protein on 9/12: 7.4    Plan:  -f/u pleural fluid cultures + gram stain  Disposition: Critical care    History of Present Illness   Aurora Anderson is a 89 y.o. PMH, HFpEF, atrial flutter, CKD who presented with increased shortness of breath and fatigue. On admission to ED patient had respiratory acidosis on VBG, fluid overloaded on examination. EKG on admission showed Afib with RVR. CXR on admission showed moderate-large right sided pleural effusion. Patient had bedside thoracentesis done 1L of fluid drawn. CTA PE study showed multiple bilateral pulmonary emboli with right heart strain, RV/LV ratio of 1.2, edema vs. Interstitial pneumonia. Patient was brought up to ICU for management of her respiratory acidosis possibly requiring BIPAP. Patient history obtained from the daughter. Patient has not been compliant with her medications since February.  Patient at baseline is able to  move from the chair to her garage, but for the past 3 days patient had decreased mobility, she sat in her recliner for most of the day.  Her daughter also mentions that she was complaining of pain from her left leg near the knee during these last few days.  Patient presented because of dyspnea this morning along with a pleuritic chest pain, she has noticed increased leg swelling over the last 3 days.  She denies any fevers, chills, cough, abdominal pain, nausea or vomiting.. She denies any travel history or surgery. Patient has no family history of breast cancer.  History obtained from daughter.  Review of Systems: See HPI for Review of Systems    Historical Information   Past Medical History:  No date: Acute kidney injury ([facility])  No date: Acute on chronic respiratory failure ([facility])  01/04/2024: HOSSEIN (acute kidney injury) ([facility])  No date: Arthritis  No date: Atrial flutter ([facility])  No date: Rapid heart rate Past Surgical History:  No date: CATARACT EXTRACTION  11/14/2017: EGD AND COLONOSCOPY; N/A      Comment:  Procedure: EGD AND COLONOSCOPY;  Surgeon: Radha Salas MD;  Location: [facility];  Service: Gastroenterology  No date: HIP FRACTURE SURGERY   Current Outpatient Medications   Medication Instructions    diltiazem (CARDIZEM CD) 180 mg 24 hr capsule TAKE 1 CAPSULE(180 MG) BY MOUTH DAILY    Emollient (CeraVe Daily Moisturizing) LOTN 1 Application, Apply externally, Daily    metoprolol succinate (TOPROL-XL) 25 mg, Oral, Daily    Allergies   Allergen Reactions    Penicillins Rash      Social History     Tobacco Use    Smoking status: Never    Smokeless tobacco: Never    Tobacco comments:     former smoker, per [employer];  started smoking at 17 yo, stopped at 29 yo   Vaping Use    Vaping status: Never Used   Substance Use Topics    Alcohol use: No    Drug use: No    Family History   Problem Relation Age of Onset    Diabetes Father     Cancer Family           Objective                          Vitals I/O       Most Recent Min/Max in 24hrs   Temp 98.4 °F (36.9 °C) Temp  Min: 98.4 °F (36.9 °C)  Max: 98.4 °F (36.9 °C)   Pulse (!) 107 Pulse  Min: 85  Max: 122   Resp 16 Resp  Min: 16  Max: 24   BP 95/63 BP  Min: 79/63  Max: 110/72   O2 Sat 98 % SpO2  Min: 90 %  Max: 100 %      Intake/Output Summary (Last 24 hours) at 2024 1630  Last data filed at 2024 1203  Gross per 24 hour   Intake 250 ml   Output --   Net 250 ml       Diet NPO    Invasive Monitoring           Physical Exam   Physical Exam  Skin:     General: Skin is warm and dry.   HENT:      Head: Normocephalic and atraumatic.   Cardiovascular:      Rate and Rhythm: Tachycardia present. Rhythm irregular.   Musculoskeletal:      Right lower le+ Edema present.      Left lower le+ Edema present.   Abdominal: General: There is no distension.      Palpations: Abdomen is soft.      Tenderness: There is no abdominal tenderness.   Constitutional:       Appearance: She is ill-appearing.      Interventions: She is not sedated and not intubated.  Pulmonary:      Effort: She is not intubated.      Breath sounds: Rhonchi present.      Comments: On 2L NC  Neurological:      Mental Status: She is alert. She is calm.      Motor: No motor deficit.      Comments: Patient awake and oriented to person and place but not time. However upon questioning able to provide limited information about her presentation.            Diagnostic Studies        Lab Results: I have reviewed the following results:      Medications:  Scheduled PRN   chlorhexidine, 15 mL, Q12H BRISSA  heparin (porcine), 4,800 Units, Once      heparin (porcine), 2,400 Units, Q6H PRN  heparin (porcine), 4,800 Units, Q6H PRN  sodium chloride (PF), 3 mL, Q1H PRN       Continuous    heparin (porcine), 3-30 Units/kg/hr (Order-Specific)         Labs:   CBC    Recent Labs     24  1001   WBC 15.18*   HGB 8.0*   HCT 30.5*      BANDSPCT 1     BMP    Recent Labs     24  1001   SODIUM 141   K 5.2   CL  106   CO2 28   AGAP 7   BUN 36*   CREATININE 1.42*   CALCIUM 8.6       Coags    No recent results     Additional Electrolytes  Recent Labs     09/12/24  1001   MG 2.2   PHOS 4.1          Blood Gas    No recent results  Recent Labs     09/12/24  1201   PHVEN 7.265*   PPR6XIV 68.4*   PO2VEN 29.8*   [id number] 30.4*   BEVEN 2.6   [id number] 42.5*    LFTs  No recent results    Infectious  No recent results  Glucose  Recent Labs     09/12/24  1001   GLUC 121

## 2024-09-12 NOTE — ASSESSMENT & PLAN NOTE
Previous home regimen: Metoprolol 25 mg qd, Diltiazem 180 mg qd, Eliquis (patient refused to take)  Will avoid cardizem and beta blocker given patient currently softer blood pressures and right side heart strain    Plan:

## 2024-09-12 NOTE — ED PROVIDER NOTES
No diagnosis found.  ED Disposition       None          Assessment & Plan       Medical Decision Making  Patient is a 89 y.o. year-old female w/ hx of CHF, CKD, sacral decub, lymphedema, and Afib/flutter who presents with 2 days of generalized weakness in the setting of stopping her home diltiazem. Exam with significant volume overload.      Differential diagnosis includes but is not limited to: hypovolemia, HOSSEIN, CHF exacerbation, PE, viral resp infection, less likely ACS    Workup and treatment as below:    Imaging: CTA PE and CXR notable for large R pleural effusion and multiple Pes, TTE pending  EKG: Rate 113, Afib w/ RVR, TWI in 1 and aVL without reciprocal changes and similar to prior EKG  Labs: Elevated BNP, troponin, HOSSEIN, slight mixed resp/metabolic acidosis  Meds: diuresis for extravascular overload, small IVF bolus for dehydration seen on exam and in pre-renal HOSSEIN, O2 therapy  Consults: Pulm crit care for bedside thoracentesis and PERT  Reassessment: Patient remains in Afib RVR but with stable BP and resp condition on reassessment.    Dispo: Admitted to Stepdown 1 unit      Amount and/or Complexity of Data Reviewed  Independent Historian:      Details: Daughter  Labs: ordered. Decision-making details documented in ED Course.  Radiology: ordered.  ECG/medicine tests: ordered and independent interpretation performed.    Risk  OTC drugs.  Prescription drug management.  Decision regarding hospitalization.                  ED Course as of 09/13/24 1337   Thu Sep 12, 2024   1033 BUN(!): 36   1033 Creatinine(!): 1.42  Prerenal HOSSEIN       Medications   sodium chloride (PF) 0.9 % injection 3 mL (has no administration in time range)   aspirin chewable tablet 324 mg (has no administration in time range)   furosemide (LASIX) injection 40 mg (has no administration in time range)   albuterol inhalation solution 2.5 mg (0 mg Nebulization Given to EMS 9/12/24 3244)       History of Present Illness       4 y.o. year-old  "female w/ hx of CHF, CKD, sacral decub, lymphedema, and Afib/flutter who presents with 2 days of generalized weakness. Her daughter who is her caregiver provides additional hx. They state that she is normally ambulatory with assistance but for the last two days she has had dyspnea on exertion, L sided CP without radiation, and feeling \"fluid on her lungs\". She denies cough/fevers/chills, N/V, back pain, calf pain, numbness/tingling, changes in speech, visual disturbances, ataxia, lightheadedness/palpitations or any other complaints. Her wound care RN said that her wound has been steadily improving.               Review of Systems   Constitutional:  Positive for activity change (Not ambulatory x2 days) and fatigue. Negative for chills and fever.   HENT:  Negative for ear pain and sore throat.    Eyes:  Negative for pain and visual disturbance.   Respiratory:  Positive for shortness of breath. Negative for cough, choking, chest tightness and wheezing.    Cardiovascular:  Positive for chest pain and leg swelling. Negative for palpitations.   Gastrointestinal:  Negative for abdominal pain, constipation, diarrhea, nausea and vomiting.   Genitourinary:  Negative for dysuria and hematuria.   Musculoskeletal:  Negative for arthralgias and back pain.   Skin:  Negative for color change and rash.   Neurological:  Negative for dizziness, tremors, seizures, syncope, facial asymmetry, light-headedness, numbness and headaches. Weakness: generalized but no focal weakness.  All other systems reviewed and are negative.          Objective     ED Triage Vitals [09/12/24 0922]   Temperature Pulse Blood Pressure Respirations SpO2 Patient Position - Orthostatic VS   98.4 °F (36.9 °C) 95 107/74 (!) 24 98 % --      Temp Source Heart Rate Source BP Location FiO2 (%) Pain Score    Oral Monitor -- -- No Pain        Physical Exam  Constitutional:       General: She is not in acute distress.     Appearance: She is ill-appearing. She is not " toxic-appearing or diaphoretic.   HENT:      Head: Normocephalic.      Nose: Nose normal.      Mouth/Throat:      Mouth: Mucous membranes are dry.      Pharynx: Oropharynx is clear.   Eyes:      Conjunctiva/sclera: Conjunctivae normal.   Cardiovascular:      Rate and Rhythm: Tachycardia present. Rhythm irregular.      Heart sounds: Normal heart sounds.   Pulmonary:      Effort: Pulmonary effort is normal.      Comments: Diminished breath sounds on R side of chest  Abdominal:      General: Abdomen is flat.      Palpations: Abdomen is soft.      Tenderness: There is no abdominal tenderness.   Musculoskeletal:      Right lower leg: Edema present.      Left lower leg: Edema present.   Skin:     General: Skin is warm and dry.      Capillary Refill: Capillary refill takes less than 2 seconds.   Neurological:      Mental Status: She is alert and oriented to person, place, and time.   Psychiatric:         Mood and Affect: Mood normal.         Labs Reviewed   COVID-19/INFLUENZA A/B RAPID ANTIGEN (30 MIN.TAT)   CBC AND DIFFERENTIAL   BASIC METABOLIC PANEL   HS TROPONIN I 0HR   B-TYPE NATRIURETIC PEPTIDE (BNP)   MAGNESIUM   PHOSPHORUS     No orders to display       CriticalCare Time    Date/Time: 9/13/2024 1:45 PM    Performed by: Roya Dukes MD  Authorized by: Roya Dukes MD    Critical care provider statement:     Critical care time (minutes):  34    Critical care was necessary to treat or prevent imminent or life-threatening deterioration of the following conditions:  Respiratory failure, circulatory failure and renal failure    Critical care was time spent personally by me on the following activities:  Obtaining history from patient or surrogate, ordering and performing treatments and interventions, development of treatment plan with patient or surrogate, discussions with consultants, ordering and review of laboratory studies, ordering and review of radiographic studies, re-evaluation of patient's condition, evaluation of  patient's response to treatment and examination of patient    I assumed direction of critical care for this patient from another provider in my specialty: charles Dukes MD  09/13/24 5439

## 2024-09-12 NOTE — ASSESSMENT & PLAN NOTE
Wt Readings from Last 3 Encounters:   09/13/24 65.4 kg (144 lb 2.9 oz)   04/29/24 62.7 kg (138 lb 3.2 oz)   02/21/24 58.1 kg (128 lb)     Home regimen: Lasix 20 mg every other day    Patient fluid overloaded on examination on 9/12.  CTA PE 9/12: Bilateral ground glass opacities which could be edema vs. Interstitial pneumonia   Administered 40 mg Lasix IV in the ED    Plan:  -F/u echo  -F/u UOP, received 40 Lasix IV in ED

## 2024-09-12 NOTE — PROGRESS NOTES
[facility]  Consult Note: Pulmonary Embolism Response Team  Name: Aurora Anderson 89 y.o. female I MRN: [id number]  Unit/Bed#: ED-18 I Date of Admission: 2024   Date of Service: 2024 I Hospital Day: 0    Assessment & Plan      Classification per PERT guidelines: High risk  Additional PERT team notified: Yes and Interventional Radiology  Admit to Stepdown Level 1 at [place]  Treatment recommendations: anticoagulation alone       History of Present Illness     Brief HPI: Aurora Anderson is a 89 y.o. who presents with 3 days history of progressively worsening shortness of breath and generalized weakness. Prior medical history includes atrial fibrillation/flutter, chronic lymphedema of bilateral lower extremities, and HFpEF of 55%.  Patient is not on anticoagulation at home despite high [id number] score of 4.      Consult requested by: José Miguel Sandoval MD    Consult time: 1600    Objective    PESI  Age: 89 years old  Sex: 0  History of Cancer: 0  History of Heart Failure: 10  Heart rate greater than or equal to 110: 20  Systolic BP < 100 mmH  PESI Score Results: 154              Class I Low Risk <66   Class II  66-85   Class III     Class IV High Risk 106-125   Class V  >125       Diagnostic Studies      CTA findings: Bilateral segmental and subsegmental emboli with evidence of right heart strain, calculated ratio of right ventricular to left ventricular diameter (RV/LV ratio) is 1.2.   ECHO: Pending review     Selected Labs     BNP  Recent Labs     24  1001   *      Troponin  No recent results            José Miguel Sandoval MD

## 2024-09-12 NOTE — ASSESSMENT & PLAN NOTE
Wt Readings from Last 3 Encounters:   04/29/24 62.7 kg (138 lb 3.2 oz)   02/21/24 58.1 kg (128 lb)   02/19/24 58.1 kg (128 lb)     Patient with known HFpEF with last EF of 55%.  Was previously admitted in January for sepsis secondary to UTI complicated by CHF exacerbation.  After this admission she discontinued all home medications.  She presented with somnolence and shortness of breath.  Found to have moderate to large right pleural effusion on CT.  Pulmonology consulted, plan to do thoracentesis with possible BiPAP after.  Currently saturating at 100% on 2 L nasal cannula.

## 2024-09-12 NOTE — ASSESSMENT & PLAN NOTE
VBG on admission showed pH of 7.26 and CO2 of 68.4.  She is likely hypoventilating secondary to moderate to large pleural effusion.  It is possible that her VBG will normalize following thoracentesis.  However we will continue to consider BiPAP should she not adequately improve per pulmonology.  Currently she is somewhat somnolent, would recommend SLP evaluation.

## 2024-09-12 NOTE — ASSESSMENT & PLAN NOTE
Identified on 9/12 CT PE:  Large R pleural effusion  S/p thoracentesis on 9/12 one liter of fluid drained  Blood LD on 9/12: 318  Total Protein on 9/12: 7.4    Plan:  -f/u pleural fluid cultures + gram stain

## 2024-09-12 NOTE — ASSESSMENT & PLAN NOTE
Likely contributed to by known pulmonary emboli, pleural effusion now s/p thora, heart failure exacerbation given patient's fluid overloaded status and  with wnl troponins.     CTA PE 9/12: bilateral segmental and subsegmental emboli with evidence of right heart strain, RV/LV ratio 1.2, bilateral ground glass opacities which could be edema vs. Interstitial pneumonia with RLL opacity which can represent atelectasis vs. Infiltrate. Large R pleural effusion, R. Breast mass suspicious for neoplasm.    Plan:  -Currently on 2L NC  -Wean O2 as tolerated to maintain SpO2>95%

## 2024-09-12 NOTE — ASSESSMENT & PLAN NOTE
Lab Results   Component Value Date    EGFR 31 09/12/2024    EGFR 55 01/16/2024    EGFR 49 01/14/2024    CREATININE 1.42 (H) 09/12/2024    CREATININE 0.89 01/16/2024    CREATININE 0.98 01/14/2024     Baseline Cr 1-1.25    Plan:  -Avoid nephrotoxic medications  -Continue monitoring BMP

## 2024-09-12 NOTE — HOSPITAL COURSE
"89 y.o. year-old female w/ hx of CHF, CKD, sacral decub, lymphedema, and Afib/flutter who presents with 2 days of generalized weakness. Her daughter who is her caregiver provides additional hx. They state that she is normally ambulatory with assistance but for the last two days she has had dyspnea on exertion, L sided CP without radiation, and feeling \"fluid on her lungs\". She denies cough/fevers/chills, N/V, back pain, calf pain, numbness/tingling, changes in speech, visual disturbances, ataxia, lightheadedness/palpitations or any other complaints. Her wound care RN said that her wound has been steadily improving.     Aurora Anderson is a 89 y.o. female with a PMH of HFpEF, atrial flutter, and stage 3 CKD who presents with increased shortness of breath and fatigue. Was brought in by her daughter. Was admitted in January with sepsis 2/2 UTI complicated by CHF. Since then she has been at baseline. Since that discharge, she has discontinued all home meds. On examination, she is not fully able to answer questions. History provided by daughter.       Pulm consulted after large R. Pleural effusion seen on CT, planned thoracentesis with bipap after. Previously spO2 100% on 2L NC.     Atypical atrial flutter  -Previous regimen of metoprolol, cardizem, eliquis  -Since last admission the patient discontinued all home medications    Sacral wound  -Longstanding chronic pressure sacral wound  -Previously followed by wound care    Labs in ED:  -WBC 15.18  Hgb 8.0, Plt 266 wnl    "

## 2024-09-12 NOTE — ASSESSMENT & PLAN NOTE
Home medication: Iron 325 mg po qAM  Hgb of 8.0 today, around baseline of 8.0 on previous encounters.    Plan:  -Restart Iron 325 mg when mentation improves and patient tolerates po intake  -f/u AM CBC

## 2024-09-12 NOTE — ASSESSMENT & PLAN NOTE
Previously diagnosed with a flutter following COVID infection.  She was placed on Cardizem CD.  Since her last admission she self discontinued all home medications.  Currently in afib with tachycardia in the low 100s.

## 2024-09-12 NOTE — ASSESSMENT & PLAN NOTE
Wt Readings from Last 3 Encounters:   09/12/24 62.6 kg (138 lb)   04/29/24 62.7 kg (138 lb 3.2 oz)   02/21/24 58.1 kg (128 lb)     Home regimen: Lasix 20 mg every other day    Patient fluid overloaded on examination on 9/12.  CTA PE 9/12: Bilateral ground glass opacities which could be edema vs. Interstitial pneumonia   Administered 40 mg Lasix IV in the ED    Plan:  -F/u echo  -F/u UOP, received 40 Lasix IV in ED, 25g of % albumin

## 2024-09-12 NOTE — CASE MANAGEMENT
Case Management Assessment & Discharge Planning Note    Patient name Aurora Anderson  Location ED-18/ED-18 MRN [id number]  : 1929 Date 2024       Current Admission Date: 2024  Current Admission Diagnosis:Pulmonary embolism (HCC)   Patient Active Problem List    Diagnosis Date Noted Date Diagnosed    Sacral wound 2024     Pulmonary embolism (HCC) 2024     Pleural effusion, right 2024     Dizziness 2024     Failure to thrive in adult 2024     Pressure injury of left foot, unstageable (HCC) 2024     Pressure injury of right foot, unstageable (HCC) 2024     Ambulatory dysfunction 2024     Pressure injury of sacral region, stage 4 (HCC) 2024     Anemia due to chronic kidney disease and iron deficiency 2024     Lymphedema 2024     Chronic heart failure with preserved ejection fraction (HCC) 2021     Breast mass, right 2021     Stage 3b chronic kidney disease (HCC) 2021     Atypical atrial flutter (HCC) 2021     Acute on chronic respiratory failure (HCC) 2021     Anemia 2021     Respiratory acidosis 2017     Right bundle branch block (RBBB) on electrocardiogram (ECG) 2017     Diverticulosis of colon 2017     Large hiatal hernia 2017     Incidental 6cm right Renal lesion on CT 2017       LOS (days): 0  Geometric Mean LOS (GMLOS) (days):   Days to GMLOS:     OBJECTIVE:              Current admission status: Inpatient       Preferred Pharmacy:   [id number] - [place] - [address] [facility]  [address] [facility] [address]  Phone: [phone] Fax: [phone]    [place], [place] -  [facility]   [facility] [address]  Phone: [phone] Fax: [phone]    *[id number] - [place], [place] - [address],  [address],  [facility] [address]  Phone:  [phone] Fax: [phone]    Primary Care Provider: Anyi Lopez MD    Primary Insurance:   Secondary Insurance:     ASSESSMENT:  Active Health Care Proxies    There are no active Health Care Proxies on file.                 Readmission Root Cause  30 Day Readmission: No    Patient Information  Admitted from:: Home  Mental Status: Alert  During Assessment patient was accompanied by: Not accompanied during assessment  Assessment information provided by:: Daughter  Support Systems: Family members, Daughter  Home entry access options. Select all that apply.: No steps to enter home  Type of Current Residence: Bi-level  Upon entering residence, is there a bedroom on the main floor (no further steps)?: Yes  Upon entering residence, is there a bathroom on the main floor (no further steps)?: Yes  Living Arrangements: Lives Alone    Activities of Daily Living Prior to Admission  Functional Status: Assistance  Completes ADLs independently?: No  Level of ADL dependence: Assistance  Ambulates independently?: Yes  Does patient use assisted devices?: Yes  Assisted Devices (DME) used: Walker, Bedside Commode  Does patient currently own DME?: Yes  What DME does the patient currently own?: Bedside Commode, Walker  Does patient have a history of Outpatient Therapy (PT/OT)?: No  Does the patient have a history of Short-Term Rehab?: Yes  Does patient have a history of HHC?: Yes  Does patient currently have HHC?: No         Patient Information Continued  Income Source: Pension/USP  Does patient have prescription coverage?: Yes  Does patient receive dialysis treatments?: No  Does patient have a history of substance abuse?: No  Does patient have a history of Mental Health Diagnosis?: No         Means of Transportation  Means of Transport to Appts:: Family transport      Social Determinants of Health (SDOH)      Flowsheet Row Most Recent Value   Housing Stability    In the last 12 months, was there a time when you were  not able to pay the mortgage or rent on time? N   At any time in the past 12 months, were you homeless or living in a shelter (including now)? N   Transportation Needs    In the past 12 months, has lack of transportation kept you from medical appointments or from getting medications? no   In the past 12 months, has lack of transportation kept you from meetings, work, or from getting things needed for daily living? No   Food Insecurity    Within the past 12 months, you worried that your food would run out before you got the money to buy more. Never true   Within the past 12 months, the food you bought just didn't last and you didn't have money to get more. Never true   Utilities    In the past 12 months has the electric, gas, oil, or water company threatened to shut off services in your home? No            DISCHARGE DETAILS:    Discharge planning discussed with:: daughter  Freedom of Choice: Yes  Comments - Freedom of Choice: CC contacted daughter to review role of Cm. Daughter reports that patient currently only has Part A insurance through Medicare. Daughter reports that family applied for [employer] but that determination is still pending. Per daughter, she refuses to have patient placed in a rehab facility. Per daughter she would prefer to take patient home with [facility] services if possible. CM to follow up with needs.  CM contacted family/caregiver?: Yes  Were Treatment Team discharge recommendations reviewed with patient/caregiver?: Yes  Did patient/caregiver verbalize understanding of patient care needs?: Yes  Were patient/caregiver advised of the risks associated with not following Treatment Team discharge recommendations?: Yes    Contacts  Patient Contacts: rogelio Millan  Relationship to Patient:: Family  Contact Method: Phone  Reason/Outcome: Continuity of Care, Discharge Planning, Emergency Contact, Referral

## 2024-09-12 NOTE — ASSESSMENT & PLAN NOTE
CT PE 9/12: Bilateral segmental and subsegmental emboli with evidence of right heart strain, calculated ratio of right ventricular to left ventricular diameter (RV/LV ratio) is 1.2.     Plan:  -Start Heparin VTE/PE infusion  -Monitor INR  -Monitor pt respiratory status

## 2024-09-12 NOTE — ASSESSMENT & PLAN NOTE
CT showed bilateral segmental and subsegmental emboli with evidence of right heart strain. Bilateral groundglass opacities which could be related to edema and/or interstitial pneumonia with right lower lobe opacity which can represent compressive atelectasis versus infiltrate.

## 2024-09-13 PROBLEM — I26.09 ACUTE PULMONARY EMBOLISM WITH ACUTE COR PULMONALE (HCC): Status: ACTIVE | Noted: 2024-01-01

## 2024-09-13 PROBLEM — R57.9 SHOCK (HCC): Status: ACTIVE | Noted: 2024-01-01

## 2024-09-13 PROBLEM — I48.91 ATRIAL FIBRILLATION (HCC): Status: ACTIVE | Noted: 2024-01-01

## 2024-09-13 NOTE — WOUND OSTOMY CARE
Consult Note - Wound   Aurora Hintonicola 89 y.o. female MRN: [id number]  Unit/Bed#: ICU 13 Encounter: [id number]        History and Present Illness:  Patient is 89 yo female admitted to [facility] with acute PE. Patient has history of HF, CKD, and atrial flutter. Patient is incontinent of bowel and bladder. Patient is mod/max assist with turning from side to side for assessment.  Wound care consulted for multiple wounds.    Assessment Findings:     POA stage IV sacrum- two open areas measured together center wound with full thickness skin loss that probes to bone and has circumferential undermining 1.5cm with beefy red tissue, distal wound bed with 100% slough tissue wound bed. Periwound skin is fragile and pink, moderate serosanguineous drainage present.   POA unstageable right heel- wound bed with 95% yellow slough tissue and 5% beefy red tissue, periwound skin is fragile, moderate serosanguineous drainage present. Right heel is intact and dry, preventative foam in place.  Left leg wound- likely traumatic wound with full thickness skin loss with mixed slough and beefy red tissue, periwound skin with significant edema, moderate serosanguineous drainage present.    No induration, fluctuance, warmth/temperature differences, redness, or purulence noted to the above noted wounds and skin areas assessed. New dressings applied per orders listed below. Patient tolerated well- no s/s of non-verbal pain or discomfort observed during the encounter. Bedside nurse aware of plan of care. See flow sheets for more detailed assessment findings.  Wound care will continue to follow, call or Secure Chat Message with questions.     Skin care Plan:  1-Cleanse sacral/buttocks wound with 1/4 strength Dakins, gently pack open sacrum wound with aquacel ag, place melgisorb ag over both open wounds, cover with silicone bordered foam. Change daily and PRN soilage/displacement.   2-Turn/reposition q2h or when medically stable for pressure  re-distribution on skin .  3-Elevate heels to offload pressure  4-Moisturize skin daily with skin nourishing cream  5-Ehob cushion in chair when out of bed.  6-Cleanse right heel with 1/4 strength Dakins. Apply melgisorb ag to wound bed and cover with Silicone bordered foam, mag T for treatment, and change every other day and PRN soilage/displacement  7-Cleanse right heel with soap and water. Apply Silicone bordered foam, mag P for prevention, and change every other day and PRN soilage/displacement  8-Cleanse left leg wound with normal saline, apply melgisorb ag to wound bed and cover with silicone bordered foam, Change every other day and PRN soilage/displacement.    Wounds:  Wound 09/12/24 Pressure Injury Coccyx (Active)   Wound Image   09/13/24 0856   Wound Description Beefy red;Slough;Probes to bone 09/13/24 0856   Pressure Injury Stage 4 09/13/24 0856   Park-wound Assessment Fragile;Pink 09/13/24 0856   Wound Length (cm) 8 cm 09/13/24 0856   Wound Width (cm) 9 cm 09/13/24 0856   Wound Depth (cm) 1.5 cm 09/13/24 0856   Wound Surface Area (cm^2) 72 cm^2 09/13/24 0856   Wound Volume (cm^3) 108 cm^3 09/13/24 0856   Calculated Wound Volume (cm^3) 108 cm^3 09/13/24 0856   Number of underminings 1 09/13/24 0856   Undermining 1 1.5 09/13/24 0856   Undermining 1 is depth extending from 12-12 09/13/24 0856   Wound Site Closure NEIL 09/13/24 0856   Drainage Amount Moderate 09/13/24 0856   Drainage Description Serosanguineous 09/13/24 0856   Non-staged Wound Description Full thickness 09/13/24 0856   Treatments Cleansed 09/13/24 0856   Dressing Calcium Alginate with Silver;Foam, Silicon (eg. Allevyn, etc);Silver dressing 09/13/24 0856   Wound packed? Yes 09/13/24 0856   Packing- # removed 0 09/13/24 0856   Packing- # inserted 1 09/13/24 0856   Dressing Changed New 09/13/24 0856   Patient Tolerance Tolerated well 09/13/24 0856   Dressing Status Clean;Dry;Intact 09/13/24 0856       Wound 09/12/24 Pressure Injury Heel  Right;Plantar;Posterior (Active)   Wound Image   09/13/24 0854   Wound Description Beefy red;Slough 09/13/24 0854   Pressure Injury Stage U 09/13/24 0854   Park-wound Assessment Fragile 09/13/24 0854   Wound Length (cm) 2 cm 09/13/24 0854   Wound Width (cm) 4 cm 09/13/24 0854   Wound Depth (cm) 0.5 cm 09/13/24 0854   Wound Surface Area (cm^2) 8 cm^2 09/13/24 0854   Wound Volume (cm^3) 4 cm^3 09/13/24 0854   Calculated Wound Volume (cm^3) 4 cm^3 09/13/24 0854   Wound Site Closure NEIL 09/13/24 0854   Drainage Amount Moderate 09/13/24 0854   Drainage Description Serosanguineous 09/13/24 0854   Non-staged Wound Description Full thickness 09/13/24 0854   Treatments Cleansed 09/13/24 0854   Dressing Calcium Alginate with Silver;Foam, Silicon (eg. Allevyn, etc) 09/13/24 0854   Wound packed? No 09/13/24 0854   Packing- # removed 0 09/13/24 0854   Packing- # inserted 0 09/13/24 0854   Dressing Changed New 09/13/24 0854   Patient Tolerance Tolerated well 09/13/24 0854   Dressing Status Clean;Dry;Intact 09/13/24 0854       Wound 09/12/24 Skin Tear Tibial Left;Posterior (Active)   Wound Image   09/13/24 0851   Wound Description Beefy red;Slough 09/13/24 0851   Park-wound Assessment Edema;Fragile 09/13/24 0851   Wound Length (cm) 2 cm 09/13/24 0851   Wound Width (cm) 2.3 cm 09/13/24 0851   Wound Depth (cm) 0.2 cm 09/13/24 0851   Wound Surface Area (cm^2) 4.6 cm^2 09/13/24 0851   Wound Volume (cm^3) 0.92 cm^3 09/13/24 0851   Calculated Wound Volume (cm^3) 0.92 cm^3 09/13/24 0851   Wound Site Closure NEIL 09/13/24 0851   Drainage Amount Moderate 09/13/24 0851   Drainage Description Serosanguineous 09/13/24 0851   Non-staged Wound Description Full thickness 09/13/24 0851   Treatments Cleansed 09/13/24 0851   Dressing Calcium Alginate with Silver;Foam, Silicon (eg. Allevyn, etc) 09/13/24 0851   Wound packed? No 09/13/24 0851   Packing- # removed 0 09/13/24 0851   Packing- # inserted 0 09/13/24 0851   Dressing Changed New 09/13/24  0851   Patient Tolerance Tolerated well 09/13/24 0851   Dressing Status Clean;Dry;Intact 09/13/24 0851       Lisa Pastrana BSN, RN, CWOCN

## 2024-09-13 NOTE — SEPSIS NOTE
Sepsis Note   Aurora Anderson 89 y.o. female MRN: [id number]  Unit/Bed#: ICU 13 Encounter: [id number]       Initial Sepsis Screening       Row Name 09/13/24 0547                Is the patient's history suggestive of a new or worsening infection? Yes (Proceed)  -AB        Suspected source of infection pneumonia  -AB        Indicate SIRS criteria Leukocytosis (WBC > 24227 IJL) OR Leukopenia (WBC <4000 IJL) OR Bandemia (WBC >10% bands);Hyperthemia > 38.3C (100.9F) OR Hypothermia <36C (96.8F);Tachycardia > 90 bpm  -AB        Are two or more of the above signs & symptoms of infection both present and new to the patient? Yes (Proceed)  -AB        Assess for evidence of organ dysfunction: Are any of the below criteria present within 6 hours of suspected infection and SIRS criteria that are NOT considered to be chronic conditions? MAP < 65  -AB        Date of presentation of septic shock --        Time of presentation of septic shock --        Fluid Resuscitation: A lesser volume than 30 ml/kg IV fluid will be given  -AB        The 30 mL/kg fluid bolus was not given to the patient despite having hypotension, a lactate of >= 4 mmol/L, or documentation of septic shock secondary to: Concern for fluid/volume overload;Heart Failure;Colloids were administered  -AB        Instead of the 30 ml/kg fluid bolus, the following volume of crystalloid fluid will be ordered: --                  User Key  (r) = Recorded By, (t) = Taken By, (c) = Cosigned By      Initials Name Provider Type    AB Beto Rivera DO Resident                        Body mass index is 24.75 kg/m².  Wt Readings from Last 1 Encounters:   09/13/24 65.4 kg (144 lb 2.9 oz)     IBW (Ideal Body Weight): 54.7 kg    Ideal body weight: 54.7 kg (120 lb 9.5 oz)  Adjusted ideal body weight: 59 kg (130 lb 0.4 oz)      Patient initially presented with shortness of breath and was found to have large pleural effusion and a submassive to massive PE.  She was meeting  SIRS criteria which was thought to be secondary to the pleural effusion and a pulmonary embolism.  She also became more hypotensive than baseline over the course the night requiring vasopressors, also initially thought to be secondary to PE.  This morning she has become mildly hypothermic with mildly increased vasopressor support and a persistent leukocytosis.  She does also appear to be volume overloaded and in heart failure, she was given albumin 5% and 25% over the course of the night.  Blood cultures will be collected and she will be started on ceftriaxone.    That being said, there is still a low suspicion for an infectious process, however given her clinical status she will be started on empiric antibiotics.

## 2024-09-13 NOTE — ASSESSMENT & PLAN NOTE
Patient has a history of atrial flutter  Previous home regimen: Metoprolol 25 mg qd, Diltiazem 180 mg qd, Eliquis   Patient non compliant since February  Will avoid cardizem and beta blocker given patient currently hypotensive  Patient currently in atrial fibrillation    Plan:  See plan for atrial fibrillation

## 2024-09-13 NOTE — ASSESSMENT & PLAN NOTE
Likely contributed to by known pulmonary emboli, pleural effusion now s/p thora, heart failure exacerbation given patient's fluid overloaded status and  with wnl troponins.     CTA PE 9/12: bilateral segmental and subsegmental emboli with evidence of right heart strain, RV/LV ratio 1.2, bilateral ground glass opacities which could be edema vs. Interstitial pneumonia with RLL opacity which can represent atelectasis vs. Infiltrate. Large R pleural effusion, R. Breast mass suspicious for neoplasm.    Plan:  -Currently on 3L NC  - Continue heparin Drip  -Wean O2 as tolerated to maintain SpO2>95%

## 2024-09-13 NOTE — UTILIZATION REVIEW
Initial Clinical Review    Admission: Date/Time/Statement:   Admission Orders (From admission, onward)       Ordered        09/12/24 1615  Inpatient Admission  Once                          Orders Placed This Encounter   Procedures    Inpatient Admission     Standing Status:   Standing     Number of Occurrences:   1     Order Specific Question:   Level of Care     Answer:   Critical Care [15]     Order Specific Question:   Estimated length of stay     Answer:   More than 2 Midnights     Order Specific Question:   Certification     Answer:   I certify that inpatient services are medically necessary for this patient for a duration of greater than two midnights. See H&P and MD Progress Notes for additional information about the patient's course of treatment.     ED Arrival Information       Expected   -    Arrival   9/12/2024 09:14    Acuity   Emergent              Means of arrival   Ambulance    [facility]    Service   Critical Care/ICU    Admission type   Emergency              Arrival complaint   -             Chief Complaint   Patient presents with    Weakness - Generalized     Patient arrives via EMS for evaluation of generalized weakness and cough x1 week, denies fevers, increased swelling in b/l legs       Initial Presentation: 89 y.o. female  to ED via EMS from home.    Admitted to inpatient with Dx: PE with RV strain/Pleural effusion/HOSSEIN on CKD/HOSSEIN on CKD/CHF exacerbation/Sacral wound stage IV/history of atrial flutter/acute hypoxemic respiratory failure.  Presented to ED with dyspnea,pleuritic chest pain  on morning of arrival,  for last 3 days with decreased mobility and sat in recliner most of day, pain in left leg near knee.  Increased leg swelling last 3 days. Has been non complaint with medications since February. Ems gave Neb.  PMHx: HFpEF, atrial flutter, CKD. On exam: tachycardia, Rhythm irregular.  BLE +2 edema.  Appears ill.  Lungs Rhonchi.  Awake and confused to time.  Bun 36.   Creatinine 1.42.   .  Wbc 15.18.  H&H 8/30.5.  CxR showed pulmonary edema, moderate size right pleural effusion.  Cta showed Bilateral PE with right heart strain, RV ratio of 1.2 venous duplex showed DVT RLE and LLE.  Ecg atrial fib with RVR.     ED treatment: given neb, Lasix IV, IVF bolus.  Given Heparin bolus and started on drip.   In ED became hypotensive, given albumin. PERT alert, no IR intervention.  Thoracentesis done.   Plan includes  to continue Heparin drip.  Monitor respiratory status.   Check echo and monitor volume status.   Wean O2 as tolerated to maintain SpO2>95%     Date: 9/13/24    Day 2:  feels about same as yesterday.   Still short of breath.  Cough present last 3 days.  On exam: tachycardia.  Rhythm irregular.  BLE +3 edema.  Onchomycosis on toenails and dry scaly skin.  Appears ill.  On oxygen 2 liter.  Procalcitonin 0.41.  wbc 15.31.  H&H 7/27.3.  bun 37.  Creatinine 1.34.  Wean O2 as tolerated to maintain SpO2>95%.  Continue Heparin gtt.   Given IV Lasix and albumin.   Started on IV Dig     ED Triage Vitals [09/12/24 0922]   Temperature Pulse Respirations Blood Pressure SpO2 Pain Score   98.4 °F (36.9 °C) 85 (!) 24 107/74 98 % No Pain     Weight (last 2 days)       Date/Time Weight    09/13/24 0300 65.4 (144.18)    09/12/24 1811 62.6 (138)    09/12/24 1545 62.6 (138)          Vital Signs (last 3 days)       Date/Time Temp Pulse Resp BP MAP (mmHg) Arterial Line BP MAP SpO2 Calculated FIO2 (%) - Nasal Cannula Nasal Cannula O2 Flow Rate (L/min) O2 Device Patient Position - Orthostatic VS [place] Coma Scale Score Pain    09/13/24 0815 -- -- -- -- -- 83/52 65 mmHg -- -- -- -- -- -- --    09/13/24 0800 -- 124 39 -- -- 84/54 67 mmHg 93 % -- -- -- -- 15 No Pain    09/13/24 0745 -- -- -- -- -- 99/55 73 mmHg -- -- -- -- -- -- --    09/13/24 0730 -- -- -- -- -- 88/52 65 mmHg -- -- -- -- -- -- --    09/13/24 0715 -- -- -- -- -- 89/53 67 mmHg -- -- -- -- -- -- --    09/13/24 0700 -- 111 31 -- --  96/56 71 mmHg 93 % -- -- -- -- -- --    09/13/24 0515 -- 117 28 -- -- 84/51 65 mmHg 92 % -- -- -- -- -- --    09/13/24 0511 -- 107 19 -- -- 83/51 63 mmHg 89 % -- -- -- -- -- --    09/13/24 0500 -- 105 17 -- -- 77/53 65 mmHg -- -- -- -- -- -- --    09/13/24 0445 -- 103 21 -- -- 84/52 65 mmHg 95 % -- -- -- -- -- --    09/13/24 0430 -- 104 16 -- -- 81/52 63 mmHg 97 % -- -- -- -- -- --    09/13/24 0415 -- 105 16 -- -- 84/53 66 mmHg 96 % -- -- -- -- -- --    09/13/24 0400 -- 103 13 -- -- 84/53 66 mmHg 97 % -- -- -- -- 15 --    09/13/24 0345 -- 100 13 -- -- 85/53 67 mmHg 98 % -- -- -- -- -- --    09/13/24 0330 -- 101 11 -- -- 85/55 67 mmHg 97 % -- -- -- -- -- --    09/13/24 0315 -- 101 11 -- -- 82/52 65 mmHg 99 % -- -- -- -- -- --    09/13/24 0300 96.7 °F (35.9 °C) 109 28 -- -- 89/57 71 mmHg 97 % -- -- -- -- -- --    09/13/24 0245 -- 115 32 -- -- 95/62 76 mmHg 89 % -- -- -- -- -- --    09/13/24 0230 -- 102 17 -- -- 77/59 67 mmHg 90 % -- -- -- -- -- --    09/13/24 0215 -- 106 13 -- -- 79/58 68 mmHg 91 % -- -- -- -- -- --    09/13/24 0200 -- 108 31 -- -- 85/59 70 mmHg 89 % -- -- -- -- -- --    09/13/24 0145 -- 97 12 -- -- 86/59 71 mmHg 89 % -- -- -- -- -- --    09/13/24 0130 -- 89 10 -- -- 83/55 67 mmHg 90 % -- -- -- -- -- --    09/13/24 0115 -- 95 9 -- -- 83/57 69 mmHg 91 % -- -- -- -- -- --    09/13/24 0100 -- 89 20 -- -- 84/56 68 mmHg 93 % -- -- -- -- -- --    09/13/24 0045 -- 95 17 -- -- 88/58 71 mmHg 93 % -- -- -- -- -- --    09/13/24 0030 -- 116 36 -- -- 86/56 67 mmHg -- -- -- -- -- -- --    09/13/24 0015 -- 96 23 -- -- 96/58 72 mmHg -- -- -- -- -- -- --    09/13/24 0000 -- 94 18 -- -- 92/56 69 mmHg 95 % -- -- -- -- 15 No Pain    09/12/24 2345 -- 91 18 -- -- 94/57 70 mmHg 91 % -- -- -- -- -- --    09/12/24 2330 -- 90 21 -- -- 91/55 68 mmHg 96 % -- -- -- -- -- --    09/12/24 2315 -- 91 22 -- -- 88/54 66 mmHg -- -- -- -- -- -- --    09/12/24 2300 -- 108 30 -- -- 88/55 66 mmHg 94 % -- -- -- -- -- --    09/12/24 2245 --  102 31 -- -- 93/63 76 mmHg 93 % -- -- -- -- -- --    09/12/24 2230 -- 102 28 -- -- 107/65 81 mmHg 93 % -- -- -- -- -- --    09/12/24 2229 96.5 °F (35.8 °C) -- -- -- -- -- -- -- -- -- -- -- -- --    09/12/24 2215 -- 93 24 -- -- 96/58 72 mmHg -- -- -- -- -- -- --    09/12/24 2200 -- 93 29 -- -- 84/60 69 mmHg 94 % -- -- -- -- -- --    09/12/24 2145 -- 87 14 -- -- 72/52 61 mmHg 94 % -- -- -- -- -- --    09/12/24 2130 -- 93 33 -- -- 80/51 61 mmHg 90 % -- -- -- -- -- --    09/12/24 2115 -- 94 29 -- -- 94/58 71 mmHg 92 % -- -- -- -- -- --    09/12/24 2112 -- 104 -- -- -- -- 61 mmHg -- -- -- -- -- -- --    09/12/24 2100 -- 99 28 -- -- 83/52 63 mmHg -- -- -- -- -- -- --    09/12/24 2045 -- 111 26 -- -- 82/51 61 mmHg -- -- -- -- -- -- --    09/12/24 2030 -- 105 24 99/59 72 95/59 73 mmHg -- -- -- -- -- -- --    09/12/24 2000 -- -- -- -- -- -- -- -- -- -- -- -- 15 No Pain    09/12/24 1914 -- 95 21 72/46 55 -- -- 94 % 28 2 L/min Nasal cannula Lying -- --    09/12/24 1851 -- 105 23 135/84 103 -- -- 87 % -- -- -- -- -- --    09/12/24 1811 98.4 °F (36.9 °C) 88 3 70/51 56 -- -- 89 % -- -- -- -- -- --    09/12/24 1800 -- -- -- -- -- -- -- -- -- -- -- -- 15 No Pain    09/12/24 1738 -- 107 16 84/66 71 -- -- 99 % 28 2 L/min Nasal cannula Sitting -- --    09/12/24 1730 -- 103 16 95/53 74 -- -- 94 % 28 2 L/min Nasal cannula -- -- --    09/12/24 1700 -- 106 16 104/56 75 -- -- 97 % 28 2 L/min Nasal cannula -- -- --    09/12/24 1615 -- 107 16 95/63 74 -- -- -- 28 2 L/min -- -- -- --    09/12/24 1545 -- 118 16 88/71 76 -- -- 98 % 28 2 L/min Nasal cannula -- -- --    09/12/24 1530 -- 109 -- 79/63  68 -- -- 97 % 28 2 L/min Nasal cannula -- -- --    09/12/24 1500 -- 110 -- 92/56 68 -- -- 100 % 28 2 L/min Nasal cannula -- -- --    09/12/24 1330 -- 105 18 110/72 -- -- -- 99 % 28 2 L/min Nasal cannula -- -- --    09/12/24 1215 -- 98 18 90/56 69 -- -- 100 % 28 2 L/min Nasal cannula -- -- --    09/12/24 1200 -- 108 20 94/60 72 -- -- 94 % 28 2 L/min  "Nasal cannula -- -- --    09/12/24 1130 -- 109 18 91/67 74 -- -- 95 % 28 2 L/min Nasal cannula -- -- --    09/12/24 1115 -- 105 18 99/77 84 -- -- 90 % 28 2 L/min Nasal cannula -- -- --    09/12/24 1100 -- 110 18 96/68 77 -- -- 100 % 28 2 L/min Nasal cannula -- -- --    09/12/24 1045 -- 121 20 93/64 -- -- -- 95 % 28 2 L/min Nasal cannula -- -- --    09/12/24 1015 -- 122 20 108/66 79 -- -- 96 % 28 2 L/min Nasal cannula -- -- --    09/12/24 0922 98.4 °F (36.9 °C) 85 24 107/74 -- -- -- 98 % -- -- None (Room air) -- -- No Pain            Pertinent Labs/Diagnostic Test Results:   Radiology:   VAS VENOUS DUPLEX - LOWER LIMB BILATERAL   Final Interpretation by Alida Delcid DO (09/13 0615)      XR chest 1 view   Final Interpretation by Dat Grant MD (09/12 6475)      Interval clearing of the right pleural effusion status post thoracentesis. No pneumothorax. Improved interstitial edema.            Workstation performed: [id number]         CTA ED chest PE Study   Final Interpretation by Sanjuana Ellis MD (09/12 6758)         1. Bilateral segmental and subsegmental emboli with evidence of right heart strain, calculated ratio of right ventricular to left ventricular diameter (RV/LV ratio) is 1.2.      There is a critical finding of acute PE with RV/LV ratio >0.9. Based on PERT algorithm recommendations:    \"  Order STAT biomarkers including troponin, NT-BNP or BNP    \"  Calculate PESI score:   o  If PESI score falls in I-III, with negative biomarkers, please order a PERT priority ECHO.   o  If PESI score falls in IV-V or with positive biomarkers, please order STAT ECHO and alert the campus PERT via PAC at [phone].         2. Bilateral groundglass opacities which could be related to edema and/or interstitial pneumonia with right lower lobe opacity which can represent compressive atelectasis versus infiltrate.      3. Large right pleural effusion.   4. Right breast mass suspicious for neoplasm. Evaluation " with mammogram and ultrasound recommended.      I personally discussed this study with ADAM LONNIEERIN on 9/12/2024 2:46 PM.            Resident: José Miguel Ortega I, the attending radiologist, have reviewed the images and agree with the final report above.      Workstation performed: [id number]         X-ray chest 1 view portable   Final Interpretation by Dat Grant MD (09/12 1643)      Moderate pulmonary edema suggested similar to prior chest x-ray. Moderate-sized right pleural effusion slightly increased.            Workstation performed: [id number]         FL barium swallow video w speech    (Results Pending)     Cardiology:  No orders to display     9/12/24 ecg Atrial fibrillation with rapid ventricular response with premature ventricular or aberrantly conducted complexes  Left axis deviation  Non-specific intra-ventricular conduction block  Poor R wave progression    9/12/24 ecg Atrial fibrillation with rapid ventricular response with premature ventricular or aberrantly conducted complexes  Left axis deviation  Incomplete right bundle branch block  Poor R wave progression  T wave abnormality, consider lateral ischemia  Abnormal ECG    9/12/24 ecg Atrial fibrillation with rapid ventricular response  Left axis deviation  Incomplete right bundle branch block  Poor R wave progression  Abnormal ECG    9/12/24 echo  Left Ventricle: Left ventricular cavity size is normal. Wall thickness is mildly increased. There is concentric remodeling. The left ventricular ejection fraction is 60%. Systolic function is normal. Wall motion is normal.    IVS: There is diastolic flattening of the interventricular septum consistent with right ventricle volume overload.    Right Ventricle: Right ventricular cavity size is moderately dilated. Systolic function is moderately to severely reduced. Wall motion is abnormal. There is severe hypokinesis of the mid to apical free wall.    Left Atrium: The atrium is severely dilated (>48  mL/m2).    Right Atrium: The atrium is moderately dilated.    Mitral Valve: There is moderate annular calcification. There is moderate regurgitation.    Tricuspid Valve: There is moderate to severe regurgitation. The tricuspid valve regurgitation jet is directed toward septum. The right ventricular systolic pressure is severely elevated. The estimated right ventricular systolic pressure is 65.00 mmHg.    Pulmonic Valve: There is mild regurgitation.    Aorta: The aortic root is mildly dilated at 4.1 cm/2.4 cm/m2. The ascending aorta is mildly dilated at 3.6 cm/2.1 cm/m2.     GI:  No orders to display         Results from last 7 days   Lab Units 09/13/24 0444 09/12/24  1609 09/12/24  1001   WBC Thousand/uL 15.31*  --  15.18*   HEMOGLOBIN g/dL 7.0*  --  8.0*   I STAT HEMOGLOBIN g/dl  --  9.5*  --    HEMATOCRIT % 27.3*  --  30.5*   HEMATOCRIT, ISTAT %  --  28*  --    PLATELETS Thousands/uL 260  --  266   TOTAL NEUT ABS Thousands/µL 14.11*  --   --    BANDS PCT %  --   --  1         Results from last 7 days   Lab Units 09/13/24 0444 09/12/24  1609 09/12/24  1001   SODIUM mmol/L 143  --  141   POTASSIUM mmol/L 4.1  --  5.2   CHLORIDE mmol/L 109*  --  106   CO2 mmol/L 28  --  28   CO2, I-STAT mmol/L  --  31  --    ANION GAP mmol/L 6  --  7   BUN mg/dL 37*  --  36*   CREATININE mg/dL 1.34*  --  1.42*   EGFR ml/min/1.73sq m 33  --  31   CALCIUM mg/dL 7.6*  --  8.6   CALCIUM, IONIZED, ISTAT mmol/L  --  1.18  --    MAGNESIUM mg/dL 1.9  --  2.2   PHOSPHORUS mg/dL  --   --  4.1     Results from last 7 days   Lab Units 09/12/24  1001   TOTAL PROTEIN g/dL 7.4     Results from last 7 days   Lab Units 09/13/24  0444 09/12/24  1001   GLUCOSE RANDOM mg/dL 86 121     Results from last 7 days   Lab Units 09/12/24  2040   PH ART  7.338*   PCO2 ART mm Hg 52.6*   PO2 ART mm Hg 87.5   HCO3 ART mmol/L 27.6   BASE EXC ART mmol/L 1.4   O2 CONTENT ART mL/dL 11.5*   O2 HGB, ARTERIAL % 94.8   ABG SOURCE  Radial, Right     Results from last 7  days   Lab Units 09/12/24  1201   PH PATSY  7.265*   PCO2 PATSY mm Hg 68.4*   PO2 PATSY mm Hg 29.8*   HCO3 PATSY mmol/L 30.4*   BASE EXC PATSY mmol/L 2.6   O2 CONTENT PATSY ml/dL 5.1   O2 HGB, VENOUS % 42.5*     Results from last 7 days   Lab Units 09/12/24  1609   I STAT BASE EXC mmol/L 1   I STAT O2 SAT % 92*   ISTAT PH ART  7.287*   I STAT ART PCO2 mm HG 60.4*   I STAT ART PO2 mm HG 72.0*   I STAT ART HCO3 mmol/L 28.8*     Results from last 7 days   Lab Units 09/12/24  1453 09/12/24  1201 09/12/24  1001   HS TNI 0HR ng/L  --   --  16   HS TNI 2HR ng/L  --  16  --    HSTNI D2 ng/L  --  0  --    HS TNI 4HR ng/L 14  --   --    HSTNI D4 ng/L -2  --   --      Results from last 7 days   Lab Units 09/13/24  0634 09/12/24  2251 09/12/24  1551   PROTIME seconds  --   --  18.4*   INR   --   --  1.45*   PTT seconds >210* >210* 36*     Results from last 7 days   Lab Units 09/13/24  0444   PROCALCITONIN ng/ml 0.41*     Results from last 7 days   Lab Units 09/12/24  1201   LACTIC ACID mmol/L 1.6     Results from last 7 days   Lab Units 09/12/24  1001   BNP pg/mL 676*     Results from last 7 days   Lab Units 09/12/24  1453   GRAM STAIN RESULT  No organisms seen     Results from last 7 days   Lab Units 09/12/24  1453   TOTAL COUNTED  100   WBC FLUID /ul 428       ED Treatment-Medication Administration from 09/12/2024 0914 to 09/12/2024 1751         Date/Time Order Dose Route Action     09/12/2024 0923 albuterol inhalation solution 2.5 mg 0 mg Nebulization Given to EMS     09/12/2024 1005 aspirin chewable tablet 324 mg 324 mg Oral Given     09/12/2024 1103 furosemide (LASIX) injection 40 mg 40 mg Intravenous Given     09/12/2024 1103 multi-electrolyte (ISOLYTE-S PH 7.4) bolus 250 mL 250 mL Intravenous New Bag     09/12/2024 1232 iohexol (OMNIPAQUE) 350 MG/ML injection (MULTI-DOSE) 100 mL 60 mL Intravenous Given     09/12/2024 1632 heparin (porcine) injection 4,800 Units 4,800 Units Intravenous Given     09/12/2024 1631 heparin (porcine)  25,000 units in 0.45% NaCl 250 mL infusion (premix) 18 Units/kg/hr Intravenous New Bag     09/12/2024 1551 albumin human (FLEXBUMIN) 5 % injection 25 g 25 g Intravenous New Bag            Past Medical History:   Diagnosis Date    Acute kidney injury (HCC)     Acute on chronic respiratory failure (HCC)     HOSSEIN (acute kidney injury) (HCC) 01/04/2024    Arthritis     Atrial flutter (HCC)     Rapid heart rate      Present on Admission:   Atypical atrial flutter (HCC)   Chronic heart failure with preserved ejection fraction (HCC)   Respiratory acidosis   Stage 3b chronic kidney disease (HCC)   Pleural effusion, right   Anemia due to chronic kidney disease and iron deficiency   Acute on chronic respiratory failure ([facility])      Admitting Diagnosis: Leg pain [M79.606]  Pleural effusion [J90]  HOSSEIN (acute kidney injury) ([facility]) [N17.9]  Acute on chronic congestive heart failure, unspecified heart failure type ([facility]) [I50.9]  Multiple subsegmental pulmonary emboli without acute cor pulmonale ([facility]) [I26.94]  Age/Sex: 89 y.o. female  Admission Orders:  Scheduled Medications:  cefTRIAXone, 2,000 mg, Intravenous, Q24H  chlorhexidine, 15 mL, Mouth/Throat, Q12H BRISSA  digoxin, 250 mcg, Intravenous, Q6H  furosemide, 40 mg, Intravenous, Once  midodrine, 2.5 mg, Oral, TID AC  sodium hypochlorite, , Irrigation, Daily    albumin human (FLEXBUMIN) 5 % injection 12.5 g  Dose: 12.5 g  Freq: Once Route: IV  Last Dose: Stopped (09/13/24 0756)  Start: 09/13/24 0545 End: 09/13/24 0756  digoxin (LANOXIN) injection 250 mcg  Dose: 250 mcg  Freq: Every 6 hours Route: IV  Start: 09/13/24 0915 End: 09/14/24 0914  furosemide (LASIX) injection 40 mg  Dose: 40 mg  Freq: Once Route: IV  Start: 09/13/24 1015    Continuous IV Infusions:  heparin (porcine), 3-30 Units/kg/hr (Order-Specific), Intravenous, Titrated  norepinephrine, 1-30 mcg/min, Intravenous, Titrated      PRN Meds: not used   acetaminophen, 975 mg, Oral, Q8H PRN  heparin (porcine), 2,400 Units,  Intravenous, Q6H PRN  heparin (porcine), 4,800 Units, Intravenous, Q6H PRN  sodium chloride (PF), 3 mL, Intravenous, Q1H PRN    Neuro checks every 4 hours  Cardio pulmonary  monitoring     Skin care Plan:   1-Cleanse sacral/buttocks wound with 1/4 strength Dakins, gently pack open sacrum wound with aquacel ag, place melgisorb ag over both open wounds, cover with silicone bordered foam. Change daily and PRN soilage/displacement.   2-Turn/reposition q2h or when medically stable for pressure re-distribution on skin .   3-Elevate heels to offload pressure   4-Moisturize skin daily with skin nourishing cream   5-Ehob cushion in chair when out of bed.   6-Cleanse right heel with 1/4 strength Dakins. Apply melgisorb ag to wound bed and cover with Silicone bordered foam, mag T for treatment, and change every other day and PRN soilage/displacement   7-Cleanse right heel with soap and water. Apply Silicone bordered foam, mag P for prevention, and change every other day and PRN soilage/displacement   8-Cleanse left leg wound with normal saline, apply melgisorb ag to wound bed and cover with silicone bordered foam, Change every other day and PRN soilage/displacement.       Network Utilization Review Department  ATTENTION: Please call with any questions or concerns to [phone] and carefully listen to the prompts so that you are directed to the right person. All voicemails are confidential.   For Discharge needs, contact Care Management DC Support Team at [phone] opt. [phone]  Send all requests for admission clinical reviews, approved or denied determinations and any other requests to dedicated fax number below belonging to the campus where the patient is receiving treatment. List of dedicated fax numbers for the Facilities:  FACILITY NAME UR FAX NUMBER   ADMISSION DENIALS (Administrative/Medical Necessity) [phone]   DISCHARGE SUPPORT TEAM (NETWORK) [phone]   PARENT CHILD HEALTH (Maternity/NICU/Pediatrics)  [phone]   [facility] [phone]   [facility] [phone]   [facility] [phone]   [facility] [phone]   [facility] [phone]   [facility] [phone]   [facility] [phone]   [facility] [phone]   [facility] [phone]   [facility] [phone]   [facility] [phone]   [facility] [phone]

## 2024-09-13 NOTE — PLAN OF CARE
Rec advance diet to dysphagia 1 puree w/ thin liquids  Meds in applesauce  Aspiration precautions  Consider VBS

## 2024-09-13 NOTE — SPEECH THERAPY NOTE
Speech-Language Pathology Bedside Swallow Evaluation        Patient Name: Aruora Anderson    Today's Date: 9/13/2024     Problem List  Principal Problem:    Acute pulmonary embolism with acute cor pulmonale (HCC)  Active Problems:    Respiratory acidosis    Stage 3b chronic kidney disease (HCC)    Atypical atrial flutter (HCC)    Acute on chronic respiratory failure (HCC)    Chronic heart failure with preserved ejection fraction (HCC)    Anemia due to chronic kidney disease and iron deficiency    Sacral wound    Pleural effusion, right         Past Medical History  Past Medical History:   Diagnosis Date    Acute kidney injury (HCC)     Acute on chronic respiratory failure (HCC)     HOSSEIN (acute kidney injury) (HCC) 01/04/2024    Arthritis     Atrial flutter (HCC)     Rapid heart rate          Summary    Pt presents with oropharyngeal dysphagia characterized by difficulty w/ mastication of mech soft/solids and occas throat clearing w/ liquids.      Recommendations:   Diet: puree/level 1 diet and thin liquids   Meds: whole with puree   Frequent Oral care: 4x/day  Aspiration precautions   Other Recommendations/ considerations: consider VBS to assess aspiration risk and pharyngeal swallow.        Current Medical Status  Pt is a 89 y.o. female who presented to [facility]  with acute PE. Pt presented with increased shortness of breath and fatigue. On admission to ED patient had respiratory acidosis on VBG, fluid overloaded on examination. EKG on admission showed Afib with RVR. CXR on admission showed moderate-large right sided pleural effusion. Patient had bedside thoracentesis done 1L of fluid drawn. CTA PE study showed multiple bilateral pulmonary emboli with right heart strain, RV/LV ratio of 1.2, edema vs. Interstitial pneumonia. Patient was brought up to ICU for management of her respiratory acidosis possibly requiring BIPAP.      Past medical history:   Please see H&P for details    Special Studies:  CXR:  9/12/24 Interval clearing of the right pleural effusion status post thoracentesis. No pneumothorax. Improved interstitial edema.     Social/Education/Vocational Hx:  Pt lives with family    Swallow Information   Prior speech/swallowing tx: jan 2024, rec dysphagia 2 w/ thin liquids  Current Risks for Dysphagia & Aspiration:  SOB, hx of dysphagia, generalized weakness  Current Symptoms/Concerns: change in respiratory status  Current Diet: NPO   Baseline Diet: mechanically altered/level 2 diet and thin liquids  Takes pills-whole w/ water per pt    Baseline Assessment   Behavior/Cognition: alert  Speech/Language Status: able to follow commands and limited verbal output  Patient Positioning: upright in chair     Swallow Mechanism Exam   Facial: symmetrical  Labial: WFL  Lingual:  lingual atrophy noted on L  Velum: unable to visualize  Mandible: adequate ROM  Dentition: upper dentures  Vocal quality:clear/adequate   Volitional Cough: strong/productive   Respiratory: NC    Consistencies Assessed and Performance   Consistencies Administered: thin liquids, nectar thick, puree, and mechanical soft solids- limited amts    Oral Stage: pt took pcs of shortbread cookie- slow, prolonged mastication/manipulation. Pt took sips of NTL and thin liquids from cup and straw with adequate oral control.  MIld oral residue noted with mech soft/solids.     Pharyngeal Stage: swallow initiation appeared delayed. Throat clearing noted with consec sips/swallows of NTL by cup, wet swallows noted with consec sips of NTL by straw. Throat clearing also noted with mech soft. No s/s aspiration w/ single sips of thin liquids by cup and straw. Voice clear.       Esophageal Concerns: none reported      Results Reviewed with: patient, RN, and MD   Dysphagia Goals: pt will tolerate dysphagia 1 puree with thin liquids without s/s of aspiration x1-3 sessions, further goals pending VBS      Mohini Person MA CCC-SLP  Speech Pathologist  [place] license # [id number]  [place] license # [id number]  Available via Secure Chat

## 2024-09-13 NOTE — ASSESSMENT & PLAN NOTE
Lab Results   Component Value Date    EGFR 28 09/14/2024    EGFR 33 09/13/2024    EGFR 31 09/12/2024    CREATININE 1.53 (H) 09/14/2024    CREATININE 1.34 (H) 09/13/2024    CREATININE 1.42 (H) 09/12/2024     Baseline Cr 1-1.25    Plan:  -Avoid nephrotoxic medications  -Continue monitoring BMP

## 2024-09-13 NOTE — OCCUPATIONAL THERAPY NOTE
Occupational Therapy Cancellation Note        Patient Name: Aurora Anderson  Today's Date: 9/13/2024 09/13/24 1025   Note Type   Note type Cancelled Session   Cancel Reasons Medical status   Additional Comments OT orders received, chart review completed. Spoke to CAITLIN Spicer at ICU rounds who reports that pt is currently on gretel hugger as temperature is 93*. Will hold OT evaluation at this time and see when is medically stable and appropriate for participation in therapy off of gretel hugger     Anika Fortune MS, OTR/L

## 2024-09-13 NOTE — ASSESSMENT & PLAN NOTE
Wt Readings from Last 3 Encounters:   09/14/24 64.7 kg (142 lb 10.2 oz)   04/29/24 62.7 kg (138 lb 3.2 oz)   02/21/24 58.1 kg (128 lb)     Home regimen: Lasix 20 mg every other day    Patient fluid overloaded on examination on 9/12.  CTA PE 9/12: Bilateral ground glass opacities which could be edema vs. Interstitial pneumonia   Administered 40 mg Lasix IV in the ED  Echo showed left ventricular ejection fraction of 60%, diastolic flattening of the interventricular septum consistent with right ventricle volume overload.  RV function moderately dilated, systolic function moderately to severely reduced.  Moderate to severe regurgitation of tricuspid valve estimated RVSP 65 mmHg.  Biatrial enlargement.  No surgical intervention given her age   Patient appears volume overload on physical exam, Also elevated RVSP on echo, will diurese with lasix to reduce right heart strain    Plan:  Will give 40 mg lasix   Monitor urine output  Daily weights  Monitor I/Os

## 2024-09-13 NOTE — ASSESSMENT & PLAN NOTE
Lab Results   Component Value Date    EGFR 33 09/13/2024    EGFR 31 09/12/2024    EGFR 55 01/16/2024    CREATININE 1.34 (H) 09/13/2024    CREATININE 1.42 (H) 09/12/2024    CREATININE 0.89 01/16/2024     Baseline Cr 1-1.25    Plan:  -Avoid nephrotoxic medications  -Continue monitoring BMP

## 2024-09-13 NOTE — ASSESSMENT & PLAN NOTE
AEB hypotension and end organ dysfunction (HOSSEIN) despite fluids/albumin requiring pressor therapy  Leukocytosis and tachycardia present although likely reactive in setting of pulmonary embolism and atrial fibrillation  Shock likely obstructive vs cardiogenic  Echocardiogram showing normal left ventricular function although left atrium is severely dilated and moderate mitral regurgitation is present.  Loss of atrial kick from Afib/flutter thought at least in part to be contributing to shock state but pressor requirements remain unchanged despite rate control with digoxin.  Echo also shows new severe right ventricle dysfunction in the mid to apical free wall compared to echocardiogram in January of this year. Suspect this is in the setting of acute PE vs acutely/chronically worsening pulmonary artery hypertension vs CAD.  Component of CTEPH also a possibility as chronicity of symptoms unclear  Attempts to diurese patient to alleviate right heart strain and poor coaptation of tricupsid valve have not improved pressor requirements despite large urine output over last 24 hrs    Plan:  Will hold off on further diuresis today  Consider addition of PDE for tx of pulmonary hypertension, Patient unlikely to be a good surgical candidate for tx of CTEPH, will explore possibility of medical management with adempas   Consider CT head to evaluate for lesions in relation to breast mass that are at risk for hemorrhagic conversion.  Will talk to family again about possibility of tPA if CT is reassuring.  +/- cardiology/advanced heart failure consultation - patient may need cardiac catheterization   Continue pressor therapy with levo to with goal of maintaining systolic blood pressure > 90 mmHg  Continue digoxin, check level today  Check lactic acid  Continue heparin gtt - switch to eliquis 10 mg bid x7 followed by lifelong anticoagulation once able to tolerate oral medication consistently  All planning is in the background of ongoing  goals of care discussions with patient and family.  Prognosis is poor given advanced age and severity of right heart dysfunction.  She is likely appropriate for hospice and we will consider involving their team as well.

## 2024-09-13 NOTE — ASSESSMENT & PLAN NOTE
Identified on 9/12 CT PE:  Large R pleural effusion  S/p thoracentesis on 9/12 one liter of fluid drained  Blood LD on 9/12: 318  Total Protein on 9/12: 7.4    Plan:  -f/u pleural fluid cultures + cytology

## 2024-09-13 NOTE — ASSESSMENT & PLAN NOTE
9/12 VBG: pH 7.265, pCO2 68.4, pO2 29.8, HCO3 30.4  Likely secondary to hypoventilation secondary to large R. Pleural effusion identified on CT PE 9/12  S/p Thoracentesis on 9/12  On 3L NC    Plan:  -BiPAP prn  Check abg

## 2024-09-13 NOTE — ASSESSMENT & PLAN NOTE
Patient present with atrial fibrillation and RVR,   Patient has history of atrial flutter, non compliant with medications since February  Echo showed left ventricular ejection fraction of 60%, diastolic flattening of the interventricular septum consistent with right ventricle volume overload.  RV function moderately dilated, systolic function moderately to severely reduced.  Moderate to severe regurgitation of tricuspid valve estimated RVSP 65 mmHg.  Biatrial enlargement.   Left atrial enlargement, may allude to chronic changes  Avoiding beta blockers cardizem in the setting of hypotension  Patient on levophed for pressor support  Started on digoxin on 09/13/2024 inotropic, chronotropic & dromotropic effects  HR improved on digoxin in 80s-90s    Plan   Continue digoxin  Check level  Continue heparin gtt

## 2024-09-13 NOTE — DISCHARGE INSTR - OTHER ORDERS
Skin care Plan:  1-Cleanse sacral/buttocks wound with 1/4 strength Dakins, gently pack open sacrum wound with aquacel ag, place melgisorb ag over both open wounds, cover with silicone bordered foam. Change daily and PRN soilage/displacement.   2-Turn/reposition q2h or when medically stable for pressure re-distribution on skin .  3-Elevate heels to offload pressure  4-Moisturize skin daily with skin nourishing cream  5-Ehob cushion in chair when out of bed.  6-Cleanse right heel with 1/4 strength Dakins. Apply melgisorb ag to wound bed and cover with Silicone bordered foam, mag T for treatment, and change every other day and PRN soilage/displacement  7-Cleanse right heel with soap and water. Apply Silicone bordered foam, mag P for prevention, and change every other day and PRN soilage/displacement  8-Cleanse left leg wound with normal saline, apply Xeroform to wound bed and cover with silicone bordered foam, Change every other day and PRN soilage/displacement.  9-Cleanse right arm area with soap and water. Apply small 2X2 Silicone bordered foam, mag T for treatment, and change every other day and PRN soilage/displacement  10-Cleanse bridge of nose with normal saline, pat dry, apply small piece of hydrocolloid and change daily and PRN displacement.

## 2024-09-13 NOTE — ASSESSMENT & PLAN NOTE
Pressure ulcer of sacrum, stage 4, POA, in the setting of decreased mobility, as evidenced by positive bone probe and tunneling, being treated with wound care team consult, off-loading, cleansing and daily packing.    Plan  - Daily wound care  - Turn every 2 hours  - OOB into chair  - PT/OT  -discontinue abx

## 2024-09-13 NOTE — PROCEDURES
Arterial Line Insertion    Date/Time: 9/12/2024 8:45 PM    Performed by: AZRA Brownlee  Authorized by: AZRA Brownlee    Patient location:  Bedside  Consent:     Consent obtained:  Verbal    Consent given by:  Patient    Risks discussed:  Bleeding, ischemia, repeat procedure, pain and infection  Universal protocol:     Procedure explained and questions answered to patient or proxy's satisfaction: yes      Relevant documents present and verified: yes      Required blood products, implants, devices, and special equipment available: yes      Site/side marked: yes      Immediately prior to procedure a time out was called: yes      Patient identity confirmed:  Verbally with patient and arm band  Indications:     Indications: hemodynamic monitoring and frequent labs / infusion    Pre-procedure details:     Skin preparation:  Chlorhexidine    Preparation: Patient was prepped and draped in sterile fashion    Anesthesia (see MAR for exact dosages):     Anesthesia method:  Local infiltration    Local anesthetic:  Lidocaine 1% w/o epi  Procedure details:     Location / Tip of Catheter:  Radial    Laterality:  Right    Piotr's test performed: yes      Piotr's test abnormal: no      Needle gauge:  20 G    Placement technique:  Ultrasound guided    Ultrasound image availability:  Not saved    Sterile ultrasound techniques: Sterile gel and sterile probe covers were used      Number of attempts:  1    Successful placement: yes      Transducer: waveform confirmed    Post-procedure details:     Post-procedure:  Sutured    CMS:  Unchanged    Patient tolerance of procedure:  Tolerated well, no immediate complications

## 2024-09-13 NOTE — PHYSICAL THERAPY NOTE
PHYSICAL THERAPY CANCELLATION NOTE          Patient Name: Aurora Anderson  Today's Date: 9/13/2024 09/13/24 1030   Note Type   Note type Cancelled Session   Cancel Reasons Medical status   Additional Comments PT eval orders received, chart review performed. Spoke to CAITLIN Spicer during ICU mobility rounds. Pt not medically appropriate to participate, currently on gretel hugger due to temp of 93*. Will hold PT eval and mobilization at this time. PT will continue to follow pt as appropriate and as schedule allows.         Kary Mora, PT, DPT  09/13/24

## 2024-09-13 NOTE — PROGRESS NOTES
Progress Note - Critical Care/ICU   Name: Aurora Anderson 89 y.o. female I MRN: [id number]  Unit/Bed#: ICU 13 I Date of Admission: 9/12/2024   Date of Service: 9/13/2024 I Hospital Day: 1      Assessment & Plan  Acute pulmonary embolism with acute cor pulmonale (HCC)  CT PE 9/12: Bilateral segmental and subsegmental emboli with evidence of right heart strain, calculated ratio of right ventricular to left ventricular diameter (RV/LV ratio) is 1.2.     Plan:  -Start Heparin VTE/PE infusion  -Monitor INR  -Monitor pt respiratory status  Chronic heart failure with preserved ejection fraction (HCC)  Wt Readings from Last 3 Encounters:   09/13/24 65.4 kg (144 lb 2.9 oz)   04/29/24 62.7 kg (138 lb 3.2 oz)   02/21/24 58.1 kg (128 lb)     Home regimen: Lasix 20 mg every other day    Patient fluid overloaded on examination on 9/12.  CTA PE 9/12: Bilateral ground glass opacities which could be edema vs. Interstitial pneumonia   Administered 40 mg Lasix IV in the ED    Plan:  -F/u echo  -F/u UOP, received 40 Lasix IV in ED    Atypical atrial flutter (HCC)  Previous home regimen: Metoprolol 25 mg qd, Diltiazem 180 mg qd, Eliquis (patient refused to take)  Will avoid cardizem and beta blocker given patient currently softer blood pressures and right side heart strain    Plan:    Stage 3b chronic kidney disease (HCC)  Lab Results   Component Value Date    EGFR 33 09/13/2024    EGFR 31 09/12/2024    EGFR 55 01/16/2024    CREATININE 1.34 (H) 09/13/2024    CREATININE 1.42 (H) 09/12/2024    CREATININE 0.89 01/16/2024     Baseline Cr 1-1.25    Plan:  -Avoid nephrotoxic medications  -Continue monitoring BMP  Respiratory acidosis  9/12 VBG: pH 7.265, pCO2 68.4, pO2 29.8, HCO3 30.4  Likely secondary to hypoventilation secondary to large R. Pleural effusion identified on CT PE 9/12      Plan:  -s/p Thoracentesis on 9/12  -f/u VBG  -Consider bipap if respiratory acidosis does not improve    Acute on chronic respiratory failure (HCC)  Likely  contributed to by known pulmonary emboli, pleural effusion now s/p thora, heart failure exacerbation given patient's fluid overloaded status and  with wnl troponins.     CTA PE 9/12: bilateral segmental and subsegmental emboli with evidence of right heart strain, RV/LV ratio 1.2, bilateral ground glass opacities which could be edema vs. Interstitial pneumonia with RLL opacity which can represent atelectasis vs. Infiltrate. Large R pleural effusion, R. Breast mass suspicious for neoplasm.    Plan:  -Currently on 2L NC  -Wean O2 as tolerated to maintain SpO2>95%  Anemia due to chronic kidney disease and iron deficiency  Home medication: Iron 325 mg po qAM  Hgb of 8.0 today, around baseline of 8.0 on previous encounters.    Plan:  -Restart Iron 325 mg when mentation improves and patient tolerates po intake  -f/u AM CBC  Sacral wound  Patient followed by wound care outpatient.     Plan:  -Wound care f/u inpatient  Pleural effusion, right  Identified on 9/12 CT PE:  Large R pleural effusion  S/p thoracentesis on 9/12 one liter of fluid drained  Blood LD on 9/12: 318  Total Protein on 9/12: 7.4    Plan:  -f/u pleural fluid cultures + gram stain  Disposition: Critical care    ICU Core Measures     A: Assess, Prevent, and Manage Pain Has pain been assessed? Yes  Need for changes to pain regimen? No   B: Both SAT/SAT  N/A   C: Choice of Sedation RASS Goal: 0 Alert and Calm  Need for changes to sedation or analgesia regimen? No   D: Delirium CAM-ICU: Negative   E: Early Mobility  Plan for early mobility? Yes   F: Family Engagement Plan for family engagement today? Yes       Antibiotic Review: Patient on appropriate coverage based on culture data.     Review of Invasive Devices:        Sharmin Plan: Keep arterial line for hemodynamic monitoring    Prophylaxis:  VTE VTE covered by:  heparin (porcine), Intravenous, 15 Units/kg/hr at 09/13/24 0138  heparin (porcine), Intravenous  heparin (porcine), Intravenous       Stress  Ulcer  not ordered         24 Hour Events   24hr events: Patient admitted to icu placed on heparin drip, found to be hypotensive around 6pm, 70/51 map of 56 was given 25 g of albumin 25%. Maps continue to be around 61, was started on levophed, mildly hypothermic overnight 96.7, patient was placed on bearhugger.  Subjective   This morning patient states she feels same as yesterday, she denies any chest pain, but states she is still short of breath. She denies any fevers, chills. Patient states she has had a cough for 3 days but none before that. She denies any abdominal pain, nausea/vomiting or dysuria.   Review of Systems: Review of Systems    Objective                          Vitals I/O      Most Recent Min/Max in 24hrs   Temp (!) 96.7 °F (35.9 °C) Temp  Min: 96.5 °F (35.8 °C)  Max: 98.4 °F (36.9 °C)   Pulse (!) 117 Pulse  Min: 85  Max: 122   Resp (!) 28 Resp  Min: 3  Max: 36   BP 99/59 BP  Min: 70/51  Max: 135/84   O2 Sat 92 % SpO2  Min: 87 %  Max: 100 %      Intake/Output Summary (Last 24 hours) at 9/13/2024 0742  Last data filed at 9/13/2024 0600  Gross per 24 hour   Intake 1132.01 ml   Output 535 ml   Net 597.01 ml       Diet NPO    Invasive Monitoring   Arterial Line  Sharmin BP 84/51  Arterial Line BP  Min: 72/52  Max: 107/65   MAP 65 mmHg  Arterial Line MAP (mmHg)  Min: 61 mmHg  Max: 81 mmHg           Physical Exam   Physical Exam  Skin:     General: Skin is warm and dry.   HENT:      Head: Normocephalic and atraumatic.   Cardiovascular:      Rate and Rhythm: Tachycardia present. Rhythm irregular.   Musculoskeletal:      Right lower leg: 3+ Edema present.      Left lower leg: 3+ Edema present.      Comments: Onchomycosis on toenails, and dry scaly skin   Abdominal: General: There is no distension.      Palpations: Abdomen is soft.      Tenderness: There is no abdominal tenderness.   Constitutional:       Appearance: She is ill-appearing.      Interventions: She is not sedated and not intubated.  Pulmonary:       Effort: She is not intubated.      Breath sounds: Rhonchi present.      Comments: On 2L NC  Neurological:      Mental Status: She is alert. She is calm.      Motor: No motor deficit.      Comments: Patient AOx4            Diagnostic Studies        Lab Results: I have reviewed the following results:      Medications:  Scheduled PRN   cefTRIAXone, 2,000 mg, Q24H  chlorhexidine, 15 mL, Q12H BRISSA  midodrine, 2.5 mg, TID AC      acetaminophen, 975 mg, Q8H PRN  heparin (porcine), 2,400 Units, Q6H PRN  heparin (porcine), 4,800 Units, Q6H PRN  sodium chloride (PF), 3 mL, Q1H PRN       Continuous    heparin (porcine), 3-30 Units/kg/hr (Order-Specific), Last Rate: 15 Units/kg/hr (09/13/24 0138)  norepinephrine, 1-30 mcg/min, Last Rate: 6 mcg/min (09/13/24 0511)         Labs:   CBC    Recent Labs     09/12/24  1001 09/12/24  1609 09/13/24  0444   WBC 15.18*  --  15.31*   HGB 8.0* 9.5* 7.0*   HCT 30.5* 28* 27.3*     --  260   BANDSPCT 1  --   --      BMP    Recent Labs     09/12/24  1001 09/12/24  1609 09/13/24  0444   SODIUM 141  --  143   K 5.2  --  4.1     --  109*   CO2 28 31 28   AGAP 7  --  6   BUN 36*  --  37*   CREATININE 1.42*  --  1.34*   CALCIUM 8.6  --  7.6*       Coags    Recent Labs     09/12/24  1551 09/12/24  2251   INR 1.45*  --    PTT 36* >210*        Additional Electrolytes  Recent Labs     09/12/24  1001 09/12/24  1609   MG 2.2  --    PHOS 4.1  --    CAIONIZED  --  1.18          Blood Gas    Recent Labs     09/12/24 2040   PHART 7.338*   NKY1CJZ 52.6*   PO2ART 87.5   WLG2XAE 27.6   BEART 1.4   SOURCE Radial, Right     Recent Labs     09/12/24  1201 09/12/24 2040   PHVEN 7.265*  --    [id number] 68.4*  --    PO2VEN 29.8*  --    XDV2MKV 30.4*  --    BEVEN 2.6  --    M1XDEHC 42.5*  --    SOURCE  --  Radial, Right    LFTs  No recent results    Infectious  Recent Labs     09/13/24  0444   PROCALCITONI 0.41*     Glucose  Recent Labs     09/12/24  1001 09/13/24 0444   GLUC 121 86

## 2024-09-13 NOTE — ASSESSMENT & PLAN NOTE
Home medication: Iron 325 mg po qAM  Hgb on admission, around baseline of 8.0 on previous encounters.  Patient hemoglobin am of 09/13 7.0 will repeat hgb @2pm    Plan:  -F/u repeat hemoglobin  -Restart Iron 325 mg when mentation improves and patient tolerates po intake  -f/u AM CBC

## 2024-09-13 NOTE — ASSESSMENT & PLAN NOTE
Patient complains of dyspnea and pleuritic which is new today and leg swelling for the past 3 days.  Patient with respiratory acidosis on VBG, fluid overloaded on examination.  CXR on admission showed moderate-large right sided pleural effusion.   Patient had bedside thoracentesis done.   CT PE 9/12: Bilateral segmental and subsegmental emboli with evidence of right heart strain, calculated ratio of right ventricular to left ventricular diameter (RV/LV ratio) is 1.2.   Suspicious r breast mass  Echo showed: diastolic flattening of the interventricular septum consistent with right ventricle volume overload. RV function moderately dilated, systolic function moderately to severely reduced. Moderate to severe regurgitation of tricuspid valve estimated RVSP 65 mmHg.     Plan:  -Continue Heparin VTE/PE infusion - transition to eliquis 10 mg bid x7 days followed by lifelong AC once able to tolerate oral medication  -Monitor INR  -Monitor CBC  -Patient currently on 3L NC

## 2024-09-14 PROBLEM — N17.9 AKI (ACUTE KIDNEY INJURY) (HCC): Status: ACTIVE | Noted: 2021-03-22

## 2024-09-14 PROBLEM — J96.22 ACUTE ON CHRONIC RESPIRATORY FAILURE WITH HYPOXIA AND HYPERCAPNIA (HCC): Status: ACTIVE | Noted: 2021-03-22

## 2024-09-14 NOTE — QUICK NOTE
Interval Events:       Patient remains in critical condition secondary to acute hypercapnic respiratory failure and cardiogenic shock state.  One time IV push of cardizem 30 mg administered in an attempt to better control patient's heart rate however she became bradycardic and further hypotensive requiring the administration of atropine.  Hemodynamics stabilized shortly thereafter.  Goals of care discussion had with daughter at bedside and she confirms the patient's level 3 DNR/DNI code status.  She would like to maximize medical therapy before making decision to pursue comfort care/hospice    Pertinent New Data:   blood pressure and pulse      I have personally reviewed pertinent lab results.  No pertinent imaging studies reviewed.    Assessment and Plan          José Miguel Sandoval MD

## 2024-09-14 NOTE — PROGRESS NOTES
Progress Note - Critical Care/ICU   Name: Aurora Anderson 89 y.o. female I MRN: [id number]  Unit/Bed#: ICU 13 I Date of Admission: 9/12/2024   Date of Service: 9/14/2024 I Hospital Day: 2      Assessment & Plan  Shock (HCC)  AEB hypotension and end organ dysfunction (HOSSEIN) despite fluids/albumin requiring pressor therapy  Leukocytosis and tachycardia present although likely reactive in setting of pulmonary embolism and atrial fibrillation  Shock likely obstructive vs cardiogenic  Echocardiogram showing normal left ventricular function although left atrium is severely dilated and moderate mitral regurgitation is present.  Loss of atrial kick from Afib/flutter thought at least in part to be contributing to shock state but pressor requirements remain unchanged despite rate control with digoxin.  Echo also shows new severe right ventricle dysfunction in the mid to apical free wall compared to echocardiogram in January of this year. Suspect this is in the setting of acute PE vs acutely/chronically worsening pulmonary artery hypertension vs CAD.  Component of CTEPH also a possibility as chronicity of symptoms unclear  Attempts to diurese patient to alleviate right heart strain and poor coaptation of tricupsid valve have not improved pressor requirements despite large urine output over last 24 hrs    Plan:  Will hold off on further diuresis today  Consider addition of PDE for tx of pulmonary hypertension, Patient unlikely to be a good surgical candidate for tx of CTEPH, will explore possibility of medical management with adempas   Consider CT head to evaluate for lesions in relation to breast mass that are at risk for hemorrhagic conversion.  Will talk to family again about possibility of tPA if CT is reassuring.  +/- cardiology/advanced heart failure consultation - patient may need cardiac catheterization   Continue pressor therapy with levo to with goal of maintaining systolic blood pressure > 90 mmHg  Continue digoxin,  check level today  Check lactic acid  Continue heparin gtt - switch to eliquis 10 mg bid x7 followed by lifelong anticoagulation once able to tolerate oral medication consistently  All planning is in the background of ongoing goals of care discussions with patient and family.  Prognosis is poor given advanced age and severity of right heart dysfunction.  She is likely appropriate for hospice and we will consider involving their team as well.        Acute pulmonary embolism with acute cor pulmonale (HCC)  Patient complains of dyspnea and pleuritic which is new today and leg swelling for the past 3 days.  Patient with respiratory acidosis on VBG, fluid overloaded on examination.  CXR on admission showed moderate-large right sided pleural effusion.   Patient had bedside thoracentesis done.   CT PE 9/12: Bilateral segmental and subsegmental emboli with evidence of right heart strain, calculated ratio of right ventricular to left ventricular diameter (RV/LV ratio) is 1.2.   Suspicious r breast mass  Echo showed: diastolic flattening of the interventricular septum consistent with right ventricle volume overload. RV function moderately dilated, systolic function moderately to severely reduced. Moderate to severe regurgitation of tricuspid valve estimated RVSP 65 mmHg.     Plan:  -Continue Heparin VTE/PE infusion - transition to eliquis 10 mg bid x7 days followed by lifelong AC once able to tolerate oral medication  -Monitor INR  -Monitor CBC  -Patient currently on 3L NC  Chronic heart failure with preserved ejection fraction (HCC)  Wt Readings from Last 3 Encounters:   09/14/24 64.7 kg (142 lb 10.2 oz)   04/29/24 62.7 kg (138 lb 3.2 oz)   02/21/24 58.1 kg (128 lb)     Home regimen: Lasix 20 mg every other day    Patient fluid overloaded on examination on 9/12.  CTA PE 9/12: Bilateral ground glass opacities which could be edema vs. Interstitial pneumonia   Administered 40 mg Lasix IV in the ED  Echo showed left ventricular  ejection fraction of 60%, diastolic flattening of the interventricular septum consistent with right ventricle volume overload.  RV function moderately dilated, systolic function moderately to severely reduced.  Moderate to severe regurgitation of tricuspid valve estimated RVSP 65 mmHg.  Biatrial enlargement.  No surgical intervention given her age   Patient appears volume overload on physical exam, Also elevated RVSP on echo, will diurese with lasix to reduce right heart strain    Plan:  Will give 40 mg lasix   Monitor urine output  Daily weights  Monitor I/Os    Atypical atrial flutter (HCC)  Patient has a history of atrial flutter  Previous home regimen: Metoprolol 25 mg qd, Diltiazem 180 mg qd, Eliquis   Patient non compliant since February  Will avoid cardizem and beta blocker given patient currently hypotensive  Patient currently in atrial fibrillation    Plan:  See plan for atrial fibrillation  OHSSEIN (acute kidney injury) ([facility])  Lab Results   Component Value Date    EGFR 28 09/14/2024    EGFR 33 09/13/2024    EGFR 31 09/12/2024    CREATININE 1.53 (H) 09/14/2024    CREATININE 1.34 (H) 09/13/2024    CREATININE 1.42 (H) 09/12/2024     Baseline Cr 1-1.25    Plan:  -Avoid nephrotoxic medications  -Continue monitoring BMP  Respiratory acidosis  9/12 VBG: pH 7.265, pCO2 68.4, pO2 29.8, HCO3 30.4  Likely secondary to hypoventilation secondary to large R. Pleural effusion identified on CT PE 9/12  S/p Thoracentesis on 9/12  On 3L NC    Plan:  -BiPAP prn  Check abg    Acute on chronic respiratory failure with hypoxia and hypercapnia ([facility])  Likely contributed to by known pulmonary emboli, pleural effusion now s/p thora, heart failure exacerbation given patient's fluid overloaded status and  with wnl troponins.     CTA PE 9/12: bilateral segmental and subsegmental emboli with evidence of right heart strain, RV/LV ratio 1.2, bilateral ground glass opacities which could be edema vs. Interstitial pneumonia with RLL  opacity which can represent atelectasis vs. Infiltrate. Large R pleural effusion, R. Breast mass suspicious for neoplasm.    Plan:  -Currently on 3L NC  - Continue heparin Drip  -Wean O2 as tolerated to maintain SpO2>95%  Anemia due to chronic kidney disease and iron deficiency  Home medication: Iron 325 mg po qAM  Hgb on admission, around baseline of 8.0 on previous encounters.  Patient hemoglobin am of 09/13 7.0 will repeat hgb @2pm    Plan:  -F/u repeat hemoglobin  -Restart Iron 325 mg when mentation improves and patient tolerates po intake  -f/u AM CBC  Sacral wound  Pressure ulcer of sacrum, stage 4, POA, in the setting of decreased mobility, as evidenced by positive bone probe and tunneling, being treated with wound care team consult, off-loading, cleansing and daily packing.    Plan  - Daily wound care  - Turn every 2 hours  - OOB into chair  - PT/OT  -discontinue abx  Pleural effusion, right  Identified on 9/12 CT PE:  Large R pleural effusion  S/p thoracentesis on 9/12 one liter of fluid drained  Blood LD on 9/12: 318  Total Protein on 9/12: 7.4    Plan:  -f/u pleural fluid cultures + cytology  Atrial fibrillation (HCC)  Patient present with atrial fibrillation and RVR,   Patient has history of atrial flutter, non compliant with medications since February  Echo showed left ventricular ejection fraction of 60%, diastolic flattening of the interventricular septum consistent with right ventricle volume overload.  RV function moderately dilated, systolic function moderately to severely reduced.  Moderate to severe regurgitation of tricuspid valve estimated RVSP 65 mmHg.  Biatrial enlargement.   Left atrial enlargement, may allude to chronic changes  Avoiding beta blockers cardizem in the setting of hypotension  Patient on levophed for pressor support  Started on digoxin on 09/13/2024 inotropic, chronotropic & dromotropic effects  HR improved on digoxin in 80s-90s    Plan   Continue digoxin  Check  level  Continue heparin gtt  Disposition: Critical care    ICU Core Measures     A: Assess, Prevent, and Manage Pain Has pain been assessed? Yes  Need for changes to pain regimen? No   B: Both SAT/SAT  N/A   C: Choice of Sedation RASS Goal: N/A patient not on sedation  Need for changes to sedation or analgesia regimen? NA   D: Delirium CAM-ICU: Negative   E: Early Mobility  Plan for early mobility? Yes   F: Family Engagement Plan for family engagement today? Yes       Antibiotic Review: Antibiotics to be discontinued today    Review of Invasive Devices:    Carbajal Plan: Continue for accurate I/O monitoring for 48 hours    Sharmin Plan: Keep arterial line for hemodynamic monitoring, frequent ABGs, and frequent labs    Prophylaxis:  VTE VTE covered by:  heparin (porcine), Intravenous, 11 Units/kg/hr at 09/14/24 0041  heparin (porcine), Intravenous, 2,400 Units at 09/14/24 0038  heparin (porcine), Intravenous       Stress Ulcer  not ordered         24 Hour Events   24hr events: Patient having intermittent confusion and worsening hypercapnia requiring bipap.  Patient unable to tolerate bipap and family gave permission for restraints to be placed to allow for treatment     Subjective   Review of Systems: Review of Systems   Unable to perform ROS: Mental status change       Objective                          Vitals I/O      Most Recent Min/Max in 24hrs   Temp (!) 97.3 °F (36.3 °C) Temp  Min: 93.7 °F (34.3 °C)  Max: 98.8 °F (37.1 °C)   Pulse (!) 113 Pulse  Min: 82  Max: 124   Resp (!) 40 Resp  Min: 20  Max: 55   BP 99/59 No data recorded   O2 Sat 99 % SpO2  Min: 90 %  Max: 100 %      Intake/Output Summary (Last 24 hours) at 9/14/2024 0728  Last data filed at 9/14/2024 0605  Gross per 24 hour   Intake 1038.77 ml   Output 4250 ml   Net -3211.23 ml       Diet Dysphagia/Modified Consistency; Dysphagia 1-Pureed; Thin Liquid    Invasive Monitoring           Physical Exam   Physical Exam  Skin:     General: Skin is warm and dry.    HENT:      Head: Normocephalic and atraumatic.   Neck:      Vascular: JVD present.   Cardiovascular:      Rate and Rhythm: Normal rate. Rhythm irregular.   Musculoskeletal:      Right lower leg: 3+ Edema present.      Left lower leg: 3+ Edema present.      Comments: Onchomycosis on toenails, and dry scaly skin   Abdominal: General: There is no distension.      Palpations: Abdomen is soft.      Tenderness: There is no abdominal tenderness.   Constitutional:       Appearance: She is ill-appearing.      Interventions: She is not sedated and not intubated.  Pulmonary:      Effort: She is not intubated.      Breath sounds: Rhonchi present.      Comments: On 2L NC  Neurological:      Mental Status: She is calm.      Motor: No motor deficit.      Comments:             Diagnostic Studies        Lab Results: I have reviewed the following results:      Medications:  Scheduled PRN   cefTRIAXone, 2,000 mg, Q24H  chlorhexidine, 15 mL, Q12H BRISSA  sodium hypochlorite, , Daily      acetaminophen, 975 mg, Q8H PRN  heparin (porcine), 2,400 Units, Q6H PRN  heparin (porcine), 4,800 Units, Q6H PRN       Continuous    heparin (porcine), 3-30 Units/kg/hr (Order-Specific), Last Rate: 11 Units/kg/hr (09/14/24 0041)  norepinephrine, 1-30 mcg/min, Last Rate: 5 mcg/min (09/14/24 0040)         Labs:   CBC    Recent Labs     09/12/24  1001 09/12/24  1609 09/13/24  1503 09/14/24  0000   WBC 15.18*   < > 13.10* 11.94*   HGB 8.0*   < > 7.1* 7.0*   HCT 30.5*   < > 27.7* 27.1*      < > 249 226   BANDSPCT 1  --  1  --     < > = values in this interval not displayed.     BMP    Recent Labs     09/13/24  0444 09/14/24  0000   SODIUM 143 146   K 4.1 4.1   * 106   CO2 28 34*   AGAP 6 6   BUN 37* 38*   CREATININE 1.34* 1.53*   CALCIUM 7.6* 8.0*       Coags    Recent Labs     09/12/24  1551 09/12/24  2251 09/13/24  1503 09/13/24  2326   INR 1.45*  --   --   --    PTT 36*   < > 119* 59*    < > = values in this interval not displayed.         Additional Electrolytes  Recent Labs     09/12/24  1001 09/12/24  1609 09/13/24  0444 09/14/24  0000   MG 2.2  --  1.9 1.9   PHOS 4.1  --   --   --    CAIONIZED  --  1.18  --   --           Blood Gas    Recent Labs     09/13/24  2321 09/14/24  0000   PHART 7.241* 7.275*   FZO1JTD 73.4* 70.8*   PO2ART 140.4* 132.6*   WDL0GIA 30.8* 32.1*   BEART 2.6 4.4   SOURCE Line, Arterial  --      Recent Labs     09/12/24  1201 09/12/24 2040 09/13/24 2321   PHVEN 7.265*  --   --    YIT1TWG 68.4*  --   --    PO2VEN 29.8*  --   --    NYY2JNU 30.4*  --   --    BEVEN 2.6  --   --    Z6VORWI 42.5*  --   --    SOURCE  --    < > Line, Arterial    < > = values in this interval not displayed.    LFTs  Recent Labs     09/14/24  0000   ALT 9   AST 16   ALKPHOS 192*   ALB 2.9*   TBILI 0.93       Infectious  Recent Labs     09/13/24  0444   PROCALCITONI 0.41*     Glucose  Recent Labs     09/12/24  1001 09/13/24  0444 09/14/24  0000   GLUC 121 86 70

## 2024-09-14 NOTE — NURSING NOTE
Administered Cardizem IV push as ordered in MAR. Patients heart rate quickly became bradycardic in the 30's. Called for assistance by charge nurse and MICHAEL Brown. Atropine given per Stephanie. Heart Rate improved after administration. Dr. Meg monahan.

## 2024-09-14 NOTE — RESPIRATORY THERAPY NOTE
09/14/24 1011   Respiratory Assessment   Resp Comments abg sent to lab per results changes made on bipap   Non-Invasive Information   O2 Interface Device Full face mask   Non-Invasive Ventilation Mode BiPAP   SpO2 99 %   Non-Invasive Settings   IPAP (cm) (S)  14 cm   EPAP (cm) 6 cm   Rate (Set) 12   FiO2 (%) 30   Pressure Support (cm H2O) 8   Non-Invasive Readings   Skin Intervention Skin intact   Total Rate 30   MV (Mech) 8.6   Peak Pressure (Obs) 19   Spontaneous Vt (mL) 323   Non-Invasive Alarms   Insp Pressure High (cm H20) 40   Insp Pressure Low (cm H20) 5   MV High (L/min) 20   MV Low (L/min) 4   Vt High (mL) 1000   Vt Low (mL) 100   High Resp Rate (BPM) 60 BPM   Apnea Interval (sec) 20

## 2024-09-15 NOTE — ASSESSMENT & PLAN NOTE
Patient has a history of atrial flutter  Previous home regimen: Metoprolol 25 mg qd, Diltiazem 180 mg qd, Eliquis   Patient non compliant since February  Will avoid cardizem and beta blocker     Plan:  See plan for atrial fibrillation

## 2024-09-15 NOTE — ASSESSMENT & PLAN NOTE
Lab Results   Component Value Date    EGFR 31 09/16/2024    EGFR 28 09/15/2024    EGFR 27 09/15/2024    CREATININE 1.43 (H) 09/16/2024    CREATININE 1.55 (H) 09/15/2024    CREATININE 1.58 (H) 09/15/2024     Baseline Cr 1-1.25    Plan:  -Avoid nephrotoxic medications  -Continue monitoring BMP

## 2024-09-15 NOTE — PLAN OF CARE
Problem: Prexisting or High Potential for Compromised Skin Integrity  Goal: Skin integrity is maintained or improved  Description: INTERVENTIONS:  - Identify patients at risk for skin breakdown  - Assess and monitor skin integrity  - Assess and monitor nutrition and hydration status  - Monitor labs   - Assess for incontinence   - Turn and reposition patient  - Assist with mobility/ambulation  - Relieve pressure over bony prominences  - Avoid friction and shearing  - Provide appropriate hygiene as needed including keeping skin clean and dry  - Evaluate need for skin moisturizer/barrier cream  - Collaborate with interdisciplinary team   - Patient/family teaching  - Consider wound care consult   Outcome: Progressing     Problem: Nutrition/Hydration-ADULT  Goal: Nutrient/Hydration intake appropriate for improving, restoring or maintaining nutritional needs  Description: Monitor and assess patient's nutrition/hydration status for malnutrition. Collaborate with interdisciplinary team and initiate plan and interventions as ordered.  Monitor patient's weight and dietary intake as ordered or per policy. Utilize nutrition screening tool and intervene as necessary. Determine patient's food preferences and provide high-protein, high-caloric foods as appropriate.     INTERVENTIONS:  - Monitor oral intake, urinary output, labs, and treatment plans  - Assess nutrition and hydration status and recommend course of action  - Evaluate amount of meals eaten  - Assist patient with eating if necessary   - Allow adequate time for meals  - Recommend/ encourage appropriate diets, oral nutritional supplements, and vitamin/mineral supplements  - Order, calculate, and assess calorie counts as needed  - Recommend, monitor, and adjust tube feedings and TPN/PPN based on assessed needs  - Assess need for intravenous fluids  - Provide specific nutrition/hydration education as appropriate  - Include patient/family/caregiver in decisions related to  nutrition  Outcome: Not Progressing     Problem: SAFETY,RESTRAINT: NV/NON-SELF DESTRUCTIVE BEHAVIOR  Goal: Remains free of harm/injury (restraint for non violent/non self-detsructive behavior)  Description: INTERVENTIONS:  - Instruct patient/family regarding restraint use   - Assess and monitor physiologic and psychological status   - Provide interventions and comfort measures to meet assessed patient needs   - Identify and implement measures to help patient regain control  - Assess readiness for release of restraint   Outcome: Progressing  Goal: Returns to optimal restraint-free functioning  Description: INTERVENTIONS:  - Assess the patient's behavior and symptoms that indicate continued need for restraint  - Identify and implement measures to help patient regain control  - Assess readiness for release of restraint   Outcome: Progressing

## 2024-09-15 NOTE — ASSESSMENT & PLAN NOTE
Likely contributed to by known pulmonary emboli, pleural effusion now s/p thora, heart failure exacerbation given patient's fluid overloaded status and  with wnl troponins.     CTA PE 9/12: bilateral segmental and subsegmental emboli with evidence of right heart strain, RV/LV ratio 1.2, bilateral ground glass opacities which could be edema vs. Interstitial pneumonia with RLL opacity which can represent atelectasis vs. Infiltrate. Large R pleural effusion, R. Breast mass suspicious for neoplasm.    Plan:  -Currently on 2L NC  - Continue heparin Drip  -Bipap as needed  -Check VBG if change in mental status  -Wean O2 as tolerated to maintain SpO2>95%

## 2024-09-15 NOTE — ASSESSMENT & PLAN NOTE
Patient complains of dyspnea and pleuritic which is new today and leg swelling for the past 3 days.  Patient with respiratory acidosis on VBG, fluid overloaded on examination.  CXR on admission showed moderate-large right sided pleural effusion.   Patient had bedside thoracentesis done.   CT PE 9/12: Bilateral segmental and subsegmental emboli with evidence of right heart strain, calculated ratio of right ventricular to left ventricular diameter (RV/LV ratio) is 1.2.   Suspicious r breast mass  Echo showed: diastolic flattening of the interventricular septum consistent with right ventricle volume overload. RV function moderately dilated, systolic function moderately to severely reduced. Moderate to severe regurgitation of tricuspid valve estimated RVSP 65 mmHg.     Plan:  -Continue Heparin VTE/PE infusion - transition to eliquis 10 mg bid x7 days followed by lifelong AC once able to tolerate oral medication  -Monitor INR  -Monitor CBC  -Patient currently on 2L NC

## 2024-09-15 NOTE — PROGRESS NOTES
Progress Note - Critical Care/ICU   Name: Aurora Anderson 89 y.o. female I MRN: [id number]  Unit/Bed#: ICU 13 I Date of Admission: 9/12/2024   Date of Service: 9/15/2024 I Hospital Day: 3       Assessment & Plan  Shock (HCC)  AEB hypotension and end organ dysfunction (HOSSEIN) despite fluids/albumin requiring pressor therapy  Leukocytosis and tachycardia present although likely reactive in setting of pulmonary embolism and atrial fibrillation  Shock likely obstructive vs cardiogenic  Echocardiogram showing normal left ventricular function although left atrium is severely dilated and moderate mitral regurgitation is present.  Loss of atrial kick from Afib/flutter thought at least in part to be contributing to shock state but pressor requirements remain unchanged despite rate control with digoxin.  Echo also shows new severe right ventricle dysfunction in the mid to apical free wall compared to echocardiogram in January of this year. Suspect this is in the setting of acute PE vs acutely/chronically worsening pulmonary artery hypertension vs CAD.  Component of CTEPH also a possibility as chronicity of symptoms unclear  Attempts to diurese patient to alleviate right heart strain and poor coaptation of tricupsid valve have not improved pressor requirements despite large urine output over last 24 hrs    Plan:  Will hold off on further diuresis today  Consider addition of PDE for tx of pulmonary hypertension, Patient unlikely to be a good surgical candidate for tx of CTEPH, will explore possibility of medical management with adempas   Consider CT head to evaluate for lesions in relation to breast mass that are at risk for hemorrhagic conversion.  Will talk to family again about possibility of tPA if CT is reassuring.  +/- cardiology/advanced heart failure consultation - patient may need cardiac catheterization   Continue pressor therapy with levo to with goal of maintaining systolic blood pressure > 90 mmHg  Continue digoxin,  check level today  Check lactic acid  Continue heparin gtt - switch to eliquis 10 mg bid x7 followed by lifelong anticoagulation once able to tolerate oral medication consistently  All planning is in the background of ongoing goals of care discussions with patient and family.  Prognosis is poor given advanced age and severity of right heart dysfunction.  She is likely appropriate for hospice and we will consider involving their team as well.        Acute pulmonary embolism with acute cor pulmonale (HCC)  Patient complains of dyspnea and pleuritic which is new today and leg swelling for the past 3 days.  Patient with respiratory acidosis on VBG, fluid overloaded on examination.  CXR on admission showed moderate-large right sided pleural effusion.   Patient had bedside thoracentesis done.   CT PE 9/12: Bilateral segmental and subsegmental emboli with evidence of right heart strain, calculated ratio of right ventricular to left ventricular diameter (RV/LV ratio) is 1.2.   Suspicious r breast mass  Echo showed: diastolic flattening of the interventricular septum consistent with right ventricle volume overload. RV function moderately dilated, systolic function moderately to severely reduced. Moderate to severe regurgitation of tricuspid valve estimated RVSP 65 mmHg.     Plan:  -Continue Heparin VTE/PE infusion - transition to eliquis 10 mg bid x7 days followed by lifelong AC once able to tolerate oral medication  -Monitor INR  -Monitor CBC  -Patient currently on 3L NC  Chronic heart failure with preserved ejection fraction (HCC)  Wt Readings from Last 3 Encounters:   09/15/24 60.8 kg (134 lb 0.6 oz)   04/29/24 62.7 kg (138 lb 3.2 oz)   02/21/24 58.1 kg (128 lb)     Home regimen: Lasix 20 mg every other day    Patient fluid overloaded on examination on 9/12.  CTA PE 9/12: Bilateral ground glass opacities which could be edema vs. Interstitial pneumonia   Administered 40 mg Lasix IV in the ED  Echo showed left ventricular  ejection fraction of 60%, diastolic flattening of the interventricular septum consistent with right ventricle volume overload.  RV function moderately dilated, systolic function moderately to severely reduced.  Moderate to severe regurgitation of tricuspid valve estimated RVSP 65 mmHg.  Biatrial enlargement.  No surgical intervention given her age   Patient appears volume overload on physical exam, Also elevated RVSP on echo, will diurese with lasix to reduce right heart strain    Plan:  Will give 40 mg lasix   Monitor urine output  Daily weights  Monitor I/Os    Atypical atrial flutter (HCC)  Patient has a history of atrial flutter  Previous home regimen: Metoprolol 25 mg qd, Diltiazem 180 mg qd, Eliquis   Patient non compliant since February  Will avoid cardizem and beta blocker given patient currently hypotensive  Patient currently in atrial fibrillation    Plan:  See plan for atrial fibrillation  HOSSEIN (acute kidney injury) ([facility])  Lab Results   Component Value Date    EGFR 27 09/15/2024    EGFR 28 09/14/2024    EGFR 33 09/13/2024    CREATININE 1.58 (H) 09/15/2024    CREATININE 1.53 (H) 09/14/2024    CREATININE 1.34 (H) 09/13/2024     Baseline Cr 1-1.25    Plan:  -Avoid nephrotoxic medications  -Continue monitoring BMP  Respiratory acidosis  9/12 VBG: pH 7.265, pCO2 68.4, pO2 29.8, HCO3 30.4  Likely secondary to hypoventilation secondary to large R. Pleural effusion identified on CT PE 9/12  S/p Thoracentesis on 9/12  On 3L NC    Plan:  -BiPAP prn  Check abg    Acute on chronic respiratory failure with hypoxia and hypercapnia ([facility])  Likely contributed to by known pulmonary emboli, pleural effusion now s/p thora, heart failure exacerbation given patient's fluid overloaded status and  with wnl troponins.     CTA PE 9/12: bilateral segmental and subsegmental emboli with evidence of right heart strain, RV/LV ratio 1.2, bilateral ground glass opacities which could be edema vs. Interstitial pneumonia with RLL  opacity which can represent atelectasis vs. Infiltrate. Large R pleural effusion, R. Breast mass suspicious for neoplasm.    Plan:  -Currently on 3L NC  - Continue heparin Drip  -Wean O2 as tolerated to maintain SpO2>95%  Anemia due to chronic kidney disease and iron deficiency  Home medication: Iron 325 mg po qAM  Hgb on admission, around baseline of 8.0 on previous encounters.  Patient hemoglobin am of 09/13 7.0 will repeat hgb @2pm    Plan:  -F/u repeat hemoglobin  -Restart Iron 325 mg when mentation improves and patient tolerates po intake  -f/u AM CBC  Sacral wound  Pressure ulcer of sacrum, stage 4, POA, in the setting of decreased mobility, as evidenced by positive bone probe and tunneling, being treated with wound care team consult, off-loading, cleansing and daily packing.    Plan  - Daily wound care  - Turn every 2 hours  - OOB into chair  - PT/OT  -discontinue abx  Pleural effusion, right  Identified on 9/12 CT PE:  Large R pleural effusion  S/p thoracentesis on 9/12 one liter of fluid drained  Blood LD on 9/12: 318  Total Protein on 9/12: 7.4    Plan:  -f/u pleural fluid cultures + cytology  Atrial fibrillation (HCC)  Patient present with atrial fibrillation and RVR,   Patient has history of atrial flutter, non compliant with medications since February  Echo showed left ventricular ejection fraction of 60%, diastolic flattening of the interventricular septum consistent with right ventricle volume overload.  RV function moderately dilated, systolic function moderately to severely reduced.  Moderate to severe regurgitation of tricuspid valve estimated RVSP 65 mmHg.  Biatrial enlargement.   Left atrial enlargement, may allude to chronic changes  Avoiding beta blockers cardizem in the setting of hypotension  Patient on levophed for pressor support  Started on digoxin on 09/13/2024 inotropic, chronotropic & dromotropic effects  HR improved on digoxin in 80s-90s    Plan   Continue digoxin  Check  level  Continue heparin gtt  Anemia  Patient has a history of anemia  Last iron panel showed low iron, low iron sat, low ferritin, normal TIBC  Likely iron deficiency anemia;  Patient non compliant with meds  Hgb 6.7 on 09/15/24    Plan  Will give 1 unit pack RBC's today  Will restart ferrous sulfate once patient able to tolerate PO  Monitor daily CBC  Transfuse for Hgb <7  Disposition: Critical care    ICU Core Measures     A: Assess, Prevent, and Manage Pain Has pain been assessed? Yes  Need for changes to pain regimen? No   B: Both SAT/SAT  N/A   C: Choice of Sedation RASS Goal: 0 Alert and Calm  Need for changes to sedation or analgesia regimen? No   D: Delirium CAM-ICU: Positive   E: Early Mobility  Plan for early mobility? No   F: Family Engagement Plan for family engagement today? Yes       Review of Invasive Devices:    Solomon Plan: Continue for accurate I/O monitoring for 48 hours    Sharmin Plan: Keep arterial line for hemodynamic monitoring    Prophylaxis:  VTE VTE covered by:  heparin (porcine), Intravenous, 13 Units/kg/hr at 09/15/24 0815  heparin (porcine), Intravenous, 2,400 Units at 09/14/24 2012  heparin (porcine), Intravenous       Stress Ulcer  not ordered         24 Hour Events   24hr events: Patient had episode of bradycardia yesterday on cardizem, received atropine with improvement. Patient agitated overnight, slept for a few hours, but awake most of the night on bipap. Hypoglycemia this morning of 62.  Subjective   Patient Aox2, denies any active chest pain, abdominal pain.  Review of Systems: See HPI for Review of Systems    Objective                          Vitals I/O      Most Recent Min/Max in 24hrs   Temp 98.4 °F (36.9 °C) Temp  Min: 98.1 °F (36.7 °C)  Max: 99.1 °F (37.3 °C)   Pulse 98 Pulse  Min: 85  Max: 118   Resp (!) 33 Resp  Min: 20  Max: 47   BP (!) 95/42 BP  Min: 95/42  Max: 120/53   O2 Sat 93 % SpO2  Min: 87 %  Max: 100 %      Intake/Output Summary (Last 24 hours) at 9/15/2024  1353  Last data filed at 9/15/2024 1054  Gross per 24 hour   Intake 1239.97 ml   Output 4475 ml   Net -3235.03 ml       Diet Dysphagia/Modified Consistency; Dysphagia 1-Pureed; Thin Liquid    Invasive Monitoring   Arterial Line  Sharmin /61  Arterial Line BP  Min: 92/43  Max: 126/55   MAP 79 mmHg  Arterial Line MAP (mmHg)  Min: 58 mmHg  Max: 82 mmHg           Physical Exam   Physical Exam  Skin:     General: Skin is warm and dry.   HENT:      Head: Normocephalic and atraumatic.   Neck:      Vascular: JVD present.   Cardiovascular:      Rate and Rhythm: Normal rate. Rhythm irregular.   Musculoskeletal:      Right lower le+ Edema present.      Left lower le+ Edema present.      Comments: Blue elevated hematoma on right midshin, with tenderness and erythema surrounding.  Onchomycosis on toenails, and dry scaly skin   Abdominal: General: There is no distension.      Palpations: Abdomen is soft.      Tenderness: There is no abdominal tenderness.   Constitutional:       Appearance: She is ill-appearing.      Interventions: She is not sedated and not intubated.  Pulmonary:      Effort: She is not intubated.      Breath sounds: Rhonchi present.      Comments: On 2L NC  Neurological:      Mental Status: She is calm.      Motor: No motor deficit.      Comments:             Diagnostic Studies        Lab Results: I have reviewed the following results:      Medications:  Scheduled PRN   artificial tear, , HS  chlorhexidine, 15 mL, Q12H BRISSA  senna, 1 tablet, HS  sodium hypochlorite, , Daily      acetaminophen, 975 mg, Q8H PRN  heparin (porcine), 2,400 Units, Q6H PRN  heparin (porcine), 4,800 Units, Q6H PRN       Continuous    heparin (porcine), 3-30 Units/kg/hr (Order-Specific), Last Rate: 13 Units/kg/hr (09/15/24 0815)  norepinephrine, 1-30 mcg/min, Last Rate: 2 mcg/min (09/15/24 1053)         Labs:   CBC    Recent Labs     24  1503 24  0000 09/15/24  0438 09/15/24  0517   WBC 13.10* 11.94* 11.24*  --     HGB 7.1* 7.0* 6.7* 6.7*   HCT 27.7* 27.1* 25.3*  --     226 180  --    BANDSPCT 1  --   --   --      BMP    Recent Labs     09/14/24  0000 09/15/24  0438   SODIUM 146 151*   K 4.1 3.4*    106   CO2 34* 38*   AGAP 6 7   BUN 38* 38*   CREATININE 1.53* 1.58*   CALCIUM 8.0* 8.1*       Coags    Recent Labs     09/15/24  0203 09/15/24  0808   PTT 88* 84*        Additional Electrolytes  Recent Labs     09/14/24  0000 09/15/24  0438   MG 1.9 1.9          Blood Gas    Recent Labs     09/15/24  0437   PHART 7.399   LWX3OFY 61.6*   PO2ART 119.3   ORO3EJX 37.2*   BEART 11.1   SOURCE Line, Arterial     Recent Labs     09/15/24  0437   SOURCE Line, Arterial    LFTs  Recent Labs     09/14/24  0000   ALT 9   AST 16   ALKPHOS 192*   ALB 2.9*   TBILI 0.93       Infectious  No recent results  Glucose  Recent Labs     09/14/24  0000 09/15/24  0438   GLUC 70 67

## 2024-09-15 NOTE — ASSESSMENT & PLAN NOTE
Wt Readings from Last 3 Encounters:   09/15/24 60.8 kg (134 lb 0.6 oz)   04/29/24 62.7 kg (138 lb 3.2 oz)   02/21/24 58.1 kg (128 lb)     Home regimen: Lasix 20 mg every other day    Patient fluid overloaded on examination on 9/12.  CTA PE 9/12: Bilateral ground glass opacities which could be edema vs. Interstitial pneumonia   Administered 40 mg Lasix IV in the ED  Echo showed left ventricular ejection fraction of 60%, diastolic flattening of the interventricular septum consistent with right ventricle volume overload.  RV function moderately dilated, systolic function moderately to severely reduced.  Moderate to severe regurgitation of tricuspid valve estimated RVSP 65 mmHg.  Biatrial enlargement.  No surgical intervention given her age   Patient appears volume overload on physical exam, Also elevated RVSP on echo, will diurese with lasix to reduce right heart strain    Plan:  Holding lasix  Monitor urine output  Daily weights  Monitor I/Os

## 2024-09-15 NOTE — ASSESSMENT & PLAN NOTE
Home medication: Iron 325 mg po qAM  Hgb on admission, around baseline of 8.0 on previous encounters.  Patient hemoglobin am of 09/13 7.0 will repeat hgb @2pm    Plan:  -Will give 1 unit PRBC's  -Restart Iron 325 mg when mentation improves and patient tolerates po intake  -f/u AM CBC

## 2024-09-15 NOTE — ASSESSMENT & PLAN NOTE
Patient has a history of anemia  Last iron panel showed low iron, low iron sat, low ferritin, normal TIBC  Likely iron deficiency anemia;  Patient non compliant with meds  Hgb 6.7 on 09/15/24    Plan  Will give 1 unit pack RBC's today  Will restart ferrous sulfate once patient able to tolerate PO  Monitor daily CBC  Transfuse for Hgb <7

## 2024-09-15 NOTE — ASSESSMENT & PLAN NOTE
AEB hypotension and end organ dysfunction (HOSSEIN) despite fluids/albumin requiring pressor therapy  Leukocytosis and tachycardia present although likely reactive in setting of pulmonary embolism and atrial fibrillation  Shock likely obstructive vs cardiogenic  Echocardiogram showing normal left ventricular function although left atrium is severely dilated and moderate mitral regurgitation is present.  Loss of atrial kick from Afib/flutter thought at least in part to be contributing to shock state but pressor requirements remain unchanged despite rate control with digoxin.  Echo also shows new severe right ventricle dysfunction in the mid to apical free wall compared to echocardiogram in January of this year. Suspect this is in the setting of acute PE vs acutely/chronically worsening pulmonary artery hypertension vs CAD.  Component of CTEPH also a possibility as chronicity of symptoms unclear  Attempts to diurese patient to alleviate right heart strain and poor coaptation of tricupsid valve have not improved pressor requirements despite large urine output over last 24 hrs  Consider addition of PDE for tx of pulmonary hypertension, Patient unlikely to be a good surgical candidate for tx of CTEPH, will explore possibility of medical management with adempas   +/- cardiology/advanced heart failure consultation - patient may need cardiac catheterization       Plan:  Will hold off on further diuresis today  Continue pressor therapy with levo to with goal of maintaining systolic blood pressure > 90 mmHg. Patient coming down on pressor requirements  Continue heparin gtt - switch to eliquis 10 mg bid x7 followed by lifelong anticoagulation once able to tolerate oral medication consistently     Declines

## 2024-09-15 NOTE — ASSESSMENT & PLAN NOTE
Wt Readings from Last 3 Encounters:   09/15/24 60.8 kg (134 lb 0.6 oz)   04/29/24 62.7 kg (138 lb 3.2 oz)   02/21/24 58.1 kg (128 lb)     Home regimen: Lasix 20 mg every other day    Patient fluid overloaded on examination on 9/12.  CTA PE 9/12: Bilateral ground glass opacities which could be edema vs. Interstitial pneumonia   Administered 40 mg Lasix IV in the ED  Echo showed left ventricular ejection fraction of 60%, diastolic flattening of the interventricular septum consistent with right ventricle volume overload.  RV function moderately dilated, systolic function moderately to severely reduced.  Moderate to severe regurgitation of tricuspid valve estimated RVSP 65 mmHg.  Biatrial enlargement.  No surgical intervention given her age   Patient appears volume overload on physical exam, Also elevated RVSP on echo, will diurese with lasix to reduce right heart strain    Plan:  Will give 40 mg lasix   Monitor urine output  Daily weights  Monitor I/Os

## 2024-09-15 NOTE — ASSESSMENT & PLAN NOTE
Lab Results   Component Value Date    EGFR 27 09/15/2024    EGFR 28 09/14/2024    EGFR 33 09/13/2024    CREATININE 1.58 (H) 09/15/2024    CREATININE 1.53 (H) 09/14/2024    CREATININE 1.34 (H) 09/13/2024     Baseline Cr 1-1.25    Plan:  -Avoid nephrotoxic medications  -Continue monitoring BMP

## 2024-09-16 PROBLEM — Z22.322 MRSA (METHICILLIN RESISTANT STAPH AUREUS) CULTURE POSITIVE: Status: ACTIVE | Noted: 2024-01-01

## 2024-09-16 NOTE — PROGRESS NOTES
Progress Note - Critical Care/ICU   Name: Aurora Anderson 89 y.o. female I MRN: [id number]  Unit/Bed#: ICU 13 I Date of Admission: 9/12/2024   Date of Service: 9/16/2024 I Hospital Day: 4      Assessment & Plan  Shock (HCC)  AEB hypotension and end organ dysfunction (HOSSEIN) despite fluids/albumin requiring pressor therapy  Leukocytosis and tachycardia present although likely reactive in setting of pulmonary embolism and atrial fibrillation  Shock likely obstructive vs cardiogenic  Echocardiogram showing normal left ventricular function although left atrium is severely dilated and moderate mitral regurgitation is present.  Loss of atrial kick from Afib/flutter thought at least in part to be contributing to shock state but pressor requirements remain unchanged despite rate control with digoxin.  Echo also shows new severe right ventricle dysfunction in the mid to apical free wall compared to echocardiogram in January of this year. Suspect this is in the setting of acute PE vs acutely/chronically worsening pulmonary artery hypertension vs CAD.  Component of CTEPH also a possibility as chronicity of symptoms unclear  Attempts to diurese patient to alleviate right heart strain and poor coaptation of tricupsid valve have not improved pressor requirements despite large urine output over last 24 hrs  Consider addition of PDE for tx of pulmonary hypertension, Patient unlikely to be a good surgical candidate for tx of CTEPH, will explore possibility of medical management with adempas   +/- cardiology/advanced heart failure consultation - patient may need cardiac catheterization       Plan:  Will hold off on further diuresis today  Continue pressor therapy with levo to with goal of maintaining systolic blood pressure > 90 mmHg. Patient coming down on pressor requirements  Continue heparin gtt - switch to eliquis 10 mg bid x7 followed by lifelong anticoagulation once able to tolerate oral medication consistently    Acute pulmonary  embolism with acute cor pulmonale (HCC)  Patient complains of dyspnea and pleuritic which is new today and leg swelling for the past 3 days.  Patient with respiratory acidosis on VBG, fluid overloaded on examination.  CXR on admission showed moderate-large right sided pleural effusion.   Patient had bedside thoracentesis done.   CT PE 9/12: Bilateral segmental and subsegmental emboli with evidence of right heart strain, calculated ratio of right ventricular to left ventricular diameter (RV/LV ratio) is 1.2.   Suspicious r breast mass  Echo showed: diastolic flattening of the interventricular septum consistent with right ventricle volume overload. RV function moderately dilated, systolic function moderately to severely reduced. Moderate to severe regurgitation of tricuspid valve estimated RVSP 65 mmHg.     Plan:  -Continue Heparin VTE/PE infusion - transition to eliquis 10 mg bid x7 days followed by lifelong AC once able to tolerate oral medication  -Monitor INR  -Monitor CBC  -Patient currently on 2L NC  Chronic heart failure with preserved ejection fraction (HCC)  Wt Readings from Last 3 Encounters:   09/15/24 60.8 kg (134 lb 0.6 oz)   04/29/24 62.7 kg (138 lb 3.2 oz)   02/21/24 58.1 kg (128 lb)     Home regimen: Lasix 20 mg every other day    Patient fluid overloaded on examination on 9/12.  CTA PE 9/12: Bilateral ground glass opacities which could be edema vs. Interstitial pneumonia   Administered 40 mg Lasix IV in the ED  Echo showed left ventricular ejection fraction of 60%, diastolic flattening of the interventricular septum consistent with right ventricle volume overload.  RV function moderately dilated, systolic function moderately to severely reduced.  Moderate to severe regurgitation of tricuspid valve estimated RVSP 65 mmHg.  Biatrial enlargement.  No surgical intervention given her age   Patient appears volume overload on physical exam, Also elevated RVSP on echo, will diurese with lasix to reduce right  heart strain    Plan:  Holding lasix  Monitor urine output  Daily weights  Monitor I/Os    Atypical atrial flutter (HCC)  Patient has a history of atrial flutter  Previous home regimen: Metoprolol 25 mg qd, Diltiazem 180 mg qd, Eliquis   Patient non compliant since February  Will avoid cardizem and beta blocker     Plan:  See plan for atrial fibrillation  HOSSEIN (acute kidney injury) ([facility])  Lab Results   Component Value Date    EGFR 31 09/16/2024    EGFR 28 09/15/2024    EGFR 27 09/15/2024    CREATININE 1.43 (H) 09/16/2024    CREATININE 1.55 (H) 09/15/2024    CREATININE 1.58 (H) 09/15/2024     Baseline Cr 1-1.25    Plan:  -Avoid nephrotoxic medications  -Continue monitoring BMP  Respiratory acidosis  9/12 VBG: pH 7.265, pCO2 68.4, pO2 29.8, HCO3 30.4  Likely secondary to hypoventilation secondary to large R. Pleural effusion identified on CT PE 9/12  S/p Thoracentesis on 9/12  On 3L NC    Plan:  -BiPAP prn  Check abg    Acute on chronic respiratory failure with hypoxia and hypercapnia ([facility])  Likely contributed to by known pulmonary emboli, pleural effusion now s/p thora, heart failure exacerbation given patient's fluid overloaded status and  with wnl troponins.     CTA PE 9/12: bilateral segmental and subsegmental emboli with evidence of right heart strain, RV/LV ratio 1.2, bilateral ground glass opacities which could be edema vs. Interstitial pneumonia with RLL opacity which can represent atelectasis vs. Infiltrate. Large R pleural effusion, R. Breast mass suspicious for neoplasm.    Plan:  -Currently on 2L NC  - Continue heparin Drip  -Bipap as needed  -Check VBG if change in mental status  -Wean O2 as tolerated to maintain SpO2>95%  Anemia due to chronic kidney disease and iron deficiency  Home medication: Iron 325 mg po qAM  Hgb on admission, around baseline of 8.0 on previous encounters.  Patient hemoglobin am of 09/13 7.0 will repeat hgb @2pm    Plan:  -Will give 1 unit PRBC's  -Restart Iron 325 mg when  mentation improves and patient tolerates po intake  -f/u AM CBC  Sacral wound  Pressure ulcer of sacrum, stage 4, POA, in the setting of decreased mobility, as evidenced by positive bone probe and tunneling, being treated with wound care team consult, off-loading, cleansing and daily packing.    Plan  - Daily wound care  - Turn every 2 hours  - OOB into chair  - PT/OT  -discontinue abx  Pleural effusion, right  Identified on 9/12 CT PE:  Large R pleural effusion  S/p thoracentesis on 9/12 one liter of fluid drained  Blood LD on 9/12: 318  Total Protein on 9/12: 7.4    Plan:  -f/u pleural fluid cultures + cytology  Atrial fibrillation (HCC)  Patient present with atrial fibrillation and RVR,   Patient has history of atrial flutter, non compliant with medications since February  Echo showed left ventricular ejection fraction of 60%, diastolic flattening of the interventricular septum consistent with right ventricle volume overload.  RV function moderately dilated, systolic function moderately to severely reduced.  Moderate to severe regurgitation of tricuspid valve estimated RVSP 65 mmHg.  Biatrial enlargement.   Left atrial enlargement, may allude to chronic changes  Avoiding beta blockers cardizem in the setting of hypotension  Patient on levophed for pressor support  Started on digoxin on 09/13/2024 inotropic, chronotropic & dromotropic effects  HR improved on digoxin in 80s-90s    Plan   Continue digoxin  Check level  Continue heparin gtt  Anemia  Patient has a history of anemia  Last iron panel showed low iron, low iron sat, low ferritin, normal TIBC  Likely iron deficiency anemia;  Patient non compliant with meds  Hgb 6.7 on 09/15/24    Plan  Will give 1 unit pack RBC's today  Will restart ferrous sulfate once patient able to tolerate PO  Monitor daily CBC  Transfuse for Hgb <7  Disposition: Critical care    ICU Core Measures     A: Assess, Prevent, and Manage Pain Has pain been assessed? Yes  Need for  changes to pain regimen? No   B: Both SAT/SAT  N/A   C: Choice of Sedation RASS Goal: 0 Alert and Calm  Need for changes to sedation or analgesia regimen? No   D: Delirium CAM-ICU: Negative   E: Early Mobility  Plan for early mobility? Yes   F: Family Engagement Plan for family engagement today? Yes       Antibiotic Review: Patient on appropriate coverage based on culture data.     Review of Invasive Devices:    [place] Plan: Continue for accurate I/O monitoring for 48 hours    [place] Plan: Discontinue arterial line    Prophylaxis:  VTE VTE covered by:  heparin (porcine), Intravenous, 13 Units/kg/hr at 09/15/24 1901  heparin (porcine), Intravenous, 2,400 Units at 24  heparin (porcine), Intravenous       Stress Ulcer  not ordered         24 Hour Events   24hr events: Patient taken off of levophed yesterday, with improved mentation, urine cultures positive for MSSA started on cefazolin. Patient on 2L NC overnight. Today urine cultures positive for 60,000 CFU's of MRSA.    Subjective   Review of Systems: See HPI for Review of Systems    Objective                          Vitals I/O      Most Recent Min/Max in 24hrs   Temp (!) 97.3 °F (36.3 °C) Temp  Min: 97.3 °F (36.3 °C)  Max: 99.1 °F (37.3 °C)   Pulse 90 Pulse  Min: 90  Max: 110   Resp (!) 24 Resp  Min: 20  Max: 45   BP 95/54 BP  Min: 90/55  Max: 131/67   O2 Sat 99 % SpO2  Min: 62 %  Max: 100 %      Intake/Output Summary (Last 24 hours) at 2024 0848  Last data filed at 2024 0800  Gross per 24 hour   Intake 2359.33 ml   Output 2680 ml   Net -320.67 ml       Diet Dysphagia/Modified Consistency; Dysphagia 1-Pureed; Thin Liquid    Invasive Monitoring           Physical Exam   Physical Exam  Skin:     General: Skin is warm and dry.   HENT:      Head: Normocephalic and atraumatic.   Cardiovascular:      Rate and Rhythm: Normal rate and regular rhythm.   Musculoskeletal:      Right lower le+ Edema present.      Left lower le+ Edema present.       Comments: Blue elevated hematoma on right midshin, with tenderness and erythema surrounding.  Onchomycosis on toenails, and dry scaly skin   Abdominal: General: There is no distension.      Palpations: Abdomen is soft.      Tenderness: There is no abdominal tenderness.   Constitutional:       General: She is not in acute distress.     Appearance: She is not ill-appearing.   Pulmonary:      Breath sounds: Rales (left lung base) present. No wheezing or rhonchi.      Comments: On 2L NC  Neurological:      Mental Status: She is alert and oriented to person, place and time. She is calm.      Motor: No motor deficit.      Comments:             Diagnostic Studies        Lab Results: I have reviewed the following results:      Medications:  Scheduled PRN   artificial tear, , HS  cefazolin, 1,000 mg, Q12H  chlorhexidine, 15 mL, Q12H BRISSA  senna, 1 tablet, HS  sodium hypochlorite, , Daily      acetaminophen, 975 mg, Q8H PRN  heparin (porcine), 2,400 Units, Q6H PRN  heparin (porcine), 4,800 Units, Q6H PRN       Continuous    heparin (porcine), 3-30 Units/kg/hr (Order-Specific), Last Rate: 13 Units/kg/hr (09/15/24 1901)         Labs:   CBC    Recent Labs     09/15/24  0438 09/15/24  0517 09/16/24  0405   WBC 11.24*  --  10.14   HGB 6.7* 6.7* 7.9*   HCT 25.3*  --  29.8*     --  140*     BMP    Recent Labs     09/15/24  1456 09/16/24  0405   SODIUM 150* 150*   K 3.7 4.0    105   CO2 39* 43*   AGAP 6 2*   BUN 36* 32*   CREATININE 1.55* 1.43*   CALCIUM 7.9* 8.1*       Coags    Recent Labs     09/15/24  0808 09/16/24  0400   PTT 84* 71*        Additional Electrolytes  Recent Labs     09/15/24  0438 09/16/24  0405   MG 1.9 1.9          Blood Gas    Recent Labs     09/15/24  2025 09/16/24  0405   PHART 7.376 7.407   HZS0UBW 73.6* 66.9*   PO2ART 76.9 73.9*   FTV5KOX 42.2* 41.2*   BEART 14.8 14.5   SOURCE Line, Arterial  --      Recent Labs     09/15/24  2025   SOURCE Line, Arterial    LFTs  No recent results     Infectious  No recent results  Glucose  Recent Labs     09/15/24  0438 09/15/24  1456 09/16/24  0405   GLUC 67 136 125

## 2024-09-16 NOTE — CONSULTS
Consultation - Infectious Disease   Aurora Anderson 89 y.o. female MRN: [id number]  Unit/Bed#: ICU 13 Encounter: [id number]      IMPRESSION & RECOMMENDATIONS:   Impression/Recommendations:  This is a 89 y.o. female, with multiple medical problems outlined below, admitted on 9/12 with bilateral PE, pulmonary edema and possible pneumonia, with acute hypoxic respiratory failure.  Patient was clinically improved with anticoagulation, diuresis and antibiotic.  Urine culture now has growth of MRSA.    1.  Possible pneumonia.  CXR and chest CT findings were equivocal for pneumonia and her elevated procalcitonin may be all secondary to HOSSEIN, given response to ceftriaxone, will have her complete treatment course for pneumonia.  Change antibiotic back to IV ceftriaxone.  Monitor temperature/WBC.  Monitor respiratory status.  Treat x 7-day antibiotic course.    2.  MRSA bacteriuria.  This is most likely bladder colonization.  Although Staph aureus is not a common urinary bladder colonizer, it can be seen in elderly patient with urinary retention..  Patient's admission blood cultures were negative and she really did not have evidence of sepsis clinically, which would be expected to be present with Staph aureus bacteremia.  No antibiotic needed for this indication.    3.  Sacral wound which is probed to bone.  Finding of probing to bone suggest underlying osteomyelitis.  Patient is status post local I&D, without evidence of cellulitis.  Given significant generalized debility and bedbound state, likely very high risk for extensive I&D and flap closure, prolonged antibiotic course would be futile.  Best course of action would be to continue local wound care.  Routine wound culture is most likely ulcer/wound colonization.  No antibiotic needed for this indication.  Continue local wound care.    4.  Bilateral PE.  Patient is currently on anticoagulation.  Anticoagulation per primary service.    5.  Pulmonary edema with large right  pleural effusion.  Patient is status post thoracentesis with pleural fluid finding consistent with transudate.  She is improved with diuresis.  Diuresis per primary service.    6.  Acute hypoxic respiratory failure, likely multifactorial, secondary to PE, pulmonary edema and possibly pneumonia.  Patient is clinically improved.  O2 support is decreased.  O2 support and weaning per primary service.    7.  Hypotension, most likely secondary to PE.  Patient had been on pressor but now off it.  Monitor hemodynamics.    8.  HOSSEIN, superimposed on CKD.  Creatinine is improved.  Ceftriaxone does not need dose adjustment.  Monitor creatinine.    9.  PCN allergy.  Patient is tolerating cephalosporin well.  Monitor for cross allergic reaction.    Outpatient records reviewed in detail.  Discussed with patient in detail regarding the above plan.  Discussed with Dr. Cross from critical care medicine service regarding antibiotic change above.  He is in agreement.    Thank you for this consultation.  We will follow along with you.    HISTORY OF PRESENT ILLNESS:  Reason for Consult: MRSA bacteriuria.    HPI: Aurora Anderson is a 89 y.o. female, with multiple medical problems including HFpEF, CKD and atrial flutter, presented to ER on 9/12 with acute dyspnea.  On presentation, patient had acute hypoxic respiratory failure and hypotension.  CXR showed a large right-sided pleural effusion.  Patient had thoracentesis done with fluid appearing transudative.  Chest CTA showed multiple bilateral pulmonary emboli with right heart strain.  Patient also had leukocytosis and procalcitonin was also elevated.  Patient was admitted to ICU.  She was started on anticoagulation.  She was also started on ceftriaxone for presumptive pneumonia.  Patient improved, with decreased O2 support and getting off pressors..  WBC decreased.  Yesterday, urine culture had growth of Staph aureus.  Antibiotic was changed to cefazolin.  Today, Staph aureus isolate was  identified as MRSA.  For these reasons, we are asked to evaluate the patient    At present, patient states he feels better.  Dyspnea is improved.  Cough is also improved, mostly nonproductive.  No chest pain.  No abdominal or flank pain.  Patient has Carbajal catheter in place.  She denies any urinary symptoms prior to admission.  She has no fever or chills.    REVIEW OF SYSTEMS:  A complete system-based review was done.  Except for what is noted in HPI above, ROS of systems is otherwise negative.    PAST MEDICAL HISTORY:  Past Medical History:   Diagnosis Date    Acute kidney injury (HCC)     Acute on chronic respiratory failure (HCC)     HOSSEIN (acute kidney injury) (HCC) 2024    Arthritis     Atrial flutter (HCC)     Rapid heart rate      Past Surgical History:   Procedure Laterality Date    CATARACT EXTRACTION      EGD AND COLONOSCOPY N/A 2017    Procedure: EGD AND COLONOSCOPY;  Surgeon: Rdaha Salas MD;  Location: [facility];  Service: Gastroenterology    HIP FRACTURE SURGERY       Problem list reviewed.    FAMILY HISTORY:  Non-contributory    SOCIAL HISTORY:  Social History     Substance and Sexual Activity   Alcohol Use No     Social History     Substance and Sexual Activity   Drug Use No     Social History     Tobacco Use   Smoking Status Never   Smokeless Tobacco Never   Tobacco Comments    former smoker, per [facility];  started smoking at 17 yo, stopped at 31 yo       ALLERGIES:  Allergies   Allergen Reactions    Penicillins Rash       MEDICATIONS:  All current active medications have been reviewed.  Patient is currently on IV cefazolin.    PHYSICAL EXAM:  Vitals:  Temp:  [97.2 °F (36.2 °C)-99.1 °F (37.3 °C)] 97.2 °F (36.2 °C)  HR:  [] 86  Resp:  [20-45] 29  BP: ()/(53-67) 91/53  SpO2:  [62 %-100 %] 97 %  Temp (24hrs), Av.4 °F (36.9 °C), Min:97.2 °F (36.2 °C), Max:99.1 °F (37.3 °C)  Current: Temperature: (!) 97.2 °F (36.2 °C)     Physical Exam:  General:  Well-nourished,  well-developed, in no acute distress. Awake, alert and mildly confused but able to answer questions regarding her symptoms appropriately.  Eyes:  Conjunctive clear with no hemorrhages or effusions  Oropharynx:  No ulcers, no lesions, pharynx benign, no tonsillitis  Neck:  Supple, no lymphadenopathy, no mass, nontender  Lungs:  Expansion symmetric, no rales, no wheezing, no accessory muscle use  Cardiac:  Regular rate and rhythm, normal S1, normal S2, no murmurs  Abdomen:  Soft, nondistended, non-tender, no HSM  Extremities: Mild leg edema, no erythema, nontender. No draining ulcers  Skin:  No rashes, no ulcers  Neurological:  Moves all four extremities spontaneously, sensation grossly intact    LABS, IMAGING, & OTHER STUDIES:  Lab Results:  I have personally reviewed pertinent labs.  Results from last 7 days   Lab Units 09/16/24  0405 09/15/24  1456 09/15/24  0438 09/14/24  0000 09/13/24  0444 09/12/24  1609   POTASSIUM mmol/L 4.0 3.7 3.4* 4.1   < >  --    CHLORIDE mmol/L 105 105 106 106   < >  --    CO2 mmol/L 43* 39* 38* 34*   < >  --    CO2, I-STAT mmol/L  --   --   --   --   --  31   BUN mg/dL 32* 36* 38* 38*   < >  --    CREATININE mg/dL 1.43* 1.55* 1.58* 1.53*   < >  --    EGFR ml/min/1.73sq m 31 28 27 28   < >  --    GLUCOSE, ISTAT mg/dl  --   --   --   --   --  97   CALCIUM mg/dL 8.1* 7.9* 8.1* 8.0*   < >  --    AST U/L  --   --   --  16  --   --    ALT U/L  --   --   --  9  --   --    ALK PHOS U/L  --   --   --  192*  --   --     < > = values in this interval not displayed.     Results from last 7 days   Lab Units 09/16/24  0405 09/15/24  0517 09/15/24  0438 09/14/24  0000   WBC Thousand/uL 10.14  --  11.24* 11.94*   HEMOGLOBIN g/dL 7.9* 6.7* 6.7* 7.0*   PLATELETS Thousands/uL 140*  --  180 226     Results from last 7 days   Lab Units 09/13/24  1649 09/13/24  1053 09/13/24  0611 09/12/24  1453   BLOOD CULTURE   --   --  No Growth at 72 hrs.  No Growth at 72 hrs.  --    GRAM STAIN RESULT  2+ Gram negative  rods*  2+ Gram positive cocci in clusters*  2+ Polys*  --   --  No organisms seen   URINE CULTURE   --  >100,000 cfu/ml Staphylococcus aureus*  60,000-69,000 cfu/ml Methicillin Resistant Staphylococcus aureus*  --   --    WOUND CULTURE  1 colony Pseudomonas aeruginosa*  4+ Growth of  --   --   --    BODY FLUID CULTURE, STERILE   --   --   --  No growth       Imaging Studies:   I have personally reviewed pertinent imaging study reports and images in PACS.  CXR reviewed personally.  Right-sided pleural effusion with mild pulm edema.  9/12 chest CT reviewed personally.  Bilateral emboli.  Large right pleural effusion with bilateral groundglass opacities.  Right breast mass.    EKG, Pathology, and Other Studies:   I have personally reviewed pertinent reports.

## 2024-09-16 NOTE — ASSESSMENT & PLAN NOTE
AEB hypotension and end organ dysfunction (HOSSEIN) despite fluids/albumin requiring pressor therapy  Leukocytosis and tachycardia present although likely reactive in setting of pulmonary embolism and atrial fibrillation  Shock likely obstructive vs cardiogenic  Echocardiogram showing normal left ventricular function although left atrium is severely dilated and moderate mitral regurgitation is present.  Loss of atrial kick from Afib/flutter thought at least in part to be contributing to shock state but pressor requirements remain unchanged despite rate control with digoxin.  Echo also shows new severe right ventricle dysfunction in the mid to apical free wall compared to echocardiogram in January of this year. Suspect this is in the setting of acute PE vs acutely/chronically worsening pulmonary artery hypertension vs CAD.  Component of CTEPH also a possibility as chronicity of symptoms unclear  Attempts to diurese patient to alleviate right heart strain and poor coaptation of tricupsid valve have not improved pressor requirements despite large urine output over last 24 hrs  Consider addition of PDE for tx of pulmonary hypertension, Patient unlikely to be a good surgical candidate for tx of CTEPH, will explore possibility of medical management with adempas   +/- cardiology/advanced heart failure consultation - patient may need cardiac catheterization       Plan:  Will hold off on further diuresis today  Off of pressors  Switch to eliquis 10 mg bid x7 followed by lifelong anticoagulation once able to tolerate oral medication consistently

## 2024-09-16 NOTE — ASSESSMENT & PLAN NOTE
MRSA bacteriuria.  This is most likely bladder colonization.  Although Staph aureus is not a common urinary bladder colonizer, it can be seen in elderly patient with urinary retention..  Patient's admission blood cultures were negative and she really did not have evidence of sepsis clinically, which would be expected to be present with Staph aureus bacteremia.  No antibiotic needed for this indication.

## 2024-09-16 NOTE — OCCUPATIONAL THERAPY NOTE
Occupational Therapy Evaluation     Patient Name: Aurora Anderson  Today's Date: 9/16/2024  Problem List  Principal Problem:    Shock (HCC)  Active Problems:    Respiratory acidosis    Anemia    HOSSEIN (acute kidney injury) (HCC)    Atypical atrial flutter (HCC)    Acute on chronic respiratory failure with hypoxia and hypercapnia (HCC)    Chronic heart failure with preserved ejection fraction (HCC)    Anemia due to chronic kidney disease and iron deficiency    Sacral wound    Acute pulmonary embolism with acute cor pulmonale (HCC)    Pleural effusion, right    Atrial fibrillation (HCC)    Past Medical History  Past Medical History:   Diagnosis Date    Acute kidney injury (HCC)     Acute on chronic respiratory failure (HCC)     HOSSEIN (acute kidney injury) (HCC) 01/04/2024    Arthritis     Atrial flutter (HCC)     Rapid heart rate      Past Surgical History  Past Surgical History:   Procedure Laterality Date    CATARACT EXTRACTION      EGD AND COLONOSCOPY N/A 11/14/2017    Procedure: EGD AND COLONOSCOPY;  Surgeon: Radha Salas MD;  [facility];  Service: Gastroenterology    HIP FRACTURE SURGERY               09/16/24 0925   OT Last Visit   OT Visit Date 09/16/24   Note Type   Note type Evaluation   Pain Assessment   Pain Assessment Tool FLACC   Pain Score No Pain   Hospital Pain Intervention(s) Repositioned;Emotional support   Pain Rating: FLACC (Rest) - Face 0   Pain Rating: FLACC (Rest) - Legs 0   Pain Rating: FLACC (Rest) - Activity 0   Pain Rating: FLACC (Rest) - Cry 0   Pain Rating: FLACC (Rest) - Consolability 0   Score: FLACC (Rest) 0   Pain Rating: FLACC (Activity) - Face 1   Pain Rating: FLACC (Activity) - Legs 1   Pain Rating: FLACC (Activity) - Activity 1   Pain Rating: FLACC (Activity) - Cry 0   Pain Rating: FLACC (Activity) - Consolability 1   Score: FLACC (Activity) 4   Restrictions/Precautions   Weight Bearing Precautions Per Order No   Other Precautions Cognitive;Chair Alarm;Bed Alarm;Multiple  "lines;O2;Fall Risk;Pain  (2L O2)   Home Living   Type of Home Apartment  (in law suite attached to daughter's home)   Home Layout One level  (0 ALEXA)   Bathroom Shower/Tub Walk-in shower   Bathroom Toilet Standard   Bathroom Equipment Built-in shower seat   Bathroom Accessibility Accessible   Home Equipment Walker   Additional Comments use of RW with tray attachment with mod I at baseline   Prior Function   Level of [place] Needs assistance with ADLs  ((A) with showering + LB dressing. (I) with toileting and UB dressing)   Lives With Daughter  (+ ZORAIDA)   Receives Help From Family  (ZORAIDA is home during the day)   IADLs Family/Friend/Other provides transportation;Family/Friend/Other provides meals;Family/Friend/Other provides medication management  ((I) with folding laundry)   Falls in the last 6 months 1 to 4   Vocational Retired   Lifestyle   Autonomy PTA pt living with daughter + ZORAIDA in an in-law suite attached to their home, pt requiring (A) with ADLs and IADLs, (+)fall, (-)drives, use of RW at baseline   Reciprocal Relationships supportive daughter and son in law   Service to Others retired   Intrinsic Gratification enjoys crocheting and watching tv in the evenings   General   Additional Pertinent History Admit due to respiratory distress + fluid overload. Has thoracentesis at bedside in ED. PMH: chronic CHF w/ preserved EF, a flutter, CKD 3   Family/Caregiver Present No   Subjective   Subjective \"I am just so disappointed in myself, you know I walked to the movie theater yesterday\"   ADL   Eating Assistance 5  Supervision/Setup   Grooming Assistance 5  Supervision/Setup   Grooming Deficit Increased time to complete;Supervision/safety;Verbal cueing;Brushing hair   UB Bathing Assistance 3  Moderate Assistance   LB Bathing Assistance 2  Maximal Assistance   UB Dressing Assistance 3  Moderate Assistance   LB Dressing Assistance 1  Total Assistance   LB Dressing Deficit Don/doff R sock;Don/doff L sock   Toileting " Assistance  3  Moderate Assistance   Bed Mobility   Supine to Sit 2  Maximal assistance   Additional items Assist x 2;Increased time required;Verbal cues;LE management   Additional Comments sitting EOB with min A   Transfers   Sit to Stand 2  Maximal assistance   Additional items Assist x 2;Increased time required;Verbal cues   Stand to Sit 2  Maximal assistance   Additional items Assist x 2;Increased time required;Verbal cues   Stand pivot 2  Maximal assistance   Additional items Assist x 2;Increased time required;Verbal cues   Additional Comments use of RW   Functional Mobility   Additional Comments unable to advance SPT from EOB to recliner only   Balance   Static Sitting Fair -   Dynamic Sitting Poor +   Static Standing Zero   Activity Tolerance   Activity Tolerance Patient limited by fatigue;Patient limited by pain;Other (Comment)  (limited by cognition)   Medical Staff Made Aware PT Thomas, CAITLIN Lynn   RUE Assessment   RUE Assessment   (limited shldr ROM, grossly 4-/5 strength)   LUE Assessment   LUE Assessment   (limited shldr ROM, grossly 4-/5 strength)   Hand Function   Gross Motor Coordination Functional   Fine Motor Coordination Functional   Vision - Complex Assessment   Additional Comments glaucoma, unable to read name badge, or see tv. Reports able to see outlines of people/objects   Cognition   Overall Cognitive Status Impaired   Arousal/Participation Alert;Cooperative   Attention Attends with cues to redirect   Orientation Level Oriented to person;Oriented to place;Disoriented to time;Disoriented to situation   Memory Decreased short term memory;Decreased recall of recent events;Decreased recall of precautions   Following Commands Follows one step commands with increased time or repetition   Comments pleasantly confused, poor recall of events - believing that she was at the movie theater yesterday. Poor insight into deficits   Assessment   Limitation Decreased ADL status;Decreased UE strength;Decreased  Safe judgement during ADL;Decreased cognition;Decreased endurance;Decreased self-care trans;Decreased high-level ADLs  (impaired balance, fxnl mobility, act amber, fxnl reach, standing amber, strength, attention to task, safety awareness, insight, pacing, problem solving, learning new tasks, response time)   Prognosis Good   Assessment Pt is a 89 y.o. female seen for OT evaluation s/p admission to [facility] on 9/12/2024 due to respiratory distress. Diagnosed with Shock (HCC). Personal and env factors supporting pt at time of IE include supportive daughter + son in law who provide 24/7 care, accessible home environment, and FF. Personal and env factors inhibiting engagement in occupations include advanced age, lifestyle patterns, difficulty completing ADLs, and difficulty completing IADLs. Performance deficits that affect the pt’s occupational performance can be seen above. Due to pt's current functional limitations and medical complications pt is functioning below baseline. Pt would benefit from continued skilled OT treatment in order to maximize safety, independence and overall performance with ADLs, functional mobility, functional transfers, and cognition in order to achieve highest level of function.   Goals   Patient Goals none stated, will continue to assess   LTG Time Frame 10-14   Long Term Goal see goals listed below   Plan   Treatment Interventions ADL retraining;Functional transfer training;Endurance training;Cognitive reorientation;Patient/family training;Equipment evaluation/education;Compensatory technique education;Energy conservation;Activityengagement   Goal Expiration Date 09/26/24   OT Treatment Day 0   OT Frequency 3-5x/wk   Discharge Recommendation   Rehab Resource Intensity Level, OT II (Moderate Resource Intensity)   AM-PAC Daily Activity Inpatient   Lower Body Dressing 1   Bathing 2   Toileting 1   Upper Body Dressing 2   Grooming 3   Eating 3   Daily Activity Raw Score 12   Daily Activity  Standardized Score (Calc for Raw Score >=11) 30.6   AM-PAC Applied Cognition Inpatient   Following a Speech/Presentation 2   Understanding Ordinary Conversation 3   Taking Medications 1   Remembering Where Things Are Placed or Put Away 2   Remembering List of 4-5 Errands 2   Taking Care of Complicated Tasks 1   Applied Cognition Raw Score 11   Applied Cognition Standardized Score 27.03   End of Consult   Patient Position at End of Consult Bedside chair;Bed/Chair alarm activated;All needs within reach        GOALS:     -Patient will perform grooming tasks sitting at sink with overall Mod I in order to increase overall independence    -Patient will be Mod I with UB dressing using AE and AD as needed in order to increase (I) with ADLs    -Patient will be Mod I with UB bathing using AE and AD as needed in order to increase (I) with ADLs    -Patient will be Mod A  with LB dressing with use of AE and AD as needed in order to increase (I) with ADLs    -Patient will be Mod A  with LB bathing with use of AE and AD as needed in order to increase (I) with ADLs    -Patient will complete toileting w/ Min A  w/ G hygiene/thoroughness in order to reduce caregiver burden    -Patient will demonstrate Mod A x 1 with bed mobility for ability to manage own comfort and initiate OOB tasks.     -Patient will perform functional transfers with Mod A x 1 to/from all surfaces using DME as needed in order to increase (I) with functional tasks    -Patient will be Mod A x 1 with functional mobility to/from BS for increased independence with toileting tasks    -Patient will tolerate therapeutic activities for greater than 30 min, in order to increase tolerance for functional activities.     -Patient will increase OOB/sitting tolerance to 2-4 hours per day to increase participation in self-care and leisure tasks with no s/s of exertion.     -Patient will engage in ongoing cognitive assessment in order to assist with safe discharge  planning/recommendations.          The patient's raw score on the AM-PAC Daily Activity Inpatient Short Form is 12. A raw score of less than 19 suggests the patient may benefit from discharge to post-acute rehabilitation services. HOWEVER please refer to the recommendation of the Occupational Therapist for safe discharge planning.    This session, pt required and most appropriately benefited from skilled OT/PT co-eval due to extensive physical assistance of SKILLED therapists, significant regression from functional baseline, and decreased activity tolerance. OT and PT goals were addressed separately as seen in documentation.     Anika Fortune MS, OTR/L

## 2024-09-16 NOTE — SPEECH THERAPY NOTE
Speech Language/Pathology    Speech/Language Pathology Progress Note    Patient Name: Aurora Anderson  Today's Date: 9/16/2024         Subjective:  Pt seen for dysphagia tx follow up. Pt sitting in chair, dtr at bedside. RN reported pt doing well w/ eating/drinking.   Objective:  Dtr reported pt eats regular food at home- just takes awhile to chew, especially now that she doesn't have her bottom denture.   Pt not able to bite shortbread cookie, broken in to pcs. Pt w/ prolonged but appeared w/ effective mastication/manipulation. Pt noted to have some throat clearing/expectoration w/ mech soft/solids. Took consecutive sips of water by straw, consistent throat clearing noted after swallowing. O2 sats stable.     Assessment:  Pt cont w/ oropharyngeal dysphagia symptoms w/ mech soft/solids and possible aspiration risk w/ thin liquids.     Plan/Recommendations:  Cont puree diet w/ thin liquids   Meds crushed   Discussed VBS w/ pt, she is agreeable- tent scheduled for tues at 11:15       Mohini Person MA CCC-SLP  Speech Pathologist  Available via [employer]

## 2024-09-16 NOTE — PHYSICAL THERAPY NOTE
Physical Therapy Evaluation    Patient's Name: Aurora Anderson    Admitting Diagnosis  Leg pain [M79.606]  Pleural effusion [J90]  HOSSEIN (acute kidney injury) (HCC) [N17.9]  Acute on chronic congestive heart failure, unspecified heart failure type (HCC) [I50.9]  Multiple subsegmental pulmonary emboli without acute cor pulmonale (HCC) [I26.94]    Problem List  Patient Active Problem List   Diagnosis    Respiratory acidosis    Right bundle branch block (RBBB) on electrocardiogram (ECG)    Diverticulosis of colon    Large hiatal hernia    Incidental 6cm right Renal lesion on CT    Anemia    HOSSEIN (acute kidney injury) (HCC)    Atypical atrial flutter (HCC)    Acute on chronic respiratory failure with hypoxia and hypercapnia (HCC)    Breast mass, right    Chronic heart failure with preserved ejection fraction (HCC)    Shock (HCC)    Lymphedema    Anemia due to chronic kidney disease and iron deficiency    Pressure injury of left foot, unstageable (HCC)    Pressure injury of right foot, unstageable (HCC)    Ambulatory dysfunction    Pressure injury of sacral region, stage 4 (HCC)    Failure to thrive in adult    Dizziness    Sacral wound    Acute pulmonary embolism with acute cor pulmonale (HCC)    Pleural effusion, right    Atrial fibrillation (HCC)       Past Medical History  Past Medical History:   Diagnosis Date    Acute kidney injury (HCC)     Acute on chronic respiratory failure (HCC)     HOSSEIN (acute kidney injury) (HCC) 01/04/2024    Arthritis     Atrial flutter (HCC)     Rapid heart rate        Past Surgical History  Past Surgical History:   Procedure Laterality Date    CATARACT EXTRACTION      EGD AND COLONOSCOPY N/A 11/14/2017    Procedure: EGD AND COLONOSCOPY;  Surgeon: Radha Salas MD;  Location: [facility];  Service: Gastroenterology    HIP FRACTURE SURGERY            09/16/24 0926   PT Last Visit   PT Visit Date 09/16/24   Note Type   Note type Evaluation   Pain Assessment   Pain Assessment Tool FLACC   Pain  Rating: FLACC (Rest) - Face 0   Pain Rating: FLACC (Rest) - Legs 0   Pain Rating: FLACC (Rest) - Activity 0   Pain Rating: FLACC (Rest) - Cry 0   Pain Rating: FLACC (Rest) - Consolability 0   Score: FLACC (Rest) 0   Pain Rating: FLACC (Activity) - Face 1   Pain Rating: FLACC (Activity) - Legs 1   Pain Rating: FLACC (Activity) - Activity 1   Pain Rating: FLACC (Activity) - Cry 0   Pain Rating: FLACC (Activity) - Consolability 1   Score: FLACC (Activity) 4   Restrictions/Precautions   Weight Bearing Precautions Per Order No   Other Precautions Cognitive;Chair Alarm;Bed Alarm;O2;Telemetry;Fall Risk;Multiple lines;Hard of hearing   Home Living   Type of Home Apartment  (in law suite attached to daughter's home)   Home Layout One level  (0 ALEXA)   Bathroom Shower/Tub Walk-in shower   Bathroom Toilet Standard   Bathroom Equipment Grab bars in shower   Home Equipment Walker   Prior Function   Lives With Daughter  (son in law)   Receives Help From Family   IADLs Family/Friend/Other provides transportation;Family/Friend/Other provides meals;Family/Friend/Other provides medication management   Falls in the last 6 months 1 to 4   Comments at baseline pt ambualtes mod I with RW   General   Family/Caregiver Present No   Cognition   Orientation Level Oriented to person   Following Commands Follows one step commands with increased time or repetition   Comments pt ID by wristband,name and    Subjective   Subjective pt agreeable to OOB with PT   RLE Assessment   RLE Assessment X  (BL knee flexion contractures 2+/5 grossly)   LLE Assessment   LLE Assessment X  (BL knee flexion contractures 2+/5 grossly)   Bed Mobility   Supine to Sit 2  Maximal assistance   Additional items Assist x 2;Increased time required;HOB elevated   Additional Comments sits EOB with minAx1   Transfers   Sit to Stand 2  Maximal assistance   Additional items Assist x 2;Increased time required;Verbal cues   Stand to Sit 2  Maximal assistance   Additional items  Assist x 2;Increased time required;Verbal cues   Stand pivot 2  Maximal assistance   Additional items Assist x 2;Increased time required;Verbal cues   Additional Comments use of RW, vc for hand placement, pt stands with singificant BL knee flexion, pt required tactilte facilitation for LE advancement and trunk management during SPT transfer   Ambulation/Elevation   Gait pattern Not appropriate   Balance   Static Sitting Fair -   Dynamic Sitting Poor +   Static Standing Zero   Dynamic Standing   (zero)   Activity Tolerance   Activity Tolerance Patient limited by fatigue;Treatment limited secondary to medical complications (Comment)  (cognition)   Medical Staff Made Aware care coordinated with OT, spoke with CM   Nurse Made Aware CAITLIN bryant pre/post   Assessment   Prognosis Fair   Problem List Decreased strength;Decreased endurance;Impaired balance;Decreased mobility;Decreased cognition;Impaired judgement;Decreased safety awareness;Decreased skin integrity;Pain   Assessment Pt is a 89 y.o. female seen for PT evaluation s/p admit to [facility] on 9/12/2024. Pt was admitted with a primary dx of: shock, leg pain., pleural effusion, HOSSEIN, acute on chronic congestive heart failure, multiple subsegmental pulmonary emboli without acute cor pulmonale.  PT now consulted for assessment of mobility and d/c needs. Pt with Up and OOB as tolerated orders.  Pts current comorbidities and personal factors effecting treatment include: sacral wound, chronic heart failure, anemia due to CKD, failure to thrive. Pts current clinical presentation is Unstable/Unpredictable (high complexity) due to Ongoing medical management for primary dx, Increased reliance on more restrictive AD compared to baseline, Decreased activity tolerance compared to baseline, Fall risk, Increased assistance needed from caregiver at current time, Ongoing telemetry monitoring, Increased O2 via NC from pts baseline. Prior to admission, pt was independent with use  of RW. Upon evaluation, pt currently is requiring MaxA x2 for bed mobility; MaxA x2 for transfers. Pt presents at PT eval functioning below baseline and currently w/ overall mobility deficits 2* to: BLE weakness, impaired balance, pain, decreased activity tolerance compared to baseline, decreased functional mobility tolerance compared to baseline, decreased safety awareness, impaired judgement, fall risk, decreased skin integrity, decreased cognition. Pt currently at a fall risk 2* to impairments listed above.  Pt will continue to benefit from skilled acute PT interventions to address stated impairments; to maximize functional mobility; for ongoing pt/ family training; and DME needs. At conclusion of PT session chair alarm engaged, all needs in reach, RN notified of session findings/recommendations, and pt returned back in recliner chair with phone and call bell within reach. Pt denies any further questions at this time. Recommend Level II (Moderate Resource Intensity)  upon hospital D/C.   Goals   Patient Goals no direct therapy goals stated today   STG Expiration Date 09/30/24   Short Term Goal #1 In 14 days pt will be able to: 1. Demonstrate ability to perform all aspects of bed mobility with maxAx1 to improve functional safety.  2. Perform functional transfers with maxAx1 and LRAD to facilitate safe return to previous living environment.  3.PT to see for gait/ambulation assessment and progression. 4. Improve LE strength grades by 1 to increase ease of functional mobility with transfers and gait. 5. Pt will demonstrate improved balance by one grade in order to decrease risk of falls.   PT Treatment Day 0   Plan   Treatment/Interventions Functional transfer training;LE strengthening/ROM;Elevations;Therapeutic exercise;Patient/family training;Equipment eval/education;Bed mobility;Spoke to nursing;Spoke to case management;OT;Cognitive reorientation;Endurance training   PT Frequency 3-5x/wk   Discharge Recommendation    Rehab Resource Intensity Level, PT II (Moderate Resource Intensity)   AM-PAC Basic Mobility Inpatient   Turning in Flat Bed Without Bedrails 1   Lying on Back to Sitting on Edge of Flat Bed Without Bedrails 1   Moving Bed to Chair 1   Standing Up From Chair Using Arms 1   Walk in Room 1   Climb 3-5 Stairs With Railing 1   Basic Mobility Inpatient Raw Score 6   Turning Head Towards Sound 3   Follow Simple Instructions 2   Low Function Basic Mobility Raw Score  11   Low Function Basic Mobility Standardized Score  16.55   [facility] Highest Level Of Mobility   -[facility] Goal 2: Bed activities/Dependent transfer   -HL Achieved 4: Move to chair/commode   End of Consult   Patient Position at End of Consult Bedside chair;Bed/Chair alarm activated;All needs within reach   The patient's AM-PAC Basic Mobility Inpatient Short Form Raw Score is 6. A Raw score of less than or equal to 16 suggests the patient may benefit from discharge to post-acute rehabilitation services. Please also refer to the recommendation of the Physical Therapist for safe discharge planning.    Ruddy Mclaughlin, PT

## 2024-09-16 NOTE — PLAN OF CARE
Problem: OCCUPATIONAL THERAPY ADULT  Goal: Performs self-care activities at highest level of function for planned discharge setting.  See evaluation for individualized goals.  Description: Treatment Interventions: ADL retraining, Functional transfer training, Endurance training, Cognitive reorientation, Patient/family training, Equipment evaluation/education, Compensatory technique education, Energy conservation, Activityengagement          See flowsheet documentation for full assessment, interventions and recommendations.   Note: Limitation: Decreased ADL status, Decreased UE strength, Decreased Safe judgement during ADL, Decreased cognition, Decreased endurance, Decreased self-care trans, Decreased high-level ADLs (impaired balance, fxnl mobility, act amber, fxnl reach, standing amber, strength, attention to task, safety awareness, insight, pacing, problem solving, learning new tasks, response time)  Prognosis: Good  Assessment: Pt is a 89 y.o. female seen for OT evaluation s/p admission to [facility] on 9/12/2024 due to respiratory distress. Diagnosed with Shock (HCC). Personal and env factors supporting pt at time of IE include supportive daughter + son in law who provide 24/7 care, accessible home environment, and [facility]. Personal and env factors inhibiting engagement in occupations include advanced age, lifestyle patterns, difficulty completing ADLs, and difficulty completing IADLs. Performance deficits that affect the pt’s occupational performance can be seen above. Due to pt's current functional limitations and medical complications pt is functioning below baseline. Pt would benefit from continued skilled OT treatment in order to maximize safety, independence and overall performance with ADLs, functional mobility, functional transfers, and cognition in order to achieve highest level of function.     Rehab Resource Intensity Level, OT: II (Moderate Resource Intensity)

## 2024-09-16 NOTE — ASSESSMENT & PLAN NOTE
Patient present with atrial fibrillation and RVR,   Patient has history of atrial flutter, non compliant with medications since February  Echo showed left ventricular ejection fraction of 60%, diastolic flattening of the interventricular septum consistent with right ventricle volume overload.  RV function moderately dilated, systolic function moderately to severely reduced.  Moderate to severe regurgitation of tricuspid valve estimated RVSP 65 mmHg.  Biatrial enlargement.   Left atrial enlargement, may allude to chronic changes  Avoiding beta blockers cardizem in the setting of hypotension  Patient on levophed for pressor support  Started on digoxin on 09/13/2024 inotropic, chronotropic & dromotropic effects  HR improved on digoxin in 80s-90s    Plan   Patient transitioned to elliquis 10mg bid x 7 days  Will require anticoagulation outpatient

## 2024-09-16 NOTE — ASSESSMENT & PLAN NOTE
Pressure ulcer of sacrum, stage 4, POA, in the setting of decreased mobility, as evidenced by positive bone probe and tunneling, being treated with wound care team consult, off-loading, cleansing and daily packing.    Plan  - Daily wound care  - Turn every 2 hours  - OOB into chair  - PT/OT

## 2024-09-16 NOTE — ASSESSMENT & PLAN NOTE
Likely contributed to by known pulmonary emboli, pleural effusion now s/p thora, heart failure exacerbation given patient's fluid overloaded status and  with wnl troponins.     CTA PE 9/12: bilateral segmental and subsegmental emboli with evidence of right heart strain, RV/LV ratio 1.2, bilateral ground glass opacities which could be edema vs. Interstitial pneumonia with RLL opacity which can represent atelectasis vs. Infiltrate. Large R pleural effusion, R. Breast mass suspicious for neoplasm.    Plan:  -Currently on 2L NC  -Bipap as needed  -Check VBG if change in mental status  -Wean O2 as tolerated to maintain SpO2>95%

## 2024-09-16 NOTE — ASSESSMENT & PLAN NOTE
Wt Readings from Last 3 Encounters:   09/17/24 60.8 kg (134 lb 0.6 oz)   04/29/24 62.7 kg (138 lb 3.2 oz)   02/21/24 58.1 kg (128 lb)     Home regimen: Lasix 20 mg every other day    Patient fluid overloaded on examination on 9/12.  CTA PE 9/12: Bilateral ground glass opacities which could be edema vs. Interstitial pneumonia   Administered 40 mg Lasix IV in the ED  Echo showed left ventricular ejection fraction of 60%, diastolic flattening of the interventricular septum consistent with right ventricle volume overload.  RV function moderately dilated, systolic function moderately to severely reduced.  Moderate to severe regurgitation of tricuspid valve estimated RVSP 65 mmHg.  Biatrial enlargement.  No surgical intervention given her age   Patient appears volume overload on physical exam, Also elevated RVSP on echo, will diurese with lasix to reduce right heart strain    Plan:  Holding lasix  Monitor urine output  Daily weights  Monitor I/Os

## 2024-09-16 NOTE — ASSESSMENT & PLAN NOTE
Patient has a history of anemia  Last iron panel showed low iron, low iron sat, low ferritin, normal TIBC  Likely iron deficiency anemia;  Patient non compliant with meds  Hgb 6.7 on 09/15/24    Plan  Monitor daily CBC  Transfuse for Hgb <7

## 2024-09-16 NOTE — ASSESSMENT & PLAN NOTE
Lab Results   Component Value Date    EGFR 45 09/17/2024    EGFR 31 09/16/2024    EGFR 28 09/15/2024    CREATININE 1.06 09/17/2024    CREATININE 1.43 (H) 09/16/2024    CREATININE 1.55 (H) 09/15/2024     Baseline Cr 1-1.25    Plan:  -Avoid nephrotoxic medications  -Continue monitoring BMP

## 2024-09-17 PROBLEM — R23.8 BULLA: Status: ACTIVE | Noted: 2024-01-01

## 2024-09-17 NOTE — RESPIRATORY THERAPY NOTE
Attempted to place patient on BiPAP due to hypercarbia. However patient did not tolerate and refuse it. MD is made aware.

## 2024-09-17 NOTE — ASSESSMENT & PLAN NOTE
Patient has a history of anemia  Last iron panel showed low iron, low iron sat, low ferritin, normal TIBC  Likely iron deficiency anemia;  Patient non compliant with meds  Hgb 8.0 on 09/17/24    Plan  Monitor daily CBC  Transfuse for Hgb <7

## 2024-09-17 NOTE — CONSULTS
Consultation - Surgery-General   Name: Aurora Anderson 89 y.o. female I MRN: [id number]  Unit/Bed#: ICU 13 I Date of Admission: 9/12/2024   Date of Service: 9/17/2024 I Hospital Day: 5   Inpatient consult to Acute Care Surgery  Consult performed by: Renzo Wu MD  Consult ordered by: AZRA Correa        Physician Requesting Evaluation: Paramjit Alves MD   Reason for Evaluation / Principal Problem: Enlarging blister    Assessment & Plan  Bulla  Patient noted to have rapidly enlarging wound on RLE.   Appears to be fluid fluid-bulla. Has surrounding raised erythema. Patient lethargic and unable to give history, but withdraws to pain when wound palpated.    Plan  -Follow up dermatology recommendations/management  -Bulla may rupture spontaneously;cover with sterile dressing  -Monitor wound for progression/regression        History of Present Illness   Aurora Anderson is a 89 y.o. female who presents with SOB and fatigue. On presentation she was noted to have respiratory acidos and in Afib with RVR. She had BL pleural effusions which were drained at bedside. She was also found to have BL Pe's.   ACS consulted for development of RLE wound that evolved today. Wound appears fluid filled with surrounding raised erythema. Patient unable to verbalize pain but withdraws upon palpation of wound.    Review of Systems      Objective      Temp:  [95.9 °F (35.5 °C)-98.4 °F (36.9 °C)] 96.7 °F (35.9 °C)  HR:  [] 84  Resp:  [17-69] 27  BP: (70-98)/(41-61) 84/54  O2 Device: None (Room air)          I/O         09/16 0701  09/17 0700 09/17 0701  09/18 0700    P.O. 924 205    I.V. (mL/kg) 91 (1.5)     Blood      IV Piggyback 290 80    Total Intake(mL/kg) 1305 (21.5) 285 (4.7)    Urine (mL/kg/hr) 1380 (0.9) 300 (0.4)    Stool      Total Output 1380 300    Net -75 -15                Lines/Drains/Airways       Active Status       None                  Physical Exam   General: NAD  HENT: NCAT MMM  Neck: supple, no JVD  CV: nl  rate  Lungs: nl wob. No resp distress  ABD: Soft, nontender, nondistended  Extrem: wound on LLE covered in mepilex. RLE wound with fluid filled bulla and surrounding erythema that is raised, tender.  Neuro: AAOx3      Lab Results: I have reviewed the following results:   Imaging Review: No pertinent imaging studies reviewed.  Other Studies: No additional pertinent studies reviewed.    VTE Pharmacologic Prophylaxis: VTE covered by:  apixaban, Oral, 10 mg at 09/17/24 1721     VTE Mechanical Prophylaxis: sequential compression device

## 2024-09-17 NOTE — ASSESSMENT & PLAN NOTE
Patient noted to have rapidly enlarging wound on RLE.   Appears to be fluid fluid-bulla. Has surrounding raised erythema. Patient lethargic and unable to give history, but withdraws to pain when wound palpated.    Plan  -Follow up dermatology recommendations/management  -Bulla may rupture spontaneously;cover with sterile dressing  -Monitor wound for progression/regression

## 2024-09-17 NOTE — SPEECH THERAPY NOTE
Speech Language/Pathology    Speech/Language Pathology Progress Note    Patient Name: Aurora Anderson  Today's Date: 9/17/2024       VBS was scheduled for 11:15, per nsg pt not appropriate at this time. Will check in pt later and determine if able/ need to re-schedule.       Mohini Person MA CCC-SLP  Speech Pathologist  Available via [employer]

## 2024-09-17 NOTE — PROGRESS NOTES
Progress Note - Infectious Disease   Aurora Anderson 89 y.o. female MRN: [id number]  Unit/Bed#: ICU 13 Encounter: [id number]      Impression/Recommendations:  1.  Possible pneumonia.  CXR and chest CT findings were equivocal for pneumonia and her elevated procalcitonin may be all secondary to HOSSEIN, given response to ceftriaxone, will have her complete treatment course for pneumonia.  Continue IV ceftriaxone.  Monitor temperature/WBC.  Monitor respiratory status.  Treat x 7-day antibiotic course.     2.  MRSA bacteriuria.  This is most likely bladder colonization.  Although Staph aureus is not a common urinary bladder colonizer, it can be seen in elderly patient with urinary retention..  Patient's admission blood cultures were negative and she really did not have evidence of sepsis clinically, which would be expected to be present with Staph aureus bacteremia.  Given clinical improvement with ceftriaxone above, plan was not to treat MRSA bacteriuria.  If patient becomes hypotensive again, we can reconsider the need to treat this.  No antibiotic needed for this indication.     3.  Sacral wound which is probed to bone.  Finding of probing to bone suggest underlying osteomyelitis.  Patient is status post local I&D, without evidence of cellulitis.  Given significant generalized debility and bedbound state, likely very high risk for extensive I&D and flap closure, prolonged antibiotic course would be futile.  Best course of action would be to continue local wound care.  Light growth of Pseudomonas in wound culture wound culture is most likely ulcer/wound colonization.  No antibiotic needed for this indication.  Continue local wound care.     4.  Bilateral PE.  Patient is currently on anticoagulation.  Anticoagulation per primary service.     5.  Pulmonary edema with large right pleural effusion.  Patient is status post thoracentesis with pleural fluid finding consistent with transudate.  She is improved with  diuresis.  Diuresis per primary service.     6.  Acute hypoxic respiratory failure, likely multifactorial, secondary to PE, pulmonary edema and possibly pneumonia.  Patient is clinically improved.  O2 support is decreased.  O2 support and weaning per primary service.     7.  Hypotension, most likely secondary to PE.  Patient had been on pressor but now off it.  However, her SBP has been trending down today.  If SBP continues to worsen and patient needs to go back on pressors, we will need to reevaluate the need for antibiotic escalation at that time.  Monitor hemodynamics.     8.  HOSSEIN, superimposed on CKD.  Creatinine continues to improve.  Ceftriaxone does not need dose adjustment.  Monitor creatinine.     9.  PCN allergy.  Patient is tolerating cephalosporin well.  Monitor for cross allergic reaction.     Discussed with patient in detail regarding the above plan.  Discussed with Dr. Cross from critical care medicine service regarding antibiotic plan above.  He is in agreement.    Antibiotics:  Ceftriaxone  Antibiotic #. 5    Subjective:  Patient is sleepy but arousable.  She is able to answer simple questions but falls back to sleep quickly.  No dyspnea.  Cough mild, weak and nonproductive.  No abdominal or flank pain.  Temperature stays down.  No chills.    Objective:  Vitals:  Temp:  [95.9 °F (35.5 °C)-98.4 °F (36.9 °C)] 98.4 °F (36.9 °C)  HR:  [] 89  Resp:  [17-37] 18  BP: ()/(41-64) 70/41  SpO2:  [92 %-100 %] 99 %  Temp (24hrs), Av.1 °F (36.2 °C), Min:95.9 °F (35.5 °C), Max:98.4 °F (36.9 °C)  Current: Temperature: 98.4 °F (36.9 °C)    Physical Exam:     General: More somnolent but arousable, answer simple questions, comfortable, nontoxic.   Neck:  Supple. No mass.  No lymphadenopathy.   Lungs: Decreased breath sounds, sparse basilar rhonchi and rales, no wheezing, respirations unlabored.   Heart:  Regular rate and rhythm, S1 and S2 normal, no murmur.   Abdomen: Soft, nondistended,  non-tender, bowel sounds active all four quadrants, no masses, no organomegaly.   Extremities: Stable mild leg edema. No erythema/warmth. No draining ulcer. Nontender to palpation.   Skin:  No rash.   Neuro: Difficult to assess.     Invasive Devices       Peripheral Intravenous Line  Duration             Peripheral IV 09/16/24 Left;Ventral (anterior) Forearm 1 day    Peripheral IV 09/16/24 Distal;Right;Dorsal (posterior) Forearm <1 day              Drain  Duration             Urethral Catheter Temperature probe 4 days                    Labs studies:   I have personally reviewed pertinent labs.  Results from last 7 days   Lab Units 09/17/24  0332 09/16/24  0405 09/15/24  1456 09/15/24  0438 09/14/24  0000 09/13/24  0444 09/12/24  1609   POTASSIUM mmol/L 4.3 4.0 3.7   < > 4.1   < >  --    CHLORIDE mmol/L 103 105 105   < > 106   < >  --    CO2 mmol/L 45* 43* 39*   < > 34*   < >  --    CO2, I-STAT mmol/L  --   --   --   --   --   --  31   BUN mg/dL 25 32* 36*   < > 38*   < >  --    CREATININE mg/dL 1.06 1.43* 1.55*   < > 1.53*   < >  --    EGFR ml/min/1.73sq m 45 31 28   < > 28   < >  --    GLUCOSE, ISTAT mg/dl  --   --   --   --   --   --  97   CALCIUM mg/dL 8.4 8.1* 7.9*   < > 8.0*   < >  --    AST U/L  --   --   --   --  16  --   --    ALT U/L  --   --   --   --  9  --   --    ALK PHOS U/L  --   --   --   --  192*  --   --     < > = values in this interval not displayed.     Results from last 7 days   Lab Units 09/17/24  0332 09/16/24  0405 09/15/24  0517 09/15/24  0438   WBC Thousand/uL 10.42* 10.14  --  11.24*   HEMOGLOBIN g/dL 8.0* 7.9* 6.7* 6.7*   PLATELETS Thousands/uL 115* 140*  --  180     Results from last 7 days   Lab Units 09/13/24  1649 09/13/24  1053 09/13/24  0611 09/12/24  1453   BLOOD CULTURE   --   --  No Growth After 4 Days.  No Growth After 4 Days.  --    GRAM STAIN RESULT  2+ Gram negative rods*  2+ Gram positive cocci in clusters*  2+ Polys*  --   --  No organisms seen   URINE CULTURE   --   >100,000 cfu/ml Staphylococcus aureus*  60,000-69,000 cfu/ml Methicillin Resistant Staphylococcus aureus*  --   --    WOUND CULTURE  1 colony Pseudomonas aeruginosa*  4+ Growth of  --   --   --    BODY FLUID CULTURE, STERILE   --   --   --  No growth       Imaging Studies:   I have personally reviewed pertinent imaging study reports and images in PACS.    EKG, Pathology, and Other Studies:   I have personally reviewed pertinent reports.

## 2024-09-17 NOTE — PROGRESS NOTES
Progress Note - Critical Care/ICU   Name: Aurora Anderson 89 y.o. female I MRN: [id number]  Unit/Bed#: ICU 13 I Date of Admission: 9/12/2024   Date of Service: 9/17/2024 I Hospital Day: 5      Assessment & Plan  Shock (HCC)  AEB hypotension and end organ dysfunction (HOSSEIN) despite fluids/albumin requiring pressor therapy  Leukocytosis and tachycardia present although likely reactive in setting of pulmonary embolism and atrial fibrillation  Shock likely obstructive vs cardiogenic  Echocardiogram showing normal left ventricular function although left atrium is severely dilated and moderate mitral regurgitation is present.  Loss of atrial kick from Afib/flutter thought at least in part to be contributing to shock state but pressor requirements remain unchanged despite rate control with digoxin.  Echo also shows new severe right ventricle dysfunction in the mid to apical free wall compared to echocardiogram in January of this year. Suspect this is in the setting of acute PE vs acutely/chronically worsening pulmonary artery hypertension vs CAD.  Component of CTEPH also a possibility as chronicity of symptoms unclear  Attempts to diurese patient to alleviate right heart strain and poor coaptation of tricupsid valve have not improved pressor requirements despite large urine output over last 24 hrs  Consider addition of PDE for tx of pulmonary hypertension, Patient unlikely to be a good surgical candidate for tx of CTEPH, will explore possibility of medical management with adempas   +/- cardiology/advanced heart failure consultation - patient may need cardiac catheterization       Plan:  Will hold off on further diuresis today  Off of pressors  Switch to eliquis 10 mg bid x7 followed by lifelong anticoagulation once able to tolerate oral medication consistently    Acute pulmonary embolism with acute cor pulmonale (HCC)  Patient complains of dyspnea and pleuritic which is new today and leg swelling for the past 3  days.  Patient with respiratory acidosis on VBG, fluid overloaded on examination.  CXR on admission showed moderate-large right sided pleural effusion.   Patient had bedside thoracentesis done.   CT PE 9/12: Bilateral segmental and subsegmental emboli with evidence of right heart strain, calculated ratio of right ventricular to left ventricular diameter (RV/LV ratio) is 1.2.   Suspicious r breast mass  Echo showed: diastolic flattening of the interventricular septum consistent with right ventricle volume overload. RV function moderately dilated, systolic function moderately to severely reduced. Moderate to severe regurgitation of tricuspid valve estimated RVSP 65 mmHg.     Plan:  -Continue Heparin VTE/PE infusion - transition to eliquis 10 mg bid x7 days followed by lifelong AC once able to tolerate oral medication  -Monitor INR  -Monitor CBC  -Patient currently on 2L NC  Chronic heart failure with preserved ejection fraction (HCC)  Wt Readings from Last 3 Encounters:   09/17/24 60.8 kg (134 lb 0.6 oz)   04/29/24 62.7 kg (138 lb 3.2 oz)   02/21/24 58.1 kg (128 lb)     Home regimen: Lasix 20 mg every other day    Patient fluid overloaded on examination on 9/12.  CTA PE 9/12: Bilateral ground glass opacities which could be edema vs. Interstitial pneumonia   Administered 40 mg Lasix IV in the ED  Echo showed left ventricular ejection fraction of 60%, diastolic flattening of the interventricular septum consistent with right ventricle volume overload.  RV function moderately dilated, systolic function moderately to severely reduced.  Moderate to severe regurgitation of tricuspid valve estimated RVSP 65 mmHg.  Biatrial enlargement.  No surgical intervention given her age   Patient appears volume overload on physical exam, Also elevated RVSP on echo, will diurese with lasix to reduce right heart strain    Plan:  Holding lasix  Monitor urine output  Daily weights  Monitor I/Os    Atypical atrial flutter (HCC)  Patient has a  history of atrial flutter  Previous home regimen: Metoprolol 25 mg qd, Diltiazem 180 mg qd, Eliquis   Patient non compliant since February  Will avoid cardizem and beta blocker     Plan:  See plan for atrial fibrillation  HOSSEIN (acute kidney injury) (HCC)  Lab Results   Component Value Date    EGFR 45 09/17/2024    EGFR 31 09/16/2024    EGFR 28 09/15/2024    CREATININE 1.06 09/17/2024    CREATININE 1.43 (H) 09/16/2024    CREATININE 1.55 (H) 09/15/2024     Baseline Cr 1-1.25    Plan:  -Avoid nephrotoxic medications  -Continue monitoring BMP  Respiratory acidosis  9/12 VBG: pH 7.265, pCO2 68.4, pO2 29.8, HCO3 30.4  Likely secondary to hypoventilation secondary to large R. Pleural effusion identified on CT PE 9/12  S/p Thoracentesis on 9/12  On 3L NC    Plan:  -BiPAP prn  Check abg    Acute on chronic respiratory failure with hypoxia and hypercapnia (HCC)  Likely contributed to by known pulmonary emboli, pleural effusion now s/p thora, heart failure exacerbation given patient's fluid overloaded status and  with wnl troponins.     CTA PE 9/12: bilateral segmental and subsegmental emboli with evidence of right heart strain, RV/LV ratio 1.2, bilateral ground glass opacities which could be edema vs. Interstitial pneumonia with RLL opacity which can represent atelectasis vs. Infiltrate. Large R pleural effusion, R. Breast mass suspicious for neoplasm.    Plan:  -Currently on 2L NC  -Bipap as needed  -Check VBG if change in mental status  -Wean O2 as tolerated to maintain SpO2>95%  Sacral wound  Pressure ulcer of sacrum, stage 4, POA, in the setting of decreased mobility, as evidenced by positive bone probe and tunneling, being treated with wound care team consult, off-loading, cleansing and daily packing.    Plan  - Daily wound care  - Turn every 2 hours  - OOB into chair  - PT/OT  Pleural effusion, right  Identified on 9/12 CT PE:  Large R pleural effusion  S/p thoracentesis on 9/12 one liter of fluid drained  Blood  LD on 9/12: 318  Total Protein on 9/12: 7.4    Plan:  -f/u pleural fluid cultures + cytology  Atrial fibrillation (HCC)  Patient present with atrial fibrillation and RVR,   Patient has history of atrial flutter, non compliant with medications since February  Echo showed left ventricular ejection fraction of 60%, diastolic flattening of the interventricular septum consistent with right ventricle volume overload.  RV function moderately dilated, systolic function moderately to severely reduced.  Moderate to severe regurgitation of tricuspid valve estimated RVSP 65 mmHg.  Biatrial enlargement.   Left atrial enlargement, may allude to chronic changes  Avoiding beta blockers cardizem in the setting of hypotension  Patient on levophed for pressor support  Started on digoxin on 09/13/2024 inotropic, chronotropic & dromotropic effects  HR improved on digoxin in 80s-90s    Plan   Patient transitioned to elliquis 10mg bid x 7 days  Will require anticoagulation outpatient  Anemia  Patient has a history of anemia  Last iron panel showed low iron, low iron sat, low ferritin, normal TIBC  Likely iron deficiency anemia;  Patient non compliant with meds  Hgb 6.7 on 09/15/24    Plan  Monitor daily CBC  Transfuse for Hgb <7  MRSA (methicillin resistant staph aureus) culture positive  MRSA bacteriuria.  This is most likely bladder colonization.  Although Staph aureus is not a common urinary bladder colonizer, it can be seen in elderly patient with urinary retention..  Patient's admission blood cultures were negative and she really did not have evidence of sepsis clinically, which would be expected to be present with Staph aureus bacteremia.  No antibiotic needed for this indication.  Disposition: Critical care    ICU Core Measures     A: Assess, Prevent, and Manage Pain Has pain been assessed? Yes  Need for changes to pain regimen? No   B: Both SAT/SAT  N/A   C: Choice of Sedation RASS Goal: 0 Alert and Calm  Need for changes to  sedation or analgesia regimen? No   D: Delirium CAM-ICU: Negative   E: Early Mobility  Plan for early mobility? Yes   F: Family Engagement Plan for family engagement today? Yes       Antibiotic Review: Patient on appropriate coverage based on culture data.  and Infectious disease consulted    Review of Invasive Devices:    [place] Plan: Continue for accurate I/O monitoring for 48 hours        Prophylaxis:  VTE VTE covered by:  apixaban, Oral, 10 mg at 09/16/24 1724       Stress Ulcer  not ordered         24 Hour Events   24hr events: Patient remained off of pressors, patient had positive MRSA culture in urine, seen by ID, likely urinary colonization, restarted ceftriaxone for possible PNA. Patient had heparin drip discontinue and transitioned to elliquis 10 bid for 7 days.    Subjective   Patient Aox2 this morning, able to follow commands, however decreased mentation from yesterday. Patient denies any active chest pain, shortness of breath, abdominal pain, urinary symptoms.  Review of Systems: See HPI for Review of Systems    Objective                          Vitals I/O      Most Recent Min/Max in 24hrs   Temp (!) 95.9 °F (35.5 °C) Temp  Min: 95.9 °F (35.5 °C)  Max: 97.3 °F (36.3 °C)   Pulse 87 Pulse  Min: 82  Max: 110   Resp (!) 23 Resp  Min: 17  Max: 37   BP (!) 85/52 BP  Min: 85/52  Max: 147/58   O2 Sat 95 % SpO2  Min: 92 %  Max: 100 %      Intake/Output Summary (Last 24 hours) at 9/17/2024 0832  Last data filed at 9/17/2024 0557  Gross per 24 hour   Intake 1065.45 ml   Output 1180 ml   Net -114.55 ml       Diet Dysphagia/Modified Consistency; Dysphagia 1-Pureed; Thin Liquid    Invasive Monitoring           Physical Exam   Physical Exam  Skin:     General: Skin is warm and dry.      Comments: Right lateral thigh bleeding with  skin, darkened irregular shaped skin lesions   HENT:      Head: Normocephalic and atraumatic.   Cardiovascular:      Rate and Rhythm: Normal rate and regular rhythm.      Heart  sounds: Murmur (systolic mumur over mitral and tricuspid area) heard.   Musculoskeletal:      Right lower le+ Edema present.      Left lower le+ Edema present.      Comments: Blue elevated hematoma on right midshin, with tenderness and erythema surrounding.  Onchomycosis on toenails, and dry scaly skin   Abdominal: General: There is no distension.      Palpations: Abdomen is soft.      Tenderness: There is no abdominal tenderness.   Constitutional:       General: She is not in acute distress.     Appearance: She is not ill-appearing.   Pulmonary:      Breath sounds: No wheezing or rhonchi. Rales: left lung base.     Comments: On 2L NC  Neurological:      Mental Status: She is alert and oriented to person, place and time. She is calm and disoriented to time.      Motor: No motor deficit.      Comments: Patient AOx2            Diagnostic Studies        Lab Results: I have reviewed the following results:      Medications:  Scheduled PRN   apixaban, 10 mg, BID  artificial tear, , HS  cefTRIAXone, 1,000 mg, Q24H  chlorhexidine, 15 mL, Q12H BRISSA  multi-electrolyte, 500 mL, Once  senna, 1 tablet, HS  sodium hypochlorite, , Daily      acetaminophen, 975 mg, Q8H PRN       Continuous          Labs:   CBC    Recent Labs     24  0332   WBC 10.14 10.42*   HGB 7.9* 8.0*   HCT 29.8* 30.4*   * 115*     BMP    Recent Labs     24  0332   SODIUM 150* 147   K 4.0 4.3    103   CO2 43* 45*   AGAP 2* -1*   BUN 32* 25   CREATININE 1.43* 1.06   CALCIUM 8.1* 8.4       Coags    Recent Labs     24  0400   PTT 71*        Additional Electrolytes  Recent Labs     24  0332   MG 1.9 2.1   PHOS  --  2.4   CAIONIZED  --  1.14          Blood Gas    Recent Labs     09/15/24  2025 09/16/24  0405   PHART 7.376 7.407   [id number] 73.6* 66.9*   PO2ART 76.9 73.9*   [id number] 42.2* 41.2*   BEART 14.8 14.5   SOURCE Line, Arterial  --      Recent Labs     09/15/24  2025    SOURCE Line, Arterial    LFTs  No recent results    Infectious  No recent results  Glucose  Recent Labs     09/15/24  1456 09/16/24  0405 09/17/24  0332   GLUC 136 125 89

## 2024-09-17 NOTE — ASSESSMENT & PLAN NOTE
9/12 VBG: pH 7.265, pCO2 68.4, pO2 29.8, HCO3 30.4  Likely secondary to hypoventilation secondary to large R. Pleural effusion identified on CT PE 9/12  S/p Thoracentesis on 9/12   Latest Reference Range & Units 09/13/24 23:21 09/14/24 00:00 09/14/24 09:28 09/14/24 12:04 09/15/24 04:37 09/15/24 20:25 09/16/24 04:05   pH, Arterial 7.350 - 7.450  7.241 (L) 7.275 (L) 7.250 (L) 7.325 (L) 7.399 7.376 7.407   pCO2, Arterial 36.0 - 44.0 mm Hg 73.4 (HH) 70.8 (HH) 76.5 (HH) 63.0 (H) 61.6 (H) 73.6 (HH) 66.9 (H)   pO2, Arterial 75.0 - 129.0 mm Hg 140.4 (H) 132.6 (H) 165.0 (H) 85.9 119.3 76.9 73.9 (L)   HCO3, Arterial 22.0 - 28.0 mmol/L 30.8 (H) 32.1 (H) 32.8 (H) 32.1 (H) 37.2 (H) 42.2 (H) 41.2 (H)   Base Excess, Arterial mmol/L 2.6 4.4 4.6 5.2 11.1 14.8 14.5   O2 Content, Arterial 16.0 - 23.0 mL/dL 11.2 (L) 11.4 (L) 10.9 (L) 10.4 (L) 10.3 (L) 11.6 (L) 11.4 (L)   O2 HGB,Arterial 94.0 - 97.0 % 96.7 97.4 (H) 97.0 93.0 (L) 95.6 91.1 (L) 91.4 (L)   Patient with respiratory acidosis and possible concomitant metabolic alkalosis 2nd to diuresis    Plan:  -BiPAP prn  -Check vbg if worsening mental status

## 2024-09-18 PROBLEM — S80.11XA HEMATOMA OF RIGHT LOWER LEG: Status: ACTIVE | Noted: 2024-01-01

## 2024-09-18 NOTE — ANESTHESIA PROCEDURE NOTES
Peripheral Block    Patient location during procedure: OR  Start time: 9/18/2024 10:26 AM  Reason for block: at surgeon's request and post-op pain management  Staffing  Performed by: Agustín Gerber CRNA  Authorized by: Jose Martin Montejo MD    Preanesthetic Checklist  Completed: patient identified, IV checked, site marked, risks and benefits discussed, surgical consent, monitors and equipment checked, pre-op evaluation and timeout performed  Peripheral Block  Prep: ChloraPrep  Patient monitoring: frequent blood pressure checks, continuous pulse oximetry and heart rate  Block type: Femoral  Laterality: right  Injection technique: single-shot  Procedures: ultrasound guided, Ultrasound guidance required for the procedure to increase accuracy and safety of medication placement and decrease risk of complications.  Ultrasound permanent image saved    Needle  Needle type: Stimuplex   Needle length: 2 in  Needle localization: anatomical landmarks and ultrasound guidance  Assessment  Injection assessment: incremental injection, frequent aspiration, injected with ease, negative aspiration, negative for heart rate change, no paresthesia on injection, no symptoms of intraneural/intravenous injection and needle tip visualized at all times  Paresthesia pain: none  Post-procedure:  site cleaned  patient tolerated the procedure well with no immediate complications

## 2024-09-18 NOTE — QUICK NOTE
Postop check    Drowsy but arousable. On bipap. RT at bedside.   Opens eyes to verbal command.   Good b/l hand , and b/l plantar flexion.   Shifting herself in bed  Right leg w ace bandage and vac - serosanguinous ouput.     Cont vac. Plan change/removal in 2-3 days.  Ok for chemical dvt ppx tonight.    Remainder of care per primary team.   Thank you.

## 2024-09-18 NOTE — ASSESSMENT & PLAN NOTE
Patient with expanding right hematoma right lower extremity    Plan  For surgery today for exploration, washout, and wound vac

## 2024-09-18 NOTE — ANESTHESIA POSTPROCEDURE EVALUATION
Post-Op Assessment Note    CV Status:  Stable  Pain scale: Unable to assess at this time.    Pain management: adequate       Post-procedure mental status: Unable to assess at this time.   Hydration Status:  Euvolemic   PONV Controlled:  Controlled   Airway Patency:  Patent     Post Op Vitals Reviewed: Yes    No anethesia notable event occurred.    Staff: CRNA               BP   92/65 (IV levo gtt)   Temp      Pulse  62   Resp 18   SpO2 95% simple mask 6L

## 2024-09-18 NOTE — ASSESSMENT & PLAN NOTE
Wt Readings from Last 3 Encounters:   09/18/24 60.8 kg (134 lb)   04/29/24 62.7 kg (138 lb 3.2 oz)   02/21/24 58.1 kg (128 lb)     Home regimen: Lasix 20 mg every other day    Patient fluid overloaded on examination on 9/12.  CTA PE 9/12: Bilateral ground glass opacities which could be edema vs. Interstitial pneumonia   Administered 40 mg Lasix IV in the ED  Echo showed left ventricular ejection fraction of 60%, diastolic flattening of the interventricular septum consistent with right ventricle volume overload.  RV function moderately dilated, systolic function moderately to severely reduced.  Moderate to severe regurgitation of tricuspid valve estimated RVSP 65 mmHg.  Biatrial enlargement.  No surgical intervention given her age   Patient appears volume overload on physical exam, Also elevated RVSP on echo, will diurese with lasix to reduce right heart strain    Plan:  Holding lasix  Monitor urine output  Daily weights  Monitor I/Os

## 2024-09-18 NOTE — PHYSICAL THERAPY NOTE
PHYSICAL THERAPY CANCELLATION NOTE          Patient Name: Aurora Anderson  Today's Date: 9/18/2024 09/18/24 1030   Note Type   Note Type Cancelled Session   Cancel Reasons Patient to operating room   Assessment   Assessment Chart review performed. Pt currently unavailable to participate in PT intervention, pt currently in OR for RLE exploration, wound debridement, VAC placement. PT will continue to follow pt during current admission as appropriate and as schedule allows.         Kary Mora, PT, DPT  09/18/24

## 2024-09-18 NOTE — OCCUPATIONAL THERAPY NOTE
Occupational Therapy Cancellation Note        Patient Name: Aurora Anderson  Today's Date: 9/18/2024 09/18/24 1035   Note Type   Note type Cancelled Session   Cancel Reasons Patient to operating room   Additional Comments Chart review completed. Spoke to CAITLIN Clarke who reports that pt is currently in OR for debridement/washout of RLE     Anika Fortune MS, OTR/L

## 2024-09-18 NOTE — ASSESSMENT & PLAN NOTE
Patient with expanding right hematoma right lower extremity  S/p exploration, washout and wound vac placement    Plan  Will monitor wound vac for output  Surgery on board

## 2024-09-18 NOTE — ANESTHESIA PROCEDURE NOTES
Arterial Line Insertion    Performed by: Jose Martin Montejo MD  Authorized by: Jose Martin Montejo MD  Preparation: Patient was prepped and draped in the usual sterile fashion.  Indications: hemodynamic monitoring  Orientation:  Left  Location: radial artery  Procedure Details:  Needle gauge: 20  Number of attempts: 1    Post-procedure:  Post-procedure: dressing applied  Waveform: good waveform  Post-procedure CNS: unchanged  Patient tolerance: Patient tolerated the procedure well with no immediate complications  Comments: Ultrasound used

## 2024-09-18 NOTE — RESPIRATORY THERAPY NOTE
09/18/24 1228   Respiratory Assessment   Assessment Type Assess only   General Appearance Lethargic;Unresponsive   Respiratory Pattern Dyspnea at rest   Chest Assessment Chest expansion symmetrical   Resp Comments Patient placed on Bipap per MD. Settings determined by provider.ABG ran on ISTAT   O2 Device bipap C1   Non-Invasive Information   O2 Interface Device Face mask   Non-Invasive Ventilation Mode BiPAP   SpO2 93 %   $ Pulse Oximetry Spot Check Charge Completed   Non-Invasive Settings   IPAP (cm) 22 cm   EPAP (cm) 6 cm   Rate (Set) 28   FiO2 (%) 30   Trigger Sensitivity Flow (lpm) 3   Inspiratory Time (Set) 1   Non-Invasive Readings   Skin Intervention Skin intact   Total Rate 28   MAP (Obs) 12.2   MV (Mech) 12.2   Peak Pressure (Obs) 29   Spontaneous Vt (mL) 556   I/E Ratio (Obs) 1:1.2   Leak (lpm) 19.7   Non-Invasive Alarms   Insp Pressure High (cm H20) 40   Insp Pressure Low (cm H20) 5   Low Insp Pressure Time (sec) 20 sec   MV High (L/min) 20   MV Low (L/min) 2   Vt High (mL) 1000   Vt Low (mL) 100   High Resp Rate (BPM) 50 BPM   Low Resp Rate (BPM) 8 BPM   Apnea Interval (sec) 20

## 2024-09-18 NOTE — PROGRESS NOTES
Progress Note - Critical Care/ICU   Name: Aurora Anderson 89 y.o. female I MRN: [id number]  Unit/Bed#: ICU 13 I Date of Admission: 9/12/2024   Date of Service: 9/18/2024 I Hospital Day: 6       Interval Events:       Patient was noted to be less responsive and hypotensive. She was placed on Bipap with improvement of her mentation. For her BP, she was given Albumin with temporary improvement. Labs showed Hgb of 5.9, so she was transfused 1u PRBC with improvement of her pressures.    She was also noted to have RLE swelling earlier yesterday that got progressively worse. Stat CT was ordered, found to have 20 cm hematoma with active extrav. Surgery notified of results and recommended IR consult.    Pertinent New Data:   blood pressure      Increased pain and swelling to RLE. 2+bilateral pedal pulses, warm lower extremities, neurovascularly intact distally.    CBC:   Lab Results   Component Value Date    WBC 9.71 09/18/2024    HGB 6.7 (L) 09/18/2024    HCT 24.1 (L) 09/18/2024    MCV 83 09/18/2024    PLT 70 (L) 09/18/2024    RBC 2.90 (L) 09/18/2024    MCH 23.1 (L) 09/18/2024    MCHC 27.8 (L) 09/18/2024    RDW 21.7 (H) 09/18/2024    MPV 9.8 09/18/2024    NRBC 1 09/18/2024     Reviewed radiology reports from this admission including: CT right lower extremity.    Assessment and Plan  Diagnosis: RLE hematoma with active extrav, acute blood loss anemia  Plan: Reverse Eliquis with Kcentra, consult IR for possible intervention, monitor H&H & transfuse as necessary    Billing Level:  Critical Care Time Statement: Upon my evaluation, this patient had a high probability of imminent or life-threatening deterioration due to active bleeding, which required my direct attention, intervention, and personal management.  I spent a total of 60 minutes directly providing critical care services, including evaluating for the presence of life-threatening injuries or illnesses, complex medical decision making (to support/prevent further  life-threatening deterioration)., and interpretation of hemodynamic data. This time is exclusive of procedures, teaching, family meetings, and any prior time recorded by providers other than myself.      Jeanette Pascal MD

## 2024-09-18 NOTE — TELEMEDICINE
Inpatient Consult to IR  Consult performed by: Naresh Xie MD  Consult ordered by: Jeanette Pascal MD      It appears that this case was discussed with my on-call IR colleague overnight.  Patient subsequently underwent surgery.  Reconsult IR if needed.       Thank you for allowing Interventional Radiology to participate in the care of Aurora Anderson. Please don't hesitate to call or TigerText us with any questions.     Naresh Xie MD

## 2024-09-18 NOTE — ASSESSMENT & PLAN NOTE
Lab Results   Component Value Date    EGFR 52 09/18/2024    EGFR 52 09/18/2024    EGFR 48 09/17/2024    CREATININE 0.93 09/18/2024    CREATININE 0.93 09/18/2024    CREATININE 1.00 09/17/2024     Baseline Cr 1-1.25    Plan:  -Avoid nephrotoxic medications  -Continue monitoring BMP

## 2024-09-18 NOTE — CONSULTS
DERMATOLOGY:  CONSULTATION   Aurora Anderson 89 y.o. female MRN: [id number]  Unit/Bed#: OR POOL Encounter: [id number]          Inpatient consult to Dermatology     Date/Time  9/17/2024 9:58 AM     Performed by  Paul Lundberg MD   Authorized by  José Miguel Sandoval MD                 DERM CONSULT - How was clinical care provided?: Attending present VIRTUALLY with Derm Resident and Patient; Resident present in-person with Patient; Patient in hospital setting (ED or INPATIENT):  I discussed this case with the resident and reviewed the resident's note, any prescribed medication and any orders placed. I supervised the resident and agree with the resident's management plan as it was presented to me. I was not physically present in the patient's room; however, I personally participated in the virtual visit with the patient and the resident.      Assessment/Recommendations   Based on a thorough discussion of this condition and the management approach to it (including a comprehensive discussion of the known risks, side effects and potential benefits of treatment), the patient (family) agrees to implement the following specific plan:    Lesion most consistent with hematoma given fluctuance and eliquis use although focal bullous pemphigoid cannot be excluded. Recommend US and incision and drainage to see if fluid is serous or hemorrhagic.  and 230 ordered. We will follow-up with patient in our [place] office.      Please set up discharge follow up by calling our office: [phone])     Thank you for involving me in the care of your patient. Please call with questions, change in clinical status or if tests recommended above are abnormal.     Discussed with the primary service.      History:     HISTORY OF PRESENT ILLNESS:    Reason for Consult: Lesion  HPI: Aurora Anderson is a 89 y.o. year old female with hx fluctuant violaceous bulla on R shin for three days. It has worsened within the past day. She is taking eliquis  for PE and notes lesion is painful. Denies other symptoms including fever and pruritus.       ROS:  Negative except as per HPI      PAST MEDICAL HISTORY:  Past Medical History:   Diagnosis Date    Acute kidney injury (HCC)     Acute on chronic respiratory failure (HCC)     HOSSEIN (acute kidney injury) (HCC) 2024    Arthritis     Atrial flutter (HCC)     Rapid heart rate      Past Surgical History:   Procedure Laterality Date    CATARACT EXTRACTION      EGD AND COLONOSCOPY N/A 2017    Procedure: EGD AND COLONOSCOPY;  Surgeon: Radha Salas MD;  Location: [facility];  Service: Gastroenterology    HIP FRACTURE SURGERY         FAMILY HISTORY:  Non-contributory    SOCIAL HISTORY:  Social History     Social History     Substance and Sexual Activity   Alcohol Use No     Social History     Substance and Sexual Activity   Drug Use No     Social History     Tobacco Use   Smoking Status Never   Smokeless Tobacco Never   Tobacco Comments    former smoker, per [facility];  started smoking at 15 yo, stopped at 29 yo       ALLERGIES:  Allergies   Allergen Reactions    Penicillins Rash       MEDICATIONS:  All current active medications have been reviewed.       Physical exam:     Temp:  [93.6 °F (34.2 °C)-98.1 °F (36.7 °C)] 97.1 °F (36.2 °C)  HR:  [] 99  Resp:  [7-69] 10  BP: (61-97)/(37-63) 97/56  SpO2:  [84 %-100 %] 99 %  Temp (24hrs), Av.7 °F (35.4 °C), Min:93.6 °F (34.2 °C), Max:98.1 °F (36.7 °C)  Current: Temperature: (!) 97.1 °F (36.2 °C)    Skin: Fluctuant violaceous bulla on R shin                                    Labs, Imaging, & Other Studies     Lab Results:  I have personally reviewed pertinent labs.     Results from last 7 days   Lab Units 24  04424  0438 24  0145 24  2231   WBC Thousand/uL 9.46  --  9.71 8.06   HEMOGLOBIN g/dL 7.7* 7.5* 6.7* 5.9*   PLATELETS Thousands/uL 75*  --  70* 77*     Results from last 7 days   Lab Units 24  0441 09/15/24  043  09/14/24  0000 09/13/24  0444 09/12/24  1609   POTASSIUM mmol/L 3.7   < > 4.1   < >  --    CHLORIDE mmol/L 104   < > 106   < >  --    CO2 mmol/L 41*   < > 34*   < >  --    CO2, I-STAT mmol/L  --   --   --   --  31   BUN mg/dL 23   < > 38*   < >  --    CREATININE mg/dL 0.93   < > 1.53*   < >  --    EGFR ml/min/1.73sq m 52   < > 28   < >  --    GLUCOSE, ISTAT mg/dl  --   --   --   --  97   CALCIUM mg/dL 7.9*   < > 8.0*   < >  --    AST U/L  --   --  16  --   --    ALT U/L  --   --  9  --   --    ALK PHOS U/L  --   --  192*  --   --     < > = values in this interval not displayed.     Results from last 7 days   Lab Units 09/13/24  1649 09/13/24  1053 09/13/24  0611 09/12/24  1453   BLOOD CULTURE   --   --  No Growth After 4 Days.  No Growth After 4 Days.  --    GRAM STAIN RESULT  2+ Gram negative rods*  2+ Gram positive cocci in clusters*  2+ Polys*  --   --  No organisms seen   URINE CULTURE   --  >100,000 cfu/ml Staphylococcus aureus*  60,000-69,000 cfu/ml Methicillin Resistant Staphylococcus aureus*  --   --    WOUND CULTURE  1 colony Pseudomonas aeruginosa*  4+ Growth of  --   --   --    BODY FLUID CULTURE, STERILE   --   --   --  No growth           Pathology:   I have personally reviewed pertinent reports.

## 2024-09-18 NOTE — CASE MANAGEMENT
Case Management Discharge Planning Note    Patient name Aurora Anderson  Location OR POOL/OR POOL MRN [id number]  : 1929 Date 2024       Current Admission Date: 2024  Current Admission Diagnosis:Shock (HCC)   Patient Active Problem List    Diagnosis Date Noted Date Diagnosed    Hematoma of right lower leg 2024     Bulla 2024     MRSA (methicillin resistant staph aureus) culture positive 2024     Atrial fibrillation (HCC) 2024     Sacral wound 2024     Acute pulmonary embolism with acute cor pulmonale (HCC) 2024     Pleural effusion, right 2024     Dizziness 2024     Failure to thrive in adult 2024     Pressure injury of left foot, unstageable ([facility]) 2024     Pressure injury of right foot, unstageable ([facility]) 2024     Ambulatory dysfunction 2024     Pressure injury of sacral region, stage 4 ([facility]) 2024     Anemia due to chronic kidney disease and iron deficiency 2024     Lymphedema 2024     Shock (HCC) 2024     Chronic heart failure with preserved ejection fraction ([facility]) 2021     Breast mass, right 2021     HOSSEIN (acute kidney injury) ([facility]) 2021     Atypical atrial flutter ([facility]) 2021     Acute on chronic respiratory failure with hypoxia and hypercapnia ([facility]) 2021     Anemia 2021     Respiratory acidosis 2017     Right bundle branch block (RBBB) on electrocardiogram (ECG) 2017     Diverticulosis of colon 2017     Large hiatal hernia 2017     Incidental 6cm right Renal lesion on CT 2017       LOS (days): 6  Geometric Mean LOS (GMLOS) (days): 5.1  Days to GMLOS:-0.7     OBJECTIVE:  Risk of Unplanned Readmission Score: 21.57         Current admission status: Inpatient   Preferred Pharmacy:   [id number]  [place] - [address]  [address] [address]  Phone: [phone] Fax:  [phone]    [facility], [place] - [address]  [address] [address]  Phone: [phone] Fax: [phone]    *[id number] - [place], [place] - [address],  [address],  [facility] [address]  Phone: [phone] Fax: [phone]    Primary Care Provider: Anyi Lopez MD    Primary Insurance: MEDICARE  Secondary Insurance: [employer]ENDING    DISCHARGE DETAILS:    Discharge planning discussed with:: Pt's daughterYana  Freedom of Choice: Yes  Comments - Freedom of Choice: Return home with [facility]. Decline STR    Contacts  Patient Contacts: rogelio Millan  Relationship to Patient:: Family  Contact Method: In Person  Reason/Outcome: Continuity of Care, Discharge Planning, Emergency Contact, Referral    Requested Home Health Care         Is the patient interested in [facility] at discharge?: Yes  Home Health Discipline requested:: Nursing, Occupational Therapy, Physical Therapy  Home Health Agency Name:: Other (TBD)  Home Health Follow-Up Provider:: PCP  Home Health Services Needed:: Evaluate Functional Status and Safety, Gait/ADL Training, Strengthening/Theraputic Exercises to Improve Function, Other (comment)  Homebound Criteria Met:: Requires the Assistance of Another Person for Safe Ambulation or to Leave the Home, Uses an Assist Device (i.e. cane, walker, etc)  Supporting Clincal Findings:: Limited Endurance, Fatigues Easliy in Short Distances    Other Referral/Resources/Interventions Provided:  Interventions: Short Term Rehab, [facility]  Referral Comments: TUCKER met with pt's daughter, Yana, at bedside. CM reviewed PT/OT recs for STR. Yana reports that she does not want the pt to go to rehab at this time. Preference is for her to return home with [facility]. They previously had [facility] and are agreeable to referral. Yana reports she can be with pt 24/7 and assist. She feels she is able to manage pt at home. CM did review if she feels  differently as pt is here at the hospital that CM can place STR referrals. Yana reports they plan to apply for MA for the pt. She reports pt has a  PCP. Aware CM will place OP CM referral for assistance with MA and waiver for additional support. Yana denies additional questions at this time. Referral placed to [facility].

## 2024-09-18 NOTE — PROGRESS NOTES
Progress Note - Infectious Disease   Aurora Anderson 89 y.o. female MRN: [id number]  Unit/Bed#: ICU 13 Encounter: [id number]      Impression/Recommendations:  1.  Possible pneumonia.  CXR and chest CT findings were equivocal for pneumonia and her elevated procalcitonin may be all secondary to HOSSEIN, given response to ceftriaxone, will have her complete treatment course for pneumonia.  Continue IV ceftriaxone.  Monitor temperature/WBC.  Monitor respiratory status.  Treat x 7-day antibiotic course, last dose tomorrow.     2.  MRSA bacteriuria.  This is most likely bladder colonization.  Although Staph aureus is not a common urinary bladder colonizer, it can be seen in elderly patient with urinary retention..  Patient's admission blood cultures had no growth and she really did not have evidence of sepsis clinically, which would be expected to be present with Staph aureus bacteremia.  Given clinical improvement with ceftriaxone above, plan was not to treat MRSA bacteriuria.   No antibiotic needed for this indication.     3.  Sacral wound which is probed to bone.  Finding of probing to bone suggest underlying osteomyelitis.  Patient is status post local I&D, without evidence of cellulitis.  Given significant generalized debility and bedbound state, likely very high risk for extensive I&D and flap closure, prolonged antibiotic course would be futile.  Best course of action would be to continue local wound care.  Light growth of Pseudomonas in wound culture wound culture is most likely ulcer/wound colonization.  No antibiotic needed for this indication.  Continue local wound care.     4.  Bilateral PE.  Patient is currently on anticoagulation.  Anticoagulation per primary service.     5.  Pulmonary edema with large right pleural effusion.  Patient is status post thoracentesis with pleural fluid finding consistent with transudate.  She is improved with diuresis.  Diuresis per primary service.     6.  Acute hypoxic respiratory  failure, likely multifactorial, secondary to PE, pulmonary edema and possibly pneumonia.  Patient is clinically improved.  O2 support is decreased.  O2 support and weaning per primary service.     7.  Hypotension, initially most likely secondary to PE.  Patient had been on pressor but now off it.  However, over the last 24 hours, patient became hypotensive again.  She was found to have an enlarging hematoma in right lower leg, which was evacuated earlier today.  Her hypotension is most likely hemorrhagic in etiology.  No evidence of septic shock clinically.  Management per critical medicine service.     8.  HOSSEIN, superimposed on CKD.  Creatinine continues to improve.  Ceftriaxone does not need dose adjustment.  Monitor creatinine.     9.  PCN allergy.  Patient is tolerating cephalosporin well.  Monitor for cross allergic reaction.     Discussed with Dr. Briceno from critical care medicine service regarding antibiotic plan above.  He is in agreement.     Antibiotics:  Ceftriaxone  Antibiotic # 5     Subjective:  Events noted.  Patient's hypotension worsened throughout the day yesterday.  CT right leg showed enlarging hematoma.  Earlier today, patient was taken the OR to undergo evacuation of large hematoma in right lower leg with cauterization and Surgicel for hemostasis.  Patient is currently sedated.    Objective:  Vitals:  Temp:  [92.7 °F (33.7 °C)-99 °F (37.2 °C)] 92.8 °F (33.8 °C)  HR:  [64-99] 80  Resp:  [7-51] 12  BP: ()/(37-67) 134/67  SpO2:  [86 %-100 %] 100 %  Temp (24hrs), Av.4 °F (35.2 °C), Min:92.7 °F (33.7 °C), Max:99 °F (37.2 °C)  Current: Temperature: (!) 92.8 °F (33.8 °C)    Physical Exam:     General: Sedated, minimal response to verbal stimuli, comfortable, nontoxic.   Neck:  Supple. No mass.  No lymphadenopathy.   Lungs: Expansion symmetric, no rales, no wheezing, respirations unlabored.   Heart:  Regular rate and rhythm, S1 and S2 normal, no murmur.   Abdomen: Soft, nondistended, non-tender,  bowel sounds active all four quadrants, no masses, no organomegaly.   Extremities: Right leg with dressing in place.  No purulence.  No erythema/warmth.  No obvious tenderness.   Skin:  No rash.   Neuro: Difficult to assess.     Invasive Devices       Peripheral Intravenous Line  Duration             Peripheral IV 09/16/24 Left;Ventral (anterior) Forearm 2 days    Peripheral IV 09/16/24 Distal;Right;Dorsal (posterior) Forearm 1 day    Peripheral IV 09/18/24 Left Hand <1 day    Peripheral IV 09/18/24 Proximal;Right;Ventral (anterior) Forearm <1 day              Arterial Line  Duration             Arterial Line 09/18/24 Left Radial <1 day                    Labs studies:   I have personally reviewed pertinent labs.  Results from last 7 days   Lab Units 09/18/24  1243 09/18/24  1227 09/18/24  1217 09/18/24  1108 09/18/24  1101 09/18/24  0441 09/18/24  0145 09/15/24  0438 09/14/24  0000   POTASSIUM mmol/L  --  5.2  --   --   --  3.7 3.7   < > 4.1   CHLORIDE mmol/L  --  109*  --   --   --  104 103   < > 106   CO2 mmol/L  --  36*  --   --   --  41* 42*   < > 34*   BUN mg/dL  --  20  --   --   --  23 23   < > 38*   CREATININE mg/dL  --  0.82  --   --   --  0.93 0.93   < > 1.53*   EGFR ml/min/1.73sq m  --  61  --   --   --  52 52   < > 28   GLUCOSE, ISTAT mg/dl 111  --  115 100   < >  --   --   --   --    CALCIUM mg/dL  --  7.6*  --   --   --  7.9* 7.9*   < > 8.0*   AST U/L  --  10*  --   --   --   --   --   --  16   ALT U/L  --  5*  --   --   --   --   --   --  9   ALK PHOS U/L  --  59  --   --   --   --   --   --  192*    < > = values in this interval not displayed.     Results from last 7 days   Lab Units 09/18/24  1243 09/18/24  1227 09/18/24  1217 09/18/24  1101 09/18/24  0441 09/18/24  0438 09/18/24  0145   WBC Thousand/uL  --  12.38*  --   --  9.46  --  9.71   HEMOGLOBIN g/dL  --  8.5*  --   --  7.7*   < > 6.7*   I STAT HEMOGLOBIN g/dl 8.2*  --  7.8*   < >  --   --   --    PLATELETS Thousands/uL  --  59*  --   --   75*  --  70*    < > = values in this interval not displayed.     Results from last 7 days   Lab Units 09/13/24  1649 09/13/24  1053 09/13/24  0611 09/12/24  1453   BLOOD CULTURE   --   --  No Growth After 5 Days.  No Growth After 5 Days.  --    GRAM STAIN RESULT  2+ Gram negative rods*  2+ Gram positive cocci in clusters*  2+ Polys*  --   --  No organisms seen   URINE CULTURE   --  >100,000 cfu/ml Staphylococcus aureus*  60,000-69,000 cfu/ml Methicillin Resistant Staphylococcus aureus*  --   --    WOUND CULTURE  1 colony Pseudomonas aeruginosa*  4+ Growth of  --   --   --    BODY FLUID CULTURE, STERILE   --   --   --  No growth       Imaging Studies:   I have personally reviewed pertinent imaging study reports and images in PACS.  Right leg CT reviewed personally.  Large complex collection consistent with hematoma.    EKG, Pathology, and Other Studies:   I have personally reviewed pertinent reports.

## 2024-09-18 NOTE — ASSESSMENT & PLAN NOTE
Was presumed to be blister, continued to rapidly expand. Patient became hypotensive, Hgb dropped to 5.9 from 7.8 Eliquis held, s/p Kcentra and 2u pRBC on 9/17-9/19 AM.  9/17 CT RLE-hematoma with active extravasation    Plan  IR consulted- too small to embolize  OR today for exploration, washout, VAC placement of RLE  RLE compression  Serial H/H  Monitor progression

## 2024-09-18 NOTE — QUICK NOTE
Pt seen and examined in preop. TEG results with FFP indicated. Currently on levophed at 10. SBP 97. A/ox3. Nad. Right leg ACE saturated posterolat. FFP started then OR. Plan is for exploration, debridement and control of bleeding.     Of note, pt is here with bilateral PE treated with Eliquis. Reversed with kcentra. No respiratory distress, no chest pain at this time. Pt is high risk for periop cardiorespiratory decompensation. This was discussed with patient. At the present time, pt  states she is full code. Pt states her daughter helps with decisions.     Correction. ICU team clarified that the patient and family had decided on DNR/DNI status. Operative plan discussed with Anesthesia. Anesthetic plan for nerve block and mild sedation.

## 2024-09-18 NOTE — ASSESSMENT & PLAN NOTE
Lab Results   Component Value Date    EGFR 54 09/19/2024    EGFR 61 09/18/2024    EGFR 52 09/18/2024    CREATININE 0.90 09/19/2024    CREATININE 0.82 09/18/2024    CREATININE 0.93 09/18/2024     Baseline Cr 1-1.25    Plan:  -Avoid nephrotoxic medications  -Continue monitoring BMP

## 2024-09-18 NOTE — ASSESSMENT & PLAN NOTE
Patient present with atrial fibrillation and RVR,   Patient has history of atrial flutter, non compliant with medications since February  Echo showed left ventricular ejection fraction of 60%, diastolic flattening of the interventricular septum consistent with right ventricle volume overload.  RV function moderately dilated, systolic function moderately to severely reduced.  Moderate to severe regurgitation of tricuspid valve estimated RVSP 65 mmHg.  Biatrial enlargement.   Left atrial enlargement, may allude to chronic changes  Avoiding beta blockers cardizem in the setting of hypotension  Patient on levophed for pressor support  Started on digoxin on 09/13/2024 inotropic, chronotropic & dromotropic effects  HR improved on digoxin in 80s-90s    Plan   Not on any anticoagulation in setting of hemmorhagic shock

## 2024-09-18 NOTE — PROGRESS NOTES
Progress Note - Surgery-General   Name: Aurora Anderson 89 y.o. female I MRN: [id number]  Unit/Bed#: ICU 13 I Date of Admission: 9/12/2024   Date of Service: 9/18/2024 I Hospital Day: 6    Assessment & Plan  Hematoma of right lower leg  Was presumed to be blister, continued to rapidly expand. Patient became hypotensive, Hgb dropped to 5.9 from 7.8 Eliquis held, s/p Kcentra and 2u pRBC on 9/17-9/19 AM.  9/17 CT RLE-hematoma with active extravasation    Plan  IR consulted- too small to embolize  OR today for exploration, washout, VAC placement of RLE  RLE compression  Serial H/H  Monitor progression          History of Present Illness   Patient continued to have progression of RLE wound into the evening on 9/17. Became hypotensive and Hgb drop 5.9 from 7.8. She receieved 1u pRBC, Eliquis held and receieved Kcentra. Taken to CT which was c/w hematoma with active extrav. Wound is now breaking down. Patient otherwise unable to give report, confused and agitated, necessitating 1:1 obs.    Objective      Temp:  [93.6 °F (34.2 °C)-98.4 °F (36.9 °C)] 93.7 °F (34.3 °C)  HR:  [] 70  Resp:  [11-69] 21  BP: (61-97)/(41-63) 89/54  FiO2 (%):  [30] 30  O2 Device: Nasal cannula          I/O         09/16 0701  09/17 0700 09/17 0701  09/18 0700    P.O. 924 205    I.V. (mL/kg) 91 (1.5)     Blood  850    IV Piggyback 290 580    Total Intake(mL/kg) 1305 (21.5) 1635 (26.9)    Urine (mL/kg/hr) 1380 (0.9) 700 (0.5)    Total Output 1380 700    Net -75 +935                Lines/Drains/Airways       Active Status       None                  Physical Exam   General: NAD  HENT: NCAT MMM  Neck: supple, no JVD  CV: nl rate  Lungs: nl wob. No resp distress  ABD: Soft, nontender, nondistended  Extrem: chronic wounds. wound on LLE covered in mepilex. RLE wound with surrounding erythema that is markedly raised, tender.  Neuro: AAOx3    Lab Results: I have reviewed the following results: CBC/BMP:   .     09/18/24  0145   WBC 9.71   HGB 6.7*   HCT  24.1*   PLT 70*   SODIUM 145   K 3.7      CO2 42*   BUN 23   CREATININE 0.93   GLUC 103   MG 2.0   PHOS 2.1*      Imaging Review: Reviewed radiology reports from this admission including: CT RLE.  Other Studies: No additional pertinent studies reviewed.    VTE Pharmacologic Prophylaxis: VTE covered by:    None     VTE Mechanical Prophylaxis: sequential compression device

## 2024-09-18 NOTE — ASSESSMENT & PLAN NOTE
AEB hypotension and end organ dysfunction (HOSSEIN) despite fluids/albumin requiring pressor therapy  Leukocytosis and tachycardia present although likely reactive in setting of pulmonary embolism and atrial fibrillation  Shock likely obstructive vs cardiogenic  Echocardiogram showing normal left ventricular function although left atrium is severely dilated and moderate mitral regurgitation is present.  Loss of atrial kick from Afib/flutter thought at least in part to be contributing to shock state but pressor requirements remain unchanged despite rate control with digoxin.  Echo also shows new severe right ventricle dysfunction in the mid to apical free wall compared to echocardiogram in January of this year. Suspect this is in the setting of acute PE vs acutely/chronically worsening pulmonary artery hypertension vs CAD.  Component of CTEPH also a possibility as chronicity of symptoms unclear  Attempts to diurese patient to alleviate right heart strain and poor coaptation of tricupsid valve have not improved pressor requirements despite large urine output over last 24 hrs  Consider addition of PDE for tx of pulmonary hypertension, Patient unlikely to be a good surgical candidate for tx of CTEPH, will explore possibility of medical management with adempas   +/- cardiology/advanced heart failure consultation - patient may need cardiac catheterization       Plan:  See plan for hemmorhagic shock

## 2024-09-18 NOTE — ASSESSMENT & PLAN NOTE
Wt Readings from Last 3 Encounters:   09/19/24 73.4 kg (161 lb 13.1 oz)   04/29/24 62.7 kg (138 lb 3.2 oz)   02/21/24 58.1 kg (128 lb)     Home regimen: Lasix 20 mg every other day    Patient fluid overloaded on examination on 9/12.  CTA PE 9/12: Bilateral ground glass opacities which could be edema vs. Interstitial pneumonia   Administered 40 mg Lasix IV in the ED  Echo showed left ventricular ejection fraction of 60%, diastolic flattening of the interventricular septum consistent with right ventricle volume overload.  RV function moderately dilated, systolic function moderately to severely reduced.  Moderate to severe regurgitation of tricuspid valve estimated RVSP 65 mmHg.  Biatrial enlargement.  No surgical intervention given her age   Patient appears volume overload on physical exam, Also elevated RVSP on echo, will diurese with lasix to reduce right heart strain    Plan:  Holding lasix  Monitor urine output  Daily weights  Monitor I/Os

## 2024-09-18 NOTE — WOUND OSTOMY CARE
Progress Note - Wound   Aurora Anderson 89 y.o. female MRN: [id number]  Unit/Bed#: ICU 13 Encounter: [id number]          Wound care called to bedside by primary nurse and critical care resident.  Patient placed in cardiac chair position, right lower extremity with rapidly developed fluid filled bullae. Right lower extremity warm and erythematous. CC AP, resident, and primary nurse at bedside for assessment.    Findings:    R lower leg fluid filled bullae -  large oval shaped intact fluid filled blister; fluctuant and fragile. Park wound dry, intact, erythematous. No open aspects or drainage noted. Etiology unclear. Recommend using adaptic and loose dressing of ABD and kerlex to protect blister while being prepared for spontaneous rupture. Elevate extremity.         New dressings applied per orders listed below. Patient tolerated well- no s/s of non-verbal pain or discomfort observed during the encounter. Bedside nurse aware of plan of care. See flow sheets for more detailed assessment findings.      Orders listed below and wound care will continue to follow, call or Secure Chat with questions.     Cleanse skin gently with NSS soaked gauze. Apply large adaptic to blister, cover with ABD and wrap with kerlex. Change daily or PRN for soilage / dislodgement.                          Rosio Gonzales RN, BSN, CCRN

## 2024-09-18 NOTE — ASSESSMENT & PLAN NOTE
Likely contributed to by known pulmonary emboli, pleural effusion now s/p thora, heart failure exacerbation given patient's fluid overloaded status and  with wnl troponins.     CTA PE 9/12: bilateral segmental and subsegmental emboli with evidence of right heart strain, RV/LV ratio 1.2, bilateral ground glass opacities which could be edema vs. Interstitial pneumonia with RLL opacity which can represent atelectasis vs. Infiltrate. Large R pleural effusion, R. Breast mass suspicious for neoplasm.    Plan:  -Bipap as needed  -Check VBG if change in mental status  -Wean O2 as tolerated to maintain SpO2>95%

## 2024-09-18 NOTE — SPEECH THERAPY NOTE
Speech Language/Pathology    Speech/Language Pathology Cancel Note    Patient Name: Aurora Anderson  Today's Date: 9/18/2024       Pt currently in OR for RLE wound exploration. Pt NPO, will follow up 9/19 as able, as appropriate.       Mohini Person MA CCC-SLP  Speech Pathologist  Available via [employer]

## 2024-09-19 PROBLEM — Z51.5 PALLIATIVE CARE ENCOUNTER: Status: ACTIVE | Noted: 2024-01-01

## 2024-09-19 PROBLEM — R57.8 HEMORRHAGIC SHOCK (HCC): Status: ACTIVE | Noted: 2024-01-01

## 2024-09-19 NOTE — ASSESSMENT & PLAN NOTE
Patient complains of dyspnea and pleuritic which is new today and leg swelling for the past 3 days.  Patient with respiratory acidosis on VBG, fluid overloaded on examination.  CXR on admission showed moderate-large right sided pleural effusion.   Patient had bedside thoracentesis done.   CT PE 9/12: Bilateral segmental and subsegmental emboli with evidence of right heart strain, calculated ratio of right ventricular to left ventricular diameter (RV/LV ratio) is 1.2.   Suspicious r breast mass  Echo showed: diastolic flattening of the interventricular septum consistent with right ventricle volume overload. RV function moderately dilated, systolic function moderately to severely reduced. Moderate to severe regurgitation of tricuspid valve estimated RVSP 65 mmHg.     Plan:  -Holding anticoagulation in the setting of acute blood loss  -Monitor INR  -Monitor CBC

## 2024-09-19 NOTE — CONSULTS
Consultation - Palliative Care   Name: Aurora Anderson 89 y.o. female I MRN: [id number]  Unit/Bed#: ICU 13 I Date of Admission: 9/12/2024   Date of Service: 9/19/2024 I Hospital Day: 7   Consults  Physician Requesting Evaluation: Paramjit Alves MD   Reason for Evaluation / Principal Problem: Hemorraghic shock    Assessment & Plan  HOSSEIN (acute kidney injury) (HCC)  Baseline Cr 1-1.25  Plan:  -Avoid nephrotoxic medications  -Continue monitoring BMP  Acute on chronic respiratory failure with hypoxia and hypercapnia (HCC)  Likely contributed to by known pulmonary emboli, pleural effusion now s/p thora, heart failure exacerbation given patient's fluid overloaded status and  with wnl troponins.      CTA PE 9/12: bilateral segmental and subsegmental emboli with evidence of right heart strain, RV/LV ratio 1.2, bilateral ground glass opacities which could be edema vs. Interstitial pneumonia with RLL opacity which can represent atelectasis vs. Infiltrate. Large R pleural effusion, R. Breast mass suspicious for neoplasm.     Plan:  -Bipap as needed  -Check VBG if change in mental status  -Wean O2 as tolerated to maintain SpO2>95%  Chronic heart failure with preserved ejection fraction (HCC)  Wt Readings from Last 3 Encounters:   09/19/24 73.4 kg (161 lb 13.1 oz)   04/29/24 62.7 kg (138 lb 3.2 oz)   02/21/24 58.1 kg (128 lb)     Patient fluid overloaded on examination on 9/12.  CTA PE 9/12: Bilateral ground glass opacities which could be edema vs. Interstitial pneumonia   Administered 40 mg Lasix IV in the ED  Echo showed left ventricular ejection fraction of 60%, diastolic flattening of the interventricular septum consistent with right ventricle volume overload.  RV function moderately dilated, systolic function moderately to severely reduced.  Moderate to severe regurgitation of tricuspid valve estimated RVSP 65 mmHg.  Biatrial enlargement.  No surgical intervention given her age   Patient appears volume overload on  physical exam, Also elevated RVSP on echo, will diurese with lasix to reduce right heart strain     Plan:  Holding lasix  Monitor urine output  Daily weights  Monitor I/Os    Palliative care encounter  Palliative diagnoses: CHF  Goals of care  Level 3 code status  Disease focused care with DNR/DNI limits placed.   Left a message for family requesting a call back to discuss goals of care.     Follow up  Palliative Care will continue to follow and goals of care discussions will be ongoing.    Please reach out via [place] Secure Chat if questions or concerns arise.    I have reviewed the patient's controlled substance dispensing history in the Prescription Drug Monitoring Program in compliance with the [facility] regulations before prescribing any controlled substances.  Last refills  No opioid or benzo refills in [place] over past year.    Decisional apparatus:  Patient lacks capacity on exam today.  If capacity is lost, patient's substitute decision maker would default to adult children by [place] 169.  Advance Directive/Living Will, POLST and POA Forms: On file and reviewed, medical directives start on page 6.  Names rogelio Santamaria as primary decision maker with ZORAIDA Da Silva as secondary decision maker.  Please note, first 5 pages are about finances and decision makers are listed in opposite order for financial decisions.  ER contacts:  Name Relation Home Work Mobile   Yana Santamaria Daughter [phone]     Avinash Santamaria Son In-Law [phone]       We appreciate the invitation to be involved in this patient's care.  We will continue to follow throughout this hospitalization.  Please do not hesitate to reach our on call provider through our clinic answering service at [phone] should you have acute symptom control concerns.    Jennifer P. Bloch, MSN, CRNP, ACHPN  Palliative and Supportive Care  Clinic/Answering Service: [phone]  You can find me on [employer]!       History of Present Illness   HPI:  Aurora Anderson is a 89 y.o. year old female who presented with SOB and fatigue.  On admission had respiratory acidosis on VBG, fluid overloaded on examination. EKG on admission showed Afib with RVR. CXR on admission showed moderate-large right sided pleural effusion. Patient had bedside thoracentesis done 1L of fluid drawn. CTA PE study showed multiple bilateral pulmonary emboli with right heart strain, RV/LV ratio of 1.2, edema vs. Interstitial pneumonia. Has been in the ICU for management since that time. She is currently levophed for blood pressure support and no in restraints and sedated due to agitation. Palliative Medicine has been consulted to assist with goals of care counseling during this admission.     Patient seen with no family present. She is resting comfortably with BiPAP in place. Does not open eyes to voice. Will follow up with family.      Left a message for daughter Yana requesting a call back to discuss goals of care.    Review of Systems   Unable to perform ROS: Acuity of condition     Objective      Temp:  [93.2 °F (34 °C)-101.1 °F (38.4 °C)] 95.7 °F (35.4 °C)  HR:  [] 73  Resp:  [9-45] 19  BP: ()/(50-73) 108/62  Arterial Line BP: ()/(41-91) 104/51  FiO2 (%):  [30-40] 30  O2 Device: Nasal cannula          I/O last 24 hours:  In: 5484.1 [I.V.:4284.1; Blood:700; IV Piggyback:500]  Out: 2050 [Urine:1400; Drains:250; Blood:400]  Lines/Drains/Airways       Active Status       Name Placement date Placement time Site Days    Arterial Line 09/18/24 Left Radial 09/18/24  1032  Radial  1    Urethral Catheter 09/18/24  1706  --  less than 1                  Physical Exam  Vitals and nursing note reviewed.   Constitutional:       Appearance: She is ill-appearing.      Interventions: Face mask in place.   Eyes:      Comments: closed   Cardiovascular:      Rate and Rhythm: Normal rate.   Pulmonary:      Effort: Pulmonary effort is normal. No tachypnea, bradypnea, accessory muscle usage  or respiratory distress.      Comments: BiPAP mask in place  Abdominal:      General: There is no distension.   Skin:     Coloration: Skin is pale.      Findings: Bruising present.   Neurological:      Mental Status: She is lethargic.      Cranial Nerves: No dysarthria or facial asymmetry.          Lab Results: I have reviewed the following results: CBC/BMP:   .     09/19/24  0447 09/19/24  0804 09/19/24  1111 09/19/24  1344   WBC 14.89*  --   --   --    HGB 8.5*   < > 8.5* 8.5*   HCT 26.7*  --   --  25*   PLT 79*  --  72*  --    SODIUM 151*  --   --   --    K 4.1  --   --   --    *  --   --   --    CO2 39*  --   --  41*   BUN 18  --   --   --    CREATININE 0.90  --   --   --    GLUC 81  --   --   --    CAIONIZED 1.18  --   --  1.25   MG 1.9  --   --   --     < > = values in this interval not displayed.    , LFTs: No new results in last 24 hours.   Lab Results   Component Value Date/Time    SODIUM 151 (H) 09/19/2024 04:47 AM    K 4.1 09/19/2024 04:47 AM    BUN 18 09/19/2024 04:47 AM    CREATININE 0.90 09/19/2024 04:47 AM    GLUC 81 09/19/2024 04:47 AM    CALCIUM 8.5 09/19/2024 04:47 AM    AST 10 (L) 09/18/2024 12:27 PM    ALT 5 (L) 09/18/2024 12:27 PM    ALB 2.4 (L) 09/18/2024 12:27 PM    TP 4.5 (L) 09/18/2024 12:27 PM    EGFR 54 09/19/2024 04:47 AM    EGFR 41 11/13/2017 04:30 PM     Lab Results   Component Value Date/Time    HGB 8.5 (L) 09/19/2024 01:44 PM    HGB 8.5 (L) 09/19/2024 11:11 AM    WBC 14.89 (H) 09/19/2024 04:47 AM    PLT 72 (L) 09/19/2024 11:11 AM    INR 1.61 (H) 09/19/2024 11:11 AM    PTT 41 (H) 09/19/2024 11:11 AM     Lab Results   Component Value Date/Time    HVY7VJPNCAUM 1.952 09/18/2024 07:41 PM       Imaging Review: No pertinent imaging studies reviewed.  Other Studies:   Administrative Statements   I have spent a total time of 50 minutes in caring for this patient on the day of the visit/encounter including Documenting in the medical record, Reviewing / ordering tests, medicine, procedures   , Obtaining or reviewing history  , and Communicating with other healthcare professionals .

## 2024-09-19 NOTE — ASSESSMENT & PLAN NOTE
Patient had hemorrhagic shock s/p enlarging right leg hematoma  S/p surgical exploration, washout, and wound vac placement  S/p 3 units PRBCs, 1 unit plasma, IV resuscitation    Plan  Continue levophed for MAP goals >65  Monitor hemoglobin  q4 hour transfuse if hgb <7  Monitor wound vac from right leg

## 2024-09-19 NOTE — ASSESSMENT & PLAN NOTE
AEB hypotension and end organ dysfunction (HOSSEIN) despite fluids/albumin requiring pressor therapy  Leukocytosis and tachycardia present although likely reactive in setting of pulmonary embolism and atrial fibrillation  Shock likely obstructive vs cardiogenic  Echocardiogram showing normal left ventricular function although left atrium is severely dilated and moderate mitral regurgitation is present.  Loss of atrial kick from Afib/flutter thought at least in part to be contributing to shock state but pressor requirements remain unchanged despite rate control with digoxin.  Echo also shows new severe right ventricle dysfunction in the mid to apical free wall compared to echocardiogram in January of this year. Suspect this is in the setting of acute PE vs acutely/chronically worsening pulmonary artery hypertension vs CAD.  Component of CTEPH also a possibility as chronicity of symptoms unclear  Attempts to diurese patient to alleviate right heart strain and poor coaptation of tricupsid valve have not improved pressor requirements despite large urine output over last 24 hrs  Consider addition of PDE for tx of pulmonary hypertension, Patient unlikely to be a good surgical candidate for tx of CTEPH, will explore possibility of medical management with adempas       Plan:  See plan for hemmorhagic shock

## 2024-09-19 NOTE — TELEPHONE ENCOUNTER
HFU per Dr. Lundberg/// Called Pt's daughter to Pre-reg for 9/24 appt, Pt still in ICU, Per Dr Lundberg schedule in 2 weeks, scheduled for 10/3 @ 11am.      If Pt's daughter calls to confirm appt, please obtain Insurance info and enter into Epic, Pt's daughter advised they are trying to get [place] Medicaid for her as Pt only has Part A Medicare, Pt's daughter is aware that if Pt does not have insurance it would be self pay.. Paying $30 at time of visit and then being balance billed @ 50%.

## 2024-09-19 NOTE — ASSESSMENT & PLAN NOTE
Palliative diagnoses: CHF  Goals of care  Level 3 code status  Disease focused care with DNR/DNI limits placed.   Left a message for family requesting a call back to discuss goals of care.     Follow up  Palliative Care will continue to follow and goals of care discussions will be ongoing.    Please reach out via [employer] Secure Chat if questions or concerns arise.    I have reviewed the patient's controlled substance dispensing history in the Prescription Drug Monitoring Program in compliance with the [facility] regulations before prescribing any controlled substances.  Last refills  No opioid or benzo refills in [place] over past year.    Decisional apparatus:  Patient lacks capacity on exam today.  If capacity is lost, patient's substitute decision maker would default to adult children [place] 169.  Advance Directive/Living Will, POLST and POA Forms: On file and reviewed, medical directives start on page 6.  Names rogelio Santamaria as primary decision maker with ZORAIDA Da Silva as secondary decision maker.  Please note, first 5 pages are about finances and decision makers are listed in opposite order for financial decisions.  ER contacts:  Name Relation Home Work Mobile   Yana Santamaria Daughter [phone]     Avinash Santamaria Son In-Law [phone]       We appreciate the invitation to be involved in this patient's care.  We will continue to follow throughout this hospitalization.  Please do not hesitate to reach our on call provider through our clinic answering service at [phone] should you have acute symptom control concerns.    Jennifer P. Bloch, MSN, CRNP, ACHPN  Palliative and Supportive Care  Clinic/Answering Service: [phone]  You can find me on [employer]!

## 2024-09-19 NOTE — PROGRESS NOTES
Aurora Anderson is a 89 y.o. female who is currently ordered Vancomycin IV with management by the Pharmacy Consult service.  Relevant clinical data and objective / subjective history reviewed.  Vancomycin Assessment:  Indication and Goal AUC/Trough: Urinary tract infection (goal -600, trough >10); Pneumonia (goal -600, trough >10)  Clinical Status:  critical care  Micro:     Renal Function:  SCr: 0.9 mg/dL  CrCl: 36.8 mL/min  Renal replacement: Not on dialysis  Days of Therapy: 1  Current Dose: 1500 mg IV once  Vancomycin Plan:  New Dosin mg IV q24h  Estimated AUC: 555 mcg*hr/mL  Estimated Trough: 16.5 mcg/mL  Next Level:  at 0600  Renal Function Monitoring: Daily BMP and UOP  Pharmacy will continue to follow closely for s/sx of nephrotoxicity, infusion reactions and appropriateness of therapy.  BMP and CBC will be ordered per protocol. We will continue to follow the patient’s culture results and clinical progress daily.    Abdulaziz Ocampo, Pharmacist

## 2024-09-19 NOTE — PLAN OF CARE
Problem: Prexisting or High Potential for Compromised Skin Integrity  Goal: Skin integrity is maintained or improved  Description: INTERVENTIONS:  - Identify patients at risk for skin breakdown  - Assess and monitor skin integrity  - Assess and monitor nutrition and hydration status  - Monitor labs   - Assess for incontinence   - Turn and reposition patient  - Assist with mobility/ambulation  - Relieve pressure over bony prominences  - Avoid friction and shearing  - Provide appropriate hygiene as needed including keeping skin clean and dry  - Evaluate need for skin moisturizer/barrier cream  - Collaborate with interdisciplinary team   - Patient/family teaching  - Consider wound care consult   Outcome: Progressing     Problem: Nutrition/Hydration-ADULT  Goal: Nutrient/Hydration intake appropriate for improving, restoring or maintaining nutritional needs  Description: Monitor and assess patient's nutrition/hydration status for malnutrition. Collaborate with interdisciplinary team and initiate plan and interventions as ordered.  Monitor patient's weight and dietary intake as ordered or per policy. Utilize nutrition screening tool and intervene as necessary. Determine patient's food preferences and provide high-protein, high-caloric foods as appropriate.     INTERVENTIONS:  - Monitor oral intake, urinary output, labs, and treatment plans  - Assess nutrition and hydration status and recommend course of action  - Evaluate amount of meals eaten  - Assist patient with eating if necessary   - Allow adequate time for meals  - Recommend/ encourage appropriate diets, oral nutritional supplements, and vitamin/mineral supplements  - Order, calculate, and assess calorie counts as needed  - Recommend, monitor, and adjust tube feedings and TPN/PPN based on assessed needs  - Assess need for intravenous fluids  - Provide specific nutrition/hydration education as appropriate  - Include patient/family/caregiver in decisions related to  nutrition  Outcome: Progressing     Problem: Potential for Falls  Goal: Patient will remain free of falls  Description: INTERVENTIONS:  - Educate patient/family on patient safety including physical limitations  - Instruct patient to call for assistance with activity   - Consult OT/PT to assist with strengthening/mobility   - Keep Call bell within reach  - Keep bed low and locked with side rails adjusted as appropriate  - Keep care items and personal belongings within reach  - Initiate and maintain comfort rounds  - Make Fall Risk Sign visible to staff  - Offer Toileting every  Hours, in advance of need  - Initiate/Maintain alarm  - Obtain necessary fall risk management equipment:   - Apply yellow socks and bracelet for high fall risk patients  - Consider moving patient to room near nurses station  Outcome: Progressing

## 2024-09-19 NOTE — ASSESSMENT & PLAN NOTE
Identified on 9/12 CT PE:  Large R pleural effusion  S/p thoracentesis on 9/12 one liter of fluid drained  Blood LD on 9/12: 318  Total Protein on 9/12: 7.4    Plan:  -F/u CXR for possible renewed pleural effusion

## 2024-09-19 NOTE — PHYSICAL THERAPY NOTE
PHYSICAL THERAPY CANCELLATION NOTE          Patient Name: Aurora Anderson  Today's Date: 9/19/2024 09/19/24 1032   Note Type   Note Type Cancelled Session   Cancel Reasons Medical status   Assessment   Assessment PT orders received, chart review performed. Spoke to RN Vince during ICU mobility rounds. At this time pt is not appropriate for PT/mobility, pt currently on biPAP. PT will continue to follow pt as appropriate and as schedule allows.         Kary Mora, PT, DPT  09/19/24

## 2024-09-19 NOTE — PROGRESS NOTES
Progress Note - Critical Care/ICU   Name: Aurora Anderson 89 y.o. female I MRN: [id number]  Unit/Bed#: ICU 13 I Date of Admission: 9/12/2024   Date of Service: 9/19/2024 I Hospital Day: 7      Assessment & Plan  Shock (HCC)  AEB hypotension and end organ dysfunction (HOSSEIN) despite fluids/albumin requiring pressor therapy  Leukocytosis and tachycardia present although likely reactive in setting of pulmonary embolism and atrial fibrillation  Shock likely obstructive vs cardiogenic  Echocardiogram showing normal left ventricular function although left atrium is severely dilated and moderate mitral regurgitation is present.  Loss of atrial kick from Afib/flutter thought at least in part to be contributing to shock state but pressor requirements remain unchanged despite rate control with digoxin.  Echo also shows new severe right ventricle dysfunction in the mid to apical free wall compared to echocardiogram in January of this year. Suspect this is in the setting of acute PE vs acutely/chronically worsening pulmonary artery hypertension vs CAD.  Component of CTEPH also a possibility as chronicity of symptoms unclear  Attempts to diurese patient to alleviate right heart strain and poor coaptation of tricupsid valve have not improved pressor requirements despite large urine output over last 24 hrs  Consider addition of PDE for tx of pulmonary hypertension, Patient unlikely to be a good surgical candidate for tx of CTEPH, will explore possibility of medical management with adempas   +/- cardiology/advanced heart failure consultation - patient may need cardiac catheterization       Plan:  See plan for hemmorhagic shock    Acute pulmonary embolism with acute cor pulmonale (HCC)  Patient complains of dyspnea and pleuritic which is new today and leg swelling for the past 3 days.  Patient with respiratory acidosis on VBG, fluid overloaded on examination.  CXR on admission showed moderate-large right sided pleural effusion.    Patient had bedside thoracentesis done.   CT PE 9/12: Bilateral segmental and subsegmental emboli with evidence of right heart strain, calculated ratio of right ventricular to left ventricular diameter (RV/LV ratio) is 1.2.   Suspicious r breast mass  Echo showed: diastolic flattening of the interventricular septum consistent with right ventricle volume overload. RV function moderately dilated, systolic function moderately to severely reduced. Moderate to severe regurgitation of tricuspid valve estimated RVSP 65 mmHg.     Plan:  -Holding anticoagulation in the setting of acute blood loss  -Monitor INR  -Monitor CBC  Chronic heart failure with preserved ejection fraction (HCC)  Wt Readings from Last 3 Encounters:   09/19/24 73.4 kg (161 lb 13.1 oz)   04/29/24 62.7 kg (138 lb 3.2 oz)   02/21/24 58.1 kg (128 lb)     Home regimen: Lasix 20 mg every other day    Patient fluid overloaded on examination on 9/12.  CTA PE 9/12: Bilateral ground glass opacities which could be edema vs. Interstitial pneumonia   Administered 40 mg Lasix IV in the ED  Echo showed left ventricular ejection fraction of 60%, diastolic flattening of the interventricular septum consistent with right ventricle volume overload.  RV function moderately dilated, systolic function moderately to severely reduced.  Moderate to severe regurgitation of tricuspid valve estimated RVSP 65 mmHg.  Biatrial enlargement.  No surgical intervention given her age   Patient appears volume overload on physical exam, Also elevated RVSP on echo, will diurese with lasix to reduce right heart strain    Plan:  Holding lasix  Monitor urine output  Daily weights  Monitor I/Os    Atypical atrial flutter (HCC)  Patient has a history of atrial flutter  Previous home regimen: Metoprolol 25 mg qd, Diltiazem 180 mg qd, Eliquis   Patient non compliant since February  Will avoid cardizem and beta blocker     Plan:  See plan for atrial fibrillation  HOSSEIN (acute kidney injury)  (HCC)  Lab Results   Component Value Date    EGFR 54 09/19/2024    EGFR 61 09/18/2024    EGFR 52 09/18/2024    CREATININE 0.90 09/19/2024    CREATININE 0.82 09/18/2024    CREATININE 0.93 09/18/2024     Baseline Cr 1-1.25    Plan:  -Avoid nephrotoxic medications  -Continue monitoring BMP    Respiratory acidosis  9/12 VBG: pH 7.265, pCO2 68.4, pO2 29.8, HCO3 30.4  Likely secondary to hypoventilation secondary to large R. Pleural effusion identified on CT PE 9/12  S/p Thoracentesis on 9/12   Latest Reference Range & Units 09/13/24 23:21 09/14/24 00:00 09/14/24 09:28 09/14/24 12:04 09/15/24 04:37 09/15/24 20:25 09/16/24 04:05   pH, Arterial 7.350 - 7.450  7.241 (L) 7.275 (L) 7.250 (L) 7.325 (L) 7.399 7.376 7.407   pCO2, Arterial 36.0 - 44.0 mm Hg 73.4 (HH) 70.8 (HH) 76.5 (HH) 63.0 (H) 61.6 (H) 73.6 (HH) 66.9 (H)   pO2, Arterial 75.0 - 129.0 mm Hg 140.4 (H) 132.6 (H) 165.0 (H) 85.9 119.3 76.9 73.9 (L)   HCO3, Arterial 22.0 - 28.0 mmol/L 30.8 (H) 32.1 (H) 32.8 (H) 32.1 (H) 37.2 (H) 42.2 (H) 41.2 (H)   Base Excess, Arterial mmol/L 2.6 4.4 4.6 5.2 11.1 14.8 14.5   O2 Content, Arterial 16.0 - 23.0 mL/dL 11.2 (L) 11.4 (L) 10.9 (L) 10.4 (L) 10.3 (L) 11.6 (L) 11.4 (L)   O2 HGB,Arterial 94.0 - 97.0 % 96.7 97.4 (H) 97.0 93.0 (L) 95.6 91.1 (L) 91.4 (L)   Patient with respiratory acidosis and possible concomitant metabolic alkalosis 2nd to diuresis    Plan:  -BiPAP prn  -Check vbg if worsening mental status    Acute on chronic respiratory failure with hypoxia and hypercapnia (HCC)  Likely contributed to by known pulmonary emboli, pleural effusion now s/p thora, heart failure exacerbation given patient's fluid overloaded status and  with wnl troponins.     CTA PE 9/12: bilateral segmental and subsegmental emboli with evidence of right heart strain, RV/LV ratio 1.2, bilateral ground glass opacities which could be edema vs. Interstitial pneumonia with RLL opacity which can represent atelectasis vs. Infiltrate. Large R pleural  effusion, R. Breast mass suspicious for neoplasm.    Plan:  -Bipap as needed  -Check VBG if change in mental status  -Wean O2 as tolerated to maintain SpO2>95%  Sacral wound  Pressure ulcer of sacrum, stage 4, POA, in the setting of decreased mobility, as evidenced by positive bone probe and tunneling, being treated with wound care team consult, off-loading, cleansing and daily packing.    Plan  - Daily wound care  - Turn every 2 hours  - OOB into chair  - PT/OT  Pleural effusion, right  Identified on 9/12 CT PE:  Large R pleural effusion  S/p thoracentesis on 9/12 one liter of fluid drained  Blood LD on 9/12: 318  Total Protein on 9/12: 7.4    Plan:  -f/u pleural fluid cultures + cytology  Atrial fibrillation (HCC)  Patient present with atrial fibrillation and RVR,   Patient has history of atrial flutter, non compliant with medications since February  Echo showed left ventricular ejection fraction of 60%, diastolic flattening of the interventricular septum consistent with right ventricle volume overload.  RV function moderately dilated, systolic function moderately to severely reduced.  Moderate to severe regurgitation of tricuspid valve estimated RVSP 65 mmHg.  Biatrial enlargement.   Left atrial enlargement, may allude to chronic changes  Avoiding beta blockers cardizem in the setting of hypotension  Patient on levophed for pressor support  Started on digoxin on 09/13/2024 inotropic, chronotropic & dromotropic effects  HR improved on digoxin in 80s-90s    Plan   Not on any anticoagulation in setting of hemmorhagic shock  Anemia  Patient has a history of anemia  Last iron panel showed low iron, low iron sat, low ferritin, normal TIBC  Likely iron deficiency anemia;  Patient non compliant with meds  Hgb 8.0 on 09/17/24    Plan  Monitor daily CBC  Transfuse for Hgb <7  MRSA (methicillin resistant staph aureus) culture positive  MRSA bacteriuria.  This is most likely bladder colonization.  Although Staph aureus  is not a common urinary bladder colonizer, it can be seen in elderly patient with urinary retention..  Patient's admission blood cultures were negative and she really did not have evidence of sepsis clinically, which would be expected to be present with Staph aureus bacteremia.  No antibiotic needed for this indication.  Hematoma of right lower leg  Patient with expanding right hematoma right lower extremity  S/p exploration, washout and wound vac placement    Plan  Will monitor wound vac for output  Surgery on board      Hemorrhagic shock (HCC)  Patient had hemorrhagic shock s/p enlarging right leg hematoma  S/p surgical exploration, washout, and wound vac placement  S/p 3 units PRBCs, 1 unit plasma, IV resuscitation    Plan  Continue levophed for MAP goals >65  Monitor hemoglobin  q4 hour transfuse if hgb <7  Monitor wound vac from right leg    Disposition: Critical care    ICU Core Measures     A: Assess, Prevent, and Manage Pain Has pain been assessed? Yes  Need for changes to pain regimen? No   B: Both SAT/SAT  N/A   C: Choice of Sedation RASS Goal: -4 Deep Sedation or 0 Alert and Calm  Need for changes to sedation or analgesia regimen? Yes   D: Delirium CAM-ICU: Negative   E: Early Mobility  Plan for early mobility? Yes   F: Family Engagement Plan for family engagement today? Yes       Antibiotic Review: Patient on appropriate coverage based on culture data.     Review of Invasive Devices:    [place] Plan: Continue for accurate I/O monitoring for 48 hours    [place] Plan: Keep arterial line for hemodynamic monitoring and frequent ABGs    Prophylaxis:  VTE VTE covered by:    None    Contraindicated secondary to: Hematoma   Stress Ulcer  not ordered         24 Hour Events   24hr events: Patient went to surgery for exploration and treatment of right leg hematoma, wound vac placement. Patient had worsening hypercapnia after leaving OR. Patient received another unit of PRBC's and volume resucictation. Was placed on  levophed for blood pressure support. Was placed on bipap for worsening hypercapnia. Patient was placed in restraints due to agitation. Also started on precedex for sedation. Had low urine output overnight, that improved with 500cc bolus of isolyte.   Subjective   Review of Systems: See HPI for Review of Systems    Objective                          Vitals I/O      Most Recent Min/Max in 24hrs   Temp (!) 96.8 °F (36 °C) Temp  Min: 92.7 °F (33.7 °C)  Max: 101.1 °F (38.4 °C)   Pulse 75 Pulse  Min: 61  Max: 102   Resp 21 Resp  Min: 9  Max: 37   /73 BP  Min: 79/50  Max: 134/67   O2 Sat 100 % SpO2  Min: 86 %  Max: 100 %      Intake/Output Summary (Last 24 hours) at 2024 1058  Last data filed at 2024 0800  Gross per 24 hour   Intake 5332.35 ml   Output 1730 ml   Net 3602.35 ml       Diet Surgical; NPO; Sips with meds    Invasive Monitoring   Arterial Line  [place] /67  Arterial Line BP  Min: 69/45  Max: 156/91   MAP 91 mmHg  Arterial Line MAP (mmHg)  Min: 55 mmHg  Max: 118 mmHg           Physical Exam   Physical Exam  Skin:     General: Skin is warm and dry.      Comments: Right lateral thigh bleeding with  skin, darkened irregular shaped skin lesions   HENT:      Head: Normocephalic and atraumatic.      Nose: Nasal deformity present.   Cardiovascular:      Rate and Rhythm: Normal rate and regular rhythm.      Heart sounds: Murmur (systolic mumur over mitral and tricuspid area) heard.   Musculoskeletal:      Right lower le+ Edema present.      Left lower le+ Edema present.      Comments: Right leg in dressing s/p surgery. Wound vac output overnight 250ml serosanguinous fluid.  Onchomycosis on toenails, and dry scaly skin   Abdominal: General: There is no distension.      Palpations: Abdomen is soft.      Tenderness: There is no abdominal tenderness.   Constitutional:       Appearance: She is ill-appearing.      Interventions: She is sedated and restrained.   Pulmonary:      Breath  sounds: Rhonchi present. No wheezing. Rales: left lung base.     Comments: Bipap rate 20, , minp 30, max 30, Epap 6, Fio2 30%  Neurological:      Mental Status: She is somnolent and unresponsive.      Motor: No motor deficit.          Diagnostic Studies        Lab Results: I have reviewed the following results:      Medications:  Scheduled PRN   artificial tear, , HS  cefTRIAXone, 1,000 mg, Q24H  chlorhexidine, 15 mL, Q12H BRISSA  senna-docusate sodium, 2 tablet, BID  sodium hypochlorite, , Daily      acetaminophen, 975 mg, Q8H PRN       Continuous    dexmedetomidine, 0.1-0.5 mcg/kg/hr, Last Rate: 0.3 mcg/kg/hr (09/19/24 0841)  norepinephrine, 1-30 mcg/min, Last Rate: 6 mcg/min (09/19/24 0927)         Labs:   CBC    Recent Labs     09/18/24  1227 09/18/24  1243 09/18/24  1501 09/18/24  1941 09/19/24  0447 09/19/24  0804   WBC 12.38*  --   --   --  14.89*  --    HGB 8.5*   < > 9.5*   < > 8.5* 8.3*   HCT 27.6*   < > 28*  --  26.7*  --    PLT 59*  --   --   --  79*  --    BANDSPCT 3  --   --   --   --   --     < > = values in this interval not displayed.     BMP    Recent Labs     09/18/24  1227 09/19/24  0447   SODIUM 145 151*   K 5.2 4.1   * 109*   CO2 36* 39*   AGAP 0* 3*   BUN 20 18   CREATININE 0.82 0.90   CALCIUM 7.6* 8.5       Coags    Recent Labs     09/18/24  0640 09/18/24  1227   INR 2.14* 2.07*   PTT 44* 42*        Additional Electrolytes  Recent Labs     09/18/24  0441 09/18/24  0640 09/18/24  1227 09/18/24  1243 09/18/24  1501 09/19/24  0447   MG 2.0  --  1.8*  --   --  1.9   PHOS 2.2*  --  4.6*  --   --   --    CAIONIZED  --    < > 1.08*   < > 1.48* 1.18    < > = values in this interval not displayed.          Blood Gas    Recent Labs     09/19/24  0807   PHART 7.372   BDR9GYI 63.8*   PO2ART 110.9   GLJ4TLZ 36.2*   BEART 9.1   SOURCE Line, Arterial     Recent Labs     09/18/24  0708 09/18/24  1336 09/19/24  0807   PHVEN 7.268*  --   --    LZH1AGR 88.3*  --   --    PO2VEN 76.9*  --   --    NVA5HXP  39.5*  --   --    BEVEN 11.0  --   --    B7SGHXS 91.1*  --   --    SOURCE  --    < > Line, Arterial    < > = values in this interval not displayed.    LFTs  Recent Labs     09/18/24  1227   ALT 5*   AST 10*   ALKPHOS 59   ALB 2.4*   TBILI 0.72       Infectious  No recent results  Glucose  Recent Labs     09/18/24  0145 09/18/24  0441 09/18/24  1227 09/19/24  0447   GLUC 103 91 136 81

## 2024-09-19 NOTE — WOUND OSTOMY CARE
Progress Note - Wound   Aurora Anderson 89 y.o. female MRN: [id number]  Unit/Bed#: ICU 13 Encounter: [id number]        Assessment:   Patient seen today for wound care follow up assessment. Patient is incontinent of bowel and bladder. Patient is mod/max assist with turning from side to side for assessment.  Wound care consulted for multiple wounds.     Assessment Findings:      POA stage IV sacrum- two open areas measured together center wound with full thickness skin loss with expose to bone and has circumferential undermining has increased to 5.5 cm with beefy red tissue, distal wound bed with 100% slough tissue wound bed. Periwound skin is fragile and pink, large thick sanguineous drainage noted. Findings communicated with primary CC team.   POA unstageable right heel- wound bed with 100% yellow slough tissue, periwound skin is fragile, moderate serosanguineous drainage present. Right heel is intact and dry, preventative foam in place.  Left leg wound- partial thickness skin loss with yellow and pink tissue, noted two areas with this assessment. Periwound skin with significant edema, small serosanguineous drainage present. Drainage has decreased, changed order to xeroform so product does not stick to wound.  Right wrist- likely skin tear from edema, pink/yellow tissue, periwound skin is edematous, scant drainage present.  Nose- partial thickness skin loss likely from bipap mask, placed hydrocolloid.      Right leg hematoma now with wound vac being managed by surgery team.     No induration, fluctuance, warmth/temperature differences, redness, or purulence noted to the above noted wounds and skin areas assessed. New dressings applied per orders listed below. Patient tolerated well- no s/s of non-verbal pain or discomfort observed during the encounter. Bedside nurse aware of plan of care. See flow sheets for more detailed assessment findings.  Wound care will continue to follow, call or Secure Chat Message with  questions.      Skin care Plan:  1-Cleanse sacral/buttocks wound with 1/4 strength Dakins, gently pack open sacrum wound with aquacel ag, place melgisorb ag over both open wounds, cover with silicone bordered foam. Change daily and PRN soilage/displacement.   2-Turn/reposition q2h or when medically stable for pressure re-distribution on skin .  3-Elevate heels to offload pressure  4-Moisturize skin daily with skin nourishing cream  5-Ehob cushion in chair when out of bed.  6-Cleanse right heel with 1/4 strength Dakins. Apply melgisorb ag to wound bed and cover with Silicone bordered foam, mag T for treatment, and change every other day and PRN soilage/displacement  7-Cleanse right heel with soap and water. Apply Silicone bordered foam, mag P for prevention, and change every other day and PRN soilage/displacement  8-Cleanse left leg wound with normal saline, apply melgisorb ag to wound bed and cover with silicone bordered foam, Change every other day and PRN soilage/displacement.     Skin care Plan:  1-Cleanse sacral/buttocks wound with 1/4 strength Dakins, gently pack open sacrum wound with aquacel ag, place melgisorb ag over both open wounds, cover with silicone bordered foam. Change daily and PRN soilage/displacement.   2-Turn/reposition q2h or when medically stable for pressure re-distribution on skin .  3-Elevate heels to offload pressure  4-Moisturize skin daily with skin nourishing cream  5-Ehob cushion in chair when out of bed.  6-Cleanse right heel with 1/4 strength Dakins. Apply melgisorb ag to wound bed and cover with Silicone bordered foam, mag T for treatment, and change every other day and PRN soilage/displacement  7-Cleanse right heel with soap and water. Apply Silicone bordered foam, mag P for prevention, and change every other day and PRN soilage/displacement  8-Cleanse left leg wound with normal saline, apply Xeroform to wound bed and cover with silicone bordered foam, Change every other day and  PRN soilage/displacement.  9-Cleanse right arm area with soap and water. Apply small 2X2 Silicone bordered foam, mag T for treatment, and change every other day and PRN soilage/displacement  10-Cleanse bridge of nose with normal saline, pat dry, apply small piece of hydrocolloid and change daily and PRN displacement.    Wound 09/12/24 Pressure Injury Coccyx (Active)   Wound Image   09/19/24 1114   Wound Description NEIL 09/19/24 1200   Pressure Injury Stage 4 09/19/24 1114   Park-wound Assessment Fragile;Erythema 09/19/24 1114   Wound Length (cm) 7 cm 09/19/24 1114   Wound Width (cm) 10 cm 09/19/24 1114   Wound Depth (cm) 1.5 cm 09/19/24 1114   Wound Surface Area (cm^2) 70 cm^2 09/19/24 1114   Wound Volume (cm^3) 105 cm^3 09/19/24 1114   Calculated Wound Volume (cm^3) 105 cm^3 09/19/24 1114   Change in Wound Size % 2.78 09/19/24 1114   Number of underminings 1 09/19/24 1114   Undermining 1 5.5 09/19/24 1114   Undermining 1 is depth extending from 12-12 09/19/24 1114   Wound Site Closure NEIL 09/19/24 1114   Drainage Amount Large 09/19/24 1114   Drainage Description Sanguineous;Milky 09/19/24 1114   Non-staged Wound Description Full thickness 09/19/24 1114   Treatments Cleansed 09/19/24 1114   Dressing Foam, Silicon (eg. Allevyn, etc) 09/19/24 1200   Wound packed? Yes 09/19/24 1114   Packing- # removed 1 09/19/24 1114   Packing- # inserted 1 09/19/24 1114   Dressing Changed New 09/19/24 1114   Patient Tolerance Tolerated well 09/19/24 1114   Dressing Status Clean;Dry;Intact 09/19/24 1200       Wound 09/12/24 Pressure Injury Heel Right;Plantar;Posterior (Active)   Wound Image   09/19/24 1122   Wound Description NEIL 09/19/24 1200   Pressure Injury Stage U 09/19/24 1122   Park-wound Assessment Fragile;Callus 09/19/24 1122   Wound Length (cm) 1.5 cm 09/19/24 1122   Wound Width (cm) 2.5 cm 09/19/24 1122   Wound Depth (cm) 1 cm 09/19/24 1122   Wound Surface Area (cm^2) 3.75 cm^2 09/19/24 1122   Wound Volume (cm^3) 3.75 cm^3  09/19/24 1122   Calculated Wound Volume (cm^3) 3.75 cm^3 09/19/24 1122   Change in Wound Size % 6.25 09/19/24 1122   Wound Site Closure NEIL 09/19/24 1122   Drainage Amount Moderate 09/19/24 1122   Drainage Description Serosanguineous 09/19/24 1122   Non-staged Wound Description Full thickness 09/19/24 1122   Treatments Cleansed 09/19/24 1122   Dressing Foam, Silicon (eg. Allevyn, etc) 09/19/24 1200   Wound packed? No 09/19/24 1122   Packing- # removed 0 09/19/24 1122   Packing- # inserted 0 09/19/24 1122   Dressing Changed New 09/19/24 1122   Patient Tolerance Tolerated well 09/19/24 1122   Dressing Status Clean;Dry;Intact 09/19/24 1200       Wound 09/12/24 Skin Tear Tibial Left;Posterior (Active)   Wound Image    09/19/24 1119   Wound Description NEIL 09/19/24 1200   Park-wound Assessment Purple 09/19/24 1119   Wound Length (cm) 2 cm 09/19/24 1119   Wound Width (cm) 1.5 cm 09/19/24 1119   Wound Depth (cm) 0.1 cm 09/19/24 1119   Wound Surface Area (cm^2) 3 cm^2 09/19/24 1119   Wound Volume (cm^3) 0.3 cm^3 09/19/24 1119   Calculated Wound Volume (cm^3) 0.3 cm^3 09/19/24 1119   Change in Wound Size % 67.39 09/19/24 1119   Wound Site Closure NEIL 09/19/24 1119   Drainage Amount Small 09/19/24 1119   Drainage Description Serosanguineous 09/19/24 1119   Non-staged Wound Description Partial thickness 09/19/24 1119   Treatments Cleansed 09/19/24 1119   Dressing Foam, Silicon (eg. Allevyn, etc);Xeroform 09/19/24 1119   Wound packed? No 09/19/24 1119   Packing- # removed 0 09/19/24 1119   Packing- # inserted 0 09/19/24 1119   Dressing Changed New 09/19/24 1119   Patient Tolerance Tolerated well 09/19/24 1119   Dressing Status Clean;Dry;Intact 09/19/24 1200       Wound 09/17/24 Pretibial Right (Active)   Wound Image    09/18/24 0142   Wound Description Intact;Gray;Edema 09/19/24 1200   Wound Length (cm) 12 cm 09/17/24 1518   Wound Width (cm) 7.5 cm 09/17/24 1518   Wound Surface Area (cm^2) 90 cm^2 09/17/24 1518   Drainage  Amount Large 09/18/24 0623   Dressing Xeroform;Foam, Silicon (eg. Allevyn, etc) 09/18/24 0623   Dressing Changed Changed 09/18/24 0623   Dressing Status Clean;Dry;Intact 09/18/24 0623       Wound 09/18/24 Leg Right (Active)       Wound 09/19/24 Skin Tear Arm Anterior;Distal;Right;Lower (Active)   Wound Image   09/19/24 1404   Wound Description Pink;Yellow 09/19/24 1404   Park-wound Assessment Edema 09/19/24 1404   Wound Length (cm) 0.2 cm 09/19/24 1404   Wound Width (cm) 0.5 cm 09/19/24 1404   Wound Depth (cm) 0.1 cm 09/19/24 1404   Wound Surface Area (cm^2) 0.1 cm^2 09/19/24 1404   Wound Volume (cm^3) 0.01 cm^3 09/19/24 1404   Calculated Wound Volume (cm^3) 0.01 cm^3 09/19/24 1404   Wound Site Closure NEIL 09/19/24 1404   Drainage Amount Scant 09/19/24 1404   Drainage Description Sanguineous 09/19/24 1404   Non-staged Wound Description Partial thickness 09/19/24 1404   Treatments Cleansed 09/19/24 1404   Dressing Foam, Silicon (eg. Allevyn, etc) 09/19/24 1404   Wound packed? No 09/19/24 1404   Packing- # removed 0 09/19/24 1404   Packing- # inserted 0 09/19/24 1404   Dressing Changed New 09/19/24 1404   Patient Tolerance Tolerated well 09/19/24 1404   Dressing Status Clean;Intact;Dry 09/19/24 1404       Wound 09/19/24 Nose (Active)   Wound Image   09/19/24 1405   Wound Description [place] 09/19/24 1405   Park-wound Assessment Fragile;Erythema 09/19/24 1405   Wound Length (cm) 0.2 cm 09/19/24 1405   Wound Width (cm) 0.4 cm 09/19/24 1405   Wound Depth (cm) 0.1 cm 09/19/24 1405   Wound Surface Area (cm^2) 0.08 cm^2 09/19/24 1405   Wound Volume (cm^3) 0.008 cm^3 09/19/24 1405   Calculated Wound Volume (cm^3) 0.01 cm^3 09/19/24 1405   Wound Site Closure NEIL 09/19/24 1405   Drainage Amount None 09/19/24 1405   Non-staged Wound Description Partial thickness 09/19/24 1405   Treatments Cleansed 09/19/24 1405   Dressing Hydrocolloid 09/19/24 1405   Wound packed? No 09/19/24 1405   Packing- # removed 0 09/19/24 1405   Packing- #  inserted 0 09/19/24 1405   Dressing Changed New 09/19/24 1405   Patient Tolerance Tolerated well 09/19/24 1405   Dressing Status Clean;Dry 09/19/24 1405           Lisa WASHBURNN, RN, CWOCN

## 2024-09-19 NOTE — ASSESSMENT & PLAN NOTE
Patient has a history of anemia  Last iron panel showed low iron, low iron sat, low ferritin, normal TIBC  Likely iron deficiency anemia;  Patient non compliant with meds  Hgb 8.5    Plan  Monitoring hemoglobin Q4h  Transfuse for Hgb <7

## 2024-09-19 NOTE — ASSESSMENT & PLAN NOTE
Likely contributed to by known pulmonary emboli, pleural effusion now s/p thora, heart failure exacerbation given patient's fluid overloaded status and  with wnl troponins.   CTA PE 9/12: bilateral segmental and subsegmental emboli with evidence of right heart strain, RV/LV ratio 1.2, bilateral ground glass opacities which could be edema vs. Interstitial pneumonia with RLL opacity which can represent atelectasis vs. Infiltrate. Large R pleural effusion, R. Breast mass suspicious for neoplasm.      Plan:  -F/u CXR  -Bipap as needed  -Check VBG if change in mental status  -Wean O2 as tolerated to maintain SpO2>95%

## 2024-09-19 NOTE — PROGRESS NOTES
Progress Note - Surgery-General   Name: Aurora Anderson 89 y.o. female I MRN: [id number]  Unit/Bed#: ICU 13 I Date of Admission: 9/12/2024   Date of Service: 9/19/2024 I Hospital Day: 7    Assessment & Plan  Hematoma of right lower leg  Was initially presumed to be blister, continued to rapidly expand. Patient became hypotensive, Hgb dropped to 5.9 from 7.8 Eliquis held, s/p Kcentra and 2u pRBC on 9/17-9/18 AM. 9/17 CT RLE-hematoma with active extravasation. Now POD1 s/p RLE exploration and washout with VAC placement on 9/18    Plan:  Dysphagia diet  Wean BiPAP as able  Wound VAC management, plan for change 9/20   Monitor wound for any changes  Resuscitate as needed  Wean pressors as able  OK for chemical DVT ppx  Encourage incentive spirometer use  Strict I's and O's  Pain and nausea control p.r.n.  Rest of care per ICU  Can restart hep gtt      Please contact the SecureChat role for the Surgery-General service with any questions/concerns.    History of Present Illness   No acute events overnight. Patient was febrile with Tmax 101.1. Patient remains sedated on BiPAP. They are resting comfortably in bed and moving all extremities.      Objective      Temp:  [92.7 °F (33.7 °C)-101.1 °F (38.4 °C)] 98.4 °F (36.9 °C)  HR:  [] 75  Resp:  [7-37] 24  BP: ()/(50-67) 134/67  Arterial Line BP: ()/(43-91) 108/58  FiO2 (%):  [30-40] 30  O2 Device: BiPAP          I/O         09/17 0701  09/18 0700 09/18 0701  09/19 0700    P.O. 205     I.V. (mL/kg) 515.4 (8.5) 4062 (55.3)    Blood 850 700    IV Piggyback 580 450    Total Intake(mL/kg) 2150.4 (35.4) 5212 (71)    Urine (mL/kg/hr) 700 (0.5) 1035 (0.6)    Drains  50    Blood  400    Total Output 700 1485    Net +1450.4 +3727                Lines/Drains/Airways       Active Status       Name Placement date Placement time Site Days    Arterial Line 09/18/24 Left Radial 09/18/24  1032  Radial  less than 1    Urethral Catheter 09/18/24  1706  --  less than 1                   Physical Exam  Constitutional:       General: She is not in acute distress.  HENT:      Head: Normocephalic and atraumatic.      Right Ear: External ear normal.      Left Ear: External ear normal.      Nose: Nose normal.      Mouth/Throat:      Pharynx: Oropharynx is clear.   Eyes:      Extraocular Movements: Extraocular movements intact.   Cardiovascular:      Rate and Rhythm: Normal rate.   Pulmonary:      Effort: Pulmonary effort is normal.      Comments: BiPAP  Abdominal:      General: There is no distension.      Palpations: Abdomen is soft.      Tenderness: There is no abdominal tenderness.   Musculoskeletal:      Cervical back: Normal range of motion.      Comments: RLE wound with wound VAC in place, draining serosanguinous fluid   Skin:     General: Skin is warm and dry.   Neurological:      Mental Status: She is alert. Mental status is at baseline.   Psychiatric:         Mood and Affect: Mood normal.          Lab Results: I have reviewed the following results: CBC/BMP:   .     09/18/24  1227 09/18/24  1243 09/19/24  0447   WBC 12.38*  --  14.89*   HGB 8.5*   < > 8.5*   HCT 27.6*   < > 26.7*   PLT 59*  --  79*   BANDSPCT 3  --   --    SODIUM 145  --  151*   K 5.2  --  4.1   *  --  109*   CO2 36*  --  39*   BUN 20  --  18   CREATININE 0.82  --  0.90   GLUC 136  --  81   CAIONIZED 1.08*   < > 1.18   MG 1.8*  --  1.9   PHOS 4.6*  --   --     < > = values in this interval not displayed.      Imaging Review: No pertinent imaging studies reviewed.  Other Studies: No additional pertinent studies reviewed.    VTE Pharmacologic Prophylaxis: VTE covered by:    None      VTE Mechanical Prophylaxis: sequential compression device

## 2024-09-19 NOTE — ASSESSMENT & PLAN NOTE
Patient has a history of atrial flutter  Previous home regimen: Metoprolol 25 mg qd, Diltiazem 180 mg qd, Eliquis   Patient non compliant since February  Will avoid cardizem and beta blocker     Plan:  Patient in NSR

## 2024-09-19 NOTE — OCCUPATIONAL THERAPY NOTE
Occupational Therapy Cancellation Note        Patient Name: Aurora Anderson  Today's Date: 9/19/2024 09/19/24 1322   Note Type   Note type Cancelled Session   Cancel Reasons Medical status   Additional Comments OT orders pending, chart reviewed. Spoke with PT Kary. Per ICU mobility rounds, pt on BiPAP this AM, not medically appropriate for therapy. Will cancel and f/u as appropriate     Mary Camarena, OT

## 2024-09-19 NOTE — SPEECH THERAPY NOTE
Speech Language/Pathology    Speech/Language Pathology Cancel Note    Patient Name: Aurora Anderson  Today's Date: 9/19/2024       Pt currently NPO, on bipap. Will follow up as able. Rec dys 1 puree diet w/ thin liquids when medically able.       Mohini Person MA CCC-SLP  Speech Pathologist  Available via [employer]

## 2024-09-19 NOTE — QUICK NOTE
General Surgery Post-Op Check  Aurora Anderson 89 y.o. female MRN: [id number]  Unit/Bed#: ICU 13 Encounter: [id number]     S: Patient seen and evaluated at bedside after arrival to floor. Per ICU team patient was agitated while on BiPAP 30% O2 and required sedation. Upon examination patient resting comfortably in bed on BiPAP. Wound VAC in place on RLE wound and functioning, putting out serosanguinous fluid. ACE wrap in place over wound VAC. On levo 10 and precedex 0.3.     O:   Vitals:    09/18/24 2100   BP:    Pulse: 93   Resp: 22   Temp: 99 °F (37.2 °C)   SpO2: 97%     I/O         09/17 0701 09/18 0700 09/18 0701 09/19 0700    P.O. 205     I.V. (mL/kg) 515.4 (8.5) 3199.6 (52.6)    Blood 850 700    IV Piggyback 580 450    Total Intake(mL/kg) 2150.4 (35.4) 4349.6 (71.5)    Urine (mL/kg/hr) 700 (0.5) 235 (0.2)    Drains  50    Blood  400    Total Output 700 685    Net +1450.4 +3664.6                PE:  Gen:  No acute distress  CV:  Warm, well-perfused  Lung:  On BiPAP, no respiratory distress  Abd:  Soft, non- tender, nondistended   Ext:  Moving all extremities  Neuro:  Sedated but able to follow commands  Skin:  Wound VAC clean, dry, intact     Lab Results   Component Value Date    WBC 12.38 (H) 09/18/2024    HGB 8.6 (L) 09/18/2024    HCT 28 (L) 09/18/2024    MCV 86 09/18/2024    PLT 59 (L) 09/18/2024     Lab Results   Component Value Date    GLUCOSE 118 09/18/2024    CALCIUM 7.6 (L) 09/18/2024    K 5.2 09/18/2024    CO2  09/18/2024      Comment:      Out of Reportable Range     (H) 09/18/2024    BUN 20 09/18/2024    CREATININE 0.82 09/18/2024         A/P: 89 y.o. female Day of Surgery s/p Procedure(s) (LRB):  RLE exploration, DEBRIDEMENT WOUND (WASH OUT),   evacuation of hematoma vac placement (Right)    Plan:  Dysphagia diet  Wound VAC management, plan for change in 2-3d  Monitor wound for any changes  Resuscitate as needed  Wean pressors as able  DVT ppx  Encourage incentive spirometer use  Strict I's  and O's  Pain and nausea control p.r.n.  Rest of care per ICU      Virgie Levine MD  PGYI, General Surgery

## 2024-09-19 NOTE — ASSESSMENT & PLAN NOTE
Was initially presumed to be blister, continued to rapidly expand. Patient became hypotensive, Hgb dropped to 5.9 from 7.8 Eliquis held, s/p Kcentra and 2u pRBC on 9/17-9/18 AM. 9/17 CT RLE-hematoma with active extravasation. Now POD1 s/p RLE exploration and washout with VAC placement on 9/18    Plan:  Dysphagia diet  Wean BiPAP as able  Wound VAC management, plan for change 9/20   Monitor wound for any changes  Resuscitate as needed  Wean pressors as able  OK for chemical DVT ppx  Encourage incentive spirometer use  Strict I's and O's  Pain and nausea control p.r.n.  Rest of care per ICU  Can restart hep gtt

## 2024-09-19 NOTE — ASSESSMENT & PLAN NOTE
Wt Readings from Last 3 Encounters:   09/19/24 73.4 kg (161 lb 13.1 oz)   04/29/24 62.7 kg (138 lb 3.2 oz)   02/21/24 58.1 kg (128 lb)     Patient fluid overloaded on examination on 9/12.  CTA PE 9/12: Bilateral ground glass opacities which could be edema vs. Interstitial pneumonia   Administered 40 mg Lasix IV in the ED  Echo showed left ventricular ejection fraction of 60%, diastolic flattening of the interventricular septum consistent with right ventricle volume overload.  RV function moderately dilated, systolic function moderately to severely reduced.  Moderate to severe regurgitation of tricuspid valve estimated RVSP 65 mmHg.  Biatrial enlargement.  No surgical intervention given her age   Patient appears volume overload on physical exam, Also elevated RVSP on echo, will diurese with lasix to reduce right heart strain     Plan:  Holding lasix  Monitor urine output  Daily weights  Monitor I/Os

## 2024-09-20 NOTE — ASSESSMENT & PLAN NOTE
Was initially presumed to be blister, continued to rapidly expand. Patient became hypotensive, Hgb dropped to 5.9 from 7.8 Eliquis held, s/p Kcentra and 2u pRBC on 9/17-9/18 AM. 9/17 CT RLE-hematoma with active extravasation. Now POD1 s/p RLE exploration and washout with VAC placement on 9/18    Plan:  Dysphagia diet  Wean BiPAP as able  Wound VAC management, plan for change today  Monitor wound for any changes  Resuscitate as needed  Wean pressors as able  OK for chemical DVT ppx  Encourage incentive spirometer use  Strict I's and O's  Pain and nausea control p.r.n.  Rest of care per ICU  Can restart hep gtt, opt for IVC filter per ICU

## 2024-09-20 NOTE — PHYSICAL THERAPY NOTE
Physical Therapy Cancellation Note           09/20/24 1040   Note Type   Note type Cancelled Session   Cancel Reasons Medical status   Additional Comments PT orders received. Chart reviewed. Per ICU mobility rounds, pt not current appropriate for PT treatment. PT currently on biPAP. Will hold and continue to f/u.     Ruddy Mclaughlin, PT

## 2024-09-20 NOTE — PLAN OF CARE
Problem: Prexisting or High Potential for Compromised Skin Integrity  Goal: Skin integrity is maintained or improved  Description: INTERVENTIONS:  - Identify patients at risk for skin breakdown  - Assess and monitor skin integrity  - Assess and monitor nutrition and hydration status  - Monitor labs   - Assess for incontinence   - Turn and reposition patient  - Assist with mobility/ambulation  - Relieve pressure over bony prominences  - Avoid friction and shearing  - Provide appropriate hygiene as needed including keeping skin clean and dry  - Evaluate need for skin moisturizer/barrier cream  - Collaborate with interdisciplinary team   - Patient/family teaching  - Consider wound care consult   Outcome: Not Progressing     Problem: Nutrition/Hydration-ADULT  Goal: Nutrient/Hydration intake appropriate for improving, restoring or maintaining nutritional needs  Description: Monitor and assess patient's nutrition/hydration status for malnutrition. Collaborate with interdisciplinary team and initiate plan and interventions as ordered.  Monitor patient's weight and dietary intake as ordered or per policy. Utilize nutrition screening tool and intervene as necessary. Determine patient's food preferences and provide high-protein, high-caloric foods as appropriate.     INTERVENTIONS:  - Monitor oral intake, urinary output, labs, and treatment plans  - Assess nutrition and hydration status and recommend course of action  - Evaluate amount of meals eaten  - Assist patient with eating if necessary   - Allow adequate time for meals  - Recommend/ encourage appropriate diets, oral nutritional supplements, and vitamin/mineral supplements  - Order, calculate, and assess calorie counts as needed  - Recommend, monitor, and adjust tube feedings and TPN/PPN based on assessed needs  - Assess need for intravenous fluids  - Provide specific nutrition/hydration education as appropriate  - Include patient/family/caregiver in decisions  related to nutrition  Outcome: Not Progressing     Problem: Potential for Falls  Goal: Patient will remain free of falls  Description: INTERVENTIONS:  - Educate patient/family on patient safety including physical limitations  - Instruct patient to call for assistance with activity   - Consult OT/PT to assist with strengthening/mobility   - Keep Call bell within reach  - Keep bed low and locked with side rails adjusted as appropriate  - Keep care items and personal belongings within reach  - Initiate and maintain comfort rounds  - Make Fall Risk Sign visible to staff  - Offer Toileting every  Hours, in advance of need  - Initiate/Maintain alarm  - Obtain necessary fall risk management equipment:   - Apply yellow socks and bracelet for high fall risk patients  - Consider moving patient to room near nurses station  Outcome: Not Progressing

## 2024-09-20 NOTE — SPEECH THERAPY NOTE
Speech Language/Pathology    Speech/Language Pathology Cancel Note    Patient Name: Aurora Anderson  Today's Date: 9/20/2024     Pt now level 4 comfort care, ordered regular diet/pleasure feed. Will sign off.        Mohini Person MA CCC-SLP  Speech Pathologist  Available via [employer]

## 2024-09-20 NOTE — PROGRESS NOTES
Progress Note - Critical Care/ICU   Name: Aurora Anderson 89 y.o. female I MRN: [id number]  Unit/Bed#: ICU 13 I Date of Admission: 9/12/2024   Date of Service: 9/20/2024 I Hospital Day: 8       Interval Events:       Pt with increasing pressor requirements despite IV albumin and MIVF with D51/4NSS. Family requested bipap break last pm. Repeat ABG with resp acidosis. CXR 7/19 R consolidation/pleural effusion. Placed back on bipap however per RN pt c/o and pulling off bipap this am placed back on NC    Pertinent New Data:   blood pressure, pulse, temperature, respirations, and pulse oximetry  Arterial Line  Sharmin BP 91/47  Arterial Line BP  Min: 69/45  Max: 138/77   MAP 66 mmHg  Arterial Line MAP (mmHg)  Min: 55 mmHg  Max: 103 mmHg       CBC:   Lab Results   Component Value Date    WBC 16.46 (H) 09/20/2024    HGB 7.9 (L) 09/20/2024    HCT 25.9 (L) 09/20/2024    MCV 86 09/20/2024     (L) 09/20/2024    RBC 3.01 (L) 09/20/2024    MCH 26.2 (L) 09/20/2024    MCHC 30.5 (L) 09/20/2024    RDW 18.5 (H) 09/20/2024    MPV 10.5 09/20/2024    NRBC 0 09/20/2024   , CMP:   Lab Results   Component Value Date    SODIUM 145 09/20/2024    K 3.5 09/20/2024     09/20/2024    CO2 38 (H) 09/20/2024    CO2 41 (H) 09/19/2024    BUN 19 09/20/2024    CREATININE 0.90 09/20/2024    GLUCOSE 87 09/19/2024    CALCIUM 7.7 (L) 09/20/2024    AST 11 (L) 09/20/2024    ALT 4 (L) 09/20/2024    ALKPHOS 72 09/20/2024    EGFR 54 09/20/2024   , ABG:   Lab Results   Component Value Date    PHART 7.213 (L) 09/20/2024    PGJ3HXE 92.3 (HH) 09/20/2024    PO2ART 292.0 (H) 09/20/2024    MIF5RHQ 36.3 (H) 09/20/2024    BEART 6.8 09/20/2024    SOURCE Line, Arterial 09/20/2024     chest xrayReviewed radiology reports from this admission including: chest xray.    Assessment and Plan  Diagnosis: Acute hypoxic/hypercapneic resp failure  Plan: NC for sats >92%, Bipap PRN, consider R u/s to eval pleural fluid, f/u palliative care  for GOC discussion    Diagnosis:  Septic Shock  Plan: levo, add vaso for MAP >65, PRN albumin boluses, t/c repeat cultures pending GOC with persistent hypothermia and/or steroids    Billing Level:  Critical Care Time Statement: Upon my evaluation, this patient had a high probability of imminent or life-threatening deterioration due to resp failure/shock, which required my direct attention, intervention, and personal management.  I spent a total of 25 minutes directly providing critical care services, including interpretation of complex medical databases, evaluating for the presence of life-threatening injuries or illnesses, management of organ system failure(s) , complex medical decision making (to support/prevent further life-threatening deterioration)., and titration of vasoactive medications. This time is exclusive of procedures, teaching, family meetings, and any prior time recorded by providers other than myself.      AZRA Salas

## 2024-09-20 NOTE — PROGRESS NOTES
Progress Note - Palliative Care   Name: Aurora Anderson 89 y.o. female I MRN: [id number]  Unit/Bed#: ICU 13 I Date of Admission: 9/12/2024   Date of Service: 9/20/2024 I Hospital Day: 8    Assessment & Plan  HOSSEIN (acute kidney injury) (HCC)  Family has decided to transition to level 4 comfort care.  Acute on chronic respiratory failure with hypoxia and hypercapnia (HCC)  Family has decided to transition to level 4 comfort care.  BiPAP removed and now on NC O2 for comfort.    Chronic heart failure with preserved ejection fraction (HCC)  Wt Readings from Last 3 Encounters:   09/20/24 73.1 kg (161 lb 2.5 oz)   04/29/24 62.7 kg (138 lb 3.2 oz)   02/21/24 58.1 kg (128 lb)     Family has decided to transition to level 4 comfort care.    Palliative care encounter  Palliative diagnoses: CHF  Goals of care  Level 4 code status  Patient and family have elected to focus on comfort at this time. Although patient lacks full capacity she is able to make her needs known and is agreeable to comfort measures only.  They express clear understanding of her medical conditions and does not wish for further blood draws, transfusions, cancer directed treatments, or other forms of medical optimization. They are agreeable to comfort medications only.   CM, ICU Team and nurse aware    Follow up  Palliative Care will continue to follow and goals of care discussions will be ongoing.    Please reach out via [email or web address] Secure Chat if questions or concerns arise.    I have reviewed the patient's controlled substance dispensing history in the Prescription Drug Monitoring Program in compliance with the [facility] regulations before prescribing any controlled substances.  Last refills  No opioid or benzo refills in [place] over past year.    Decisional apparatus:  Patient lacks capacity on exam today.  If capacity is lost, patient's substitute decision maker would default to adult children by [place] Act 169.  Advance Directive/Living Will, POLST and POA Forms: On file and  reviewed, medical directives start on page 6.  Names rogelio Santamaria as primary decision maker with ZORAIDA Da Silva as secondary decision maker.  Please note, first 5 pages are about finances and decision makers are listed in opposite order for financial decisions.  ER contacts:  Name Relation Home Work Mobile   Yana Santamaria [phone]     Avinash Santamaria Son In-Law [phone]       We appreciate the invitation to be involved in this patient's care.  We will continue to follow throughout this hospitalization.  Please do not hesitate to reach our on call provider through our clinic answering service at [phone] should you have acute symptom control concerns.    Jennifer P. Bloch, MSN, CRNP, ACHPN  Palliative and Supportive Care  Clinic/Answering Service: [phone]  You can find me on Homes[employer]!       24 hour history  Chart reviewed prior to visit  No events overnight  Daughter ZORAIDA Millan and grandson present at today's visit    PALLIATIVE AND SUPPORTIVE CARE FAMILY CONFERENCE:     Time of Meeting: 10:00 - 11:00     Participants: Rogelio Millan ([facility]), ZORAIDA Da Silva and grandgarfield Rdorick, ICU Team and myself     Patient Participation: Patient was not an active participant in the meeting. She is unable to make her own medical decisions.     Patient Support System: Family     Meeting Location: bedside     A family meeting was necessary to allow for thorough discussion regarding patient's clinical presentation, expected disease trajectory, and comfort care versus continuing disease directed therapy in the setting of hemorrhagic shock. Comfort care has been thoroughly explained. They had questions related to medical diagnoses, prognosis, comfort care and coverage and after all questions were answered they agreed that comfort care is the appropriate for Aurora at this time. They are agreeable to removing BiPAP and only using NC for comfort.  Discussed medications that would be ordered and  they are understanding of information.  Hospice can be considered should patient survive the next 24 hours.      Objective      Temp:  [94.6 °F (34.8 °C)-97.9 °F (36.6 °C)] 97.3 °F (36.3 °C)  HR:  [61-94] 93  Resp:  [10-45] 11  BP: ()/(50-73) 93/51  Arterial Line BP: ()/(37-77) 88/46  FiO2 (%):  [30] 30  O2 Device: BiPAP          I/O last 24 hours:  In: 922 [I.V.:772; IV Piggyback:150]  Out: 1115 [Urine:915; Drains:200]  Lines/Drains/Airways       Active Status       Name Placement date Placement time Site Days    Arterial Line 09/18/24 Left Radial 09/18/24  1032  Radial  1    Urethral Catheter 09/18/24  1706  --  1                  Physical Exam  Vitals and nursing note reviewed.   Constitutional:       General: She is awake.      Appearance: She is underweight. She is ill-appearing.      Interventions: Face mask in place.      Comments: Appears weak and frail   HENT:      Head: Normocephalic and atraumatic.      Mouth/Throat:      Mouth: Mucous membranes are dry.   Eyes:      General: No scleral icterus.        Right eye: No discharge.         Left eye: No discharge.      Conjunctiva/sclera: Conjunctivae normal.   Cardiovascular:      Rate and Rhythm: Normal rate.   Pulmonary:      Effort: Pulmonary effort is normal. Tachypnea present.      Comments: On BiPAP  Abdominal:      General: There is no distension.   Musculoskeletal:      Right lower leg: No edema.      Left lower leg: No edema.   Skin:     General: Skin is dry.      Coloration: Skin is pale.   Neurological:      Cranial Nerves: No dysarthria or facial asymmetry.   Psychiatric:         Mood and Affect: Mood is anxious.         Lab Results: I have reviewed the following results: CBC/BMP:   .     09/19/24  1344 09/19/24  1542 09/20/24  0208   WBC  --   --  16.46*   HGB 8.5*   < > 7.9*   HCT 25*  --  25.9*   PLT  --   --  108*   SODIUM  --   --  145   K  --   --  3.5   CL  --   --  107   CO2 41*  --  38*   BUN  --   --  19   CREATININE  --   --   0.90   GLUC  --   --  165*   CAIONIZED 1.25  --   --    MG  --   --  2.3    < > = values in this interval not displayed.    , LFTs:   .     09/20/24  0208   AST 11*   ALT 4*   ALB 2.3*   TBILI 0.57   ALKPHOS 72      Lab Results   Component Value Date/Time    SODIUM 145 09/20/2024 02:08 AM    K 3.5 09/20/2024 02:08 AM    BUN 19 09/20/2024 02:08 AM    CREATININE 0.90 09/20/2024 02:08 AM    GLUC 165 (H) 09/20/2024 02:08 AM    CALCIUM 7.7 (L) 09/20/2024 02:08 AM    AST 11 (L) 09/20/2024 02:08 AM    ALT 4 (L) 09/20/2024 02:08 AM    ALB 2.3 (L) 09/20/2024 02:08 AM    TP 5.0 (L) 09/20/2024 02:08 AM    EGFR 54 09/20/2024 02:08 AM    EGFR 41 11/13/2017 04:30 PM     Lab Results   Component Value Date/Time    HGB 7.9 (L) 09/20/2024 02:08 AM    WBC 16.46 (H) 09/20/2024 02:08 AM     (L) 09/20/2024 02:08 AM    INR 1.61 (H) 09/19/2024 11:11 AM    PTT 41 (H) 09/19/2024 11:11 AM     Lab Results   Component Value Date/Time    [id number] 1.952 09/18/2024 07:41 PM       Administrative Statements   I have spent a total time of 60 minutes in caring for this patient on the day of the visit/encounter including Prognosis, Risks and benefits of tx options, Instructions for management, Patient and family education, Risk factor reductions, Counseling / Coordination of care, Documenting in the medical record, Reviewing / ordering tests, medicine, procedures  , Obtaining or reviewing history  , and Communicating with other healthcare professionals .

## 2024-09-20 NOTE — ASSESSMENT & PLAN NOTE
Wt Readings from Last 3 Encounters:   09/20/24 73.1 kg (161 lb 2.5 oz)   04/29/24 62.7 kg (138 lb 3.2 oz)   02/21/24 58.1 kg (128 lb)     Family has decided to transition to level 4 comfort care.

## 2024-09-20 NOTE — DEATH NOTE
INPATIENT DEATH NOTE  Aurora Anderson 89 y.o. female MRN: [id number]  Unit/Bed#: ICU 13 Encounter: [id number]    Date, Time and Cause of Death    Date of Death: 24  Time of Death:  4:50 PM  Preliminary Cause of Death: Acute hypercapnic respiratory failure (HCC)  Entered by: Sylvia Cross[BB1.1]       Attribution       BB1.1 Sylvia Cross MD 24 17:02                 PHYSICAL EXAM:  Unresponsive to noxious stimuli, Spontaneous respirations absent, Breath sounds absent, Carotid pulse absent, Heart sounds absent, Pupillary light reflex absent, and Corneal blink reflex absent    Medical Examiner notification criteria:  NONE APPLICABLE   Medical Examiner's office notified?:  Yes   Medical Examiner accepted case?:  No  Name of Medical Examiner: Yana Dean    Family Notification  Was the family notified?: Yes  Date Notified: 24  Time Notified: 1700   Relationship to Patient: Daughter  Family Notification Route: At bedside  Was the family told to contact a  home?: Yes    Autopsy Options:  Post-mortem examination declined by next of kin    Primary Service Attending Physician notified?:  yes - Attending:  Paramjit Alves MD    Physician/Resident responsible for completing Discharge Summary:  Sylvia Cross

## 2024-09-20 NOTE — RESPIRATORY THERAPY NOTE
09/20/24 1032   Respiratory Assessment   Assessment Type Assess only   General Appearance Lethargic   Respiratory Pattern Dyspnea at rest   Chest Assessment Chest expansion symmetrical   Resp Comments Patient was taken off bipap by RN and placed on 4L NC. Patient family educated on patient holding off on water and food at this time. Expressed that they are not happy with that solution and started yelling at this RT. RN aware about patients family being hostile and said they were that way to them as well. RT removed herself from situation as family was not calming down with how they are speaking to this RT. Patient appears stable at this time but not completly alert/awake.   Oxygen Therapy/Pulse Ox   O2 Device Nasal cannula   O2 Therapy Oxygen   Nasal Cannula O2 Flow Rate (L/min) 4 L/min   Calculated FIO2 (%) - Nasal Cannula 36   SpO2 94 %   SpO2 Activity At Rest   $ Pulse Oximetry Spot Check Charge Completed

## 2024-09-20 NOTE — ASSESSMENT & PLAN NOTE
Family has decided to transition to level 4 comfort care.  BiPAP removed and now on NC O2 for comfort.

## 2024-09-20 NOTE — RESPIRATORY THERAPY NOTE
09/20/24 0727   Respiratory Assessment   Assessment Type Assess only   General Appearance Awake   Respiratory Pattern Dyspnea with exertion;Spontaneous   Chest Assessment Chest expansion symmetrical   Bilateral Breath Sounds Diminished   Resp Comments patient recieved on BIPAP (AVAPS)   Non-Invasive Information   O2 Interface Device Face mask   Non-Invasive Ventilation Mode BiPAP AVAP   SpO2 91 %   $ Pulse Oximetry Spot Check Charge Completed   Non-Invasive Settings   Min Pressure Set (cm H20) 8 cm H2O   Max Pressure Set (cm H20) 30 cm H2O   EPAP (cm) 6 cm   Rate (Set) 20   FiO2 (%) 30   Rise Time 3   Inspiratory Time (Set) 1   Vt (mL) 400 mL   Non-Invasive Readings   Skin Intervention Skin barrier applied   Total Rate 45   MV (Mech) 9.2   Peak Pressure (Obs) 15   Spontaneous Vt (mL) 366   Leak (lpm) 29   Non-Invasive Alarms   Insp Pressure High (cm H20) 35   Insp Pressure Low (cm H20) 5   Low Insp Pressure Time (sec) 20 sec   MV High (L/min) 25   MV Low (L/min) 3   Vt High (mL) 1200   Vt Low (mL) 200   High Resp Rate (BPM) 40 BPM   Low Resp Rate (BPM) 8 BPM   Apnea Interval (sec) 20

## 2024-09-20 NOTE — NURSING NOTE
Family requesting to remove BIPAP mask. Stating that they are visiting the patient and the mask should be removed and can be put back on when they leave. MD aware of the conversation. BIPAP removed at this time and placed on 4L NC

## 2024-09-20 NOTE — DISCHARGE SUMMARY
Admission Date: 9/12/2024     Admitting Diagnosis: Leg pain [M79.606]  Pleural effusion [J90]  HOSSEIN (acute kidney injury) (HCC) [N17.9]  Acute on chronic congestive heart failure, unspecified heart failure type (HCC) [I50.9]  Multiple subsegmental pulmonary emboli without acute cor pulmonale (HCC) [I26.94]    HPI: Aurora Anderson is a 89 y.o. PMH, HFpEF, atrial flutter, CKD who presented with increased shortness of breath and fatigue. On admission to ED patient had respiratory acidosis on VBG, fluid overloaded on examination. EKG on admission showed Afib with RVR. CXR on admission showed moderate-large right sided pleural effusion. Patient had bedside thoracentesis done 1L of fluid drawn. CTA PE study showed multiple bilateral pulmonary emboli with right heart strain, RV/LV ratio of 1.2, edema vs. Interstitial pneumonia. Patient was brought up to ICU for management of her respiratory acidosis possibly requiring BIPAP. Patient history obtained from the daughter. Patient has not been compliant with her medications since February.  Patient at baseline is able to move from the chair to her garage, but for the past 3 days patient had decreased mobility, she sat in her recliner for most of the day.  Her daughter also mentions that she was complaining of pain from her left leg near the knee during these last few days.  Patient presented because of dyspnea this morning along with a pleuritic chest pain, she has noticed increased leg swelling over the last 3 days.  She denies any fevers, chills, cough, abdominal pain, nausea or vomiting.. She denies any travel history or surgery. Patient has no family history of breast cancer.     Procedures Performed:   Orders Placed This Encounter   Procedures    Critical Care       Summary of Hospital Course:   89 y/o female with a PMH, HFpEF, atrial flutter, CKD who presented with increased shortness of breath and fatigue. S/p thoracentesis for right pleural effusion. 1L of transudate  drained. CTA PE study showed multiple bilateral pulmonary emboli with right heart strain, RV/LV ratio of 1.2. Placed on heparin drip admitted to ICU due to concerns about respiratory acidosis requiring bipap. Patient was was started on a heparin drip for IV anticoagulation. Patient also required levophed early in her hospital course.  Echo showed biatrial enlargement, and right ventricular systolic dysfunction, elevated right ventricular systolic pressure. In light of the right heart strain patient was diuresed with Lasix which showed improvement of her leg swelling.  Patient also developed acute hypercapnic respiratory failure throughout her stay initially improved after BiPAP on September 16.  Other notable events include urine cultures positive for MMSA MRSA infectious disease was consulted MRSA believed to have been a colonizer, but started patient on ceftriaxone for treatment of possible pneumonia.  On 9/17 patient had rapidly expanding right shin hematoma, patient required 2 units of blood overnight s/p wound exploration, washout and debridement. After surgery patient was unresponsive with worsening hypercapnia, pH of 6.99 pCO2 over 103.  Patient was restarted on BiPAP received PRBC's and volume resuscitation was restarted on Levophed.  Patient had worsening hypercapnia, lethargic off of bipap, however when placed on BiPAP patient became agitated and needed to be sedated and restrained.  Multiple goals of care discussions were had with the patient's family throughout her hospital stay.  On 9/20, patient was having worsening shock with increased vasopressor requirements, and still hypercapnic despite being on BiPAP.  Goals of care discussion was had with the family, palliative care was consulted.  Patient was ultimately transitioned to comfort care.    Significant Findings, Care, Treatment and Services Provided: Right pleural effusion, multiple bilateral subsegmental pulmonary emboli, bilateral DVT's,  echocardiogram showing right ventricular dysfunction, biatrial enlargement, sacral wound, right leg hematoma.    Complications: none    Disposition:      Final Diagnosis: Acute hypercapnic respiratory failure    Medical Problems       Resolved Problems  Date Reviewed: 2024   None         Condition at Time of Death: poor  Date, Time and Cause of Death    Date of Death: 24  Time of Death:  4:50 PM  Preliminary Cause of Death: Acute hypercapnic respiratory failure (HCC)  Entered by: Sylvia Cross[BB1.1]       Attribution       BB1.1 Sylvia Cross MD 24 17:02            Death Note:  INPATIENT DEATH NOTE  Aurora Anderson 89 y.o. female MRN: [id number]  Unit/Bed#: ICU 13 Encounter: [id number]    Date, Time and Cause of Death    Date of Death: 24  Time of Death:  4:50 PM  Preliminary Cause of Death: Acute hypercapnic respiratory failure (HCC)  Entered by: Sylvia Cross[BB1.1]       Attribution       BB1.1 Sylvia Cross MD 24 17:02                 PHYSICAL EXAM:  Unresponsive to noxious stimuli, Spontaneous respirations absent, Breath sounds absent, Carotid pulse absent, Heart sounds absent, Pupillary light reflex absent, and Corneal blink reflex absent    Medical Examiner notification criteria:  NONE APPLICABLE   Medical Examiner's office notified?:  Yes   Medical Examiner accepted case?:  No  Name of Medical Examiner: Yana Dianne    Family Notification  Was the family notified?: Yes  Date Notified: 24  Time Notified: 1700   Relationship to Patient: Daughter  Family Notification Route: At bedside  Was the family told to contact a  home?: Yes    Autopsy Options:  Post-mortem examination declined by next of kin    Primary Service Attending Physician notified?:  yes - Attending:  Paramjit Alves MD    Physician/Resident responsible for completing Discharge Summary:  Sylvia Cross

## 2024-09-20 NOTE — PROGRESS NOTES
Progress Note - Surgery-General   Name: Aurora Anderson 89 y.o. female I MRN: [id number]  Unit/Bed#: ICU 13 I Date of Admission: 9/12/2024   Date of Service: 9/20/2024 I Hospital Day: 8    Assessment & Plan  Hematoma of right lower leg  Was initially presumed to be blister, continued to rapidly expand. Patient became hypotensive, Hgb dropped to 5.9 from 7.8 Eliquis held, s/p Kcentra and 2u pRBC on 9/17-9/18 AM. 9/17 CT RLE-hematoma with active extravasation. Now POD1 s/p RLE exploration and washout with VAC placement on 9/18    Plan:  Dysphagia diet  Wean BiPAP as able  Wound VAC management, plan for change today  Monitor wound for any changes  Resuscitate as needed  Wean pressors as able  OK for chemical DVT ppx  Encourage incentive spirometer use  Strict I's and O's  Pain and nausea control p.r.n.  Rest of care per ICU  Can restart hep gtt, opt for IVC filter per ICU          History of Present Illness   No acute events overnight.  Patient with hypotension and tachypnea.  Remains on pressure support with Levophed and vaso started this morning. Patient is lethargic on exam and unable to give report wound VAC in place with minimal serosanguineous output.    Objective      Temp:  [94.6 °F (34.8 °C)-99.3 °F (37.4 °C)] 97.5 °F (36.4 °C)  HR:  [61-87] 86  Resp:  [11-45] 11  BP: ()/(50-73) 97/54  Arterial Line BP: ()/(37-77) 91/47  FiO2 (%):  [30] 30  O2 Device: BiPAP          I/O         09/18 0701  09/19 0700 09/19 0701  09/20 0700    I.V. (mL/kg) 4236.5 (57.7) 772 (10.6)    Blood 700     IV Piggyback 450 150    Total Intake(mL/kg) 5386.5 (73.4) 922 (12.6)    Urine (mL/kg/hr) 1035 (0.6) 690 (0.4)    Drains 50 200    Blood 400     Total Output 1485 890    Net +3901.5 +32                Lines/Drains/Airways       Active Status       Name Placement date Placement time Site Days    Arterial Line 09/18/24 Left Radial 09/18/24  1032  Radial  1    Urethral Catheter 09/18/24  1706  --  1                  Physical  Exam   General: NAD, lethargic  HENT: NCAT MMM  Neck: supple, no JVD  CV: nl rate  Lungs: nl wob.  On BiPAP  ABD: Soft, nontender, nondistended  Extrem: chronic wounds. wound on LLE covered in mepilex. RLE wound with wound VAC draining minimal serosanguineous fluid.  Neuro: Unable to assess.    Lab Results: I have reviewed the following results:   Imaging Review: Reviewed radiology reports from this admission including: CT RLE.  Other Studies: No additional pertinent studies reviewed.    VTE Pharmacologic Prophylaxis: VTE covered by:    None     VTE Mechanical Prophylaxis: sequential compression device

## 2024-09-20 NOTE — PROGRESS NOTES
Progress Note - Infectious Disease   Aurora Anderson 89 y.o. female MRN: [id number]  Unit/Bed#: ICU 13 Encounter: [id number]      Impression/Recommendations:  1.  Hypotension and hypothermia.  Despite correction of right leg hemorrhage, patient remains hypotensive and now hypothermic.  Concern was for development of sepsis.  Patient has Pseudomonas growing from sacral wound and MRSA growing from urine culture previously that was felt to be colonization and treated, given absence of signs of infection.  Given concerns for sepsis, antibiotic regimen was changed to vancomycin/cefepime.  Although it is unclear the patient has any active infection, this is a reasonable approach.  Since this antibiotics are now started, it is best to have patient complete an empiric course.  Continue vancomycin/cefepime.  Monitor temperature/WBC.  Monitor hemodynamics.  Treat x 7-day course.    2.  Possible pneumonia.  CXR and chest CT findings were equivocal for pneumonia and her elevated procalcitonin may be all secondary to HOSSEIN, given response to ceftriaxone, patient was treated for presumptive pneumonia.  She completed antibiotic course for it.  Antibiotic needed for this indication.     3.  MRSA bacteriuria.  This is most likely bladder colonization.  However, as in above, due to concern for sepsis, patient is now on IV vancomycin.   Antibiotic plan as in above.     4.  Sacral wound which is probed to bone.  Finding of probing to bone suggest underlying osteomyelitis.  Patient is status post local I&D, with culture growing Pseudomonas blood without evidence of cellulitis clinically.  However, as in above, due to concern for sepsis, patient is now on IV cefepime.  Given significant generalized debility and bedbound state, likely very high risk for extensive I&D and flap closure, prolonged antibiotic course would be futile.  Best course of action would be to continue local wound care.  Light growth of Pseudomonas in wound culture wound  culture is most likely ulcer/wound colonization.  Antibiotic plan as in above.  Continue local wound care.     5.  Bilateral PE.  Patient is currently on anticoagulation.  Anticoagulation per primary service.     6.  Pulmonary edema with large right pleural effusion.  Patient is status post thoracentesis with pleural fluid finding consistent with transudate.  She is improved with diuresis.  Diuresis per primary service.     7.  Acute hypoxic respiratory failure, likely multifactorial, secondary to PE, pulmonary edema and possibly pneumonia.  Patient is currently on O2 supplement.  O2 support per primary service.     8.  HOSSEIN, superimposed on CKD.  Creatinine continues to improve.  Antibiotic dosages adjusted accordingly.  Monitor creatinine.     9.  PCN allergy.  Patient is tolerating cephalosporin well.  Monitor for cross allergic reaction.     Discussed with critical care medicine service regarding antibiotic plan above.  They are in agreement.     Antibiotics:  Vancomycin/cefepime     Subjective:  Patient's hypotension persists.  She is now also hypothermic.  Antibiotic regimen was escalated.  Patient is sleepy set lethargic, barely arousable.    Objective:  Vitals:  Temp:  [94.6 °F (34.8 °C)-101.1 °F (38.4 °C)] 95.2 °F (35.1 °C)  HR:  [] 72  Resp:  [16-45] 21  BP: ()/(50-73) 124/73  SpO2:  [84 %-100 %] 100 %  Temp (24hrs), Av.8 °F (36 °C), Min:94.6 °F (34.8 °C), Max:101.1 °F (38.4 °C)  Current: Temperature: (!) 95.2 °F (35.1 °C)    Physical Exam:     General: Patient lethargic, barely arousable.  She mumbles but is otherwise noncommunicative.  Comfortable.  Nontoxic.   Neck:  Supple. No mass.  No lymphadenopathy.   Lungs: Decreased breath sounds, no rales, no wheezing, respirations unlabored.   Heart:  Regular rate and rhythm, S1 and S2 normal, no murmur.   Abdomen: Soft, nondistended, non-tender, bowel sounds active all four quadrants, no masses, no organomegaly.   Extremities: Stable leg edema.   Right leg wound with VAC.  No purulence.  No erythema/warmth.  Apparent mild tenderness.   Skin:  No rash.   Neuro: Difficult to assess.     Invasive Devices       Peripheral Intravenous Line  Duration             Peripheral IV 09/16/24 Distal;Right;Dorsal (posterior) Forearm 3 days    Peripheral IV 09/16/24 Left;Ventral (anterior) Forearm 3 days    Peripheral IV 09/18/24 Left Hand 1 day    Peripheral IV 09/18/24 Proximal;Right;Ventral (anterior) Forearm 1 day              Arterial Line  Duration             Arterial Line 09/18/24 Left Radial 1 day              Drain  Duration             Urethral Catheter 1 day                    Labs studies:   I have personally reviewed pertinent labs.  Results from last 7 days   Lab Units 09/19/24  1344 09/19/24  0447 09/18/24  1501 09/18/24  1243 09/18/24  1227 09/18/24  1101 09/18/24  0441 09/15/24  0438 09/14/24  0000   POTASSIUM mmol/L  --  4.1  --   --  5.2  --  3.7   < > 4.1   CHLORIDE mmol/L  --  109*  --   --  109*  --  104   < > 106   CO2 mmol/L  --  39*  --   --  36*  --  41*   < > 34*   CO2, I-STAT mmol/L 41*  --   --   --   --   --   --   --   --    BUN mg/dL  --  18  --   --  20  --  23   < > 38*   CREATININE mg/dL  --  0.90  --   --  0.82  --  0.93   < > 1.53*   EGFR ml/min/1.73sq m  --  54  --   --  61  --  52   < > 28   GLUCOSE, ISTAT mg/dl 87  --  118 111  --    < >  --   --   --    CALCIUM mg/dL  --  8.5  --   --  7.6*  --  7.9*   < > 8.0*   AST U/L  --   --   --   --  10*  --   --   --  16   ALT U/L  --   --   --   --  5*  --   --   --  9   ALK PHOS U/L  --   --   --   --  59  --   --   --  192*    < > = values in this interval not displayed.     Results from last 7 days   Lab Units 09/19/24  1744 09/19/24  1542 09/19/24  1344 09/19/24  1111 09/19/24  0804 09/19/24  0447 09/18/24  1243 09/18/24  1227 09/18/24  1101 09/18/24  0441   WBC Thousand/uL  --   --   --   --   --  14.89*  --  12.38*  --  9.46   HEMOGLOBIN g/dL 7.9* 8.2*  --  8.5*   < > 8.5*   < > 8.5*   --  7.7*   I STAT HEMOGLOBIN g/dl  --   --  8.5*  --   --   --    < >  --    < >  --    PLATELETS Thousands/uL  --   --   --  72*  --  79*  --  59*  --  75*    < > = values in this interval not displayed.     Results from last 7 days   Lab Units 09/13/24  1649 09/13/24  1053 09/13/24  0611   BLOOD CULTURE   --   --  No Growth After 5 Days.  No Growth After 5 Days.   GRAM STAIN RESULT  2+ Gram negative rods*  2+ Gram positive cocci in clusters*  2+ Polys*  --   --    URINE CULTURE   --  >100,000 cfu/ml Staphylococcus aureus*  60,000-69,000 cfu/ml Methicillin Resistant Staphylococcus aureus*  --    WOUND CULTURE  1 colony Pseudomonas aeruginosa*  4+ Growth of  --   --        Imaging Studies:   I have personally reviewed pertinent imaging study reports and images in PACS.    EKG, Pathology, and Other Studies:   I have personally reviewed pertinent reports.

## 2024-09-20 NOTE — ASSESSMENT & PLAN NOTE
Palliative diagnoses: CHF  Goals of care  Level 4 code status  Patient and family have elected to focus on comfort at this time. Although patient lacks full capacity she is able to make her needs known and is agreeable to comfort measures only.  They express clear understanding of her medical conditions and does not wish for further blood draws, transfusions, cancer directed treatments, or other forms of medical optimization. They are agreeable to comfort medications only.   CM, ICU Team and nurse aware    Follow up  Palliative Care will continue to follow and goals of care discussions will be ongoing.    Please reach out via [employer] Secure Chat if questions or concerns arise.    I have reviewed the patient's controlled substance dispensing history in the Prescription Drug Monitoring Program in compliance with the [facility] regulations before prescribing any controlled substances.  Last refills  No opioid or benzo refills in [place] over past year.    Decisional apparatus:  Patient lacks capacity on exam today.  If capacity is lost, patient's substitute decision maker would default to adult children by [place] Act 169.  Advance Directive/Living Will, POLST and POA Forms: On file and reviewed, medical directives start on page 6.  Names rogelio Santamaria as primary decision maker with ZORAIDA Da Silva as secondary decision maker.  Please note, first 5 pages are about finances and decision makers are listed in opposite order for financial decisions.  ER contacts:  Name Relation Home Work Mobile   Yana Santamaria Daughter [phone]     Avinash Santamaria Son In-Law [phone]       We appreciate the invitation to be involved in this patient's care.  We will continue to follow throughout this hospitalization.  Please do not hesitate to reach our on call provider through our clinic answering service at [phone] should you have acute symptom control concerns.    Jennifer P. Bloch, MSN, CRNP, ACHPN  Palliative and Supportive  [facility]  Clinic/Answering Service: [phone]  You can find me on [email or web address]!

## 2024-09-20 NOTE — PROGRESS NOTES
Aurora Anderson is a 89 y.o. female who is currently ordered Vancomycin IV with management by the Pharmacy Consult service.  Relevant clinical data and objective / subjective history reviewed.  Vancomycin Assessment:  Indication and Goal AUC/Trough: Urinary tract infection (goal -600, trough >10); Pneumonia (goal -600, trough >10)  Micro:     Renal Function:  SCr: 0.9 mg/dL  CrCl: 36.8 mL/min  Renal replacement: Not on dialysis  Days of Therapy: 2  Current Dose: 1000 mg IV every 24 hours  Vancomycin Plan:  New Dosing: continue current dose  Estimated AUC: 553 mcg*hr/mL  Estimated Trough: 16.4 mcg/mL  Next Level: 9/22 at 0600  Renal Function Monitoring: Daily BMP and UOP  Pharmacy will continue to follow closely for s/sx of nephrotoxicity, infusion reactions and appropriateness of therapy.  BMP and CBC will be ordered per protocol. We will continue to follow the patient’s culture results and clinical progress daily.    Jennifer Wolfe, Pharmacist

## 2024-09-23 ENCOUNTER — TELEPHONE (OUTPATIENT)
Dept: INTERNAL MEDICINE CLINIC | Facility: CLINIC | Age: 89
End: 2024-09-23

## 2024-09-23 LAB — MISCELLANEOUS LAB TEST RESULT: NORMAL

## 2024-09-23 NOTE — PLAN OF CARE
Problem: Prexisting or High Potential for Compromised Skin Integrity  Goal: Skin integrity is maintained or improved  Description: INTERVENTIONS:  - Identify patients at risk for skin breakdown  - Assess and monitor skin integrity  - Assess and monitor nutrition and hydration status  - Monitor labs   - Assess for incontinence   - Turn and reposition patient  - Assist with mobility/ambulation  - Relieve pressure over bony prominences  - Avoid friction and shearing  - Provide appropriate hygiene as needed including keeping skin clean and dry  - Evaluate need for skin moisturizer/barrier cream  - Collaborate with interdisciplinary team   - Patient/family teaching  - Consider wound care consult   Outcome: Progressing     Problem: Nutrition/Hydration-ADULT  Goal: Nutrient/Hydration intake appropriate for improving, restoring or maintaining nutritional needs  Description: Monitor and assess patient's nutrition/hydration status for malnutrition. Collaborate with interdisciplinary team and initiate plan and interventions as ordered.  Monitor patient's weight and dietary intake as ordered or per policy. Utilize nutrition screening tool and intervene as necessary. Determine patient's food preferences and provide high-protein, high-caloric foods as appropriate.     INTERVENTIONS:  - Monitor oral intake, urinary output, labs, and treatment plans  - Assess nutrition and hydration status and recommend course of action  - Evaluate amount of meals eaten  - Assist patient with eating if necessary   - Allow adequate time for meals  - Recommend/ encourage appropriate diets, oral nutritional supplements, and vitamin/mineral supplements  - Order, calculate, and assess calorie counts as needed  - Recommend, monitor, and adjust tube feedings and TPN/PPN based on assessed needs  - Assess need for intravenous fluids  - Provide specific nutrition/hydration education as appropriate  - Include patient/family/caregiver in decisions related to  nutrition  Outcome: Progressing     Problem: SAFETY,RESTRAINT: NV/NON-SELF DESTRUCTIVE BEHAVIOR  Goal: Remains free of harm/injury (restraint for non violent/non self-detsructive behavior)  Description: INTERVENTIONS:  - Instruct patient/family regarding restraint use   - Assess and monitor physiologic and psychological status   - Provide interventions and comfort measures to meet assessed patient needs   - Identify and implement measures to help patient regain control  - Assess readiness for release of restraint   Outcome: Progressing  Goal: Returns to optimal restraint-free functioning  Description: INTERVENTIONS:  - Assess the patient's behavior and symptoms that indicate continued need for restraint  - Identify and implement measures to help patient regain control  - Assess readiness for release of restraint   Outcome: Progressing      I was physically present and participated in this delivery.

## 2024-09-24 NOTE — PLAN OF CARE
----- Message from Ralph Raygoza MD sent at 9/24/2024 10:15 AM CDT -----  10-year surveillance.  Please call patient and let them know the pathology report.   GASTROINTESTINAL - ADULT     Maintains or returns to baseline bowel function Progressing     Maintains adequate nutritional intake Progressing        METABOLIC, FLUID AND ELECTROLYTES - ADULT     Electrolytes maintained within normal limits Progressing     Fluid balance maintained Progressing        PAIN - ADULT     Verbalizes/displays adequate comfort level or baseline comfort level Progressing        Potential for Falls     Patient will remain free of falls Progressing        SKIN/TISSUE INTEGRITY - ADULT     Skin integrity remains intact Progressing

## 2024-09-25 NOTE — TELEPHONE ENCOUNTER
24 11:37 PM      Per PCP Guidelines, the office is to complete the appropriate portion of the  Workflow and remove PCP at the office level.      Thank you  Andrey Kendall

## 2024-10-01 NOTE — OP NOTE
OPERATIVE REPORT  PATIENT NAME: Aurora Anderson    :  1929  MRN: [id number]  Pt Location: [facility] OR ROOM 01    SURGERY DATE: 2024    Surgeons and Role:     * Nicol Pérez MD - Primary     * Kinsey Nuñez MD - Assisting     Preop Diagnosis:  Hematoma of leg [S80.10XA]  Hematoma of right lower leg [S80.11XA]    Post-Op Diagnosis Codes:     * Hematoma of leg [S80.10XA]     * Hematoma of right lower leg [S80.11XA]    Procedure(s):  Right - RLE exploration. DEBRIDEMENT WOUND (WASH OUT).  vac placement    Specimen(s):  * No specimens in log *    Estimated Blood Loss:   1000 mL    Drains:  Wound VAC    Anesthesia Type:   General    Operative Indications:  Hematoma of leg [S80.10XA]  Hematoma of right lower leg [S80.11XA]      Operative Findings:  Large hematoma of the right lower leg with approximately 1 L of clot removed.      Complications:   None    Procedure and Technique:  Patient was brought to the OR where she was positioned in the usual fashion.  A femoral nerve block was done to avoid complications with intubation given her age and comorbidities.  Once the block was confirmed, she was prepped with Betadine, and ChloraPrep.  There was a 8 to 10 cm skin tear overlying the hematoma at the site of the right shin.  This area was friable and erupted with minimal manual pressure.  Given the poor friable integrity of the skin the remainder of the wound opened spontaneously along the length of the hematoma just proximal to the medial malleolus.  Approximately 1 mL of clot was removed from the superficial pocket.  The underlying dermis appeared to remain intact.  The entire wound measured approximately 15 cm x 23 cm.  No active bleeding vessel was noted, however there were small petechial bruises throughout the wound bed.  This was thought to be secondary to the patient's coagulopathic status.  Manual pressure was applied, and an epinephrine soaked gauze was used to help with some of the small petechial  bleeding.  In addition 3 sheets of Surgicel were placed temporarily to help with hemostasis.  Any obvious areas of bleeding were cauterized, however once again there was no overt vessel to target.  The corners of the skin were very loosely approximated using a 2-0 chromic just to make sure that the overlying skin remained in place.  The skin predominantly looked healthy.  There was a area approximately 8 x 6 cm just distal to the knee more anteriorly, that did appear to look questionable.  The decision was made to leave the skin in place to allow any viable skin to heal in place.  A small piece of black sponge was placed into the most distal independent aspect of the wound.  Adaptic dressing was applied over the skin and wound.  A large VAC sponge was placed over the Adaptic, and this was secured in place using VAC tape.  The VAC was set to 100 mmHg and the seal was ensured.  Patient was taken back to ICU without any complications.    Dr. Pérez was present for the entire procedure.    Patient Disposition:  Critical Care Unit             SIGNATURE: Kinsey Nuñez MD  DATE: September 18, 2024  TIME: 11:43 AM                 Patient interviewed in a private space.

## 2024-10-07 LAB
PF4 HEPARIN CMPLX AB SER-ACNC: NORMAL
SRA .2 IU/ML UFH SER-ACNC: NORMAL %

## 2024-11-18 NOTE — TELEPHONE ENCOUNTER
Neda Crawford has called the office in regards to [employer]  Darylene Boring states a prior authorization is needed for both metprolo succinate (TOPROL-XL) 25 mg 24 hr tablet and pantoprazole (PROTONIX) 40 mg tablet  Darylene Boring states she was able to get the medication without an issue before, without any issue  DISPLAY PLAN FREE TEXT

## (undated) DEVICE — DRESSING MEPILEX AG BORDER 4 X 4 IN

## (undated) DEVICE — CURITY NON-ADHERENT STRIPS: Brand: CURITY